# Patient Record
Sex: MALE | Race: WHITE | NOT HISPANIC OR LATINO | Employment: UNEMPLOYED | URBAN - METROPOLITAN AREA
[De-identification: names, ages, dates, MRNs, and addresses within clinical notes are randomized per-mention and may not be internally consistent; named-entity substitution may affect disease eponyms.]

---

## 2017-07-21 ENCOUNTER — HOSPITAL ENCOUNTER (INPATIENT)
Facility: MEDICAL CENTER | Age: 52
LOS: 1 days | DRG: 565 | End: 2017-07-22
Attending: EMERGENCY MEDICINE | Admitting: INTERNAL MEDICINE
Payer: COMMERCIAL

## 2017-07-21 ENCOUNTER — APPOINTMENT (OUTPATIENT)
Dept: RADIOLOGY | Facility: MEDICAL CENTER | Age: 52
DRG: 565 | End: 2017-07-21
Attending: INTERNAL MEDICINE
Payer: COMMERCIAL

## 2017-07-21 DIAGNOSIS — M79.605 BILATERAL LOWER EXTREMITY PAIN: ICD-10-CM

## 2017-07-21 DIAGNOSIS — M79.604 BILATERAL LOWER EXTREMITY PAIN: ICD-10-CM

## 2017-07-21 PROBLEM — T87.43 RIGHT BKA INFECTION (HCC): Status: ACTIVE | Noted: 2017-07-21

## 2017-07-21 PROBLEM — S81.801A WOUND OF RIGHT LEG: Status: ACTIVE | Noted: 2017-07-21

## 2017-07-21 PROBLEM — Z72.0 TOBACCO ABUSE: Status: ACTIVE | Noted: 2017-07-21

## 2017-07-21 PROBLEM — D50.9 MICROCYTIC ANEMIA: Status: ACTIVE | Noted: 2017-07-21

## 2017-07-21 PROBLEM — G62.9 PERIPHERAL NEUROPATHY: Status: ACTIVE | Noted: 2017-07-21

## 2017-07-21 PROBLEM — L97.522 SKIN ULCER OF LEFT FOOT WITH FAT LAYER EXPOSED (HCC): Status: ACTIVE | Noted: 2017-07-21

## 2017-07-21 LAB
ALBUMIN SERPL BCP-MCNC: 3.2 G/DL (ref 3.2–4.9)
ALBUMIN/GLOB SERPL: 0.6 G/DL
ALP SERPL-CCNC: 117 U/L (ref 30–99)
ALT SERPL-CCNC: 16 U/L (ref 2–50)
ANION GAP SERPL CALC-SCNC: 14 MMOL/L (ref 0–11.9)
AST SERPL-CCNC: 24 U/L (ref 12–45)
BASOPHILS # BLD AUTO: 0.4 % (ref 0–1.8)
BASOPHILS # BLD: 0.04 K/UL (ref 0–0.12)
BILIRUB SERPL-MCNC: 0.4 MG/DL (ref 0.1–1.5)
BUN SERPL-MCNC: 10 MG/DL (ref 8–22)
CALCIUM SERPL-MCNC: 8.7 MG/DL (ref 8.4–10.2)
CHLORIDE SERPL-SCNC: 99 MMOL/L (ref 96–112)
CO2 SERPL-SCNC: 24 MMOL/L (ref 20–33)
CREAT SERPL-MCNC: 0.72 MG/DL (ref 0.5–1.4)
CRP SERPL HS-MCNC: 2.52 MG/DL (ref 0–0.75)
EOSINOPHIL # BLD AUTO: 0.22 K/UL (ref 0–0.51)
EOSINOPHIL NFR BLD: 2.1 % (ref 0–6.9)
ERYTHROCYTE [DISTWIDTH] IN BLOOD BY AUTOMATED COUNT: 39.4 FL (ref 35.9–50)
ERYTHROCYTE [SEDIMENTATION RATE] IN BLOOD BY WESTERGREN METHOD: 69 MM/HOUR (ref 0–20)
GFR SERPL CREATININE-BSD FRML MDRD: >60 ML/MIN/1.73 M 2
GLOBULIN SER CALC-MCNC: 5.2 G/DL (ref 1.9–3.5)
GLUCOSE SERPL-MCNC: 97 MG/DL (ref 65–99)
HCT VFR BLD AUTO: 36.9 % (ref 42–52)
HGB BLD-MCNC: 11.6 G/DL (ref 14–18)
IMM GRANULOCYTES # BLD AUTO: 0.03 K/UL (ref 0–0.11)
IMM GRANULOCYTES NFR BLD AUTO: 0.3 % (ref 0–0.9)
IRON SATN MFR SERPL: 13 % (ref 15–55)
IRON SERPL-MCNC: 40 UG/DL (ref 50–180)
LYMPHOCYTES # BLD AUTO: 2.74 K/UL (ref 1–4.8)
LYMPHOCYTES NFR BLD: 25.6 % (ref 22–41)
MAGNESIUM SERPL-MCNC: 1.9 MG/DL (ref 1.5–2.5)
MCH RBC QN AUTO: 23.3 PG (ref 27–33)
MCHC RBC AUTO-ENTMCNC: 31.4 G/DL (ref 33.7–35.3)
MCV RBC AUTO: 74.2 FL (ref 81.4–97.8)
MONOCYTES # BLD AUTO: 0.48 K/UL (ref 0–0.85)
MONOCYTES NFR BLD AUTO: 4.5 % (ref 0–13.4)
NEUTROPHILS # BLD AUTO: 7.2 K/UL (ref 1.82–7.42)
NEUTROPHILS NFR BLD: 67.1 % (ref 44–72)
NRBC # BLD AUTO: 0 K/UL
NRBC BLD AUTO-RTO: 0 /100 WBC
PLATELET # BLD AUTO: 566 K/UL (ref 164–446)
PMV BLD AUTO: 8.5 FL (ref 9–12.9)
POTASSIUM SERPL-SCNC: 4.1 MMOL/L (ref 3.6–5.5)
PROT SERPL-MCNC: 8.4 G/DL (ref 6–8.2)
RBC # BLD AUTO: 4.97 M/UL (ref 4.7–6.1)
SODIUM SERPL-SCNC: 137 MMOL/L (ref 135–145)
TIBC SERPL-MCNC: 307 UG/DL (ref 250–450)
TSH SERPL DL<=0.005 MIU/L-ACNC: 0.92 UIU/ML (ref 0.35–5.5)
WBC # BLD AUTO: 10.7 K/UL (ref 4.8–10.8)

## 2017-07-21 PROCEDURE — 99407 BEHAV CHNG SMOKING > 10 MIN: CPT | Performed by: INTERNAL MEDICINE

## 2017-07-21 PROCEDURE — 99285 EMERGENCY DEPT VISIT HI MDM: CPT

## 2017-07-21 PROCEDURE — A9270 NON-COVERED ITEM OR SERVICE: HCPCS | Performed by: INTERNAL MEDICINE

## 2017-07-21 PROCEDURE — 73630 X-RAY EXAM OF FOOT: CPT | Mod: LT

## 2017-07-21 PROCEDURE — 85652 RBC SED RATE AUTOMATED: CPT

## 2017-07-21 PROCEDURE — 700105 HCHG RX REV CODE 258: Performed by: INTERNAL MEDICINE

## 2017-07-21 PROCEDURE — 83540 ASSAY OF IRON: CPT

## 2017-07-21 PROCEDURE — 700102 HCHG RX REV CODE 250 W/ 637 OVERRIDE(OP): Performed by: INTERNAL MEDICINE

## 2017-07-21 PROCEDURE — 770006 HCHG ROOM/CARE - MED/SURG/GYN SEMI*

## 2017-07-21 PROCEDURE — 83735 ASSAY OF MAGNESIUM: CPT

## 2017-07-21 PROCEDURE — 84443 ASSAY THYROID STIM HORMONE: CPT

## 2017-07-21 PROCEDURE — 86140 C-REACTIVE PROTEIN: CPT

## 2017-07-21 PROCEDURE — 73562 X-RAY EXAM OF KNEE 3: CPT | Mod: RT

## 2017-07-21 PROCEDURE — 94760 N-INVAS EAR/PLS OXIMETRY 1: CPT

## 2017-07-21 PROCEDURE — 700111 HCHG RX REV CODE 636 W/ 250 OVERRIDE (IP): Performed by: EMERGENCY MEDICINE

## 2017-07-21 PROCEDURE — 82728 ASSAY OF FERRITIN: CPT

## 2017-07-21 PROCEDURE — 36415 COLL VENOUS BLD VENIPUNCTURE: CPT

## 2017-07-21 PROCEDURE — 83550 IRON BINDING TEST: CPT

## 2017-07-21 PROCEDURE — 96372 THER/PROPH/DIAG INJ SC/IM: CPT

## 2017-07-21 PROCEDURE — 99223 1ST HOSP IP/OBS HIGH 75: CPT | Mod: 25 | Performed by: INTERNAL MEDICINE

## 2017-07-21 PROCEDURE — 80053 COMPREHEN METABOLIC PANEL: CPT

## 2017-07-21 PROCEDURE — 83036 HEMOGLOBIN GLYCOSYLATED A1C: CPT

## 2017-07-21 PROCEDURE — 96374 THER/PROPH/DIAG INJ IV PUSH: CPT

## 2017-07-21 PROCEDURE — 700111 HCHG RX REV CODE 636 W/ 250 OVERRIDE (IP): Performed by: INTERNAL MEDICINE

## 2017-07-21 PROCEDURE — 85025 COMPLETE CBC W/AUTO DIFF WBC: CPT

## 2017-07-21 RX ORDER — OXYCODONE HYDROCHLORIDE 5 MG/1
5 TABLET ORAL
Status: DISCONTINUED | OUTPATIENT
Start: 2017-07-21 | End: 2017-07-22 | Stop reason: HOSPADM

## 2017-07-21 RX ORDER — PROMETHAZINE HYDROCHLORIDE 25 MG/1
12.5-25 SUPPOSITORY RECTAL EVERY 4 HOURS PRN
Status: DISCONTINUED | OUTPATIENT
Start: 2017-07-21 | End: 2017-07-22 | Stop reason: HOSPADM

## 2017-07-21 RX ORDER — POLYETHYLENE GLYCOL 3350 17 G/17G
1 POWDER, FOR SOLUTION ORAL
Status: DISCONTINUED | OUTPATIENT
Start: 2017-07-21 | End: 2017-07-22 | Stop reason: HOSPADM

## 2017-07-21 RX ORDER — NICOTINE 21 MG/24HR
14 PATCH, TRANSDERMAL 24 HOURS TRANSDERMAL
Status: DISCONTINUED | OUTPATIENT
Start: 2017-07-21 | End: 2017-07-22 | Stop reason: HOSPADM

## 2017-07-21 RX ORDER — ONDANSETRON 2 MG/ML
4 INJECTION INTRAMUSCULAR; INTRAVENOUS EVERY 4 HOURS PRN
Status: DISCONTINUED | OUTPATIENT
Start: 2017-07-21 | End: 2017-07-22 | Stop reason: HOSPADM

## 2017-07-21 RX ORDER — MORPHINE SULFATE 4 MG/ML
2 INJECTION, SOLUTION INTRAMUSCULAR; INTRAVENOUS
Status: DISCONTINUED | OUTPATIENT
Start: 2017-07-21 | End: 2017-07-22 | Stop reason: HOSPADM

## 2017-07-21 RX ORDER — MORPHINE SULFATE 4 MG/ML
4 INJECTION, SOLUTION INTRAMUSCULAR; INTRAVENOUS
Status: COMPLETED | OUTPATIENT
Start: 2017-07-21 | End: 2017-07-22

## 2017-07-21 RX ORDER — AMOXICILLIN 250 MG
2 CAPSULE ORAL 2 TIMES DAILY
Status: DISCONTINUED | OUTPATIENT
Start: 2017-07-21 | End: 2017-07-22 | Stop reason: HOSPADM

## 2017-07-21 RX ORDER — SODIUM CHLORIDE 9 MG/ML
INJECTION, SOLUTION INTRAVENOUS CONTINUOUS
Status: DISCONTINUED | OUTPATIENT
Start: 2017-07-21 | End: 2017-07-22 | Stop reason: HOSPADM

## 2017-07-21 RX ORDER — ONDANSETRON 4 MG/1
4 TABLET, ORALLY DISINTEGRATING ORAL EVERY 4 HOURS PRN
Status: DISCONTINUED | OUTPATIENT
Start: 2017-07-21 | End: 2017-07-22 | Stop reason: HOSPADM

## 2017-07-21 RX ORDER — BISACODYL 10 MG
10 SUPPOSITORY, RECTAL RECTAL
Status: DISCONTINUED | OUTPATIENT
Start: 2017-07-21 | End: 2017-07-22 | Stop reason: HOSPADM

## 2017-07-21 RX ORDER — MORPHINE SULFATE 4 MG/ML
4 INJECTION, SOLUTION INTRAMUSCULAR; INTRAVENOUS ONCE
Status: COMPLETED | OUTPATIENT
Start: 2017-07-21 | End: 2017-07-21

## 2017-07-21 RX ORDER — PROMETHAZINE HYDROCHLORIDE 25 MG/1
12.5-25 TABLET ORAL EVERY 4 HOURS PRN
Status: DISCONTINUED | OUTPATIENT
Start: 2017-07-21 | End: 2017-07-22 | Stop reason: HOSPADM

## 2017-07-21 RX ORDER — GABAPENTIN 300 MG/1
300 CAPSULE ORAL 3 TIMES DAILY
Status: DISCONTINUED | OUTPATIENT
Start: 2017-07-21 | End: 2017-07-22

## 2017-07-21 RX ORDER — OXYCODONE HYDROCHLORIDE 5 MG/1
2.5 TABLET ORAL
Status: DISCONTINUED | OUTPATIENT
Start: 2017-07-21 | End: 2017-07-22 | Stop reason: HOSPADM

## 2017-07-21 RX ORDER — ONDANSETRON 2 MG/ML
4 INJECTION INTRAMUSCULAR; INTRAVENOUS
Status: DISCONTINUED | OUTPATIENT
Start: 2017-07-21 | End: 2017-07-22 | Stop reason: HOSPADM

## 2017-07-21 RX ORDER — ACETAMINOPHEN 325 MG/1
650 TABLET ORAL EVERY 6 HOURS PRN
Status: DISCONTINUED | OUTPATIENT
Start: 2017-07-21 | End: 2017-07-22 | Stop reason: HOSPADM

## 2017-07-21 RX ORDER — ONDANSETRON 2 MG/ML
4 INJECTION INTRAMUSCULAR; INTRAVENOUS ONCE
Status: DISCONTINUED | OUTPATIENT
Start: 2017-07-21 | End: 2017-07-22 | Stop reason: HOSPADM

## 2017-07-21 RX ADMIN — MORPHINE SULFATE 2 MG: 4 INJECTION INTRAVENOUS at 19:23

## 2017-07-21 RX ADMIN — MORPHINE SULFATE 4 MG: 4 INJECTION INTRAVENOUS at 14:29

## 2017-07-21 RX ADMIN — OXYCODONE HYDROCHLORIDE 5 MG: 5 TABLET ORAL at 20:40

## 2017-07-21 RX ADMIN — OXYCODONE HYDROCHLORIDE 5 MG: 5 TABLET ORAL at 16:49

## 2017-07-21 RX ADMIN — ACETAMINOPHEN 650 MG: 325 TABLET, FILM COATED ORAL at 20:44

## 2017-07-21 RX ADMIN — ONDANSETRON 4 MG: 2 INJECTION INTRAMUSCULAR; INTRAVENOUS at 14:16

## 2017-07-21 RX ADMIN — MORPHINE SULFATE 4 MG: 4 INJECTION INTRAVENOUS at 23:17

## 2017-07-21 RX ADMIN — SODIUM CHLORIDE: 9 INJECTION, SOLUTION INTRAVENOUS at 16:50

## 2017-07-21 ASSESSMENT — PAIN SCALES - GENERAL
PAINLEVEL_OUTOF10: 7
PAINLEVEL_OUTOF10: 8
PAINLEVEL_OUTOF10: 7
PAINLEVEL_OUTOF10: 7

## 2017-07-21 ASSESSMENT — ENCOUNTER SYMPTOMS
NAUSEA: 0
SHORTNESS OF BREATH: 0
MYALGIAS: 0
HEADACHES: 0
HEARTBURN: 0
FEVER: 0
DEPRESSION: 0
BLOOD IN STOOL: 0
COUGH: 0
HALLUCINATIONS: 0
ABDOMINAL PAIN: 0
DIZZINESS: 0
PALPITATIONS: 0
WEAKNESS: 0
DIARRHEA: 0
VOMITING: 0
SORE THROAT: 0
FOCAL WEAKNESS: 0
BACK PAIN: 0
CHILLS: 0

## 2017-07-21 NOTE — ED NOTES
"Med Rec completed per patient  Allergies reviewed  No ORAL antibiotics in last 30 days    Patient stated he found some [place] in a bag last week and took a few of those, along with a blue pill he found (\"maybe Oxycodone\")  "

## 2017-07-21 NOTE — ASSESSMENT & PLAN NOTE
- with hx of PVD and medical noncompliance with wound care since moving to [place]  - Wound care consult placed; appreciate recs  - Xray findings noted; elevated CRP/ESR noted; possible chr osteo  - Wound care obtained wound culture; started broad-spec abx  - ID recs appreciated; transferred to [facility] for LPS

## 2017-07-21 NOTE — ED NOTES
RN unsuccessful x 2 attempts on IV start. Lab called for assistance. ERP aware. Charge nurse notified for assistance.

## 2017-07-21 NOTE — ASSESSMENT & PLAN NOTE
- X-ray findings noted  - CRP and ESR elevated  - SIRS markers stable for now  - wound care obtained Cx; started on Vanc and Zosyn  - transferring to [facility] for LPS

## 2017-07-21 NOTE — H&P
" Hospital Medicine History and Physical    Date of Service  7/21/2017    Chief Complaint  Chief Complaint   Patient presents with   • Open Wound       History of Presenting Illness  52 y.o. male w hx of PVD s/p L TMA and R BKA, tobacco abuse who presented 7/21/2017 with R stump pain and drainage and persistent L TMA stump wound.    Had his amputations >5 years ago in [place].  Had home health with wound care while living there and was on disability but one year ago moved to [place] and has not had any wound care or medical follow up since.  A few months ago he noticed that his R BKA site seemed to have opened up.  After this it started draining straw colored fluid that would \"harden up\" on his skin.  It would also bleed intermittently.  He also noticed an ulcer form on his L TMA site.  Due to both issues he was not able to use his prosthesis (Right) and was not able to put weight on his L foot, therefore he has been crawling from place to place.  He tries not to go outside as he has no wheelchair.  He rents a room by the week at various hotels around [facility].    He denies any fever.  +drainage and pain at R BKA site.  No foul odor.    No change in urination or bowel habits.  Normal appetite.    Primary Care Physician  Pcp Pt States None    Consultants  LPS    Code Status  Full    Review of Systems  Review of Systems   Constitutional: Negative for fever, chills and malaise/fatigue.   HENT: Negative for sore throat.    Respiratory: Negative for cough and shortness of breath.    Cardiovascular: Negative for chest pain and palpitations.   Gastrointestinal: Negative for heartburn, nausea, vomiting, abdominal pain, diarrhea and blood in stool.   Genitourinary: Negative for dysuria and frequency.   Musculoskeletal: Negative for myalgias and back pain.   Neurological: Negative for dizziness, focal weakness, weakness and headaches.   Psychiatric/Behavioral: Negative for depression and hallucinations.   All other systems " reviewed and are negative.         Past Medical History  History reviewed. No pertinent past medical history.    Surgical History  Past Surgical History   Procedure Laterality Date   • Other orthopedic surgery       right BKA   • Other orthopedic surgery       left foot partial amputation       Medications  No current facility-administered medications on file prior to encounter.     No current outpatient prescriptions on file prior to encounter.       Family History  No FMHx of heart attack or stroke    Social History  Social History   Substance Use Topics   • Smoking status: Never Smoker    • Smokeless tobacco: Never Used   • Alcohol Use: No       Allergies  Allergies   Allergen Reactions   • Succinylcholine      Sister had malignant hyperthermia post administration        Physical Exam  Laboratory   Hemodynamics  Temp (24hrs), Av.1 °C (98.8 °F), Min:37.1 °C (98.8 °F), Max:37.1 °C (98.8 °F)   Temperature: 37.1 °C (98.8 °F)  Pulse  Av  Min: 103  Max: 103    Blood Pressure: (!) 99/71 mmHg, NIBP: 138/76 mmHg      Respiratory      Respiration: 18, Pulse Oximetry: 95 %             Physical Exam   Constitutional: He is oriented to person, place, and time. He appears well-developed and well-nourished. No distress.   Unkempt   HENT:   Head: Normocephalic.   Mouth/Throat: No oropharyngeal exudate.   Eyes:   R eye w corneal thickening, ptosis   Neck: Normal range of motion. No tracheal deviation present.   Cardiovascular: Normal rate and regular rhythm.    No murmur heard.  Pulmonary/Chest: Effort normal. No respiratory distress. He has no wheezes. He exhibits no tenderness.   Abdominal: Soft. He exhibits no distension. There is no tenderness. There is no guarding.   Musculoskeletal: Normal range of motion. He exhibits tenderness (R BKA stump). He exhibits no edema.   Lymphadenopathy:     He has no cervical adenopathy.   Neurological: He is alert and oriented to person, place, and time. No cranial nerve deficit.    Decreased sensation of L foot   Skin: Skin is warm and dry. No rash noted.   Broken skin on R BKA stump, no necrosis or abscess.  No fluctuance. +serosanguinous drainage.  Large ulceration on plantar aspect of L foot, clean edges, no drainage   Psychiatric: He has a normal mood and affect. His behavior is normal.   Nursing note and vitals reviewed.      Recent Labs      07/21/17   1441   WBC  10.7   RBC  4.97   HEMOGLOBIN  11.6*   HEMATOCRIT  36.9*   MCV  74.2*   MCH  23.3*   MCHC  31.4*   RDW  39.4   PLATELETCT  566*   MPV  8.5*     Recent Labs      07/21/17   1441   SODIUM  137   POTASSIUM  4.1   CHLORIDE  99   CO2  24   GLUCOSE  97   BUN  10   CREATININE  0.72   CALCIUM  8.7     Recent Labs      07/21/17   1441   ALTSGPT  16   ASTSGOT  24   ALKPHOSPHAT  117*   TBILIRUBIN  0.4   GLUCOSE  97                 No results found for: TROPONINI    Imaging  X ray L foot and R BKA pending     Assessment/Plan     I anticipate this patient will require at least two midnights for appropriate medical management, necessitating inpatient admission.  IV abx after cultures drawn, IV morphine for pain control.    * Wound of right leg, at BKA site  Assessment & Plan  He reports no hx of DM but history of poor circulation, had BKA RLE in 2008 (Dr. Collins in [place]).  He subsequently had left TMA, same physician also in [place].  States had MRSA infection in the past.  Since surgeries had home health with wound care but has since moved to [place] ~1year ago and no wound care since that time.  -Left plantar ulcer, deep tissues exposed, no drainage or evidence of acute infection  -R BKA dehiscence, draining straw colored material for last several weeks, CRP elevated, suspect chronic osteo  -Imaging w Xray ordered and pending  -LPS consulted, may need debridement vs further amp for suspected osteo.  -Hold off on abx until cultures drawn (possibly in OR) since he is not acutely ill  -May need ID as well pending imaging and LPS  evaluation    Skin ulcer of left foot with fat layer exposed (CMS-HCC)  Assessment & Plan  No purulent material but has been walking/crawling on open wound for months  -Imaging with xray  -CRP elevated  -F/U ESR  -Wound care  -Possible further surgery needed  -Hold off on abx until cultures drawn    Microcytic anemia  Assessment & Plan  Associated with thrombocytosis suggests iron deficiency  -Check iron studies  -Start supplement (PO) if low, avoid IV in setting of acute infection    Peripheral neuropathy  Assessment & Plan  Add gabapentin to help with pain control  Check A1C    Tobacco abuse  Assessment & Plan  -counseled for more than 10 minutes on tobacco cessation [id number]   -states he has cut down by only using e-vape version of tobacco          VTE prophylaxis: Lovenox.

## 2017-07-21 NOTE — ED PROVIDER NOTES
ED Provider Note    CHIEF COMPLAINT  Chief Complaint   Patient presents with   • Open Wound       HPI  Yariel Cheung is a 52 y.o. male who presents complaining of wounds on his legs. The patient only recently relocated here to [place]. He has a history of amputations secondary to circulation problems, although he's been told by other vascular surgeons as circulation is fine. He has a left-sided foot amputation, as well as a right-sided below-knee amputation. He had been in wound care for some time but was eventually released. Over the last several days has had increasing pain in both of his surgical sites, and on the right it's been having increasing drainage. He suspects he has had a fever although he has not had temperature to measure it. It feels like his leg is more swollen the right, not point he cannot wear his prosthesis. He denies any chest pain or shortness of breath. There is no nausea or vomiting. No change in bowel or bladder. There is no other complaint.    PAST MEDICAL HISTORY  No diabetes.    FAMILY HISTORY  History reviewed. No pertinent family history.    SOCIAL HISTORY  Social History   Substance Use Topics   • Smoking status: Never Smoker    • Smokeless tobacco: Never Used   • Alcohol Use: No         SURGICAL HISTORY  Past Surgical History   Procedure Laterality Date   • Other orthopedic surgery       right BKA   • Other orthopedic surgery       left foot partial amputation       CURRENT MEDICATIONS  Home Medications     Reviewed by Deniz Chaudhari (Pharmacy Tech) on 07/21/17 at 1339  Med List Status: Complete    Medication Last Dose Status          Patient Rao Taking any Medications                        I have reviewed the nurses notes and/or the list brought with the patient.    ALLERGIES  Allergies   Allergen Reactions   • Succinylcholine      Sister had malignant hyperthermia post administration       REVIEW OF SYSTEMS  See HPI for further details. Review of systems as above, otherwise  "all other systems are negative.     PHYSICAL EXAM  VITAL SIGNS: BP 99/71 mmHg  Pulse 103  Temp(Src) 37.1 °C (98.8 °F)  Resp 18  Ht 1.854 m (6' 0.99\")  Wt 72.576 kg (160 lb)  BMI 21.11 kg/m2  SpO2 97%  Constitutional: Well appearing patient in no acute distress.  Not toxic, nor ill in appearance.  HENT: Mucus membranes moist.  Oropharynx is clear.  Eyes: Right eye trauma.  No scleral icterus.   Neck: Full nontender range of motion.  Lymphatic: No cervical lymphadenopathy noted.   Cardiovascular: Regular heart rate and rhythm.  No murmurs, rubs, nor gallop appreciated.   Thorax & Lungs: Chest is nontender.  Lungs are clear to auscultation with good air movement bilaterally.  No wheeze, rhonchi, nor rales.   Abdomen: Soft, with no tenderness, rebound nor guarding.  No mass, pulsatile mass, nor hepatosplenomegaly appreciated.  Skin: No purpura nor petechia noted.  Extremities/Musculoskeletal: Left distal foot amputation. There is diffusely tender however I see no erythema or warmth. There is no drainage. On the right, he has some serous sanguinous drainage from the end of the scar. This is exquisitely tender. There is no warmth or erythema. I do not appreciate any edema. He has a bounding left posterior tibialis  Neurologic: Alert & oriented.  Strength and sensation is intact all around.   Psychiatric: Normal affect appropriate for the clinical situation.  LABS  As ordered:  Labs Reviewed   CBC WITH DIFFERENTIAL   COMP METABOLIC PANEL   CRP QUANTITIVE (NON-CARDIAC)   WESTERGREN SED RATE   MAGNESIUM     MEDICAL RECORD  I have reviewed patient's medical record and pertinent results are listed above.    COURSE & MEDICAL DECISION MAKING  I have reviewed any medical record information, laboratory studies and radiographic results as noted above.  This patient presents with increasing pain of his amputation sites, and some drainage and swelling of the right to the point that he cannot wear his prosthesis. I've ordered " laboratories.  At this point, recommended imaging to confirm that there is no evidence of osteomyelitis. He also will need pain control. Discussed the patient's case with Dr. Morelos. She'll be consulting on him.      FINAL IMPRESSION  1. Bilateral lower extremity pain           This dictation was created using voice recognition software.    Electronically signed by: Pepe Cash, 7/21/2017 2:38 PM

## 2017-07-21 NOTE — ED NOTES
Wounds on right BKA stump and wound on bottom of left foot. States wound on bottom of left foot was under weekly treatment until 4 months ago when stopped going to treatment. Writing in pain.   States just moved to [place] and living in [facility].

## 2017-07-21 NOTE — ASSESSMENT & PLAN NOTE
- iron studies noted; awaiting stool occult  - starting oral iron supplementation and monitoring for bleed

## 2017-07-21 NOTE — ASSESSMENT & PLAN NOTE
- counseled by admitting hospitalist for more than 10 minutes on tobacco cessation   - states he vapes to assist with cessation

## 2017-07-22 ENCOUNTER — RESOLUTE PROFESSIONAL BILLING HOSPITAL PROF FEE (OUTPATIENT)
Dept: HOSPITALIST | Facility: MEDICAL CENTER | Age: 52
End: 2017-07-22
Payer: COMMERCIAL

## 2017-07-22 ENCOUNTER — HOSPITAL ENCOUNTER (INPATIENT)
Facility: MEDICAL CENTER | Age: 52
LOS: 88 days | DRG: 464 | End: 2017-10-18
Attending: INTERNAL MEDICINE | Admitting: INTERNAL MEDICINE
Payer: COMMERCIAL

## 2017-07-22 VITALS
BODY MASS INDEX: 21.2 KG/M2 | OXYGEN SATURATION: 94 % | WEIGHT: 160 LBS | HEIGHT: 73 IN | DIASTOLIC BLOOD PRESSURE: 53 MMHG | SYSTOLIC BLOOD PRESSURE: 99 MMHG | RESPIRATION RATE: 18 BRPM | TEMPERATURE: 97.9 F | HEART RATE: 83 BPM

## 2017-07-22 DIAGNOSIS — F11.20 UNCOMPLICATED OPIOID DEPENDENCE (HCC): ICD-10-CM

## 2017-07-22 DIAGNOSIS — S81.801S WOUND OF RIGHT LOWER EXTREMITY, SEQUELA: ICD-10-CM

## 2017-07-22 DIAGNOSIS — G62.9 PERIPHERAL POLYNEUROPATHY: ICD-10-CM

## 2017-07-22 DIAGNOSIS — L97.522 SKIN ULCER OF LEFT FOOT WITH FAT LAYER EXPOSED (HCC): ICD-10-CM

## 2017-07-22 DIAGNOSIS — S81.801S WOUND OF RIGHT LEG, SEQUELA: ICD-10-CM

## 2017-07-22 LAB
ALBUMIN SERPL BCP-MCNC: 2.4 G/DL (ref 3.2–4.9)
BASOPHILS # BLD AUTO: 0.7 % (ref 0–1.8)
BASOPHILS # BLD: 0.05 K/UL (ref 0–0.12)
BUN SERPL-MCNC: 9 MG/DL (ref 8–22)
CALCIUM SERPL-MCNC: 8.4 MG/DL (ref 8.4–10.2)
CHLORIDE SERPL-SCNC: 104 MMOL/L (ref 96–112)
CO2 SERPL-SCNC: 26 MMOL/L (ref 20–33)
CREAT SERPL-MCNC: 0.79 MG/DL (ref 0.5–1.4)
EOSINOPHIL # BLD AUTO: 0.24 K/UL (ref 0–0.51)
EOSINOPHIL NFR BLD: 3.2 % (ref 0–6.9)
ERYTHROCYTE [DISTWIDTH] IN BLOOD BY AUTOMATED COUNT: 39.8 FL (ref 35.9–50)
FERRITIN SERPL-MCNC: 131.4 NG/ML (ref 22–322)
GFR SERPL CREATININE-BSD FRML MDRD: >60 ML/MIN/1.73 M 2
GLUCOSE SERPL-MCNC: 117 MG/DL (ref 65–99)
GRAM STN SPEC: NORMAL
HCT VFR BLD AUTO: 35.7 % (ref 42–52)
HGB BLD-MCNC: 11.2 G/DL (ref 14–18)
IMM GRANULOCYTES # BLD AUTO: 0.03 K/UL (ref 0–0.11)
IMM GRANULOCYTES NFR BLD AUTO: 0.4 % (ref 0–0.9)
LYMPHOCYTES # BLD AUTO: 2.02 K/UL (ref 1–4.8)
LYMPHOCYTES NFR BLD: 26.6 % (ref 22–41)
MAGNESIUM SERPL-MCNC: 1.9 MG/DL (ref 1.5–2.5)
MCH RBC QN AUTO: 23.5 PG (ref 27–33)
MCHC RBC AUTO-ENTMCNC: 31.4 G/DL (ref 33.7–35.3)
MCV RBC AUTO: 74.8 FL (ref 81.4–97.8)
MONOCYTES # BLD AUTO: 0.35 K/UL (ref 0–0.85)
MONOCYTES NFR BLD AUTO: 4.6 % (ref 0–13.4)
NEUTROPHILS # BLD AUTO: 4.91 K/UL (ref 1.82–7.42)
NEUTROPHILS NFR BLD: 64.5 % (ref 44–72)
NRBC # BLD AUTO: 0 K/UL
NRBC BLD AUTO-RTO: 0 /100 WBC
PHOSPHATE SERPL-MCNC: 3.2 MG/DL (ref 2.5–4.5)
PLATELET # BLD AUTO: 505 K/UL (ref 164–446)
PMV BLD AUTO: 8.5 FL (ref 9–12.9)
POTASSIUM SERPL-SCNC: 3.8 MMOL/L (ref 3.6–5.5)
RBC # BLD AUTO: 4.77 M/UL (ref 4.7–6.1)
SIGNIFICANT IND 70042: NORMAL
SITE SITE: NORMAL
SODIUM SERPL-SCNC: 138 MMOL/L (ref 135–145)
SOURCE SOURCE: NORMAL
WBC # BLD AUTO: 7.6 K/UL (ref 4.8–10.8)

## 2017-07-22 PROCEDURE — 700102 HCHG RX REV CODE 250 W/ 637 OVERRIDE(OP): Performed by: INTERNAL MEDICINE

## 2017-07-22 PROCEDURE — 700105 HCHG RX REV CODE 258: Performed by: INTERNAL MEDICINE

## 2017-07-22 PROCEDURE — 85025 COMPLETE CBC W/AUTO DIFF WBC: CPT

## 2017-07-22 PROCEDURE — 700111 HCHG RX REV CODE 636 W/ 250 OVERRIDE (IP): Performed by: EMERGENCY MEDICINE

## 2017-07-22 PROCEDURE — A9270 NON-COVERED ITEM OR SERVICE: HCPCS | Performed by: INTERNAL MEDICINE

## 2017-07-22 PROCEDURE — 3E0234Z INTRODUCTION OF SERUM, TOXOID AND VACCINE INTO MUSCLE, PERCUTANEOUS APPROACH: ICD-10-PCS | Performed by: INTERNAL MEDICINE

## 2017-07-22 PROCEDURE — 36415 COLL VENOUS BLD VENIPUNCTURE: CPT

## 2017-07-22 PROCEDURE — 700111 HCHG RX REV CODE 636 W/ 250 OVERRIDE (IP): Performed by: INTERNAL MEDICINE

## 2017-07-22 PROCEDURE — 90670 PCV13 VACCINE IM: CPT | Performed by: INTERNAL MEDICINE

## 2017-07-22 PROCEDURE — 99223 1ST HOSP IP/OBS HIGH 75: CPT | Performed by: INTERNAL MEDICINE

## 2017-07-22 PROCEDURE — 87205 SMEAR GRAM STAIN: CPT

## 2017-07-22 PROCEDURE — 87186 SC STD MICRODIL/AGAR DIL: CPT | Mod: 91

## 2017-07-22 PROCEDURE — 83735 ASSAY OF MAGNESIUM: CPT

## 2017-07-22 PROCEDURE — 87077 CULTURE AEROBIC IDENTIFY: CPT | Mod: 91

## 2017-07-22 PROCEDURE — 87070 CULTURE OTHR SPECIMN AEROBIC: CPT

## 2017-07-22 PROCEDURE — 770006 HCHG ROOM/CARE - MED/SURG/GYN SEMI*

## 2017-07-22 PROCEDURE — 80069 RENAL FUNCTION PANEL: CPT

## 2017-07-22 RX ORDER — MORPHINE SULFATE 4 MG/ML
2 INJECTION, SOLUTION INTRAMUSCULAR; INTRAVENOUS
Status: DISCONTINUED | OUTPATIENT
Start: 2017-07-22 | End: 2017-08-01

## 2017-07-22 RX ORDER — ONDANSETRON 4 MG/1
4 TABLET, ORALLY DISINTEGRATING ORAL EVERY 4 HOURS PRN
Status: DISCONTINUED | OUTPATIENT
Start: 2017-07-22 | End: 2017-10-18 | Stop reason: HOSPADM

## 2017-07-22 RX ORDER — POLYETHYLENE GLYCOL 3350 17 G/17G
1 POWDER, FOR SOLUTION ORAL
Status: CANCELLED | OUTPATIENT
Start: 2017-07-22

## 2017-07-22 RX ORDER — PROMETHAZINE HYDROCHLORIDE 25 MG/1
12.5-25 SUPPOSITORY RECTAL EVERY 4 HOURS PRN
Status: CANCELLED | OUTPATIENT
Start: 2017-07-22

## 2017-07-22 RX ORDER — FERROUS SULFATE 325(65) MG
325 TABLET ORAL 2 TIMES DAILY WITH MEALS
Status: DISCONTINUED | OUTPATIENT
Start: 2017-07-22 | End: 2017-10-18 | Stop reason: HOSPADM

## 2017-07-22 RX ORDER — NICOTINE 21 MG/24HR
14 PATCH, TRANSDERMAL 24 HOURS TRANSDERMAL
Status: DISCONTINUED | OUTPATIENT
Start: 2017-07-23 | End: 2017-08-03

## 2017-07-22 RX ORDER — BISACODYL 10 MG
10 SUPPOSITORY, RECTAL RECTAL
Status: CANCELLED | OUTPATIENT
Start: 2017-07-22

## 2017-07-22 RX ORDER — AMOXICILLIN 250 MG
2 CAPSULE ORAL 2 TIMES DAILY
Status: CANCELLED | OUTPATIENT
Start: 2017-07-22

## 2017-07-22 RX ORDER — OXYCODONE HYDROCHLORIDE 5 MG/1
5 TABLET ORAL
Status: CANCELLED | OUTPATIENT
Start: 2017-07-22

## 2017-07-22 RX ORDER — ONDANSETRON 2 MG/ML
4 INJECTION INTRAMUSCULAR; INTRAVENOUS EVERY 4 HOURS PRN
Status: CANCELLED | OUTPATIENT
Start: 2017-07-22

## 2017-07-22 RX ORDER — NICOTINE 21 MG/24HR
14 PATCH, TRANSDERMAL 24 HOURS TRANSDERMAL
Status: CANCELLED | OUTPATIENT
Start: 2017-07-23

## 2017-07-22 RX ORDER — SODIUM CHLORIDE 9 MG/ML
INJECTION, SOLUTION INTRAVENOUS CONTINUOUS
Status: CANCELLED | OUTPATIENT
Start: 2017-07-22

## 2017-07-22 RX ORDER — PROMETHAZINE HYDROCHLORIDE 25 MG/1
12.5-25 SUPPOSITORY RECTAL EVERY 4 HOURS PRN
Status: DISCONTINUED | OUTPATIENT
Start: 2017-07-22 | End: 2017-08-15

## 2017-07-22 RX ORDER — FERROUS SULFATE 325(65) MG
325 TABLET ORAL 2 TIMES DAILY WITH MEALS
Status: DISCONTINUED | OUTPATIENT
Start: 2017-07-22 | End: 2017-07-22 | Stop reason: HOSPADM

## 2017-07-22 RX ORDER — AMOXICILLIN 250 MG
2 CAPSULE ORAL 2 TIMES DAILY
Status: DISCONTINUED | OUTPATIENT
Start: 2017-07-22 | End: 2017-07-31

## 2017-07-22 RX ORDER — MORPHINE SULFATE 4 MG/ML
2 INJECTION, SOLUTION INTRAMUSCULAR; INTRAVENOUS
Status: CANCELLED | OUTPATIENT
Start: 2017-07-22

## 2017-07-22 RX ORDER — OXYCODONE HYDROCHLORIDE 5 MG/1
2.5 TABLET ORAL
Status: DISCONTINUED | OUTPATIENT
Start: 2017-07-22 | End: 2017-08-01

## 2017-07-22 RX ORDER — POLYETHYLENE GLYCOL 3350 17 G/17G
1 POWDER, FOR SOLUTION ORAL
Status: DISCONTINUED | OUTPATIENT
Start: 2017-07-22 | End: 2017-07-31

## 2017-07-22 RX ORDER — ACETAMINOPHEN 325 MG/1
650 TABLET ORAL EVERY 6 HOURS PRN
Status: CANCELLED | OUTPATIENT
Start: 2017-07-22

## 2017-07-22 RX ORDER — BISACODYL 10 MG
10 SUPPOSITORY, RECTAL RECTAL
Status: DISCONTINUED | OUTPATIENT
Start: 2017-07-22 | End: 2017-07-31

## 2017-07-22 RX ORDER — ONDANSETRON 4 MG/1
4 TABLET, ORALLY DISINTEGRATING ORAL EVERY 4 HOURS PRN
Status: CANCELLED | OUTPATIENT
Start: 2017-07-22

## 2017-07-22 RX ORDER — ONDANSETRON 2 MG/ML
4 INJECTION INTRAMUSCULAR; INTRAVENOUS EVERY 4 HOURS PRN
Status: DISCONTINUED | OUTPATIENT
Start: 2017-07-22 | End: 2017-10-18 | Stop reason: HOSPADM

## 2017-07-22 RX ORDER — PROMETHAZINE HYDROCHLORIDE 25 MG/1
12.5-25 TABLET ORAL EVERY 4 HOURS PRN
Status: DISCONTINUED | OUTPATIENT
Start: 2017-07-22 | End: 2017-08-15

## 2017-07-22 RX ORDER — ACETAMINOPHEN 325 MG/1
650 TABLET ORAL EVERY 6 HOURS PRN
Status: DISCONTINUED | OUTPATIENT
Start: 2017-07-22 | End: 2017-10-18 | Stop reason: HOSPADM

## 2017-07-22 RX ORDER — SODIUM CHLORIDE 9 MG/ML
INJECTION, SOLUTION INTRAVENOUS CONTINUOUS
Status: DISCONTINUED | OUTPATIENT
Start: 2017-07-22 | End: 2017-07-23

## 2017-07-22 RX ORDER — OXYCODONE HYDROCHLORIDE 5 MG/1
5 TABLET ORAL
Status: DISCONTINUED | OUTPATIENT
Start: 2017-07-22 | End: 2017-08-01

## 2017-07-22 RX ORDER — PROMETHAZINE HYDROCHLORIDE 25 MG/1
12.5-25 TABLET ORAL EVERY 4 HOURS PRN
Status: CANCELLED | OUTPATIENT
Start: 2017-07-22

## 2017-07-22 RX ORDER — OXYCODONE HYDROCHLORIDE 5 MG/1
2.5 TABLET ORAL
Status: CANCELLED | OUTPATIENT
Start: 2017-07-22

## 2017-07-22 RX ORDER — FERROUS SULFATE 325(65) MG
325 TABLET ORAL 2 TIMES DAILY WITH MEALS
Status: CANCELLED | OUTPATIENT
Start: 2017-07-22

## 2017-07-22 RX ADMIN — SODIUM CHLORIDE: 9 INJECTION, SOLUTION INTRAVENOUS at 04:42

## 2017-07-22 RX ADMIN — STANDARDIZED SENNA CONCENTRATE AND DOCUSATE SODIUM 1 TABLET: 8.6; 5 TABLET, FILM COATED ORAL at 19:15

## 2017-07-22 RX ADMIN — PNEUMOCOCCAL 13-VALENT CONJUGATE VACCINE 0.5 ML: 2.2; 2.2; 2.2; 2.2; 2.2; 4.4; 2.2; 2.2; 2.2; 2.2; 2.2; 2.2; 2.2 INJECTION, SUSPENSION INTRAMUSCULAR at 23:25

## 2017-07-22 RX ADMIN — ENOXAPARIN SODIUM 40 MG: 100 INJECTION SUBCUTANEOUS at 09:16

## 2017-07-22 RX ADMIN — OXYCODONE HYDROCHLORIDE 5 MG: 5 TABLET ORAL at 16:31

## 2017-07-22 RX ADMIN — MORPHINE SULFATE 2 MG: 4 INJECTION INTRAVENOUS at 18:44

## 2017-07-22 RX ADMIN — Medication 325 MG: at 16:31

## 2017-07-22 RX ADMIN — NICOTINE 14 MG: 14 PATCH, EXTENDED RELEASE TRANSDERMAL at 06:13

## 2017-07-22 RX ADMIN — OXYCODONE HYDROCHLORIDE 5 MG: 5 TABLET ORAL at 06:13

## 2017-07-22 RX ADMIN — OXYCODONE HYDROCHLORIDE 5 MG: 5 TABLET ORAL at 13:00

## 2017-07-22 RX ADMIN — OXYCODONE HYDROCHLORIDE 5 MG: 5 TABLET ORAL at 09:15

## 2017-07-22 RX ADMIN — MORPHINE SULFATE 4 MG: 4 INJECTION INTRAVENOUS at 04:42

## 2017-07-22 RX ADMIN — OXYCODONE HYDROCHLORIDE 5 MG: 5 TABLET ORAL at 02:12

## 2017-07-22 RX ADMIN — SODIUM CHLORIDE: 9 INJECTION, SOLUTION INTRAVENOUS at 16:47

## 2017-07-22 RX ADMIN — OXYCODONE HYDROCHLORIDE 5 MG: 5 TABLET ORAL at 23:06

## 2017-07-22 RX ADMIN — VANCOMYCIN HYDROCHLORIDE 1800 MG: 10 INJECTION, POWDER, LYOPHILIZED, FOR SOLUTION INTRAVENOUS at 11:02

## 2017-07-22 RX ADMIN — SODIUM CHLORIDE: 9 INJECTION, SOLUTION INTRAVENOUS at 23:10

## 2017-07-22 RX ADMIN — PIPERACILLIN SODIUM AND TAZOBACTAM SODIUM 3.38 G: 3; .375 INJECTION, POWDER, FOR SOLUTION INTRAVENOUS at 18:44

## 2017-07-22 RX ADMIN — FERROUS SULFATE TAB 325 MG (65 MG ELEMENTAL FE) 325 MG: 325 (65 FE) TAB at 09:15

## 2017-07-22 RX ADMIN — VANCOMYCIN HYDROCHLORIDE 1200 MG: 100 INJECTION, POWDER, LYOPHILIZED, FOR SOLUTION INTRAVENOUS at 23:15

## 2017-07-22 RX ADMIN — ACETAMINOPHEN 650 MG: 325 TABLET, FILM COATED ORAL at 19:14

## 2017-07-22 ASSESSMENT — PAIN SCALES - GENERAL
PAINLEVEL_OUTOF10: 4
PAINLEVEL_OUTOF10: 7
PAINLEVEL_OUTOF10: 7
PAINLEVEL_OUTOF10: 5
PAINLEVEL_OUTOF10: 7
PAINLEVEL_OUTOF10: 8

## 2017-07-22 ASSESSMENT — LIFESTYLE VARIABLES
ALCOHOL_USE: NO
EVER_SMOKED: YES
ALCOHOL_USE: NO
EVER_SMOKED: YES
DO YOU DRINK ALCOHOL: NO

## 2017-07-22 ASSESSMENT — PATIENT HEALTH QUESTIONNAIRE - PHQ9
SUM OF ALL RESPONSES TO PHQ QUESTIONS 1-9: 0
2. FEELING DOWN, DEPRESSED, IRRITABLE, OR HOPELESS: NOT AT ALL
1. LITTLE INTEREST OR PLEASURE IN DOING THINGS: NOT AT ALL
SUM OF ALL RESPONSES TO PHQ9 QUESTIONS 1 AND 2: 0

## 2017-07-22 NOTE — CARE PLAN
Problem: Infection  Goal: Will remain free from infection  Intervention: Implement standard precautions and perform hand washing before and after patient contact  Hand washing continued before and after contact with patient  Patient educated on hand washing      Problem: Pain Management  Goal: Pain level will decrease to patient’s comfort goal  Pain assessed on 0-10 pain scale  Reporting 7/10 to right BKA and left foot  Medicated per MAR with prn morphine and oxy      Intervention: Educate and implement non-pharmacologic comfort measures. Examples: relaxation, distration, play therapy, activity therapy, massage, etc.  Elevation of extremities with pt report of relief with elevation

## 2017-07-22 NOTE — DISCHARGE PLANNING
Per Transfer Nurse Lachelle patient will go to room T428-2.  Transportation has been arranged with YOHAN for patient to transfer to [facility] today at 1300.  LISBETH Villar has been advised of transport time.

## 2017-07-22 NOTE — CONSULTS
INFECTIOUS DISEASES INPATIENT CONSULT NOTE     Date of Service: 7/22/2017    Consult Requested By: Manuel Reece M.D.    Reason for Consultation: nonhealing BKA and foot ulcer    History of Present Illness:   Yariel Cheung is a 52 y.o. White male w hx of PVD s/p L TMA and R BKA, tobacco abuse whowas admitted 7/21/2017 with R stump pain and drainage and persistent L TMA stump wound.    Had his amputations >5 years ago in [place].  States the left foot wound never healed.  The BKA site originally healed then started draining and swelling intermittently 5 months ago.  Had home health with wound care while living there and was on disability but one year ago moved to [place] and has not had any wound care or medical follow up since. +Fever when it swelled-none now. + bleeding intermittently.   Due to both issues he was not able to use his prosthesis (Right) and was not able to put weight on his L foot, therefore he has to crawl.    No foul odor.   No N/V. No change in urination or bowel habits.  Normal appetite.  He stopped seeing his doctor due to limitations on narcotics  Currently on no abtibiotics. Infectious Diseases consulted for antibiotic recommendation and management      Review Of Systems:  ROS are negative except as noted above in the HPI.    PMH:   Blind right eye  Right BKA  Left TMA  PVD  No DM    PSH:  Past Surgical History   Procedure Laterality Date   • Other orthopedic surgery       right BKA   • Other orthopedic surgery       left foot partial amputation       FAMILY HX:  Denied    SOCIAL HX: Vapes for past 2 years  Social History     Social History   • Marital Status: Single     Spouse Name: N/A   • Number of Children: N/A   • Years of Education: N/A     Occupational History   • Not on file.     Social History Main Topics   • Smoking status: Former Smoker    • Smokeless tobacco: Never Used   • Alcohol Use: No   • Drug Use: Yes      Comment: cannabis   • Sexual Activity: Not on file       History    Alcohol Use No       Allergies/Intolerances:  Allergies   Allergen Reactions   • Succinylcholine      Sister had malignant hyperthermia post administration       History reviewed with the patient    Other Current Medications:    Current facility-administered medications:   •  ferrous sulfate tablet 325 mg, 325 mg, Oral, BID WITH MEALS, Manuel Reece M.D., 325 mg at 07/22/17 0915  •  MD RIVERA... vancomycin per pharmacy protocol, , Other, pharmacy to dose, Joie Ramon M.D.  •  piperacillin-tazobactam (ZOSYN) 3.375 g in  mL IVPB, 3.375 g, Intravenous, Q6HRS, Joie Ramon M.D.  •  vancomycin 1,200 mg in  mL IVPB, 17 mg/kg, Intravenous, Q12HR, Manuel Reece M.D.  •  [COMPLETED] morphine (pf) 4 mg/ml injection 4 mg, 4 mg, Intravenous, Q30 MIN PRN, 4 mg at 07/22/17 0442 **AND** ondansetron (ZOFRAN) syringe/vial injection 4 mg, 4 mg, Intravenous, Q HOUR PRN, Pepe Cash M.D., 4 mg at 07/21/17 1416  •  ondansetron (ZOFRAN) syringe/vial injection 4 mg, 4 mg, Intramuscular, Once, Pepe Cash M.D., Stopped at 07/21/17 1445  •  senna-docusate (PERICOLACE or SENOKOT S) 8.6-50 MG per tablet 2 Tab, 2 Tab, Oral, BID, 2 Tab at 07/21/17 2040 **AND** polyethylene glycol/lytes (MIRALAX) PACKET 1 Packet, 1 Packet, Oral, QDAY PRN **AND** magnesium hydroxide (MILK OF MAGNESIA) suspension 30 mL, 30 mL, Oral, QDAY PRN, 30 mL at 07/21/17 2037 **AND** bisacodyl (DULCOLAX) suppository 10 mg, 10 mg, Rectal, QDAY PRN, Patricia Morelos D.O.  •  NS infusion, , Intravenous, Continuous, Patricia Morelos D.O., Last Rate: 83 mL/hr at 07/22/17 0442  •  enoxaparin (LOVENOX) inj 40 mg, 40 mg, Subcutaneous, DAILY, CESAR GarcíaO., 40 mg at 07/22/17 0916  •  ondansetron (ZOFRAN) syringe/vial injection 4 mg, 4 mg, Intravenous, Q4HRS PRN, CESAR GarcíaO.  •  ondansetron (ZOFRAN ODT) dispertab 4 mg, 4 mg, Oral, Q4HRS PRN, CESAR GarcíaO.  •  promethazine (PHENERGAN) tablet 12.5-25 mg, 12.5-25 mg,  "Oral, Q4HRS PRN, Patricia Morelos D.O.  •  promethazine (PHENERGAN) suppository 12.5-25 mg, 12.5-25 mg, Rectal, Q4HRS PRN, Patricia Moreols D.O.  •  prochlorperazine (COMPAZINE) injection 5-10 mg, 5-10 mg, Intravenous, Q4HRS PRN, Patricia Morelos D.O.  •  INITIATE NICOTINE REPLACEMENT PROTOCOL , , , Once **AND** nicotine (NICODERM) 14 MG/24HR 14 mg, 14 mg, Transdermal, Daily-0600, 14 mg at 17 0613 **AND** Protocol 205 PATIENT EDUCATION MATERIALS, , , Once **AND** Protocol 205 Rotate nicotine patch application sites daily , , , CONTINUOUS **AND** nicotine polacrilex (NICORETTE) 2 MG piece 2 mg, 2 mg, Oral, Q HOUR PRN, Patricia Morelos D.O.  •  acetaminophen (TYLENOL) tablet 650 mg, 650 mg, Oral, Q6HRS PRN, Patricia Morelos D.O., 650 mg at 17  •  Notify provider if pain remains uncontrolled, , , CONTINUOUS **AND** Use the numeric rating scale (NRS-11) on regular floors and Critical-Care Pain Observation Tool (CPOT) on ICUs/Trauma to assess pain, , , CONTINUOUS **AND** Pulse Ox (Oximetry), , , CONTINUOUS **AND** Pharmacy Consult Request ...Pain Management Review, , Other, PRN **AND** If patient difficult to arouse and/or has respiratory depression, stop any opiates that are currently infusing and call a Rapid Response., , , CONTINUOUS **AND** oxycodone immediate-release (ROXICODONE) tablet 2.5 mg, 2.5 mg, Oral, Q3HRS PRN **AND** oxycodone immediate-release (ROXICODONE) tablet 5 mg, 5 mg, Oral, Q3HRS PRN, 5 mg at 17 1300 **AND** morphine (pf) 4 mg/ml injection 2 mg, 2 mg, Intravenous, Q3HRS PRN, Patricia Morelos D.O., 2 mg at 17      Most Recent Vital Signs:  BP 99/53 mmHg  Pulse 83  Temp(Src) 36.6 °C (97.9 °F)  Resp 18  Ht 1.854 m (6' 0.99\")  Wt 72.576 kg (160 lb)  BMI 21.11 kg/m2  SpO2 94%  Temp  Av.7 °C (98 °F)  Min: 36.4 °C (97.5 °F)  Max: 37.1 °C (98.8 °F)    Physical Exam:  General: Disheveled well-appearing, well nourished no acute distress  HEENT: sclera " anicteric, PERRL, EOMI, MMM, no oral lesions Dentition poor  Neck: supple, no lymphadenopathy  Chest: CTAB, no r/r/w, unlabored.  Cardiac: RRR, normal S1 S2, no m/r/g   Abdomen: + bowel sounds, soft, non-tender, non-distended, no HSM  Extremities: No cyanosis, clubbing. no edema, + pulses Left TMA with large plantar wound 5 X 6 X 1 cm  Drainage from BKA  Skin: tattoos  Neuro: Alert and oriented times 3, Speech fluent CN intact OLIVA    Pertinent Lab Results:  Recent Labs      07/21/17   1441  07/22/17   0751   WBC  10.7  7.6      Recent Labs      07/21/17   1441  07/22/17   0751   HEMOGLOBIN  11.6*  11.2*   HEMATOCRIT  36.9*  35.7*   MCV  74.2*  74.8*   MCH  23.3*  23.5*   PLATELETCT  566*  505*         Recent Labs      07/21/17   1441  07/22/17   0751   SODIUM  137  138   POTASSIUM  4.1  3.8   CHLORIDE  99  104   CO2  24  26   CREATININE  0.72  0.79        Recent Labs      07/21/17   1441  07/22/17   0751   ALBUMIN  3.2  2.4*        Pertinent Micro:  Results     Procedure Component Value Units Date/Time    CULTURE WOUND W/ GRAM STAIN [[id number]] Collected:  07/22/17 1000    Order Status:  Completed Specimen Information:  Wound from Left Foot Updated:  07/22/17 1051    CULTURE WOUND W/ GRAM STAIN [[id number]]     Order Status:  Sent Specimen Information:  Wound from Right Leg         No results found for: BLOODCULTU, BLDCULT, BCHOLD     Studies:    IMPRESSION:   Likely PVD  Nonhealing TMA with ulceration  Dehiscence BKA      PLAN:   Wound of right leg, at BKA site  Afebrile  No leukocytosis  No hx of DM  Suspect chronic osteo  PVD-needs reeval    + MRSA infection in the past.   Noncompliant with wound care and follow up  Xray equiv-MRI if feasible  Start vanco and Zosyn pending debridement    Skin ulcer of left foot with fat layer exposed (CMS-HCC)  Left plantar ulcer, deep tissues exposed, serous drainage   Xray equiv-MRI if feasible  CRP elevated  Wound care  Needs LPS eval  Abx as above    Peripheral neuropathy  Add  gabapentin to help with pain control  Check A1C    Recommend transfer to [facility] for LPS eval  Prognosis for limb salvage poor  Plan of care discussed with IM Manuel Reece M.D.. Will continue to follow    Joie Ramon M.D.

## 2017-07-22 NOTE — PROGRESS NOTES
Requesting to be up on wheelchair so he can get OOB and wheel himself off the hallway-wrapped left stump with dry gauze.

## 2017-07-22 NOTE — PROGRESS NOTES
"Pharmacy Kinetics 52 y.o. male on vancomycin day # 1 2017    Currently on Vancomycin New start    Indication for Treatment: Osteo    Pertinent history per medical record: Admitted on 2017 for None.    Other antibiotics: Zosyn 3.375 q 6h    Allergies: Succinylcholine     List concerns for renal function (possible concerns include abnormal LFTs, BUN/SCr ratio > 20:1, CHF, obesity, malnutrition/low albumin, hypermetabolic state (SIRS), pressors/hypotension, nephrotoxic drugs, etc.): None    Pertinent cultures to date:   None    Recent Labs      17   1441  17   0751   WBC  10.7  7.6   NEUTSPOLYS  67.10  64.50     Recent Labs      17   1441  17   0751   BUN  10  9   CREATININE  0.72  0.79   ALBUMIN  3.2  2.4*     No results for input(s): VANCOTROUGH, VANCOPEAK, VANCORANDOM in the last 72 hours.  Intake/Output Summary (Last 24 hours) at 17 1022  Last data filed at 17 1009   Gross per 24 hour   Intake   1956 ml   Output   2200 ml   Net   -244 ml      Blood pressure 99/53, pulse 83, temperature 36.6 °C (97.9 °F), resp. rate 18, height 1.854 m (6' 0.99\"), weight 72.576 kg (160 lb), SpO2 94 %. Temp (24hrs), Av.7 °C (98 °F), Min:36.4 °C (97.5 °F), Max:37.1 °C (98.8 °F)      A/P   1. Vancomycin dose change: Start with 1800 mg load dose.  Start 1200 mg q 12h  2. Next vancomycin level:  10:30  3. Goal trough: 15  4. Comments: Will monitor.    Janet Garcia.    "

## 2017-07-22 NOTE — DISCHARGE PLANNING
Medical SW    Referral: Dr Reece will transfer pt to [facility] for limb preservation/wound care. He wants pt to go to a medical bed. He would like pt to d/c around noon.      Intervention: Allan spoke to transfer center at [facility]. She will advised Sw once a bed is located. Allan completed internal transfer form and PCS and faxed to Iesha HENDERSON. Allan spoke to Iesha. Paul [facility] has located a room for pt T434.1 and and RN report [phone].     Allan completed COBRA w/ pt and MD then provided to RN w/ above info. Unit clerk completed transfer packet for pt.    Allan spoke to Iesha and YOHAN castillo p/u at 1pm. Iesha indicates room has changed to T428.2. Allan advised bedside RN of room number change.     Plan: Allan to assist w/ d/c planning as needed.

## 2017-07-22 NOTE — PROGRESS NOTES
Pt alert and oriented.  C/O BLE pain, will medicate per MAR.  IV fluids infusing.  Updated pt w/ POC.  Will continue with care.

## 2017-07-22 NOTE — PROGRESS NOTES
Report received from day RN, assumed care of pt at 1900, pt c/o of pain to right BKA, medicated per MAR, fall precautions in place, call light within reach, will continue rounding

## 2017-07-22 NOTE — H&P
Hospital Medicine History and Physical    Date of Service  7/22/2017    Chief Complaint  LE stump wound drainage    History of Presenting Illness  52 y.o. male who presented 7/21/2017 with right BKA stump pain and wound drainage in addition to left TMA stump wound (nonhealing).  Patient has history of PVD s/p L TMA and R BKA more than 5 yrs ago in [place].  He was receiving home health with wound care while living in [facility] while on disability.  Patient moved to [place] 1yr ago.  Since moving to [place], he has not established with PCP or received wound care.  Over the past few months, he began noticing drainage from R BKA stump with wound opening up.  He also noticed ulcer developing at the site of his L TMA stump.  He has prosthesis for his R leg, but unable to use due to these developments over the past few months.  He has no wheelchair and rents rooms weekly at various hotels throughout the city.    Patient will be transferred to [facility] for LPS evaluation and treatment.  May need ID to be consulted as well to assist with antibiotic management.   Primary Care Physician  Pcp Pt States None    Consultants  LPS    Code Status  Full    Review of Systems  ROS  Constitutional: Negative for fever, chills and malaise/fatigue.    HENT: Negative for sore throat.     Respiratory: Negative for cough and shortness of breath.     Cardiovascular: Negative for chest pain and palpitations.    Gastrointestinal: Negative for heartburn, nausea, vomiting, abdominal pain, diarrhea and blood in stool.    Genitourinary: Negative for dysuria and frequency.    Musculoskeletal: Negative for myalgias and back pain.    Neurological: Negative for dizziness, focal weakness, weakness and headaches.    Psychiatric/Behavioral: Negative for depression and hallucinations.    All other systems reviewed and are negative.     Past Medical History  No past medical history on file.    Surgical History  Past Surgical History   Procedure Laterality Date   •  Other orthopedic surgery       right BKA   • Other orthopedic surgery       left foot partial amputation       Medications  No current facility-administered medications on file prior to encounter.     No current outpatient prescriptions on file prior to encounter.       Family History  No family history on file.    Social History  Social History   Substance Use Topics   • Smoking status: Never Smoker    • Smokeless tobacco: Never Used   • Alcohol Use: No       Allergies  Allergies   Allergen Reactions   • Chicken Allergy Hives     Skin gets red and blotchy.  Pt wants this in as an allergy    • Turkey Hives     Skin gets red and blotchy.  Pt wants this in as a food allergy   • Succinylcholine      Sister had malignant hyperthermia post administration        Physical Exam  Laboratory   Hemodynamics  Temp (24hrs), Av.2 °C (98.9 °F), Min:37.2 °C (98.9 °F), Max:37.2 °C (98.9 °F)   Temperature: 37.2 °C (98.9 °F)  Pulse  Av  Min: 99  Max: 99    Blood Pressure: (!) 84/59 mmHg      Respiratory      Respiration: 20, Pulse Oximetry: 95 %             Physical Exam  Constitutional: He is oriented to person, place, and time. He appears well-developed and well-nourished. No distress.   Unkempt    HENT:    Head: Normocephalic.    Mouth/Throat: No oropharyngeal exudate.    Eyes:   R eye w corneal thickening, ptosis    Neck: Normal range of motion. No tracheal deviation present.    Cardiovascular: Normal rate and regular rhythm.     No murmur heard.  Pulmonary/Chest: Effort normal. No respiratory distress. He has no wheezes. He exhibits no tenderness.    Abdominal: Soft. He exhibits no distension. There is no tenderness. There is no guarding.   Musculoskeletal: Normal range of motion. He exhibits tenderness (R BKA stump). He exhibits no edema.   Lymphadenopathy:     He has no cervical adenopathy.   Neurological: He is alert and oriented to person, place, and time. No cranial nerve deficit.   Decreased sensation of L foot     Skin: Skin is warm and dry. No rash noted.   Broken skin on R BKA stump, no necrosis or abscess.  No fluctuance. +serosanguinous drainage.  Large ulceration on plantar aspect of L foot, clean edges, no drainage   Psychiatric: He has a normal mood and affect. His behavior is normal.    Nursing note and vitals reviewed.   Recent Labs      07/21/17   1441  07/22/17   0751   WBC  10.7  7.6   RBC  4.97  4.77   HEMOGLOBIN  11.6*  11.2*   HEMATOCRIT  36.9*  35.7*   MCV  74.2*  74.8*   MCH  23.3*  23.5*   MCHC  31.4*  31.4*   RDW  39.4  39.8   PLATELETCT  566*  505*   MPV  8.5*  8.5*     Recent Labs      07/21/17   1441  07/22/17   0751   SODIUM  137  138   POTASSIUM  4.1  3.8   CHLORIDE  99  104   CO2  24  26   GLUCOSE  97  117*   BUN  10  9   CREATININE  0.72  0.79   CALCIUM  8.7  8.4     Recent Labs      07/21/17   1441  07/22/17   0751   ALTSGPT  16   --    ASTSGOT  24   --    ALKPHOSPHAT  117*   --    TBILIRUBIN  0.4   --    GLUCOSE  97  117*                 No results found for: TROPONINI    Imaging  Xray L Foot:    Extensive postsurgical changes from transmetatarsal amputation. There is overlying soft tissue swelling.  No specific radiographic findings for osteomyelitis. However, plain film is very insensitive to detect early osteomyelitis. If there is clinical concern, further evaluation with MRI is recommended.    Xray R Knee 3V:    1. Mild tricompartmental osteoarthritis.  2. Meniscal chondrocalcinosis could relate to degenerative change or CPPD arthropathy.    Assessment/Plan     I anticipate this patient will require at least two midnights for appropriate medical management, necessitating inpatient admission.    * Skin ulcer of left foot with fat layer exposed (CMS-[facility])  Assessment & Plan  - X-ray findings noted  - CRP and ESR elevated  - SIRS markers stable for now  - wound care obtained Cx; started on Vanc and Zosyn  - transferring to [facility] for LPS     Wound of right leg, at BKA site  Assessment & Plan  -  with hx of PVD and medical noncompliance with wound care since moving to [place]  - Wound care consult placed; appreciate recs  - Xray findings noted; elevated CRP/ESR noted; possible chr osteo  - Wound care obtained wound culture; started broad-spec abx  - ID recs appreciated; transferred to [facility] for LPS    Microcytic anemia  Assessment & Plan  - iron studies noted; awaiting stool occult  - starting oral iron supplementation and monitoring for bleed    Peripheral neuropathy  Assessment & Plan  - cont gabapentin  - A1c pending    Tobacco abuse  Assessment & Plan  - counseled by admitting hospitalist for more than 10 minutes on tobacco cessation    - states he vapes to assist with cessation          VTE prophylaxis: Lovenox.

## 2017-07-22 NOTE — DISCHARGE SUMMARY
DISCHARGE SUMMARY   Hospital Medicine History and Physical    Date of Service  7/22/2017    Chief Complaint  Chief Complaint   Patient presents with   • Open Wound       History of Presenting Illness  52 y.o. male who presented 7/21/2017 with right BKA stump pain and wound drainage in addition to left TMA stump wound (nonhealing).  Patient has history of PVD s/p L TMA and R BKA more than 5 yrs ago in [place].  He was receiving home health with wound care while living in [facility] while on disability.  Patient moved to [place] 1yr ago.  Since moving to [place], he has not established with PCP or received wound care.  Over the past few months, he began noticing drainage from R BKA stump with wound opening up.  He also noticed ulcer developing at the site of his L TMA stump.  He has prosthesis for his R leg, but unable to use due to these developments over the past few months.  He has no wheelchair and rents rooms weekly at various hotels throughout the city.    Patient will be transferred to [facility] for LPS evaluation and treatment.  May need ID to be consulted as well to assist with antibiotic management.  Primary Care Physician  Pcp Pt States None    Consultants  ID: Dr. Ramon  LPS    Code Status  Full    Review of Systems  ROS   Constitutional: Negative for fever, chills and malaise/fatigue.   HENT: Negative for sore throat.    Respiratory: Negative for cough and shortness of breath.    Cardiovascular: Negative for chest pain and palpitations.   Gastrointestinal: Negative for heartburn, nausea, vomiting, abdominal pain, diarrhea and blood in stool.   Genitourinary: Negative for dysuria and frequency.   Musculoskeletal: Negative for myalgias and back pain.   Neurological: Negative for dizziness, focal weakness, weakness and headaches.   Psychiatric/Behavioral: Negative for depression and hallucinations.   All other systems reviewed and are negative.       Past Medical History  History reviewed. No pertinent past medical  history.    Surgical History  Past Surgical History   Procedure Laterality Date   • Other orthopedic surgery       right BKA   • Other orthopedic surgery       left foot partial amputation       Medications  No current facility-administered medications on file prior to encounter.     No current outpatient prescriptions on file prior to encounter.       Family History  History reviewed. No pertinent family history.    Social History  Social History   Substance Use Topics   • Smoking status: Never Smoker    • Smokeless tobacco: Never Used   • Alcohol Use: No       Allergies  Allergies   Allergen Reactions   • Succinylcholine      Sister had malignant hyperthermia post administration        Physical Exam  Laboratory   Hemodynamics  Temp (24hrs), Av.7 °C (98 °F), Min:36.4 °C (97.5 °F), Max:37.1 °C (98.8 °F)   Temperature: 36.6 °C (97.9 °F)  Pulse  Av.4  Min: 79  Max: 103    Blood Pressure: (!) 99/53 mmHg, NIBP: 138/76 mmHg      Respiratory      Respiration: 18, Pulse Oximetry: 94 %             Physical Exam  Constitutional: He is oriented to person, place, and time. He appears well-developed and well-nourished. No distress.   Unkempt   HENT:    Head: Normocephalic.    Mouth/Throat: No oropharyngeal exudate.   Eyes:   R eye w corneal thickening, ptosis   Neck: Normal range of motion. No tracheal deviation present.   Cardiovascular: Normal rate and regular rhythm.     No murmur heard.  Pulmonary/Chest: Effort normal. No respiratory distress. He has no wheezes. He exhibits no tenderness.   Abdominal: Soft. He exhibits no distension. There is no tenderness. There is no guarding.   Musculoskeletal: Normal range of motion. He exhibits tenderness (R BKA stump). He exhibits no edema.   Lymphadenopathy:     He has no cervical adenopathy.   Neurological: He is alert and oriented to person, place, and time. No cranial nerve deficit.   Decreased sensation of L foot   Skin: Skin is warm and dry. No rash noted.   Broken skin  on R BKA stump, no necrosis or abscess.  No fluctuance. +serosanguinous drainage.  Large ulceration on plantar aspect of L foot, clean edges, no drainage   Psychiatric: He has a normal mood and affect. His behavior is normal.   Nursing note and vitals reviewed.  Recent Labs      07/21/17   1441  07/22/17   0751   WBC  10.7  7.6   RBC  4.97  4.77   HEMOGLOBIN  11.6*  11.2*   HEMATOCRIT  36.9*  35.7*   MCV  74.2*  74.8*   MCH  23.3*  23.5*   MCHC  31.4*  31.4*   RDW  39.4  39.8   PLATELETCT  566*  505*   MPV  8.5*  8.5*     Recent Labs      07/21/17   1441  07/22/17   0751   SODIUM  137  138   POTASSIUM  4.1  3.8   CHLORIDE  99  104   CO2  24  26   GLUCOSE  97  117*   BUN  10  9   CREATININE  0.72  0.79   CALCIUM  8.7  8.4     Recent Labs      07/21/17   1441  07/22/17   0751   ALTSGPT  16   --    ASTSGOT  24   --    ALKPHOSPHAT  117*   --    TBILIRUBIN  0.4   --    GLUCOSE  97  117*                 No results found for: TROPONINI    Imaging  Xray L Foot:   Extensive postsurgical changes from transmetatarsal amputation. There is overlying soft tissue swelling.  No specific radiographic findings for osteomyelitis. However, plain film is very insensitive to detect early osteomyelitis. If there is clinical concern, further evaluation with MRI is recommended.    Xray R Knee 3V:   1. Mild tricompartmental osteoarthritis.  2. Meniscal chondrocalcinosis could relate to degenerative change or CPPD arthropathy.   Assessment/Plan     I anticipate this patient will require at least two midnights for appropriate medical management, necessitating inpatient admission.    * Skin ulcer of left foot with fat layer exposed (CMS-[facility])  Assessment & Plan  - X-ray findings noted  - CRP and ESR elevated  - SIRS markers stable for now  - wound care obtained Cx; started on Vanc and Zosyn  - transferring to [facility] for LPS     Wound of right leg, at BKA site  Assessment & Plan  - with hx of PVD and medical noncompliance with wound care since  moving to [place]  - Wound care consult placed; appreciate recs  - Xray findings noted; elevated CRP/ESR noted; possible chr osteo  - Wound care obtained wound culture; started broad-spec abx  - ID recs appreciated; transferred to [facility] for LPS    Microcytic anemia  Assessment & Plan  - iron studies noted; awaiting stool occult  - starting oral iron supplementation and monitoring for bleed    Peripheral neuropathy  Assessment & Plan  - cont gabapentin  - A1c pending    Tobacco abuse  Assessment & Plan  - counseled by admitting hospitalist for more than 10 minutes on tobacco cessation   - states he vapes to assist with cessation          VTE prophylaxis: Lovenox.

## 2017-07-22 NOTE — PROGRESS NOTES
"Pharmacy Kinetics 52 y.o. male on vancomycin day #1 7/22/2017    Currently on Vancomycin 1200 mg iv q12hr (11, 23)    Indication for Treatment: possible chronic osteomyelitis  ID consult: No    Pertinent history per medical record: Admitted on 7/22/2017 for R stump pain and drainage and persistent L TMA stump wound. Patient has a hx of PVD s/p L TMA, R BKA and tobacco abuse. Had his amputations >5 years ago in [place].  Had home health with wound care while living there and was on disability but one year ago moved to [place] and has not had any wound care or medical follow up since.  A few months ago he noticed that his R BKA site seemed to have opened up.  After this it started draining straw colored fluid that would \"harden up\" on his skin.  It would also bleed intermittently.  He also noticed an ulcer form on his L TMA site.  Due to both issues he was not able to use his prosthesis (Right) and was not able to put weight on his L foot, therefore he has been crawling from place to place.  He tries not to go outside as he has no wheelchair.  He rents a room by the week at various hotels around [facility]. He denies any fever.  +drainage and pain at R BKA site. No foul odor. No change in urination or bowel habits.     Findings are as follows:  \"-Left plantar ulcer, deep tissues exposed, no drainage or evidence of acute infection  -R BKA dehiscence, draining straw colored material for last several weeks, CRP elevated, suspect chronic osteo  -Imaging w Xray ordered and pending  -LPS consulted, may need debridement vs further amp for suspected osteo.  -Hold off on abx until cultures drawn (possibly in OR) since he is not acutely ill  -May need ID as well pending imaging and LPS evaluation\"    Other antibiotics: piperacillin/tazobactam 3.375 gm q6h    Allergies: Succinylcholine     List concerns for renal function: BKA    Pertinent cultures to date:   To be collected    Recent Labs      07/21/17   1441  07/22/17   0751   WBC  " 10.7  7.6   NEUTSPOLYS  67.10  64.50     Recent Labs      07/21/17   1441  07/22/17   0751   BUN  10  9   CREATININE  0.72  0.79   ALBUMIN  3.2  2.4*     A/P   1. Next vancomycin level: 7/24/17 at 1030 (not ordered)  2. Goal trough: 12-16 mcg/mL  3. Comments: Transfer from [facility]. The patient was loaded there and sent here. I will continue with scheduled dosing and check a level in 2 days to assess. 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 Patient has peripheral neuropathy. HA1c being checked; recommend control of blood sugars to assess with wound healing. Patient has a complicated history (see above) and would likely benefit from an ID consult. Pharmacy will continue to monitor and adjust dosing as clinically appropriate.    Nany Zazueta, PharmD

## 2017-07-22 NOTE — DISCHARGE PLANNING
Received choice form from [facility]. Spoke with Lachelle the transfer nurse, they currently have no released a bed as they are awaiting MD to accept patient.

## 2017-07-22 NOTE — PROGRESS NOTES
Patient is a direct admit from [facility], patient of Dr. Reece's for right BKA infection.  Dr. Reece is accepting the patient.  ADT order signed and held in Psychiatric by MD on 7/22.  Held orders in Psychiatric to be released upon patients arrival to unit.

## 2017-07-23 LAB
GRAM STN SPEC: NORMAL
SIGNIFICANT IND 70042: NORMAL
SITE SITE: NORMAL
SOURCE SOURCE: NORMAL
VANCOMYCIN TROUGH SERPL-MCNC: 25.9 UG/ML (ref 10–20)

## 2017-07-23 PROCEDURE — 80202 ASSAY OF VANCOMYCIN: CPT

## 2017-07-23 PROCEDURE — 700101 HCHG RX REV CODE 250: Performed by: HOSPITALIST

## 2017-07-23 PROCEDURE — 700111 HCHG RX REV CODE 636 W/ 250 OVERRIDE (IP): Performed by: INTERNAL MEDICINE

## 2017-07-23 PROCEDURE — 99406 BEHAV CHNG SMOKING 3-10 MIN: CPT | Performed by: HOSPITALIST

## 2017-07-23 PROCEDURE — 700111 HCHG RX REV CODE 636 W/ 250 OVERRIDE (IP): Performed by: HOSPITALIST

## 2017-07-23 PROCEDURE — 87070 CULTURE OTHR SPECIMN AEROBIC: CPT

## 2017-07-23 PROCEDURE — 770006 HCHG ROOM/CARE - MED/SURG/GYN SEMI*

## 2017-07-23 PROCEDURE — 700105 HCHG RX REV CODE 258: Performed by: INTERNAL MEDICINE

## 2017-07-23 PROCEDURE — 700105 HCHG RX REV CODE 258: Performed by: HOSPITALIST

## 2017-07-23 PROCEDURE — 700102 HCHG RX REV CODE 250 W/ 637 OVERRIDE(OP): Performed by: INTERNAL MEDICINE

## 2017-07-23 PROCEDURE — A9270 NON-COVERED ITEM OR SERVICE: HCPCS | Performed by: INTERNAL MEDICINE

## 2017-07-23 PROCEDURE — 36415 COLL VENOUS BLD VENIPUNCTURE: CPT

## 2017-07-23 PROCEDURE — 99233 SBSQ HOSP IP/OBS HIGH 50: CPT | Mod: 25 | Performed by: HOSPITALIST

## 2017-07-23 PROCEDURE — 87205 SMEAR GRAM STAIN: CPT

## 2017-07-23 RX ORDER — SODIUM HYPOCHLORITE 1.25 MG/ML
SOLUTION TOPICAL 2 TIMES DAILY
Status: DISCONTINUED | OUTPATIENT
Start: 2017-07-23 | End: 2017-07-26

## 2017-07-23 RX ADMIN — DAKIN'S SOLUTION 0.125% (QUARTER STRENGTH) 1 ML: 0.12 SOLUTION at 22:16

## 2017-07-23 RX ADMIN — DAKIN'S SOLUTION 0.125% (QUARTER STRENGTH) 50 ML: 0.12 SOLUTION at 15:46

## 2017-07-23 RX ADMIN — OXYCODONE HYDROCHLORIDE 5 MG: 5 TABLET ORAL at 08:40

## 2017-07-23 RX ADMIN — NICOTINE 14 MG: 14 PATCH, EXTENDED RELEASE TRANSDERMAL at 05:15

## 2017-07-23 RX ADMIN — AMPICILLIN SODIUM AND SULBACTAM SODIUM 3 G: 2; 1 INJECTION, POWDER, FOR SOLUTION INTRAMUSCULAR; INTRAVENOUS at 17:11

## 2017-07-23 RX ADMIN — MORPHINE SULFATE 2 MG: 4 INJECTION INTRAVENOUS at 05:15

## 2017-07-23 RX ADMIN — PIPERACILLIN SODIUM AND TAZOBACTAM SODIUM 3.38 G: 3; .375 INJECTION, POWDER, FOR SOLUTION INTRAVENOUS at 05:27

## 2017-07-23 RX ADMIN — STANDARDIZED SENNA CONCENTRATE AND DOCUSATE SODIUM 2 TABLET: 8.6; 5 TABLET, FILM COATED ORAL at 08:46

## 2017-07-23 RX ADMIN — ENOXAPARIN SODIUM 40 MG: 100 INJECTION SUBCUTANEOUS at 08:41

## 2017-07-23 RX ADMIN — STANDARDIZED SENNA CONCENTRATE AND DOCUSATE SODIUM 1 TABLET: 8.6; 5 TABLET, FILM COATED ORAL at 21:16

## 2017-07-23 RX ADMIN — MORPHINE SULFATE 2 MG: 4 INJECTION INTRAVENOUS at 08:41

## 2017-07-23 RX ADMIN — Medication 325 MG: at 17:11

## 2017-07-23 RX ADMIN — VANCOMYCIN HYDROCHLORIDE 1200 MG: 100 INJECTION, POWDER, LYOPHILIZED, FOR SOLUTION INTRAVENOUS at 13:38

## 2017-07-23 RX ADMIN — AMPICILLIN SODIUM AND SULBACTAM SODIUM 3 G: 2; 1 INJECTION, POWDER, FOR SOLUTION INTRAMUSCULAR; INTRAVENOUS at 12:00

## 2017-07-23 RX ADMIN — MORPHINE SULFATE 2 MG: 4 INJECTION INTRAVENOUS at 01:04

## 2017-07-23 RX ADMIN — Medication 325 MG: at 08:41

## 2017-07-23 RX ADMIN — MORPHINE SULFATE 2 MG: 4 INJECTION INTRAVENOUS at 17:09

## 2017-07-23 RX ADMIN — OXYCODONE HYDROCHLORIDE 5 MG: 5 TABLET ORAL at 13:38

## 2017-07-23 RX ADMIN — OXYCODONE HYDROCHLORIDE 5 MG: 5 TABLET ORAL at 21:17

## 2017-07-23 RX ADMIN — PIPERACILLIN SODIUM AND TAZOBACTAM SODIUM 3.38 G: 3; .375 INJECTION, POWDER, FOR SOLUTION INTRAVENOUS at 00:45

## 2017-07-23 ASSESSMENT — ENCOUNTER SYMPTOMS
DIARRHEA: 0
DIZZINESS: 0
FOCAL WEAKNESS: 0
PALPITATIONS: 0
MYALGIAS: 0
CONSTIPATION: 0
NAUSEA: 0
FEVER: 0
SHORTNESS OF BREATH: 0
CHILLS: 0
WEAKNESS: 1
VOMITING: 0
ABDOMINAL PAIN: 0
COUGH: 0
SENSORY CHANGE: 1
HEADACHES: 0

## 2017-07-23 ASSESSMENT — PAIN SCALES - GENERAL
PAINLEVEL_OUTOF10: 7
PAINLEVEL_OUTOF10: 2
PAINLEVEL_OUTOF10: 7
PAINLEVEL_OUTOF10: 7

## 2017-07-23 NOTE — ASSESSMENT & PLAN NOTE
-L stump is healing well, new alginate appears to be accelerating wound healing, no changes noted today, afebrile, will do another wound dressing change today  -aggressive wound care, all wound vacs off  -continue to encourage ambulation, working on fixing prosthetic   Nutrition support  -see pain control as outlined above  -working on prosthetic leg for patient, SW working on acquiring a wheelchair as well, doing well

## 2017-07-23 NOTE — PROGRESS NOTES
"Received bedside report and assumed care of patient.  Assessment complete; discussed POC with patient.  AO x4, patient calls for assistance.  Patient appears calm and exhibits no signs of distress.  Patient reports pain of 7/10, medicating per MAR PRN with oxycodone PO and morphine IV.  Left plantar foot wound COLUMBA with minimal drainage, Pt verbalized that dressing placed at [facility] was \"just for padding during the transport\".  Right BKA stump appears dry and cracked, discolored, no drainage.  Patient tolerating current diet, no N/V.  BS normoactive x 4, LBM PTA, informed pt to notify RN of BM so that occult blood stool sample can be collected, patient voids with bedside urinal.  Patient is 1x assist, gait not yet observed, pt verbalized \"I can put weight on my right heel if its just for a second\".  Call light within reach, bed in lowest position, SCDs refused, bed alarm in use.  Will continue to monitor pt for changes in status and provide care.  "

## 2017-07-23 NOTE — PROGRESS NOTES
[facility] Hospitalist Progress Note    Date of Service: 2017    Chief Complaint  52 y.o. male admitted 2017 with right BKA stump pain and wound drainage in addition to left TMA stump wound (nonhealing).  Patient has history of PVD s/p L TMA and R BKA more than 5 yrs ago in [place].  He was receiving home health with wound care while living in [facility] while on disability.  Patient moved to [place] 1yr ago.  Since moving to [place], he has not established with PCP or received wound care.  Over the past few months, he began noticing drainage from R BKA stump with wound opening up.  He also noticed ulcer developing at the site of his L TMA stump.  He has prosthesis for his R leg, but unable to use due to these developments over the past few months.  He has no wheelchair and rents rooms weekly at various hotels throughout the city.    Interval Problem Update   - discussed with ID. Discussed with wound. Abx reviewed. Cultures reviewed. Pain 7/10 in left foot.     Consultants/Specialty  LPS  ID - Yenny    Disposition  Clinical, suspect will need long-term abx and probably SNF        Review of Systems   Constitutional: Negative for fever and chills.   Respiratory: Negative for cough and shortness of breath.    Cardiovascular: Negative for chest pain and palpitations.   Gastrointestinal: Negative for nausea, vomiting, abdominal pain, diarrhea and constipation.   Genitourinary: Negative for dysuria.   Musculoskeletal: Negative for myalgias.        Left foot pain and right BKA stump pain   Skin: Negative for itching.   Neurological: Positive for weakness. Negative for dizziness, focal weakness and headaches.   All other systems reviewed and are negative.     Physical Exam  Laboratory/Imaging   Hemodynamics  Temp (24hrs), Av.9 °C (98.4 °F), Min:36.6 °C (97.8 °F), Max:37.4 °C (99.3 °F)   Temperature: 37.4 °C (99.3 °F)  Pulse  Av.7  Min: 77  Max: 99    Blood Pressure: 103/67 mmHg      Respiratory      Respiration:  16, Pulse Oximetry: 100 %             Fluids    Intake/Output Summary (Last 24 hours) at 07/23/17 1411  Last data filed at 07/23/17 1100   Gross per 24 hour   Intake   1476 ml   Output   1800 ml   Net   -324 ml       Nutrition  Orders Placed This Encounter   Procedures   • Diet Order     Standing Status: Standing      Number of Occurrences: 1      Standing Expiration Date:      Order Specific Question:  Diet:     Answer:  Regular [1]      Comments:  No fowl/poultry of any kind in meals per pt request     Physical Exam   Constitutional: He is oriented to person, place, and time. He appears well-developed and well-nourished.   HENT:   Head: Normocephalic and atraumatic.   Mouth/Throat: Oropharynx is clear and moist.   Eyes: Conjunctivae and EOM are normal. Pupils are equal, round, and reactive to light. No scleral icterus.   Neck: Normal range of motion. Neck supple. No tracheal deviation present. No thyromegaly present.   Cardiovascular: Normal rate, regular rhythm, normal heart sounds and intact distal pulses.    No murmur heard.  Pulmonary/Chest: Effort normal and breath sounds normal. No respiratory distress. He has no wheezes.   Abdominal: Soft. Bowel sounds are normal. He exhibits no distension. There is no tenderness.   Musculoskeletal: Normal range of motion. He exhibits no edema or tenderness.        Legs:  Lymphadenopathy:     He has no cervical adenopathy.        Right: No supraclavicular adenopathy present.        Left: No supraclavicular adenopathy present.   Neurological: He is alert and oriented to person, place, and time.   Skin: Skin is warm and dry.   Vitals reviewed.      Recent Labs      07/21/17   1441  07/22/17   0751   WBC  10.7  7.6   RBC  4.97  4.77   HEMOGLOBIN  11.6*  11.2*   HEMATOCRIT  36.9*  35.7*   MCV  74.2*  74.8*   MCH  23.3*  23.5*   MCHC  31.4*  31.4*   RDW  39.4  39.8   PLATELETCT  566*  505*   MPV  8.5*  8.5*     Recent Labs      07/21/17   1441  07/22/17   0751   SODIUM  137  138    POTASSIUM  4.1  3.8   CHLORIDE  99  104   CO2  24  26   GLUCOSE  97  117*   BUN  10  9   CREATININE  0.72  0.79   CALCIUM  8.7  8.4                      Assessment/Plan     Wound of right leg, at BKA site (present on admission)  Assessment & Plan  Not overly infected looking, but abx for left would cover    Skin ulcer of left foot with fat layer exposed (CMS-HCC) (present on admission)  Assessment & Plan  Highly suspicious for osteomyelitis  MR ordered  Discussed with ID and wound  Active Orders     Ordered     Ordering Provider       Sun Jul 23, 2017  8:27 AM    07/23/17 0827  ampicillin/sulbactam (UNASYN) 3 g in  mL IVPB   EVERY 6 HOURS      Parmjit Dumont M.D.       Sat Jul 22, 2017  1:57 PM    07/22/17 1357  MD ALERT... vancomycin per pharmacy protocol   PHARMACY TO DOSE     Question:  Indication(s) for vancomycin?  Answer:  Osteomyelitis    Manuel Reece M.D.    07/22/17 1357  vancomycin 1,200 mg in  mL IVPB   EVERY 12 HOURS      Manuel Reece M.D.      Cultures growing pseudomonas and s. aureus    Peripheral neuropathy (present on admission)  Assessment & Plan  Per patient, has had since childhood. Has had good success with Lyrica, will order    Tobacco abuse (present on admission)  Assessment & Plan  Nicotine replacement PRN  Strongly encouraged to quit  Counseled at least 3 minutes on 07/23/2017    Labs reviewed and Medications reviewed        DVT Prophylaxis: Enoxaparin (Lovenox)    Ulcer prophylaxis: Not indicated

## 2017-07-23 NOTE — ASSESSMENT & PLAN NOTE
s/p BKA revision w debridement.   Follow clinically   Prosthetic Tech evaluating patient for fit, apparently having issues

## 2017-07-23 NOTE — CARE PLAN
Problem: Safety  Goal: Will remain free from falls  Outcome: PROGRESSING AS EXPECTED  Reminded patient to use call light when ambulating in order to prevent falls.  Treaded socks on, bed alarm in use, call light within reach, bed in lowest position, upper bed rails up.    Problem: Infection  Goal: Will remain free from infection  Outcome: PROGRESSING AS EXPECTED  Reminded patient of the signs and symptoms of infection and encouraged patient to report any of these findings in order to promote best outcomes.    Problem: Pain Management  Goal: Pain level will decrease to patient’s comfort goal  Outcome: PROGRESSING SLOWER THAN EXPECTED  Assessed pain and medicated per MAR.  Reminded patient to report any changes in severity or quality of pain in order to best manage pain.

## 2017-07-23 NOTE — PROGRESS NOTES
Pt family at bedside, past medical notes (paperwork) given to this RN and has been placed in chart in locked draw.

## 2017-07-23 NOTE — PROGRESS NOTES
Wound culture for left plantar foot wound sent this AM.  Patient was already receiving antibiotics prior to transfer from [facility].    Patient's IV this morning was detatched from the pigtail lumen, and leaking when this RN came back into room.  Tegarderm was peeled back and some blood had pooled onto pillow below the dressing.  Cleaned and redressed IV, assured patency, and continued infusion.    Discussed with NOC RN pt verbalizing past heroin abuse, and that his brother or son (unknown) also had a history of drug abuse.  NOC RN also reported that a male family member had visited last night, and verbalized that they were currently suing another hospital.  Patient appears to be well-informed on hospital processes including medication administration, wound cultures, antibiotics, and pain management.    Obtaining IV access for this patient has proved to be exceptionally difficult with multiple attempts by more than one RN, both for this admission and as per report from RN at [facility].  Pt verbalized past placement of a PICC line, and verbalized if this would be necessary.  Reminded patient of risks related to infection with central line placement.

## 2017-07-23 NOTE — PROGRESS NOTES
Infectious Disease Progress Note    Author: Joie Ramon DOS & Time created: 2017  2:53 PM    CC: FU right BKA stump infection and nonhealing left TMA stump wound     Interval History:  52 y.o. WM admitted 2017 with right BKA infection and nonhealing left TMA stump wound    AF WBC 7.6 less drainage from wounds Asking if he will need another BKA  Labs Reviewed, Medications Reviewed, Radiology Reviewed and Wound Reviewed.    Review of Systems:  Review of Systems   Constitutional: Negative for fever and chills.   Gastrointestinal: Negative for nausea, vomiting, abdominal pain and diarrhea.   Neurological: Positive for sensory change.   All other systems reviewed and are negative.      Hemodynamics:  Temp (24hrs), Av.9 °C (98.4 °F), Min:36.6 °C (97.8 °F), Max:37.4 °C (99.3 °F)  Temperature: 37.4 °C (99.3 °F)  Pulse  Av.7  Min: 77  Max: 99   Blood Pressure: 103/67 mmHg       Physical Exam:  Physical Exam   Constitutional: He is oriented to person, place, and time. He appears well-developed. No distress.   Disheveled   HENT:   Head: Normocephalic.   Eyes:   Blind right eye   Neck: Neck supple.   Cardiovascular: Normal rate.    Pulmonary/Chest: Effort normal. No respiratory distress.   Abdominal: Soft. He exhibits no distension. There is no tenderness.   Musculoskeletal: He exhibits no edema.   Wound right BKA-no longer draining  Large plantar wound left   Neurological: He is alert and oriented to person, place, and time.   Skin: No rash noted.   tattoos   Nursing note and vitals reviewed.      Labs:  Recent Labs      17   1441  17   0751   WBC  10.7  7.6   RBC  4.97  4.77   HEMOGLOBIN  11.6*  11.2*   HEMATOCRIT  36.9*  35.7*   MCV  74.2*  74.8*   MCH  23.3*  23.5*   RDW  39.4  39.8   PLATELETCT  566*  505*   MPV  8.5*  8.5*   NEUTSPOLYS  67.10  64.50   LYMPHOCYTES  25.60  26.60   MONOCYTES  4.50  4.60   EOSINOPHILS  2.10  3.20   BASOPHILS  0.40  0.70     Recent Labs      17    1441  07/22/17   0751   SODIUM  137  138   POTASSIUM  4.1  3.8   CHLORIDE  99  104   CO2  24  26   GLUCOSE  97  117*   BUN  10  9     Recent Labs      07/21/17   1441  07/22/17   0751   ALBUMIN  3.2  2.4*   TBILIRUBIN  0.4   --    ALKPHOSPHAT  117*   --    TOTPROTEIN  8.4*   --    ALTSGPT  16   --    ASTSGOT  24   --    CREATININE  0.72  0.79     Results     Procedure Component Value Units Date/Time    GRAM STAIN [[id number]] Collected:  07/23/17 0730    Order Status:  Completed Specimen Information:  Wound Updated:  07/23/17 1107     Significant Indicator .      Source WND      Site RIGHT LEG      Gram Stain Result No organisms seen.     Narrative:      Collected By:[id number] SUNNY GONZALEZ    CULTURE WOUND W/ GRAM STAIN [[id number]] Collected:  07/23/17 0730    Order Status:  Completed Specimen Information:  Wound from Right Leg Updated:  07/23/17 0744    Narrative:      Collected By:[id number] SUNNY GONZALEZ          Fluids:  Intake/Output       07/21/17 0700 - 07/22/17 0659 (Not Admitted) 07/22/17 0700 - 07/23/17 0659 07/23/17 0700 - 07/24/17 0659      [id number] 4752-7043 Total 7403-0643 1717-2712 Total 2833-6586 5878-3157 Total       Intake    P.O.  --  -- --  500  -- 500  240  -- 240    P.O. -- -- -- 500 -- 500 240 -- 240    I.V.  --  -- --  --  996 996  --  -- --    IV Volume (NS) -- -- -- -- 996 996 -- -- --    Total Intake -- -- --  240 -- 240       Output    Urine  --  -- --  200  900 1100  700  -- 700    Void (ml) -- -- --  700 -- 700    Stool  --  -- --  --  -- --  --  -- --    Number of Times Stooled -- -- -- 0 x 0 x 0 x -- -- --    Total Output -- -- --  700 -- 700       Net I/O     -- -- -- 300 96 155 -983 -- -460        Weight: 65.2 kg (143 lb 11.8 oz)    Assessment:  Active Hospital Problems    Diagnosis   • Skin ulcer of left foot with fat layer exposed (CMS-[facility]) [L97.522]   • Wound of right leg, at BKA site [S88.801A]   • Peripheral neuropathy [G62.9]   • Tobacco abuse  [Z72.0]       Plan:  Wound of right leg, at BKA site  Afebrile  No leukocytosis  No hx of DM  Suspect chronic osteo  PVD-needs reeval    + MRSA infection in the past.   Noncompliant with wound care and follow up  Xray equiv-MRI if feasible  Continue vanco and Zosyn pending debridement    Skin ulcer of left foot with fat layer exposed (CMS-HCC)  Left plantar ulcer, deep tissues exposed, serous drainage    Xray equiv-MRI if feasible  CRP elevated  Wound care  Needs LPS eval  Abx as above    Peripheral neuropathy  Add gabapentin to help with pain control  Check A1C    Prognosis for limb salvage poor    Plan of care discussed with IM-Dr Dumont and LPS

## 2017-07-23 NOTE — PROGRESS NOTES
"Pharmacy Kinetics 52 y.o. male on vancomycin day #2 7/23/2017    Currently on Vancomycin 1200 mg iv q12hr (11, 23)    Indication for Treatment: SSTI  ID consult: Yes    Pertinent history per medical record: Admitted on 7/22/2017 for R stump pain and drainage and persistent L TMA stump wound. Patient has a hx of PVD s/p L TMA, R BKA and tobacco abuse. Had his amputations >5 years ago in [place].  Had home health with wound care while living there and was on disability but one year ago moved to [place] and has not had any wound care or medical follow up since.  A few months ago he noticed that his R BKA site seemed to have opened up.  After this it started draining straw colored fluid that would \"harden up\" on his skin.  It would also bleed intermittently.  He also noticed an ulcer form on his L TMA site.  Due to both issues he was not able to use his prosthesis (Right) and was not able to put weight on his L foot, therefore he has been crawling from place to place.  He tries not to go outside as he has no wheelchair.  He rents a room by the week at various hotels around [facility]. He denies any fever.  +drainage and pain at R BKA site. No foul odor. No change in urination or bowel habits.     Other antibiotics: ampicillin/sulbactam 3 gm q6h    Allergies: Succinylcholine     List concerns for renal function: BKA    Pertinent cultures to date:    (7/22) left foot wound - POS for staph aureus and pseudomonas  (7/23) right leg wound - NEG    Recent Labs      07/21/17   1441  07/22/17   0751   WBC  10.7  7.6   NEUTSPOLYS  67.10  64.50     Recent Labs      07/21/17   1441  07/22/17   0751   BUN  10  9   CREATININE  0.72  0.79   ALBUMIN  3.2  2.4*     Recent Labs      07/23/17   1033   VANCOTROUGH  25.9*     Intake/Output Summary (Last 24 hours) at 07/23/17 1509  Last data filed at 07/23/17 1100   Gross per 24 hour   Intake   1476 ml   Output   1800 ml   Net   -324 ml      Blood pressure 103/67, pulse 90, temperature 37.4 °C " (99.3 °F), resp. rate 16, weight 65.2 kg (143 lb 11.8 oz), SpO2 100 %. Temp (24hrs), Av.9 °C (98.4 °F), Min:36.6 °C (97.8 °F), Max:37.4 °C (99.3 °F)    A/P   1. Vancomycin dose change: decrease to 1200 mg q24h (@1400)  2. Next vancomycin level: 17 at 1330 (not ordered)  3. Goal trough: 12-16 mcg/mL  4. Comments: A level was drawn early but it was significantly higher than expected. The patient's weight is actually about ~7kg smaller. Based on the level, I will change the frequency from q12h to q24h. I will check a level in 2 days (possibly tomorrow) to assess the new dosing and monitor renal function. ID is following the patient. Pharmacy will continue to monitor and adjust dosing as clinically appropriate.    Nany Zazueta, MattD

## 2017-07-24 ENCOUNTER — APPOINTMENT (OUTPATIENT)
Dept: RADIOLOGY | Facility: MEDICAL CENTER | Age: 52
DRG: 464 | End: 2017-07-24
Attending: ORTHOPAEDIC SURGERY
Payer: COMMERCIAL

## 2017-07-24 LAB
ANION GAP SERPL CALC-SCNC: 9 MMOL/L (ref 0–11.9)
BACTERIA WND AEROBE CULT: ABNORMAL
BUN SERPL-MCNC: 12 MG/DL (ref 8–22)
CALCIUM SERPL-MCNC: 9 MG/DL (ref 8.5–10.5)
CHLORIDE SERPL-SCNC: 106 MMOL/L (ref 96–112)
CO2 SERPL-SCNC: 23 MMOL/L (ref 20–33)
CREAT SERPL-MCNC: 0.8 MG/DL (ref 0.5–1.4)
ERYTHROCYTE [DISTWIDTH] IN BLOOD BY AUTOMATED COUNT: 40.7 FL (ref 35.9–50)
EST. AVERAGE GLUCOSE BLD GHB EST-MCNC: 117 MG/DL
GFR SERPL CREATININE-BSD FRML MDRD: >60 ML/MIN/1.73 M 2
GLUCOSE SERPL-MCNC: 104 MG/DL (ref 65–99)
GRAM STN SPEC: ABNORMAL
HBA1C MFR BLD: 5.7 % (ref 0–5.6)
HCT VFR BLD AUTO: 35 % (ref 42–52)
HGB BLD-MCNC: 10.7 G/DL (ref 14–18)
MCH RBC QN AUTO: 23.1 PG (ref 27–33)
MCHC RBC AUTO-ENTMCNC: 30.6 G/DL (ref 33.7–35.3)
MCV RBC AUTO: 75.6 FL (ref 81.4–97.8)
PLATELET # BLD AUTO: 495 K/UL (ref 164–446)
PMV BLD AUTO: 8.6 FL (ref 9–12.9)
POTASSIUM SERPL-SCNC: 3.6 MMOL/L (ref 3.6–5.5)
RBC # BLD AUTO: 4.63 M/UL (ref 4.7–6.1)
SIGNIFICANT IND 70042: ABNORMAL
SITE SITE: ABNORMAL
SODIUM SERPL-SCNC: 138 MMOL/L (ref 135–145)
SOURCE SOURCE: ABNORMAL
WBC # BLD AUTO: 10.6 K/UL (ref 4.8–10.8)

## 2017-07-24 PROCEDURE — 93922 UPR/L XTREMITY ART 2 LEVELS: CPT

## 2017-07-24 PROCEDURE — 700105 HCHG RX REV CODE 258

## 2017-07-24 PROCEDURE — 83036 HEMOGLOBIN GLYCOSYLATED A1C: CPT

## 2017-07-24 PROCEDURE — 73590 X-RAY EXAM OF LOWER LEG: CPT | Mod: RT

## 2017-07-24 PROCEDURE — A9270 NON-COVERED ITEM OR SERVICE: HCPCS | Performed by: INTERNAL MEDICINE

## 2017-07-24 PROCEDURE — 700102 HCHG RX REV CODE 250 W/ 637 OVERRIDE(OP): Performed by: HOSPITALIST

## 2017-07-24 PROCEDURE — 700111 HCHG RX REV CODE 636 W/ 250 OVERRIDE (IP)

## 2017-07-24 PROCEDURE — 80048 BASIC METABOLIC PNL TOTAL CA: CPT

## 2017-07-24 PROCEDURE — 85027 COMPLETE CBC AUTOMATED: CPT

## 2017-07-24 PROCEDURE — 36415 COLL VENOUS BLD VENIPUNCTURE: CPT

## 2017-07-24 PROCEDURE — 770021 HCHG ROOM/CARE - ISO PRIVATE

## 2017-07-24 PROCEDURE — 700105 HCHG RX REV CODE 258: Performed by: HOSPITALIST

## 2017-07-24 PROCEDURE — 700111 HCHG RX REV CODE 636 W/ 250 OVERRIDE (IP): Performed by: INTERNAL MEDICINE

## 2017-07-24 PROCEDURE — 93922 UPR/L XTREMITY ART 2 LEVELS: CPT | Performed by: SURGERY

## 2017-07-24 PROCEDURE — 99232 SBSQ HOSP IP/OBS MODERATE 35: CPT | Performed by: HOSPITALIST

## 2017-07-24 PROCEDURE — 700111 HCHG RX REV CODE 636 W/ 250 OVERRIDE (IP): Performed by: HOSPITALIST

## 2017-07-24 PROCEDURE — A9270 NON-COVERED ITEM OR SERVICE: HCPCS | Performed by: HOSPITALIST

## 2017-07-24 PROCEDURE — 700102 HCHG RX REV CODE 250 W/ 637 OVERRIDE(OP): Performed by: INTERNAL MEDICINE

## 2017-07-24 RX ORDER — PREGABALIN 75 MG/1
75 CAPSULE ORAL 3 TIMES DAILY
Status: DISCONTINUED | OUTPATIENT
Start: 2017-07-24 | End: 2017-07-31

## 2017-07-24 RX ORDER — OXYCODONE HCL 10 MG/1
10 TABLET, FILM COATED, EXTENDED RELEASE ORAL EVERY 12 HOURS
Status: DISCONTINUED | OUTPATIENT
Start: 2017-07-24 | End: 2017-07-26

## 2017-07-24 RX ADMIN — AMPICILLIN SODIUM AND SULBACTAM SODIUM 3 G: 2; 1 INJECTION, POWDER, FOR SOLUTION INTRAMUSCULAR; INTRAVENOUS at 06:06

## 2017-07-24 RX ADMIN — Medication 325 MG: at 19:00

## 2017-07-24 RX ADMIN — PREGABALIN 75 MG: 75 CAPSULE ORAL at 12:31

## 2017-07-24 RX ADMIN — DAKIN'S SOLUTION 0.125% (QUARTER STRENGTH) 1 ML: 0.12 SOLUTION at 21:51

## 2017-07-24 RX ADMIN — NICOTINE 14 MG: 14 PATCH, EXTENDED RELEASE TRANSDERMAL at 06:06

## 2017-07-24 RX ADMIN — STANDARDIZED SENNA CONCENTRATE AND DOCUSATE SODIUM 2 TABLET: 8.6; 5 TABLET, FILM COATED ORAL at 08:48

## 2017-07-24 RX ADMIN — STANDARDIZED SENNA CONCENTRATE AND DOCUSATE SODIUM 2 TABLET: 8.6; 5 TABLET, FILM COATED ORAL at 21:49

## 2017-07-24 RX ADMIN — AMPICILLIN SODIUM AND SULBACTAM SODIUM 3 G: 2; 1 INJECTION, POWDER, FOR SOLUTION INTRAMUSCULAR; INTRAVENOUS at 00:22

## 2017-07-24 RX ADMIN — OXYCODONE HYDROCHLORIDE 10 MG: 10 TABLET, FILM COATED, EXTENDED RELEASE ORAL at 21:49

## 2017-07-24 RX ADMIN — ENOXAPARIN SODIUM 40 MG: 100 INJECTION SUBCUTANEOUS at 08:47

## 2017-07-24 RX ADMIN — Medication 325 MG: at 08:48

## 2017-07-24 RX ADMIN — VANCOMYCIN HYDROCHLORIDE 1200 MG: 100 INJECTION, POWDER, LYOPHILIZED, FOR SOLUTION INTRAVENOUS at 15:16

## 2017-07-24 RX ADMIN — DAKIN'S SOLUTION 0.125% (QUARTER STRENGTH) 50 ML: 0.12 SOLUTION at 08:54

## 2017-07-24 RX ADMIN — AMPICILLIN SODIUM AND SULBACTAM SODIUM 3 G: 2; 1 INJECTION, POWDER, FOR SOLUTION INTRAMUSCULAR; INTRAVENOUS at 12:39

## 2017-07-24 RX ADMIN — PREGABALIN 75 MG: 75 CAPSULE ORAL at 21:49

## 2017-07-24 RX ADMIN — MORPHINE SULFATE 2 MG: 4 INJECTION INTRAVENOUS at 08:51

## 2017-07-24 RX ADMIN — OXYCODONE HYDROCHLORIDE 5 MG: 5 TABLET ORAL at 06:06

## 2017-07-24 RX ADMIN — ONDANSETRON 4 MG: 2 INJECTION INTRAMUSCULAR; INTRAVENOUS at 08:59

## 2017-07-24 RX ADMIN — PREGABALIN 75 MG: 75 CAPSULE ORAL at 15:16

## 2017-07-24 RX ADMIN — MORPHINE SULFATE 2 MG: 4 INJECTION INTRAVENOUS at 00:22

## 2017-07-24 RX ADMIN — OXYCODONE HYDROCHLORIDE 5 MG: 5 TABLET ORAL at 12:31

## 2017-07-24 RX ADMIN — OXYCODONE HYDROCHLORIDE 10 MG: 10 TABLET, FILM COATED, EXTENDED RELEASE ORAL at 12:39

## 2017-07-24 ASSESSMENT — ENCOUNTER SYMPTOMS
MYALGIAS: 0
CONSTIPATION: 0
SHORTNESS OF BREATH: 0
SENSORY CHANGE: 1
HEADACHES: 0
DIARRHEA: 0
ABDOMINAL PAIN: 0
PALPITATIONS: 0
FOCAL WEAKNESS: 0
MYALGIAS: 1
CHILLS: 0
WEAKNESS: 1
COUGH: 0
DIZZINESS: 0
FEVER: 0
VOMITING: 0
CHILLS: 1
NAUSEA: 0

## 2017-07-24 ASSESSMENT — PAIN SCALES - GENERAL
PAINLEVEL_OUTOF10: 8
PAINLEVEL_OUTOF10: 7

## 2017-07-24 NOTE — CARE PLAN
Problem: Safety  Goal: Will remain free from injury  Outcome: PROGRESSING AS EXPECTED  Proper fall precautions in place. Call light within reach and encouraged to use. Hourly rounding in practice. Bed alarm in use.     Problem: Skin Integrity  Goal: Risk for impaired skin integrity will decrease  Outcome: PROGRESSING AS EXPECTED  Dressing to RLE changed this shift. Pt tolerated well.

## 2017-07-24 NOTE — PROGRESS NOTES
[facility] Hospitalist Progress Note    Date of Service: 7/24/2017    Chief Complaint  52 y.o. male admitted 7/22/2017 with right BKA stump pain and wound drainage in addition to left TMA stump wound (nonhealing).  Patient has history of PVD s/p L TMA and R BKA more than 5 yrs ago in [place].  He was receiving home health with wound care while living in [facility] while on disability.  Patient moved to [place] 1yr ago.  Since moving to [place], he has not established with PCP or received wound care.  Over the past few months, he began noticing drainage from R BKA stump with wound opening up.  He also noticed ulcer developing at the site of his L TMA stump.  He has prosthesis for his R leg, but unable to use due to these developments over the past few months.  He has no wheelchair and rents rooms weekly at various hotels throughout the city.    Interval Problem Update  7/24 - he has a lot of interest in increasing his pain regimen. It sounds like he had been taking methadone even purchasing on the street. Prior to that he had been on some long-acting opiates. I advised I would start this for his chronic back pain but needed down titrated cerumen. I made no such promises about him continuing on an outpatient basis, but given his acute on chronic pain currently, and the fact that he had already been using long-acting pain medications, albeit somewhat illicitly, it should be fine for a brief period of time, with the goal to down titrate. He understands this.    7/23 - discussed with ID. Discussed with wound. Abx reviewed. Cultures reviewed. Pain 7/10 in left foot.     Consultants/Specialty  LPS  ID - Yenny    Disposition  Clinical, suspect will need long-term abx and probably SNF        Review of Systems   Constitutional: Negative for fever and chills.   Respiratory: Negative for cough and shortness of breath.    Cardiovascular: Negative for chest pain and palpitations.   Gastrointestinal: Negative for nausea, vomiting, abdominal  pain, diarrhea and constipation.   Genitourinary: Negative for dysuria.   Musculoskeletal: Negative for myalgias.        Left foot pain and right BKA stump pain   Skin: Negative for itching.   Neurological: Positive for weakness. Negative for dizziness, focal weakness and headaches.   All other systems reviewed and are negative.     Physical Exam  Laboratory/Imaging   Hemodynamics  Temp (24hrs), Av.8 °C (98.2 °F), Min:36.2 °C (97.2 °F), Max:37.6 °C (99.7 °F)   Temperature: 36.8 °C (98.3 °F)  Pulse  Av  Min: 77  Max: 99    Blood Pressure: 113/68 mmHg      Respiratory      Respiration: 18, Pulse Oximetry: 99 %        RUL Breath Sounds: Diminished;Clear, RML Breath Sounds: Diminished, RLL Breath Sounds: Diminished, NANCY Breath Sounds: Diminished;Clear, LLL Breath Sounds: Diminished    Fluids    Intake/Output Summary (Last 24 hours) at 17 1407  Last data filed at 17 0700   Gross per 24 hour   Intake    860 ml   Output   1250 ml   Net   -390 ml       Nutrition  Orders Placed This Encounter   Procedures   • DIET ORDER     Standing Status: Standing      Number of Occurrences: 1      Standing Expiration Date:      Order Specific Question:  Diet:     Answer:  Regular [1]      Comments:  No fowl/poultry     Physical Exam   Constitutional: He is oriented to person, place, and time. He appears well-developed and well-nourished.   HENT:   Head: Normocephalic and atraumatic.   Mouth/Throat: Oropharynx is clear and moist.   Eyes: Conjunctivae and EOM are normal. Pupils are equal, round, and reactive to light. No scleral icterus.   Neck: Normal range of motion. Neck supple. No tracheal deviation present. No thyromegaly present.   Cardiovascular: Normal rate, regular rhythm, normal heart sounds and intact distal pulses.    No murmur heard.  Pulmonary/Chest: Effort normal and breath sounds normal. No respiratory distress. He has no wheezes.   Abdominal: Soft. Bowel sounds are normal. He exhibits no distension.  There is no tenderness.   Musculoskeletal: Normal range of motion. He exhibits no edema or tenderness.        Legs:  Lymphadenopathy:     He has no cervical adenopathy.        Right: No supraclavicular adenopathy present.        Left: No supraclavicular adenopathy present.   Neurological: He is alert and oriented to person, place, and time.   Skin: Skin is warm and dry.   Vitals reviewed.      Recent Labs      07/21/17   1441  07/22/17   0751  07/24/17   0433   WBC  10.7  7.6  10.6   RBC  4.97  4.77  4.63*   HEMOGLOBIN  11.6*  11.2*  10.7*   HEMATOCRIT  36.9*  35.7*  35.0*   MCV  74.2*  74.8*  75.6*   MCH  23.3*  23.5*  23.1*   MCHC  31.4*  31.4*  30.6*   RDW  39.4  39.8  40.7   PLATELETCT  566*  505*  495*   MPV  8.5*  8.5*  8.6*     Recent Labs      07/21/17   1441  07/22/17   0751  07/24/17   0433   SODIUM  137  138  138   POTASSIUM  4.1  3.8  3.6   CHLORIDE  99  104  106   CO2  24  26  23   GLUCOSE  97  117*  104*   BUN  10  9  12   CREATININE  0.72  0.79  0.80   CALCIUM  8.7  8.4  9.0                      Assessment/Plan     Wound of right leg, at BKA site (present on admission)  Assessment & Plan  Not overly infected looking, but abx for left would cover    Skin ulcer of left foot with fat layer exposed (CMS-HCC) (present on admission)  Assessment & Plan  Highly suspicious for osteomyelitis  MR ordered  Discussed with ID and wound  Active Orders     Ordered     Ordering Provider       Sun Jul 23, 2017  3:08 PM    07/23/17 1508  vancomycin 1,200 mg in  mL IVPB   EVERY 24 HOURS      KYRA Flowers Jul 23, 2017  8:27 AM    07/23/17 0827  ampicillin/sulbactam (UNASYN) 3 g in  mL IVPB   EVERY 6 HOURS      FLORA Perez Jul 22, 2017  1:57 PM    07/22/17 1357  MD ALERT... vancomycin per pharmacy protocol   PHARMACY TO DOSE     Question:  Indication(s) for vancomycin?  Answer:  Osteomyelitis    Manuel Reece M.D.       Contact isolation    Peripheral neuropathy  (present on admission)  Assessment & Plan  Per patient, has had since childhood. Has had good success with Lyrica    Tobacco abuse (present on admission)  Assessment & Plan  Nicotine replacement PRN  Strongly encouraged to quit  Counseled at least 3 minutes on 07/23/2017      Labs reviewed and Medications reviewed        DVT Prophylaxis: Enoxaparin (Lovenox)    Ulcer prophylaxis: Not indicated

## 2017-07-24 NOTE — CARE PLAN
Problem: Nutritional:  Goal: Achieve adequate nutritional intake  Patient will consume >50% of meals  Outcome: MET Date Met:  07/24/17

## 2017-07-24 NOTE — DISCHARGE PLANNING
Care Transition Team Assessment    SW met with pt at bedside to complete assessment. Pt states he has been staying with his son who just got a new place and pt is not yet sure what the address is. Pt advised [place] the [place] and has been in [place] for 9 days. Pt plans to move to [place], but currently has [employer] insurance. This could be a barrier to dc as many providers/facilities in the [place] area do not work with [employer]. Per pt, plan is for him to get surgery tomorrow. DC needs currently unknown.     Pt requested a WC for his room to get himself up to the bathroom. SW spoke with Charge RN who advised this is a safety issue so pt will not have a WC in his room.     Information Source  Orientation : Oriented x 4  Information Given By: Patient  Who is responsible for making decisions for patient? : Patient    Readmission Evaluation  Is this a readmission?: No    Elopement Risk  Legal Hold: No  Ambulatory or Self Mobile in Wheelchair: No-Not an Elopement Risk  Elopement Risk: Not at Risk for Elopement    Interdisciplinary Discharge Planning  Does Admitting Nurse Feel This Could be a Complex Discharge?: Yes  Primary Care Physician: [facility] but [facility] yet  Lives with - Patient's Self Care Capacity: Friends, Other (Comments) (currintly staying with son)  Patient or legal guardian wants to designate a caregiver (see row info): No  Support Systems: Children  Housing / Facility: Motel  Do You Take your Prescribed Medications Regularly: Yes  Able to Return to Previous ADL's: Yes  Mobility Issues: Yes  Patient Expects to be Discharged to:: home with son  Assistance Needed: Yes  Durable Medical Equipment: Other - Specify (WC)    Discharge Preparedness  What is your plan after discharge?: Uncertain - pending medical team collaboration  What are your discharge supports?: Child, Other (comment) (Friend//Sponsor)  Prior Functional Level: Independent with Activities of Daily  Living  Difficulity with ADLs: Walking  Difficulty with ADLs Comment: Pt mostly stays in bed and states he has a WC in the [place] area.  Difficulity with IADLs: Driving  Difficulity with IADL Comments: Pt stays home    Functional Assesment  Prior Functional Level: Independent with Activities of Daily Living    Finances  Prescription Coverage: Yes    Vision / Hearing Impairment  Vision Impairment : Yes (reading glasses)  Right Eye Vision: Blind  Left Eye Vision: Impaired, Patient Declines to Wear Visual Aid  Hearing Impairment : No    Values / Beliefs / Concerns  Values / Beliefs Concerns : No    Advance Directive  Advance Directive?: None  Advance Directive offered?: AD Booklet refused    Domestic Abuse  Have you ever been the victim of abuse or violence?: No  Physical Abuse or Sexual Abuse: No  Verbal Abuse or Emotional Abuse: No  Possible Abuse Reported to:: Not Applicable    Psychological Assessment  History of Substance Abuse: Heroin  Date Last Used - Heroin: 10 years ago per pt  History of Psychiatric Problems: Yes    Discharge Risks or Barriers  Discharge risks or barriers?: No PCP, Uninsured / underinsured, Transportation, Mental health, Post-acute placement / services  Patient risk factors: Mental health, No PCP, Substance abuse, Uninsured or underinsured

## 2017-07-24 NOTE — PROGRESS NOTES
Pt AAOx4.   C/o pain at 7/10 in left foot. Medicated per MAR.   Dressing in place on left TMA. With ulcer on plantar surface.  Right BKA, dehiscing, per pt serous drainage leaking from wound, none noted at this time.   Prosthesis at bedside, pt stating it is not fitting at this time due to swelling.  Blindness noted in right eye.  Diminished breath sounds in all fields. Pt given IS and able to pull 3000 mL.  Pt up at bedside, tolerating a regular diet.   Last BM 7/22 per pt. Pt voiding in urinal.   Discussed POC with pt to be up out of bed today for ambulation.   Pt asking to wait until after meeting with physician and stating that he will have pain when up.   Reassured pt that he can be premedicated prior to ambulation.  Call light within reach, bed alarm on. Educated pt on need to call for assistance.  All needs addressed at this time.

## 2017-07-24 NOTE — PROGRESS NOTES
Infectious Disease Progress Note    Author: Bekah Mckeon M.D. DOS & Time created: 2017  11:48 AM    CC: FU right BKA stump infection and nonhealing left TMA stump wound     Interval History:  52 y.o. WM admitted 2017 with right BKA infection and nonhealing left TMA stump wound    AF WBC 7.6 less drainage from wounds Asking if he will need another BKA  17- no fevers. WBC 10.6. Complains of chills. Complains of 10 out of 10 pain in the left foot. Complains of drainage from the right stump.  Labs Reviewed, Medications Reviewed, Radiology Reviewed and Wound Reviewed.    Review of Systems:  Review of Systems   Constitutional: Positive for chills. Negative for fever.   Respiratory: Negative for cough and shortness of breath.    Cardiovascular: Negative for chest pain.   Gastrointestinal: Negative for nausea, vomiting, abdominal pain and diarrhea.   Genitourinary: Negative for dysuria.   Musculoskeletal: Positive for myalgias and joint pain.   Neurological: Positive for sensory change.   All other systems reviewed and are negative.      Hemodynamics:  Temp (24hrs), Av.8 °C (98.2 °F), Min:36.2 °C (97.2 °F), Max:37.6 °C (99.7 °F)  Temperature: 36.8 °C (98.3 °F)  Pulse  Av  Min: 77  Max: 99   Blood Pressure: 113/68 mmHg       Physical Exam:  Physical Exam   Constitutional: He is oriented to person, place, and time. He appears well-developed. No distress.   Disheveled   HENT:   Head: Normocephalic.   Eyes:   Blind right eye   Neck: Neck supple.   Cardiovascular: Normal rate.    Pulmonary/Chest: Effort normal. No respiratory distress.   Abdominal: Soft. He exhibits no distension. There is no tenderness.   Musculoskeletal: He exhibits no edema.   Wound right BKA-no longer draining  Large plantar wound left foot- refer to the pictures   Neurological: He is alert and oriented to person, place, and time.   Skin: No rash noted.   tattoos   Nursing note and vitals reviewed.      Labs:  Recent Labs       07/21/17 1441 07/22/17 0751 07/24/17   0433   WBC  10.7  7.6  10.6   RBC  4.97  4.77  4.63*   HEMOGLOBIN  11.6*  11.2*  10.7*   HEMATOCRIT  36.9*  35.7*  35.0*   MCV  74.2*  74.8*  75.6*   MCH  23.3*  23.5*  23.1*   RDW  39.4  39.8  40.7   PLATELETCT  566*  505*  495*   MPV  8.5*  8.5*  8.6*   NEUTSPOLYS  67.10  64.50   --    LYMPHOCYTES  25.60  26.60   --    MONOCYTES  4.50  4.60   --    EOSINOPHILS  2.10  3.20   --    BASOPHILS  0.40  0.70   --      Recent Labs      07/21/17 1441 07/22/17 0751 07/24/17 0433   SODIUM  137  138  138   POTASSIUM  4.1  3.8  3.6   CHLORIDE  99  104  106   CO2  24  26  23   GLUCOSE  97  117*  104*   BUN  10  9  12     Recent Labs      07/21/17 1441 07/22/17 0751 07/24/17 0433   ALBUMIN  3.2  2.4*   --    TBILIRUBIN  0.4   --    --    ALKPHOSPHAT  117*   --    --    TOTPROTEIN  8.4*   --    --    ALTSGPT  16   --    --    ASTSGOT  24   --    --    CREATININE  0.72  0.79  0.80     Results     Procedure Component Value Units Date/Time    GRAM STAIN [[id number]] Collected:  07/23/17 0730    Order Status:  Completed Specimen Information:  Wound Updated:  07/23/17 1107     Significant Indicator .      Source WND      Site RIGHT LEG      Gram Stain Result No organisms seen.     Narrative:      Collected By:[id number] SUNNY GONZALEZ    CULTURE WOUND W/ GRAM STAIN [[id number]] Collected:  07/23/17 0730    Order Status:  Completed Specimen Information:  Wound from Right Leg Updated:  07/23/17 0744    Narrative:      Collected By:[id number] SUNNY GONZALEZ          Fluids:  Intake/Output       07/22/17 0700 - 07/23/17 0659 07/23/17 0700 - 07/24/17 0659 07/24/17 0700 - 07/25/17 0659      [id number] [id number] Total 0700-1859 1900-0659 Total 0700-1859 1900-0659 Total       Intake    P.O.  500  -- 500  240  480 720  --  -- --    P.O. 500 -- 500 240 480 720 -- -- --    I.V.  --  996 996  --  280 280  100  -- 100    IV Volume (NS) -- 996 996 -- 180 180 -- -- --    IV Piggyback Volume -- --  -- -- 100 100 100 -- 100    Total Intake   100 -- 100       Output    Urine  200  900 1100  700  1250 1950  --  -- --    Void (ml)  700 1250 1950 -- -- --    Stool  --  -- --  --  -- --  --  -- --    Number of Times Stooled 0 x 0 x 0 x -- -- -- -- -- --    Total Output  700 1250 1950 -- -- --       Net I/O     300 96 396 -460 -490 -950 100 -- 100             Assessment:  Active Hospital Problems    Diagnosis   • Skin ulcer of left foot with fat layer exposed (CMS-HCC) [L97.522]   • Wound of right leg, at BKA site [S81.801A]   • Peripheral neuropathy [G62.9]   • Tobacco abuse [Z72.0]       Plan:  Wound of right leg, at BKA site  Afebrile  No leukocytosis  No hx of DM  Suspect chronic osteo  PVD-needs reeval    + MRSA infection in the past.   Noncompliant with wound care and follow up  Xray equiv-MRI if feasible  Continue vanco and Zosyn pending debridement  Cultures MRSA and Pseudomonas  MRSA isolation    Skin ulcer of left foot with fat layer exposed (CMS-HCC)  Left plantar ulcer, deep tissues exposed, serous drainage    Xray equiv-MRI if feasible  CRP elevated  Wound care  Needs LPS eval  Abx as above    Peripheral neuropathy  Add gabapentin to help with pain control  Check A1C    Prognosis for limb salvage poor    Plan of care discussed with IM-Dr Dumont and LPS

## 2017-07-24 NOTE — PROGRESS NOTES
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 Takes methadone he states for back pain.  Not diabetic.   Presents to ED with increased pain to L TMA with infected neuropathic ulcer and pain to R BKA. TMA to L foot performed 4 years ago in [place], [place]. He recalls the incision never resolved until recently. He developed plantar ulcer to his foot within few months after the TMA. He refused to go to a SNF because his mother passed away from c.diff infection so he had home IV infusions with wound care at outpt clinic. His Right foot developed ulcers during this time that became infected. He had VAC therapy to both feet, and HBOT to try to heal the ulcers. Pt then had RLE BKA about 2 years after the L foot TMA. He stopped going to wound care clinic and began his own wound care regimen saying the therapies did not work.   The R BKA he states has been leaking for 1 year blood and ss fluid. His leg swells up and he notices an increase in drainage. He wears a  sock. He has pain every time he puts his prosthesis on. Used [facility] [facility] in [place], [place]. prosthetic is 2 years old. He purchased his own CAM walking boot for his left foot when walking.  He moved to [place] last 2 wks [facility] when he had intense pain to his BLE, taken to ED. Currently living with son.     Past medical history, medicines, allergies, family history, social history,   and review of systems reviewed      PHYSICAL EXAMINATION:   General Appearance:  Well developed, nourished, in no acute distress    Vascular Assessment:  +3 popliteal pulse RLE  +2 PT, unable to feel DP  Audible with doppler but appears weaker towards DP    Sensory  "Assessment  Insensate at the level of knee bilaterally  insensate to hands and forearms     Offloading  Short CAM walking boot for L foot  Prosthetic for RLE      Wound Assessment:  R BKA:  Partial thickness ulcer to medial stump measures ~0.5x0.5x<0.1cm. Scant serous drainage present. Cleaned with NS, applied skin prep, hydrocolloid thick, secured with hypafix tape.    L TMA: large plantar ulcer. Distal incision resolved.   Plantar ulcer with thick biofilm. separation 0.3cm to lateral edge. Bone easily palpated. 75% red tissue, 25% slough  dakins drsg applied     L calf gastrocsoleus ROM limited    Labs  Recent Labs      07/21/17   1441  07/22/17   0751  07/24/17   0433   WBC  10.7  7.6  10.6   RBC  4.97  4.77  4.63*   HEMOGLOBIN  11.6*  11.2*  10.7*   HEMATOCRIT  36.9*  35.7*  35.0*   MCV  74.2*  74.8*  75.6*   MCH  23.3*  23.5*  23.1*   MCHC  31.4*  31.4*  30.6*   RDW  39.4  39.8  40.7   PLATELETCT  566*  505*  495*   MPV  8.5*  8.5*  8.6*     Recent Labs      07/21/17   1441  07/22/17   0751  07/24/17   0433   SODIUM  137  138  138   POTASSIUM  4.1  3.8  3.6   CHLORIDE  99  104  106   CO2  24  26  23   GLUCOSE  97  117*  104*   BUN  10  9  12     /68 mmHg  Pulse 85  Temp(Src) 36.8 °C (98.3 °F)  Resp 18  Ht 1.854 m (6' 1\")  Wt 65.2 kg (143 lb 11.8 oz)  BMI 18.97 kg/m2  SpO2 99%  a1c 5.7%  Esr 69  crp 2.52    Imaging  L TMA: Extensive postsurgical changes from transmetatarsal amputation. There is overlying soft tissue swelling.  R knee:   Mild tricompartmental osteoarthritis.    2. Meniscal chondrocalcinosis could relate to degenerative change or CPPD arthropathy.               Infection Management  Microbiology   L foot: MRSA, pseudomonas  R BKA: neg    Antibiotics     vanco  zosyn      Patient Education    Discussed with pt regarding importance of offloading ulcers to heal wounds BLE, checking foot daily d/t neuropathy, to not trim his own toenails d/t neuropathy, consume nutritious foods-- extra " protein for increased wound healing.      Plan:   1. R BKA: drsg orders placed. Contact [facility] to evaluate prosthesis.      2. L TMA: bone exposed. Xray w/o definite osseus destruction, Clinically suspect osteo. . D/w Ortho. Plan for surgery either L TMA revision with VAC vs BKA after arterial status is confirmed. Cancel MRI    3. Foot infection: +MRSA, ID involved. Elevated CRP, ESR. IV abx per ID    4. Offloading: NWB BLE.  to check prosthesis. Large plantar ulcer to L foot    5. Neuropathy:  Implications of loss of protective sensation (LOPS) discussed with patient- including increased risk for amputation.  Advised to check foot at least daily, moisturize, and to always wear protective foot wear.     6. : pt concerned about post acute care. Fearful of SNF as mother  from c. Diff infection. Pt with soical security/disability from [place].     DW: pt, son, RN, Dr. Dumont, Dr. Shirley

## 2017-07-24 NOTE — DIETARY
Nutrition Services: Low BMI and Pressure Ulcer on Nutrition Admit Screen   52 year old male with admit dx of Right BKA infection  Past Pertinent Med Hx: No past medical history on file.    Attempted to visit pt, pt was busy with other discipline. Per chart pt PO %. Pt is eating well.     Ht: 185.4 cm  Wt 7/23: 65.2 kg via stand up scale. Adjust for BKA: 69 kg  BMI: 18.96. Adjusted for BKA: 20.1  Diet: Regular  Pertinent Labs: Glucose 104  Pertinent Meds: unasyn, lovenox, ferrous sulfate, oxycontin, lyrica, pericolace, vancomycin, roxicodone (prn)  Fluids: No IV fluids noted in MAR  Skin: Per wound team note on 7/23 - Left plantar foot, neuropathic wound    GI: Last BM 7/24    PLAN/RECOMMEND -   1) Nutrition rep to see patient daily for meal and snack preferences.  2) Encourage PO  3) Weekly weights to monitor fluid and nutrition status  4) Obtain supplement order per RD as needed.    RD available prn

## 2017-07-24 NOTE — PROGRESS NOTES
Report received from Jayson DALE. Assessment completed. Patient A&Ox4. Complains of 7/10 pain in his left foot, medicated per MAR. Refused to mobilize due to pain and weakness in bilateral lower extremities. Right BKA, dehiscing, patient states serous drainage from wound, none noted at this time. Right BKA prosthesis at bedside, patient states it no longer fits him well. Left foot TMA with ulcer on plantar surface. Dressing changed this shift. Blindness in right eye. Diminished lung sounds throughout all fields. IS use encouraged, room air, O2 saturation in high 90's. Tolerating regular diet, normoactive bowel sounds x4. Last BM 7/22/2017 (per patient). PIV left forearm, running TKO. Discussed plan of care with patient, all questions answered. Call light within reach, bed in lowest position, all needs met.

## 2017-07-24 NOTE — PROGRESS NOTES
"Pharmacy Kinetics 52 y.o. male on vancomycin day # 3 7/24/2017    Currently on Vancomycin 1200 mg iv q12hr (11, 23)    Indication for Treatment: SSTI  ID consult: Yes    Pertinent history per medical record: Admitted on 7/22/2017 for R stump pain and drainage and persistent L TMA stump wound. Patient has a hx of PVD s/p L TMA, R BKA and tobacco abuse. Had his amputations >5 years ago in [place].  Had home health with wound care while living there and was on disability but one year ago moved to [place] and has not had any wound care or medical follow up since.  A few months ago he noticed that his R BKA site seemed to have opened up.  After this it started draining straw colored fluid that would \"harden up\" on his skin.  It would also bleed intermittently.  He also noticed an ulcer form on his L TMA site.  Due to both issues he was not able to use his prosthesis (Right) and was not able to put weight on his L foot, therefore he has been crawling from place to place.  He tries not to go outside as he has no wheelchair.  He rents a room by the week at various hotels around [facility]. He denies any fever.  +drainage and pain at R BKA site. No foul odor. No change in urination or bowel habits.    Other antibiotics: ampicillin/sulbactam 3 gm q6h    Allergies: Succinylcholine     List concerns for renal function: BKA    Pertinent cultures to date:    (7/22) left foot wound - POS for staph aureus and pseudomonas  (7/23) right leg wound - NEG      METHICILLIN RESISTANT STAPHYLOCOCCUS AUREUS      Antibiotic Sensitivity Microscan Unit Status     Ampicillin/sulbactam Resistant >16/8 mcg/mL Final     Clindamycin Sensitive <=0.5 mcg/mL Final     Daptomycin Sensitive 1 mcg/mL Final     Erythromycin Sensitive <=0.5 mcg/mL Final     Moxifloxacin Sensitive 2 mcg/mL Final     Oxacillin Resistant >2 mcg/mL Final     Penicillin Resistant >8 mcg/mL Final     Tetracycline Sensitive <=4 mcg/mL Final     Trimeth/Sulfa Sensitive <=0.5/9.5 mcg/mL " "Final     Vancomycin Sensitive 2 mcg/mL Final        PSEUDOMONAS AERUGINOSA      Antibiotic Sensitivity Microscan Unit Status     Amikacin Sensitive <=16 mcg/mL Final     Cefepime Sensitive <=8 mcg/mL Final     Ceftazidime Sensitive 4 mcg/mL Final     Ciprofloxacin Sensitive <=1 mcg/mL Final     Gentamicin Sensitive <=4 mcg/mL Final     Imipenem Resistant 8 mcg/mL Final     Meropenem Sensitive 4 mcg/mL Final     Pip/Tazobactam Sensitive <=16 mcg/mL Final     Tobramycin Sensitive <=4 mcg/mL Final        Recent Labs      17   1441  17   0751  17   0433   WBC  10.7  7.6  10.6   NEUTSPOLYS  67.10  64.50   --      Recent Labs      17   1441  17   0751  17   0433   BUN  10  9  12   CREATININE  0.72  0.79  0.80   ALBUMIN  3.2  2.4*   --      Recent Labs      17   1033   VANCOTROUGH  25.9*     Intake/Output Summary (Last 24 hours) at 17 1226  Last data filed at 17 0700   Gross per 24 hour   Intake    860 ml   Output   1250 ml   Net   -390 ml      Blood pressure 113/68, pulse 85, temperature 36.8 °C (98.3 °F), resp. rate 18, height 1.854 m (6' 1\"), weight 65.2 kg (143 lb 11.8 oz), SpO2 99 %. Temp (24hrs), Av.8 °C (98.2 °F), Min:36.2 °C (97.2 °F), Max:37.6 °C (99.7 °F)    A/P   1. Next vancomycin level: 17 at 1330 (ordered)  2. Goal trough: 12-16 mcg/mL  3. Comments: ID is following the patient. The wound is growing pseudomonas and staph aureus - sensitivities as above. A level was obtained (about an hour early - not while vancomycin was running) and it was well above goal so I halved the dose but I am going to check a level after 2 doses on the new dosing to make sure we are not under-dosing the patient. Renal function is stable. Pharmacy will continue to monitor and adjust dosing as clinically appropriate.    Nany Zazueta, PharmD  "

## 2017-07-24 NOTE — WOUND TEAM
"[facility]  Inpatient Services  Initial Wound and Skin Care Evaluation    Admission Date:  07/22/17     HPI, PMH, SH: Reviewed  Unit where seen by Wound Team:  [facility]     WOUND CONSULT RELATED TO:  Left plantar and right BKA site      SUBJECTIVE:  \"The right one is the one that hurts.\"      Self Report / Pain Level:  Pain with palpation to right limb.     OBJECTIVE:  Wounds open to air.    WOUND TYPE, LOCATION, CHARACTERISTICS (Pressure ulcers: location, stage, POA or date identified)    Location and type of wound:  Left plantar foot, neuropathic wound          Periwound:  Calloused, intact       Drainage:   Min, yellow    Tissue Type and %:  40% yellow, 60% pink    Wound Edges:  Calloused    Odor:    None    Exposed structure(s): Bone palpated    S&S of Infection:  Non healing, exposed bone       Measurements:  (Taken 07/23/17)   Length:   5.5 cm    Width:    5 cm   Depth:   ANDREINA     INTERVENTIONS BY WOUND TEAM:  Wound cleansed with NS and gauze.  Wound bed covered with NS moistened roll guaze, covered with ABD pad and secured with roll guaze.  Right limb cleansed and covered with adhesive foam for further assessment of any drainage.     Interdisciplinary consultation:  Dr. Dumont, Dom GUTHRIE, RN, patient      EVALUATION:  Patient with history of neuropathy without DM.  Previous TMA to left foot about 4-5 years ago, which patient states is just now healing.  Patient unsure how plantar wound started, assuming because he walks on it and does not use any offloading boot at this time.  [facility] for antimicrobial and debridement.  Dr. Dumont ordered MRI since bone exposed.   Patient states that right limb has been draining heavily with blood and clots.  Incision appears mostly healed with a chronic appearing dimple.  Unable to find or palpate any tract or area that could be draining.  Foam placed to absorb any drainage for assessment.    LPS to continue to follow and evaluate further needs     Factors " affecting wound healing:  Neuropathy, tobacco abuse    Goals:  Decrease in wound size by 1% each week.     NURSING PLAN OF CARE ORDERS (X):    Dressing changes: See Dressing Maintenance orders: X  Skin care: See Skin Care orders:   Rectal tube care: See Rectal Tube Care orders:   Other orders:    RSKIN: CURRENT (X) ORDERED (O)  Q shift Himanshu:  X  Q shift pressure point assessments:  X  Pressure redistribution mattress      X  CAROL      Bariatric CAROL      Bariatric foam        Heel float boots       Heels floated on pillows    X  Barrier wipes      Barrier Cream      Barrier paste      Sacral silicone dressing    PRN  Silicone O2 tubing      Anchorfast      Trach with Optifoam split foam       Waffle cushion      Rectal tube or BMS      Antifungal tx    Turn q 2 hours  X ensure patient is turning   Up to chair     Ambulate   PT/OT     Dietician      PO    X TF   TPN     PVN    NPO   # days   Other       WOUND TEAM PLAN OF CARE (X):   NPWT change 3 x week:        Dressing changes by wound team:       Follow up as needed:     X  Other (explain):    Anticipated discharge plans (X):  SNF:           Home Care:           Outpatient Wound Center:      X      Self Care:            Other:

## 2017-07-25 PROBLEM — F11.20 OPIATE DEPENDENCE (HCC): Status: ACTIVE | Noted: 2017-07-25

## 2017-07-25 LAB
BACTERIA WND AEROBE CULT: NORMAL
GRAM STN SPEC: NORMAL
SIGNIFICANT IND 70042: NORMAL
SITE SITE: NORMAL
SOURCE SOURCE: NORMAL

## 2017-07-25 PROCEDURE — 700111 HCHG RX REV CODE 636 W/ 250 OVERRIDE (IP): Performed by: INTERNAL MEDICINE

## 2017-07-25 PROCEDURE — 700105 HCHG RX REV CODE 258

## 2017-07-25 PROCEDURE — 500053 HCHG BANDAGE, ELASTIC 4: Performed by: ORTHOPAEDIC SURGERY

## 2017-07-25 PROCEDURE — 160035 HCHG PACU - 1ST 60 MINS PHASE I: Performed by: ORTHOPAEDIC SURGERY

## 2017-07-25 PROCEDURE — 501330 HCHG SET, CYSTO IRRIG TUBING: Performed by: ORTHOPAEDIC SURGERY

## 2017-07-25 PROCEDURE — 160038 HCHG SURGERY MINUTES - EA ADDL 1 MIN LEVEL 2: Performed by: ORTHOPAEDIC SURGERY

## 2017-07-25 PROCEDURE — A9270 NON-COVERED ITEM OR SERVICE: HCPCS | Performed by: HOSPITALIST

## 2017-07-25 PROCEDURE — A6402 STERILE GAUZE <= 16 SQ IN: HCPCS | Performed by: ORTHOPAEDIC SURGERY

## 2017-07-25 PROCEDURE — 160027 HCHG SURGERY MINUTES - 1ST 30 MINS LEVEL 2: Performed by: ORTHOPAEDIC SURGERY

## 2017-07-25 PROCEDURE — 700105 HCHG RX REV CODE 258: Performed by: INTERNAL MEDICINE

## 2017-07-25 PROCEDURE — 0JBR0ZZ EXCISION OF LEFT FOOT SUBCUTANEOUS TISSUE AND FASCIA, OPEN APPROACH: ICD-10-PCS | Performed by: ORTHOPAEDIC SURGERY

## 2017-07-25 PROCEDURE — 501838 HCHG SUTURE GENERAL: Performed by: ORTHOPAEDIC SURGERY

## 2017-07-25 PROCEDURE — 502240 HCHG MISC OR SUPPLY RC 0272: Performed by: ORTHOPAEDIC SURGERY

## 2017-07-25 PROCEDURE — 700101 HCHG RX REV CODE 250

## 2017-07-25 PROCEDURE — 501480 HCHG STOCKINETTE: Performed by: ORTHOPAEDIC SURGERY

## 2017-07-25 PROCEDURE — A9270 NON-COVERED ITEM OR SERVICE: HCPCS | Performed by: INTERNAL MEDICINE

## 2017-07-25 PROCEDURE — 99232 SBSQ HOSP IP/OBS MODERATE 35: CPT | Performed by: INTERNAL MEDICINE

## 2017-07-25 PROCEDURE — 700102 HCHG RX REV CODE 250 W/ 637 OVERRIDE(OP): Performed by: INTERNAL MEDICINE

## 2017-07-25 PROCEDURE — 500881 HCHG PACK, EXTREMITY: Performed by: ORTHOPAEDIC SURGERY

## 2017-07-25 PROCEDURE — A9270 NON-COVERED ITEM OR SERVICE: HCPCS

## 2017-07-25 PROCEDURE — A6454 SELF-ADHER BAND W>=3" <5"/YD: HCPCS | Performed by: ORTHOPAEDIC SURGERY

## 2017-07-25 PROCEDURE — 700111 HCHG RX REV CODE 636 W/ 250 OVERRIDE (IP)

## 2017-07-25 PROCEDURE — 501488 HCHG SUCTION CANN, WOUNDVAC TRAC: Performed by: ORTHOPAEDIC SURGERY

## 2017-07-25 PROCEDURE — 770021 HCHG ROOM/CARE - ISO PRIVATE

## 2017-07-25 PROCEDURE — 700111 HCHG RX REV CODE 636 W/ 250 OVERRIDE (IP): Performed by: HOSPITALIST

## 2017-07-25 PROCEDURE — 700105 HCHG RX REV CODE 258: Performed by: HOSPITALIST

## 2017-07-25 PROCEDURE — 700102 HCHG RX REV CODE 250 W/ 637 OVERRIDE(OP): Performed by: HOSPITALIST

## 2017-07-25 PROCEDURE — 0L8P0ZZ DIVISION OF LEFT LOWER LEG TENDON, OPEN APPROACH: ICD-10-PCS | Performed by: ORTHOPAEDIC SURGERY

## 2017-07-25 PROCEDURE — 160022 HCHG BLOCK: Performed by: ORTHOPAEDIC SURGERY

## 2017-07-25 PROCEDURE — 160009 HCHG ANES TIME/MIN: Performed by: ORTHOPAEDIC SURGERY

## 2017-07-25 PROCEDURE — 160002 HCHG RECOVERY MINUTES (STAT): Performed by: ORTHOPAEDIC SURGERY

## 2017-07-25 PROCEDURE — 500452 HCHG DRESSING, WOUND VAC MED.: Performed by: ORTHOPAEDIC SURGERY

## 2017-07-25 PROCEDURE — 160036 HCHG PACU - EA ADDL 30 MINS PHASE I: Performed by: ORTHOPAEDIC SURGERY

## 2017-07-25 PROCEDURE — 160048 HCHG OR STATISTICAL LEVEL 1-5: Performed by: ORTHOPAEDIC SURGERY

## 2017-07-25 PROCEDURE — 700102 HCHG RX REV CODE 250 W/ 637 OVERRIDE(OP)

## 2017-07-25 RX ORDER — HYDROMORPHONE HYDROCHLORIDE 2 MG/ML
INJECTION, SOLUTION INTRAMUSCULAR; INTRAVENOUS; SUBCUTANEOUS
Status: COMPLETED
Start: 2017-07-25 | End: 2017-07-25

## 2017-07-25 RX ORDER — OXYCODONE HCL 5 MG/5 ML
SOLUTION, ORAL ORAL
Status: COMPLETED
Start: 2017-07-25 | End: 2017-07-25

## 2017-07-25 RX ORDER — SODIUM CHLORIDE 9 MG/ML
INJECTION, SOLUTION INTRAVENOUS
Status: COMPLETED
Start: 2017-07-25 | End: 2017-07-25

## 2017-07-25 RX ORDER — MAGNESIUM HYDROXIDE 1200 MG/15ML
LIQUID ORAL
Status: DISCONTINUED | OUTPATIENT
Start: 2017-07-25 | End: 2017-07-25 | Stop reason: HOSPADM

## 2017-07-25 RX ORDER — SULFAMETHOXAZOLE AND TRIMETHOPRIM 800; 160 MG/1; MG/1
1 TABLET ORAL EVERY 12 HOURS
Status: DISCONTINUED | OUTPATIENT
Start: 2017-07-25 | End: 2017-07-29

## 2017-07-25 RX ORDER — ZOLPIDEM TARTRATE 5 MG/1
5 TABLET ORAL
Status: DISCONTINUED | OUTPATIENT
Start: 2017-07-25 | End: 2017-10-18

## 2017-07-25 RX ADMIN — HYDROMORPHONE HYDROCHLORIDE 0.5 MG: 2 INJECTION INTRAMUSCULAR; INTRAVENOUS; SUBCUTANEOUS at 15:05

## 2017-07-25 RX ADMIN — HYDROMORPHONE HYDROCHLORIDE 0.5 MG: 2 INJECTION INTRAMUSCULAR; INTRAVENOUS; SUBCUTANEOUS at 15:15

## 2017-07-25 RX ADMIN — DAKIN'S SOLUTION 0.125% (QUARTER STRENGTH) 50 ML: 0.12 SOLUTION at 09:24

## 2017-07-25 RX ADMIN — OXYCODONE HYDROCHLORIDE 10 MG: 5 SOLUTION ORAL at 15:10

## 2017-07-25 RX ADMIN — SULFAMETHOXAZOLE AND TRIMETHOPRIM 1 TABLET: 800; 160 TABLET ORAL at 20:42

## 2017-07-25 RX ADMIN — AMPICILLIN SODIUM AND SULBACTAM SODIUM 3 G: 2; 1 INJECTION, POWDER, FOR SOLUTION INTRAMUSCULAR; INTRAVENOUS at 05:43

## 2017-07-25 RX ADMIN — STANDARDIZED SENNA CONCENTRATE AND DOCUSATE SODIUM 2 TABLET: 8.6; 5 TABLET, FILM COATED ORAL at 09:23

## 2017-07-25 RX ADMIN — HYDROMORPHONE HYDROCHLORIDE 0.5 MG: 2 INJECTION INTRAMUSCULAR; INTRAVENOUS; SUBCUTANEOUS at 15:35

## 2017-07-25 RX ADMIN — AMPICILLIN SODIUM AND SULBACTAM SODIUM 3 G: 2; 1 INJECTION, POWDER, FOR SOLUTION INTRAMUSCULAR; INTRAVENOUS at 12:16

## 2017-07-25 RX ADMIN — OXYCODONE HYDROCHLORIDE 5 MG: 5 TABLET ORAL at 09:23

## 2017-07-25 RX ADMIN — Medication 325 MG: at 16:30

## 2017-07-25 RX ADMIN — NICOTINE 14 MG: 14 PATCH, EXTENDED RELEASE TRANSDERMAL at 05:43

## 2017-07-25 RX ADMIN — SODIUM CHLORIDE 500 ML: 9 INJECTION, SOLUTION INTRAVENOUS at 16:30

## 2017-07-25 RX ADMIN — OXYCODONE HYDROCHLORIDE 10 MG: 10 TABLET, FILM COATED, EXTENDED RELEASE ORAL at 09:22

## 2017-07-25 RX ADMIN — FENTANYL CITRATE 50 MCG: 50 INJECTION, SOLUTION INTRAMUSCULAR; INTRAVENOUS at 15:00

## 2017-07-25 RX ADMIN — PIPERACILLIN SODIUM AND TAZOBACTAM SODIUM 3.38 G: 3; .375 INJECTION, POWDER, FOR SOLUTION INTRAVENOUS at 16:46

## 2017-07-25 RX ADMIN — OXYCODONE HYDROCHLORIDE 10 MG: 10 TABLET, FILM COATED, EXTENDED RELEASE ORAL at 20:42

## 2017-07-25 RX ADMIN — PREGABALIN 75 MG: 75 CAPSULE ORAL at 16:30

## 2017-07-25 RX ADMIN — ZOLPIDEM TARTRATE 5 MG: 5 TABLET, FILM COATED ORAL at 00:20

## 2017-07-25 RX ADMIN — FENTANYL CITRATE 50 MCG: 50 INJECTION, SOLUTION INTRAMUSCULAR; INTRAVENOUS at 15:20

## 2017-07-25 RX ADMIN — PROMETHAZINE HYDROCHLORIDE 25 MG: 25 TABLET ORAL at 04:24

## 2017-07-25 RX ADMIN — AMPICILLIN SODIUM AND SULBACTAM SODIUM 3 G: 2; 1 INJECTION, POWDER, FOR SOLUTION INTRAMUSCULAR; INTRAVENOUS at 01:15

## 2017-07-25 RX ADMIN — MORPHINE SULFATE 2 MG: 4 INJECTION INTRAVENOUS at 22:31

## 2017-07-25 RX ADMIN — PREGABALIN 75 MG: 75 CAPSULE ORAL at 09:23

## 2017-07-25 RX ADMIN — PREGABALIN 75 MG: 75 CAPSULE ORAL at 20:41

## 2017-07-25 RX ADMIN — ZOLPIDEM TARTRATE 5 MG: 5 TABLET, FILM COATED ORAL at 22:36

## 2017-07-25 RX ADMIN — Medication 325 MG: at 09:23

## 2017-07-25 RX ADMIN — HYDROMORPHONE HYDROCHLORIDE 0.5 MG: 2 INJECTION INTRAMUSCULAR; INTRAVENOUS; SUBCUTANEOUS at 15:25

## 2017-07-25 RX ADMIN — MORPHINE SULFATE 2 MG: 4 INJECTION INTRAVENOUS at 04:18

## 2017-07-25 RX ADMIN — SODIUM CHLORIDE 500 ML: 9 INJECTION, SOLUTION INTRAVENOUS at 02:15

## 2017-07-25 ASSESSMENT — ENCOUNTER SYMPTOMS
SHORTNESS OF BREATH: 0
SENSORY CHANGE: 1
CHILLS: 0
COUGH: 0
VOMITING: 0
NAUSEA: 0
MYALGIAS: 1
DIARRHEA: 0
FEVER: 0
ABDOMINAL PAIN: 0

## 2017-07-25 ASSESSMENT — PAIN SCALES - GENERAL
PAINLEVEL_OUTOF10: 7
PAINLEVEL_OUTOF10: 2
PAINLEVEL_OUTOF10: 4
PAINLEVEL_OUTOF10: 7
PAINLEVEL_OUTOF10: 7
PAINLEVEL_OUTOF10: 2
PAINLEVEL_OUTOF10: 3
PAINLEVEL_OUTOF10: 6

## 2017-07-25 NOTE — OR NURSING
Pt AA/Ox4. VSS. Wound vac dressing to left foot, CDI. Pt reports 2-3/10 pain scale. Pain medications given. Pt reports numbness and tingling to bilateral lower extremities. Pt denies nausea or vomiting. Report given to SUYAPA Tyler. Pt's son, michaela Newman. Awaiting transport.

## 2017-07-25 NOTE — CARE PLAN
Problem: Mobility  Goal: Risk for activity intolerance will decrease  Outcome: PROGRESSING AS EXPECTED  Educated that wheelchair cannot be at bedside due to high fall risk. Patient educated to call for assistance.     Problem: Pain Management  Goal: Pain level will decrease to patient’s comfort goal  Outcome: PROGRESSING AS EXPECTED  Pain managed with scheduled OxyContin and Lyrica. Tolerating pain level at this time.

## 2017-07-25 NOTE — PROGRESS NOTES
LIMB PRESERVATION SERVICE     51 y/o male with R BKA and L foot TMA with infected ulcer and pain.       ABIs completed  triphasic flow LLE 1.42      PLAN  Continue wound care  NPO midnight  Ortho-Dr. Shirley to see pt, eval for I & D with revision of TMA and GSR vs BKA      D/w: pt, son, RN, Dr. Shirley

## 2017-07-25 NOTE — PROGRESS NOTES
Infectious Disease Progress Note    Author: Bekah Mckeon M.D. DOS & Time created: 2017  1:21 PM    CC: FU right BKA stump infection and nonhealing left TMA stump wound     Interval History:  52 y.o. WM admitted 2017 with right BKA infection and nonhealing left TMA stump wound    AF WBC 7.6 less drainage from wounds Asking if he will need another BKA  17- no fevers. WBC 10.6. Complains of chills. Complains of 10 out of 10 pain in the left foot. Complains of drainage from the right stump.  17- complains of pain in the foot. Had low-grade fevers up to 100.7 last night . Foot cultures are showing MRSA and Pseudomonas  Labs Reviewed, Medications Reviewed, Radiology Reviewed and Wound Reviewed.    Review of Systems:  Review of Systems   Constitutional: Negative for fever and chills.   Respiratory: Negative for cough and shortness of breath.    Cardiovascular: Negative for chest pain.   Gastrointestinal: Negative for nausea, vomiting, abdominal pain and diarrhea.   Genitourinary: Negative for dysuria.   Musculoskeletal: Positive for myalgias and joint pain.   Neurological: Positive for sensory change.   All other systems reviewed and are negative.      Hemodynamics:  Temp (24hrs), Av.2 °C (98.9 °F), Min:36.6 °C (97.9 °F), Max:38.2 °C (100.7 °F)  Temperature: 36.6 °C (97.9 °F)  Pulse  Av  Min: 72  Max: 111Heart Rate (Monitored): 72  Blood Pressure: 142/85 mmHg, NIBP: (!) 98/75 mmHg       Physical Exam:  Physical Exam   Constitutional: He is oriented to person, place, and time. He appears well-developed. No distress.   Disheveled   HENT:   Head: Normocephalic.   Eyes:   Blind right eye   Neck: Neck supple.   Cardiovascular: Normal rate.    Pulmonary/Chest: Effort normal. No respiratory distress.   Abdominal: Soft. He exhibits no distension. There is no tenderness.   Musculoskeletal: He exhibits no edema.   Wound right BKA-no longer draining  Large plantar wound left foot- refer to the  pictures   Neurological: He is alert and oriented to person, place, and time.   Skin: No rash noted.   tattoos   Nursing note and vitals reviewed.      Labs:  Recent Labs      07/24/17 0433   WBC  10.6   RBC  4.63*   HEMOGLOBIN  10.7*   HEMATOCRIT  35.0*   MCV  75.6*   MCH  23.1*   RDW  40.7   PLATELETCT  495*   MPV  8.6*     Recent Labs      07/24/17 0433   SODIUM  138   POTASSIUM  3.6   CHLORIDE  106   CO2  23   GLUCOSE  104*   BUN  12     Recent Labs      07/24/17 0433   CREATININE  0.80     Results     Procedure Component Value Units Date/Time    CULTURE WOUND W/ GRAM STAIN [[id number]] Collected:  07/23/17 0730    Order Status:  Completed Specimen Information:  Wound from Right Leg Updated:  07/25/17 1018     Gram Stain Result No organisms seen.      Significant Indicator NEG      Source WND      Site RIGHT LEG      Culture Result Wound Rare growth Mixed skin gladis.     Narrative:      Collected By:[id number] SUNNY GONZALEZ    GRAM STAIN [[id number]] Collected:  07/23/17 0730    Order Status:  Completed Specimen Information:  Wound Updated:  07/23/17 1107     Significant Indicator .      Source WND      Site RIGHT LEG      Gram Stain Result No organisms seen.     Narrative:      Collected By:[id number] SUNNY GONZALEZ          Fluids:  Intake/Output       07/23/17 0700 - 07/24/17 0659 07/24/17 0700 - 07/25/17 0659 07/25/17 0700 - 07/26/17 0659      4221-3840 1029-6450 Total 1935-8314 2753-6710 Total 1374-5304 4691-0255 Total       Intake    P.O.  240  480 720  1440  480 1920  --  -- --    P.O. 240  480 1920 -- -- --    I.V.  --  280 280  100  530 630  --  -- --    IV Volume (NS) -- 180 180 -- 180 180 -- -- --    IV Piggyback Volume -- 100 100 100 350 450 -- -- --    Total Intake  1540 1010 2550 -- -- --       Output    Urine  700  1250 1950  1800  1800 3600  1000  -- 1000    Number of Times Voided -- -- -- 6 x -- 6 x -- -- --    Void (ml) 700 1250 1950 1800 1800 3600 1000 -- 1000    Stool   --  -- --  --  -- --  --  -- --    Number of Times Stooled -- -- -- -- -- -- 0 x -- 0 x    Total Output 700 1250 1950 1800 1800 3600 1000 -- 1000       Net I/O     -460 -490 -950 -260 -790 -1050 -1000 -- -1000             Assessment:  Active Hospital Problems    Diagnosis   • Skin ulcer of left foot with fat layer exposed (CMS-HCC) [L97.522]   • Wound of right leg, at BKA site [S81.801A]   • Peripheral neuropathy [G62.9]   • Tobacco abuse [Z72.0]       Plan:  Wound of right leg, at BKA site  Afebrile  No leukocytosis  No hx of DM  Suspect chronic osteo  PVD-needs reeval    + MRSA infection in the past.   Noncompliant with wound care and follow up  Xray equiv-MRI if feasible  Patient is for revision of the stump today  Patient says he still has drainage from the stump    Skin ulcer of left foot with fat layer exposed (CMS-HCC)  Left plantar ulcer, deep tissues exposed, serous drainage    Cultures for MRSA and Pseudomonas  Discontinue vancomycin  Start by mouth Bactrim  Continue Zosyn  Patient is for debridement today    Peripheral neuropathy  Add gabapentin to help with pain control  Check A1C    Prognosis for limb salvage poor    Placement- patient has lots of concerns about his insurance coverage and subsequent place to live    Plan of care discussed with IM.

## 2017-07-25 NOTE — OR NURSING
Per pt request, notary services needed.  Pt states he would like for someone to assist him with that paperwork, after surgery.  Message left for Alisia Porter.

## 2017-07-25 NOTE — OR NURSING
Pt via bed, accompanied by transport, was transferred to [facility] at 1609.

## 2017-07-25 NOTE — PROGRESS NOTES
Pt indicated for:     RIGHT revision BKA with I&D and tract excision    LEFT foot I&D, gastroc recession, wound vac placement    Discussed with patient, understandably concerned with social situation and finances, will work w SW and DC planning on DC needs.  Will be heel WB LLE and NWB LLE postop, floor vac changes 1-2 times then decide whether continued vac v dressing changes most appropriate.    Chavez Shirley MD

## 2017-07-25 NOTE — PROGRESS NOTES
"Pharmacy Kinetics 52 y.o. male on vancomycin day #4 7/25/2017    Currently on Vancomycin 1200 mg iv q24h (1500)    Indication for Treatment: SSTI  ID consult: Yes    Pertinent history per medical record: Admitted on 7/22/2017 for R stump pain and drainage and persistent L TMA stump wound. Patient has a hx of PVD s/p L TMA, R BKA and tobacco abuse. Had his amputations >5 years ago in [place].  Had home health with wound care while living there and was on disability but one year ago moved to [place] and has not had any wound care or medical follow up since.  A few months ago he noticed that his R BKA site seemed to have opened up.  After this it started draining straw colored fluid that would \"harden up\" on his skin.  It would also bleed intermittently.  He also noticed an ulcer form on his L TMA site.  Due to both issues he was not able to use his prosthesis (Right) and was not able to put weight on his L foot, therefore he has been crawling from place to place.  He tries not to go outside as he has no wheelchair.  He rents a room by the week at various hotels around [facility]. He denies any fever.  +drainage and pain at R BKA site. No foul odor. No change in urination or bowel habits.    Other antibiotics: ampicillin/sulbactam 3 gm q6h    Allergies: Succinylcholine     List concerns for renal function: BKA    Pertinent cultures to date:    (7/22) left foot wound - POS for staph aureus and pseudomonas  (7/23) right leg wound - NEG    METHICILLIN RESISTANT STAPHYLOCOCCUS AUREUS        Antibiotic  Sensitivity  Microscan  Unit  Status       Ampicillin/sulbactam  Resistant  >16/8  mcg/mL  Final       Clindamycin  Sensitive  <=0.5  mcg/mL  Final       Daptomycin  Sensitive  1  mcg/mL  Final       Erythromycin  Sensitive  <=0.5  mcg/mL  Final       Moxifloxacin  Sensitive  2  mcg/mL  Final       Oxacillin  Resistant  >2  mcg/mL  Final       Penicillin  Resistant  >8  mcg/mL  Final       Tetracycline  Sensitive  <=4  mcg/mL  " "Final       Trimeth/Sulfa  Sensitive  <=0.5/9.5  mcg/mL  Final       Vancomycin  Sensitive  2  mcg/mL  Final            PSEUDOMONAS AERUGINOSA        Antibiotic  Sensitivity  Microscan  Unit  Status       Amikacin  Sensitive  <=16  mcg/mL  Final       Cefepime  Sensitive  <=8  mcg/mL  Final       Ceftazidime  Sensitive  4  mcg/mL  Final       Ciprofloxacin  Sensitive  <=1  mcg/mL  Final       Gentamicin  Sensitive  <=4  mcg/mL  Final       Imipenem  Resistant  8  mcg/mL  Final       Meropenem  Sensitive  4  mcg/mL  Final       Pip/Tazobactam  Sensitive  <=16  mcg/mL  Final       Tobramycin  Sensitive  <=4  mcg/mL  Final               Recent Labs      17   0433   WBC  10.6     Recent Labs      17   0433   BUN  12   CREATININE  0.80     Recent Labs      17   1033   VANCOTROUGH  25.9*     Intake/Output Summary (Last 24 hours) at 17 1235  Last data filed at 17 0800   Gross per 24 hour   Intake   1490 ml   Output   3550 ml   Net  -2060 ml      Blood pressure 142/85, pulse 111, temperature 38.2 °C (100.7 °F), resp. rate 16, height 1.854 m (6' 1\"), weight 65.2 kg (143 lb 11.8 oz), SpO2 91 %. Temp (24hrs), Av.3 °C (99.1 °F), Min:36.7 °C (98 °F), Max:38.2 °C (100.7 °F)    A/P   1. Next vancomycin level: 727/17 at 1430 (not ordered)  2. Goal trough: 12-16 mcg/mL  3. Comments: IV access was lost yesterday. The dose was started on time but stopped and resumed at ~2200. The dose was re-timed from 1400 to 1500 daily. Therefore, I will keep the patient on q24h dosing and let the patient get 2 more doses before checking a level to ensure an accurate representation of dosing. ID is following the patient. No concern for accumulation. Every 12 hours dosing appeared to be too much for the patient. Pharmacy will continue to monitor and adjust dosing as clinically appropriate.    Nany Zazueta, PharmD  "

## 2017-07-25 NOTE — PROGRESS NOTES
Patient is A&Ox4.   Reports pain to LE, medicated per MAR.   R BKA with open wound, dressing in place, intact. L TMA dressing changed this shift. OLIVA, reports baseline numbness and tingling throughout, calls appropriately for assistance, bed alarm on.   On RA, denies SOB, chest pain.   Normoactive BS x 4. Tolerates regular diet. +flatus, +BM. Pt is voiding q shift. Aware of NPO status after midnight for impending surgery.   PIV KVO.   Updated on POC. Belongings and call light within reach. All needs met at this time.

## 2017-07-25 NOTE — CONSULTS
"7/25/2017    Yariel Cheung is a 52 y.o. male who presents with a complicated history regarding his bilateral lower extremities.  He has had a BKA on the right that healed, but has had serous drainage from it the last 2-3 months.  His prosthesis has become painful.  He is also s/p Left TMA, which he states never completely healed, now with plantar ulceration.  Very resistant to BKA on left.  Discussed with son and patient, they would like to try nonoperative management on the right BKA stump, would like to try to save Left foot.  Patient is in the process of relocating from [place] to [place] as his son is here.        History reviewed. No pertinent past medical history.    Past Surgical History   Procedure Laterality Date   • Other orthopedic surgery       right BKA   • Other orthopedic surgery       left foot partial amputation       Medications  No current facility-administered medications on file prior to encounter.     No current outpatient prescriptions on file prior to encounter.       Allergies  Chicken allergy; Turkey; and Succinylcholine    ROS  All other systems were reviewed and found to be negative    History reviewed. No pertinent family history.    Social History     Social History   • Marital Status: Single     Spouse Name: N/A   • Number of Children: N/A   • Years of Education: N/A     Social History Main Topics   • Smoking status: Never Smoker    • Smokeless tobacco: Never Used   • Alcohol Use: No   • Drug Use: Yes      Comment: cannabis   • Sexual Activity: Not Asked     Other Topics Concern   • None     Social History Narrative       Physical Exam  Vitals  Blood pressure 142/85, pulse 111, temperature 36.6 °C (97.9 °F), resp. rate 18, height 1.854 m (6' 1\"), weight 65.2 kg (143 lb 11.8 oz), SpO2 99 %.  General: Well Developed, Well Nourished, no acute distress  Psychiatric: Alert and oriented x3, appropriate responses to questions, pleasant mood and affect.  HEENT: Normocephalic, atraumatic  Eyes: " Anicteric, PERRLA, EOMI  RLE: s/p BKA, invaginations of incision, dried skin distally.  Do not see ulceration.  LLE: S/P short TMA.  Incision healed, but large plantar ulceration with 75% red tissue and 25% slough, thick surrounding callous.  No sensation in feet.  Palp PT and DP pulses.     Vascular studies normal to LLE    Radiographs:  DX-TIBIA AND FIBULA RIGHT   Final Result      Focal soft tissue swelling of the distal RIGHT stump status post below knee amputation with heterotopic ossification and potential focal bony destruction, raising concern for osteomyelitis.      LE ART/JOHNATHAN   Final Result          Laboratory Values  Recent Labs      07/24/17   0433   WBC  10.6   RBC  4.63*   HEMOGLOBIN  10.7*   HEMATOCRIT  35.0*   MCV  75.6*   MCH  23.1*   MCHC  30.6*   RDW  40.7   PLATELETCT  495*   MPV  8.6*     Recent Labs      07/24/17   0433   SODIUM  138   POTASSIUM  3.6   CHLORIDE  106   CO2  23   GLUCOSE  104*   BUN  12             Impression:    #1 Persistent ulceration plantar L foot s/p TMA.  Pain at distal Right BKA stump with bony overgrowth.  Will attempt nonoperative management RLE with eval by prosthetist.  Discussed high likelihood of LEFT BKA without progress in wound healing    Plan:    NPO for LEFT foot I&D, GSR, vac placement  CAM boot LLE posop  VAC change on floor postop with LPW eval to determine continued vac v dressing  Will arrange eval by prosthetist for RLE  Abx per ID  DVT proph per hospitalist  Will continue to follow    Chavez Shirley MD

## 2017-07-25 NOTE — PROGRESS NOTES
Received bedside report and assumed care of patient.  Assessment complete; discussed POC with patient.  AO x4, patient calls for assistance.  Patient appears anxious and exhibits no signs of distress.  Patient reports pain of 6/10, medicating per MAR PRN.  Patient NPO with sips, pt verbalized understadning.  BS normoactive x 4, LBM yesterday per pt 7/24/17, patient voids with bedside urinal PRN.  Patient is 1x assist, non-weight bearing indicated by limb preservation at this time, gait not attempted.  Call light within reach, bed in lowest position, SCDs refused, bed alarm in use.  Will continue to monitor pt for changes in status and provide care.    Possible surgery planned with Dr Shirley, discussed with pt at bedside this AM.

## 2017-07-25 NOTE — OR SURGEON
Operative Report    PreOp Diagnosis: Left plantar foot ulcer    PostOp Diagnosis: Same    Procedure(s):  IRRIGATION & DEBRIDEMENT ORTHO-FOOT AND GASTROC RECESSION, wound vac placement  - Wound Class: Dirty or Infected    Surgeon(s):  Chavez Shirley M.D.    Anesthesiologist/Type of Anesthesia:  Anesthesiologist: Carrington Gonzales M.D./General    Surgical Staff:  Circulator: Ericka Man R.N.  Scrub Person: Sharonda Lindsey    Specimens:  * No specimens in log *    Estimated Blood Loss: 25cc    TT: 24 min @ 250mmHg    Findings: Bleeding bed of ulceration 6cm x 6cm, bleeding tissue covering bone in its entirety    Complications: None        7/25/2017 2:39 PM Chavez Shirley

## 2017-07-26 PROCEDURE — 97163 PT EVAL HIGH COMPLEX 45 MIN: CPT

## 2017-07-26 PROCEDURE — A9270 NON-COVERED ITEM OR SERVICE: HCPCS | Performed by: INTERNAL MEDICINE

## 2017-07-26 PROCEDURE — 700111 HCHG RX REV CODE 636 W/ 250 OVERRIDE (IP): Performed by: INTERNAL MEDICINE

## 2017-07-26 PROCEDURE — A9270 NON-COVERED ITEM OR SERVICE: HCPCS | Performed by: HOSPITALIST

## 2017-07-26 PROCEDURE — 700102 HCHG RX REV CODE 250 W/ 637 OVERRIDE(OP): Performed by: INTERNAL MEDICINE

## 2017-07-26 PROCEDURE — 97166 OT EVAL MOD COMPLEX 45 MIN: CPT

## 2017-07-26 PROCEDURE — G8987 SELF CARE CURRENT STATUS: HCPCS | Mod: CK

## 2017-07-26 PROCEDURE — 97535 SELF CARE MNGMENT TRAINING: CPT

## 2017-07-26 PROCEDURE — 770021 HCHG ROOM/CARE - ISO PRIVATE

## 2017-07-26 PROCEDURE — 99232 SBSQ HOSP IP/OBS MODERATE 35: CPT | Performed by: INTERNAL MEDICINE

## 2017-07-26 PROCEDURE — 700102 HCHG RX REV CODE 250 W/ 637 OVERRIDE(OP): Performed by: HOSPITALIST

## 2017-07-26 PROCEDURE — 700101 HCHG RX REV CODE 250: Performed by: INTERNAL MEDICINE

## 2017-07-26 PROCEDURE — G8988 SELF CARE GOAL STATUS: HCPCS | Mod: CJ

## 2017-07-26 PROCEDURE — 700105 HCHG RX REV CODE 258: Performed by: INTERNAL MEDICINE

## 2017-07-26 PROCEDURE — G8979 MOBILITY GOAL STATUS: HCPCS | Mod: CI

## 2017-07-26 PROCEDURE — G8978 MOBILITY CURRENT STATUS: HCPCS | Mod: CK

## 2017-07-26 RX ORDER — SODIUM CHLORIDE 9 MG/ML
INJECTION, SOLUTION INTRAVENOUS
Status: COMPLETED
Start: 2017-07-26 | End: 2017-07-27

## 2017-07-26 RX ORDER — LIDOCAINE 50 MG/G
1 PATCH TOPICAL EVERY 24 HOURS
Status: DISCONTINUED | OUTPATIENT
Start: 2017-07-26 | End: 2017-10-18 | Stop reason: HOSPADM

## 2017-07-26 RX ORDER — OXYCODONE HCL 20 MG/1
20 TABLET, FILM COATED, EXTENDED RELEASE ORAL EVERY 12 HOURS
Status: DISCONTINUED | OUTPATIENT
Start: 2017-07-26 | End: 2017-08-05

## 2017-07-26 RX ADMIN — MORPHINE SULFATE 2 MG: 4 INJECTION INTRAVENOUS at 07:47

## 2017-07-26 RX ADMIN — PIPERACILLIN SODIUM AND TAZOBACTAM SODIUM 3.38 G: 3; .375 INJECTION, POWDER, FOR SOLUTION INTRAVENOUS at 00:40

## 2017-07-26 RX ADMIN — PREGABALIN 75 MG: 75 CAPSULE ORAL at 16:30

## 2017-07-26 RX ADMIN — ENOXAPARIN SODIUM 40 MG: 100 INJECTION SUBCUTANEOUS at 07:41

## 2017-07-26 RX ADMIN — OXYCODONE HYDROCHLORIDE 20 MG: 20 TABLET, FILM COATED, EXTENDED RELEASE ORAL at 20:39

## 2017-07-26 RX ADMIN — PIPERACILLIN SODIUM AND TAZOBACTAM SODIUM 3.38 G: 3; .375 INJECTION, POWDER, FOR SOLUTION INTRAVENOUS at 12:24

## 2017-07-26 RX ADMIN — PIPERACILLIN SODIUM AND TAZOBACTAM SODIUM 3.38 G: 3; .375 INJECTION, POWDER, FOR SOLUTION INTRAVENOUS at 06:00

## 2017-07-26 RX ADMIN — SULFAMETHOXAZOLE AND TRIMETHOPRIM 1 TABLET: 800; 160 TABLET ORAL at 20:39

## 2017-07-26 RX ADMIN — ZOLPIDEM TARTRATE 5 MG: 5 TABLET, FILM COATED ORAL at 23:37

## 2017-07-26 RX ADMIN — Medication 325 MG: at 16:30

## 2017-07-26 RX ADMIN — OXYCODONE HYDROCHLORIDE 5 MG: 5 TABLET ORAL at 03:59

## 2017-07-26 RX ADMIN — PIPERACILLIN SODIUM AND TAZOBACTAM SODIUM 3.38 G: 3; .375 INJECTION, POWDER, FOR SOLUTION INTRAVENOUS at 23:37

## 2017-07-26 RX ADMIN — SULFAMETHOXAZOLE AND TRIMETHOPRIM 1 TABLET: 800; 160 TABLET ORAL at 07:47

## 2017-07-26 RX ADMIN — OXYCODONE HYDROCHLORIDE 5 MG: 5 TABLET ORAL at 08:53

## 2017-07-26 RX ADMIN — ACETAMINOPHEN 650 MG: 325 TABLET, FILM COATED ORAL at 10:10

## 2017-07-26 RX ADMIN — LIDOCAINE 1 PATCH: 50 PATCH CUTANEOUS at 12:01

## 2017-07-26 RX ADMIN — NICOTINE 14 MG: 14 PATCH, EXTENDED RELEASE TRANSDERMAL at 06:08

## 2017-07-26 RX ADMIN — OXYCODONE HYDROCHLORIDE 5 MG: 5 TABLET ORAL at 16:30

## 2017-07-26 RX ADMIN — Medication 325 MG: at 07:41

## 2017-07-26 RX ADMIN — PREGABALIN 75 MG: 75 CAPSULE ORAL at 20:39

## 2017-07-26 RX ADMIN — STANDARDIZED SENNA CONCENTRATE AND DOCUSATE SODIUM 2 TABLET: 8.6; 5 TABLET, FILM COATED ORAL at 07:41

## 2017-07-26 RX ADMIN — MORPHINE SULFATE 2 MG: 4 INJECTION INTRAVENOUS at 12:01

## 2017-07-26 RX ADMIN — OXYCODONE HYDROCHLORIDE 10 MG: 10 TABLET, FILM COATED, EXTENDED RELEASE ORAL at 07:41

## 2017-07-26 RX ADMIN — PROMETHAZINE HYDROCHLORIDE 25 MG: 25 TABLET ORAL at 23:38

## 2017-07-26 RX ADMIN — PIPERACILLIN SODIUM AND TAZOBACTAM SODIUM 3.38 G: 3; .375 INJECTION, POWDER, FOR SOLUTION INTRAVENOUS at 18:40

## 2017-07-26 RX ADMIN — PREGABALIN 75 MG: 75 CAPSULE ORAL at 07:41

## 2017-07-26 ASSESSMENT — COGNITIVE AND FUNCTIONAL STATUS - GENERAL
STANDING UP FROM CHAIR USING ARMS: A LITTLE
MOVING FROM LYING ON BACK TO SITTING ON SIDE OF FLAT BED: UNABLE
TOILETING: A LOT
HELP NEEDED FOR BATHING: A LOT
CLIMB 3 TO 5 STEPS WITH RAILING: TOTAL
DAILY ACTIVITIY SCORE: 15
PERSONAL GROOMING: A LITTLE
WALKING IN HOSPITAL ROOM: TOTAL
SUGGESTED CMS G CODE MODIFIER DAILY ACTIVITY: CK
EATING MEALS: A LITTLE
DRESSING REGULAR UPPER BODY CLOTHING: A LITTLE
SUGGESTED CMS G CODE MODIFIER MOBILITY: CL
DRESSING REGULAR LOWER BODY CLOTHING: A LOT
MOBILITY SCORE: 14

## 2017-07-26 ASSESSMENT — ENCOUNTER SYMPTOMS
FEVER: 0
DIARRHEA: 0
DIZZINESS: 0
CHILLS: 0
HEADACHES: 0
FOCAL WEAKNESS: 0
NAUSEA: 0
SHORTNESS OF BREATH: 0
MYALGIAS: 0
CONSTIPATION: 0
COUGH: 0
SENSORY CHANGE: 1
VOMITING: 0
MYALGIAS: 1
PALPITATIONS: 0
ABDOMINAL PAIN: 0

## 2017-07-26 ASSESSMENT — PAIN SCALES - GENERAL
PAINLEVEL_OUTOF10: 6
PAINLEVEL_OUTOF10: 8
PAINLEVEL_OUTOF10: ASSUMED PAIN PRESENT
PAINLEVEL_OUTOF10: 8
PAINLEVEL_OUTOF10: 4
PAINLEVEL_OUTOF10: 7
PAINLEVEL_OUTOF10: 6
PAINLEVEL_OUTOF10: 3
PAINLEVEL_OUTOF10: 1
PAINLEVEL_OUTOF10: 7

## 2017-07-26 ASSESSMENT — ACTIVITIES OF DAILY LIVING (ADL): TOILETING: INDEPENDENT

## 2017-07-26 ASSESSMENT — GAIT ASSESSMENTS: GAIT LEVEL OF ASSIST: UNABLE TO PARTICIPATE

## 2017-07-26 NOTE — THERAPY
"Occupational Therapy Evaluation completed.   Functional Status: Mod A ADLs and mobility using sliding board  Plan of Care: Will benefit from Occupational Therapy 3 times per week  Discharge Recommendations:  Equipment: Wheelchair. Post-acute therapy Discharge to a transitional care facility for continued skilled therapy services.    Patient presents s/p R BKA infection and L wound debridement with L LE TTWB. Patient reports I with ADLs, IADLs, and mobility PTA with noted need to have more help noting reason to move in with son. Patient, upon eval, demos decreased strength, endurance, tolerance, ADL participation, balance, and increased pain necessitating Mod A ADLs and mobility using sliding board. Patient would benefit from skilled OT in this setting followed by rehab prior to DC home with services and son. x3 week.     Issued green theraband        See \"Rehab Therapy-Acute\" Patient Summary Report for complete documentation.    "

## 2017-07-26 NOTE — PROGRESS NOTES
Size medium diabetic off loading boot has been applied and fitted to pt's L LE. Pt is tolerating well at this time. Please do not hesitate to contact the ortho tech  team at ext # [phone] if any assistance is needed.

## 2017-07-26 NOTE — THERAPY
Physical Therapy Evaluation completed.   Bed Mobility:  Supine to Sit: Supervised  Transfers: Sit to Stand: Unable to Participate  Gait: Level Of Assist: Unable to Participate with No Equipment Needed       Plan of Care: Will benefit from Physical Therapy 3 times per week  Discharge Recommendations: Equipment: Wheelchair. Post-acute therapy Discharge to a transitional care facility for continued skilled therapy services.    Mr. Cheung is a 51 y/o male who presents to acute with drainage and pain from R BKA and non healing ulcer at L TMA. It was recommended he receive R BKA revision and L BKA, both of which he refused. He did agree to I and D of L foot plantar ulcer and gastroc recession with wound vac placement. During therapy eval he stated that he regrets refusing the R BKA revision as he feels he will not be able to don a prosthetic until it is corrected. He presents with significant lower extremity weakness, impaired dynamic balance, and inability to stand or ambulate due to TTWB L LE and inability to don prosthetic on R LE. Prosthetist has been contacted for fitting of new prosthetic. He is a questionable historian, stating facts to therapist that may be confabulations. Impaired reasoning skills, at one time explains why he refused various procedures, then will state he wants to have procedures (placement of PICC line and R BKA revision). Unclear extent of son's involvement, at one point pt referred to son as his caregiver, later in session states he does not want to reside with his son. Attempted to educate patient extensively on importance of the procedures medical staff is completing to his overall well being. Practiced scooting at EOB to determine if could follow TTWB L LE, 100% compliance with this. Bed to w/c transfer min assist using lateral scoot method, however bed was elevated. Recommend slide board for transfers secondary to inability to use LEs and overall weakness/deconditioning. Pt would benefit from  "physical therapy services during acute stay to address his impaired transfers, LE weakness, and dynamic balance deficits.    See \"Rehab Therapy-Acute\" Patient Summary Report for complete documentation.     "

## 2017-07-26 NOTE — PROGRESS NOTES
[facility] Hospitalist Progress Note    Date of Service: 7/26/2017    Chief Complaint  52 y.o. male admitted 7/22/2017 with right BKA stump pain and wound drainage in addition to left TMA stump wound (nonhealing).  Patient has history of PVD s/p L TMA and R BKA more than 5 yrs ago in [place].  He was receiving home health with wound care while living in [facility] while on disability.  Patient moved to [place] 1yr ago.  Since moving to [place], he has not established with PCP or received wound care.  Over the past few months, he began noticing drainage from R BKA stump with wound opening up.  He also noticed ulcer developing at the site of his L TMA stump.  He has prosthesis for his R leg, but unable to use due to these developments over the past few months.  He has no wheelchair and rents rooms weekly at various hotels throughout the city.    Interval Problem Update  7/24- opiate dependence addressed by Dr Dumont  7/25 - s/p Debridement and gastroscoleus resection LLE in OR  7/26- c/o Burning in LLE to RN, already on Lyrica- Oxycontin increased, discussed w/ wound RN at bedside, discussed d/c planning w/ patient and .  Orpro making new cup for BKA prosthesis RLE, new clamshell boot for heel walking LLE    Consultants/Specialty  LPS  ID - Yenny  Ortho- Fern    Disposition  Limited options w/ Payor (Medical HMO)  Discussed realities of VA benefits/ eligibility w/ patient        Review of Systems   Constitutional: Negative for fever and chills.   Respiratory: Negative for cough and shortness of breath.    Cardiovascular: Negative for chest pain and palpitations.   Gastrointestinal: Negative for nausea, vomiting, abdominal pain, diarrhea and constipation.   Genitourinary: Negative for dysuria.   Musculoskeletal: Negative for myalgias.        Left foot pain, burning   Skin: Negative for itching.   Neurological: Negative for dizziness, focal weakness and headaches.   All other systems reviewed and are negative.      Physical Exam  Laboratory/Imaging   Hemodynamics  Temp (24hrs), Av.1 °C (98.7 °F), Min:36.3 °C (97.3 °F), Max:37.5 °C (99.5 °F)   Temperature: 36.9 °C (98.4 °F)  Pulse  Av.8  Min: 71  Max: 111 Heart Rate (Monitored): 73  Blood Pressure: 107/62 mmHg, NIBP: (!) 93/58 mmHg      Respiratory      Respiration: 18, Pulse Oximetry: 94 %        RUL Breath Sounds: Diminished, RML Breath Sounds: Diminished, RLL Breath Sounds: Diminished, NANCY Breath Sounds: Diminished, LLL Breath Sounds: Diminished    Fluids    Intake/Output Summary (Last 24 hours) at 17 1323  Last data filed at 17 1000   Gross per 24 hour   Intake   1520 ml   Output   2225 ml   Net   -705 ml       Nutrition  Orders Placed This Encounter   Procedures   • DIET ORDER     Standing Status: Standing      Number of Occurrences: 1      Standing Expiration Date:      Order Specific Question:  Diet:     Answer:  Regular [1]     Physical Exam   Constitutional: He is oriented to person, place, and time. He appears well-developed and well-nourished.   Disheveled and malnourished appearing  daniel   HENT:   Head: Normocephalic and atraumatic.   Mouth/Throat: Oropharynx is clear and moist.   Eyes: No scleral icterus.   R corneal opacification and small globe   Neck: Normal range of motion. Neck supple. No tracheal deviation present. No thyromegaly present.   Cardiovascular: Normal rate, regular rhythm, normal heart sounds and intact distal pulses.    No murmur heard.  Pulmonary/Chest: Effort normal and breath sounds normal. No respiratory distress. He has no wheezes.   Abdominal: Soft. Bowel sounds are normal. He exhibits no distension. There is no tenderness.   Musculoskeletal: He exhibits no edema or tenderness.        Arms:       Legs:  LLE post op dressings/ boot     Lymphadenopathy:     He has no cervical adenopathy.        Right: No supraclavicular adenopathy present.        Left: No supraclavicular adenopathy present.   Neurological: He is  alert and oriented to person, place, and time.   Skin: Skin is warm and dry.   Psychiatric: He has a normal mood and affect.   Vitals reviewed.      Recent Labs      07/24/17   0433   WBC  10.6   RBC  4.63*   HEMOGLOBIN  10.7*   HEMATOCRIT  35.0*   MCV  75.6*   MCH  23.1*   MCHC  30.6*   RDW  40.7   PLATELETCT  495*   MPV  8.6*     Recent Labs      07/24/17   0433   SODIUM  138   POTASSIUM  3.6   CHLORIDE  106   CO2  23   GLUCOSE  104*   BUN  12   CREATININE  0.80   CALCIUM  9.0                      Assessment/Plan     Wound of right leg, at BKA site (present on admission)  Assessment & Plan  Not overly infected looking, but abx for left would cover    Skin ulcer of left foot with fat layer exposed (CMS-HCC) (present on admission)  Assessment & Plan  S/p OR debridement and gastroscoleus resection 7/25 (boubacar)   Active Orders     Ordered     Ordering Provider       Sun Jul 23, 2017  3:08 PM    07/23/17 1508  vancomycin 1,200 mg in  mL IVPB   EVERY 24 HOURS      Nany Zazueta, PHARMD       [place] Jul 23, 2017  8:27 AM    07/23/17 0827  ampicillin/sulbactam (UNASYN) 3 g in  mL IVPB   EVERY 6 HOURS      Parmjit Dumont M.D.       Sat Jul 22, 2017  1:57 PM    07/22/17 1357  MD ALERT... vancomycin per pharmacy protocol   PHARMACY TO DOSE     Question:  Indication(s) for vancomycin?  Answer:  Osteomyelitis    Manuel Reece M.D.       Contact isolation    Peripheral neuropathy (present on admission)  Assessment & Plan  On lyrica, still c/o pain  oxycontin increased  Consider depakote    Tobacco abuse (present on admission)  Assessment & Plan  Nicotine replacement PRN  Strongly encouraged to quit  Counseled at least 3 minutes on 07/23/2017    Opiate dependence (CMS-[facility]) (present on admission)  Assessment & Plan  Buying methadone on street  Drug seeking behavior inpatient.      Labs reviewed and Medications reviewed  Rolon catheter: No Rolon      DVT Prophylaxis: Enoxaparin (Lovenox)    Ulcer prophylaxis:  Not indicated  Antibiotics: Treating active infection/contamination beyond 24 hours perioperative coverage  Assessed for rehab: Patient was assess for and/or received rehabilitation services during this hospitalization

## 2017-07-26 NOTE — PROGRESS NOTES
Report received, poc discussed, assumed care of pt.   Call light in reach, hourly rounding in place.   Pt is baseline WC bound, R BKA, per report friend is to bring home WC today.   Reg diet.  + void. LBM 7/24.   Oxy/morphine for pain.  Contact precautions for MRSA.  LLE wound vac in place.  No further needs.    Pt requesting lidocaine patch to be ordered for back pain.

## 2017-07-26 NOTE — PROGRESS NOTES
[facility] Hospitalist Progress Note    Date of Service: 2017    Chief Complaint  52 y.o. male admitted 2017 with right BKA stump pain and wound drainage in addition to left TMA stump wound (nonhealing).  Patient has history of PVD s/p L TMA and R BKA more than 5 yrs ago in [place].  He was receiving home health with wound care while living in [facility] while on disability.  Patient moved to [place] 1yr ago.  Since moving to [place], he has not established with PCP or received wound care.  Over the past few months, he began noticing drainage from R BKA stump with wound opening up.  He also noticed ulcer developing at the site of his L TMA stump.  He has prosthesis for his R leg, but unable to use due to these developments over the past few months.  He has no wheelchair and rents rooms weekly at various hotels throughout the city.    Interval Problem Update  - opiate dependence addressed by Dr Dumont   - s/p Debridement and gastrox resection LLE in OR    Consultants/Specialty  LPS  ID - Yenny  Ortho- Fern    Disposition  Clinical, suspect will need long-term abx and probably SNF        Review of Systems   Constitutional: Negative for fever and chills.   Respiratory: Negative for cough and shortness of breath.    Cardiovascular: Negative for chest pain and palpitations.   Gastrointestinal: Negative for nausea, vomiting, abdominal pain, diarrhea and constipation.   Genitourinary: Negative for dysuria.   Musculoskeletal: Negative for myalgias.        Left foot pain and right BKA stump pain   Skin: Negative for itching.   Neurological: Positive for weakness. Negative for dizziness, focal weakness and headaches.   All other systems reviewed and are negative.     Physical Exam  Laboratory/Imaging   Hemodynamics  Temp (24hrs), Av.1 °C (98.8 °F), Min:36.3 °C (97.3 °F), Max:38.2 °C (100.7 °F)   Temperature: 37.4 °C (99.4 °F)  Pulse  Av.7  Min: 71  Max: 111 Heart Rate (Monitored): 73  Blood Pressure: 109/53  mmHg, NIBP: (!) 93/58 mmHg      Respiratory      Respiration: 16, Pulse Oximetry: 97 %        RUL Breath Sounds: Diminished, RML Breath Sounds: Diminished, RLL Breath Sounds: Diminished, NANCY Breath Sounds: Diminished, LLL Breath Sounds: Diminished    Fluids    Intake/Output Summary (Last 24 hours) at 07/25/17 2158  Last data filed at 07/25/17 1800   Gross per 24 hour   Intake   1490 ml   Output   2925 ml   Net  -1435 ml       Nutrition  Orders Placed This Encounter   Procedures   • DIET ORDER     Standing Status: Standing      Number of Occurrences: 1      Standing Expiration Date:      Order Specific Question:  Diet:     Answer:  Regular [1]     Physical Exam   Constitutional: He is oriented to person, place, and time. He appears well-developed and well-nourished.   Disheveled and malnourished appearing  daniel   HENT:   Head: Normocephalic and atraumatic.   Mouth/Throat: Oropharynx is clear and moist.   Eyes: No scleral icterus.   R corneal opacification and small globe   Neck: Normal range of motion. Neck supple. No tracheal deviation present. No thyromegaly present.   Cardiovascular: Normal rate, regular rhythm, normal heart sounds and intact distal pulses.    No murmur heard.  Pulmonary/Chest: Effort normal and breath sounds normal. No respiratory distress. He has no wheezes.   Abdominal: Soft. Bowel sounds are normal. He exhibits no distension. There is no tenderness.   Musculoskeletal: He exhibits no edema or tenderness.        Arms:       Legs:  LLE post op dressings/ boot  R stump with bland dry eschars no erythema   Lymphadenopathy:     He has no cervical adenopathy.        Right: No supraclavicular adenopathy present.        Left: No supraclavicular adenopathy present.   Neurological: He is alert and oriented to person, place, and time.   Skin: Skin is warm and dry.   Psychiatric: He has a normal mood and affect.   Vitals reviewed.      Recent Labs      07/24/17   0433   WBC  10.6   RBC  4.63*    HEMOGLOBIN  10.7*   HEMATOCRIT  35.0*   MCV  75.6*   MCH  23.1*   MCHC  30.6*   RDW  40.7   PLATELETCT  495*   MPV  8.6*     Recent Labs      07/24/17   0433   SODIUM  138   POTASSIUM  3.6   CHLORIDE  106   CO2  23   GLUCOSE  104*   BUN  12   CREATININE  0.80   CALCIUM  9.0                      Assessment/Plan     Wound of right leg, at BKA site (present on admission)  Assessment & Plan  Not overly infected looking, but abx for left would cover    Skin ulcer of left foot with fat layer exposed (CMS-HCC) (present on admission)  Assessment & Plan  S/p OR debridement and gastroscoleus resection 7/25 (boubacar)   Active Orders     Ordered     Ordering Provider       Sun Jul 23, 2017  3:08 PM    07/23/17 1508  vancomycin 1,200 mg in  mL IVPB   EVERY 24 HOURS      Nany Zazueta, PHARMD       [place] Jul 23, 2017  8:27 AM    07/23/17 0827  ampicillin/sulbactam (UNASYN) 3 g in  mL IVPB   EVERY 6 HOURS      Parmjit Dumont M.D.       Sat Jul 22, 2017  1:57 PM    07/22/17 1357  MD ALERT... vancomycin per pharmacy protocol   PHARMACY TO DOSE     Question:  Indication(s) for vancomycin?  Answer:  Osteomyelitis    Manuel Reece M.D.       Contact isolation    Peripheral neuropathy (present on admission)  Assessment & Plan  Per patient, has had since childhood. Has had good success with Lyrica    Tobacco abuse (present on admission)  Assessment & Plan  Nicotine replacement PRN  Strongly encouraged to quit  Counseled at least 3 minutes on 07/23/2017    Opiate dependence (CMS-HCC)  Assessment & Plan  Buying methadone on street      Labs reviewed and Medications reviewed  Rolon catheter: No Rolon      DVT Prophylaxis: Enoxaparin (Lovenox)    Ulcer prophylaxis: Not indicated  Antibiotics: Treating active infection/contamination beyond 24 hours perioperative coverage  Assessed for rehab: Patient was assess for and/or received rehabilitation services during this hospitalization

## 2017-07-26 NOTE — PROGRESS NOTES
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 Takes methadone he states for back pain.  Not diabetic.    Presents to ED with increased pain to L TMA with infected neuropathic ulcer and pain to R BKA.    POD # 1 s/p L foot I & D with VAC placement, GSR by Dr. Shirley  /62 mmHg  Pulse 73  Temp(Src) 36.9 °C (98.4 °F)  Resp 18  Ht 1.854 m (6' 1\")  Wt 65.2 kg (143 lb 11.8 oz)  BMI 18.97 kg/m2  SpO2 94%  Pain controlled    Offloading boot in place to LLE.   Discussed with Nazario CLARKE with [facility]set[facility]  Will plan to make CROW boot for pt to offload plantar ulcer and provide some weight bearing without affecting the plantar ulcer with VAC  VAC in place 125mmhg continuous     R BKA:  Partial thickness ulcer appears it has resolved. Mild crust to incision line. Dimethicone lotion applied. tubigrip D applied double layer.       ID involved  IV abx- MRSA      PLAN  VAC change tomorrow by IP wound team for LLE. Change per IP wound team protocol  NWB LLE until CROW boot can be applied by [facility]  diabetic boot continuously for 6wk d/t GSR incision to calf  Prosthetist to eval RLE prosthetic-ordered   NWB until prosthetic is adjusted  Skin care for RLE, ordered   Continue IV abx per ID       "

## 2017-07-26 NOTE — ASSESSMENT & PLAN NOTE
Methadone w prn oral oxycodone, dilaudid -decrease even further today  prn  Morphine IM for dressing changes only, pain appears well controlled  -NO INCREASE IN PAIN MEDS with IV  Continue this dose of the gabapentin at 800 mg TID  -highly manipulative behavior  -attempt multi-modal pain therapy  -continue low dose marinol to help with pain and appetite improvement  -NO other changes to pain medications today

## 2017-07-26 NOTE — PROGRESS NOTES
Infectious Disease Progress Note    Author: Bekah Mckeon M.D. DOS & Time created: 2017  12:32 PM    CC: FU right BKA stump infection and nonhealing left TMA stump wound     Interval History:  52 y.o. WM admitted 2017 with right BKA infection and nonhealing left TMA stump wound    AF WBC 7.6 less drainage from wounds Asking if he will need another BKA  17- no fevers. WBC 10.6. Complains of chills. Complains of 10 out of 10 pain in the left foot. Complains of drainage from the right stump.  17- complains of pain in the foot. Had low-grade fevers up to 100.7 last night . Foot cultures are showing MRSA and Pseudomonas  17- no fevers. Complains of burning in his left leg. No discharge noted from his right stump  Labs Reviewed, Medications Reviewed, Radiology Reviewed and Wound Reviewed.    Review of Systems:  Review of Systems   Constitutional: Negative for fever and chills.   Respiratory: Negative for cough and shortness of breath.    Cardiovascular: Negative for chest pain.   Gastrointestinal: Negative for nausea, vomiting, abdominal pain and diarrhea.   Genitourinary: Negative for dysuria.   Musculoskeletal: Positive for myalgias and joint pain.        Burning sensation in his left foot and lower leg   Neurological: Positive for sensory change.   All other systems reviewed and are negative.      Hemodynamics:  Temp (24hrs), Av °C (98.6 °F), Min:36.3 °C (97.3 °F), Max:37.5 °C (99.5 °F)  Temperature: 36.9 °C (98.4 °F)  Pulse  Av.8  Min: 71  Max: 111Heart Rate (Monitored): 73  Blood Pressure: 107/62 mmHg, NIBP: (!) 93/58 mmHg       Physical Exam:  Physical Exam   Constitutional: He is oriented to person, place, and time. He appears well-developed. No distress.   Disheveled   HENT:   Head: Normocephalic.   Poor dentition   Eyes:   Blind right eye   Neck: Neck supple.   Cardiovascular: Normal rate.    Pulmonary/Chest: Effort normal. No respiratory distress.   Abdominal: Soft. He  exhibits no distension. There is no tenderness.   Musculoskeletal: He exhibits no edema.   Wound right BKA-no longer draining-wound is closed  Left foot surgical dressing and wound VAC present   Neurological: He is alert and oriented to person, place, and time.   Skin: No rash noted.   tattoos   Nursing note and vitals reviewed.      Labs:  Recent Labs      07/24/17 0433   WBC  10.6   RBC  4.63*   HEMOGLOBIN  10.7*   HEMATOCRIT  35.0*   MCV  75.6*   MCH  23.1*   RDW  40.7   PLATELETCT  495*   MPV  8.6*     Recent Labs      07/24/17 0433   SODIUM  138   POTASSIUM  3.6   CHLORIDE  106   CO2  23   GLUCOSE  104*   BUN  12     Recent Labs      07/24/17 0433   CREATININE  0.80     Results     Procedure Component Value Units Date/Time    CULTURE WOUND W/ GRAM STAIN [[id number]] Collected:  07/23/17 0730    Order Status:  Completed Specimen Information:  Wound from Right Leg Updated:  07/25/17 1018     Gram Stain Result No organisms seen.      Significant Indicator NEG      Source WND      Site RIGHT LEG      Culture Result Wound Rare growth Mixed skin gladis.     Narrative:      Collected By:[id number] SUNNY GONZALEZ    GRAM STAIN [[id number]] Collected:  07/23/17 0730    Order Status:  Completed Specimen Information:  Wound Updated:  07/23/17 1107     Significant Indicator .      Source WND      Site RIGHT LEG      Gram Stain Result No organisms seen.     Narrative:      Collected By:[id number] SUNNY GONZALEZ          Fluids:  Intake/Output       07/24/17 0700 - 07/25/17 0659 07/25/17 0700 - 07/26/17 0659 07/26/17 0700 - 07/27/17 0659      0700-1859 1900-0659 Total 0700-1859 1900-0659 Total 2911-7838 2806-7105 Total       Intake    P.O.  1440  480 1920  280  500 780  240  -- 240    P.O. 4877 915 9339 280 500 780 240 -- 240    I.V.  100  530 630  500  -- 500  --  -- --    Crystalloid Intake -- -- -- 500 -- 500 -- -- --    IV Volume (NS) -- 180 180 -- -- -- -- -- --    IV Piggyback Volume 100 350 450 -- -- -- -- -- --    Total  Intake 1540 1010 2550  240 -- 240       Output    Urine  1800  1800 3600  1400  600 2000  1200  -- 1200    Number of Times Voided 6 x -- 6 x -- 1 x 1 x -- -- --    Void (ml) 1800 1800 3600 9209 878 4694 1200 -- 1200    Stool  --  -- --  --  -- --  --  -- --    Number of Times Stooled -- -- -- 0 x 0 x 0 x -- -- --    Blood  --  -- --  25  -- 25  --  -- --    Est. Blood Loss (mL) -- -- -- 25 -- 25 -- -- --    Total Output 1800 1800 3600 3916 527 4978 1200 -- 1200       Net I/O     -260 -790 -1050 -645 -100 -745 -960 -- -960             Assessment:  Active Hospital Problems    Diagnosis   • Skin ulcer of left foot with fat layer exposed (CMS-HCC) [L97.522]   • Wound of right leg, at BKA site [S81.801A]   • Peripheral neuropathy [G62.9]   • Tobacco abuse [Z72.0]       Plan:  Drainage from right BKA site- resolved  Afebrile  No leukocytosis  The wound has completely healed  Patient refused a revision yesterday    Skin ulcer of left foot with fat layer exposed (CMS-HCC)  Left plantar ulcer, deep tissues exposed, serous drainage    Cultures for MRSA and Pseudomonas  Discontinue vancomycin  Start by mouth Bactrim  Continue Zosyn  Patient underwent surgery on 7/25/17  Follow the OR cultures  Probably needs long-term antibiotics  PICC line    Peripheral neuropathy    Prognosis for limb salvage poor    Placement- patient has lots of concerns about his insurance coverage and subsequent place to live    Plan of care discussed with IM.

## 2017-07-26 NOTE — PROGRESS NOTES
Pageeduard QUIJANO.  Pt irritable. Continuously paging staff to ask when the MD will be in.   C/o burning to LLE foot/ankle area where wound vac is in place.  Vac is functioning, ace wrap re-wrapped. PRNs given.  Pt also requesting lidocaine patch for LBP.

## 2017-07-26 NOTE — PROGRESS NOTES
"Ortho Prog Note    POD #0 s/p L foot I&D, GSR, vac placement    Doing well postop, pain in calf    /60 mmHg  Pulse 75  Temp(Src) 37.2 °C (98.9 °F)  Resp 18  Ht 1.854 m (6' 1\")  Wt 65.2 kg (143 lb 11.8 oz)  BMI 18.97 kg/m2  SpO2 95%      RLE: Dressing CDI, vac holding suction. Foot WWP        POD #0 Doing well    TDWB LLE  WBAT RLE  Vac change LLE Thursday or Friday with vac team, will monitor wound progress closely  Will ask orthotist to eval RLE prosthesis for improvements in fit  Abx per ID    Chavez Shirley MD      "

## 2017-07-26 NOTE — CARE PLAN
Problem: Pain Management  Goal: Pain level will decrease to patient’s comfort goal  Outcome: PROGRESSING AS EXPECTED  Pt has pain in RLE, receiving PRN pain meds per MAR, repositioned for comfort. Non-pharmacologic options offered.  C/o burning. meds ordered. Lidocaine patch ordered for LBP.    Problem: Mobility  Goal: Risk for activity intolerance will decrease  Outcome: PROGRESSING AS EXPECTED  Pt is a 2 person assist with WC. PT/OT involved. Off loading boot in place.

## 2017-07-27 ENCOUNTER — APPOINTMENT (OUTPATIENT)
Dept: RADIOLOGY | Facility: MEDICAL CENTER | Age: 52
DRG: 464 | End: 2017-07-27
Attending: INTERNAL MEDICINE
Payer: COMMERCIAL

## 2017-07-27 PROCEDURE — 770021 HCHG ROOM/CARE - ISO PRIVATE

## 2017-07-27 PROCEDURE — 700102 HCHG RX REV CODE 250 W/ 637 OVERRIDE(OP): Performed by: INTERNAL MEDICINE

## 2017-07-27 PROCEDURE — 97605 NEG PRS WND THER DME<=50SQCM: CPT

## 2017-07-27 PROCEDURE — A9270 NON-COVERED ITEM OR SERVICE: HCPCS | Performed by: HOSPITALIST

## 2017-07-27 PROCEDURE — A9270 NON-COVERED ITEM OR SERVICE: HCPCS | Performed by: INTERNAL MEDICINE

## 2017-07-27 PROCEDURE — 700101 HCHG RX REV CODE 250: Performed by: INTERNAL MEDICINE

## 2017-07-27 PROCEDURE — 700105 HCHG RX REV CODE 258: Performed by: INTERNAL MEDICINE

## 2017-07-27 PROCEDURE — 700111 HCHG RX REV CODE 636 W/ 250 OVERRIDE (IP): Performed by: INTERNAL MEDICINE

## 2017-07-27 PROCEDURE — 99232 SBSQ HOSP IP/OBS MODERATE 35: CPT | Performed by: INTERNAL MEDICINE

## 2017-07-27 PROCEDURE — 700105 HCHG RX REV CODE 258

## 2017-07-27 PROCEDURE — 700102 HCHG RX REV CODE 250 W/ 637 OVERRIDE(OP): Performed by: HOSPITALIST

## 2017-07-27 PROCEDURE — 36569 INSJ PICC 5 YR+ W/O IMAGING: CPT

## 2017-07-27 PROCEDURE — C1751 CATH, INF, PER/CENT/MIDLINE: HCPCS

## 2017-07-27 RX ADMIN — OXYCODONE HYDROCHLORIDE 20 MG: 20 TABLET, FILM COATED, EXTENDED RELEASE ORAL at 11:24

## 2017-07-27 RX ADMIN — OXYCODONE HYDROCHLORIDE 5 MG: 5 TABLET ORAL at 07:31

## 2017-07-27 RX ADMIN — OXYCODONE HYDROCHLORIDE 20 MG: 20 TABLET, FILM COATED, EXTENDED RELEASE ORAL at 19:49

## 2017-07-27 RX ADMIN — PIPERACILLIN SODIUM AND TAZOBACTAM SODIUM 3.38 G: 3; .375 INJECTION, POWDER, FOR SOLUTION INTRAVENOUS at 18:22

## 2017-07-27 RX ADMIN — OXYCODONE HYDROCHLORIDE 5 MG: 5 TABLET ORAL at 18:22

## 2017-07-27 RX ADMIN — OXYCODONE HYDROCHLORIDE 5 MG: 5 TABLET ORAL at 03:36

## 2017-07-27 RX ADMIN — PREGABALIN 75 MG: 75 CAPSULE ORAL at 15:18

## 2017-07-27 RX ADMIN — Medication 325 MG: at 18:22

## 2017-07-27 RX ADMIN — PREGABALIN 75 MG: 75 CAPSULE ORAL at 08:28

## 2017-07-27 RX ADMIN — OXYCODONE HYDROCHLORIDE 5 MG: 5 TABLET ORAL at 15:18

## 2017-07-27 RX ADMIN — PIPERACILLIN SODIUM AND TAZOBACTAM SODIUM 3.38 G: 3; .375 INJECTION, POWDER, FOR SOLUTION INTRAVENOUS at 11:24

## 2017-07-27 RX ADMIN — LIDOCAINE 1 PATCH: 50 PATCH CUTANEOUS at 11:24

## 2017-07-27 RX ADMIN — PREGABALIN 75 MG: 75 CAPSULE ORAL at 19:49

## 2017-07-27 RX ADMIN — Medication 325 MG: at 08:27

## 2017-07-27 RX ADMIN — MORPHINE SULFATE 2 MG: 4 INJECTION INTRAVENOUS at 08:24

## 2017-07-27 RX ADMIN — SULFAMETHOXAZOLE AND TRIMETHOPRIM 1 TABLET: 800; 160 TABLET ORAL at 08:27

## 2017-07-27 RX ADMIN — PIPERACILLIN SODIUM AND TAZOBACTAM SODIUM 3.38 G: 3; .375 INJECTION, POWDER, FOR SOLUTION INTRAVENOUS at 05:39

## 2017-07-27 RX ADMIN — SODIUM CHLORIDE 1000 ML: 9 INJECTION, SOLUTION INTRAVENOUS at 03:37

## 2017-07-27 RX ADMIN — SULFAMETHOXAZOLE AND TRIMETHOPRIM 1 TABLET: 800; 160 TABLET ORAL at 19:48

## 2017-07-27 RX ADMIN — ENOXAPARIN SODIUM 40 MG: 100 INJECTION SUBCUTANEOUS at 08:27

## 2017-07-27 RX ADMIN — NICOTINE 14 MG: 14 PATCH, EXTENDED RELEASE TRANSDERMAL at 05:39

## 2017-07-27 RX ADMIN — STANDARDIZED SENNA CONCENTRATE AND DOCUSATE SODIUM 2 TABLET: 8.6; 5 TABLET, FILM COATED ORAL at 08:27

## 2017-07-27 RX ADMIN — ZOLPIDEM TARTRATE 5 MG: 5 TABLET, FILM COATED ORAL at 23:48

## 2017-07-27 RX ADMIN — OXYCODONE HYDROCHLORIDE 5 MG: 5 TABLET ORAL at 22:42

## 2017-07-27 RX ADMIN — PIPERACILLIN SODIUM AND TAZOBACTAM SODIUM 3.38 G: 3; .375 INJECTION, POWDER, FOR SOLUTION INTRAVENOUS at 23:45

## 2017-07-27 ASSESSMENT — ENCOUNTER SYMPTOMS
VOMITING: 0
NAUSEA: 0
FEVER: 0
MYALGIAS: 1
DIARRHEA: 0
MYALGIAS: 0
CHILLS: 0
DIZZINESS: 0
FOCAL WEAKNESS: 0
HEADACHES: 0
ABDOMINAL PAIN: 0
COUGH: 0
PALPITATIONS: 0
SENSORY CHANGE: 1
SHORTNESS OF BREATH: 0
CONSTIPATION: 0
WEAKNESS: 1

## 2017-07-27 ASSESSMENT — PAIN SCALES - GENERAL
PAINLEVEL_OUTOF10: 7
PAINLEVEL_OUTOF10: 6
PAINLEVEL_OUTOF10: 2
PAINLEVEL_OUTOF10: 7
PAINLEVEL_OUTOF10: 1
PAINLEVEL_OUTOF10: 5

## 2017-07-27 NOTE — PROGRESS NOTES
[facility] Hospitalist Progress Note    Date of Service: 7/27/2017    Chief Complaint  52 y.o. male admitted 7/22/2017 with right BKA stump pain and wound drainage in addition to left TMA stump wound (nonhealing).  Patient has history of PVD s/p L TMA and R BKA more than 5 yrs ago in [place].  He was receiving home health with wound care while living in [facility] while on disability.  Patient moved to [place] 1yr ago.  Since moving to [place], he has not established with PCP or received wound care.  Over the past few months, he began noticing drainage from R BKA stump with wound opening up.  He also noticed ulcer developing at the site of his L TMA stump.  He has prosthesis for his R leg, but unable to use due to these developments over the past few months.  He has no wheelchair and rents rooms weekly at various hotels throughout the city.    Interval Problem Update  7/24- opiate dependence addressed by Dr Dumont  7/25 - s/p Debridement and gastroscoleus resection LLE in OR  7/26- c/o Burning in LLE to RN, already on Lyrica- Oxycontin increased, discussed w/ wound RN at bedside, discussed d/c planning w/ patient and .  Orpro making new cup for BKA prosthesis RLE, new clamshell boot for heel walking LLE  7/27 He is actively calling his insurance  in [place] regarding SNF options.  Had wound vac change earlier today.    Consultants/Specialty  [facility]    Disposition  Limited options w/ Payor (Medical HMO)  Discussed realities of VA benefits/ eligibility w/ patient        Review of Systems   Constitutional: Negative for fever and chills.   Respiratory: Negative for cough and shortness of breath.    Cardiovascular: Negative for chest pain and palpitations.   Gastrointestinal: Negative for nausea, vomiting, abdominal pain, diarrhea and constipation.   Genitourinary: Negative for dysuria.   Musculoskeletal: Negative for myalgias.        Left foot pain, burning   Skin: Negative for itching.    Neurological: Negative for dizziness, focal weakness and headaches.   All other systems reviewed and are negative.     Physical Exam  Laboratory/Imaging   Hemodynamics  Temp (24hrs), Av.7 °C (98.1 °F), Min:36.4 °C (97.5 °F), Max:37 °C (98.6 °F)   Temperature: 36.8 °C (98.3 °F)  Pulse  Av.7  Min: 71  Max: 111    Blood Pressure: 148/95 mmHg      Respiratory      Respiration: 18, Pulse Oximetry: 94 %        RUL Breath Sounds: Diminished, RML Breath Sounds: Diminished, RLL Breath Sounds: Diminished, NANCY Breath Sounds: Diminished, LLL Breath Sounds: Diminished    Fluids    Intake/Output Summary (Last 24 hours) at 17 1654  Last data filed at 17 1600   Gross per 24 hour   Intake   1220 ml   Output   2600 ml   Net  -1380 ml       Nutrition  Orders Placed This Encounter   Procedures   • DIET ORDER     Standing Status: Standing      Number of Occurrences: 1      Standing Expiration Date:      Order Specific Question:  Diet:     Answer:  Regular [1]     Physical Exam   Constitutional: He is oriented to person, place, and time. He appears well-developed and well-nourished.   Interval shower   HENT:   Head: Normocephalic and atraumatic.   Mouth/Throat: Oropharynx is clear and moist.   Eyes: No scleral icterus.   R corneal opacification and small globe   Neck: Normal range of motion. Neck supple. No tracheal deviation present. No thyromegaly present.   Cardiovascular: Normal rate, regular rhythm, normal heart sounds and intact distal pulses.    No murmur heard.  Pulmonary/Chest: Effort normal and breath sounds normal. No respiratory distress. He has no wheezes.   Abdominal: Soft. Bowel sounds are normal. He exhibits no distension. There is no tenderness.   Musculoskeletal: He exhibits no edema or tenderness.        Arms:       Legs:  LLE post op dressings/ boot     Lymphadenopathy:     He has no cervical adenopathy.        Right: No supraclavicular adenopathy present.        Left: No supraclavicular  adenopathy present.   Neurological: He is alert and oriented to person, place, and time.   Skin: Skin is warm and dry.   Psychiatric: He has a normal mood and affect.   Vitals reviewed.                               Assessment/Plan     Wound of right leg, at BKA site (present on admission)  Assessment & Plan  Not overly infected looking, but abx for left would cover    Skin ulcer of left foot with fat layer exposed (CMS-HCC) (present on admission)  Assessment & Plan  S/p OR debridement and gastroscoleus resection 7/25 (boubacar)   Active Orders     Ordered     Ordering Provider       Sun Jul 23, 2017  3:08 PM    07/23/17 1508  vancomycin 1,200 mg in  mL IVPB   EVERY 24 HOURS      Nany Zazueta, KYRA       [place] Jul 23, 2017  8:27 AM    07/23/17 0827  ampicillin/sulbactam (UNASYN) 3 g in  mL IVPB   EVERY 6 HOURS      Parmjit Dumont M.D.       Sat Jul 22, 2017  1:57 PM    07/22/17 1357  MD ALERT... vancomycin per pharmacy protocol   PHARMACY TO DOSE     Question:  Indication(s) for vancomycin?  Answer:  Osteomyelitis    Manuel Reece M.D.       Contact isolation    Peripheral neuropathy (present on admission)  Assessment & Plan  On lyrica, still c/o pain  oxycontin increased  Consider depakote    Tobacco abuse (present on admission)  Assessment & Plan  Nicotine replacement PRN  Strongly encouraged to quit  Counseled at least 3 minutes on 07/23/2017    Opiate dependence (CMS-HCC) (present on admission)  Assessment & Plan  Buying methadone on street  Drug seeking behavior inpatient.      Labs reviewed and Medications reviewed  Rolon catheter: No Rolon      DVT Prophylaxis: Enoxaparin (Lovenox)    Ulcer prophylaxis: Not indicated  Antibiotics: Treating active infection/contamination beyond 24 hours perioperative coverage  Assessed for rehab: Patient was assess for and/or received rehabilitation services during this hospitalization

## 2017-07-27 NOTE — PROGRESS NOTES
"Student nurse met with pt at bedside to complete assessment. Pt is pleasant and reports that he just came here from [place] and is staying with his son who lives in a 3rd story apt with elevator access. Pt has a RBKA and a wound on the bottom of his left foot. Reports he usually can ambulate with a walker but since he has the wound on his left foot now he cannot. He states that he has a w/c but does not know where it is and states that it is \"beat up.\" Assisted pt to the edge of the bed to eat his breakfast.  "

## 2017-07-27 NOTE — PROGRESS NOTES
"Report received from NOC shift RN. Patient is A/O X 4 and on room air. No O2 use at home. VSS. /62 mmHg  Pulse 82  Temp(Src) 37 °C (98.6 °F)  Resp 16  Ht 1.854 m (6' 1\")  Wt 65.2 kg (143 lb 11.8 oz)  BMI 18.97 kg/m2  SpO2 94% Labs reviewed. WBC at 10.6 from 7.6. Last CMP 7/24/2017.  Administered 2mg of IV Morphine for LLE wound vac change. Scant output present in wound vac reservoir. Set to 125mmHg. Boot removed to facilitate wound vac change.  +BM. +void. Right stump assessed, skin approximated, no oozing from site at this time. Patient states that oozing begins when prosthesis is worn. PIV on RUE running TKO. 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 Patient is able to transfer to personal WC which is stored in bathroom. Call light at bedside and patient calls appropriately.     "

## 2017-07-27 NOTE — PROGRESS NOTES
PICC Insertion Procedure Note    Consents confirmed, vessel patency confirmed with ultrasound. Risks and benefits of procedure explained to patient/family and education regarding central line associated bloodstream infections provided. Questions answered.     PICC placed in LUE per MD order with ultrasound guidance. 4 Fr, single lumen PICC placed in basilic vein after 1 attempt(s). 3 cc's of 1% lidocaine injected intradermally, 21 gauge microintroducer needle and modified Seldinger technique used. 47 cm total catheter length inserted with good blood return. Each lumen flushed without resistance with 10 mL 0.9% normal saline. PICC line secured with Biopatch and Tegaderm.    PICC tip location in the SVC confirmed by ECG technology. Pt tolerated procedure well.  Patient condition relayed to unit RN or ordering physician via this post procedure note in the EMR.     Phot[employer] Power PICC ref # [id number], Lot # [id number]

## 2017-07-27 NOTE — CARE PLAN
Problem: Communication  Goal: The ability to communicate needs accurately and effectively will improve  Outcome: PROGRESSING AS EXPECTED  Pt demonstrates ability to make needs known, uses call light appropriately.    Problem: Infection  Goal: Will remain free from infection  Outcome: PROGRESSING AS EXPECTED  Pt educated on the s/s of infection, verbalizes understanding, will continue to monitor

## 2017-07-27 NOTE — WOUND TEAM
"[facility]  Inpatient Services  Wound and Skin Care Progress Note    Admission Date:  07/22/17     HPI, PMH, SH: Reviewed  Unit where seen by Wound Team:  [id number]     WOUND CONSULT RELATED TO:   NPWT dressing change left plantar foot      SUBJECTIVE:  \"I've had VAC dressings before\"      Self Report / Pain Level:  Pain with touching foot secondary to neuropathy.     OBJECTIVE:    NPWT dressing intact    WOUND TYPE, LOCATION, CHARACTERISTICS (Pressure ulcers: location, stage, POA or date identified)    Location and type of wound:  Open surgical wound left plantar foot      Periwound:   intact       Drainage:  Minimal serosanguinous    Tissue Type and %:  100%    Wound Edges:  attached   Odor:  mild    Exposed structure(s):  Bone palpated    S&S of Infection:    Non healing, exposed bone       Measurements:    (Taken 7/27/17)   Length:   5 cm    Width:    5.2 cm   Depth:   <0.5cm     INTERVENTIONS BY WOUND TEAM:  Met with Dom Nguyễn LPS team and removed old dressing.  Cleansed wound with wound cleanser and measurements and photo taken.  Benzoin and drape to bull wound skin.  Covered wound with one piece black foam and bridged to dorsum of foot.  Secured with drape and resumed NPWT at 125mmHg continuously.  ABD pad under tubing anterior leg and secured with ACE wrap.  Reapplied boot noting difficult to fit foot with heel at bottom of boot easily.  [facility] is following up with this.      Interdisciplinary consultation:  Dom Nguyễn RN, staff RN, patient      EVALUATION:  Clean surgically debrided wound with mild odor with initial dressing removal.     Factors affecting wound healing:  Neuropathy, tobacco abuse    Goals:  Decrease in wound size by 1% each week.     NURSING PLAN OF CARE ORDERS (X):    Dressing changes: See Dressing Maintenance orders:  X  Skin care: See Skin Care orders:   Rectal tube care: See Rectal Tube Care orders:   Other orders:    WOUND TEAM PLAN OF CARE (X):   NPWT change 3 x week: "   X     Dressing changes by wound team:       Follow up as needed:       Other (explain):    Anticipated discharge plans (X):  SNF:           Home Care:           Outpatient Wound Center:      X      Self Care:            Other:

## 2017-07-27 NOTE — PROGRESS NOTES
Infectious Disease Progress Note    Author: Bekah Mckeon M.D. DOS & Time created: 2017  4:51 PM    CC: FU right BKA stump infection and nonhealing left TMA stump wound     Interval History:  52 y.o. WM admitted 2017 with right BKA infection and nonhealing left TMA stump wound    AF WBC 7.6 less drainage from wounds Asking if he will need another BKA  17- no fevers. WBC 10.6. Complains of chills. Complains of 10 out of 10 pain in the left foot. Complains of drainage from the right stump.  17- complains of pain in the foot. Had low-grade fevers up to 100.7 last night . Foot cultures are showing MRSA and Pseudomonas  17- no fevers. Complains of burning in his left leg. No discharge noted from his right stump  17- no fevers. Getting his PICC line. No new issues overnight.  Labs Reviewed, Medications Reviewed, Radiology Reviewed and Wound Reviewed.    Review of Systems:  Review of Systems   Constitutional: Negative for fever and chills.   Respiratory: Negative for cough and shortness of breath.    Cardiovascular: Negative for chest pain.   Gastrointestinal: Negative for nausea, vomiting, abdominal pain and diarrhea.   Genitourinary: Negative for dysuria.   Musculoskeletal: Positive for myalgias and joint pain.        Burning sensation in his left foot and lower leg   Neurological: Positive for sensory change.   All other systems reviewed and are negative.      Hemodynamics:  Temp (24hrs), Av.7 °C (98.1 °F), Min:36.4 °C (97.5 °F), Max:37 °C (98.6 °F)  Temperature: 36.8 °C (98.3 °F)  Pulse  Av.7  Min: 71  Max: 111   Blood Pressure: 148/95 mmHg       Physical Exam:  Physical Exam   Constitutional: He is oriented to person, place, and time. He appears well-developed. No distress.   Disheveled   HENT:   Head: Normocephalic.   Poor dentition   Eyes:   Blind right eye   Neck: Neck supple.   Cardiovascular: Normal rate.    Pulmonary/Chest: Effort normal. No respiratory distress.    Abdominal: Soft. He exhibits no distension. There is no tenderness.   Musculoskeletal: He exhibits no edema.   Wound right BKA-no longer draining-wound is closed  Left foot surgical dressing and wound VAC present   Neurological: He is alert and oriented to person, place, and time.   Skin: No rash noted.   tattoos   Nursing note and vitals reviewed.      Labs:  No results for input(s): WBC, RBC, HEMOGLOBIN, HEMATOCRIT, MCV, MCH, RDW, PLATELETCT, MPV, NEUTSPOLYS, LYMPHOCYTES, MONOCYTES, EOSINOPHILS, BASOPHILS, RBCMORPHOLO in the last 72 hours.  No results for input(s): SODIUM, POTASSIUM, CHLORIDE, CO2, GLUCOSE, BUN, CPKTOTAL in the last 72 hours.  No results for input(s): ALBUMIN, TBILIRUBIN, ALKPHOSPHAT, TOTPROTEIN, ALTSGPT, ASTSGOT, CREATININE in the last 72 hours.  Results     Procedure Component Value Units Date/Time    CULTURE WOUND W/ GRAM STAIN [[id number]] Collected:  07/23/17 0730    Order Status:  Completed Specimen Information:  Wound from Right Leg Updated:  07/25/17 1018     Gram Stain Result No organisms seen.      Significant Indicator NEG      Source WND      Site RIGHT LEG      Culture Result Wound Rare growth Mixed skin gladis.     Narrative:      Collected By:[id number] SUNNY GONZALEZ    GRAM STAIN [[id number]] Collected:  07/23/17 0730    Order Status:  Completed Specimen Information:  Wound Updated:  07/23/17 1107     Significant Indicator .      Source WND      Site RIGHT LEG      Gram Stain Result No organisms seen.     Narrative:      Collected By:[id number] SUNNY GONZALEZ          Fluids:  Intake/Output       07/25/17 0700 - 07/26/17 0659 07/26/17 0700 - 07/27/17 0659 07/27/17 0700 - 07/28/17 0659      7444-4049 4458-8459 Total 2471-0272 5531-4032 Total 1521-1825 8756-2209 Total       Intake    P.O.  280  500 780  720  500 1220  480  -- 480    P.O. 280 500 780  480 -- 480    I.V.  500  -- 500  --  -- --  --  -- --    Crystalloid Intake 500 -- 500 -- -- -- -- -- --    Total Intake 780 500  1280  480 -- 480       Output    Urine  1400  600 2000 2050  800 2850  1450  -- 1450    Number of Times Voided -- 1 x 1 x -- 2 x 2 x -- -- --    Void (ml) 3444 254 4239 2050 800 2850 1450 -- 1450    Stool  --  -- --  --  -- --  --  -- --    Number of Times Stooled 0 x 0 x 0 x -- 0 x 0 x 0 x -- 0 x    Blood  25  -- 25  --  -- --  --  -- --    Est. Blood Loss (mL) 25 -- 25 -- -- -- -- -- --    Total Output 9789 167 1579 2050 800 2850 1450 -- 1450       Net I/O     -645 -100 -745 -1330 -300 -1630 -970 -- -970             Assessment:  Active Hospital Problems    Diagnosis   • Skin ulcer of left foot with fat layer exposed (CMS-HCC) [L97.522]   • Wound of right leg, at BKA site [S81.801A]   • Peripheral neuropathy [G62.9]   • Tobacco abuse [Z72.0]       Plan:  Drainage from right BKA site- resolved  Afebrile  No leukocytosis  The wound has completely healed  Patient refused a revision yesterday    Skin ulcer of left foot with fat layer exposed (CMS-HCC)  Left plantar ulcer, deep tissues exposed, serous drainage    Cultures for MRSA and Pseudomonas  Discontinue vancomycin  Start by mouth Bactrim  Continue Zosyn  Patient underwent surgery on 7/25/17  Follow the OR cultures  Probably needs long-term antibiotics  PICC line placed 7/27/17    Peripheral neuropathy    Prognosis for limb salvage poor        Plan of care discussed with IM.

## 2017-07-27 NOTE — PROGRESS NOTES
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 Takes methadone he states for back pain.  Not diabetic.    Presents to ED with increased pain to L TMA with infected neuropathic ulcer and pain to R BKA.    POD # 2 s/p L foot I & D with VAC placement, GSR by Dr. Shirley  /62 mmHg  Pulse 82  Temp(Src) 37 °C (98.6 °F)  Resp 16  Ht 1.854 m (6' 1\")  Wt 65.2 kg (143 lb 11.8 oz)  BMI 18.97 kg/m2  SpO2 94%  Pain controlled but c/o neuropathic pains intermittently to LLE    Offloading boot in place to LLE.   Indentation to heel noted, non tender to touch. Concerning for possible blister formation  Discussed with Nazario CLARKE with [facility]. Will see pt today  Will plan to make CROW boot for pt to offload plantar ulcer and provide some weight bearing without affecting the plantar ulcer with VAC      L TMA plantar ulcer:   Wound bed 100% red clean, bleeding to edges. Bone palpated-smooth, not visible  VAC changed by wound team CRWO Floyd, set to 125mmhg continuous       GSR:   Incision approximated, without erythema, edema. No drainage  Cleaned with NS, applied dry gauze adaptic  Boot replaced with ABD pad to heel      R BKA:  Partial thickness ulcer appears it has resolved. Mild crust to incision line. Dimethicone lotion applied. tubigrip D applied double layer.       ID involved  IV abx- MRSA      PLAN  Continue VAC change per IP wound team for LLE.   diabetic boot continuously for 6wk d/t GSR incision to calf  NWB LLE until CROW boot can be applied by [facility]    Prosthetist to eval RLE prosthetic  NWB until prosthetic is adjusted  Skin care for RLE, ordered   Continue IV abx per ID    D/C planning: SW involved, [facility]. Concerned with insurance coverage. anticipate IV abx, wound care longterm       "

## 2017-07-28 PROBLEM — A49.8 PSEUDOMONAS INFECTION: Status: ACTIVE | Noted: 2017-07-28

## 2017-07-28 PROBLEM — Z22.322 MRSA (METHICILLIN RESISTANT STAPH AUREUS) CULTURE POSITIVE: Status: ACTIVE | Noted: 2017-07-28

## 2017-07-28 PROCEDURE — 770021 HCHG ROOM/CARE - ISO PRIVATE

## 2017-07-28 PROCEDURE — 700102 HCHG RX REV CODE 250 W/ 637 OVERRIDE(OP): Performed by: INTERNAL MEDICINE

## 2017-07-28 PROCEDURE — 700111 HCHG RX REV CODE 636 W/ 250 OVERRIDE (IP): Performed by: INTERNAL MEDICINE

## 2017-07-28 PROCEDURE — 700101 HCHG RX REV CODE 250: Performed by: INTERNAL MEDICINE

## 2017-07-28 PROCEDURE — 700102 HCHG RX REV CODE 250 W/ 637 OVERRIDE(OP): Performed by: HOSPITALIST

## 2017-07-28 PROCEDURE — A9270 NON-COVERED ITEM OR SERVICE: HCPCS | Performed by: INTERNAL MEDICINE

## 2017-07-28 PROCEDURE — A9270 NON-COVERED ITEM OR SERVICE: HCPCS | Performed by: HOSPITALIST

## 2017-07-28 PROCEDURE — 700105 HCHG RX REV CODE 258: Performed by: INTERNAL MEDICINE

## 2017-07-28 PROCEDURE — 99232 SBSQ HOSP IP/OBS MODERATE 35: CPT | Performed by: INTERNAL MEDICINE

## 2017-07-28 RX ORDER — SODIUM CHLORIDE 9 MG/ML
INJECTION, SOLUTION INTRAVENOUS
Status: COMPLETED
Start: 2017-07-28 | End: 2017-07-28

## 2017-07-28 RX ADMIN — NICOTINE 14 MG: 14 PATCH, EXTENDED RELEASE TRANSDERMAL at 05:11

## 2017-07-28 RX ADMIN — PREGABALIN 75 MG: 75 CAPSULE ORAL at 15:06

## 2017-07-28 RX ADMIN — MORPHINE SULFATE 2 MG: 4 INJECTION INTRAVENOUS at 15:05

## 2017-07-28 RX ADMIN — PIPERACILLIN SODIUM AND TAZOBACTAM SODIUM 3.38 G: 3; .375 INJECTION, POWDER, FOR SOLUTION INTRAVENOUS at 19:21

## 2017-07-28 RX ADMIN — Medication 325 MG: at 19:21

## 2017-07-28 RX ADMIN — Medication 325 MG: at 09:35

## 2017-07-28 RX ADMIN — OXYCODONE HYDROCHLORIDE 5 MG: 5 TABLET ORAL at 23:04

## 2017-07-28 RX ADMIN — PREGABALIN 75 MG: 75 CAPSULE ORAL at 09:35

## 2017-07-28 RX ADMIN — PIPERACILLIN SODIUM AND TAZOBACTAM SODIUM 3.38 G: 3; .375 INJECTION, POWDER, FOR SOLUTION INTRAVENOUS at 23:08

## 2017-07-28 RX ADMIN — OXYCODONE HYDROCHLORIDE 5 MG: 5 TABLET ORAL at 12:21

## 2017-07-28 RX ADMIN — OXYCODONE HYDROCHLORIDE 20 MG: 20 TABLET, FILM COATED, EXTENDED RELEASE ORAL at 20:25

## 2017-07-28 RX ADMIN — PROMETHAZINE HYDROCHLORIDE 25 MG: 25 TABLET ORAL at 01:58

## 2017-07-28 RX ADMIN — PIPERACILLIN SODIUM AND TAZOBACTAM SODIUM 3.38 G: 3; .375 INJECTION, POWDER, FOR SOLUTION INTRAVENOUS at 12:22

## 2017-07-28 RX ADMIN — LIDOCAINE 1 PATCH: 50 PATCH CUTANEOUS at 12:22

## 2017-07-28 RX ADMIN — OXYCODONE HYDROCHLORIDE 5 MG: 5 TABLET ORAL at 19:21

## 2017-07-28 RX ADMIN — ONDANSETRON 4 MG: 2 INJECTION INTRAMUSCULAR; INTRAVENOUS at 20:24

## 2017-07-28 RX ADMIN — STANDARDIZED SENNA CONCENTRATE AND DOCUSATE SODIUM 2 TABLET: 8.6; 5 TABLET, FILM COATED ORAL at 20:25

## 2017-07-28 RX ADMIN — OXYCODONE HYDROCHLORIDE 5 MG: 5 TABLET ORAL at 01:57

## 2017-07-28 RX ADMIN — PIPERACILLIN SODIUM AND TAZOBACTAM SODIUM 3.38 G: 3; .375 INJECTION, POWDER, FOR SOLUTION INTRAVENOUS at 05:12

## 2017-07-28 RX ADMIN — OXYCODONE HYDROCHLORIDE 5 MG: 5 TABLET ORAL at 05:11

## 2017-07-28 RX ADMIN — SULFAMETHOXAZOLE AND TRIMETHOPRIM 1 TABLET: 800; 160 TABLET ORAL at 20:25

## 2017-07-28 RX ADMIN — SULFAMETHOXAZOLE AND TRIMETHOPRIM 1 TABLET: 800; 160 TABLET ORAL at 09:46

## 2017-07-28 RX ADMIN — PROMETHAZINE HYDROCHLORIDE 25 MG: 25 TABLET ORAL at 09:34

## 2017-07-28 RX ADMIN — STANDARDIZED SENNA CONCENTRATE AND DOCUSATE SODIUM 2 TABLET: 8.6; 5 TABLET, FILM COATED ORAL at 09:35

## 2017-07-28 RX ADMIN — ENOXAPARIN SODIUM 40 MG: 100 INJECTION SUBCUTANEOUS at 09:34

## 2017-07-28 RX ADMIN — OXYCODONE HYDROCHLORIDE 20 MG: 20 TABLET, FILM COATED, EXTENDED RELEASE ORAL at 09:35

## 2017-07-28 RX ADMIN — PREGABALIN 75 MG: 75 CAPSULE ORAL at 20:25

## 2017-07-28 ASSESSMENT — ENCOUNTER SYMPTOMS
SENSORY CHANGE: 1
CONSTIPATION: 0
PALPITATIONS: 0
SHORTNESS OF BREATH: 0
NAUSEA: 0
VOMITING: 0
MYALGIAS: 1
ABDOMINAL PAIN: 0
FEVER: 0
DIARRHEA: 0
CHILLS: 0
COUGH: 0

## 2017-07-28 ASSESSMENT — PAIN SCALES - GENERAL
PAINLEVEL_OUTOF10: 4
PAINLEVEL_OUTOF10: 6
PAINLEVEL_OUTOF10: 2
PAINLEVEL_OUTOF10: 2
PAINLEVEL_OUTOF10: 6

## 2017-07-28 ASSESSMENT — PAIN SCALES - WONG BAKER: WONGBAKER_NUMERICALRESPONSE: HURTS JUST A LITTLE BIT

## 2017-07-28 NOTE — PROGRESS NOTES
Infectious Disease Progress Note    Author: Bekah Mckeon M.D. DOS & Time created: 2017  3:06 PM    CC: FU right BKA stump infection and nonhealing left TMA stump wound     Interval History:  52 y.o. WM admitted 2017 with right BKA infection and nonhealing left TMA stump wound    AF WBC 7.6 less drainage from wounds Asking if he will need another BKA  17- no fevers. WBC 10.6. Complains of chills. Complains of 10 out of 10 pain in the left foot. Complains of drainage from the right stump.  17- complains of pain in the foot. Had low-grade fevers up to 100.7 last night . Foot cultures are showing MRSA and Pseudomonas  17- no fevers. Complains of burning in his left leg. No discharge noted from his right stump  17- no fevers. Complains of ongoing pain.  Labs Reviewed, Medications Reviewed, Radiology Reviewed and Wound Reviewed.    Review of Systems:  Review of Systems   Constitutional: Negative for fever and chills.   Respiratory: Negative for cough and shortness of breath.    Cardiovascular: Negative for chest pain.   Gastrointestinal: Negative for nausea, vomiting, abdominal pain and diarrhea.   Genitourinary: Negative for dysuria.   Musculoskeletal: Positive for myalgias and joint pain.        Burning sensation in his left foot and lower leg   Neurological: Positive for sensory change.   All other systems reviewed and are negative.      Hemodynamics:  Temp (24hrs), Av.9 °C (98.5 °F), Min:36.7 °C (98 °F), Max:37.2 °C (98.9 °F)  Temperature: 36.7 °C (98 °F)  Pulse  Av.2  Min: 71  Max: 111   Blood Pressure: 110/64 mmHg       Physical Exam:  Physical Exam   Constitutional: He is oriented to person, place, and time. He appears well-developed. No distress.   Disheveled   HENT:   Head: Normocephalic.   Poor dentition   Eyes:   Blind right eye   Neck: Neck supple.   Cardiovascular: Normal rate.    Pulmonary/Chest: Effort normal. No respiratory distress.   Abdominal: Soft. He  exhibits no distension. There is no tenderness.   Musculoskeletal: He exhibits no edema.   Wound right BKA-no longer draining-wound is closed  Left foot surgical dressing and wound VAC present   Neurological: He is alert and oriented to person, place, and time.   Skin: No rash noted.   tattoos   Nursing note and vitals reviewed.      Labs:  No results for input(s): WBC, RBC, HEMOGLOBIN, HEMATOCRIT, MCV, MCH, RDW, PLATELETCT, MPV, NEUTSPOLYS, LYMPHOCYTES, MONOCYTES, EOSINOPHILS, BASOPHILS, RBCMORPHOLO in the last 72 hours.  No results for input(s): SODIUM, POTASSIUM, CHLORIDE, CO2, GLUCOSE, BUN, CPKTOTAL in the last 72 hours.  No results for input(s): ALBUMIN, TBILIRUBIN, ALKPHOSPHAT, TOTPROTEIN, ALTSGPT, ASTSGOT, CREATININE in the last 72 hours.  Results     Procedure Component Value Units Date/Time    CULTURE WOUND W/ GRAM STAIN [[id number]] Collected:  07/23/17 0730    Order Status:  Completed Specimen Information:  Wound from Right Leg Updated:  07/25/17 1018     Gram Stain Result No organisms seen.      Significant Indicator NEG      Source WND      Site RIGHT LEG      Culture Result Wound Rare growth Mixed skin gladis.     Narrative:      Collected By:[id number] SUNNY GONZALEZ    GRAM STAIN [[id number]] Collected:  07/23/17 0730    Order Status:  Completed Specimen Information:  Wound Updated:  07/23/17 1107     Significant Indicator .      Source WND      Site RIGHT LEG      Gram Stain Result No organisms seen.     Narrative:      Collected By:[id number] SUNNY GONZALEZ          Fluids:  Intake/Output       07/26/17 0700 - 07/27/17 0659 07/27/17 0700 - 07/28/17 0659 07/28/17 0700 - 07/29/17 0659      0639-0087 2109-8020 Total 6873-3190 0507-8717 Total 4318-5533 9595-3946 Total       Intake    P.O.  720  500 1220  720  1000 1720  400  -- 400    P.O.  720 1000 1720 400 -- 400    Total Intake  720 1000 1720 400 -- 400       Output    Urine  2050  800 2850  1950  1800 3750  800  -- 800    Number of  Times Voided -- 2 x 2 x -- 1 x 1 x 1 x -- 1 x    Void (ml) 2050 800 2850 1950 1800 3750 800 -- 800    Stool  --  -- --  --  -- --  --  -- --    Number of Times Stooled -- 0 x 0 x 0 x 1 x 1 x 1 x -- 1 x    Total Output 2050 800 2850 1950 1800 3750 800 -- 800       Net I/O     -1330 -300 -1630 -1230 -800 -2030 -400 -- -400             Assessment:  Active Hospital Problems    Diagnosis   • Skin ulcer of left foot with fat layer exposed (CMS-HCC) [L97.522]   • Wound of right leg, at BKA site [S81.801A]   • Peripheral neuropathy [G62.9]   • Tobacco abuse [Z72.0]       Plan:  Drainage from right BKA site- resolved  Afebrile  No leukocytosis  The wound has completely healed  Patient refused a revision yesterday    Skin ulcer of left foot with fat layer exposed (CMS-[facility])  Left plantar ulcer, deep tissues exposed, serous drainage    Cultures for MRSA and Pseudomonas  Discontinue vancomycin  Start by mouth Bactrim  Continue Zosyn  Patient underwent surgery on 7/25/17  Probably needs long-term antibiotics  PICC line done 7/27/17  Continue antibiotics through 8/25/17    Peripheral neuropathy    Prognosis for limb salvage poor    Placement- patient has lots of concerns about his insurance coverage and subsequent place to live. Talking to social work about his placement.        Plan of care discussed with IM.

## 2017-07-28 NOTE — DISCHARGE PLANNING
This 52 year old male admitted on 7/22 with a left foot ulcer.  He has had a right BKA in the past and has recently had some problems with drainage from the stump.  On 7/25 he had an irrigation and debridement of his left foot and a wound vac was placed.  He has a room in [place], [place], in [facility].  He has a [employer] HMO ([facility]) and will need skilled nursing.  He has a  in the [place] ([facility]) and her name is Lamine, [phone].  I called her to find out which SNF's are preferred providers under his plan but was only able to leave a VM message.  The patient would like to move to [place].  We discussed his discharge plan and I told him that for now he will need to go to a SNF in [place] because of his insurance.  Once he is discharged from the SNF then at that time he should apply for [place] Medicaid if he plans to stay in [place].

## 2017-07-29 PROCEDURE — A9270 NON-COVERED ITEM OR SERVICE: HCPCS | Performed by: INTERNAL MEDICINE

## 2017-07-29 PROCEDURE — 700102 HCHG RX REV CODE 250 W/ 637 OVERRIDE(OP): Performed by: INTERNAL MEDICINE

## 2017-07-29 PROCEDURE — A9270 NON-COVERED ITEM OR SERVICE: HCPCS | Performed by: HOSPITALIST

## 2017-07-29 PROCEDURE — 700102 HCHG RX REV CODE 250 W/ 637 OVERRIDE(OP): Performed by: HOSPITALIST

## 2017-07-29 PROCEDURE — 700111 HCHG RX REV CODE 636 W/ 250 OVERRIDE (IP)

## 2017-07-29 PROCEDURE — 97606 NEG PRS WND THER DME>50 SQCM: CPT

## 2017-07-29 PROCEDURE — 700101 HCHG RX REV CODE 250: Performed by: INTERNAL MEDICINE

## 2017-07-29 PROCEDURE — 99232 SBSQ HOSP IP/OBS MODERATE 35: CPT | Performed by: INTERNAL MEDICINE

## 2017-07-29 PROCEDURE — 770021 HCHG ROOM/CARE - ISO PRIVATE

## 2017-07-29 PROCEDURE — 700105 HCHG RX REV CODE 258

## 2017-07-29 PROCEDURE — 700111 HCHG RX REV CODE 636 W/ 250 OVERRIDE (IP): Performed by: INTERNAL MEDICINE

## 2017-07-29 PROCEDURE — 700105 HCHG RX REV CODE 258: Performed by: INTERNAL MEDICINE

## 2017-07-29 RX ORDER — SODIUM CHLORIDE 9 MG/ML
INJECTION, SOLUTION INTRAVENOUS
Status: COMPLETED
Start: 2017-07-29 | End: 2017-07-29

## 2017-07-29 RX ADMIN — PIPERACILLIN SODIUM AND TAZOBACTAM SODIUM 3.38 G: 3; .375 INJECTION, POWDER, FOR SOLUTION INTRAVENOUS at 12:14

## 2017-07-29 RX ADMIN — VANCOMYCIN HYDROCHLORIDE 1600 MG: 100 INJECTION, POWDER, LYOPHILIZED, FOR SOLUTION INTRAVENOUS at 18:38

## 2017-07-29 RX ADMIN — Medication 325 MG: at 08:19

## 2017-07-29 RX ADMIN — SULFAMETHOXAZOLE AND TRIMETHOPRIM 1 TABLET: 800; 160 TABLET ORAL at 12:16

## 2017-07-29 RX ADMIN — PIPERACILLIN SODIUM AND TAZOBACTAM SODIUM 3.38 G: 3; .375 INJECTION, POWDER, FOR SOLUTION INTRAVENOUS at 05:10

## 2017-07-29 RX ADMIN — STANDARDIZED SENNA CONCENTRATE AND DOCUSATE SODIUM 1 TABLET: 8.6; 5 TABLET, FILM COATED ORAL at 08:19

## 2017-07-29 RX ADMIN — Medication 325 MG: at 18:38

## 2017-07-29 RX ADMIN — OXYCODONE HYDROCHLORIDE 5 MG: 5 TABLET ORAL at 23:46

## 2017-07-29 RX ADMIN — SODIUM CHLORIDE 500 ML: 9 INJECTION, SOLUTION INTRAVENOUS at 12:19

## 2017-07-29 RX ADMIN — ENOXAPARIN SODIUM 40 MG: 100 INJECTION SUBCUTANEOUS at 08:19

## 2017-07-29 RX ADMIN — OXYCODONE HYDROCHLORIDE 20 MG: 20 TABLET, FILM COATED, EXTENDED RELEASE ORAL at 08:19

## 2017-07-29 RX ADMIN — PREGABALIN 75 MG: 75 CAPSULE ORAL at 20:27

## 2017-07-29 RX ADMIN — STANDARDIZED SENNA CONCENTRATE AND DOCUSATE SODIUM 2 TABLET: 8.6; 5 TABLET, FILM COATED ORAL at 20:27

## 2017-07-29 RX ADMIN — OXYCODONE HYDROCHLORIDE 5 MG: 5 TABLET ORAL at 20:37

## 2017-07-29 RX ADMIN — OXYCODONE HYDROCHLORIDE 5 MG: 5 TABLET ORAL at 05:10

## 2017-07-29 RX ADMIN — ZOLPIDEM TARTRATE 5 MG: 5 TABLET, FILM COATED ORAL at 01:06

## 2017-07-29 RX ADMIN — PREGABALIN 75 MG: 75 CAPSULE ORAL at 08:19

## 2017-07-29 RX ADMIN — PREGABALIN 75 MG: 75 CAPSULE ORAL at 15:58

## 2017-07-29 RX ADMIN — MORPHINE SULFATE 2 MG: 4 INJECTION INTRAVENOUS at 07:30

## 2017-07-29 RX ADMIN — OXYCODONE HYDROCHLORIDE 20 MG: 20 TABLET, FILM COATED, EXTENDED RELEASE ORAL at 20:27

## 2017-07-29 RX ADMIN — LIDOCAINE 1 PATCH: 50 PATCH CUTANEOUS at 12:18

## 2017-07-29 RX ADMIN — NICOTINE 14 MG: 14 PATCH, EXTENDED RELEASE TRANSDERMAL at 05:10

## 2017-07-29 RX ADMIN — CEFEPIME 2 G: 2 INJECTION, POWDER, FOR SOLUTION INTRAMUSCULAR; INTRAVENOUS at 20:29

## 2017-07-29 RX ADMIN — OXYCODONE HYDROCHLORIDE 5 MG: 5 TABLET ORAL at 15:58

## 2017-07-29 ASSESSMENT — ENCOUNTER SYMPTOMS
NAUSEA: 0
DIARRHEA: 0
FEVER: 0
VOMITING: 0
COUGH: 0
SHORTNESS OF BREATH: 0
ABDOMINAL PAIN: 0
PALPITATIONS: 0
SENSORY CHANGE: 1
MYALGIAS: 1
CHILLS: 0
CONSTIPATION: 0

## 2017-07-29 ASSESSMENT — PAIN SCALES - GENERAL
PAINLEVEL_OUTOF10: 7
PAINLEVEL_OUTOF10: 4
PAINLEVEL_OUTOF10: 7
PAINLEVEL_OUTOF10: 6
PAINLEVEL_OUTOF10: 7

## 2017-07-29 NOTE — PROGRESS NOTES
"[facility] Hospitalist Progress Note    Date of Service: 7/28/2017    Chief Complaint  52 y.o. male admitted 7/22/2017 with right BKA stump pain and wound drainage in addition to left TMA stump wound (nonhealing).  Patient has history of PVD s/p L TMA and R BKA more than 5 yrs ago in [place].  He was receiving home health with wound care while living in [facility] while on disability.  Patient moved to [place] 1yr ago.  Since moving to [place], he has not established with PCP or received wound care.  Over the past few months, he began noticing drainage from R BKA stump with wound opening up.  He also noticed ulcer developing at the site of his L TMA stump.  He has prosthesis for his R leg, but unable to use due to these developments over the past few months.  He has no wheelchair and rents rooms weekly at various hotels throughout the city.    Interval Problem Update  7/24- opiate dependence addressed by Dr Dumont  7/25 - s/p Debridement and gastroscoleus resection LLE in OR  7/26- c/o Burning in LLE to RN, already on Lyrica- Oxycontin increased, discussed w/ wound RN at bedside, discussed d/c planning w/ patient and .  Orpro making new cup for BKA prosthesis RLE, new clamshell boot for heel walking LLE  7/27 He is actively calling his insurance  in [place] regarding SNF options.  Had wound vac change earlier today.  7/28- no new issues per patient- discussed w/ ID    Consultants/Specialty  LPS  ID - [facility]  Ortho- [facility]    Disposition  Limited options w/ Payor (Medical HMO)          Review of Systems   Constitutional: Negative for fever and chills.   Respiratory: Negative for cough and shortness of breath.    Cardiovascular: Negative for chest pain and palpitations.   Gastrointestinal: Negative for nausea, vomiting, abdominal pain, diarrhea (loose \"but not like c dif\") and constipation.   Musculoskeletal:        Left foot pain   All other systems reviewed and are negative.     Physical Exam  " Laboratory/Imaging   Hemodynamics  Temp (24hrs), Av °C (98.6 °F), Min:36.7 °C (98 °F), Max:37.2 °C (98.9 °F)   Temperature: 36.7 °C (98 °F)  Pulse  Av.2  Min: 71  Max: 111    Blood Pressure: 110/64 mmHg      Respiratory      Respiration: 17, Pulse Oximetry: 96 %        RUL Breath Sounds: Diminished, RML Breath Sounds: Diminished, RLL Breath Sounds: Diminished, NANCY Breath Sounds: Diminished, LLL Breath Sounds: Diminished    Fluids    Intake/Output Summary (Last 24 hours) at 17 1750  Last data filed at 17 1200   Gross per 24 hour   Intake   1640 ml   Output   3100 ml   Net  -1460 ml       Nutrition  Orders Placed This Encounter   Procedures   • DIET ORDER     Standing Status: Standing      Number of Occurrences: 1      Standing Expiration Date:      Order Specific Question:  Diet:     Answer:  Regular [1]     Physical Exam   Constitutional: He is oriented to person, place, and time. He appears well-developed and well-nourished.   HENT:   Head: Normocephalic and atraumatic.   Mouth/Throat: Oropharynx is clear and moist.   Eyes: No scleral icterus.   R corneal opacification and small globe   Neck: Normal range of motion. Neck supple. No tracheal deviation present. No thyromegaly present.   Cardiovascular: Normal rate, regular rhythm, normal heart sounds and intact distal pulses.    No murmur heard.  Pulmonary/Chest: Effort normal and breath sounds normal. No respiratory distress. He has no wheezes.   Abdominal: Soft. Bowel sounds are normal. He exhibits no distension. There is no tenderness.   Musculoskeletal: He exhibits no edema or tenderness.        Arms:       Legs:  LLE post op dressings/ boot     Lymphadenopathy:     He has no cervical adenopathy.        Right: No supraclavicular adenopathy present.        Left: No supraclavicular adenopathy present.   Neurological: He is alert and oriented to person, place, and time.   Skin: Skin is warm and dry.   Feels a bit warm   Psychiatric: He has a  normal mood and affect.   Nursing note and vitals reviewed.                               Assessment/Plan     Wound of right leg, at BKA site (present on admission)  Assessment & Plan  Not infected, just dry eschars    Skin ulcer of left foot with fat layer exposed (CMS-HCC) (present on admission)  Assessment & Plan  S/p OR debridement and gastroscoleus resection 7/25 (boubacar) wound Vac  Contact isolation    Peripheral neuropathy (present on admission)  Assessment & Plan  On lyrica, oxycontin increased, has not asked for further escalation  Consider depakote    Tobacco abuse (present on admission)  Assessment & Plan  Nicotine replacement PRN  Strongly encouraged to quit  Counseled at least 3 minutes on 07/23/2017    Opiate dependence (CMS-HCC) (present on admission)  Assessment & Plan  Buying methadone on street  Drug seeking behavior inpatient.  Has not asked for escalation last 2 days    MRSA (methicillin resistant staph aureus) culture positive (present on admission)  Assessment & Plan  bactrim    Pseudomonas infection (present on admission)  Assessment & Plan  On Zosyn      Labs reviewed and Medications reviewed  Rolon catheter: No Rolon      DVT Prophylaxis: Enoxaparin (Lovenox)    Ulcer prophylaxis: Not indicated  Antibiotics: Treating active infection/contamination beyond 24 hours perioperative coverage  Assessed for rehab: Patient was assess for and/or received rehabilitation services during this hospitalization

## 2017-07-29 NOTE — PROGRESS NOTES
Pt AAOx4, pleasant and cooperative.  Lungs are clear on room air.  L foot has wound vac in place, suctioning appropriately. Dressing CDI.  R BKA has tubigrip in place, CDI.  Denies N/V, voiding adequately.  Pain managed per MAR.  Call light and personal belongings within reach.  All needs met at this time.   Will continue to monitor pt.

## 2017-07-29 NOTE — PROGRESS NOTES
"[facility] Hospitalist Progress Note    Date of Service: 7/29/2017    Chief Complaint  52 y.o. male admitted 7/22/2017 with right BKA stump pain and wound drainage in addition to left TMA stump wound (nonhealing).  Patient has history of PVD s/p L TMA and R BKA more than 5 yrs ago in [place].  He was receiving home health with wound care while living in [facility] while on disability.  Patient moved to [place] 1yr ago.  Since moving to [place], he has not established with PCP or received wound care.  Over the past few months, he began noticing drainage from R BKA stump with wound opening up.  He also noticed ulcer developing at the site of his L TMA stump.  He has prosthesis for his R leg, but unable to use due to these developments over the past few months.  He has no wheelchair and rents rooms weekly at various hotels throughout the city.    Interval Problem Update  7/24- opiate dependence addressed by Dr Dumont  7/25 - s/p Debridement and gastroscoleus resection LLE in OR  7/26- c/o Burning in LLE to RN, already on Lyrica- Oxycontin increased, discussed w/ wound RN at bedside, discussed d/c planning w/ patient and .  Orpro making new cup for BKA prosthesis RLE, new clamshell boot for heel walking LLE  7/27 He is actively calling his insurance  in [place] regarding SNF options.  Had wound vac change earlier today.  7/28- no new issues per patient- discussed w/ ID  7/29- RN concerned about low grade temp and borderline BP- not too far from baseline.  On bactrim, needs BMP.    Consultants/Specialty  LPS  ID - [facility]  [facility]- [facility]    Disposition  Limited options w/ Payor (Medical HMO)          Review of Systems   Constitutional: Negative for fever and chills.   Respiratory: Negative for cough and shortness of breath.    Cardiovascular: Negative for chest pain and palpitations.   Gastrointestinal: Negative for nausea, vomiting, abdominal pain, diarrhea (loose \"but not like c dif\") and constipation. "   Musculoskeletal:        Left foot pain   All other systems reviewed and are negative.     Physical Exam  Laboratory/Imaging   Hemodynamics  Temp (24hrs), Av.1 °C (98.8 °F), Min:36.7 °C (98.1 °F), Max:37.3 °C (99.2 °F)   Temperature: 37.2 °C (99 °F)  Pulse  Av.7  Min: 71  Max: 111    Blood Pressure: 105/71 mmHg      Respiratory      Respiration: 16, Pulse Oximetry: 98 %        RUL Breath Sounds: Clear, RML Breath Sounds: Clear, RLL Breath Sounds: Diminished, NANCY Breath Sounds: Clear, LLL Breath Sounds: Diminished    Fluids    Intake/Output Summary (Last 24 hours) at 17 1535  Last data filed at 17 1400   Gross per 24 hour   Intake   1520 ml   Output   2150 ml   Net   -630 ml       Nutrition  Orders Placed This Encounter   Procedures   • DIET ORDER     Standing Status: Standing      Number of Occurrences: 1      Standing Expiration Date:      Order Specific Question:  Diet:     Answer:  Regular [1]     Physical Exam   Constitutional: He is oriented to person, place, and time. He appears well-developed and well-nourished.   HENT:   Head: Normocephalic and atraumatic.   Mouth/Throat: Oropharynx is clear and moist.   Eyes: No scleral icterus.   R corneal opacification and small globe   Neck: Normal range of motion. Neck supple. No tracheal deviation present. No thyromegaly present.   Cardiovascular: Normal rate, regular rhythm, normal heart sounds and intact distal pulses.    No murmur heard.  Pulmonary/Chest: Effort normal and breath sounds normal. No respiratory distress. He has no wheezes.   Abdominal: Soft. Bowel sounds are normal. He exhibits no distension. There is no tenderness.   Musculoskeletal: He exhibits no edema or tenderness.        Arms:       Legs:  LLE post op dressings/ boot     Lymphadenopathy:     He has no cervical adenopathy.        Right: No supraclavicular adenopathy present.        Left: No supraclavicular adenopathy present.   Neurological: He is alert and oriented to  person, place, and time.   Skin: Skin is warm and dry.   Psychiatric: He has a normal mood and affect.   Nursing note and vitals reviewed.                               Assessment/Plan     Wound of right leg, at BKA site (present on admission)  Assessment & Plan  Not infected, just dry eschars    Skin ulcer of left foot with fat layer exposed (CMS-HCC) (present on admission)  Assessment & Plan  S/p OR debridement and gastroscoleus resection 7/25 (boubacar) wound Vac  Contact isolation    Peripheral neuropathy (present on admission)  Assessment & Plan  On lyrica, oxycontin increased, has not asked for further escalation  Consider depakote    Tobacco abuse (present on admission)  Assessment & Plan  Nicotine replacement PRN  Strongly encouraged to quit  Counseled at least 3 minutes on 07/23/2017    Opiate dependence (CMS-HCC) (present on admission)  Assessment & Plan  Buying methadone on street  Drug seeking behavior inpatient.  Has not asked for escalation last 2 days    MRSA (methicillin resistant staph aureus) culture positive (present on admission)  Assessment & Plan  bactrim    Pseudomonas infection (present on admission)  Assessment & Plan  On Zosyn      Labs reviewed and Medications reviewed  Rolon catheter: No Rolon      DVT Prophylaxis: Enoxaparin (Lovenox)    Ulcer prophylaxis: Not indicated  Antibiotics: Treating active infection/contamination beyond 24 hours perioperative coverage  Assessed for rehab: Patient was assess for and/or received rehabilitation services during this hospitalization

## 2017-07-29 NOTE — PROGRESS NOTES
AA&O x 4.  Wound vac in place and operating left foot.  Boot in place.  Right BKA.    Tolerating regular diet.  +bs +flatus +BM  Voiding adequate clear yellow urine.  Medicated for pain Q 3 hours PRN.  VSS.  SpO2: 99% on room air.

## 2017-07-29 NOTE — CARE PLAN
Problem: Skin Integrity  Goal: Risk for impaired skin integrity will decrease  Outcome: PROGRESSING AS EXPECTED  Wound Vac inplace on L heel, Dressing CDI, suctioning appropriately    Problem: Mobility  Goal: Risk for activity intolerance will decrease  Outcome: PROGRESSING AS EXPECTED    Problem: Pain Management  Goal: Pain level will decrease to patient’s comfort goal  Outcome: PROGRESSING AS EXPECTED  Pain managed per MAR

## 2017-07-29 NOTE — CARE PLAN
Problem: Communication  Goal: The ability to communicate needs accurately and effectively will improve  Intervention: Educate patient and significant other/support system about the plan of care, procedures, treatments, medications and allow for questions  Discussed POC.  All questions answered.  Updated PRN.      Problem: Safety  Goal: Will remain free from falls  Intervention: Assess risk factors for falls  Bed alarm on.  Call light within reach, calls appropriately for assist.  Bed in lowest position.

## 2017-07-30 PROBLEM — N17.9 AKI (ACUTE KIDNEY INJURY) (HCC): Status: ACTIVE | Noted: 2017-07-30

## 2017-07-30 LAB
ALBUMIN SERPL BCP-MCNC: 3.2 G/DL (ref 3.2–4.9)
ALBUMIN/GLOB SERPL: 0.7 G/DL
ALP SERPL-CCNC: 82 U/L (ref 30–99)
ALT SERPL-CCNC: 19 U/L (ref 2–50)
ANION GAP SERPL CALC-SCNC: 7 MMOL/L (ref 0–11.9)
AST SERPL-CCNC: 24 U/L (ref 12–45)
BASOPHILS # BLD AUTO: 0 % (ref 0–1.8)
BASOPHILS # BLD: 0 K/UL (ref 0–0.12)
BILIRUB SERPL-MCNC: 0.3 MG/DL (ref 0.1–1.5)
BUN SERPL-MCNC: 22 MG/DL (ref 8–22)
CALCIUM SERPL-MCNC: 9.1 MG/DL (ref 8.5–10.5)
CHLORIDE SERPL-SCNC: 103 MMOL/L (ref 96–112)
CO2 SERPL-SCNC: 26 MMOL/L (ref 20–33)
CREAT SERPL-MCNC: 1.17 MG/DL (ref 0.5–1.4)
EOSINOPHIL # BLD AUTO: 0.31 K/UL (ref 0–0.51)
EOSINOPHIL NFR BLD: 3.5 % (ref 0–6.9)
ERYTHROCYTE [DISTWIDTH] IN BLOOD BY AUTOMATED COUNT: 44.5 FL (ref 35.9–50)
GFR SERPL CREATININE-BSD FRML MDRD: >60 ML/MIN/1.73 M 2
GLOBULIN SER CALC-MCNC: 4.7 G/DL (ref 1.9–3.5)
GLUCOSE SERPL-MCNC: 110 MG/DL (ref 65–99)
HCT VFR BLD AUTO: 36.9 % (ref 42–52)
HGB BLD-MCNC: 11.2 G/DL (ref 14–18)
LYMPHOCYTES # BLD AUTO: 3.7 K/UL (ref 1–4.8)
LYMPHOCYTES NFR BLD: 41.6 % (ref 22–41)
MANUAL DIFF BLD: NORMAL
MCH RBC QN AUTO: 23.2 PG (ref 27–33)
MCHC RBC AUTO-ENTMCNC: 30.4 G/DL (ref 33.7–35.3)
MCV RBC AUTO: 76.6 FL (ref 81.4–97.8)
MONOCYTES # BLD AUTO: 0.24 K/UL (ref 0–0.85)
MONOCYTES NFR BLD AUTO: 2.7 % (ref 0–13.4)
MORPHOLOGY BLD-IMP: NORMAL
NEUTROPHILS # BLD AUTO: 4.65 K/UL (ref 1.82–7.42)
NEUTROPHILS NFR BLD: 52.2 % (ref 44–72)
NRBC # BLD AUTO: 0 K/UL
NRBC BLD AUTO-RTO: 0 /100 WBC
PLATELET # BLD AUTO: 443 K/UL (ref 164–446)
PLATELET BLD QL SMEAR: NORMAL
PMV BLD AUTO: 8.9 FL (ref 9–12.9)
POTASSIUM SERPL-SCNC: 4.2 MMOL/L (ref 3.6–5.5)
PROT SERPL-MCNC: 7.9 G/DL (ref 6–8.2)
RBC # BLD AUTO: 4.82 M/UL (ref 4.7–6.1)
RBC BLD AUTO: NORMAL
SODIUM SERPL-SCNC: 136 MMOL/L (ref 135–145)
WBC # BLD AUTO: 8.9 K/UL (ref 4.8–10.8)

## 2017-07-30 PROCEDURE — 700111 HCHG RX REV CODE 636 W/ 250 OVERRIDE (IP): Performed by: INTERNAL MEDICINE

## 2017-07-30 PROCEDURE — 700102 HCHG RX REV CODE 250 W/ 637 OVERRIDE(OP): Performed by: HOSPITALIST

## 2017-07-30 PROCEDURE — 700101 HCHG RX REV CODE 250: Performed by: INTERNAL MEDICINE

## 2017-07-30 PROCEDURE — 700102 HCHG RX REV CODE 250 W/ 637 OVERRIDE(OP): Performed by: INTERNAL MEDICINE

## 2017-07-30 PROCEDURE — 700105 HCHG RX REV CODE 258

## 2017-07-30 PROCEDURE — A9270 NON-COVERED ITEM OR SERVICE: HCPCS | Performed by: INTERNAL MEDICINE

## 2017-07-30 PROCEDURE — A9270 NON-COVERED ITEM OR SERVICE: HCPCS | Performed by: HOSPITALIST

## 2017-07-30 PROCEDURE — 99232 SBSQ HOSP IP/OBS MODERATE 35: CPT | Performed by: INTERNAL MEDICINE

## 2017-07-30 PROCEDURE — 700105 HCHG RX REV CODE 258: Performed by: INTERNAL MEDICINE

## 2017-07-30 PROCEDURE — 700111 HCHG RX REV CODE 636 W/ 250 OVERRIDE (IP)

## 2017-07-30 PROCEDURE — 85007 BL SMEAR W/DIFF WBC COUNT: CPT

## 2017-07-30 PROCEDURE — 770021 HCHG ROOM/CARE - ISO PRIVATE

## 2017-07-30 PROCEDURE — 85027 COMPLETE CBC AUTOMATED: CPT

## 2017-07-30 PROCEDURE — 80053 COMPREHEN METABOLIC PANEL: CPT

## 2017-07-30 RX ORDER — SODIUM CHLORIDE 9 MG/ML
INJECTION, SOLUTION INTRAVENOUS
Status: COMPLETED
Start: 2017-07-30 | End: 2017-07-30

## 2017-07-30 RX ADMIN — LIDOCAINE 1 PATCH: 50 PATCH CUTANEOUS at 12:46

## 2017-07-30 RX ADMIN — Medication 325 MG: at 18:21

## 2017-07-30 RX ADMIN — VANCOMYCIN HYDROCHLORIDE 1100 MG: 100 INJECTION, POWDER, LYOPHILIZED, FOR SOLUTION INTRAVENOUS at 18:20

## 2017-07-30 RX ADMIN — PROMETHAZINE HYDROCHLORIDE 25 MG: 25 TABLET ORAL at 06:13

## 2017-07-30 RX ADMIN — OXYCODONE HYDROCHLORIDE 5 MG: 5 TABLET ORAL at 21:18

## 2017-07-30 RX ADMIN — OXYCODONE HYDROCHLORIDE 20 MG: 20 TABLET, FILM COATED, EXTENDED RELEASE ORAL at 21:18

## 2017-07-30 RX ADMIN — Medication 325 MG: at 09:50

## 2017-07-30 RX ADMIN — STANDARDIZED SENNA CONCENTRATE AND DOCUSATE SODIUM 2 TABLET: 8.6; 5 TABLET, FILM COATED ORAL at 09:50

## 2017-07-30 RX ADMIN — SODIUM CHLORIDE 1000 ML: 9 INJECTION, SOLUTION INTRAVENOUS at 18:21

## 2017-07-30 RX ADMIN — PROMETHAZINE HYDROCHLORIDE 25 MG: 25 TABLET ORAL at 00:41

## 2017-07-30 RX ADMIN — PREGABALIN 75 MG: 75 CAPSULE ORAL at 09:50

## 2017-07-30 RX ADMIN — ZOLPIDEM TARTRATE 5 MG: 5 TABLET, FILM COATED ORAL at 23:07

## 2017-07-30 RX ADMIN — STANDARDIZED SENNA CONCENTRATE AND DOCUSATE SODIUM 2 TABLET: 8.6; 5 TABLET, FILM COATED ORAL at 21:18

## 2017-07-30 RX ADMIN — ENOXAPARIN SODIUM 40 MG: 100 INJECTION SUBCUTANEOUS at 09:50

## 2017-07-30 RX ADMIN — PROMETHAZINE HYDROCHLORIDE 25 MG: 25 TABLET ORAL at 23:08

## 2017-07-30 RX ADMIN — CEFEPIME 2 G: 2 INJECTION, POWDER, FOR SOLUTION INTRAMUSCULAR; INTRAVENOUS at 21:17

## 2017-07-30 RX ADMIN — PREGABALIN 75 MG: 75 CAPSULE ORAL at 21:18

## 2017-07-30 RX ADMIN — OXYCODONE HYDROCHLORIDE 5 MG: 5 TABLET ORAL at 03:09

## 2017-07-30 RX ADMIN — OXYCODONE HYDROCHLORIDE 20 MG: 20 TABLET, FILM COATED, EXTENDED RELEASE ORAL at 09:50

## 2017-07-30 RX ADMIN — ZOLPIDEM TARTRATE 5 MG: 5 TABLET, FILM COATED ORAL at 00:41

## 2017-07-30 RX ADMIN — OXYCODONE HYDROCHLORIDE 5 MG: 5 TABLET ORAL at 16:48

## 2017-07-30 RX ADMIN — VANCOMYCIN HYDROCHLORIDE 1100 MG: 100 INJECTION, POWDER, LYOPHILIZED, FOR SOLUTION INTRAVENOUS at 06:13

## 2017-07-30 RX ADMIN — CEFEPIME 2 G: 2 INJECTION, POWDER, FOR SOLUTION INTRAMUSCULAR; INTRAVENOUS at 09:51

## 2017-07-30 RX ADMIN — PREGABALIN 75 MG: 75 CAPSULE ORAL at 15:30

## 2017-07-30 RX ADMIN — OXYCODONE HYDROCHLORIDE 5 MG: 5 TABLET ORAL at 12:50

## 2017-07-30 RX ADMIN — OXYCODONE HYDROCHLORIDE 5 MG: 5 TABLET ORAL at 06:14

## 2017-07-30 RX ADMIN — OXYCODONE HYDROCHLORIDE 5 MG: 5 TABLET ORAL at 09:50

## 2017-07-30 RX ADMIN — NICOTINE 14 MG: 14 PATCH, EXTENDED RELEASE TRANSDERMAL at 06:14

## 2017-07-30 ASSESSMENT — ENCOUNTER SYMPTOMS
NAUSEA: 0
CONSTIPATION: 0
SENSORY CHANGE: 1
CHILLS: 0
COUGH: 0
PALPITATIONS: 0
SHORTNESS OF BREATH: 0
FEVER: 0
MYALGIAS: 1
ABDOMINAL PAIN: 0
VOMITING: 0
DIARRHEA: 0

## 2017-07-30 ASSESSMENT — PAIN SCALES - GENERAL
PAINLEVEL_OUTOF10: 4
PAINLEVEL_OUTOF10: 2
PAINLEVEL_OUTOF10: 6
PAINLEVEL_OUTOF10: 7
PAINLEVEL_OUTOF10: 6
PAINLEVEL_OUTOF10: 4

## 2017-07-30 NOTE — PROGRESS NOTES
Patient is A&Ox4.   Reports pain to BLE, medicated per MAR.   OLIVA. R BKA stump with tubigrip, no drainage noted. L foot with wound vac, intact, suction at 125 mmHg. Off loading boot in place to LLE.   Pt mobilizes with x 1 assistance to wheelchair. Educated to call prior to getting oob.   Diminished lung sounds in all bases. On RA. Denies SOB, chest pain.   Normoactive BS x 4. Tolerating diet. Positive flatus, BM. Pt is voiding q shift.   LUE PICC in place, patent. KVO.   Updated on POC. Belongings and call light in reach. All needs met at this time.     Refuses BA, educated on risk to fall, verbalizes understanding and still declines. Call light in reach, calls appropriately for assistance.

## 2017-07-30 NOTE — PROGRESS NOTES
"Pharmacy Kinetics 52 y.o. male on vancomycin day # 2 2017    Currently on Vancomycin 1,100mg IV q12h (0600, 1800)     Indication for Treatment: Osteomyelitis     Pertinent history per medical record: Admitted on 2017 for R BKA pain and wound drainage + L TMA stump wound non-healing. PMH significant for TMA + BKA 5 years ago, PVD, and peripheral neuropathy. Currently treating L plantar ulcer w/ deep tissue exposed and serous draining reported w/ MRSA and pseudomonas positive cultures - ID managing.      Other antibiotics: Cefepime 2g q12h     Allergies: Chicken allergy; Turkey; and Succinylcholine     List concerns for renal function: Low albumin     Pertinent cultures to date:    : L foot wound cx = pseudomonas + MRSA; Sensitive to cefepime and vancomycin (NISHANT 2)      Recent Labs      17   0345   WBC  8.9   NEUTSPOLYS  52.20     Recent Labs      17   0345   BUN  22   CREATININE  1.17   ALBUMIN  3.2     No results for input(s): VANCOTROUGH, VANCOPEAK, VANCORANDOM in the last 72 hours.  Intake/Output Summary (Last 24 hours) at 17 0907  Last data filed at 17 0600   Gross per 24 hour   Intake   3350 ml   Output   2450 ml   Net    900 ml      Blood pressure 111/68, pulse 75, temperature 37.1 °C (98.8 °F), resp. rate 18, height 1.854 m (6' 1\"), weight 65.2 kg (143 lb 11.8 oz), SpO2 97 %. Temp (24hrs), Av.2 °C (98.9 °F), Min:36.8 °C (98.2 °F), Max:37.5 °C (99.5 °F)      A/P   1. Vancomycin dose change: Not indicated - continue current regimen.   2. Next vancomycin level:  @ 0530 AM   3. Goal trough: 16 - 20 mcg/mL   Comments: ID following - continue antibiotics through 17. Renal indices are trending upward- will CTM. Overall renal function remains adequate w/normal BMI (19). Expect a normal volume of distribution and adequate clearance of drug. Trough level ordered for  prior to the 5th total dose of vancomycin. Leukocytosis improving and " patient afebrile.     Margarita Mills, PHARMD

## 2017-07-30 NOTE — PROGRESS NOTES
Infectious Disease Progress Note    Author: Bekah Mckeon M.D. DOS & Time created: 2017  5:21 PM    CC: FU right BKA stump infection and nonhealing left TMA stump wound     Interval History:  52 y.o. WM admitted 2017 with right BKA infection and nonhealing left TMA stump wound    AF WBC 7.6 less drainage from wounds Asking if he will need another BKA  17- no fevers. WBC 10.6. Complains of chills. Complains of 10 out of 10 pain in the left foot. Complains of drainage from the right stump.  17- complains of pain in the foot. Had low-grade fevers up to 100.7 last night . Foot cultures are showing MRSA and Pseudomonas  17- no fevers. Complains of burning in his left leg. No discharge noted from his right stump  17- no fevers. Complains of ongoing pain.  17- no fevers. No labs available  Labs Reviewed, Medications Reviewed, Radiology Reviewed and Wound Reviewed.    Review of Systems:  Review of Systems   Constitutional: Negative for fever and chills.   Respiratory: Negative for cough and shortness of breath.    Cardiovascular: Negative for chest pain.   Gastrointestinal: Negative for nausea, vomiting, abdominal pain and diarrhea.   Genitourinary: Negative for dysuria.   Musculoskeletal: Positive for myalgias and joint pain.        Burning sensation in his left foot and lower leg   Neurological: Positive for sensory change.   All other systems reviewed and are negative.      Hemodynamics:  Temp (24hrs), Av.1 °C (98.8 °F), Min:36.7 °C (98.1 °F), Max:37.3 °C (99.2 °F)  Temperature: 37.3 °C (99.1 °F)  Pulse  Av.9  Min: 71  Max: 111   Blood Pressure: 109/77 mmHg       Physical Exam:  Physical Exam   Constitutional: He is oriented to person, place, and time. He appears well-developed. No distress.   Disheveled   HENT:   Head: Normocephalic.   Poor dentition  Right eye enucleation   Eyes:   Blind right eye   Neck: Neck supple.   Cardiovascular: Normal rate.    Pulmonary/Chest:  Effort normal. No respiratory distress.   Abdominal: Soft. He exhibits no distension. There is no tenderness.   Musculoskeletal: He exhibits no edema.   Wound right BKA-no longer draining-wound is closed  Left foot surgical dressing and wound VAC present   Neurological: He is alert and oriented to person, place, and time.   Skin: No rash noted.   tattoos   Nursing note and vitals reviewed.      Labs:  No results for input(s): WBC, RBC, HEMOGLOBIN, HEMATOCRIT, MCV, MCH, RDW, PLATELETCT, MPV, NEUTSPOLYS, LYMPHOCYTES, MONOCYTES, EOSINOPHILS, BASOPHILS, RBCMORPHOLO in the last 72 hours.  No results for input(s): SODIUM, POTASSIUM, CHLORIDE, CO2, GLUCOSE, BUN, CPKTOTAL in the last 72 hours.  No results for input(s): ALBUMIN, TBILIRUBIN, ALKPHOSPHAT, TOTPROTEIN, ALTSGPT, ASTSGOT, CREATININE in the last 72 hours.  Results     Procedure Component Value Units Date/Time    CULTURE WOUND W/ GRAM STAIN [[id number]] Collected:  07/23/17 0730    Order Status:  Completed Specimen Information:  Wound from Right Leg Updated:  07/25/17 1018     Gram Stain Result No organisms seen.      Significant Indicator NEG      Source WND      Site RIGHT LEG      Culture Result Wound Rare growth Mixed skin gladis.     Narrative:      Collected By:[id number] SUNNY GONZALEZ    GRAM STAIN [[id number]] Collected:  07/23/17 0730    Order Status:  Completed Specimen Information:  Wound Updated:  07/23/17 1107     Significant Indicator .      Source WND      Site RIGHT LEG      Gram Stain Result No organisms seen.     Narrative:      Collected By:[id number] SUNNY GONZALEZ          Fluids:  Intake/Output       07/27/17 0700 - 07/28/17 0659 07/28/17 0700 - 07/29/17 0659 07/29/17 0700 - 07/30/17 0659      [id number] 1552-8985 Total 3390-6255 8808-2502 Total 3930-0993 5668-2231 Total       Intake    P.O.  720  1000 1720  780  120 900  960  -- 960    P.O. 720 1000 1720 780 120 900 960 -- 960    I.V.  --  -- --  --  440 440  --  -- --    IV Volume (NS) -- -- -- -- 240  240 -- -- --    IV Piggyback Volume -- -- -- -- 200 200 -- -- --    Total Intake 720 1000 1720  960 -- 960       Output    Urine  1950  1800 3750  1400  1250 2650  900  -- 900    Number of Times Voided -- 1 x 1 x 2 x -- 2 x -- -- --    Void (ml) 1950 1800 3750 1400 1250 2650 900 -- 900    Stool  --  -- --  --  -- --  --  -- --    Number of Times Stooled 0 x 1 x 1 x 2 x 0 x 2 x 1 x -- 1 x    Total Output 1950 1800 3750 1400 1250 2650 900 -- 900       Net I/O     -1230 -800 -2030 -620 -690 -1310 60 -- 60             Assessment:  Active Hospital Problems    Diagnosis   • Skin ulcer of left foot with fat layer exposed (CMS-HCC) [L97.522]   • Wound of right leg, at BKA site [S81.801A]   • Peripheral neuropathy [G62.9]   • Tobacco abuse [Z72.0]       Plan:  Drainage from right BKA site- resolved  Afebrile  No leukocytosis  The wound has completely healed  Patient refused a revision yesterday    Skin ulcer of left foot with fat layer exposed (CMS-HCC)  Left plantar ulcer, deep tissues exposed, serous drainage    Cultures  MRSA and Pseudomonas  Discontinue bactrim  Start IV vancomycin  Change Zosyn to cefepime  Check labs in the morning  Patient had surgery on 7/25/17  PICC done on 7/27/17  Continue antibiotics through a 8/25/17  Peripheral neuropathy    Prognosis for limb salvage poor    Placement- patient has lots of concerns about his insurance coverage and subsequent place to live. Talking to social work about his placement.        Plan of care discussed with IM.

## 2017-07-30 NOTE — PROGRESS NOTES
"Pharmacy Kinetics 52 y.o. male on vancomycin day # 1 2017    Currently on Vancomycin: Loading dose 25mg/kg x one (1800)    Indication for Treatment: Osteomyelitis     Pertinent history per medical record: Admitted on 2017 for R BKA pain and wound drainage + L TMA stump wound non-healing. PMH significant for TMA + BKA 5 years ago, PVD, and peripheral neuropathy. Currently treating L plantar ulcer w/ deep tissue exposed and serous draining reported w/ MRSA and pseudomonas positive cultures - ID managing.     Other antibiotics: Cefepime 2g q12h     Allergies: Chicken allergy; Turkey; and Succinylcholine     List concerns for renal function: Low albumin     Pertinent cultures to date:   : L foot wound cx = pseudomonas + MRSA; Sensitive to cefepime and vancomycin (NISHANT 2)    No results for input(s): WBC, NEUTSPOLYS, BANDSSTABS in the last 72 hours.  No results for input(s): BUN, CREATININE, ALBUMIN in the last 72 hours.  No results for input(s): VANCOTROUGH, VANCOPEAK, VANCORANDOM in the last 72 hours.  Intake/Output Summary (Last 24 hours) at 17 1732  Last data filed at 17 1400   Gross per 24 hour   Intake   1520 ml   Output   2150 ml   Net   -630 ml      Blood pressure 109/77, pulse 92, temperature 37.3 °C (99.1 °F), resp. rate 18, height 1.854 m (6' 1\"), weight 65.2 kg (143 lb 11.8 oz), SpO2 98 %. Temp (24hrs), Av.1 °C (98.8 °F), Min:36.7 °C (98.1 °F), Max:37.3 °C (99.2 °F)      A/P   1. Vancomycin dose change: Initiated maintenance dose of 1,100mg (17mg/kg) IV q12h (0600, 1800)  2. Next vancomycin level: Prior to 4th dose (not ordered)   3. Goal trough: 16 - 20 mcg/mL   4. Comments:  ID following - continue antibiotics through 17. Renal function is adequate w/normal BMI (19). Expect a normal volume of distribution and adequate clearance of drug. Will draw a trough level prior to the 4th total dose of vancomycin when drug is at steady state and adjust regimen if needed.     Margarita HASASN" Gene, PHARMD

## 2017-07-30 NOTE — WOUND TEAM
"[facility]  Inpatient Services  Wound and Skin Care Progress Note    Admission Date:  07/22/17     HPI, PMH, SH: Reviewed  Unit where seen by Wound Team:  [id number]     WOUND CONSULT RELATED TO:   NPWT dressing change left plantar foot      SUBJECTIVE:  \"can you take a picture\"      Self Report / Pain Level:  Pain with touching foot secondary to neuropathy.     OBJECTIVE:    NPWT dressing intact    WOUND TYPE, LOCATION, CHARACTERISTICS (Pressure ulcers: location, stage, POA or date identified)    Location and type of wound:  Open surgical wound left plantar foot      Periwound:   intact       Drainage:  Minimal serosanguinous    Tissue Type and %:  100%    Wound Edges:  attached   Odor:  mild    Exposed structure(s):  Bone palpated    S&S of Infection:    Non healing, exposed bone       Measurements:    (Taken 7/27/17)   Length:   5 cm    Width:    5.2 cm   Depth:   <0.5cm     INTERVENTIONS BY WOUND TEAM:  removed old dressing, 3 pieces total.  Cleansed wound with wound cleanser.  Benzoin and drape to bull wound skin.  Covered wound with one piece black foam and bridged to dorsum of foot.  Secured with drape and resumed NPWT at 125mmHg continuously.  ABD pad under tubing anterior leg and secured with ACE wrap.  LPS is following up with this.      Interdisciplinary consultation:   staff RN, patient      EVALUATION:  Clean surgically debrided wound.    Factors affecting wound healing:  Neuropathy, tobacco abuse    Goals:  Decrease in wound size by 1% each week.     NURSING PLAN OF CARE ORDERS (X):    Dressing changes: See Dressing Maintenance orders:  X  Skin care: See Skin Care orders:   Rectal tube care: See Rectal Tube Care orders:   Other orders:    WOUND TEAM PLAN OF CARE (X):   NPWT change 3 x week:   X     Dressing changes by wound team:       Follow up as needed:       Other (explain):    Anticipated discharge plans (X):  SNF:           Home Care:           Outpatient Wound Center:      X    "   Self Care:            Other:

## 2017-07-30 NOTE — PROGRESS NOTES
Received report from NOC RN. Running NS TKO into PICC between abx IVPB's. Bowel sounds are normoactive x4. LBM- 7-29.     Pt is AOx 4. Pt gets into his wheel chair with 1 assist. Post-intervention (cough) lung sounds clear in all fields.    Pain- pt is in a lot of pain this morning. Reporting new tingling and coldness under his wound vac. Medicated per mar, repositioned and wound vac dressing visualized with no abnormalities. Will reasses pain in 1 hour and call the doc.    POC discussed with pt, All questions answered

## 2017-07-30 NOTE — PROGRESS NOTES
Infectious Disease Progress Note    Author: Bekah Mckeon M.D. DOS & Time created: 2017  1:29 PM    CC: FU right BKA stump infection and nonhealing left TMA stump wound     Interval History:  52 y.o. WM admitted 2017 with right BKA infection and nonhealing left TMA stump wound    AF WBC 7.6 less drainage from wounds Asking if he will need another BKA  17- no fevers. WBC 10.6. Complains of chills. Complains of 10 out of 10 pain in the left foot. Complains of drainage from the right stump.  17- complains of pain in the foot. Had low-grade fevers up to 100.7 last night . Foot cultures are showing MRSA and Pseudomonas  17- no fevers. Complains of burning in his left leg. No discharge noted from his right stump  17- no fevers. Complains of ongoing pain.  17- no fevers. No labs available  17-MAXIMUM TEMPERATURE 99.5. Ongoing pain issues. WBC 9000. Creatinine 1.17  Labs Reviewed, Medications Reviewed, Radiology Reviewed and Wound Reviewed.    Review of Systems:  Review of Systems   Constitutional: Negative for fever and chills.   Respiratory: Negative for cough and shortness of breath.    Cardiovascular: Negative for chest pain.   Gastrointestinal: Negative for nausea, vomiting, abdominal pain and diarrhea.   Genitourinary: Negative for dysuria.   Musculoskeletal: Positive for myalgias and joint pain.        Burning sensation in his left foot and lower leg   Neurological: Positive for sensory change.   All other systems reviewed and are negative.      Hemodynamics:  Temp (24hrs), Av.2 °C (98.9 °F), Min:36.8 °C (98.2 °F), Max:37.5 °C (99.5 °F)  Temperature: 37.1 °C (98.8 °F)  Pulse  Av.6  Min: 71  Max: 111   Blood Pressure: 111/68 mmHg       Physical Exam:  Physical Exam   Constitutional: He is oriented to person, place, and time. He appears well-developed. No distress.   Disheveled   HENT:   Head: Normocephalic.   Poor dentition  Right eye enucleation   Eyes:   Blind  right eye   Neck: Neck supple.   Cardiovascular: Normal rate.    Pulmonary/Chest: Effort normal. No respiratory distress.   Abdominal: Soft. He exhibits no distension. There is no tenderness.   Musculoskeletal: He exhibits no edema.   Wound right BKA-no longer draining-wound is closed  Left foot surgical dressing and wound VAC present   Neurological: He is alert and oriented to person, place, and time.   Skin: No rash noted.   tattoos   Nursing note and vitals reviewed.      Labs:  Recent Labs      07/30/17   0345   WBC  8.9   RBC  4.82   HEMOGLOBIN  11.2*   HEMATOCRIT  36.9*   MCV  76.6*   MCH  23.2*   RDW  44.5   PLATELETCT  443   MPV  8.9*   NEUTSPOLYS  52.20   LYMPHOCYTES  41.60*   MONOCYTES  2.70   EOSINOPHILS  3.50   BASOPHILS  0.00   RBCMORPHOLO  Normal     Recent Labs      07/30/17   0345   SODIUM  136   POTASSIUM  4.2   CHLORIDE  103   CO2  26   GLUCOSE  110*   BUN  22     Recent Labs      07/30/17   0345   ALBUMIN  3.2   TBILIRUBIN  0.3   ALKPHOSPHAT  82   TOTPROTEIN  7.9   ALTSGPT  19   ASTSGOT  24   CREATININE  1.17     Results     Procedure Component Value Units Date/Time    CULTURE WOUND W/ GRAM STAIN [[id number]] Collected:  07/23/17 0730    Order Status:  Completed Specimen Information:  Wound from Right Leg Updated:  07/25/17 1018     Gram Stain Result No organisms seen.      Significant Indicator NEG      Source WND      Site RIGHT LEG      Culture Result Wound Rare growth Mixed skin gladis.     Narrative:      Collected By:[id number] SUNNY GONZALEZ          Fluids:  Intake/Output       07/28/17 0700 - 07/29/17 0659 07/29/17 0700 - 07/30/17 0659 07/30/17 0700 - 07/31/17 0659      3515-9539 1013-4466 Total 6046-8097 4511-9537 Total 4672-4674 5419-3061 Total       Intake    P.O.  780  120 900  960  2080 3040  --  -- --    P.O. 780 120  3040 -- -- --    I.V.  --  440 440  430  -- 430  --  -- --    IV Volume (NS) -- 240 240 330 -- 330 -- -- --    IV Piggyback Volume -- 200 200 100 -- 100 -- --  --    Total Intake  1390 2080 3470 -- -- --       Output    Urine  1400  1250 2650  900  1550 2450  400  -- 400    Number of Times Voided 2 x -- 2 x -- 2 x 2 x -- -- --    Void (ml) 1400 1250 2650 900 1550 2450 400 -- 400    Stool  --  -- --  --  -- --  --  -- --    Number of Times Stooled 2 x 0 x 2 x 1 x -- 1 x -- -- --    Total Output 1400 1250 2650 900 1550 2450 400 -- 400       Net I/O     -620 690 -1310  -400 -- -400             Assessment:  Active Hospital Problems    Diagnosis   • Skin ulcer of left foot with fat layer exposed (CMS-HCC) [L97.522]   • Wound of right leg, at BKA site [S81.801A]   • Peripheral neuropathy [G62.9]   • Tobacco abuse [Z72.0]       Plan:  Drainage from right BKA site- resolved  Afebrile  No leukocytosis  The wound has completely healed      Skin ulcer of left foot with fat layer exposed (CMS-HCC)  Left plantar ulcer, deep tissues exposed, serous drainage    Cultures  MRSA and Pseudomonas  Discontinue bactrim  Started IV vancomycin 7/29/17  Zosyn was changed to cefepime on 7/29/17  His creatinine is up to 1.17  If the creatinine continues to go up we may have to discontinue the vancomycin  Patient had surgery on 7/25/17  PICC done on 7/27/17  Continue antibiotics through a 8/25/17  Monitor labs closely  Peripheral neuropathy    Prognosis for limb salvage poor    Placement- patient has lots of concerns about his insurance coverage and subsequent place to live. Talking to social work about his placement.        Plan of care discussed with IM.

## 2017-07-30 NOTE — CARE PLAN
Problem: Safety  Goal: Will remain free from injury  Outcome: PROGRESSING AS EXPECTED  Proper fall precautions in place. Call light within reach and encouraged to use. Hourly rounding in practice.     Problem: Pain Management  Goal: Pain level will decrease to patient’s comfort goal  Outcome: PROGRESSING AS EXPECTED  Pain managed with scheduled and PRN medications. Tolerating current pain level.

## 2017-07-31 PROBLEM — R19.7 DIARRHEA: Status: ACTIVE | Noted: 2017-07-31

## 2017-07-31 LAB
ANION GAP SERPL CALC-SCNC: 7 MMOL/L (ref 0–11.9)
BUN SERPL-MCNC: 25 MG/DL (ref 8–22)
CALCIUM SERPL-MCNC: 9.2 MG/DL (ref 8.5–10.5)
CHLORIDE SERPL-SCNC: 104 MMOL/L (ref 96–112)
CO2 SERPL-SCNC: 24 MMOL/L (ref 20–33)
CREAT SERPL-MCNC: 1.05 MG/DL (ref 0.5–1.4)
GFR SERPL CREATININE-BSD FRML MDRD: >60 ML/MIN/1.73 M 2
GLUCOSE SERPL-MCNC: 105 MG/DL (ref 65–99)
POTASSIUM SERPL-SCNC: 4.2 MMOL/L (ref 3.6–5.5)
SODIUM SERPL-SCNC: 135 MMOL/L (ref 135–145)

## 2017-07-31 PROCEDURE — A9270 NON-COVERED ITEM OR SERVICE: HCPCS | Performed by: INTERNAL MEDICINE

## 2017-07-31 PROCEDURE — 700105 HCHG RX REV CODE 258

## 2017-07-31 PROCEDURE — 700102 HCHG RX REV CODE 250 W/ 637 OVERRIDE(OP): Performed by: HOSPITALIST

## 2017-07-31 PROCEDURE — 700111 HCHG RX REV CODE 636 W/ 250 OVERRIDE (IP)

## 2017-07-31 PROCEDURE — 700111 HCHG RX REV CODE 636 W/ 250 OVERRIDE (IP): Performed by: INTERNAL MEDICINE

## 2017-07-31 PROCEDURE — 700102 HCHG RX REV CODE 250 W/ 637 OVERRIDE(OP): Performed by: INTERNAL MEDICINE

## 2017-07-31 PROCEDURE — 700105 HCHG RX REV CODE 258: Performed by: INTERNAL MEDICINE

## 2017-07-31 PROCEDURE — 770021 HCHG ROOM/CARE - ISO PRIVATE

## 2017-07-31 PROCEDURE — 80048 BASIC METABOLIC PNL TOTAL CA: CPT

## 2017-07-31 PROCEDURE — 99232 SBSQ HOSP IP/OBS MODERATE 35: CPT | Performed by: INTERNAL MEDICINE

## 2017-07-31 PROCEDURE — 700101 HCHG RX REV CODE 250: Performed by: NURSE PRACTITIONER

## 2017-07-31 PROCEDURE — 700101 HCHG RX REV CODE 250: Performed by: INTERNAL MEDICINE

## 2017-07-31 PROCEDURE — 87493 C DIFF AMPLIFIED PROBE: CPT

## 2017-07-31 PROCEDURE — A9270 NON-COVERED ITEM OR SERVICE: HCPCS | Performed by: HOSPITALIST

## 2017-07-31 RX ORDER — LINEZOLID 600 MG/1
600 TABLET, FILM COATED ORAL EVERY 12 HOURS
Status: DISCONTINUED | OUTPATIENT
Start: 2017-07-31 | End: 2017-07-31

## 2017-07-31 RX ORDER — LIDOCAINE 40 MG/G
1 CREAM TOPICAL 3 TIMES DAILY PRN
Status: DISCONTINUED | OUTPATIENT
Start: 2017-07-31 | End: 2017-08-12

## 2017-07-31 RX ORDER — DOXYCYCLINE 100 MG/1
100 TABLET ORAL EVERY 12 HOURS
Status: COMPLETED | OUTPATIENT
Start: 2017-07-31 | End: 2017-08-25

## 2017-07-31 RX ORDER — LIDOCAINE HYDROCHLORIDE 40 MG/ML
SOLUTION TOPICAL 3 TIMES DAILY PRN
Status: DISCONTINUED | OUTPATIENT
Start: 2017-07-31 | End: 2017-07-31

## 2017-07-31 RX ORDER — QUETIAPINE FUMARATE 25 MG/1
100 TABLET, FILM COATED ORAL 3 TIMES DAILY
Status: DISCONTINUED | OUTPATIENT
Start: 2017-07-31 | End: 2017-08-04

## 2017-07-31 RX ORDER — PREGABALIN 75 MG/1
150 CAPSULE ORAL 3 TIMES DAILY
Status: DISCONTINUED | OUTPATIENT
Start: 2017-07-31 | End: 2017-10-18

## 2017-07-31 RX ADMIN — OXYCODONE HYDROCHLORIDE 5 MG: 5 TABLET ORAL at 19:19

## 2017-07-31 RX ADMIN — PREGABALIN 150 MG: 150 CAPSULE ORAL at 20:58

## 2017-07-31 RX ADMIN — Medication 325 MG: at 17:27

## 2017-07-31 RX ADMIN — ACETAMINOPHEN 650 MG: 325 TABLET, FILM COATED ORAL at 16:03

## 2017-07-31 RX ADMIN — NICOTINE 14 MG: 14 PATCH, EXTENDED RELEASE TRANSDERMAL at 05:40

## 2017-07-31 RX ADMIN — OXYCODONE HYDROCHLORIDE 20 MG: 20 TABLET, FILM COATED, EXTENDED RELEASE ORAL at 20:58

## 2017-07-31 RX ADMIN — OXYCODONE HYDROCHLORIDE 5 MG: 5 TABLET ORAL at 03:51

## 2017-07-31 RX ADMIN — PROMETHAZINE HYDROCHLORIDE 25 MG: 25 TABLET ORAL at 23:58

## 2017-07-31 RX ADMIN — STANDARDIZED SENNA CONCENTRATE AND DOCUSATE SODIUM 2 TABLET: 8.6; 5 TABLET, FILM COATED ORAL at 07:52

## 2017-07-31 RX ADMIN — OXYCODONE HYDROCHLORIDE 20 MG: 20 TABLET, FILM COATED, EXTENDED RELEASE ORAL at 10:52

## 2017-07-31 RX ADMIN — LIDOCAINE 1 APPLICATION: 40 CREAM TOPICAL at 05:40

## 2017-07-31 RX ADMIN — OXYCODONE HYDROCHLORIDE 5 MG: 5 TABLET ORAL at 23:53

## 2017-07-31 RX ADMIN — ENOXAPARIN SODIUM 40 MG: 100 INJECTION SUBCUTANEOUS at 07:52

## 2017-07-31 RX ADMIN — CEFEPIME 2 G: 2 INJECTION, POWDER, FOR SOLUTION INTRAMUSCULAR; INTRAVENOUS at 07:53

## 2017-07-31 RX ADMIN — Medication 325 MG: at 07:49

## 2017-07-31 RX ADMIN — MORPHINE SULFATE 2 MG: 4 INJECTION INTRAVENOUS at 07:49

## 2017-07-31 RX ADMIN — CEFEPIME 2 G: 2 INJECTION, POWDER, FOR SOLUTION INTRAMUSCULAR; INTRAVENOUS at 21:08

## 2017-07-31 RX ADMIN — VANCOMYCIN HYDROCHLORIDE 1100 MG: 100 INJECTION, POWDER, LYOPHILIZED, FOR SOLUTION INTRAVENOUS at 05:40

## 2017-07-31 RX ADMIN — QUETIAPINE FUMARATE 100 MG: 25 TABLET ORAL at 20:56

## 2017-07-31 RX ADMIN — LINEZOLID 600 MG: 600 TABLET, FILM COATED ORAL at 10:52

## 2017-07-31 RX ADMIN — DOXYCYCLINE 100 MG: 100 TABLET ORAL at 13:07

## 2017-07-31 RX ADMIN — LIDOCAINE 1 PATCH: 50 PATCH CUTANEOUS at 10:51

## 2017-07-31 RX ADMIN — OXYCODONE HYDROCHLORIDE 5 MG: 5 TABLET ORAL at 13:07

## 2017-07-31 RX ADMIN — OXYCODONE HYDROCHLORIDE 5 MG: 5 TABLET ORAL at 16:03

## 2017-07-31 RX ADMIN — DOXYCYCLINE 100 MG: 100 TABLET ORAL at 20:57

## 2017-07-31 RX ADMIN — PREGABALIN 150 MG: 150 CAPSULE ORAL at 16:03

## 2017-07-31 RX ADMIN — OXYCODONE HYDROCHLORIDE 5 MG: 5 TABLET ORAL at 00:20

## 2017-07-31 RX ADMIN — PREGABALIN 75 MG: 75 CAPSULE ORAL at 07:52

## 2017-07-31 ASSESSMENT — ENCOUNTER SYMPTOMS
ABDOMINAL PAIN: 0
FEVER: 0
NAUSEA: 0
VOMITING: 0
PALPITATIONS: 0
DIARRHEA: 1
SENSORY CHANGE: 1
COUGH: 0
CONSTIPATION: 0
DIARRHEA: 0
CHILLS: 0
MYALGIAS: 1
SHORTNESS OF BREATH: 0

## 2017-07-31 ASSESSMENT — PAIN SCALES - GENERAL
PAINLEVEL_OUTOF10: 7
PAINLEVEL_OUTOF10: 6
PAINLEVEL_OUTOF10: 7
PAINLEVEL_OUTOF10: 7

## 2017-07-31 NOTE — CARE PLAN
Problem: Mobility  Goal: Risk for activity intolerance will decrease  Intervention: Encourage patient to increase activity level in collaboration with Interdisciplinary Team  OOB to wheelchair, out to [facility] for 30 minutes with family members.  Continues to use exercise band for upper body strength.        Problem: Respiratory:  Goal: Respiratory status will improve  Intervention: Educate and encourage incentive spirometry usage  Continues to use IS frequently 10 x hourly while awake.

## 2017-07-31 NOTE — PROGRESS NOTES
[facility] Progress Note    Date of Service: 7/31/2017    Chief Complaint  52 y.o. male admitted 7/22/2017 with right BKA stump pain and wound drainage in addition to left TMA stump wound (nonhealing).  Patient has history of PVD s/p L TMA and R BKA more than 5 yrs ago in [place].  He was receiving home health with wound care while living in [facility] while on disability.  Patient moved to [place] 1yr ago.  Since moving to [place], he has not established with PCP or received wound care.  Over the past few months, he began noticing drainage from R BKA stump with wound opening up.  He also noticed ulcer developing at the site of his L TMA stump.  He has prosthesis for his R leg, but unable to use due to these developments over the past few months.  He has no wheelchair and rents rooms weekly at various hotels throughout the city.    Interval Problem Update  7/24- opiate dependence addressed by Dr Dumont  7/25 - s/p Debridement and gastroscoleus resection LLE in OR  7/26- c/o Burning in LLE to RN, already on Lyrica- Oxycontin increased, discussed w/ wound RN at bedside, discussed d/c planning w/ patient and .  Orpro making new cup for BKA prosthesis RLE, new clamshell boot for heel walking LLE  7/27 He is actively calling his insurance  in [place] regarding SNF options.  Had wound vac change earlier today.  7/28- no new issues per patient- discussed w/ ID  7/29- RN concerned about low grade temp and borderline BP- not too far from baseline.  On bactrim, needs BMP.  7/30- creatinine is up, not a lot but is unclear whether this is due to the Vanco, or more recently the Bactrim- ID has changed back to Vanco, discussed with pharmacy there where of the rise in creatinine. Will continue to monitor   7/31 ID has changed Vanco to Doxy, patient having more frequent and now incontinent of stool. Again feels like he has low grade temp. Stool softeners d/c'd. (he has been refusing all but 1 colace). More pain in  legs today. Discussed w/ patient and RN. Lyrica increased.    Consultants/Specialty  LPS  ID - [facility]  Ortho- [facility]    Disposition  Limited options w/ Payor (Medical HMO)  Needs prolonged wound Vac          Review of Systems   Constitutional: Positive for malaise/fatigue. Negative for fever and chills.   Respiratory: Negative for cough and shortness of breath.    Cardiovascular: Negative for chest pain and palpitations.   Gastrointestinal: Positive for diarrhea (worsening). Negative for nausea, vomiting, abdominal pain and constipation.   Musculoskeletal:        More pain in feet   All other systems reviewed and are negative.     Physical Exam  Laboratory/Imaging   Hemodynamics  Temp (24hrs), Av.1 °C (98.7 °F), Min:36.7 °C (98.1 °F), Max:37.4 °C (99.4 °F)   Temperature: 36.7 °C (98.1 °F)  Pulse  Av.8  Min: 71  Max: 111    Blood Pressure: 114/68 mmHg      Respiratory      Respiration: 16, Pulse Oximetry: 98 %        RUL Breath Sounds: Clear, RML Breath Sounds: Clear, RLL Breath Sounds: Clear, NANCY Breath Sounds: Clear, LLL Breath Sounds: Clear    Fluids    Intake/Output Summary (Last 24 hours) at 17 1500  Last data filed at 17 0400   Gross per 24 hour   Intake   1400 ml   Output   1550 ml   Net   -150 ml       Nutrition  Orders Placed This Encounter   Procedures   • DIET ORDER     Standing Status: Standing      Number of Occurrences: 1      Standing Expiration Date:      Order Specific Question:  Diet:     Answer:  Regular [1]     Physical Exam   Constitutional: He is oriented to person, place, and time. He appears well-developed and well-nourished.   HENT:   Head: Normocephalic and atraumatic.   Mouth/Throat: Oropharynx is clear and moist.   Eyes: No scleral icterus.   R corneal opacification and small globe   Neck: Normal range of motion. Neck supple. No tracheal deviation present. No thyromegaly present.   Cardiovascular: Normal rate and regular rhythm.    No murmur heard.  Pulmonary/Chest:  Effort normal and breath sounds normal. No respiratory distress. He has no wheezes.   Abdominal: Soft. Bowel sounds are normal. He exhibits no distension. There is no tenderness.   Musculoskeletal: He exhibits no edema or tenderness.        Arms:       Legs:  LLE post op dressings/ boot     Lymphadenopathy:     He has no cervical adenopathy.        Right: No supraclavicular adenopathy present.        Left: No supraclavicular adenopathy present.   Neurological: He is alert and oriented to person, place, and time.   Skin: Skin is warm and dry.   Increased warmth to touch   Psychiatric:   Mildly anxious   Nursing note and vitals reviewed.      Recent Labs      07/30/17   0345   WBC  8.9   RBC  4.82   HEMOGLOBIN  11.2*   HEMATOCRIT  36.9*   MCV  76.6*   MCH  23.2*   MCHC  30.4*   RDW  44.5   PLATELETCT  443   MPV  8.9*     Recent Labs      07/30/17   0345  07/31/17   0402   SODIUM  136  135   POTASSIUM  4.2  4.2   CHLORIDE  103  104   CO2  26  24   GLUCOSE  110*  105*   BUN  22  25*   CREATININE  1.17  1.05   CALCIUM  9.1  9.2                      Assessment/Plan     Wound of right leg, at BKA site (present on admission)  Assessment & Plan  Not infected, just dry eschars    Skin ulcer of left foot with fat layer exposed (CMS-HCC) (present on admission)  Assessment & Plan  S/p OR debridement and gastroscoleus resection 7/25 (boubacar) wound Vac  Contact isolation    Peripheral neuropathy (present on admission)  Assessment & Plan  Pain worse  Is too far out to be acute post op pain  Lyrica maxed out today 7/31    Tobacco abuse (present on admission)  Assessment & Plan  Nicotine replacement PRN  Strongly encouraged to quit  Counseled at least 3 minutes on 07/23/2017    Opiate dependence (CMS-HCC) (present on admission)  Assessment & Plan  Buying methadone on street  Drug seeking behavior inpatient.  Has not asked for escalation since 7/26    MRSA (methicillin resistant staph aureus) culture positive (present on  admission)  Assessment & Plan  On Doxy    Pseudomonas infection (present on admission)  Assessment & Plan  On cefipime    ABRIL (acute kidney injury) (CMS-[facility])  Assessment & Plan  Improved, now on Doxycycline    Diarrhea  Assessment & Plan  Now incontinent      Labs reviewed and Medications reviewed  Rolon catheter: No Rolon      DVT Prophylaxis: Enoxaparin (Lovenox)    Ulcer prophylaxis: Not indicated  Antibiotics: Treating active infection/contamination beyond 24 hours perioperative coverage  Assessed for rehab: Patient was assess for and/or received rehabilitation services during this hospitalization

## 2017-07-31 NOTE — PROGRESS NOTES
Patient continues agitated. Requesting a Psych consult because he states that he suffers from agoraphobia and no one has offered to help him with his condition. Patient also states that he had been taking methadone back in [place] to assist with pain management. Call placed to Dr. Bermudez.

## 2017-07-31 NOTE — PROGRESS NOTES
"Patient reporting elevated pain from wound vac site. Entered room to administer PRN 5mg of Oxy IR. Patient refused medication claiming \"it doesn't do s$%t\". Returned medication to [facility].    "

## 2017-07-31 NOTE — DISCHARGE PLANNING
[facility] ([facility] [place] [place])  Lamine, [phone] who is in the [place] Area and let her know pt will need skilled nursing. I called her to find out which SNF's are preferred providers under his plan but was only able to leave a VM message. Await callback.

## 2017-07-31 NOTE — PROGRESS NOTES
"Received report from NOC shift RN. Patient is A/O X 4 and on room air. Addressed uncontrolled pain from BLE with 2mg of IV morphine. Provided dependency education d/t patient's Hx of morphine abuse. VSS. BP 99/57 mmHg  Pulse 89  Temp(Src) 36.8 °C (98.2 °F)  Resp 16  Ht 1.854 m (6' 1\")  Wt 65.2 kg (143 lb 11.8 oz)  BMI 18.97 kg/m2  SpO2 95% Labs reviewed. Removed LLE boot and assessed dressing, CDI. Wound vac in place and set to suction. Seal is patent and minimal output is present in cannister. Skin integrity at heel is PWD and intact. Boot replaced. Right BKA inspected, skin integrity is PWD and intact. Patient requesting mor lidocaine lotion to ease discomfort. +BM. +void. PICC line on left basilic running NS at TKO and ABX. Patient is now able to transfer to  using X1 assist. Right PIV still flushing, patent. Call light at bedside. Patient would like to speak to CW today to discuss dc planning.     "

## 2017-07-31 NOTE — PROGRESS NOTES
Infectious Disease Progress Note    Author: Joie Ramon M.D. DOS & Time created: 2017  3:53 PM    CC: FU right BKA stump infection and nonhealing left TMA stump wound     Interval History:  52 y.o. WM admitted 2017 with right BKA infection and nonhealing left TMA stump wound    AF WBC 7.6 less drainage from wounds Asking if he will need another BKA  17- no fevers. WBC 10.6. Complains of chills. Complains of 10 out of 10 pain in the left foot. Complains of drainage from the right stump.  17- complains of pain in the foot. Had low-grade fevers up to 100.7 last night . Foot cultures are showing MRSA and Pseudomonas  17- no fevers. Complains of burning in his left leg. No discharge noted from his right stump  17- no fevers. Complains of ongoing pain.  17- no fevers. No labs available  17-MAXIMUM TEMPERATURE 99.5. Ongoing pain issues. WBC 9000. Creatinine 1.17   AF lots of c/o neuropathy-feels like stump is in ice water  Labs Reviewed, Medications Reviewed, Radiology Reviewed and Wound Reviewed.    Review of Systems:  Review of Systems   Constitutional: Negative for fever and chills.   Respiratory: Negative for cough and shortness of breath.    Cardiovascular: Negative for chest pain.   Gastrointestinal: Negative for nausea, vomiting, abdominal pain and diarrhea.   Genitourinary: Negative for dysuria.   Musculoskeletal: Positive for myalgias and joint pain.   Neurological: Positive for sensory change.   All other systems reviewed and are negative.      Hemodynamics:  Temp (24hrs), Av.1 °C (98.7 °F), Min:36.7 °C (98.1 °F), Max:37.4 °C (99.4 °F)  Temperature: 36.7 °C (98.1 °F)  Pulse  Av.8  Min: 71  Max: 111   Blood Pressure: 114/68 mmHg       Physical Exam:  Physical Exam   Constitutional: He is oriented to person, place, and time. He appears well-developed. No distress.   Disheveled   HENT:   Head: Normocephalic.   Poor dentition  Right eye enucleation    Eyes:   Blind right eye   Neck: Neck supple.   Cardiovascular: Normal rate.    Pulmonary/Chest: Effort normal. No respiratory distress.   Abdominal: Soft. He exhibits no distension. There is no tenderness.   Musculoskeletal: He exhibits no edema.   Wound right BKA-no longer draining-wound is closed  Left foot surgical dressing and wound VAC present   Neurological: He is alert and oriented to person, place, and time.   Skin: No rash noted.   tattoos   Nursing note and vitals reviewed.      Labs:  Recent Labs      07/30/17 0345   WBC  8.9   RBC  4.82   HEMOGLOBIN  11.2*   HEMATOCRIT  36.9*   MCV  76.6*   MCH  23.2*   RDW  44.5   PLATELETCT  443   MPV  8.9*   NEUTSPOLYS  52.20   LYMPHOCYTES  41.60*   MONOCYTES  2.70   EOSINOPHILS  3.50   BASOPHILS  0.00   RBCMORPHOLO  Normal     Recent Labs      07/30/17 0345  07/31/17   0402   SODIUM  136  135   POTASSIUM  4.2  4.2   CHLORIDE  103  104   CO2  26  24   GLUCOSE  110*  105*   BUN  22  25*     Recent Labs      07/30/17 0345 07/31/17   0402   ALBUMIN  3.2   --    TBILIRUBIN  0.3   --    ALKPHOSPHAT  82   --    TOTPROTEIN  7.9   --    ALTSGPT  19   --    ASTSGOT  24   --    CREATININE  1.17  1.05     Results     Procedure Component Value Units Date/Time    CDIFF BY PCR WITH TOXIN [[id number]]     Order Status:  No result Specimen Information:  Stool from Stool     CULTURE WOUND W/ GRAM STAIN [[id number]] Collected:  07/23/17 0730    Order Status:  Completed Specimen Information:  Wound from Right Leg Updated:  07/25/17 1018     Gram Stain Result No organisms seen.      Significant Indicator NEG      Source WND      Site RIGHT LEG      Culture Result Wound Rare growth Mixed skin gladis.     Narrative:      Collected By:[id number] SUNNY GONZALEZ          Fluids:  Intake/Output       07/29/17 0700 - 07/30/17 0659 07/30/17 0700 - 07/31/17 0659 07/31/17 0700 - 08/01/17 0659      [id number] 0843-4501 Total 8334-6442 1925-4263 Total 5645-7046 6698-1473 Total       Intake    P.O.   960  2080 3040  720  1040 1760  --  -- --    P.O. 960 2080 3040 720 1040 1760 -- -- --    I.V.  430  -- 430  490  -- 490  --  -- --    IV Volume (NS) 330 -- 330 240 -- 240 -- -- --    IV Piggyback Volume 100 -- 100 250 -- 250 -- -- --    Total Intake 1390 2080 3470 1210 1040 2250 -- -- --       Output    Urine  900  1550 2450  400  1550 1950  --  -- --    Number of Times Voided -- 2 x 2 x -- 4 x 4 x -- -- --    Void (ml) 900 1550 2450 400 1550 1950 -- -- --    Stool  --  -- --  --  -- --  --  -- --    Number of Times Stooled 1 x -- 1 x -- -- -- 2 x -- 2 x    Total Output 900 1550 2450 400 1550 1950 -- -- --       Net I/O      810 -510 300 -- -- --             Assessment:  Active Hospital Problems    Diagnosis   • Skin ulcer of left foot with fat layer exposed (CMS-[facility]) [L97.522]   • Wound of right leg, at BKA site [S81.801A]   • Peripheral neuropathy [G62.9]   • Tobacco abuse [Z72.0]       Plan:  Drainage from right BKA site- improved  Afebrile  No leukocytosis  No further drainage  Persistent pain    Skin ulcer of left foot with fat layer exposed (CMS-[facility])  Afebrile  No leukocytosis  S/p OR 7/25/17  Cultures  MRSA and Pseudomonas  DC vanco due to nephrotoxicity  Added doxy (no Zyvox due to neuropathy)  Cefepime for PSAR  PICC done on 7/27/17  Continue antibiotics through a 8/25/17  No once daily options    Prognosis for limb salvage poor      Plan of care discussed with IM.

## 2017-07-31 NOTE — PROGRESS NOTES
[facility] Hospitalist Progress Note    Date of Service: 7/30/2017    Chief Complaint  52 y.o. male admitted 7/22/2017 with right BKA stump pain and wound drainage in addition to left TMA stump wound (nonhealing).  Patient has history of PVD s/p L TMA and R BKA more than 5 yrs ago in [place].  He was receiving home health with wound care while living in [facility] while on disability.  Patient moved to [place] 1yr ago.  Since moving to [place], he has not established with PCP or received wound care.  Over the past few months, he began noticing drainage from R BKA stump with wound opening up.  He also noticed ulcer developing at the site of his L TMA stump.  He has prosthesis for his R leg, but unable to use due to these developments over the past few months.  He has no wheelchair and rents rooms weekly at various hotels throughout the city.    Interval Problem Update  7/24- opiate dependence addressed by Dr Dumont  7/25 - s/p Debridement and gastroscoleus resection LLE in OR  7/26- c/o Burning in LLE to RN, already on Lyrica- Oxycontin increased, discussed w/ wound RN at bedside, discussed d/c planning w/ patient and .  Orpro making new cup for BKA prosthesis RLE, new clamshell boot for heel walking LLE  7/27 He is actively calling his insurance  in [place] regarding SNF options.  Had wound vac change earlier today.  7/28- no new issues per patient- discussed w/ ID  7/29- RN concerned about low grade temp and borderline BP- not too far from baseline.  On bactrim, needs BMP.  7/30- creatinine is up, not a lot but is unclear whether this is due to the Vanco, or more recently the Bactrim- ID has changed back to Vanco, discussed with pharmacy there where of the rise in creatinine. Will continue to monitor    Consultants/Specialty  [place]  ID - [facility]  [facility]- [facility]    Disposition  Limited options w/ Payor (Medical HMO)          Review of Systems   Constitutional: Negative for fever and chills.   Respiratory:  Negative for cough and shortness of breath.    Cardiovascular: Negative for chest pain and palpitations.   Gastrointestinal: Negative for nausea, vomiting, abdominal pain, diarrhea and constipation.   Musculoskeletal:        Left foot pain severe this a.m.  He asked his nurse for a pain pill 90 minutes before I saw him, I tracked down his nurse she is precepting a new nurse- and we'll track down that person to find out why he has not been medicated yet   All other systems reviewed and are negative.     Physical Exam  Laboratory/Imaging   Hemodynamics  Temp (24hrs), Av.2 °C (98.9 °F), Min:36.8 °C (98.2 °F), Max:37.5 °C (99.5 °F)   Temperature: 37.3 °C (99.1 °F)  Pulse  Av.8  Min: 71  Max: 111    Blood Pressure: 108/74 mmHg      Respiratory      Respiration: 14, Pulse Oximetry: 98 %        RUL Breath Sounds: Clear, RML Breath Sounds: Clear, RLL Breath Sounds: Clear, NANCY Breath Sounds: Clear, LLL Breath Sounds: Clear    Fluids    Intake/Output Summary (Last 24 hours) at 17 1838  Last data filed at 17 1800   Gross per 24 hour   Intake   3290 ml   Output   1950 ml   Net   1340 ml       Nutrition  Orders Placed This Encounter   Procedures   • DIET ORDER     Standing Status: Standing      Number of Occurrences: 1      Standing Expiration Date:      Order Specific Question:  Diet:     Answer:  Regular [1]     Physical Exam   Constitutional: He is oriented to person, place, and time. He appears well-developed and well-nourished.   HENT:   Head: Normocephalic and atraumatic.   Mouth/Throat: Oropharynx is clear and moist.   Eyes: No scleral icterus.   R corneal opacification and small globe   Neck: Normal range of motion. Neck supple. No tracheal deviation present. No thyromegaly present.   Cardiovascular: Normal rate, regular rhythm, normal heart sounds and intact distal pulses.    No murmur heard.  Pulmonary/Chest: Effort normal and breath sounds normal. No respiratory distress. He has no wheezes.    Abdominal: Soft. Bowel sounds are normal. He exhibits no distension. There is no tenderness.   Musculoskeletal: He exhibits no edema or tenderness.        Arms:       Legs:  LLE post op dressings/ boot     Lymphadenopathy:     He has no cervical adenopathy.        Right: No supraclavicular adenopathy present.        Left: No supraclavicular adenopathy present.   Neurological: He is alert and oriented to person, place, and time.   Skin: Skin is warm and dry.   Psychiatric: He has a normal mood and affect.   Nursing note and vitals reviewed.      Recent Labs      07/30/17   0345   WBC  8.9   RBC  4.82   HEMOGLOBIN  11.2*   HEMATOCRIT  36.9*   MCV  76.6*   MCH  23.2*   MCHC  30.4*   RDW  44.5   PLATELETCT  443   MPV  8.9*     Recent Labs      07/30/17   0345   SODIUM  136   POTASSIUM  4.2   CHLORIDE  103   CO2  26   GLUCOSE  110*   BUN  22   CREATININE  1.17   CALCIUM  9.1                      Assessment/Plan     Wound of right leg, at BKA site (present on admission)  Assessment & Plan  Not infected, just dry eschars    Skin ulcer of left foot with fat layer exposed (CMS-HCC) (present on admission)  Assessment & Plan  S/p OR debridement and gastroscoleus resection 7/25 (boubacar) wound Vac  Contact isolation    Peripheral neuropathy (present on admission)  Assessment & Plan  On lyrica, oxycontin increased, has not asked for further escalation  Consider depakote    Tobacco abuse (present on admission)  Assessment & Plan  Nicotine replacement PRN  Strongly encouraged to quit  Counseled at least 3 minutes on 07/23/2017    Opiate dependence (CMS-HCC) (present on admission)  Assessment & Plan  Buying methadone on street  Drug seeking behavior inpatient.  Has not asked for escalation since 7/26    MRSA (methicillin resistant staph aureus) culture positive (present on admission)  Assessment & Plan  Back on Vanco  Was on Bactrim up until 7/29    Pseudomonas infection (present on admission)  Assessment & Plan  On Zosyn    ABRIL  (acute kidney injury) (CMS-[facility])  Assessment & Plan  ? Related to antibiotics -Monitor      Labs reviewed and Medications reviewed  Rolon catheter: No Rolon      DVT Prophylaxis: Enoxaparin (Lovenox)    Ulcer prophylaxis: Not indicated  Antibiotics: Treating active infection/contamination beyond 24 hours perioperative coverage  Assessed for rehab: Patient was assess for and/or received rehabilitation services during this hospitalization

## 2017-07-31 NOTE — PROGRESS NOTES
Bedside report received.  Assessment complete.  A&O x 4. Patient calls appropriately.  Denies numbness and tingling. Moves extremities.   Patient gets into wheelchair with one assist.  Patient has 6/10 pain. MAr medication given.  Denies N&V. Tolerating diet.   + void, + flatus  Patient denies SOB.  Review plan with of care with patient. Call light and personal belongings with in reach. Hourly rounding in place. All needs met at this time.

## 2017-07-31 NOTE — PROGRESS NOTES
Rounded with pt. Answered questions related to his discharge plan and his antibiotics. Notified Case Management to round with pt regarding SNF discharge plan.

## 2017-08-01 LAB
C DIFF DNA SPEC QL NAA+PROBE: NEGATIVE
C DIFF TOX GENS STL QL NAA+PROBE: NEGATIVE

## 2017-08-01 PROCEDURE — 700111 HCHG RX REV CODE 636 W/ 250 OVERRIDE (IP): Performed by: INTERNAL MEDICINE

## 2017-08-01 PROCEDURE — 700102 HCHG RX REV CODE 250 W/ 637 OVERRIDE(OP): Performed by: INTERNAL MEDICINE

## 2017-08-01 PROCEDURE — 99232 SBSQ HOSP IP/OBS MODERATE 35: CPT | Performed by: HOSPITALIST

## 2017-08-01 PROCEDURE — A9270 NON-COVERED ITEM OR SERVICE: HCPCS | Performed by: HOSPITALIST

## 2017-08-01 PROCEDURE — 770021 HCHG ROOM/CARE - ISO PRIVATE

## 2017-08-01 PROCEDURE — A9270 NON-COVERED ITEM OR SERVICE: HCPCS | Performed by: INTERNAL MEDICINE

## 2017-08-01 PROCEDURE — 97605 NEG PRS WND THER DME<=50SQCM: CPT

## 2017-08-01 PROCEDURE — 306372 DRESSING,VAC SIMPLACE MED: Performed by: ORTHOPAEDIC SURGERY

## 2017-08-01 PROCEDURE — 700101 HCHG RX REV CODE 250: Performed by: INTERNAL MEDICINE

## 2017-08-01 PROCEDURE — 700105 HCHG RX REV CODE 258: Performed by: INTERNAL MEDICINE

## 2017-08-01 PROCEDURE — 700102 HCHG RX REV CODE 250 W/ 637 OVERRIDE(OP): Performed by: HOSPITALIST

## 2017-08-01 RX ORDER — OXYCODONE HYDROCHLORIDE 5 MG/1
5-10 TABLET ORAL EVERY 4 HOURS PRN
Status: DISCONTINUED | OUTPATIENT
Start: 2017-08-01 | End: 2017-08-01

## 2017-08-01 RX ORDER — SODIUM CHLORIDE, SODIUM LACTATE, POTASSIUM CHLORIDE, CALCIUM CHLORIDE 600; 310; 30; 20 MG/100ML; MG/100ML; MG/100ML; MG/100ML
INJECTION, SOLUTION INTRAVENOUS
Status: COMPLETED
Start: 2017-08-01 | End: 2017-08-01

## 2017-08-01 RX ORDER — OXYCODONE HYDROCHLORIDE 10 MG/1
10 TABLET ORAL EVERY 4 HOURS PRN
Status: DISCONTINUED | OUTPATIENT
Start: 2017-08-01 | End: 2017-08-19

## 2017-08-01 RX ORDER — OXYCODONE HYDROCHLORIDE 5 MG/1
5 TABLET ORAL EVERY 4 HOURS PRN
Status: DISCONTINUED | OUTPATIENT
Start: 2017-08-01 | End: 2017-08-19

## 2017-08-01 RX ORDER — MORPHINE SULFATE 4 MG/ML
2 INJECTION, SOLUTION INTRAMUSCULAR; INTRAVENOUS
Status: DISCONTINUED | OUTPATIENT
Start: 2017-08-01 | End: 2017-08-16

## 2017-08-01 RX ADMIN — CEFEPIME 2 G: 2 INJECTION, POWDER, FOR SOLUTION INTRAMUSCULAR; INTRAVENOUS at 20:50

## 2017-08-01 RX ADMIN — QUETIAPINE FUMARATE 100 MG: 25 TABLET ORAL at 22:08

## 2017-08-01 RX ADMIN — LIDOCAINE 1 PATCH: 50 PATCH CUTANEOUS at 12:48

## 2017-08-01 RX ADMIN — OXYCODONE HYDROCHLORIDE 20 MG: 20 TABLET, FILM COATED, EXTENDED RELEASE ORAL at 20:50

## 2017-08-01 RX ADMIN — OXYCODONE HYDROCHLORIDE 5 MG: 5 TABLET ORAL at 03:17

## 2017-08-01 RX ADMIN — Medication 325 MG: at 17:28

## 2017-08-01 RX ADMIN — ENOXAPARIN SODIUM 40 MG: 100 INJECTION SUBCUTANEOUS at 09:48

## 2017-08-01 RX ADMIN — NICOTINE 14 MG: 14 PATCH, EXTENDED RELEASE TRANSDERMAL at 05:29

## 2017-08-01 RX ADMIN — CEFEPIME 2 G: 2 INJECTION, POWDER, FOR SOLUTION INTRAMUSCULAR; INTRAVENOUS at 09:50

## 2017-08-01 RX ADMIN — MORPHINE SULFATE 2 MG: 4 INJECTION INTRAVENOUS at 09:44

## 2017-08-01 RX ADMIN — OXYCODONE HYDROCHLORIDE 5 MG: 5 TABLET ORAL at 10:03

## 2017-08-01 RX ADMIN — Medication 325 MG: at 09:47

## 2017-08-01 RX ADMIN — PREGABALIN 150 MG: 150 CAPSULE ORAL at 09:47

## 2017-08-01 RX ADMIN — LIDOCAINE 1 APPLICATION: 40 CREAM TOPICAL at 05:29

## 2017-08-01 RX ADMIN — PREGABALIN 150 MG: 150 CAPSULE ORAL at 15:42

## 2017-08-01 RX ADMIN — CEFEPIME 2 G: 2 INJECTION, POWDER, FOR SOLUTION INTRAMUSCULAR; INTRAVENOUS at 15:42

## 2017-08-01 RX ADMIN — OXYCODONE HYDROCHLORIDE 10 MG: 5 TABLET ORAL at 17:28

## 2017-08-01 RX ADMIN — OXYCODONE HYDROCHLORIDE 10 MG: 10 TABLET ORAL at 22:08

## 2017-08-01 RX ADMIN — OXYCODONE HYDROCHLORIDE 20 MG: 20 TABLET, FILM COATED, EXTENDED RELEASE ORAL at 09:47

## 2017-08-01 RX ADMIN — QUETIAPINE FUMARATE 50 MG: 25 TABLET ORAL at 09:58

## 2017-08-01 RX ADMIN — OXYCODONE HYDROCHLORIDE 10 MG: 5 TABLET ORAL at 12:49

## 2017-08-01 RX ADMIN — DOXYCYCLINE 100 MG: 100 TABLET ORAL at 09:47

## 2017-08-01 RX ADMIN — PROMETHAZINE HYDROCHLORIDE 25 MG: 25 TABLET ORAL at 05:29

## 2017-08-01 RX ADMIN — DOXYCYCLINE 100 MG: 100 TABLET ORAL at 20:50

## 2017-08-01 RX ADMIN — PREGABALIN 150 MG: 150 CAPSULE ORAL at 20:50

## 2017-08-01 ASSESSMENT — PAIN SCALES - GENERAL
PAINLEVEL_OUTOF10: 7
PAINLEVEL_OUTOF10: 5
PAINLEVEL_OUTOF10: 3
PAINLEVEL_OUTOF10: 2
PAINLEVEL_OUTOF10: 4
PAINLEVEL_OUTOF10: 5
PAINLEVEL_OUTOF10: 7
PAINLEVEL_OUTOF10: 6

## 2017-08-01 ASSESSMENT — ENCOUNTER SYMPTOMS
FLANK PAIN: 0
SENSORY CHANGE: 1
HALLUCINATIONS: 0
PALPITATIONS: 0
TREMORS: 0
EYE DISCHARGE: 0
EYE PAIN: 0
STRIDOR: 0
CONSTIPATION: 0
VOMITING: 0
COUGH: 0
SHORTNESS OF BREATH: 0
MYALGIAS: 1
FEVER: 0
SPEECH CHANGE: 0
CHILLS: 0
ABDOMINAL PAIN: 0
TINGLING: 1
DIARRHEA: 0
NAUSEA: 0

## 2017-08-01 ASSESSMENT — LIFESTYLE VARIABLES: SUBSTANCE_ABUSE: 1

## 2017-08-01 NOTE — DISCHARGE PLANNING
Met with pt at bedside to discuss dc plan. Told pt we are waiting on a list of skilled facilities in [facility] in [facility] from his insurance so we can make referrals. Insurance to fax their referral form to here that needs to be filled out prior to getting a list of skilled facilities.   Some choices pt has requested if they are in network are [facility] in [place], [facility] in [facility] and [place], [facility] in [place], [facility] in [place], and [facility] in [place].   Plan: await insurance form which needs to be completed prior to being given skilled facility list.

## 2017-08-01 NOTE — PROGRESS NOTES
Infectious Disease Progress Note    Author: Joie Ramon M.D. DOS & Time created: 2017  2:03 PM    CC: FU right BKA stump infection and nonhealing left TMA stump wound     Interval History:  52 y.o. WM admitted 2017 with right BKA infection and nonhealing left TMA stump wound    AF WBC 7.6 less drainage from wounds Asking if he will need another BKA  17- no fevers. WBC 10.6. Complains of chills. Complains of 10 out of 10 pain in the left foot. Complains of drainage from the right stump.  17- complains of pain in the foot. Had low-grade fevers up to 100.7 last night . Foot cultures are showing MRSA and Pseudomonas  17- no fevers. Complains of burning in his left leg. No discharge noted from his right stump  17- no fevers. Complains of ongoing pain.  17- no fevers. No labs available  17-MAXIMUM TEMPERATURE 99.5. Ongoing pain issues. WBC 9000. Creatinine 1.17   AF lots of c/o neuropathy-feels like stump is in ice water   AF no new complaints- +phantom limb pain  Labs Reviewed, Medications Reviewed, Radiology Reviewed and Wound Reviewed.    Review of Systems:  Review of Systems   Constitutional: Negative for fever and chills.   Respiratory: Negative for cough and shortness of breath.    Cardiovascular: Negative for chest pain.   Gastrointestinal: Negative for nausea, vomiting, abdominal pain and diarrhea.   Genitourinary: Negative for dysuria.   Musculoskeletal: Positive for myalgias and joint pain.   Neurological: Positive for sensory change.   All other systems reviewed and are negative.      Hemodynamics:  Temp (24hrs), Av.8 °C (98.3 °F), Min:36.4 °C (97.6 °F), Max:37.3 °C (99.2 °F)  Temperature: 36.9 °C (98.5 °F)  Pulse  Av.8  Min: 71  Max: 111   Blood Pressure: 118/64 mmHg       Physical Exam:  Physical Exam   Constitutional: He is oriented to person, place, and time. He appears well-developed. No distress.   Disheveled   HENT:   Head: Normocephalic.    Poor dentition     Eyes:   Blind right eye   Neck: Neck supple.   Cardiovascular: Normal rate.    Pulmonary/Chest: Effort normal. No respiratory distress.   Abdominal: Soft. He exhibits no distension. There is no tenderness.   Musculoskeletal: He exhibits no edema.   Wound right BKA-no longer draining-wound is closed  Left foot surgical dressing and wound VAC present   Neurological: He is alert and oriented to person, place, and time.   Skin: No rash noted.   tattoos   Nursing note and vitals reviewed.      Labs:  Recent Labs      07/30/17 0345   WBC  8.9   RBC  4.82   HEMOGLOBIN  11.2*   HEMATOCRIT  36.9*   MCV  76.6*   MCH  23.2*   RDW  44.5   PLATELETCT  443   MPV  8.9*   NEUTSPOLYS  52.20   LYMPHOCYTES  41.60*   MONOCYTES  2.70   EOSINOPHILS  3.50   BASOPHILS  0.00   RBCMORPHOLO  Normal     Recent Labs      07/30/17 0345 07/31/17   0402   SODIUM  136  135   POTASSIUM  4.2  4.2   CHLORIDE  103  104   CO2  26  24   GLUCOSE  110*  105*   BUN  22  25*     Recent Labs      07/30/17 0345 07/31/17   0402   ALBUMIN  3.2   --    TBILIRUBIN  0.3   --    ALKPHOSPHAT  82   --    TOTPROTEIN  7.9   --    ALTSGPT  19   --    ASTSGOT  24   --    CREATININE  1.17  1.05     Results     Procedure Component Value Units Date/Time    CDIFF BY PCR WITH TOXIN [[id number]] Collected:  07/31/17 2223    Order Status:  Completed Specimen Information:  Stool from Stool Updated:  08/01/17 1336     C Diff by PCR Negative      Comment: C. difficile NOT detected by PCR.  Treatment not indicated per guidelines.  Repeat testing not indicated within 7 days.          027-NAP1-BI Presumptive Negative      Comment: Presumptive 027/NAP1/BI target DNA sequences are NOT DETECTED.       Narrative:      Special Contact [id number] ROSMERY ALANIZ I  Patient running low grade temp and now incontinent of  diarrhea  Does this patient have risk factors for C-diff?->Yes  Has patient taken stool softeners or laxatives in the last 5  days?->Yes           Fluids:  Intake/Output       07/30/17 0700 - 07/31/17 0659 07/31/17 0700 - 08/01/17 0659 08/01/17 0700 - 08/02/17 0659      [id number] 5638-3963 Total 7287-9998 1758-9820 Total 0700-1859 7007-7647 Total       Intake    P.O.  720  1040 1760  960  940 1900  --  -- --    P.O. 720 1040 1760  -- -- --    I.V.  490  -- 490  --  -- --  280  -- 280    IV Volume (NS) 240 -- 240 -- -- -- 180 -- 180    IV Piggyback Volume 250 -- 250 -- -- -- 100 -- 100    Total Intake 1210 1040 2250  280 -- 280       Output    Urine  400  1550 1950  --  1000 1000  725  -- 725    Number of Times Voided -- 4 x 4 x 2 x 1 x 3 x 1 x -- 1 x    Void (ml) 400 1550 1950 -- 1000 1000 725 -- 725    Stool  --  -- --  --  -- --  --  -- --    Number of Times Stooled -- -- -- 2 x 1 x 3 x -- -- --    Total Output 400 1550 1950 -- 1000 1000 725 -- 725       Net I/O     810 -510 300 960 -60 900 -445 -- -445             Assessment:  Active Hospital Problems    Diagnosis   • Skin ulcer of left foot with fat layer exposed (CMS-[facility]) [L97.522]   • Wound of right leg, at BKA site [S81.801A]   • Peripheral neuropathy [G62.9]   • Tobacco abuse [Z72.0]       Plan:  Skin ulcer of left foot with fat layer exposed (CMS-[facility])  Afebrile  No leukocytosis  S/p OR 7/25/17  Cultures  MRSA and Pseudomonas  DC vanco due to nephrotoxicity  Continue doxy (no Zyvox due to neuropathy)  Cefepime for PSAR-increase to every 8 hours  Continue antibiotics through a 8/25/17  No once daily options    Drainage from right BKA site- improved  Afebrile  No leukocytosis  No further drainage  Persistent pain  Prognosis for limb salvage poor      Plan of care discussed with [facility]-Dr Elizabeth

## 2017-08-01 NOTE — PROGRESS NOTES
Bedside report received.  Assessment complete.  A&O x 4. Patient calls appropriately.  Denies numbness and tingling. Moves extremities.   Patient up with one assist to wheelchair.  Patient has 6/10 pain. MAR medications given  Denies N&V. Tolerating diet.  Right BKA. Wound vac to left foot.  +void, + flatus  Patient denies SOB.  Review plan with of care with patient. Call light and personal belongings with in reach. Hourly rounding in place. All needs met at this time.

## 2017-08-01 NOTE — PROGRESS NOTES
Pt educated on discharge plan. CM is awaiting a call back from his insurance company, so that they can send referrals to SNFs in [place]. Explained that the [facility] CM will be in to go over options with him prior to sending the referrals. The pt gave me permission to discuss his discharge plan wit his son. Pt requested to see Dr. Crystal and he is at bedside with pt now. Answered all questions.

## 2017-08-01 NOTE — WOUND TEAM
"[facility]  Inpatient Services  Wound and Skin Care Treatment Note    Admission Date:  07/22/17     HPI, PMH, SH: Reviewed  Unit where seen by Wound Team:  [id number]     WOUND CONSULT RELATED TO:   NPWT dressing change left plantar foot      SUBJECTIVE:  \"My whole lower leg is in pain because of \"phantom limb pain\"\"      Self Report / Pain Level:  4/10, patient received pain medication during treatment. Patient agreeable to start treatment prior to pain medication     OBJECTIVE:    NPWT dressing intact    WOUND TYPE, LOCATION, CHARACTERISTICS (Pressure ulcers: location, stage, POA or date identified)    Location and type of wound:  Open surgical wound left plantar foot      Periwound:   Intact, slight macerated around wound, increased maceration in between remaining amputation site of pervious digit      Drainage:  Minimal serosanguinous    Tissue Type and %:  100% red  Wound Edges:  attached   Odor:  mild    Exposed structure(s):  Bone palpated    S&S of Infection:    Non healing, exposed bone       Measurements:    (Taken 7/27/17)   Length:   5 cm    Width:    5.2 cm   Depth:   <0.5cm     INTERVENTIONS BY WOUND TEAM:  removed old dressing, 3 pieces of black foam total.  Cleansed wound with wound cleanser.  Benzoin and drape to bull wound skin. Placed gauze in between remaining amputation site of pervious digit. Covered wound with one piece black foam and bridged to dorsum of foot.  Secured with drape and resumed NPWT at 125mmHg continuously.  Secured with ACE wrap. Placed cam boot back on LE.  [facility] is following up with this.      Interdisciplinary consultation:   Wound PT, PT student, Nurse     EVALUATION:  Clean surgically debrided wound. Gauze used in between remaining amputated site to decrease maceration. 3 pieces of black foam used in total.     Factors affecting wound healing:  Neuropathy, tobacco abuse    Goals:  Decrease in wound size by 1% each week.     NURSING PLAN OF CARE ORDERS " (X):    Dressing changes: See Dressing Maintenance orders:  X  Skin care: See Skin Care orders:   Rectal tube care: See Rectal Tube Care orders:   Other orders:    WOUND TEAM PLAN OF CARE (X):   NPWT change 3 x week:   X     Dressing changes by wound team:       Follow up as needed:       Other (explain):    Anticipated discharge plans (X):  SNF:           Home Care:           Outpatient Wound Center:      X      Self Care:            Other:

## 2017-08-01 NOTE — DISCHARGE PLANNING
Call placed to [facility] ([facility])  Angelica, [phone]. Requested list of skilled facilities within their network to send snf referrals. Left message with Angelica, and she states she will have someone call me back.

## 2017-08-01 NOTE — PROGRESS NOTES
Assumed care at 0700. Received report from night shift RN. Bedside report completed. AOx4.  C/o pain-medicated.  Denies nausea.  Tolerating diet. +voiding. +BM yesterday.  IVF infusing tko with abx.  Wound vac in place to left foot-changed today.  Transfers to wheel chair with 1 assist. Pt call light and belongings within reach, fall precautions in place.

## 2017-08-01 NOTE — PROGRESS NOTES
[facility] Hospitalist Progress Note    Date of Service: 2017    Chief Complaint    52 y.o. male hx of PVD s/p L TMA and Rt Bka > 5 years ago  admitted 2017 with right BKA stump pain and wound drainage in addition to left TMA stump wound (nonhealing) over several months.  s/p Debridement and gastroscoleus resection LLE on 17     Interval Problem Update    Wound vac changed today .  Co uncontrolled pain left stump.    Consultants/Specialty  [place]  ID - [facility]- dr. Ramon   [facility]    Disposition  Limited options w/ Payor (Medical HMO)          Review of Systems   Constitutional: Negative for fever and chills.   HENT: Negative for congestion.    Eyes: Negative for pain and discharge.        Vision loss rt eye   Respiratory: Negative for cough, shortness of breath and stridor.    Cardiovascular: Negative for chest pain and palpitations.   Gastrointestinal: Negative for nausea, vomiting, abdominal pain, diarrhea and constipation.   Genitourinary: Negative for hematuria and flank pain.   Musculoskeletal: Positive for myalgias and joint pain.   Skin: Negative for itching and rash.   Neurological: Positive for tingling and sensory change. Negative for tremors and speech change.        Chronic peripheral neuropathy .   Psychiatric/Behavioral: Positive for substance abuse. Negative for hallucinations.   All other systems reviewed and are negative.     Physical Exam  Laboratory/Imaging   Hemodynamics  Temp (24hrs), Av.8 °C (98.2 °F), Min:36.4 °C (97.6 °F), Max:37.3 °C (99.2 °F)   Temperature: 36.6 °C (97.8 °F)  Pulse  Av.8  Min: 71  Max: 111    Blood Pressure: 107/77 mmHg      Respiratory      Respiration: 16, Pulse Oximetry: 96 %        RUL Breath Sounds: Clear, RML Breath Sounds: Clear, RLL Breath Sounds: Clear, NANCY Breath Sounds: Clear, LLL Breath Sounds: Clear    Fluids    Intake/Output Summary (Last 24 hours) at 17 0720  Last data filed at 17 0400   Gross per 24 hour   Intake   1900 ml    Output   1000 ml   Net    900 ml       Nutrition  Orders Placed This Encounter   Procedures   • DIET ORDER     Standing Status: Standing      Number of Occurrences: 1      Standing Expiration Date:      Order Specific Question:  Diet:     Answer:  Regular [1]     Physical Exam   Constitutional: He is oriented to person, place, and time. No distress.   Eyes: Right eye exhibits no discharge. Left eye exhibits no discharge. No scleral icterus.   Rt eye vision loss, enucleated.    Neck: Neck supple.   Cardiovascular: Normal rate and regular rhythm.    No murmur heard.  Pulmonary/Chest: No stridor. He has no wheezes. He has no rales.   Abdominal: Soft. Bowel sounds are normal. He exhibits no distension. There is no tenderness.   Musculoskeletal: He exhibits no edema.   Rt leg BKA - incision dry, no dehisc.  Left leg w boot wound vac being placed.    Neurological: He is alert and oriented to person, place, and time. Coordination abnormal.   Skin: Skin is warm and dry. He is not diaphoretic. No erythema.   Psychiatric: He has a normal mood and affect. His behavior is normal.       Recent Labs      07/30/17   0345   WBC  8.9   RBC  4.82   HEMOGLOBIN  11.2*   HEMATOCRIT  36.9*   MCV  76.6*   MCH  23.2*   MCHC  30.4*   RDW  44.5   PLATELETCT  443   MPV  8.9*     Recent Labs      07/30/17   0345  07/31/17   0402   SODIUM  136  135   POTASSIUM  4.2  4.2   CHLORIDE  103  104   CO2  26  24   GLUCOSE  110*  105*   BUN  22  25*   CREATININE  1.17  1.05   CALCIUM  9.1  9.2                      Assessment/Plan     * Skin ulcer of left foot with fat layer exposed (CMS-HCC) (present on admission)  Assessment & Plan  S/p OR debridement and gastroscoleus resection 7/25 w wound vac re placement. Today  Contact isolation  Wound care.   Uncontrolled pain-- increase oral prn oxycodone for breakthru pain     Wound of right leg, at BKA site (present on admission)  Assessment & Plan  No acute infection -  dry eschars present.     MRSA  (methicillin resistant staph aureus) culture positive (present on admission)  Assessment & Plan  Coverage w Doxycycline     Pseudomonas infection (present on admission)  Assessment & Plan  Coverage w IV cefipime    ABRIL (acute kidney injury) (CMS-HCC)  Assessment & Plan  ? Vancomycin associated - Improved  Antibiotics changed to Doxycycline    Tobacco abuse (present on admission)  Assessment & Plan  Nicotine replacement PRN  Cessation counseling given -  Ongoing re enforcement.     Diarrhea  Assessment & Plan  Now incontinent    Peripheral neuropathy (present on admission)  Assessment & Plan  cw lyrica .  Consider Elavil to help with sleep , pain if worsens.     Opiate dependence (CMS-HCC) (present on admission)  Assessment & Plan  Noted Drug seeking behavior inpatient.  Avoid escalating  IV narcotics with no no signs for pain.         Labs reviewed and Medications reviewed  Rolon catheter: No Rolon      DVT Prophylaxis: Enoxaparin (Lovenox)    Ulcer prophylaxis: Not indicated  Antibiotics: Treating active infection/contamination beyond 24 hours perioperative coverage  Assessed for rehab: Patient was assess for and/or received rehabilitation services during this hospitalization

## 2017-08-02 LAB
ANION GAP SERPL CALC-SCNC: 7 MMOL/L (ref 0–11.9)
BUN SERPL-MCNC: 30 MG/DL (ref 8–22)
CALCIUM SERPL-MCNC: 9.2 MG/DL (ref 8.5–10.5)
CHLORIDE SERPL-SCNC: 106 MMOL/L (ref 96–112)
CO2 SERPL-SCNC: 24 MMOL/L (ref 20–33)
CREAT SERPL-MCNC: 0.92 MG/DL (ref 0.5–1.4)
GFR SERPL CREATININE-BSD FRML MDRD: >60 ML/MIN/1.73 M 2
GLUCOSE SERPL-MCNC: 113 MG/DL (ref 65–99)
POTASSIUM SERPL-SCNC: 3.9 MMOL/L (ref 3.6–5.5)
SODIUM SERPL-SCNC: 137 MMOL/L (ref 135–145)

## 2017-08-02 PROCEDURE — 700101 HCHG RX REV CODE 250: Performed by: INTERNAL MEDICINE

## 2017-08-02 PROCEDURE — 97110 THERAPEUTIC EXERCISES: CPT

## 2017-08-02 PROCEDURE — A9270 NON-COVERED ITEM OR SERVICE: HCPCS | Performed by: INTERNAL MEDICINE

## 2017-08-02 PROCEDURE — 700102 HCHG RX REV CODE 250 W/ 637 OVERRIDE(OP): Performed by: INTERNAL MEDICINE

## 2017-08-02 PROCEDURE — 80048 BASIC METABOLIC PNL TOTAL CA: CPT

## 2017-08-02 PROCEDURE — A9270 NON-COVERED ITEM OR SERVICE: HCPCS | Performed by: HOSPITALIST

## 2017-08-02 PROCEDURE — 700102 HCHG RX REV CODE 250 W/ 637 OVERRIDE(OP): Performed by: HOSPITALIST

## 2017-08-02 PROCEDURE — 99232 SBSQ HOSP IP/OBS MODERATE 35: CPT | Performed by: HOSPITALIST

## 2017-08-02 PROCEDURE — 700105 HCHG RX REV CODE 258: Performed by: INTERNAL MEDICINE

## 2017-08-02 PROCEDURE — 700111 HCHG RX REV CODE 636 W/ 250 OVERRIDE (IP): Performed by: INTERNAL MEDICINE

## 2017-08-02 PROCEDURE — 770021 HCHG ROOM/CARE - ISO PRIVATE

## 2017-08-02 PROCEDURE — 97530 THERAPEUTIC ACTIVITIES: CPT

## 2017-08-02 PROCEDURE — 97535 SELF CARE MNGMENT TRAINING: CPT

## 2017-08-02 RX ADMIN — LIDOCAINE 1 PATCH: 50 PATCH CUTANEOUS at 10:24

## 2017-08-02 RX ADMIN — OXYCODONE HYDROCHLORIDE 10 MG: 10 TABLET ORAL at 03:25

## 2017-08-02 RX ADMIN — ENOXAPARIN SODIUM 40 MG: 100 INJECTION SUBCUTANEOUS at 08:29

## 2017-08-02 RX ADMIN — CEFEPIME 2 G: 2 INJECTION, POWDER, FOR SOLUTION INTRAMUSCULAR; INTRAVENOUS at 05:45

## 2017-08-02 RX ADMIN — DOXYCYCLINE 100 MG: 100 TABLET ORAL at 08:29

## 2017-08-02 RX ADMIN — ZOLPIDEM TARTRATE 5 MG: 5 TABLET, FILM COATED ORAL at 23:27

## 2017-08-02 RX ADMIN — LIDOCAINE 1 APPLICATION: 40 CREAM TOPICAL at 21:18

## 2017-08-02 RX ADMIN — OXYCODONE HYDROCHLORIDE 10 MG: 10 TABLET ORAL at 10:24

## 2017-08-02 RX ADMIN — Medication 325 MG: at 18:20

## 2017-08-02 RX ADMIN — PREGABALIN 150 MG: 150 CAPSULE ORAL at 08:29

## 2017-08-02 RX ADMIN — ONDANSETRON 4 MG: 4 TABLET, ORALLY DISINTEGRATING ORAL at 03:30

## 2017-08-02 RX ADMIN — QUETIAPINE FUMARATE 100 MG: 25 TABLET ORAL at 21:18

## 2017-08-02 RX ADMIN — QUETIAPINE FUMARATE 100 MG: 25 TABLET ORAL at 15:22

## 2017-08-02 RX ADMIN — OXYCODONE HYDROCHLORIDE 20 MG: 20 TABLET, FILM COATED, EXTENDED RELEASE ORAL at 08:29

## 2017-08-02 RX ADMIN — DOXYCYCLINE 100 MG: 100 TABLET ORAL at 21:18

## 2017-08-02 RX ADMIN — CEFEPIME 2 G: 2 INJECTION, POWDER, FOR SOLUTION INTRAMUSCULAR; INTRAVENOUS at 15:21

## 2017-08-02 RX ADMIN — CEFEPIME 2 G: 2 INJECTION, POWDER, FOR SOLUTION INTRAMUSCULAR; INTRAVENOUS at 21:23

## 2017-08-02 RX ADMIN — PREGABALIN 150 MG: 150 CAPSULE ORAL at 15:21

## 2017-08-02 RX ADMIN — OXYCODONE HYDROCHLORIDE 10 MG: 10 TABLET ORAL at 15:22

## 2017-08-02 RX ADMIN — LIDOCAINE 1 APPLICATION: 40 CREAM TOPICAL at 03:26

## 2017-08-02 RX ADMIN — ZOLPIDEM TARTRATE 5 MG: 5 TABLET, FILM COATED ORAL at 00:59

## 2017-08-02 RX ADMIN — OXYCODONE HYDROCHLORIDE 20 MG: 20 TABLET, FILM COATED, EXTENDED RELEASE ORAL at 21:18

## 2017-08-02 RX ADMIN — QUETIAPINE FUMARATE 100 MG: 25 TABLET ORAL at 08:28

## 2017-08-02 RX ADMIN — OXYCODONE HYDROCHLORIDE 10 MG: 10 TABLET ORAL at 21:18

## 2017-08-02 RX ADMIN — PREGABALIN 150 MG: 150 CAPSULE ORAL at 21:18

## 2017-08-02 RX ADMIN — ONDANSETRON 4 MG: 4 TABLET, ORALLY DISINTEGRATING ORAL at 21:18

## 2017-08-02 RX ADMIN — NICOTINE 14 MG: 14 PATCH, EXTENDED RELEASE TRANSDERMAL at 05:45

## 2017-08-02 RX ADMIN — Medication 325 MG: at 08:29

## 2017-08-02 ASSESSMENT — PAIN SCALES - GENERAL
PAINLEVEL_OUTOF10: 7
PAINLEVEL_OUTOF10: 6
PAINLEVEL_OUTOF10: 7

## 2017-08-02 ASSESSMENT — ENCOUNTER SYMPTOMS
NERVOUS/ANXIOUS: 1
DIARRHEA: 1
HALLUCINATIONS: 0
SPEECH CHANGE: 0
ABDOMINAL PAIN: 0
FLANK PAIN: 0
SHORTNESS OF BREATH: 0
CHILLS: 0
TREMORS: 0
TINGLING: 1
SENSORY CHANGE: 1
VOMITING: 0
CONSTIPATION: 0
EYE PAIN: 0
COUGH: 0
FEVER: 0
MYALGIAS: 1
EYE DISCHARGE: 0
STRIDOR: 0
NAUSEA: 0
DIARRHEA: 0

## 2017-08-02 ASSESSMENT — COGNITIVE AND FUNCTIONAL STATUS - GENERAL
SUGGESTED CMS G CODE MODIFIER MOBILITY: CL
SUGGESTED CMS G CODE MODIFIER DAILY ACTIVITY: CJ
WALKING IN HOSPITAL ROOM: TOTAL
DRESSING REGULAR LOWER BODY CLOTHING: A LITTLE
TOILETING: A LITTLE
HELP NEEDED FOR BATHING: A LOT
STANDING UP FROM CHAIR USING ARMS: A LITTLE
DAILY ACTIVITIY SCORE: 20
MOVING FROM LYING ON BACK TO SITTING ON SIDE OF FLAT BED: UNABLE
MOBILITY SCORE: 14
CLIMB 3 TO 5 STEPS WITH RAILING: TOTAL

## 2017-08-02 ASSESSMENT — GAIT ASSESSMENTS: GAIT LEVEL OF ASSIST: UNABLE TO PARTICIPATE

## 2017-08-02 ASSESSMENT — LIFESTYLE VARIABLES: SUBSTANCE_ABUSE: 1

## 2017-08-02 NOTE — THERAPY
"Occupational Therapy Treatment completed with focus on ADLs, ADL transfers and patient education.  Functional Status:  Pt seen for OT tx today.  Pt was pleasant and cooperative throughout the session but continues to be limited by weakness, endurance, and self care.  Pt completed supine to sit, sit to stand, and slide board transfer with SBA.    Completed seated gr/hy with supervision and completed LB dressing supine it bed as he is unable to lift self with x1 LE and UE while pulling pants up from w/c.  States due to R shoulder pain is unable to use FWW for standing.  Pt would benefit from continued inpt OT to increase independence with functional transfers, functional endurance, and IADLs.  Plan of Care: Will benefit from Occupational Therapy 3 times per week  Discharge Recommendations:  Equipment Front-Wheel Walker, Wheelchair and Grab Bars. Post-acute therapy Discharge to a transitional care facility for continued skilled therapy services.    See \"Rehab Therapy-Acute\" Patient Summary Report for complete documentation.   "

## 2017-08-02 NOTE — PROGRESS NOTES
Patient resting quietly in bed, no S/S of distress, AA&Ox4. Denies SOB, chest pain, dizziness. Patient states pain of 6/10, will medicate according to MAR. Patient has wound vac in place with no complications. Call light within reach,  pt calls appropriately and does not get out of bed. Bed in lowest position, bed locked, RN and CNA numbers provided, no further needs at this time. Hourly rounding in place.

## 2017-08-02 NOTE — CARE PLAN
Problem: Communication  Goal: The ability to communicate needs accurately and effectively will improve  Outcome: PROGRESSING AS EXPECTED  Patient able to effectively communicate his needs    Problem: Infection  Goal: Will remain free from infection  Outcome: PROGRESSING AS EXPECTED  Patient on long term antibiotics for infection

## 2017-08-02 NOTE — PROGRESS NOTES
Assumed care of Mr Jonatan at 1900.    Pt is A&O x4.  Pain 5/10.  Pt medicated per MAR  Nausea denied  Tolerating Diet   Lt foot wound.  Wound vac, dressing, and walking boot in place.  Wound vac is set to suction, patent, no leaks present  Positive Urine output  Positive BM Void   Positive Flatus  Standby assist   Bed in lowest position and locked.    ** Bed alarm refused despite pt education on fall risk.  Pt is A&O x4 and demonstrates appropriate use of call light to call for assistance.  CLIP, hourly rounding in place.    Pt resting comfortably now.  Review plan of care with patient  Call light within reach  Hourly rounds in place  All needs met at this time

## 2017-08-02 NOTE — PROGRESS NOTES
[facility] Hospitalist Progress Note    Date of Service: 2017    Chief Complaint    52 y.o. male hx of PVD s/p L TMA and Rt Bka > 5 years ago  admitted 2017 with right BKA stump pain and wound drainage in addition to left TMA stump wound (nonhealing) over several months.  s/p Debridement and gastroscoleus resection LLE on 17     Interval Problem Update    Pain better controlled. Discussed with SW- placement options difficult due to Medical Payor and multi daily dosing of IV atbs    Consultants/Specialty  LPS  ID - [facility]- dr. Yenny Shirley    Disposition  Limited options w/ Payor (Medical HMO)          Review of Systems   Constitutional: Negative for fever and chills.   HENT: Negative for congestion.    Eyes: Negative for pain and discharge.        Vision loss rt eye   Respiratory: Negative for cough, shortness of breath and stridor.    Cardiovascular: Negative for chest pain and leg swelling.   Gastrointestinal: Negative for vomiting, abdominal pain, diarrhea and constipation.   Genitourinary: Negative for hematuria and flank pain.   Musculoskeletal: Positive for myalgias and joint pain.   Neurological: Positive for tingling and sensory change. Negative for tremors and speech change.        Chronic peripheral neuropathy .   Psychiatric/Behavioral: Positive for substance abuse. Negative for hallucinations. The patient is nervous/anxious.    All other systems reviewed and are negative.     Physical Exam  Laboratory/Imaging   Hemodynamics  Temp (24hrs), Av.8 °C (98.3 °F), Min:36.4 °C (97.6 °F), Max:37.3 °C (99.1 °F)   Temperature: 36.4 °C (97.6 °F)  Pulse  Av  Min: 71  Max: 111    Blood Pressure: (!) 96/69 mmHg      Respiratory      Respiration: 18, Pulse Oximetry: 96 %        RUL Breath Sounds: Clear, RML Breath Sounds: Clear, RLL Breath Sounds: Diminished, NANCY Breath Sounds: Clear, LLL Breath Sounds: Diminished    Fluids    Intake/Output Summary (Last 24 hours) at 17 0658  Last  data filed at 08/02/17 0400   Gross per 24 hour   Intake   2220 ml   Output   3775 ml   Net  -1555 ml       Nutrition  Orders Placed This Encounter   Procedures   • DIET ORDER     Standing Status: Standing      Number of Occurrences: 1      Standing Expiration Date:      Order Specific Question:  Diet:     Answer:  Regular [1]     Physical Exam   Constitutional: He is oriented to person, place, and time. No distress.   Thin set.   HENT:   Head: Normocephalic and atraumatic.   Eyes: Right eye exhibits no discharge. Left eye exhibits no discharge. No scleral icterus.   Rt eye vision loss, enucleated.    Neck: Neck supple.   Cardiovascular: Normal rate and regular rhythm.    No murmur heard.  Pulmonary/Chest: No stridor. He has no wheezes. He has no rales.   Abdominal: Soft. Bowel sounds are normal. He exhibits no distension. There is no tenderness.   Musculoskeletal: He exhibits no edema.   Rt leg BKA - incision dry, no dehisc- healed.   Left leg w boot wound vac present.   Neurological: He is alert and oriented to person, place, and time. Coordination abnormal.   Skin: Skin is warm and dry. He is not diaphoretic. No erythema.   Psychiatric: He has a normal mood and affect. His behavior is normal.           Recent Labs      07/31/17   0402  08/02/17   0213   SODIUM  135  137   POTASSIUM  4.2  3.9   CHLORIDE  104  106   CO2  24  24   GLUCOSE  105*  113*   BUN  25*  30*   CREATININE  1.05  0.92   CALCIUM  9.2  9.2                      Assessment/Plan     * Skin ulcer of left foot with fat layer exposed (CMS-HCC) (present on admission)  Assessment & Plan  S/p OR debridement and gastroscoleus resection 7/25   Continue wounf vac care.  Contact isolation  Pain control w oxycontin , prn oxycodone , IV Ms for breakthru pain .     MRSA (methicillin resistant staph aureus) culture positive (present on admission)  Assessment & Plan  w Left foot infxn-cw Doxycycline     Pseudomonas infection (present on admission)  Assessment &  Plan  Left foot- continue IV cefepime .   Plan thru 8-25-17      ABRIL (acute kidney injury) (CMS-HCC)  Assessment & Plan  ? Vancomycin associated - resolving .  Antibiotics changed to Doxycycline  Monitor for urinary output changes .    Tobacco abuse (present on admission)  Assessment & Plan  Nicotine replacement PRN  Cessation counseling given     Diarrhea  Assessment & Plan  abd exam benign, afeb-  monitor for worsening .    Peripheral neuropathy (present on admission)  Assessment & Plan  cw lyrica --States he does not tolerate elavil .  Zyvox dcd.    Wound of right leg, at BKA site (present on admission)  Assessment & Plan  No acute infection , incision healed .    Opiate dependence (CMS-HCC) (present on admission)  Assessment & Plan  Noted Drug seeking behavior inpatient.  Avoid escalating  IV narcotics with no no signs for pain- use primarily for dressing / vac changes .        Labs reviewed and Medications reviewed  Rolon catheter: No Rolon      DVT Prophylaxis: Enoxaparin (Lovenox)    Ulcer prophylaxis: Not indicated  Antibiotics: Treating active infection/contamination beyond 24 hours perioperative coverage  Assessed for rehab: Patient was assess for and/or received rehabilitation services during this hospitalization

## 2017-08-02 NOTE — DISCHARGE PLANNING
Rec’d call from Dominic ([phone]) at [facility]. Reviewed that rec’d list of SNFs in [place]. Referrals in progress today.     Instructed by Dominic that when pt has been accepted to a SNF, we need to call [facility] to arrange transport. Call Nkechi at [phone].     [place] [facility] referrals in progress to [facility] in [place], [place]; [place], [place]; [facility], [facility], [facility]; [place], [place]; [place], [place].     Reviewed at bedside with pt.

## 2017-08-02 NOTE — PROGRESS NOTES
Infectious Disease Progress Note    Author: Joie Ramon M.D. DOS & Time created: 2017  1:27 PM    CC: FU right BKA stump infection and nonhealing left TMA stump wound     Interval History:  52 y.o. WM admitted 2017 with right BKA infection and nonhealing left TMA stump wound    AF WBC 7.6 less drainage from wounds Asking if he will need another BKA  17- no fevers. WBC 10.6. Complains of chills. Complains of 10 out of 10 pain in the left foot. Complains of drainage from the right stump.  17- complains of pain in the foot. Had low-grade fevers up to 100.7 last night . Foot cultures are showing MRSA and Pseudomonas  17- no fevers. Complains of burning in his left leg. No discharge noted from his right stump  17- no fevers. Complains of ongoing pain.  17- no fevers. No labs available  17-MAXIMUM TEMPERATURE 99.5. Ongoing pain issues. WBC 9000. Creatinine 1.17   AF lots of c/o neuropathy-feels like stump is in ice water   AF no new complaints- +phantom limb pain   AF pain better controlled Having loose stools. Wants a psych consult  Labs Reviewed, Medications Reviewed, Radiology Reviewed and Wound Reviewed.    Review of Systems:  Review of Systems   Constitutional: Negative for fever and chills.   Respiratory: Negative for cough and shortness of breath.    Cardiovascular: Negative for chest pain.   Gastrointestinal: Positive for diarrhea. Negative for nausea, vomiting and abdominal pain.   Genitourinary: Negative for dysuria.   Musculoskeletal: Positive for myalgias and joint pain.   Neurological: Positive for sensory change.   All other systems reviewed and are negative.      Hemodynamics:  Temp (24hrs), Av.7 °C (98.1 °F), Min:36.4 °C (97.6 °F), Max:37.3 °C (99.1 °F)  Temperature: 36.5 °C (97.7 °F)  Pulse  Av.9  Min: 71  Max: 111   Blood Pressure: 105/77 mmHg       Physical Exam:  Physical Exam   Constitutional: He is oriented to person, place, and  time. He appears well-developed. No distress.   Disheveled   HENT:   Head: Normocephalic.   Poor dentition     Eyes:   Blind right eye   Neck: Neck supple.   Cardiovascular: Normal rate.    Pulmonary/Chest: Effort normal. No respiratory distress.   Abdominal: Soft. He exhibits no distension. There is no tenderness.   Musculoskeletal: He exhibits no edema.   Wound right BKA-no longer draining-wound is closed  Left foot surgical dressing and wound VAC present   Neurological: He is alert and oriented to person, place, and time.   Skin: No rash noted.   tattoos   Nursing note and vitals reviewed.      Labs:  No results for input(s): WBC, RBC, HEMOGLOBIN, HEMATOCRIT, MCV, MCH, RDW, PLATELETCT, MPV, NEUTSPOLYS, LYMPHOCYTES, MONOCYTES, EOSINOPHILS, BASOPHILS, RBCMORPHOLO in the last 72 hours.  Recent Labs      07/31/17   0402  08/02/17   0213   SODIUM  135  137   POTASSIUM  4.2  3.9   CHLORIDE  104  106   CO2  24  24   GLUCOSE  105*  113*   BUN  25*  30*     Recent Labs      07/31/17   0402  08/02/17   0213   CREATININE  1.05  0.92     Results     Procedure Component Value Units Date/Time    CDIFF BY PCR WITH TOXIN [[id number]] Collected:  07/31/17 2223    Order Status:  Completed Specimen Information:  Stool from Stool Updated:  08/01/17 1336     C Diff by PCR Negative      Comment: C. difficile NOT detected by PCR.  Treatment not indicated per guidelines.  Repeat testing not indicated within 7 days.          027-NAP1-BI Presumptive Negative      Comment: Presumptive 027/NAP1/BI target DNA sequences are NOT DETECTED.       Narrative:      Special Contact [id number] [facility]  Patient running low grade temp and now incontinent of  diarrhea  Does this patient have risk factors for C-diff?->Yes  Has patient taken stool softeners or laxatives in the last 5  days?->Yes          Fluids:  Intake/Output       07/31/17 0700 - 08/01/17 0659 08/01/17 0700 - 08/02/17 0659 08/02/17 0700 - 08/03/17 0659      [id number]  "1900-0659 Total [id number] 1900-0659 Total [id number] 1900-0659 Total       Intake    P.O.  960  940 1900  840  600 1440  240  -- 240    P.O.   240 -- 240    I.V.  --  -- --  440  340 780  --  -- --    IV Volume (NS) -- -- -- 240 240 480 -- -- --    IV Piggyback Volume -- -- -- 200 100 300 -- -- --    Total Intake  2972 160 6585 240 -- 240       Output    Urine  --  1000 1000  1875  1900 3775  500  -- 500    Number of Times Voided 2 x 1 x 3 x 1 x 3 x 4 x -- -- --    Void (ml) -- 1000 1000 1875 1900 3775 500 -- 500    Stool  --  -- --  --  -- --  --  -- --    Number of Times Stooled 2 x 1 x 3 x -- -- -- -- -- --    Total Output -- 1000 1000 1875 1900 3775 500 -- 500       Net I/O     960 -60 900 -595 -960 -1555 -260 -- -260             Assessment:  Active Hospital Problems    Diagnosis   • Skin ulcer of left foot with fat layer exposed (CMS-HCC) [L97.522]   • Wound of right leg, at BKA site [S81.801A]   • Peripheral neuropathy [G62.9]   • Tobacco abuse [Z72.0]       Plan:  Skin ulcer of left foot with fat layer exposed (CMS-HCC)  Afebrile  No leukocytosis  S/p OR 7/25/17  Cultures  MRSA and Pseudomonas  DC'd vanco due to nephrotoxicity  Continue doxy (no Zyvox due to neuropathy)  Cefepime for PSAR  Continue antibiotics through a 8/25/17  No once daily options    Diarrhea, new  Cdiff neg  Hold stool softeners    Drainage from right BKA site- improved  Afebrile  No leukocytosis  No further drainage  Persistent pain  Prognosis for limb salvage poor    Placement will be difficult-states \"half\" the facilities in [place] have restraining orders against him  Plan of care discussed with IM-Dr Elizabeth      "

## 2017-08-02 NOTE — CARE PLAN
Problem: Safety  Goal: Will remain free from falls  Pt remains free from fall at this time.  Pt educated on fall risk.  Pt demonstrates understanding by appropriate use of call light to call for assistance.  CLIP, hourly rounding in place    Problem: Venous Thromboembolism (VTW)/Deep Vein Thrombosis (DVT) Prevention:  Goal: Patient will participate in Venous Thrombosis (VTE)/Deep Vein Thrombosis (DVT)Prevention Measures    08/01/17 2045   OTHER   Risk Assessment Score 2   VTE RISK Moderate   Pharmacologic Prophylaxis Used LMWH: Enoxaparin(Lovenox)   Mechanical/VTE Prophylaxis   Mechanical Prophylaxis  SCDs, Sequential Compression Device   SCDs, Sequential Compression Device Off

## 2017-08-02 NOTE — DISCHARGE PLANNING
Per the request of the CTTM on floor Carrie the SNF referral has been sent to the Facilities provided by the insurance Company

## 2017-08-03 PROBLEM — F41.9 ANXIETY: Status: ACTIVE | Noted: 2017-08-03

## 2017-08-03 PROCEDURE — 700111 HCHG RX REV CODE 636 W/ 250 OVERRIDE (IP): Performed by: INTERNAL MEDICINE

## 2017-08-03 PROCEDURE — A9270 NON-COVERED ITEM OR SERVICE: HCPCS | Performed by: HOSPITALIST

## 2017-08-03 PROCEDURE — 700105 HCHG RX REV CODE 258: Performed by: INTERNAL MEDICINE

## 2017-08-03 PROCEDURE — A9270 NON-COVERED ITEM OR SERVICE: HCPCS | Performed by: INTERNAL MEDICINE

## 2017-08-03 PROCEDURE — 99232 SBSQ HOSP IP/OBS MODERATE 35: CPT | Performed by: HOSPITALIST

## 2017-08-03 PROCEDURE — 700102 HCHG RX REV CODE 250 W/ 637 OVERRIDE(OP): Performed by: HOSPITALIST

## 2017-08-03 PROCEDURE — 700102 HCHG RX REV CODE 250 W/ 637 OVERRIDE(OP): Performed by: INTERNAL MEDICINE

## 2017-08-03 PROCEDURE — 700101 HCHG RX REV CODE 250: Performed by: INTERNAL MEDICINE

## 2017-08-03 PROCEDURE — 770021 HCHG ROOM/CARE - ISO PRIVATE

## 2017-08-03 PROCEDURE — 700101 HCHG RX REV CODE 250: Performed by: NURSE PRACTITIONER

## 2017-08-03 PROCEDURE — 97605 NEG PRS WND THER DME<=50SQCM: CPT

## 2017-08-03 RX ORDER — CLONIDINE HYDROCHLORIDE 0.1 MG/1
0.1 TABLET ORAL 3 TIMES DAILY
Status: DISCONTINUED | OUTPATIENT
Start: 2017-08-03 | End: 2017-08-04

## 2017-08-03 RX ADMIN — QUETIAPINE FUMARATE 50 MG: 25 TABLET ORAL at 07:55

## 2017-08-03 RX ADMIN — OXYCODONE HYDROCHLORIDE 20 MG: 20 TABLET, FILM COATED, EXTENDED RELEASE ORAL at 07:51

## 2017-08-03 RX ADMIN — OXYCODONE HYDROCHLORIDE 10 MG: 10 TABLET ORAL at 02:35

## 2017-08-03 RX ADMIN — OXYCODONE HYDROCHLORIDE 10 MG: 10 TABLET ORAL at 17:17

## 2017-08-03 RX ADMIN — ENOXAPARIN SODIUM 40 MG: 100 INJECTION SUBCUTANEOUS at 07:51

## 2017-08-03 RX ADMIN — QUETIAPINE FUMARATE 100 MG: 25 TABLET ORAL at 22:01

## 2017-08-03 RX ADMIN — Medication 325 MG: at 07:51

## 2017-08-03 RX ADMIN — DOXYCYCLINE 100 MG: 100 TABLET ORAL at 07:51

## 2017-08-03 RX ADMIN — OXYCODONE HYDROCHLORIDE 20 MG: 20 TABLET, FILM COATED, EXTENDED RELEASE ORAL at 22:01

## 2017-08-03 RX ADMIN — PREGABALIN 150 MG: 150 CAPSULE ORAL at 22:02

## 2017-08-03 RX ADMIN — LIDOCAINE 1 PATCH: 50 PATCH CUTANEOUS at 10:37

## 2017-08-03 RX ADMIN — DOXYCYCLINE 100 MG: 100 TABLET ORAL at 22:01

## 2017-08-03 RX ADMIN — PREGABALIN 150 MG: 150 CAPSULE ORAL at 14:15

## 2017-08-03 RX ADMIN — CEFEPIME 2 G: 2 INJECTION, POWDER, FOR SOLUTION INTRAMUSCULAR; INTRAVENOUS at 14:15

## 2017-08-03 RX ADMIN — CEFEPIME 2 G: 2 INJECTION, POWDER, FOR SOLUTION INTRAMUSCULAR; INTRAVENOUS at 05:36

## 2017-08-03 RX ADMIN — CEFEPIME 2 G: 2 INJECTION, POWDER, FOR SOLUTION INTRAMUSCULAR; INTRAVENOUS at 22:01

## 2017-08-03 RX ADMIN — ONDANSETRON 4 MG: 4 TABLET, ORALLY DISINTEGRATING ORAL at 22:14

## 2017-08-03 RX ADMIN — LIDOCAINE 1 APPLICATION: 40 CREAM TOPICAL at 22:14

## 2017-08-03 RX ADMIN — PREGABALIN 150 MG: 150 CAPSULE ORAL at 07:51

## 2017-08-03 RX ADMIN — OXYCODONE HYDROCHLORIDE 10 MG: 10 TABLET ORAL at 21:59

## 2017-08-03 RX ADMIN — QUETIAPINE FUMARATE 50 MG: 25 TABLET ORAL at 14:15

## 2017-08-03 RX ADMIN — OXYCODONE HYDROCHLORIDE 10 MG: 10 TABLET ORAL at 13:18

## 2017-08-03 RX ADMIN — LIDOCAINE 1 APPLICATION: 40 CREAM TOPICAL at 10:43

## 2017-08-03 RX ADMIN — LIDOCAINE 1 APPLICATION: 40 CREAM TOPICAL at 05:36

## 2017-08-03 RX ADMIN — ONDANSETRON 4 MG: 4 TABLET, ORALLY DISINTEGRATING ORAL at 05:41

## 2017-08-03 RX ADMIN — OXYCODONE HYDROCHLORIDE 10 MG: 10 TABLET ORAL at 09:10

## 2017-08-03 RX ADMIN — Medication 325 MG: at 17:17

## 2017-08-03 ASSESSMENT — PAIN SCALES - GENERAL
PAINLEVEL_OUTOF10: 6
PAINLEVEL_OUTOF10: 5
PAINLEVEL_OUTOF10: 5
PAINLEVEL_OUTOF10: 7
PAINLEVEL_OUTOF10: 6
PAINLEVEL_OUTOF10: 6

## 2017-08-03 ASSESSMENT — ENCOUNTER SYMPTOMS
MYALGIAS: 1
HEADACHES: 0
COUGH: 0
NAUSEA: 0
DIARRHEA: 0
HALLUCINATIONS: 0
STRIDOR: 0
CONSTIPATION: 0
CHILLS: 0
DIARRHEA: 1
SPEECH CHANGE: 0
FLANK PAIN: 0
NERVOUS/ANXIOUS: 1
INSOMNIA: 1
FEVER: 0
VOMITING: 0
TREMORS: 0
ABDOMINAL PAIN: 0
TINGLING: 1
SENSORY CHANGE: 1
SHORTNESS OF BREATH: 0

## 2017-08-03 ASSESSMENT — LIFESTYLE VARIABLES: SUBSTANCE_ABUSE: 1

## 2017-08-03 NOTE — PROGRESS NOTES
Infectious Disease Progress Note    Author: Joie Ramon M.D. DOS & Time created: 8/3/2017  2:17 PM    CC: FU right BKA stump infection and nonhealing left TMA stump wound     Interval History:  52 y.o. WM admitted 2017 with right BKA infection and nonhealing left TMA stump wound    AF WBC 7.6 less drainage from wounds Asking if he will need another BKA  17- no fevers. WBC 10.6. Complains of chills. Complains of 10 out of 10 pain in the left foot. Complains of drainage from the right stump.  17- complains of pain in the foot. Had low-grade fevers up to 100.7 last night . Foot cultures are showing MRSA and Pseudomonas  17- no fevers. Complains of burning in his left leg. No discharge noted from his right stump  17- no fevers. Complains of ongoing pain.  17- no fevers. No labs available  17-MAXIMUM TEMPERATURE 99.5. Ongoing pain issues. WBC 9000. Creatinine 1.17   AF lots of c/o neuropathy-feels like stump is in ice water   AF no new complaints- +phantom limb pain   AF pain better controlled Having loose stools. Wants a psych consult  8/3 AF in  today No new complaints  Labs Reviewed, Medications Reviewed, Radiology Reviewed and Wound Reviewed.    Review of Systems:  Review of Systems   Constitutional: Negative for fever and chills.   Respiratory: Negative for cough and shortness of breath.    Cardiovascular: Negative for chest pain.   Gastrointestinal: Positive for diarrhea. Negative for nausea, vomiting and abdominal pain.   Genitourinary: Negative for dysuria.   Musculoskeletal: Positive for myalgias and joint pain.   Neurological: Positive for sensory change.   All other systems reviewed and are negative.      Hemodynamics:  Temp (24hrs), Av.4 °C (97.6 °F), Min:36.2 °C (97.1 °F), Max:36.6 °C (97.9 °F)  Temperature: 36.6 °C (97.9 °F)  Pulse  Av.3  Min: 71  Max: 111   Blood Pressure: 137/73 mmHg       Physical Exam:  Physical Exam   Constitutional:  He is oriented to person, place, and time. He appears well-developed. No distress.   Disheveled   HENT:   Head: Normocephalic.   Poor dentition     Eyes:   Blind right eye   Neck: Neck supple.   Cardiovascular: Normal rate.    Pulmonary/Chest: Effort normal. No respiratory distress.   Abdominal: Soft. He exhibits no distension. There is no tenderness.   Musculoskeletal: He exhibits no edema.   Wound right BKA-no longer draining-wound is closed  Left foot surgical dressing and wound VAC present   Neurological: He is alert and oriented to person, place, and time.   Skin: No rash noted.   tattoos   Nursing note and vitals reviewed.      Labs:  No results for input(s): WBC, RBC, HEMOGLOBIN, HEMATOCRIT, MCV, MCH, RDW, PLATELETCT, MPV, NEUTSPOLYS, LYMPHOCYTES, MONOCYTES, EOSINOPHILS, BASOPHILS, RBCMORPHOLO in the last 72 hours.  Recent Labs      08/02/17   0213   SODIUM  137   POTASSIUM  3.9   CHLORIDE  106   CO2  24   GLUCOSE  113*   BUN  30*     Recent Labs      08/02/17   0213   CREATININE  0.92     Results     Procedure Component Value Units Date/Time    CDIFF BY PCR WITH TOXIN [[id number]] Collected:  07/31/17 2223    Order Status:  Completed Specimen Information:  Stool from Stool Updated:  08/01/17 1336     C Diff by PCR Negative      Comment: C. difficile NOT detected by PCR.  Treatment not indicated per guidelines.  Repeat testing not indicated within 7 days.          027-NAP1-BI Presumptive Negative      Comment: Presumptive 027/NAP1/BI target DNA sequences are NOT DETECTED.       Narrative:      Special Contact [id number] BOTELLO KATTY I  Patient running low grade temp and now incontinent of  diarrhea  Does this patient have risk factors for C-diff?->Yes  Has patient taken stool softeners or laxatives in the last 5  days?->Yes          Fluids:  Intake/Output       08/01/17 0700 - 08/02/17 0659 08/02/17 0700 - 08/03/17 0659 08/03/17 0700 - 08/04/17 0659      [id number] 1900-0659 Total [id number] 1900-0659  "Total 9471-1853 9301-7136 Total       Intake    P.O.  840  600 1440  240  600 840  560  -- 560    P.O.  240 600 840 560 -- 560    I.V.  440  340 780  180  240 420  160  -- 160    IV Volume (NS) 240 240 480 180 240 420 160 -- 160    IV Piggyback Volume 200 100 300 -- -- -- -- -- --    Total Intake 2311 999 6898  720 -- 720       Output    Urine  1875  1900 3775  500  1700 2200  1400  -- 1400    Number of Times Voided 1 x 3 x 4 x -- -- -- -- -- --    Void (ml) 1875 1900 3775 500 1700 2200 1400 -- 1400    Stool  --  -- --  --  -- --  --  -- --    Number of Times Stooled -- -- -- 1 x -- 1 x -- -- --    Total Output 1875 1900 3775 500 1700 2200 1400 -- 1400       Net I/O     -595 -960 -1555 -80 -860 -940 -680 -- -680             Assessment:  Active Hospital Problems    Diagnosis   • Skin ulcer of left foot with fat layer exposed (CMS-HCC) [L97.522]   • Wound of right leg, at BKA site [S81.801A]   • Peripheral neuropathy [G62.9]   • Tobacco abuse [Z72.0]       Plan:  Skin ulcer of left foot with fat layer exposed (CMS-HCC)  Afebrile  No leukocytosis  S/p OR 7/25/17  Cultures  MRSA and Pseudomonas  DC'd vanco due to nephrotoxicity  Continue doxy (no Zyvox due to neuropathy)  Cefepime for PSAR  Continue antibiotics through a 8/25/17  No once daily options  Check CBC and BMP in am    Diarrhea  Cdiff neg  Hold stool softeners    Drainage from right BKA site- improved  Afebrile  No leukocytosis  No further drainage  Persistent pain    Prognosis for limb salvage poor, LLE    Placement will be difficult-states \"half\" the facilities in [place] have restraining orders against him  Appreciate psych eval and recommendations  Plan of care discussed with IM-Dr Elizabeth      "

## 2017-08-03 NOTE — DISCHARGE PLANNING
Per Carrie RN case manager's note of 8/2, she listed [facility] [place] [facility]  SANTIAGO Copeland, has sent out referrals to these SNF's.  The patient has a wound vac.  I spoke with Bronwyn at [facility] and she said that if the patient is transferred to a SNF more that 2 hours away we will need the patient's doctor here to write an order that the wound vac can be clamped for the duration of the trip.  If this is not acceptable, the wound vac for the facility would need to be delivered here so that he has it on for the duration of the trip.

## 2017-08-03 NOTE — CARE PLAN
Problem: Safety  Goal: Will remain free from falls  Outcome: PROGRESSING AS EXPECTED  Pt remains free from fall.  Pt educated of fall risk.  Pt demonstrates understanding by appropriate use of call light to call for assistance.      Problem: Venous Thromboembolism (VTW)/Deep Vein Thrombosis (DVT) Prevention:  Goal: Patient will participate in Venous Thrombosis (VTE)/Deep Vein Thrombosis (DVT)Prevention Measures  Outcome: PROGRESSING AS EXPECTED    08/02/17 2113   OTHER   Risk Assessment Score 3   VTE RISK High   Pharmacologic Prophylaxis Used LMWH: Enoxaparin(Lovenox)   Mechanical/VTE Prophylaxis   Mechanical Prophylaxis  SCDs, Sequential Compression Device   SCDs, Sequential Compression Device Refused

## 2017-08-03 NOTE — PROGRESS NOTES
Pt requested that his son, Jorge Rosado, not be allowed to visit him.    Charge RN and security notified.  Doors on unit closed

## 2017-08-03 NOTE — PROGRESS NOTES
Spoke with Dr. Trujillo paged regarding clonidine order. Dr. Trujillo stating med is sometimes used for anxiety from withdrawal which she believes pt is experiencing.

## 2017-08-03 NOTE — CARE PLAN
Problem: Safety  Goal: Will remain free from injury  Outcome: PROGRESSING AS EXPECTED  Call light and personal belongings within reach. Pt refusing bed alarm. Pt calls appropriately. Non skid sock in use.    Problem: Skin Integrity  Goal: Risk for impaired skin integrity will decrease  Outcome: PROGRESSING AS EXPECTED  Waffle mattress in use. Encouraging Q2 turns. Wound vac to be changed today.Skin break down education provided. Moisturizer in use. Pt eating % of meals.    Problem: Pain Management  Goal: Pain level will decrease to patient’s comfort goal  Outcome: PROGRESSING AS EXPECTED  Pain well controlled. Lidocaine patch ordered. Lidocaine cream  PRN. Extra pillows in use for extremity elevation.

## 2017-08-03 NOTE — PROGRESS NOTES
[facility]ist Progress Note    Date of Service: 8/3/2017    Chief Complaint    52 y.o. male hx of PVD s/p L TMA and Rt Bka > 5 years ago  admitted 2017 with right BKA stump pain and wound drainage in addition to left TMA stump wound (nonhealing) over several months.  s/p Debridement and gastroscoleus resection LLE on 17     Interval Problem Update    Increased anxiety - states he has dx of Agoraphobia. Request life skills eval.  Episodes of breakthru pain - improved with narcotics.  Wants nicotine patch decreased.     Consultants/Specialty  LPS  ID - [facility]- dr. Yenny Sanchez- [place]    Disposition  Limited options w/ Payor (Medical HMO)          Review of Systems   Constitutional: Negative for fever and chills.   HENT: Negative for congestion.    Eyes:        Vision loss rt eye   Respiratory: Negative for cough, shortness of breath and stridor.    Cardiovascular: Negative for chest pain and leg swelling.   Gastrointestinal: Negative for vomiting, abdominal pain, diarrhea and constipation.   Genitourinary: Negative for hematuria and flank pain.   Musculoskeletal: Positive for myalgias and joint pain.   Neurological: Positive for tingling and sensory change. Negative for tremors, speech change and headaches.        Chronic peripheral neuropathy .   Psychiatric/Behavioral: Positive for substance abuse. Negative for hallucinations. The patient is nervous/anxious and has insomnia.    All other systems reviewed and are negative.     Physical Exam  Laboratory/Imaging   Hemodynamics  Temp (24hrs), Av.4 °C (97.5 °F), Min:36.2 °C (97.1 °F), Max:36.6 °C (97.8 °F)   Temperature: 36.2 °C (97.1 °F)  Pulse  Av  Min: 71  Max: 111    Blood Pressure: 115/67 mmHg      Respiratory      Respiration: 18, Pulse Oximetry: 96 %        RUL Breath Sounds: Clear, RML Breath Sounds: Clear, RLL Breath Sounds: Diminished, NANCY Breath Sounds: Clear, LLL Breath Sounds: Diminished    Fluids    Intake/Output Summary (Last 24  hours) at 08/03/17 0807  Last data filed at 08/03/17 0400   Gross per 24 hour   Intake   1000 ml   Output   2200 ml   Net  -1200 ml       Nutrition  Orders Placed This Encounter   Procedures   • DIET ORDER     Standing Status: Standing      Number of Occurrences: 1      Standing Expiration Date:      Order Specific Question:  Diet:     Answer:  Regular [1]     Physical Exam   Constitutional: He is oriented to person, place, and time. No distress.   Thin set.   Eyes: Right eye exhibits no discharge. Left eye exhibits no discharge. No scleral icterus.   Rt eye vision loss, enucleated.    Neck: Neck supple. No JVD present.   Cardiovascular: Normal rate and regular rhythm.    No murmur heard.  Pulmonary/Chest: No stridor. He has no wheezes. He has no rales.   Abdominal: Soft. Bowel sounds are normal. He exhibits no distension. There is no tenderness.   Musculoskeletal: He exhibits no edema.   Rt leg BKA - incision dry, no dehisc- healed.   Left leg w boot wound vac present.   Neurological: He is alert and oriented to person, place, and time. Coordination abnormal.   Skin: Skin is warm and dry. He is not diaphoretic. No erythema.   Psychiatric: His mood appears anxious. He is agitated and hyperactive.           Recent Labs      08/02/17   0213   SODIUM  137   POTASSIUM  3.9   CHLORIDE  106   CO2  24   GLUCOSE  113*   BUN  30*   CREATININE  0.92   CALCIUM  9.2                      Assessment/Plan     * Skin ulcer of left foot with fat layer exposed (CMS-HCC) (present on admission)  Assessment & Plan  S/p OR debridement and gastroscoleus resection 7/25   wound vac care. w Contact isolation for MRSA.  Pain control w sched oxycontin , prn oxycodone , IV Ms for breakthru pain .     MRSA (methicillin resistant staph aureus) culture positive (present on admission)  Assessment & Plan  w Left foot infxn  cw Doxycycline     Pseudomonas infection (present on admission)  Assessment & Plan  Left foot- continue IV cefepime .   Plan  thru 8-25-17      ABRIL (acute kidney injury) (CMS-HCC)  Assessment & Plan  ? Vancomycin associated - resolved.  Changed to Doxy  Monitor for urinary output changes .    Anxiety  Assessment & Plan  At times severe w agitation. Reports hx of Agoraphobia   ? Benefit from SSRI - will consult life skills.  cw seroquel     Tobacco abuse (present on admission)  Assessment & Plan  Nicotine replacement PRN  Cessation counseling given     Diarrhea  Assessment & Plan  abd exam benign, afeb  Follow symptoms.    Peripheral neuropathy (present on admission)  Assessment & Plan  cw lyrica --States he does not tolerate elavil .  Zyvox dcd.    Wound of right leg, at BKA site (present on admission)  Assessment & Plan  No acute infection , incision healed .  Monitor     Opiate dependence (CMS-HCC) (present on admission)  Assessment & Plan  Noted Drug seeking behavior inpatient, improved.  Avoid escalating  IV narcotics with no no signs for pain- use primarily for dressing / vac changes .        Labs reviewed and Medications reviewed  Rolon catheter: No Rolon      DVT Prophylaxis: Enoxaparin (Lovenox)    Ulcer prophylaxis: Not indicated  Antibiotics: Treating active infection/contamination beyond 24 hours perioperative coverage  Assessed for rehab: Patient was assess for and/or received rehabilitation services during this hospitalization

## 2017-08-03 NOTE — ASSESSMENT & PLAN NOTE
Re assurance, bit worse today since patient is apprehensive about his discharge plan to [facility]

## 2017-08-03 NOTE — PROGRESS NOTES
"Pt A&O x 4.  Reporting 6/10 pain. Pt medicated for pain per MAR.  Pt requesting half dose of seroquel stating, \"I don't want to be a zombie all day.\"  Wound vac to LLE. No air leak noted at this time. Wound vac to be changed today.  Boot in place.  Pt denies n/v at this time. +bowel sounds. Pt reporting loose BMs yesterday.  POC discussed with pt. All needs addressed at this time.  "
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
## 2017-08-03 NOTE — WOUND TEAM
"[facility]  Inpatient Services  Wound and Skin Care Treatment Note    Admission Date:  07/22/17     HPI, PMH, SH: Reviewed  Unit where seen by Wound Team:  [id number]    WOUND CONSULT RELATED TO:   NPWT dressing change left plantar foot      SUBJECTIVE:  \"I'm gonna take a shower after this.\"      Self Report / Pain Level:  Pre-medicated, tolerated well.     OBJECTIVE:    NPWT dressing intact    WOUND TYPE, LOCATION, CHARACTERISTICS (Pressure ulcers: location, stage, POA or date identified)    Location and type of wound:  Open surgical wound left plantar foot      Periwound:   Intact, slight macerated around wound, increased maceration in between remaining amputation site of pervious digit      Drainage:    Minimal serosanguinous    Tissue Type and %:   100% red  Wound Edges:   Attached/macerated    Odor:    mild    Exposed structure(s): Bone palpated    S&S of Infection:    Non healing, exposed bone       Measurements:    (Taken 08/03/17)   Length:   4.5 cm    Width:    4.5 cm   Depth:   <0.5cm     INTERVENTIONS BY WOUND TEAM:  removed old dressing, 3 pieces of black foam total.  Cleansed wound with wound cleanser.  Benzoin and drape to bull wound skin. Placed gauze in between remaining amputation site of pervious digit. Covered wound with one piece black foam and bridged to dorsum of foot.  4 pieces total.  Secured with drape and resumed NPWT at 125mmHg continuously.  Secured with ACE wrap. Placed cam boot back on LE.  LPS is following up with this.      Interdisciplinary consultation:   RN, patient      EVALUATION:  Clean surgically debrided wound. Gauze used in between remaining amputated site to decrease maceration. 4 pieces of black foam used in total.     Factors affecting wound healing:  Neuropathy, tobacco abuse    Goals:  Decrease in wound size by 1% each week.     NURSING PLAN OF CARE ORDERS (X):    Dressing changes: See Dressing Maintenance orders:  X  Skin care: See Skin Care orders:   Rectal " tube care: See Rectal Tube Care orders:   Other orders:    WOUND TEAM PLAN OF CARE (X):   NPWT change 3 x week:   X     Dressing changes by wound team:       Follow up as needed:       Other (explain):    Anticipated discharge plans (X):  SNF:           Home Care:           Outpatient Wound Center:      X      Self Care:            Other:

## 2017-08-03 NOTE — PROGRESS NOTES
Assumed care of Mr Cheung at 1900.     Pt is A&O x4.  Pain 7/10.  Pt medicated per MAR  Pt complains of nausea.  Pt medicated per MAR.  Tolerating Diet    Lt foot wound.  Wound vac, dressing, and walking boot in place.  Wound vac is set to suction, patent, no leaks present  Positive Urine output  Positive BM Void   Positive Flatus  Standby assist    Bed in lowest position and locked.    ** Bed alarm refused despite pt education on fall risk.  Pt is A&O x4 and demonstrates appropriate use of call light to call for assistance.  CLIP, hourly rounding in place.    Pt resting comfortably now.  Review plan of care with patient  Call light within reach  Hourly rounds in place  All needs met at this time
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
## 2017-08-03 NOTE — PROGRESS NOTES
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 Takes methadone he states for back pain.  Not diabetic.    Presents to ED with increased pain to L TMA with infected neuropathic ulcer and pain to R BKA.     s/p L foot I & D with VAC placement, GSR by Dr. Shirley on 7/25    /73 mmHg  Pulse 108  Temp(Src) 36.6 °C (97.9 °F)  Resp 18  Ht 1.854 m (6' 1\")  Wt 65.2 kg (143 lb 11.8 oz)  BMI 18.97 kg/m2  SpO2 96%     Pain controlled but c/o neuropathic pains intermittently to LLE    Offloading boot in place to LLE.   VAC dressing changed to day by IP wound team   Photo reviewed, wound team note reviewed. Wound bed clean and granulating, periwound macerated    GSR:    Incision approximated, without erythema, edema. No drainage  Cleaned with NS, applied dry gauze adaptic  Boot replaced with ABD pad to heel      R BKA:  skin intact  Per Nazario fajardo from [employer] working on new R prosthetic, will also be making shoe/crow boot  for left foot    ID involved  IV abx- MRSA    Case management working on SNF placement in [place]    PLAN  Continue VAC change per IP wound team for LLE.    diabetic boot continuously for 6wk d/t GSR incision to calf  NWB LLE until CROW boot can be applied by [employer]  Prosthetist to eval RLE prosthetic  NWB until prosthetic is adjusted  Continue IV abx per ID  Discharge planning.  Ok for discharge from LPS standpoint      "

## 2017-08-04 LAB
ANION GAP SERPL CALC-SCNC: 7 MMOL/L (ref 0–11.9)
BASOPHILS # BLD AUTO: 0.5 % (ref 0–1.8)
BASOPHILS # BLD: 0.05 K/UL (ref 0–0.12)
BUN SERPL-MCNC: 23 MG/DL (ref 8–22)
CALCIUM SERPL-MCNC: 9.5 MG/DL (ref 8.5–10.5)
CHLORIDE SERPL-SCNC: 103 MMOL/L (ref 96–112)
CO2 SERPL-SCNC: 25 MMOL/L (ref 20–33)
CREAT SERPL-MCNC: 0.75 MG/DL (ref 0.5–1.4)
EOSINOPHIL # BLD AUTO: 0.23 K/UL (ref 0–0.51)
EOSINOPHIL NFR BLD: 2.5 % (ref 0–6.9)
ERYTHROCYTE [DISTWIDTH] IN BLOOD BY AUTOMATED COUNT: 46.3 FL (ref 35.9–50)
GFR SERPL CREATININE-BSD FRML MDRD: >60 ML/MIN/1.73 M 2
GLUCOSE SERPL-MCNC: 107 MG/DL (ref 65–99)
HCT VFR BLD AUTO: 38.2 % (ref 42–52)
HGB BLD-MCNC: 12 G/DL (ref 14–18)
IMM GRANULOCYTES # BLD AUTO: 0.02 K/UL (ref 0–0.11)
IMM GRANULOCYTES NFR BLD AUTO: 0.2 % (ref 0–0.9)
LYMPHOCYTES # BLD AUTO: 3.24 K/UL (ref 1–4.8)
LYMPHOCYTES NFR BLD: 35.3 % (ref 22–41)
MCH RBC QN AUTO: 23.7 PG (ref 27–33)
MCHC RBC AUTO-ENTMCNC: 31.4 G/DL (ref 33.7–35.3)
MCV RBC AUTO: 75.3 FL (ref 81.4–97.8)
MONOCYTES # BLD AUTO: 0.46 K/UL (ref 0–0.85)
MONOCYTES NFR BLD AUTO: 5 % (ref 0–13.4)
NEUTROPHILS # BLD AUTO: 5.18 K/UL (ref 1.82–7.42)
NEUTROPHILS NFR BLD: 56.5 % (ref 44–72)
NRBC # BLD AUTO: 0 K/UL
NRBC BLD AUTO-RTO: 0 /100 WBC
PLATELET # BLD AUTO: 425 K/UL (ref 164–446)
PMV BLD AUTO: 9 FL (ref 9–12.9)
POTASSIUM SERPL-SCNC: 4 MMOL/L (ref 3.6–5.5)
RBC # BLD AUTO: 5.07 M/UL (ref 4.7–6.1)
SODIUM SERPL-SCNC: 135 MMOL/L (ref 135–145)
WBC # BLD AUTO: 9.2 K/UL (ref 4.8–10.8)

## 2017-08-04 PROCEDURE — 700102 HCHG RX REV CODE 250 W/ 637 OVERRIDE(OP): Performed by: INTERNAL MEDICINE

## 2017-08-04 PROCEDURE — 700101 HCHG RX REV CODE 250: Performed by: INTERNAL MEDICINE

## 2017-08-04 PROCEDURE — A9270 NON-COVERED ITEM OR SERVICE: HCPCS | Performed by: INTERNAL MEDICINE

## 2017-08-04 PROCEDURE — A9270 NON-COVERED ITEM OR SERVICE: HCPCS | Performed by: PSYCHIATRY & NEUROLOGY

## 2017-08-04 PROCEDURE — 700111 HCHG RX REV CODE 636 W/ 250 OVERRIDE (IP): Performed by: INTERNAL MEDICINE

## 2017-08-04 PROCEDURE — 770021 HCHG ROOM/CARE - ISO PRIVATE

## 2017-08-04 PROCEDURE — 700102 HCHG RX REV CODE 250 W/ 637 OVERRIDE(OP): Performed by: PSYCHIATRY & NEUROLOGY

## 2017-08-04 PROCEDURE — 700102 HCHG RX REV CODE 250 W/ 637 OVERRIDE(OP): Performed by: HOSPITALIST

## 2017-08-04 PROCEDURE — A9270 NON-COVERED ITEM OR SERVICE: HCPCS | Performed by: HOSPITALIST

## 2017-08-04 PROCEDURE — 80048 BASIC METABOLIC PNL TOTAL CA: CPT

## 2017-08-04 PROCEDURE — 99232 SBSQ HOSP IP/OBS MODERATE 35: CPT | Performed by: HOSPITALIST

## 2017-08-04 PROCEDURE — 700105 HCHG RX REV CODE 258: Performed by: INTERNAL MEDICINE

## 2017-08-04 PROCEDURE — 85025 COMPLETE CBC W/AUTO DIFF WBC: CPT

## 2017-08-04 RX ORDER — CLONIDINE HYDROCHLORIDE 0.1 MG/1
0.1 TABLET ORAL 3 TIMES DAILY
Status: DISCONTINUED | OUTPATIENT
Start: 2017-08-04 | End: 2017-08-04

## 2017-08-04 RX ORDER — DIVALPROEX SODIUM 250 MG/1
250 TABLET, DELAYED RELEASE ORAL EVERY 8 HOURS
Status: DISCONTINUED | OUTPATIENT
Start: 2017-08-04 | End: 2017-08-11

## 2017-08-04 RX ADMIN — OXYCODONE HYDROCHLORIDE 10 MG: 10 TABLET ORAL at 10:51

## 2017-08-04 RX ADMIN — PROMETHAZINE HYDROCHLORIDE 25 MG: 25 TABLET ORAL at 20:18

## 2017-08-04 RX ADMIN — LIDOCAINE 1 APPLICATION: 40 CREAM TOPICAL at 22:40

## 2017-08-04 RX ADMIN — OXYCODONE HYDROCHLORIDE 10 MG: 10 TABLET ORAL at 22:37

## 2017-08-04 RX ADMIN — OXYCODONE HYDROCHLORIDE 10 MG: 10 TABLET ORAL at 15:21

## 2017-08-04 RX ADMIN — OXYCODONE HYDROCHLORIDE 20 MG: 20 TABLET, FILM COATED, EXTENDED RELEASE ORAL at 22:29

## 2017-08-04 RX ADMIN — CEFEPIME 2 G: 2 INJECTION, POWDER, FOR SOLUTION INTRAMUSCULAR; INTRAVENOUS at 14:32

## 2017-08-04 RX ADMIN — PREGABALIN 150 MG: 150 CAPSULE ORAL at 07:12

## 2017-08-04 RX ADMIN — PREGABALIN 150 MG: 150 CAPSULE ORAL at 16:18

## 2017-08-04 RX ADMIN — OXYCODONE HYDROCHLORIDE 10 MG: 10 TABLET ORAL at 04:45

## 2017-08-04 RX ADMIN — OXYCODONE HYDROCHLORIDE 20 MG: 20 TABLET, FILM COATED, EXTENDED RELEASE ORAL at 07:12

## 2017-08-04 RX ADMIN — DOXYCYCLINE 100 MG: 100 TABLET ORAL at 22:29

## 2017-08-04 RX ADMIN — CEFEPIME 2 G: 2 INJECTION, POWDER, FOR SOLUTION INTRAMUSCULAR; INTRAVENOUS at 05:50

## 2017-08-04 RX ADMIN — DIVALPROEX SODIUM 250 MG: 250 TABLET, DELAYED RELEASE ORAL at 18:42

## 2017-08-04 RX ADMIN — Medication 325 MG: at 18:42

## 2017-08-04 RX ADMIN — NICOTINE 7 MG: 7 PATCH, EXTENDED RELEASE TRANSDERMAL at 05:50

## 2017-08-04 RX ADMIN — LIDOCAINE 1 PATCH: 50 PATCH CUTANEOUS at 11:34

## 2017-08-04 RX ADMIN — Medication 325 MG: at 07:12

## 2017-08-04 RX ADMIN — ENOXAPARIN SODIUM 40 MG: 100 INJECTION SUBCUTANEOUS at 07:12

## 2017-08-04 RX ADMIN — PREGABALIN 150 MG: 150 CAPSULE ORAL at 22:29

## 2017-08-04 RX ADMIN — DOXYCYCLINE 100 MG: 100 TABLET ORAL at 07:12

## 2017-08-04 RX ADMIN — CEFEPIME 2 G: 2 INJECTION, POWDER, FOR SOLUTION INTRAMUSCULAR; INTRAVENOUS at 22:30

## 2017-08-04 ASSESSMENT — ENCOUNTER SYMPTOMS
SHORTNESS OF BREATH: 0
NERVOUS/ANXIOUS: 1
DIARRHEA: 1
HEADACHES: 0
FEVER: 0
FLANK PAIN: 0
INSOMNIA: 1
VOMITING: 0
CONSTIPATION: 0
TINGLING: 1
COUGH: 0
MYALGIAS: 1
STRIDOR: 0
HALLUCINATIONS: 0
ABDOMINAL PAIN: 0
CHILLS: 0
TREMORS: 0
SPEECH CHANGE: 0
NAUSEA: 0
SENSORY CHANGE: 1
DIARRHEA: 0

## 2017-08-04 ASSESSMENT — PAIN SCALES - GENERAL
PAINLEVEL_OUTOF10: 5
PAINLEVEL_OUTOF10: 6
PAINLEVEL_OUTOF10: 3

## 2017-08-04 ASSESSMENT — PAIN SCALES - WONG BAKER
WONGBAKER_NUMERICALRESPONSE: HURTS JUST A LITTLE BIT
WONGBAKER_NUMERICALRESPONSE: HURTS JUST A LITTLE BIT

## 2017-08-04 ASSESSMENT — LIFESTYLE VARIABLES: SUBSTANCE_ABUSE: 1

## 2017-08-04 NOTE — CARE PLAN
Problem: Infection  Goal: Will remain free from infection  Outcome: PROGRESSING SLOWER THAN EXPECTED    Problem: Discharge Barriers/Planning  Goal: Patient’s continuum of care needs will be met  Outcome: PROGRESSING AS EXPECTED

## 2017-08-04 NOTE — PROGRESS NOTES
[facility] Hospitalist Progress Note    Date of Service: 2017    Chief Complaint    52 y.o. male hx of PVD s/p L TMA and Rt Bka > 5 years ago  admitted 2017 with right BKA stump pain and wound drainage in addition to left TMA stump wound (nonhealing) over several months.  s/p Debridement and gastroscoleus resection LLE on 17     Interval Problem Update     Clonidine started by life skills- Concerned about BP  despite measurements > 100.  Patient re assured. Using wheel chair. Afebrile on IV atbs.     Consultants/Specialty  LPS  ID - [facility]- dr. Ramon   [facility]- [facility]  Psychiatry .     Disposition  Limited options w/ Payor (Medical HMO)          Review of Systems   Constitutional: Negative for fever and chills.   HENT: Negative for congestion.    Eyes:        Vision loss rt eye   Respiratory: Negative for cough, shortness of breath and stridor.    Cardiovascular: Negative for chest pain and leg swelling.   Gastrointestinal: Negative for vomiting, abdominal pain, diarrhea and constipation.   Genitourinary: Negative for hematuria and flank pain.   Musculoskeletal: Positive for myalgias and joint pain.   Neurological: Positive for tingling and sensory change. Negative for tremors, speech change and headaches.        Chronic peripheral neuropathy .   Psychiatric/Behavioral: Positive for substance abuse. Negative for hallucinations. The patient is nervous/anxious and has insomnia.    All other systems reviewed and are negative.     Physical Exam  Laboratory/Imaging   Hemodynamics  Temp (24hrs), Av.8 °C (98.3 °F), Min:36.4 °C (97.5 °F), Max:37.3 °C (99.2 °F)   Temperature: 36.9 °C (98.4 °F)  Pulse  Av.8  Min: 71  Max: 111    Blood Pressure: 113/59 mmHg      Respiratory      Respiration: 18, Pulse Oximetry: 99 %        RUL Breath Sounds: Clear, RML Breath Sounds: Clear, RLL Breath Sounds: Diminished, NANCY Breath Sounds: Clear, LLL Breath Sounds: Diminished    Fluids    Intake/Output Summary (Last 24  hours) at 08/04/17 0858  Last data filed at 08/04/17 0725   Gross per 24 hour   Intake   1350 ml   Output   2500 ml   Net  -1150 ml       Nutrition  Orders Placed This Encounter   Procedures   • DIET ORDER     Standing Status: Standing      Number of Occurrences: 1      Standing Expiration Date:      Order Specific Question:  Diet:     Answer:  Regular [1]     Physical Exam   Constitutional: He is oriented to person, place, and time. No distress.   Thin set.   Eyes: Right eye exhibits no discharge. Left eye exhibits no discharge. No scleral icterus.   Rt eye vision loss, enucleated.    Neck: Neck supple. No JVD present.   Cardiovascular: Normal rate and regular rhythm.    No murmur heard.  Pulmonary/Chest: No stridor. He has no wheezes. He has no rales.   Abdominal: Soft. Bowel sounds are normal. He exhibits no distension. There is no tenderness.   Musculoskeletal: He exhibits no edema.   Rt leg BKA - incision dry, no dehisc- healed.   Left leg w boot wound vac present.   Neurological: He is alert and oriented to person, place, and time. Coordination abnormal.   Skin: Skin is warm and dry. He is not diaphoretic. No erythema.   Psychiatric: His mood appears anxious. He is agitated and hyperactive.           Recent Labs      08/02/17   0213   SODIUM  137   POTASSIUM  3.9   CHLORIDE  106   CO2  24   GLUCOSE  113*   BUN  30*   CREATININE  0.92   CALCIUM  9.2                      Assessment/Plan     * Skin ulcer of left foot with fat layer exposed (CMS-HCC) (present on admission)  Assessment & Plan  S/p OR debridement and gastroscoleus resection 7/25   wound vac care w  changes three time a week  Contact isolation for MRSA.  Pain control w sched oxycontin , prn oxycodone , IV Ms for breakthru pain .   TDWB LLE, WBAT RLE per Ortho recs.    MRSA (methicillin resistant staph aureus) culture positive (present on admission)  Assessment & Plan  w Left foot infxn  cw Doxycycline   Monitor for acute symptoms     Pseudomonas  infection (present on admission)  Assessment & Plan  Left foot- continue IV cefepime .   Plan thru 8-25-17      ABRIL (acute kidney injury) (CMS-HCC)  Assessment & Plan  ? Vancomycin associated - resolved. Drug stopped.   Monitor for urinary output changes .    Anxiety  Assessment & Plan  At times severe w agitation. Reports hx of Agoraphobia   cw seroquel   Monitor response to clonidine, life skills help appreciated .    Tobacco abuse (present on admission)  Assessment & Plan  Nicotine replacement PRN  Cessation counseling given     Diarrhea  Assessment & Plan  abd exam benign, afeb- reports clearing .  Follow symptoms.    Peripheral neuropathy (present on admission)  Assessment & Plan  cw lyrica --States he does not tolerate elavil .  Zyvox dcd.    Wound of right leg, at BKA site (present on admission)  Assessment & Plan  No acute infection , incision healed .  Monitor     Opiate dependence (CMS-HCC) (present on admission)  Assessment & Plan  Noted Drug seeking behavior inpatient, improved.  Avoid escalating  IV narcotics with no no signs for pain- use primarily for dressing / vac changes .      Discussed with Case management, multi disciplinary team. Remains difficult discharge .  Labs reviewed and Medications reviewed  Rolon catheter: No Rolon      DVT Prophylaxis: Enoxaparin (Lovenox)    Ulcer prophylaxis: Not indicated  Antibiotics: Treating active infection/contamination beyond 24 hours perioperative coverage  Assessed for rehab: Patient was assess for and/or received rehabilitation services during this hospitalization

## 2017-08-04 NOTE — PROGRESS NOTES
AxO x4, VSS, medicated for 5/10 pain. Tolerating diet, medicated with zofran as requested. R BKA wound healed, L foot wound has a wound vac, sealed and functioning well. L foot boot in place. Voiding adequately, LBM 8/2, +BS, +flatus. All needs met at this time, call light in reach.     Patient refuses a bed alarm despite education, patient calls appropriately.

## 2017-08-04 NOTE — PROGRESS NOTES
"Ortho Prog Note    7/25/17 L foot I&D, GSR, vac placement    Doing very well, good spirits, vac changes three times a week, measured for new prosthesis     /70 mmHg  Pulse 106  Temp(Src) 37.3 °C (99.2 °F)  Resp 18  Ht 1.854 m (6' 1\")  Wt 65.2 kg (143 lb 11.8 oz)  BMI 18.97 kg/m2  SpO2 96%      RLE: Dressing CDI, vac holding suction. Foot WWP        POD #8 Doing well, continue vac changes    TDWB LLE  WBAT RLE  Continue vac changes, work on placement  Abx per ID    Chavez Shirley MD      "

## 2017-08-04 NOTE — PROGRESS NOTES
Pt has requested to speak with someone in psych. He was seen yesterday and would like to talk with someone again. Is not expressing any suicidal ideations or threats. Is anxious after son attempted to visit him and wants to address medications with  [facility] consult line and left message.

## 2017-08-04 NOTE — PROGRESS NOTES
Infectious Disease Progress Note    Author: Joie Ramon M.D. DOS & Time created: 2017  2:21 PM    CC: FU right BKA stump infection and nonhealing left TMA stump wound     Interval History:  52 y.o. WM admitted 2017 with right BKA infection and nonhealing left TMA stump wound    AF WBC 7.6 less drainage from wounds Asking if he will need another BKA  17- no fevers. WBC 10.6. Complains of chills. Complains of 10 out of 10 pain in the left foot. Complains of drainage from the right stump.  17- complains of pain in the foot. Had low-grade fevers up to 100.7 last night . Foot cultures are showing MRSA and Pseudomonas  17- no fevers. Complains of burning in his left leg. No discharge noted from his right stump  17- no fevers. Complains of ongoing pain.  17- no fevers. No labs available  17-MAXIMUM TEMPERATURE 99.5. Ongoing pain issues. WBC 9000. Creatinine 1.17   AF lots of c/o neuropathy-feels like stump is in ice water   AF no new complaints- +phantom limb pain   AF pain better controlled Having loose stools. Wants a psych consult  8/3 AF in WC today No new complaints   AF WBC 9.2 no new complaints-wants to see psych again  Labs Reviewed, Medications Reviewed, Radiology Reviewed and Wound Reviewed.    Review of Systems:  Review of Systems   Constitutional: Negative for fever and chills.   Respiratory: Negative for cough and shortness of breath.    Cardiovascular: Negative for chest pain.   Gastrointestinal: Positive for diarrhea. Negative for nausea, vomiting and abdominal pain.   Genitourinary: Negative for dysuria.   Musculoskeletal: Positive for myalgias and joint pain.   Neurological: Positive for sensory change.   All other systems reviewed and are negative.      Hemodynamics:  Temp (24hrs), Av.8 °C (98.3 °F), Min:36.4 °C (97.5 °F), Max:37.3 °C (99.2 °F)  Temperature: 36.9 °C (98.4 °F)  Pulse  Av.8  Min: 71  Max: 111   Blood Pressure: 113/59  mmHg       Physical Exam:  Physical Exam   Constitutional: He is oriented to person, place, and time. He appears well-developed. No distress.   Disheveled   HENT:   Head: Normocephalic.   Poor dentition     Eyes:   Blind right eye   Neck: Neck supple.   Cardiovascular: Normal rate.    Pulmonary/Chest: Effort normal. No respiratory distress.   Abdominal: Soft. He exhibits no distension. There is no tenderness.   Musculoskeletal: He exhibits no edema.   Wound right BKA-no longer draining-wound is closed  Left foot surgical dressing and wound VAC present   Neurological: He is alert and oriented to person, place, and time.   Skin: No rash noted.   tattoos   Nursing note and vitals reviewed.      Labs:  Recent Labs      08/04/17   1100   WBC  9.2   RBC  5.07   HEMOGLOBIN  12.0*   HEMATOCRIT  38.2*   MCV  75.3*   MCH  23.7*   RDW  46.3   PLATELETCT  425   MPV  9.0   NEUTSPOLYS  56.50   LYMPHOCYTES  35.30   MONOCYTES  5.00   EOSINOPHILS  2.50   BASOPHILS  0.50     Recent Labs      08/02/17   0213  08/04/17   1100   SODIUM  137  135   POTASSIUM  3.9  4.0   CHLORIDE  106  103   CO2  24  25   GLUCOSE  113*  107*   BUN  30*  23*     Recent Labs      08/02/17   0213  08/04/17   1100   CREATININE  0.92  0.75     Results     Procedure Component Value Units Date/Time    CDIFF BY PCR WITH TOXIN [[id number]] Collected:  07/31/17 2223    Order Status:  Completed Specimen Information:  Stool from Stool Updated:  08/01/17 1336     C Diff by PCR Negative      Comment: C. difficile NOT detected by PCR.  Treatment not indicated per guidelines.  Repeat testing not indicated within 7 days.          027-NAP1-BI Presumptive Negative      Comment: Presumptive 027/NAP1/BI target DNA sequences are NOT DETECTED.       Narrative:      Special Contact [id number] ROSMERY ALANIZ I  Patient running low grade temp and now incontinent of  diarrhea  Does this patient have risk factors for C-diff?->Yes  Has patient taken stool softeners or laxatives  "in the last 5  days?->Yes          Fluids:  Intake/Output       08/02/17 0700 - 08/03/17 0659 08/03/17 0700 - 08/04/17 0659 08/04/17 0700 - 08/05/17 0659      7350-4986 2438-4870 Total 6695-3557 7784-2067 Total 2608-8440 9978-5965 Total       Intake    P.O.  240  600 840  1010  460 1470  --  -- --    P.O. 240  460 1470 -- -- --    I.V.  180  240 420  160  -- 160  --  -- --    IV Volume (NS) 180 240 420 160 -- 160 -- -- --    Total Intake  5464 839 9109 -- -- --       Output    Urine  500  1700 2200  1650  1150 2800  0  -- 0    Number of Times Voided -- -- -- -- -- -- 1 x -- 1 x    Void (ml) 500 1700 2200 1650 1150 2800 0 -- 0    Stool  --  -- --  --  -- --  --  -- --    Number of Times Stooled 1 x -- 1 x -- -- -- 1 x -- 1 x    Total Output 500 1700 2200 1650 1150 2800 0 -- 0       Net I/O     -80 -860 -940 -480 -690 -1170 0 -- 0             Assessment:  Active Hospital Problems    Diagnosis   • Skin ulcer of left foot with fat layer exposed (CMS-[facility]) [L97.522]   • Wound of right leg, at BKA site [S81.801A]   • Peripheral neuropathy [G62.9]   • Tobacco abuse [Z72.0]       Plan:  Skin ulcer of left foot with fat layer exposed (CMS-[facility])  Afebrile  No leukocytosis  S/p OR 7/25/17  Cultures  MRSA and Pseudomonas  DC'd vanco due to nephrotoxicity  Continue doxy (no Zyvox due to neuropathy)  Cefepime for PSAR  Continue antibiotics through a 8/25/17  No once daily options  Check CBC and BMP in am    Diarrhea  Cdiff neg  Hold stool softeners    Drainage from right BKA site- improved  Afebrile  No leukocytosis  No further drainage  Persistent pain    Prognosis for limb salvage poor, LLE    Placement will be difficult-states \"half\" the facilities in [place] have restraining orders against him  Appreciate psych eval and recommendations    Plan of care discussed with IM-Dr Elizabeth      "

## 2017-08-05 PROCEDURE — 700102 HCHG RX REV CODE 250 W/ 637 OVERRIDE(OP): Performed by: INTERNAL MEDICINE

## 2017-08-05 PROCEDURE — A9270 NON-COVERED ITEM OR SERVICE: HCPCS | Performed by: INTERNAL MEDICINE

## 2017-08-05 PROCEDURE — 700111 HCHG RX REV CODE 636 W/ 250 OVERRIDE (IP): Performed by: INTERNAL MEDICINE

## 2017-08-05 PROCEDURE — A9270 NON-COVERED ITEM OR SERVICE: HCPCS | Performed by: HOSPITALIST

## 2017-08-05 PROCEDURE — 700102 HCHG RX REV CODE 250 W/ 637 OVERRIDE(OP): Performed by: HOSPITALIST

## 2017-08-05 PROCEDURE — 700105 HCHG RX REV CODE 258: Performed by: INTERNAL MEDICINE

## 2017-08-05 PROCEDURE — 99232 SBSQ HOSP IP/OBS MODERATE 35: CPT | Performed by: HOSPITALIST

## 2017-08-05 PROCEDURE — 700102 HCHG RX REV CODE 250 W/ 637 OVERRIDE(OP): Performed by: PSYCHIATRY & NEUROLOGY

## 2017-08-05 PROCEDURE — 770021 HCHG ROOM/CARE - ISO PRIVATE

## 2017-08-05 PROCEDURE — A9270 NON-COVERED ITEM OR SERVICE: HCPCS | Performed by: PSYCHIATRY & NEUROLOGY

## 2017-08-05 PROCEDURE — 700111 HCHG RX REV CODE 636 W/ 250 OVERRIDE (IP): Performed by: HOSPITALIST

## 2017-08-05 PROCEDURE — 97605 NEG PRS WND THER DME<=50SQCM: CPT

## 2017-08-05 RX ORDER — METHADONE HYDROCHLORIDE 10 MG/1
10 TABLET ORAL EVERY 8 HOURS
Status: DISCONTINUED | OUTPATIENT
Start: 2017-08-05 | End: 2017-08-19

## 2017-08-05 RX ADMIN — LIDOCAINE 1 APPLICATION: 40 CREAM TOPICAL at 22:14

## 2017-08-05 RX ADMIN — MORPHINE SULFATE 2 MG: 4 INJECTION INTRAVENOUS at 00:04

## 2017-08-05 RX ADMIN — DIVALPROEX SODIUM 250 MG: 250 TABLET, DELAYED RELEASE ORAL at 00:04

## 2017-08-05 RX ADMIN — DOXYCYCLINE 100 MG: 100 TABLET ORAL at 08:51

## 2017-08-05 RX ADMIN — PREGABALIN 150 MG: 150 CAPSULE ORAL at 08:51

## 2017-08-05 RX ADMIN — DIVALPROEX SODIUM 250 MG: 250 TABLET, DELAYED RELEASE ORAL at 05:58

## 2017-08-05 RX ADMIN — OXYCODONE HYDROCHLORIDE 10 MG: 10 TABLET ORAL at 17:06

## 2017-08-05 RX ADMIN — OXYCODONE HYDROCHLORIDE 20 MG: 20 TABLET, FILM COATED, EXTENDED RELEASE ORAL at 09:22

## 2017-08-05 RX ADMIN — CEFEPIME 2 G: 2 INJECTION, POWDER, FOR SOLUTION INTRAMUSCULAR; INTRAVENOUS at 05:58

## 2017-08-05 RX ADMIN — CEFEPIME 2 G: 2 INJECTION, POWDER, FOR SOLUTION INTRAMUSCULAR; INTRAVENOUS at 15:40

## 2017-08-05 RX ADMIN — ENOXAPARIN SODIUM 40 MG: 100 INJECTION SUBCUTANEOUS at 08:51

## 2017-08-05 RX ADMIN — METHADONE HYDROCHLORIDE 10 MG: 10 TABLET ORAL at 22:14

## 2017-08-05 RX ADMIN — PREGABALIN 150 MG: 150 CAPSULE ORAL at 15:40

## 2017-08-05 RX ADMIN — PREGABALIN 150 MG: 150 CAPSULE ORAL at 22:14

## 2017-08-05 RX ADMIN — CEFEPIME 2 G: 2 INJECTION, POWDER, FOR SOLUTION INTRAMUSCULAR; INTRAVENOUS at 22:14

## 2017-08-05 RX ADMIN — OXYCODONE HYDROCHLORIDE 10 MG: 10 TABLET ORAL at 02:45

## 2017-08-05 RX ADMIN — DOXYCYCLINE 100 MG: 100 TABLET ORAL at 22:14

## 2017-08-05 RX ADMIN — DIVALPROEX SODIUM 250 MG: 250 TABLET, DELAYED RELEASE ORAL at 22:45

## 2017-08-05 RX ADMIN — OXYCODONE HYDROCHLORIDE 10 MG: 10 TABLET ORAL at 08:51

## 2017-08-05 RX ADMIN — DIVALPROEX SODIUM 250 MG: 250 TABLET, DELAYED RELEASE ORAL at 15:40

## 2017-08-05 RX ADMIN — NICOTINE 7 MG: 7 PATCH, EXTENDED RELEASE TRANSDERMAL at 05:58

## 2017-08-05 RX ADMIN — Medication 325 MG: at 17:08

## 2017-08-05 RX ADMIN — Medication 325 MG: at 08:51

## 2017-08-05 RX ADMIN — ZOLPIDEM TARTRATE 5 MG: 5 TABLET, FILM COATED ORAL at 00:03

## 2017-08-05 RX ADMIN — OXYCODONE HYDROCHLORIDE 10 MG: 10 TABLET ORAL at 22:14

## 2017-08-05 RX ADMIN — OXYCODONE HYDROCHLORIDE 5 MG: 5 TABLET ORAL at 12:59

## 2017-08-05 ASSESSMENT — ENCOUNTER SYMPTOMS
STRIDOR: 0
FLANK PAIN: 0
ABDOMINAL PAIN: 0
VOMITING: 0
SHORTNESS OF BREATH: 0
SPEECH CHANGE: 0
TINGLING: 1
PALPITATIONS: 0
TREMORS: 0
MYALGIAS: 1
NERVOUS/ANXIOUS: 1
FEVER: 0
NAUSEA: 0
HALLUCINATIONS: 0
DIARRHEA: 1
DIARRHEA: 0
COUGH: 0
CHILLS: 0
SENSORY CHANGE: 1

## 2017-08-05 ASSESSMENT — PAIN SCALES - GENERAL
PAINLEVEL_OUTOF10: 5
PAINLEVEL_OUTOF10: 6
PAINLEVEL_OUTOF10: 4
PAINLEVEL_OUTOF10: 6
PAINLEVEL_OUTOF10: 4

## 2017-08-05 ASSESSMENT — PATIENT HEALTH QUESTIONNAIRE - PHQ9
SUM OF ALL RESPONSES TO PHQ9 QUESTIONS 1 AND 2: 0
1. LITTLE INTEREST OR PLEASURE IN DOING THINGS: NOT AT ALL
SUM OF ALL RESPONSES TO PHQ QUESTIONS 1-9: 0
2. FEELING DOWN, DEPRESSED, IRRITABLE, OR HOPELESS: NOT AT ALL

## 2017-08-05 ASSESSMENT — LIFESTYLE VARIABLES: DO YOU DRINK ALCOHOL: NO

## 2017-08-05 NOTE — PSYCHIATRY
"PSYCHIATRIC FOLLOW UP:    Reason for Admission:   Legal hold status:     Psychiatric Supervising Attending:         HPI:     51 y/o man with personality disorder, \"anxiety\". Actually presenting as slightly manic today. He is upset he isn't getting klonopin, and iimpulsive and jumps to conclusions in conversation with me. He was able to redirect. He does not want seroquel or clonidine. He eventually agrees to take depakote as a mood stabilizer and because it helps with neuropathy.       Psychiatric Examination: observed phenomenon:  Vitals:   Filed Vitals:    08/04/17 1600 08/04/17 2030 08/05/17 0400 08/05/17 0800   BP: 141/73 96/74 116/60 105/67   Pulse: 94 95 88 88   Temp: 36.9 °C (98.5 °F) 36.9 °C (98.4 °F) 36.2 °C (97.2 °F) 37.1 °C (98.7 °F)   Resp: 18 20 20 16   Height:       Weight:       SpO2: 96% 96% 92% 98%       Musculoskeletal(abnormal movements, gait, etc): psychomotor agitation   Appearane:grooming fair   Thoughts: racing   Speech:pressured  Mood:    Irritable   Affect:    Mildly labile   SI/HI:   Denies   Attention/Alertness:   Poor attention   Memory:    Fair   Orientation:    oriented  Fund of Knowledge:    Fair   Insight/Judgement into syptoms decreased   Neurological Testing:( ie clock, cube drawing, MMSE, MOCA,etc.)    Medical systems reviewed: (pain, new complaint, etc)         Denies n/v   Denies abd pain  Some pain in legs  Tingling in hands   All other systems reviewed and are negative.     Lab results/tests:       Recent Results (from the past 48 hour(s))   CBC WITH DIFFERENTIAL    Collection Time: 08/04/17 11:00 AM   Result Value Ref Range    WBC 9.2 4.8 - 10.8 K/uL    RBC 5.07 4.70 - 6.10 M/uL    Hemoglobin 12.0 (L) 14.0 - 18.0 g/dL    Hematocrit 38.2 (L) 42.0 - 52.0 %    MCV 75.3 (L) 81.4 - 97.8 fL    MCH 23.7 (L) 27.0 - 33.0 pg    MCHC 31.4 (L) 33.7 - 35.3 g/dL    RDW 46.3 35.9 - 50.0 fL    Platelet Count 425 164 - 446 K/uL    MPV 9.0 9.0 - 12.9 fL    Neutrophils-Polys 56.50 44.00 - 72.00 " %    Lymphocytes 35.30 22.00 - 41.00 %    Monocytes 5.00 0.00 - 13.40 %    Eosinophils 2.50 0.00 - 6.90 %    Basophils 0.50 0.00 - 1.80 %    Immature Granulocytes 0.20 0.00 - 0.90 %    Nucleated RBC 0.00 /100 WBC    Neutrophils (Absolute) 5.18 1.82 - 7.42 K/uL    Lymphs (Absolute) 3.24 1.00 - 4.80 K/uL    Monos (Absolute) 0.46 0.00 - 0.85 K/uL    Eos (Absolute) 0.23 0.00 - 0.51 K/uL    Baso (Absolute) 0.05 0.00 - 0.12 K/uL    Immature Granulocytes (abs) 0.02 0.00 - 0.11 K/uL    NRBC (Absolute) 0.00 K/uL   BASIC METABOLIC PANEL    Collection Time: 08/04/17 11:00 AM   Result Value Ref Range    Sodium 135 135 - 145 mmol/L    Potassium 4.0 3.6 - 5.5 mmol/L    Chloride 103 96 - 112 mmol/L    Co2 25 20 - 33 mmol/L    Glucose 107 (H) 65 - 99 mg/dL    Bun 23 (H) 8 - 22 mg/dL    Creatinine 0.75 0.50 - 1.40 mg/dL    Calcium 9.5 8.5 - 10.5 mg/dL    Anion Gap 7.0 0.0 - 11.9   ESTIMATED GFR    Collection Time: 08/04/17 11:00 AM   Result Value Ref Range    GFR If African American >60 >60 mL/min/1.73 m 2    GFR If Non African American >60 >60 mL/min/1.73 m 2        Assessment:(acuity level)  Mood disorder unspecified, r/o BAD  Personality disorder unspecified  Opiate use disorder           Plan:  legal hold: none   Starting depakote 250mg po tid  Will follow

## 2017-08-05 NOTE — WOUND TEAM
"[facility]  Inpatient Services  Wound and Skin Care Treatment Note    Admission Date:  07/22/17     HPI, PMH, SH: Reviewed  Unit where seen by Wound Team:  [id number]    WOUND CONSULT RELATED TO:   NPWT dressing change left plantar foot      SUBJECTIVE:  \"Can you change the cannister?\"      Self Report / Pain Level:  Tolerated well with PO pre medication     OBJECTIVE:    Previous NPWT dressing intact    WOUND TYPE, LOCATION, CHARACTERISTICS (Pressure ulcers: location, stage, POA or date identified)    Location and type of wound:  Open surgical wound left plantar foot      Periwound:   Intact, macerated around wound and in between remaining amputation site of pervious digit      Drainage:    Minimal serosanguinous    Tissue Type and %:   100% red  Wound Edges:   Attached/macerated    Odor:    mild    Exposed structure(s): Bone palpated    S&S of Infection:    Non healing, exposed bone       Measurements:    (Taken 08/03/17)   Length:   4.5 cm    Width:    4.5 cm   Depth:   <0.5cm     INTERVENTIONS BY WOUND TEAM:  Removed old dressing and cleansed wound with wound cleanser.  Benzoin and drape to bull wound skin.  Covered wound with black foam bridging to dorsum of foot after placing dry gauze in between last digit.  Secured with drape and resumed NPWT at 125mmHg continuously.   Total black foam=3 pieces.  Secured with ACE wrap. Placed cam boot back on LE.  LPS is following up with this.      Interdisciplinary consultation:   Staff RN, patient      EVALUATION:  Clean surgically debrided wound. Gauze used in between remaining amputated site to decrease maceration.     Factors affecting wound healing:  Neuropathy, tobacco abuse    Goals:  Decrease in wound size by 1% each week.     NURSING PLAN OF CARE ORDERS (X):    Dressing changes: See Dressing Maintenance orders:  X  Skin care: See Skin Care orders:   Rectal tube care: See Rectal Tube Care orders:   Other orders:    WOUND TEAM PLAN OF CARE (X):   NPWT " change 3 x week:   X     Dressing changes by wound team:       Follow up as needed:       Other (explain):    Anticipated discharge plans (X):  SNF:           Home Care:           Outpatient Wound Center:      X      Self Care:            Other:

## 2017-08-05 NOTE — PROGRESS NOTES
[facility] Hospitalist Progress Note    Date of Service: 2017    Chief Complaint    52 y.o. male hx of PVD s/p L TMA and Rt Bka > 5 years ago  admitted 2017 with right BKA stump pain and wound drainage in addition to left TMA stump wound (nonhealing) over several months.  s/p Debridement and gastroscoleus resection LLE on 17     Interval Problem Update    Afeb on IV atbs. Declined clonidine- started on depakote per life skills.  Previously on Methadone 10 tid. Request to be placed back on .    Consultants/Specialty  LPS  ID - [facility]- dr. Ramon   [facility]  Psychiatry .     Disposition  Limited options w/ Payor (Medical HMO)          Review of Systems   Constitutional: Negative for fever and chills.   HENT: Negative for congestion.    Eyes:        Vision loss rt eye   Respiratory: Negative for cough, shortness of breath and stridor.    Cardiovascular: Negative for chest pain, palpitations and leg swelling.   Gastrointestinal: Negative for vomiting, abdominal pain and diarrhea.   Genitourinary: Negative for hematuria and flank pain.   Musculoskeletal: Positive for myalgias and joint pain.   Neurological: Positive for tingling and sensory change. Negative for tremors and speech change.        Chronic peripheral neuropathy .   Psychiatric/Behavioral: Negative for hallucinations. The patient is nervous/anxious (improved.).    All other systems reviewed and are negative.     Physical Exam  Laboratory/Imaging   Hemodynamics  Temp (24hrs), Av.8 °C (98.2 °F), Min:36.2 °C (97.2 °F), Max:37.1 °C (98.7 °F)   Temperature: 37.1 °C (98.7 °F)  Pulse  Av.1  Min: 71  Max: 111    Blood Pressure: 105/67 mmHg      Respiratory      Respiration: 16, Pulse Oximetry: 98 %        RUL Breath Sounds: Clear, RML Breath Sounds: Clear, RLL Breath Sounds: Diminished, NANCY Breath Sounds: Clear, LLL Breath Sounds: Diminished    Fluids    Intake/Output Summary (Last 24 hours) at 17 0914  Last data filed at 17  0800   Gross per 24 hour   Intake   1180 ml   Output    775 ml   Net    405 ml       Nutrition  Orders Placed This Encounter   Procedures   • DIET ORDER     Standing Status: Standing      Number of Occurrences: 1      Standing Expiration Date:      Order Specific Question:  Diet:     Answer:  Regular [1]     Physical Exam   Constitutional: He is oriented to person, place, and time. No distress.   Thin set.   Eyes: Right eye exhibits no discharge. Left eye exhibits no discharge.   Rt eye vision loss, enucleated.    Neck: Neck supple. No JVD present.   Cardiovascular: Normal rate and regular rhythm.    No murmur heard.  Pulmonary/Chest: No stridor. He has no wheezes. He has no rales.   Abdominal: Soft. Bowel sounds are normal. He exhibits no distension. There is no tenderness.   Musculoskeletal: He exhibits no edema.   Rt leg BKA - incision dry, no dehisc- healed.   Left leg w boot wound vac present.   Neurological: He is alert and oriented to person, place, and time. Coordination abnormal.   Skin: Skin is warm and dry. He is not diaphoretic. No erythema.   Psychiatric: His mood appears anxious (cooperative .).       Recent Labs      08/04/17   1100   WBC  9.2   RBC  5.07   HEMOGLOBIN  12.0*   HEMATOCRIT  38.2*   MCV  75.3*   MCH  23.7*   MCHC  31.4*   RDW  46.3   PLATELETCT  425   MPV  9.0     Recent Labs      08/04/17   1100   SODIUM  135   POTASSIUM  4.0   CHLORIDE  103   CO2  25   GLUCOSE  107*   BUN  23*   CREATININE  0.75   CALCIUM  9.5                      Assessment/Plan     * Skin ulcer of left foot with fat layer exposed (CMS-HCC) (present on admission)  Assessment & Plan  S/p OR debridement and gastroscoleus resection 7/25   Continue wound vac care   Contact isolation for MRSA.  TDWB LLE, WBAT RLE per Ortho recs.  Patient reportedly has restraining orders from much of the Skilled facilites in [place], difficult discharge-- await response from other facilities.     MRSA (methicillin resistant staph aureus)  culture positive (present on admission)  Assessment & Plan  w Left foot infxn  cw Doxycycline   Monitor for acute symptoms     Pseudomonas infection (present on admission)  Assessment & Plan  Left foot- continue IV cefepime .   Plan thru 8-25-17      ABRIL (acute kidney injury) (CMS-HCC)  Assessment & Plan  ? Vancomycin associated - resolved. Drug stopped.   Monitor for urinary output changes .    Anxiety  Assessment & Plan  At times severe w agitation. Reports hx of Agoraphobia   sched depakote -- life skills input.    Tobacco abuse (present on admission)  Assessment & Plan  Nicotine replacement PRN  Cessation counseling given     Diarrhea  Assessment & Plan  abd exam benign, afeb- reports clearing .  Follow symptoms.    Peripheral neuropathy (present on admission)  Assessment & Plan  cw lyrica --States he does not tolerate elavil .      Wound of right leg, at BKA site (present on admission)  Assessment & Plan  No acute infection , incision healed .  Monitor     Opiate dependence (CMS-HCC) (present on admission)  Assessment & Plan  Noted Drug seeking behavior inpatient- improved deviance.  Avoid escalating  IV narcotics with no no signs for pain- use primarily for dressing / vac changes .  Request to be placed back on methadone 10 mg tid-- plan resume and transition off oxycontin.     Discussed with ID.   Labs reviewed and Medications reviewed  Rolon catheter: No Rolon      DVT Prophylaxis: Enoxaparin (Lovenox)    Ulcer prophylaxis: Not indicated  Antibiotics: Treating active infection/contamination beyond 24 hours perioperative coverage  Assessed for rehab: Patient was assess for and/or received rehabilitation services during this hospitalization

## 2017-08-05 NOTE — PROGRESS NOTES
Infectious Disease Progress Note    Author: Joie Ramon M.D. DOS & Time created: 2017  4:36 PM    CC: FU right BKA stump infection and nonhealing left TMA stump wound     Interval History:  52 y.o. WM admitted 2017 with right BKA infection and nonhealing left TMA stump wound    AF WBC 7.6 less drainage from wounds Asking if he will need another BKA  17- no fevers. WBC 10.6. Complains of chills. Complains of 10 out of 10 pain in the left foot. Complains of drainage from the right stump.  17- complains of pain in the foot. Had low-grade fevers up to 100.7 last night . Foot cultures are showing MRSA and Pseudomonas  17- no fevers. Complains of burning in his left leg. No discharge noted from his right stump  17- no fevers. Complains of ongoing pain.  17- no fevers. No labs available  17-MAXIMUM TEMPERATURE 99.5. Ongoing pain issues. WBC 9000. Creatinine 1.17   AF lots of c/o neuropathy-feels like stump is in ice water   AF no new complaints- +phantom limb pain   AF pain better controlled Having loose stools. Wants a psych consult  8/3 AF in WC today No new complaints   AF WBC 9.2 no new complaints-wants to see psych again   AF WBC 9.2 seen during dressing change  Labs Reviewed, Medications Reviewed, Radiology Reviewed and Wound Reviewed.    Review of Systems:  Review of Systems   Constitutional: Negative for fever and chills.   Respiratory: Negative for cough and shortness of breath.    Cardiovascular: Negative for chest pain.   Gastrointestinal: Positive for diarrhea. Negative for nausea, vomiting and abdominal pain.   Genitourinary: Negative for dysuria.   Musculoskeletal: Positive for myalgias and joint pain.   Neurological: Positive for sensory change.   All other systems reviewed and are negative.      Hemodynamics:  Temp (24hrs), Av.7 °C (98.1 °F), Min:36.2 °C (97.2 °F), Max:37.1 °C (98.7 °F)  Temperature: 37.1 °C (98.7 °F)  Pulse  Av.1   Min: 71  Max: 111   Blood Pressure: 105/67 mmHg       Physical Exam:  Physical Exam   Constitutional: He is oriented to person, place, and time. He appears well-developed. No distress.   Disheveled   HENT:   Head: Normocephalic.   Poor dentition     Eyes:   Blind right eye   Neck: Neck supple.   Cardiovascular: Normal rate.    Pulmonary/Chest: Effort normal. No respiratory distress.   Abdominal: Soft. He exhibits no distension. There is no tenderness.   Musculoskeletal: He exhibits no edema.   Wound right BKA-no longer draining  Left foot surgical dressing-granulating, decreased size  4.5 by 4.5 cm-serous drainage   Neurological: He is alert and oriented to person, place, and time.   Skin: No rash noted.   tattoos   Nursing note and vitals reviewed.      Labs:  Recent Labs      08/04/17   1100   WBC  9.2   RBC  5.07   HEMOGLOBIN  12.0*   HEMATOCRIT  38.2*   MCV  75.3*   MCH  23.7*   RDW  46.3   PLATELETCT  425   MPV  9.0   NEUTSPOLYS  56.50   LYMPHOCYTES  35.30   MONOCYTES  5.00   EOSINOPHILS  2.50   BASOPHILS  0.50     Recent Labs      08/04/17   1100   SODIUM  135   POTASSIUM  4.0   CHLORIDE  103   CO2  25   GLUCOSE  107*   BUN  23*     Recent Labs      08/04/17   1100   CREATININE  0.75     Results     Procedure Component Value Units Date/Time    CDIFF BY PCR WITH TOXIN [[id number]] Collected:  07/31/17 2223    Order Status:  Completed Specimen Information:  Stool from Stool Updated:  08/01/17 1336     C Diff by PCR Negative      Comment: C. difficile NOT detected by PCR.  Treatment not indicated per guidelines.  Repeat testing not indicated within 7 days.          027-NAP1-BI Presumptive Negative      Comment: Presumptive 027/NAP1/BI target DNA sequences are NOT DETECTED.       Narrative:      Special Contact [phone] ROSMERY ALANIZ I  Patient running low grade temp and now incontinent of  diarrhea  Does this patient have risk factors for C-diff?->Yes  Has patient taken stool softeners or laxatives in  "the last 5  days?->Yes          Fluids:  Intake/Output       08/03/17 0700 - 08/04/17 0659 08/04/17 0700 - 08/05/17 0659 08/05/17 0700 - 08/06/17 0659      7513-9377 6524-0799 Total 0471-1426 3295-3050 Total 1782-4350 7139-6738 Total       Intake    P.O.  1010  460 1470  1080  -- 1080  --  -- --    P.O. 2535 737 7732 1080 -- 1080 -- -- --    I.V.  160  -- 160  100  -- 100  160  -- 160    IV Volume (NS) 160 -- 160 -- -- -- 160 -- 160    IV Piggyback Volume -- -- -- 100 -- 100 -- -- --    Total Intake 1302 947 7231 1180 -- 1180 160 -- 160       Output    Urine  1650  1150 2800  775  -- 775  0  -- 0    Number of Times Voided -- -- -- 1 x -- 1 x -- -- --    Void (ml) 1650 1150 2800 775 -- 775 0 -- 0    Stool  --  -- --  --  -- --  --  -- --    Number of Times Stooled -- -- -- 1 x -- 1 x 0 x -- 0 x    Total Output 1650 1150 2800 775 -- 775 0 -- 0       Net I/O     -480 -690 -1170 405 -- 405 160 -- 160             Assessment:  Active Hospital Problems    Diagnosis   • Skin ulcer of left foot with fat layer exposed (CMS-[facility]) [L97.522]   • Wound of right leg, at BKA site [S81.801A]   • Peripheral neuropathy [G62.9]   • Tobacco abuse [Z72.0]       Plan:  Skin ulcer of left foot with fat layer exposed (CMS-[facility]), improving  Afebrile  No leukocytosis  S/p OR 7/25/17  Cultures  MRSA and Pseudomonas  DC'd vanco due to nephrotoxicity  Continue doxy (no Zyvox due to neuropathy)  Cefepime for PSAR  Continue antibiotics through a 8/25/17  No once daily options  Check CBC and BMP in am    Diarrhea  Cdiff neg  Hold stool softeners    Drainage from right BKA site- improved  Afebrile  No leukocytosis  No further drainage  Persistent pain    Prognosis for limb salvage poor, LLE    Placement will be difficult-states \"half\" the facilities in [place] have restraining orders against him  Appreciate psych eval and recommendations    Plan of care discussed with IM-Dr Elizabeth/Wound care      "

## 2017-08-05 NOTE — CARE PLAN
Problem: Safety  Goal: Will remain free from injury  Outcome: PROGRESSING AS EXPECTED  Pt able to transfer self from bed to WC and back without assistance using slide board.     Problem: Pain Management  Goal: Pain level will decrease to patient’s comfort goal  Outcome: PROGRESSING AS EXPECTED  Pt medicated per MAR, reports adequate pain relief after brief episode of uncontrollable pain

## 2017-08-05 NOTE — PROGRESS NOTES
Assumed care of patient received report from NOC, Assessed patient, provided morning meds, Patient awake and alert sitting up in bed at lowest position with call light within reach.

## 2017-08-06 PROCEDURE — 700102 HCHG RX REV CODE 250 W/ 637 OVERRIDE(OP): Performed by: INTERNAL MEDICINE

## 2017-08-06 PROCEDURE — 700111 HCHG RX REV CODE 636 W/ 250 OVERRIDE (IP): Performed by: INTERNAL MEDICINE

## 2017-08-06 PROCEDURE — 700102 HCHG RX REV CODE 250 W/ 637 OVERRIDE(OP): Performed by: PSYCHIATRY & NEUROLOGY

## 2017-08-06 PROCEDURE — 700105 HCHG RX REV CODE 258

## 2017-08-06 PROCEDURE — 700101 HCHG RX REV CODE 250: Performed by: INTERNAL MEDICINE

## 2017-08-06 PROCEDURE — 700105 HCHG RX REV CODE 258: Performed by: INTERNAL MEDICINE

## 2017-08-06 PROCEDURE — A9270 NON-COVERED ITEM OR SERVICE: HCPCS | Performed by: INTERNAL MEDICINE

## 2017-08-06 PROCEDURE — 700102 HCHG RX REV CODE 250 W/ 637 OVERRIDE(OP): Performed by: HOSPITALIST

## 2017-08-06 PROCEDURE — A9270 NON-COVERED ITEM OR SERVICE: HCPCS | Performed by: PSYCHIATRY & NEUROLOGY

## 2017-08-06 PROCEDURE — A9270 NON-COVERED ITEM OR SERVICE: HCPCS | Performed by: HOSPITALIST

## 2017-08-06 PROCEDURE — 770021 HCHG ROOM/CARE - ISO PRIVATE

## 2017-08-06 PROCEDURE — 99232 SBSQ HOSP IP/OBS MODERATE 35: CPT | Performed by: HOSPITALIST

## 2017-08-06 RX ORDER — SODIUM CHLORIDE 9 MG/ML
INJECTION, SOLUTION INTRAVENOUS
Status: COMPLETED
Start: 2017-08-06 | End: 2017-08-06

## 2017-08-06 RX ORDER — ONDANSETRON 2 MG/ML
INJECTION INTRAMUSCULAR; INTRAVENOUS
Status: COMPLETED
Start: 2017-08-06 | End: 2017-08-06

## 2017-08-06 RX ADMIN — PREGABALIN 150 MG: 150 CAPSULE ORAL at 13:39

## 2017-08-06 RX ADMIN — LIDOCAINE 1 PATCH: 50 PATCH CUTANEOUS at 11:56

## 2017-08-06 RX ADMIN — Medication 325 MG: at 17:38

## 2017-08-06 RX ADMIN — OXYCODONE HYDROCHLORIDE 10 MG: 10 TABLET ORAL at 02:23

## 2017-08-06 RX ADMIN — METHADONE HYDROCHLORIDE 10 MG: 10 TABLET ORAL at 13:39

## 2017-08-06 RX ADMIN — SODIUM CHLORIDE 1000 ML: 9 INJECTION, SOLUTION INTRAVENOUS at 02:26

## 2017-08-06 RX ADMIN — DIVALPROEX SODIUM 250 MG: 250 TABLET, DELAYED RELEASE ORAL at 05:48

## 2017-08-06 RX ADMIN — ENOXAPARIN SODIUM 40 MG: 100 INJECTION SUBCUTANEOUS at 08:17

## 2017-08-06 RX ADMIN — PREGABALIN 150 MG: 150 CAPSULE ORAL at 08:17

## 2017-08-06 RX ADMIN — OXYCODONE HYDROCHLORIDE 10 MG: 10 TABLET ORAL at 22:23

## 2017-08-06 RX ADMIN — DIVALPROEX SODIUM 250 MG: 250 TABLET, DELAYED RELEASE ORAL at 21:44

## 2017-08-06 RX ADMIN — ACETAMINOPHEN 650 MG: 325 TABLET, FILM COATED ORAL at 11:56

## 2017-08-06 RX ADMIN — METHADONE HYDROCHLORIDE 10 MG: 10 TABLET ORAL at 21:44

## 2017-08-06 RX ADMIN — DOXYCYCLINE 100 MG: 100 TABLET ORAL at 21:44

## 2017-08-06 RX ADMIN — METHADONE HYDROCHLORIDE 10 MG: 10 TABLET ORAL at 05:49

## 2017-08-06 RX ADMIN — CEFEPIME 2 G: 2 INJECTION, POWDER, FOR SOLUTION INTRAMUSCULAR; INTRAVENOUS at 22:22

## 2017-08-06 RX ADMIN — NICOTINE 7 MG: 7 PATCH, EXTENDED RELEASE TRANSDERMAL at 05:48

## 2017-08-06 RX ADMIN — Medication 325 MG: at 08:17

## 2017-08-06 RX ADMIN — OXYCODONE HYDROCHLORIDE 10 MG: 10 TABLET ORAL at 08:17

## 2017-08-06 RX ADMIN — DIVALPROEX SODIUM 250 MG: 250 TABLET, DELAYED RELEASE ORAL at 13:39

## 2017-08-06 RX ADMIN — CEFEPIME 2 G: 2 INJECTION, POWDER, FOR SOLUTION INTRAMUSCULAR; INTRAVENOUS at 05:48

## 2017-08-06 RX ADMIN — PREGABALIN 150 MG: 150 CAPSULE ORAL at 21:44

## 2017-08-06 RX ADMIN — CEFEPIME 2 G: 2 INJECTION, POWDER, FOR SOLUTION INTRAMUSCULAR; INTRAVENOUS at 13:39

## 2017-08-06 RX ADMIN — DOXYCYCLINE 100 MG: 100 TABLET ORAL at 08:17

## 2017-08-06 RX ADMIN — LIDOCAINE 1 APPLICATION: 40 CREAM TOPICAL at 17:38

## 2017-08-06 RX ADMIN — OXYCODONE HYDROCHLORIDE 10 MG: 10 TABLET ORAL at 17:38

## 2017-08-06 ASSESSMENT — ENCOUNTER SYMPTOMS
TREMORS: 0
MYALGIAS: 1
CHILLS: 0
SPEECH CHANGE: 0
PALPITATIONS: 0
SENSORY CHANGE: 1
STRIDOR: 0
FLANK PAIN: 0
VOMITING: 0
DIARRHEA: 0
FEVER: 0
TINGLING: 1
WEAKNESS: 1
NERVOUS/ANXIOUS: 1
HALLUCINATIONS: 0
COUGH: 0
SHORTNESS OF BREATH: 0
ABDOMINAL PAIN: 0

## 2017-08-06 ASSESSMENT — PAIN SCALES - GENERAL
PAINLEVEL_OUTOF10: 6
PAINLEVEL_OUTOF10: 5
PAINLEVEL_OUTOF10: 6
PAINLEVEL_OUTOF10: 5
PAINLEVEL_OUTOF10: 6
PAINLEVEL_OUTOF10: 5

## 2017-08-06 NOTE — PROGRESS NOTES
"CNA reported to RN that pt has vaping products at his bedside.  RN also noticed odor from pt.  RN spoke to pt about [facility]'s tobacco campus policy.  Pt stated he went to where he \"saw a bunch of nurses smoking and thought it was ok.\"  RN explained to pt that leaving campus to use tobacco products can be considered leaving AMA. Pt states understanding but that \"I might be leaving AMA anyways.\"  "

## 2017-08-06 NOTE — PROGRESS NOTES
[facility] Hospitalist Progress Note    Date of Service: 2017    Chief Complaint    52 y.o. male hx of PVD s/p L TMA and Rt Bka > 5 years ago  admitted 2017 with right BKA stump pain and wound drainage in addition to left TMA stump wound (nonhealing) over several months.  s/p Debridement and gastroscoleus resection LLE on 17     Interval Problem Update    Pain controlled w switch from oxycontin to methadone.     Consultants/Specialty  LPS  ID - [facility]- dr. Ramon   [facility]  Psychiatry .     Disposition  Limited options w/ Payor (Medical HMO)          Review of Systems   Constitutional: Negative for fever and chills.   HENT: Negative for congestion.    Eyes:        Vision loss rt eye   Respiratory: Negative for cough, shortness of breath and stridor.    Cardiovascular: Negative for chest pain, palpitations and leg swelling.   Gastrointestinal: Negative for vomiting, abdominal pain and diarrhea.   Genitourinary: Negative for hematuria and flank pain.   Musculoskeletal: Positive for myalgias and joint pain.   Neurological: Positive for tingling, sensory change and weakness. Negative for tremors and speech change.        Chronic peripheral neuropathy .   Psychiatric/Behavioral: Negative for hallucinations. The patient is nervous/anxious (improved.).    All other systems reviewed and are negative.     Physical Exam  Laboratory/Imaging   Hemodynamics  Temp (24hrs), Av.6 °C (97.9 °F), Min:36.2 °C (97.1 °F), Max:37.2 °C (99 °F)   Temperature: 36.5 °C (97.7 °F)  Pulse  Av.1  Min: 68  Max: 111    Blood Pressure: 117/79 mmHg      Respiratory      Respiration: 18, Pulse Oximetry: 97 %        RUL Breath Sounds: Clear, RML Breath Sounds: Clear, RLL Breath Sounds: Diminished, NANCY Breath Sounds: Clear, LLL Breath Sounds: Diminished    Fluids    Intake/Output Summary (Last 24 hours) at 17 0908  Last data filed at 17 0800   Gross per 24 hour   Intake   1960 ml   Output   1475 ml   Net    485 ml        Nutrition  Orders Placed This Encounter   Procedures   • DIET ORDER     Standing Status: Standing      Number of Occurrences: 1      Standing Expiration Date:      Order Specific Question:  Diet:     Answer:  Regular [1]     Physical Exam   Constitutional: He is oriented to person, place, and time. No distress.   Thin set.   Eyes: Right eye exhibits no discharge. Left eye exhibits no discharge.   Rt eye vision loss, enucleated.    Neck: Neck supple. No JVD present.   Cardiovascular: Normal rate and regular rhythm.    No murmur heard.  Pulmonary/Chest: No stridor. He has no wheezes. He has no rales.   Abdominal: Soft. Bowel sounds are normal. He exhibits no distension. There is no tenderness.   Musculoskeletal: He exhibits no edema.   Rt leg BKA - incision dry, no dehisc- healed.   Left leg w boot wound vac present.   Neurological: He is alert and oriented to person, place, and time. Coordination abnormal.   Skin: Skin is warm and dry. He is not diaphoretic. No erythema.       Recent Labs      08/04/17   1100   WBC  9.2   RBC  5.07   HEMOGLOBIN  12.0*   HEMATOCRIT  38.2*   MCV  75.3*   MCH  23.7*   MCHC  31.4*   RDW  46.3   PLATELETCT  425   MPV  9.0     Recent Labs      08/04/17   1100   SODIUM  135   POTASSIUM  4.0   CHLORIDE  103   CO2  25   GLUCOSE  107*   BUN  23*   CREATININE  0.75   CALCIUM  9.5                      Assessment/Plan     * Skin ulcer of left foot with fat layer exposed (CMS-HCC) (present on admission)  Assessment & Plan  S/p OR debridement and gastroscoleus resection 7/25   Continue wound vac care  w changes three times/wk  Contact isolation for MRSA.- TDWB LLE, WBAT RLE per Ortho recs.  Patient reportedly has restraining orders from much of the Skilled facilites in [place], difficult discharge-- await response from other facilities.     MRSA (methicillin resistant staph aureus) culture positive (present on admission)  Assessment & Plan  w Left foot infxn-  Afeb, clinically improving.   cw  Doxycycline - thru 8-25-17   Monitor for acute symptoms     Pseudomonas infection (present on admission)  Assessment & Plan  Left foot- continue IV cefepime .   Plan thru 8-25-17      ABRIL (acute kidney injury) (CMS-HCC)  Assessment & Plan  ? Vancomycin associated - resolved. Drug stopped.       Anxiety  Assessment & Plan  At times severe w agitation. Reports hx of Agoraphobia   sched depakote   Life skills input appreciated .    Tobacco abuse (present on admission)  Assessment & Plan  Nicotine replacement PRN  Cessation counseling given     Diarrhea  Assessment & Plan  abd exam benign, afeb- no new co.  Follow symptoms.    Peripheral neuropathy (present on admission)  Assessment & Plan  cw lyrica --States he does not tolerate elavil .      Wound of right leg, at BKA site (present on admission)  Assessment & Plan  No acute infection , incision healed .  Monitor     Opiate dependence (CMS-HCC) (present on admission)  Assessment & Plan  Noted Drug seeking behavior inpatient- improved deviance.  Avoid escalating  IV narcotics with no no signs for pain- use primarily for dressing / vac changes .  oxycontin dcd w transition to methadone.     Discussed with ID.   Labs reviewed and Medications reviewed  Rolon catheter: No Rolon      DVT Prophylaxis: Enoxaparin (Lovenox)    Ulcer prophylaxis: Not indicated  Antibiotics: Treating active infection/contamination beyond 24 hours perioperative coverage  Assessed for rehab: Patient was assess for and/or received rehabilitation services during this hospitalization

## 2017-08-06 NOTE — CARE PLAN
Problem: Safety  Goal: Will remain free from falls  Outcome: PROGRESSING AS EXPECTED  Pt remains free from fall at this time.  Pt educated on fall risk.  Pt demonstrated understanding by appropriate use of call light to call for assistance.     Problem: Venous Thromboembolism (VTW)/Deep Vein Thrombosis (DVT) Prevention:  Goal: Patient will participate in Venous Thrombosis (VTE)/Deep Vein Thrombosis (DVT)Prevention Measures  Outcome: PROGRESSING AS EXPECTED    08/05/17 2145   OTHER   Risk Assessment Score 3   VTE RISK High   Pharmacologic Prophylaxis Used LMWH: Enoxaparin(Lovenox)   Mechanical/VTE Prophylaxis   Mechanical Prophylaxis  SCDs, Sequential Compression Device   SCDs, Sequential Compression Device Off

## 2017-08-06 NOTE — PROGRESS NOTES
AAOx4.   SBA assist transfer to wheelchair.   BA refused.  Wound vac in place to E.  Pain medicated per the MAR.   Regular diet, tolerating well.  Denies n/v.  Voids up to bathroom/ urinal at bedside. + flatus.

## 2017-08-06 NOTE — PROGRESS NOTES
Assumed care of Mr Cheung at 1900.   Pt is A&O x4.  Pain 5/10. Pt medicated per MAR  Pt complains of nausea. Pt medicated per MAR.  Tolerating Diet   Lt foot wound. Wound vac, dressing, and walking boot in place. Wound vac is set to suction, patent, no leaks present  Positive Urine output  Positive BM Void  Positive Flatus  Standby assist   Bed in lowest position and locked.    ** Bed alarm refused despite pt education on fall risk. Pt is A&O x4 and demonstrates appropriate use of call light to call for assistance. CLIP, hourly rounding in place.    Pt resting comfortably now.  Review plan of care with patient  Call light within reach  Hourly rounds in place  All needs met at this time

## 2017-08-06 NOTE — PROGRESS NOTES
"Patient increasing agitated into the evening, Order changed from OxyContin to Methadone per the pt. Request. Dose is not matching his previous dose, Current dose is 10mg TID his previous dose was 20mg TID, Pt. Stated \"why don't they give me the Klonopin I used to take\". Pt. Agitated with prescribed medications, he has spent a lot of effort over the years finding medications that work for him. This RN spoke with him about giving the dose a chance to work before allowing the issue to frustrate him. Was able to calm him. Need to monitor the methadone effectiveness for the next 48hrs.   "

## 2017-08-06 NOTE — CARE PLAN
Problem: Safety  Goal: Will remain free from falls  Outcome: PROGRESSING AS EXPECTED  Fall precautions in place.  Bed alarm refused.  Pt agrees to call for assistance.     Problem: Pain Management  Goal: Pain level will decrease to patient’s comfort goal  Pain medicated per the MAR..

## 2017-08-07 ENCOUNTER — APPOINTMENT (OUTPATIENT)
Dept: RADIOLOGY | Facility: MEDICAL CENTER | Age: 52
DRG: 464 | End: 2017-08-07
Attending: HOSPITALIST
Payer: COMMERCIAL

## 2017-08-07 PROCEDURE — 700102 HCHG RX REV CODE 250 W/ 637 OVERRIDE(OP): Performed by: PSYCHIATRY & NEUROLOGY

## 2017-08-07 PROCEDURE — A9270 NON-COVERED ITEM OR SERVICE: HCPCS | Performed by: INTERNAL MEDICINE

## 2017-08-07 PROCEDURE — 770021 HCHG ROOM/CARE - ISO PRIVATE

## 2017-08-07 PROCEDURE — 97605 NEG PRS WND THER DME<=50SQCM: CPT

## 2017-08-07 PROCEDURE — 700101 HCHG RX REV CODE 250: Performed by: HOSPITALIST

## 2017-08-07 PROCEDURE — 700111 HCHG RX REV CODE 636 W/ 250 OVERRIDE (IP): Performed by: INTERNAL MEDICINE

## 2017-08-07 PROCEDURE — 700102 HCHG RX REV CODE 250 W/ 637 OVERRIDE(OP): Performed by: INTERNAL MEDICINE

## 2017-08-07 PROCEDURE — 700111 HCHG RX REV CODE 636 W/ 250 OVERRIDE (IP): Performed by: HOSPITALIST

## 2017-08-07 PROCEDURE — 700105 HCHG RX REV CODE 258: Performed by: INTERNAL MEDICINE

## 2017-08-07 PROCEDURE — A9270 NON-COVERED ITEM OR SERVICE: HCPCS | Performed by: HOSPITALIST

## 2017-08-07 PROCEDURE — A9270 NON-COVERED ITEM OR SERVICE: HCPCS | Performed by: PSYCHIATRY & NEUROLOGY

## 2017-08-07 PROCEDURE — 76937 US GUIDE VASCULAR ACCESS: CPT

## 2017-08-07 PROCEDURE — 97535 SELF CARE MNGMENT TRAINING: CPT

## 2017-08-07 PROCEDURE — 700102 HCHG RX REV CODE 250 W/ 637 OVERRIDE(OP): Performed by: HOSPITALIST

## 2017-08-07 PROCEDURE — 36569 INSJ PICC 5 YR+ W/O IMAGING: CPT

## 2017-08-07 PROCEDURE — 99232 SBSQ HOSP IP/OBS MODERATE 35: CPT | Performed by: HOSPITALIST

## 2017-08-07 PROCEDURE — 700101 HCHG RX REV CODE 250: Performed by: INTERNAL MEDICINE

## 2017-08-07 RX ORDER — LIDOCAINE 50 MG/G
1 PATCH TOPICAL EVERY 24 HOURS
Status: DISCONTINUED | OUTPATIENT
Start: 2017-08-07 | End: 2017-09-13

## 2017-08-07 RX ADMIN — METHADONE HYDROCHLORIDE 10 MG: 10 TABLET ORAL at 21:35

## 2017-08-07 RX ADMIN — LIDOCAINE 1 PATCH: 50 PATCH CUTANEOUS at 12:13

## 2017-08-07 RX ADMIN — CEFEPIME 2 G: 2 INJECTION, POWDER, FOR SOLUTION INTRAMUSCULAR; INTRAVENOUS at 21:34

## 2017-08-07 RX ADMIN — PREGABALIN 150 MG: 150 CAPSULE ORAL at 08:12

## 2017-08-07 RX ADMIN — DIVALPROEX SODIUM 250 MG: 250 TABLET, DELAYED RELEASE ORAL at 05:17

## 2017-08-07 RX ADMIN — ENOXAPARIN SODIUM 40 MG: 100 INJECTION SUBCUTANEOUS at 08:12

## 2017-08-07 RX ADMIN — METHADONE HYDROCHLORIDE 10 MG: 10 TABLET ORAL at 14:21

## 2017-08-07 RX ADMIN — DOXYCYCLINE 100 MG: 100 TABLET ORAL at 21:35

## 2017-08-07 RX ADMIN — METHADONE HYDROCHLORIDE 10 MG: 10 TABLET ORAL at 05:17

## 2017-08-07 RX ADMIN — MORPHINE SULFATE 2 MG: 4 INJECTION INTRAVENOUS at 08:16

## 2017-08-07 RX ADMIN — CEFEPIME 2 G: 2 INJECTION, POWDER, FOR SOLUTION INTRAMUSCULAR; INTRAVENOUS at 14:21

## 2017-08-07 RX ADMIN — ZOLPIDEM TARTRATE 5 MG: 5 TABLET, FILM COATED ORAL at 00:32

## 2017-08-07 RX ADMIN — ONDANSETRON 4 MG: 4 TABLET, ORALLY DISINTEGRATING ORAL at 22:17

## 2017-08-07 RX ADMIN — Medication 325 MG: at 18:02

## 2017-08-07 RX ADMIN — OXYCODONE HYDROCHLORIDE 10 MG: 10 TABLET ORAL at 10:54

## 2017-08-07 RX ADMIN — OXYCODONE HYDROCHLORIDE 10 MG: 10 TABLET ORAL at 04:38

## 2017-08-07 RX ADMIN — Medication 325 MG: at 08:12

## 2017-08-07 RX ADMIN — ZOLPIDEM TARTRATE 5 MG: 5 TABLET, FILM COATED ORAL at 22:17

## 2017-08-07 RX ADMIN — CEFEPIME 2 G: 2 INJECTION, POWDER, FOR SOLUTION INTRAMUSCULAR; INTRAVENOUS at 05:20

## 2017-08-07 RX ADMIN — DIVALPROEX SODIUM 250 MG: 250 TABLET, DELAYED RELEASE ORAL at 21:34

## 2017-08-07 RX ADMIN — DOXYCYCLINE 100 MG: 100 TABLET ORAL at 08:12

## 2017-08-07 RX ADMIN — PREGABALIN 150 MG: 150 CAPSULE ORAL at 14:21

## 2017-08-07 RX ADMIN — OXYCODONE HYDROCHLORIDE 10 MG: 10 TABLET ORAL at 16:26

## 2017-08-07 RX ADMIN — LIDOCAINE 1 PATCH: 50 PATCH CUTANEOUS at 14:09

## 2017-08-07 RX ADMIN — LIDOCAINE 1 APPLICATION: 40 CREAM TOPICAL at 03:37

## 2017-08-07 RX ADMIN — PREGABALIN 150 MG: 150 CAPSULE ORAL at 21:34

## 2017-08-07 ASSESSMENT — ENCOUNTER SYMPTOMS
DIARRHEA: 0
TREMORS: 0
VOMITING: 0
PALPITATIONS: 0
CHILLS: 0
SENSORY CHANGE: 1
NAUSEA: 0
ABDOMINAL PAIN: 0
SPEECH CHANGE: 0
HALLUCINATIONS: 0
FEVER: 0
COUGH: 0
WEAKNESS: 1
NERVOUS/ANXIOUS: 1
MYALGIAS: 1
DIARRHEA: 1
FLANK PAIN: 0
TINGLING: 1
SHORTNESS OF BREATH: 0

## 2017-08-07 ASSESSMENT — PAIN SCALES - GENERAL
PAINLEVEL_OUTOF10: 7
PAINLEVEL_OUTOF10: 0
PAINLEVEL_OUTOF10: 5
PAINLEVEL_OUTOF10: 6
PAINLEVEL_OUTOF10: 5
PAINLEVEL_OUTOF10: 5
PAINLEVEL_OUTOF10: 6
PAINLEVEL_OUTOF10: 6
PAINLEVEL_OUTOF10: 5
PAINLEVEL_OUTOF10: 9

## 2017-08-07 ASSESSMENT — COGNITIVE AND FUNCTIONAL STATUS - GENERAL
TOILETING: A LITTLE
HELP NEEDED FOR BATHING: A LITTLE
SUGGESTED CMS G CODE MODIFIER DAILY ACTIVITY: CJ
DRESSING REGULAR LOWER BODY CLOTHING: A LITTLE
DAILY ACTIVITIY SCORE: 21

## 2017-08-07 NOTE — PROGRESS NOTES
Assumed care of pt at 0700. Pt sitting at edge of bed, oriented x4, Pt rates pain 5 out of 10 in BLE, lower back, R shoulder, medicated per MAR. Pt - N/V. + BS, + flatus, + BM. Wound vac in place to L foot, changed this am. Pt up with standby assist. Labs noted, assessment complete. Pt updated on POC. Pt educated to use call light when in need of assisstance. Bed locked and in lowest position. All needs met at this time, call light within reach, will continue to monitor.

## 2017-08-07 NOTE — WOUND TEAM
[facility]  Inpatient Services  Wound and Skin Care Treatment Note    Admission Date:  07/22/17     HPI, PMH, SH: Reviewed  Unit where seen by Wound Team:  [id number]    WOUND CONSULT RELATED TO:   Scheduled NPWT dressing change left plantar foot      SUBJECTIVE:  Pleasant/agreeable    Self Report / Pain Level:  Tolerated well with PO pre medication     OBJECTIVE:    Previous NPWT dressing intact    WOUND TYPE, LOCATION, CHARACTERISTICS (Pressure ulcers: location, stage, POA or date identified)    Location/type of wound: Open surgical wound left plantar foot      Periwound:     macerated around wound and in between remaining amputation site of pervious digit      Drainage:     scant serosanguinous    Tissue Type and %:    100% red/pink  Wound Edges:    Attached/macerated    Odor:     none  Exposed structure(s):  Bone palpated    S&S of Infection:     none      Measurements:     (Taken 08/03/17)   Length:    4.5 cm    Width:     4.5 cm   Depth:    <0.5cm      INTERVENTIONS BY WOUND TEAM:  Removed old dressing and irrigated wound with wound cleanser.  Benzoin, thin hydrocolloid and drape to bull wound skin as needed. Small piece of paste ring used to seal fissure on lateral side.  Covered wound with 1 piece black foam with a bridge and button to dorsum of foot. Secured with drape and applied neg pressure at 125mmHg/dpc.   Total black foam=3 pieces. Placed cam boot back on LE.    Interdisciplinary consultation:   Staff RN, patient      EVALUATION: bull-wnd and foot in general are moist so minimized the amt of drape and did not use the elastic wrap    Factors affecting wound healing:  Neuropathy, tobacco abuse     Goals:  Decrease in wound size by 5% every 2 weeks -- no maceration    NURSING PLAN OF CARE ORDERS (x):    Dressing changes: See Dressing Maintenance orders:  x  Skin care: See Skin Care orders: x  Rectal tube care: See Rectal Tube Care orders:   Other orders:    WOUND TEAM PLAN OF CARE (x):   NPWT  change 3 x week:   x     Dressing changes by wound team:       Follow up as needed:     x  Other (explain):    Anticipated discharge plans (x):  SNF:           Home Care:           Outpatient Wound Center:   x      Self Care:            Other:

## 2017-08-07 NOTE — CARE PLAN
Problem: Infection  Goal: Will remain free from infection  Outcome: PROGRESSING AS EXPECTED  Long term IV abx for treatment of infection. PICC line in place, CHG bath given. VSS. Monitoring wounds for s/sx of worsening infection.     Problem: Venous Thromboembolism (VTW)/Deep Vein Thrombosis (DVT) Prevention:  Goal: Patient will participate in Venous Thrombosis (VTE)/Deep Vein Thrombosis (DVT)Prevention Measures  Outcome: PROGRESSING AS EXPECTED  SQ lovenox, patient able to mobilize to wheel chair, active ROM of all extremities.

## 2017-08-07 NOTE — PROGRESS NOTES
Infectious Disease Progress Note    Author: Lizzette Pino M.D. DOS & Time created: 2017  10:23 AM    CC: FU right BKA stump infection and nonhealing left TMA stump wound     Interval History:  52 y.o. WM admitted 2017 with right BKA infection and nonhealing left TMA stump wound    AF WBC 7.6 less drainage from wounds Asking if he will need another BKA  17- no fevers. WBC 10.6. Complains of chills. Complains of 10 out of 10 pain in the left foot. Complains of drainage from the right stump.  17- complains of pain in the foot. Had low-grade fevers up to 100.7 last night . Foot cultures are showing MRSA and Pseudomonas  17- no fevers. Complains of burning in his left leg. No discharge noted from his right stump  17- no fevers. Complains of ongoing pain.  17- no fevers. No labs available  17-MAXIMUM TEMPERATURE 99.5. Ongoing pain issues. WBC 9000. Creatinine 1.17   AF lots of c/o neuropathy-feels like stump is in ice water   AF no new complaints- +phantom limb pain   AF pain better controlled Having loose stools. Wants a psych consult  8/3 AF in WC today No new complaints   AF WBC 9.2 no new complaints-wants to see psych again   AF WBC 9.2 seen during dressing change   AF, no CBC, tired of infections, thinking about left BKA, wants to speak to Dr. Shirley  Labs Reviewed, Medications Reviewed, Radiology Reviewed and Wound Reviewed.    Review of Systems:  Review of Systems   Constitutional: Negative for fever and chills.   Respiratory: Negative for cough and shortness of breath.    Cardiovascular: Negative for chest pain.   Gastrointestinal: Positive for diarrhea. Negative for nausea, vomiting and abdominal pain.   Genitourinary: Negative for dysuria.   Musculoskeletal: Positive for myalgias and joint pain.   Neurological: Positive for sensory change.   All other systems reviewed and are negative.      Hemodynamics:  Temp (24hrs), Av.9 °C (98.4 °F),  Min:36.6 °C (97.8 °F), Max:37.1 °C (98.7 °F)  Temperature: 36.9 °C (98.4 °F)  Pulse  Av.3  Min: 68  Max: 111   Blood Pressure: 113/77 mmHg       Physical Exam:  Physical Exam   Constitutional: He is oriented to person, place, and time. He appears well-developed. No distress.   Disheveled   HENT:   Head: Normocephalic.   Poor dentition     Eyes:   Blind right eye   Neck: Neck supple.   Cardiovascular: Normal rate.    Pulmonary/Chest: Effort normal. No respiratory distress.   Abdominal: Soft. He exhibits no distension. There is no tenderness.   Musculoskeletal: He exhibits no edema.   Wound right BKA-no longer draining  Left foot surgical dressing-granulating, decreased size  4.5 by 4.5 cm-serous drainage  Wound vac  Boot   Neurological: He is alert and oriented to person, place, and time.   Skin: No rash noted.   tattoos   Nursing note and vitals reviewed.      Labs:  Recent Labs      17   1100   WBC  9.2   RBC  5.07   HEMOGLOBIN  12.0*   HEMATOCRIT  38.2*   MCV  75.3*   MCH  23.7*   RDW  46.3   PLATELETCT  425   MPV  9.0   NEUTSPOLYS  56.50   LYMPHOCYTES  35.30   MONOCYTES  5.00   EOSINOPHILS  2.50   BASOPHILS  0.50     Recent Labs      17   1100   SODIUM  135   POTASSIUM  4.0   CHLORIDE  103   CO2  25   GLUCOSE  107*   BUN  23*     Recent Labs      17   1100   CREATININE  0.75     Results     Procedure Component Value Units Date/Time    CDIFF BY PCR WITH TOXIN [[id number]] Collected:  17 2223    Order Status:  Completed Specimen Information:  Stool from Stool Updated:  17 1336     C Diff by PCR Negative      Comment: C. difficile NOT detected by PCR.  Treatment not indicated per guidelines.  Repeat testing not indicated within 7 days.          027-NAP1-BI Presumptive Negative      Comment: Presumptive 027/NAP1/BI target DNA sequences are NOT DETECTED.       Narrative:      Special Contact [phone] ROSMERY ALANIZ I  Patient running low grade temp and now incontinent  "of  diarrhea  Does this patient have risk factors for C-diff?->Yes  Has patient taken stool softeners or laxatives in the last 5  days?->Yes          Fluids:  Intake/Output       08/05/17 0700 - 08/06/17 0659 08/06/17 0700 - 08/07/17 0659 08/07/17 0700 - 08/08/17 0659      0357-1219 2880-9496 Total 4253-1437 3965-2210 Total 8385-6242 0377-1931 Total       Intake    P.O.  840  960 1800  1200  240 1440  240  -- 240    P.O.  2151 091 2760 240 -- 240    I.V.  240  240 480  120  300 420  70  -- 70    IV Volume (NS) 240 240 480 120 100 220 70 -- 70    IV Piggyback Volume -- -- -- -- 200 200 -- -- --    Total Intake 1080 1200 2280 1826 242 7175 310 -- 310       Output    Urine  500  975 1475  2500  700 3200  --  -- --    Void (ml)  2500 700 3200 -- -- --    Stool  --  -- --  --  -- --  --  -- --    Number of Times Stooled 0 x 0 x 0 x -- 0 x 0 x -- -- --    Total Output  2500 700 3200 -- -- --       Net I/O     580 225 805 -1180 -160 -1340 310 -- 310             Assessment:  Active Hospital Problems    Diagnosis   • Skin ulcer of left foot with fat layer exposed (CMS-[facility]) [L97.522]   • Wound of right leg, at BKA site [S81.801A]   • Peripheral neuropathy [G62.9]   • Tobacco abuse [Z72.0]       Plan:  Skin ulcer of left foot with fat layer exposed (CMS-[facility]), improving  Afebrile  No leukocytosis  S/p OR 7/25/17  Cultures  MRSA and Pseudomonas  DC'd vanco due to nephrotoxicity  Continue doxy (no Zyvox due to neuropathy)  Cefepime for PSAR  Continue antibiotics through a 8/25/17 unless pt has BKA  No once daily options  Check CBC and BMP in am  Pt contemplating left BKA and wants to talk to Dr. Fern    Diarrhea  Cdiff neg  Hold stool softeners    Drainage from right BKA site- improved  Afebrile   No leukocytosis  No further drainage  Persistent pain    Prognosis for limb salvage poor, LLE    Placement will be difficult-states \"half\" the facilities in [place] have restraining orders against " him  Appreciate psych eval and recommendations    Plan of care discussed with IM-Dr Brownlee/Wound care

## 2017-08-07 NOTE — PROGRESS NOTES
[facility] Hospitalist Progress Note    Date of Service: 2017    Chief Complaint    52 y.o. male hx of PVD s/p L TMA and Rt Bka > 5 years ago  admitted 2017 with right BKA stump pain and wound drainage in addition to left TMA stump wound (nonhealing) over several months.  s/p Debridement and gastroscoleus resection LLE on 17     Interval Problem Update    Pain controlled w switch from oxycontin to methadone. Co chronic right shoulder pain     Consultants/Specialty  LPS  ID - [facility]- dr. Ramon   [facility]  Psychiatry .     Disposition  Limited options w/ Payor (Medical HMO)          Review of Systems   Constitutional: Negative for fever and chills.   Eyes:        Vision loss rt eye   Respiratory: Negative for cough and shortness of breath.    Cardiovascular: Negative for chest pain, palpitations and leg swelling.   Gastrointestinal: Negative for vomiting, abdominal pain and diarrhea.   Genitourinary: Negative for hematuria and flank pain.   Musculoskeletal: Positive for myalgias and joint pain.   Neurological: Positive for tingling, sensory change and weakness. Negative for tremors and speech change.        Chronic peripheral neuropathy .   Psychiatric/Behavioral: Negative for hallucinations. The patient is nervous/anxious (improved.).    All other systems reviewed and are negative.     Physical Exam  Laboratory/Imaging   Hemodynamics  Temp (24hrs), Av.9 °C (98.4 °F), Min:36.6 °C (97.8 °F), Max:37.1 °C (98.7 °F)   Temperature: 36.6 °C (97.8 °F)  Pulse  Av.2  Min: 68  Max: 111    Blood Pressure: 107/70 mmHg      Respiratory      Respiration: 16, Pulse Oximetry: 96 %        RUL Breath Sounds: Clear, RML Breath Sounds: Clear, RLL Breath Sounds: Diminished, NANCY Breath Sounds: Clear, LLL Breath Sounds: Diminished    Fluids    Intake/Output Summary (Last 24 hours) at 17 0800  Last data filed at 17 0500   Gross per 24 hour   Intake   1620 ml   Output   2600 ml   Net   -980 ml        Nutrition  Orders Placed This Encounter   Procedures   • DIET ORDER     Standing Status: Standing      Number of Occurrences: 1      Standing Expiration Date:      Order Specific Question:  Diet:     Answer:  Regular [1]     Physical Exam   Constitutional: He is oriented to person, place, and time. No distress.   Eyes: Right eye exhibits no discharge. Left eye exhibits no discharge.   Rt eye vision loss, enucleated.    Neck: Neck supple. No JVD present.   Cardiovascular: Normal rate and regular rhythm.    No murmur heard.  Pulmonary/Chest: No stridor. He has no wheezes. He has no rales.   Abdominal: Soft. Bowel sounds are normal. He exhibits no distension. There is no tenderness.   Musculoskeletal: He exhibits no edema.   Rt leg BKA - incision dry, no dehisc- healed.   Left leg w boot wound vac present.  Rt shoulder no redness or warmth, mild swelling.    Neurological: He is alert and oriented to person, place, and time. Coordination abnormal.   Skin: Skin is warm and dry. He is not diaphoretic. No erythema.       Recent Labs      08/04/17   1100   WBC  9.2   RBC  5.07   HEMOGLOBIN  12.0*   HEMATOCRIT  38.2*   MCV  75.3*   MCH  23.7*   MCHC  31.4*   RDW  46.3   PLATELETCT  425   MPV  9.0     Recent Labs      08/04/17   1100   SODIUM  135   POTASSIUM  4.0   CHLORIDE  103   CO2  25   GLUCOSE  107*   BUN  23*   CREATININE  0.75   CALCIUM  9.5                      Assessment/Plan     * Skin ulcer of left foot with fat layer exposed (CMS-HCC) (present on admission)  Assessment & Plan  S/p OR debridement and gastroscoleus resection 7/25   Continue wound vac care  w changes three times/wk  Contact isolation for MRSA.  TDWB LLE, WBAT RLE per Ortho recs.  Patient reportedly has restraining orders from much of the Skilled facilites in [place], difficult discharge-- Discussed with  - putting out referrals w no acceptance to date.    MRSA (methicillin resistant staph aureus) culture positive (present on  admission)  Assessment & Plan  w Left foot infxn-  Afeb, clinically improving.   cw Doxycycline - thru 8-25-17   Monitor for acute symptoms     Pseudomonas infection (present on admission)  Assessment & Plan  Left foot- continue IV cefepime .   Plan thru 8-25-17  Discussed with ID    Anxiety  Assessment & Plan  At times severe w agitation. Reports hx of Agoraphobia - mood improved of late.  sched depakote   Life skills input appreciated .    Diarrhea  Assessment & Plan  abd exam benign, afeb- no new co.  Follow symptoms.    Peripheral neuropathy (present on admission)  Assessment & Plan  cw lyrica      Tobacco abuse (present on admission)  Assessment & Plan  Nicotine replacement PRN  Cessation counseling given     Wound of right leg, at BKA site (present on admission)  Assessment & Plan  No acute infection , incision healed .      Opiate dependence (CMS-HCC) (present on admission)  Assessment & Plan  Noted Drug seeking behavior inpatient- improved deviance.  Avoid escalating  IV narcotics with no no signs for pain- use primarily for dressing / vac changes .  oxycontin recently stopped-- continue methadone.   Trial lidoderm patch for chronic rt shoulder , lower back pain     ABRIL (acute kidney injury) (CMS-[facility])  Assessment & Plan  Suspect Vancomycin associated - resolved. Drug stopped. Changed to Doxy for MRSA coverage.        Labs reviewed and Medications reviewed  Rolon catheter: No Rolon      DVT Prophylaxis: Enoxaparin (Lovenox)    Ulcer prophylaxis: Not indicated  Antibiotics: Treating active infection/contamination beyond 24 hours perioperative coverage  Assessed for rehab: Patient was assess for and/or received rehabilitation services during this hospitalization

## 2017-08-07 NOTE — DISCHARGE PLANNING
Left VM for Yaima [facility]CS to return call. Need the follow-up on the 7 skilled referrals that were sent out to [place] facilities to see if there are any accepting facilities.

## 2017-08-07 NOTE — PROGRESS NOTES
"Patient oriented x4. reporting pain to right shoulder and left foot, medicated per MAR. Tolerating oral medications and food/fluids. Voiding in urinal and reported x3 BMs today. PICC line in place, IV abx per MAR. RLE BKA, scab present to approximated incision. LLE with off loading boot and wound vac to foot. Patient able to transfer himself from bed to wheelchair. Refuses use of bed alarm or assistance with transfers this evening. Updated on POC and verbalizes understanding. Patient stated he was going to the vending machine to get soda this evening. Returned to floor within 10 minutes accompanied by visitors. Reinforced non smoking policy with patient, patient irritated and upset, stating \"I didn't vape in my room.\"  Educated about safety of not using these products in and around the hospital, patient hesitant to accept education. Charge RN aware. Patient's son and another visitor at the bedside this evening, patient reports he needed his son to drop of clothing and he was \"okay with him coming for now, I just don't like the drama.\" Patient requesting to take Ambien later this evening. Call light in reach, communication board updated, rounding implemented.  "

## 2017-08-07 NOTE — THERAPY
"Occupational Therapy Treatment completed with focus on ADLs, ADL transfers and patient education.  Functional Status:  Pt seen for OT tx today.  Pt was pleasant and cooperative throughout the session but continues to be limited by weakness, endurance, and self care.  Pt completed supine to sit, sit to partial stand, and sit pivot with SBA.  Continues to c/o R UE hurting and came up with various strategies during transfers to decrease use during transfer decreasing pain.  Pt continues to be unable to use prosthetic due to it is now too big for him and not safe to use.  Pt spoke with prosthetist but pt didn't like the solution presented.  Completed toileting with SBA from w/c to toilet.  Discussed d/c planning, home safety, and what rehab entails.  Pt would benefit from continued inpt OT to increase independence with functional transfers, functional endurance, and ADLs however with some assistance with community mobility, IADLs, and if pt were w/c level could be home.  Pt will be going to stay at a friends house however he is not sure he can get a w/c into the bathroom.  Will get measurements to find out.   Plan of Care: Will benefit from Occupational Therapy 3 times per week  Discharge Recommendations:  Equipment Will Continue to Assess for Equipment Needs. Post-acute therapy Discharge to a transitional care facility for continued skilled therapy services.    See \"Rehab Therapy-Acute\" Patient Summary Report for complete documentation.   "

## 2017-08-08 PROCEDURE — 700101 HCHG RX REV CODE 250: Performed by: INTERNAL MEDICINE

## 2017-08-08 PROCEDURE — A9270 NON-COVERED ITEM OR SERVICE: HCPCS | Performed by: INTERNAL MEDICINE

## 2017-08-08 PROCEDURE — 700101 HCHG RX REV CODE 250: Performed by: HOSPITALIST

## 2017-08-08 PROCEDURE — 97530 THERAPEUTIC ACTIVITIES: CPT

## 2017-08-08 PROCEDURE — 770021 HCHG ROOM/CARE - ISO PRIVATE

## 2017-08-08 PROCEDURE — 700102 HCHG RX REV CODE 250 W/ 637 OVERRIDE(OP): Performed by: INTERNAL MEDICINE

## 2017-08-08 PROCEDURE — 97110 THERAPEUTIC EXERCISES: CPT

## 2017-08-08 PROCEDURE — 700102 HCHG RX REV CODE 250 W/ 637 OVERRIDE(OP): Performed by: HOSPITALIST

## 2017-08-08 PROCEDURE — A9270 NON-COVERED ITEM OR SERVICE: HCPCS | Performed by: HOSPITALIST

## 2017-08-08 PROCEDURE — 700111 HCHG RX REV CODE 636 W/ 250 OVERRIDE (IP): Performed by: INTERNAL MEDICINE

## 2017-08-08 PROCEDURE — 99232 SBSQ HOSP IP/OBS MODERATE 35: CPT | Performed by: INTERNAL MEDICINE

## 2017-08-08 PROCEDURE — 700105 HCHG RX REV CODE 258: Performed by: INTERNAL MEDICINE

## 2017-08-08 PROCEDURE — 700111 HCHG RX REV CODE 636 W/ 250 OVERRIDE (IP): Performed by: HOSPITALIST

## 2017-08-08 RX ORDER — CLONAZEPAM 1 MG/1
1 TABLET ORAL 2 TIMES DAILY
Status: DISCONTINUED | OUTPATIENT
Start: 2017-08-08 | End: 2017-08-19

## 2017-08-08 RX ADMIN — METHADONE HYDROCHLORIDE 10 MG: 10 TABLET ORAL at 22:02

## 2017-08-08 RX ADMIN — CLONAZEPAM 1 MG: 1 TABLET ORAL at 22:02

## 2017-08-08 RX ADMIN — DOXYCYCLINE 100 MG: 100 TABLET ORAL at 08:54

## 2017-08-08 RX ADMIN — LIDOCAINE 1 PATCH: 50 PATCH CUTANEOUS at 13:00

## 2017-08-08 RX ADMIN — METHADONE HYDROCHLORIDE 10 MG: 10 TABLET ORAL at 05:34

## 2017-08-08 RX ADMIN — DOXYCYCLINE 100 MG: 100 TABLET ORAL at 22:01

## 2017-08-08 RX ADMIN — OXYCODONE HYDROCHLORIDE 10 MG: 10 TABLET ORAL at 01:36

## 2017-08-08 RX ADMIN — ENOXAPARIN SODIUM 40 MG: 100 INJECTION SUBCUTANEOUS at 08:54

## 2017-08-08 RX ADMIN — Medication 325 MG: at 08:54

## 2017-08-08 RX ADMIN — PREGABALIN 150 MG: 150 CAPSULE ORAL at 08:54

## 2017-08-08 RX ADMIN — CEFEPIME 2 G: 2 INJECTION, POWDER, FOR SOLUTION INTRAMUSCULAR; INTRAVENOUS at 13:48

## 2017-08-08 RX ADMIN — METHADONE HYDROCHLORIDE 10 MG: 10 TABLET ORAL at 13:47

## 2017-08-08 RX ADMIN — CLONAZEPAM 1 MG: 1 TABLET ORAL at 13:47

## 2017-08-08 RX ADMIN — LIDOCAINE 1 PATCH: 50 PATCH CUTANEOUS at 11:00

## 2017-08-08 RX ADMIN — Medication 325 MG: at 17:28

## 2017-08-08 RX ADMIN — ONDANSETRON 4 MG: 4 TABLET, ORALLY DISINTEGRATING ORAL at 09:02

## 2017-08-08 RX ADMIN — CEFEPIME 2 G: 2 INJECTION, POWDER, FOR SOLUTION INTRAMUSCULAR; INTRAVENOUS at 22:01

## 2017-08-08 RX ADMIN — CEFEPIME 2 G: 2 INJECTION, POWDER, FOR SOLUTION INTRAMUSCULAR; INTRAVENOUS at 05:35

## 2017-08-08 RX ADMIN — OXYCODONE HYDROCHLORIDE 10 MG: 10 TABLET ORAL at 09:02

## 2017-08-08 RX ADMIN — ACETAMINOPHEN 650 MG: 325 TABLET, FILM COATED ORAL at 03:18

## 2017-08-08 RX ADMIN — PREGABALIN 150 MG: 150 CAPSULE ORAL at 22:02

## 2017-08-08 RX ADMIN — MORPHINE SULFATE 2 MG: 4 INJECTION INTRAVENOUS at 02:33

## 2017-08-08 RX ADMIN — OXYCODONE HYDROCHLORIDE 10 MG: 10 TABLET ORAL at 13:15

## 2017-08-08 RX ADMIN — PREGABALIN 150 MG: 150 CAPSULE ORAL at 15:29

## 2017-08-08 ASSESSMENT — PAIN SCALES - GENERAL
PAINLEVEL_OUTOF10: 7
PAINLEVEL_OUTOF10: 5
PAINLEVEL_OUTOF10: 4
PAINLEVEL_OUTOF10: 9
PAINLEVEL_OUTOF10: 4
PAINLEVEL_OUTOF10: 9
PAINLEVEL_OUTOF10: 0

## 2017-08-08 ASSESSMENT — ENCOUNTER SYMPTOMS
SHORTNESS OF BREATH: 0
DIARRHEA: 1
COUGH: 0
FEVER: 0
CHILLS: 0
MYALGIAS: 1
ABDOMINAL PAIN: 0
SENSORY CHANGE: 1
TREMORS: 0
VOMITING: 0
DIARRHEA: 0
TINGLING: 1
WEAKNESS: 1
SPEECH CHANGE: 0
NAUSEA: 0
HALLUCINATIONS: 0
FLANK PAIN: 0
PALPITATIONS: 0
NERVOUS/ANXIOUS: 1

## 2017-08-08 ASSESSMENT — COGNITIVE AND FUNCTIONAL STATUS - GENERAL
WALKING IN HOSPITAL ROOM: A LITTLE
MOBILITY SCORE: 19
STANDING UP FROM CHAIR USING ARMS: A LITTLE
CLIMB 3 TO 5 STEPS WITH RAILING: A LOT
SUGGESTED CMS G CODE MODIFIER MOBILITY: CK
MOVING FROM LYING ON BACK TO SITTING ON SIDE OF FLAT BED: A LITTLE

## 2017-08-08 ASSESSMENT — GAIT ASSESSMENTS
GAIT LEVEL OF ASSIST: CONTACT GUARD ASSIST
ASSISTIVE DEVICE: FRONT WHEEL WALKER
DEVIATION: BRADYKINETIC;ANTALGIC
DISTANCE (FEET): 150

## 2017-08-08 NOTE — PROGRESS NOTES
Pt AAOx4, agitated and irritable.  Lungs are clear on RA.  Vac dressing to L dericktt CDI, wound vac suctioning appropriately.  R BKA has tubigrip in place, CDI.  Denies N/V, last BM 8/7. Tolerating diet well.  Pt on wheelchair at this time.  Call light and personal belongings within reach.  All needs met at this time.  Will continue to monitor pt.

## 2017-08-08 NOTE — PROGRESS NOTES
PICC line repositioned, notified by Veronica Tovar okay to restart fluids. Fluids restarted at [facility].

## 2017-08-08 NOTE — PROGRESS NOTES
PICC Note:  PICC line retracted approximately 10 cm.  Cleaned with chlorhexidene, draped in a sterile technique.  Guide wire placed, PICC advanced without resistance.  Dressed with tegaderm, biopatch.  Good blood return.  CXR to confirm placement.

## 2017-08-08 NOTE — PROGRESS NOTES
Called to room by pt. Per pt call light was dropped on PICC line and PICC line was pulled out. Fluids stopped. Veronica PICC line RN notified. Per Veronica, place order for PICC line reposition, Veronica will try to get to reposition today. Order placed, pt updated.

## 2017-08-08 NOTE — PROGRESS NOTES
[facility] Hospitalist Progress Note    Date of Service: 2017    Chief Complaint    52 y.o. male hx of PVD s/p L TMA and Rt Bka > 5 years ago  admitted 2017 with right BKA stump pain and wound drainage in addition to left TMA stump wound (nonhealing) over several months.  s/p Debridement and gastroscoleus resection LLE on 17     Interval Problem Update    Pain controlled w switch from oxycontin to methadone. Co chronic right shoulder pain    - close watch, working with staff. Discussed pain issues, no increase in narcotics.  Tolerating abx.  Long talk, previously on klonopin, will add low dose.    Consultants/Specialty  LPS  ID - [facility]- dr. Ramon   [facility]  Psychiatry .     Disposition  Limited options w/ Payor (Medical HMO)          Review of Systems   Constitutional: Negative for fever and chills.   Eyes:        Vision loss rt eye   Respiratory: Negative for cough and shortness of breath.    Cardiovascular: Negative for chest pain, palpitations and leg swelling.   Gastrointestinal: Negative for vomiting, abdominal pain and diarrhea.   Genitourinary: Negative for hematuria and flank pain.   Musculoskeletal: Positive for myalgias and joint pain.   Neurological: Positive for tingling, sensory change and weakness. Negative for tremors and speech change.        Chronic peripheral neuropathy .   Psychiatric/Behavioral: Negative for hallucinations. The patient is nervous/anxious (improved.).    All other systems reviewed and are negative.     Physical Exam  Laboratory/Imaging   Hemodynamics  Temp (24hrs), Av.7 °C (98.1 °F), Min:36.4 °C (97.6 °F), Max:37 °C (98.6 °F)   Temperature: 36.7 °C (98 °F)  Pulse  Av.3  Min: 68  Max: 111    Blood Pressure: 117/69 mmHg      Respiratory      Respiration: 18, Pulse Oximetry: 93 %        RUL Breath Sounds: Clear, RML Breath Sounds: Clear, RLL Breath Sounds: Clear, NANCY Breath Sounds: Clear, LLL Breath Sounds: Clear    Fluids    Intake/Output Summary (Last  24 hours) at 08/08/17 1037  Last data filed at 08/08/17 0800   Gross per 24 hour   Intake    570 ml   Output   2900 ml   Net  -2330 ml       Nutrition  Orders Placed This Encounter   Procedures   • DIET ORDER     Standing Status: Standing      Number of Occurrences: 1      Standing Expiration Date:      Order Specific Question:  Diet:     Answer:  Regular [1]     Physical Exam   Constitutional: He is oriented to person, place, and time. No distress.   Eyes: Right eye exhibits no discharge. Left eye exhibits no discharge.   Rt eye vision loss, enucleated.    Neck: Neck supple. No JVD present.   Cardiovascular: Normal rate and regular rhythm.    No murmur heard.  Pulmonary/Chest: No stridor. He has no wheezes. He has no rales.   Abdominal: Soft. Bowel sounds are normal. He exhibits no distension. There is no tenderness.   Musculoskeletal: He exhibits no edema.   Rt leg BKA - incision dry, no dehisc- healed.   Left leg w boot wound vac present.  Rt shoulder no redness or warmth, mild swelling.    Neurological: He is alert and oriented to person, place, and time. Coordination abnormal.   Skin: Skin is warm and dry. He is not diaphoretic. No erythema.                                Assessment/Plan     * Skin ulcer of left foot with fat layer exposed (CMS-HCC) (present on admission)  Assessment & Plan  S/p OR debridement and gastroscoleus resection 7/25   Continue wound vac care  w changes three times/wk  Contact isolation for MRSA.  TDWB LLE, WBAT RLE per Ortho recs.  Patient reportedly has restraining orders from much of the Skilled facilites in [place], difficult discharge-- Discussed with  - putting out referrals w no acceptance to date.    MRSA (methicillin resistant staph aureus) culture positive (present on admission)  Assessment & Plan  Doxycycline to cover L foot infection thru 8-25-17   Vanco stopped due to ARF    Pseudomonas infection (present on admission)  Assessment & Plan  Left foot infection,  continue IV cefepime thru 8-25-17, ID assisting    Anxiety  Assessment & Plan  Depakote, psych assisting    Diarrhea  Assessment & Plan  Follow    Peripheral neuropathy (present on admission)  Assessment & Plan  Lyrica      Tobacco abuse (present on admission)  Assessment & Plan  Nicotine replacement PRN  Cessation counseling given     Wound of right leg, at BKA site (present on admission)  Assessment & Plan  No acute infection , incision healed .      Opiate dependence (CMS-HCC) (present on admission)  Assessment & Plan  Noted Drug seeking behavior inpatient- improved deviance.  Avoid escalating  IV narcotics with no no signs for pain- use primarily for dressing / vac changes .  oxycontin recently stopped-- continue methadone.   Trial lidoderm patch for chronic rt shoulder , lower back pain     ABRIL (acute kidney injury) (CMS-HCC)  Assessment & Plan  Suspect Vancomycin associated - resolved. Drug stopped. Changed to Doxy for MRSA coverage.        Labs reviewed and Medications reviewed  Rolon catheter: No Rolon      DVT Prophylaxis: Enoxaparin (Lovenox)    Ulcer prophylaxis: Not indicated  Antibiotics: Treating active infection/contamination beyond 24 hours perioperative coverage  Assessed for rehab: Patient was assess for and/or received rehabilitation services during this hospitalization

## 2017-08-08 NOTE — THERAPY
"Physical Therapy Treatment completed.   Bed Mobility:  Supine to Sit: Modified Independent  Transfers: Sit to Stand: Contact Guard Assist (with FWW)  Gait: Level Of Assist: Contact Guard Assist with Front-Wheel Walker trial of prosthetic       Plan of Care: Will benefit from Physical Therapy 2 times per week  Discharge Recommendations: Equipment: Will Continue to Assess for Equipment Needs.   Pt with improving activity tolerance, obtained prosthetic to assess for home d/c instead of SNF considering dificult discharge once antibiotics end (notes indicating 8/25/17); requires assist for balance and to maintain TTWB on left LE. Impulsivity with transfers contributing to right rotator cuff irritability, focused on technique and speed to reduce strain. Anticipate pt cannot independently manage wound vac in community and will require care until this is removed; currently, would need SNF. Will follow.   See \"Rehab Therapy-Acute\" Patient Summary Report for complete documentation.       "

## 2017-08-08 NOTE — PROGRESS NOTES
Patient refused bed alarm. Patient educated on fall risks and importance of using call light. Discussed fall prevention and safety. Patient verbalized understanding, continued to decline bed alarm. Patient calls for assistance. Fall precautions in place: nonslip socks, bed is in low position, personal belongings, wastebasket, call light, and phone are within patient's reach.

## 2017-08-08 NOTE — PROGRESS NOTES
Infectious Disease Progress Note    Author: Lizzette Pino M.D. DOS & Time created: 8/8/2017  11:20 AM    CC: FU right BKA stump infection and nonhealing left TMA stump wound     Interval History:  52 y.o. WM admitted 7/22/2017 with right BKA infection and nonhealing left TMA stump wound   7/23 AF WBC 7.6 less drainage from wounds Asking if he will need another BKA  7/24/17- no fevers. WBC 10.6. Complains of chills. Complains of 10 out of 10 pain in the left foot. Complains of drainage from the right stump.  7/25/17- complains of pain in the foot. Had low-grade fevers up to 100.7 last night . Foot cultures are showing MRSA and Pseudomonas  7/26/17- no fevers. Complains of burning in his left leg. No discharge noted from his right stump  7/28/17- no fevers. Complains of ongoing pain.  7/29/17- no fevers. No labs available  7/30/17-MAXIMUM TEMPERATURE 99.5. Ongoing pain issues. WBC 9000. Creatinine 1.17  7/31 AF lots of c/o neuropathy-feels like stump is in ice water  8/1 AF no new complaints- +phantom limb pain  8/2 AF pain better controlled Having loose stools. Wants a psych consult  8/3 AF in WC today No new complaints  8/4 AF WBC 9.2 no new complaints-wants to see psych again  8/5 AF WBC 9.2 seen during dressing change  8/7 AF, no CBC, tired of infections, thinking about left BKA, wants to speak to Dr. Shirley  8/8 AF, no CBC, no longer wants a BKA, wants to try abx first, PICC came out a little after his TV remote hit his PICC  Labs Reviewed, Medications Reviewed, Radiology Reviewed and Wound Reviewed.    Review of Systems:  Review of Systems   Constitutional: Negative for fever and chills.   Respiratory: Negative for cough and shortness of breath.    Cardiovascular: Negative for chest pain.   Gastrointestinal: Positive for diarrhea. Negative for nausea, vomiting and abdominal pain.   Genitourinary: Negative for dysuria.   Musculoskeletal: Positive for myalgias and joint pain.   Neurological: Positive for  sensory change.   All other systems reviewed and are negative.      Hemodynamics:  Temp (24hrs), Av.7 °C (98.1 °F), Min:36.4 °C (97.6 °F), Max:37 °C (98.6 °F)  Temperature: 36.7 °C (98 °F)  Pulse  Av.3  Min: 68  Max: 111   Blood Pressure: 117/69 mmHg       Physical Exam:  Physical Exam   Constitutional: He is oriented to person, place, and time. He appears well-developed. No distress.   Disheveled   HENT:   Head: Normocephalic.   Poor dentition     Eyes:   Blind right eye   Neck: Neck supple.   Cardiovascular: Normal rate.    Pulmonary/Chest: Effort normal. No respiratory distress.   Abdominal: Soft. He exhibits no distension. There is no tenderness.   Musculoskeletal: He exhibits no edema.   Wound right BKA-no longer draining  Left foot surgical dressing - maceration, some serosang, bone palpate per wound care notes  Measures 4.5x4.5x0.5cm  Wound vac  Boot    LUE PICC nontender   Neurological: He is alert and oriented to person, place, and time.   Skin: No rash noted.   tattoos   Nursing note and vitals reviewed.      Labs:  No results for input(s): WBC, RBC, HEMOGLOBIN, HEMATOCRIT, MCV, MCH, RDW, PLATELETCT, MPV, NEUTSPOLYS, LYMPHOCYTES, MONOCYTES, EOSINOPHILS, BASOPHILS, RBCMORPHOLO in the last 72 hours.  No results for input(s): SODIUM, POTASSIUM, CHLORIDE, CO2, GLUCOSE, BUN, CPKTOTAL in the last 72 hours.  No results for input(s): ALBUMIN, TBILIRUBIN, ALKPHOSPHAT, TOTPROTEIN, ALTSGPT, ASTSGOT, CREATININE in the last 72 hours.  Results     Procedure Component Value Units Date/Time    CDIFF BY PCR WITH TOXIN [[id number]] Collected:  17 2223    Order Status:  Completed Specimen Information:  Stool from Stool Updated:  17 1336     C Diff by PCR Negative      Comment: C. difficile NOT detected by PCR.  Treatment not indicated per guidelines.  Repeat testing not indicated within 7 days.          027-NAP1-BI Presumptive Negative      Comment: Presumptive 027/NAP1/BI target DNA sequences are NOT  "DETECTED.       Narrative:      Special Contact [id number] ROSMERY ALANIZ I  Patient running low grade temp and now incontinent of  diarrhea  Does this patient have risk factors for C-diff?->Yes  Has patient taken stool softeners or laxatives in the last 5  days?->Yes          Fluids:  Intake/Output       08/06/17 0700 - 08/07/17 0659 08/07/17 0700 - 08/08/17 0659 08/08/17 0700 - 08/09/17 0659      6460-5937 3411-4954 Total 0748-4835 0316-8446 Total 6295-7194 8529-7997 Total       Intake    P.O.  1200  240 1440  240  -- 240  --  -- --    P.O. 0796 753 5420 240 -- 240 -- -- --    I.V.  120  300 420  310  330 640  --  -- --    IV Volume (NS) 120 100 220 210 230 440 -- -- --    IV Piggyback Volume -- 200 200 100 100 200 -- -- --    Total Intake 3522 766 5872 550 330 880 -- -- --       Output    Urine  2500  700 3200  400  1900 2300  600  -- 600    Void (ml) 2500 700 3200 400 1900 2300 600 -- 600    Stool  --  -- --  --  -- --  --  -- --    Number of Times Stooled -- 0 x 0 x -- 0 x 0 x -- -- --    Total Output 2500 700 3200 400 1900 2300 600 -- 600       Net I/O     -1180 -160 -1340 150 -1570 -1420 -600 -- -600             Assessment:  Active Hospital Problems    Diagnosis   • Skin ulcer of left foot with fat layer exposed (CMS-[facility]) [L97.522]   • Wound of right leg, at BKA site [S81.801A]   • Peripheral neuropathy [G62.9]   • Tobacco abuse [Z72.0]       Plan:  Skin ulcer of left foot with fat layer exposed (CMS-[facility]), improving  Afebrile  No leukocytosis  S/p OR 7/25/17  Cultures  MRSA and Pseudomonas  DC'd vanco due to nephrotoxicity  Continue doxy (no Zyvox due to neuropathy) and cefepime for PSAR  Continue antibiotics through a 8/25/17   No once daily options    Diarrhea, stable  Cdiff neg  Hold stool softeners    Drainage from right BKA site- improved  Afebrile   No leukocytosis  No further drainage  Persistent pain    Prognosis for limb salvage poor, LLE    Placement will be difficult-states \"half\" the " facilities in [place] have restraining orders against him    Appreciate psych eval and recommendations    DW Dr. Zee

## 2017-08-09 PROCEDURE — 700102 HCHG RX REV CODE 250 W/ 637 OVERRIDE(OP): Performed by: INTERNAL MEDICINE

## 2017-08-09 PROCEDURE — 700102 HCHG RX REV CODE 250 W/ 637 OVERRIDE(OP): Performed by: HOSPITALIST

## 2017-08-09 PROCEDURE — 700111 HCHG RX REV CODE 636 W/ 250 OVERRIDE (IP): Performed by: INTERNAL MEDICINE

## 2017-08-09 PROCEDURE — A9270 NON-COVERED ITEM OR SERVICE: HCPCS | Performed by: INTERNAL MEDICINE

## 2017-08-09 PROCEDURE — G8988 SELF CARE GOAL STATUS: HCPCS | Mod: CJ

## 2017-08-09 PROCEDURE — A9270 NON-COVERED ITEM OR SERVICE: HCPCS | Performed by: HOSPITALIST

## 2017-08-09 PROCEDURE — 700111 HCHG RX REV CODE 636 W/ 250 OVERRIDE (IP): Performed by: HOSPITALIST

## 2017-08-09 PROCEDURE — 700102 HCHG RX REV CODE 250 W/ 637 OVERRIDE(OP): Performed by: PSYCHIATRY & NEUROLOGY

## 2017-08-09 PROCEDURE — 700101 HCHG RX REV CODE 250: Performed by: HOSPITALIST

## 2017-08-09 PROCEDURE — 700105 HCHG RX REV CODE 258: Performed by: INTERNAL MEDICINE

## 2017-08-09 PROCEDURE — A9270 NON-COVERED ITEM OR SERVICE: HCPCS | Performed by: PSYCHIATRY & NEUROLOGY

## 2017-08-09 PROCEDURE — 770021 HCHG ROOM/CARE - ISO PRIVATE

## 2017-08-09 PROCEDURE — G8989 SELF CARE D/C STATUS: HCPCS | Mod: CI

## 2017-08-09 PROCEDURE — 99232 SBSQ HOSP IP/OBS MODERATE 35: CPT | Performed by: INTERNAL MEDICINE

## 2017-08-09 PROCEDURE — 700101 HCHG RX REV CODE 250: Performed by: INTERNAL MEDICINE

## 2017-08-09 PROCEDURE — 97605 NEG PRS WND THER DME<=50SQCM: CPT

## 2017-08-09 RX ORDER — SODIUM CHLORIDE 9 MG/ML
INJECTION, SOLUTION INTRAVENOUS
Status: COMPLETED
Start: 2017-08-09 | End: 2017-08-09

## 2017-08-09 RX ADMIN — CLONAZEPAM 1 MG: 1 TABLET ORAL at 09:14

## 2017-08-09 RX ADMIN — LIDOCAINE 1 PATCH: 50 PATCH CUTANEOUS at 13:50

## 2017-08-09 RX ADMIN — ONDANSETRON 4 MG: 4 TABLET, ORALLY DISINTEGRATING ORAL at 03:32

## 2017-08-09 RX ADMIN — ACETAMINOPHEN 650 MG: 325 TABLET, FILM COATED ORAL at 21:32

## 2017-08-09 RX ADMIN — ONDANSETRON 4 MG: 4 TABLET, ORALLY DISINTEGRATING ORAL at 15:22

## 2017-08-09 RX ADMIN — LIDOCAINE 1 PATCH: 50 PATCH CUTANEOUS at 13:49

## 2017-08-09 RX ADMIN — CEFEPIME 2 G: 2 INJECTION, POWDER, FOR SOLUTION INTRAMUSCULAR; INTRAVENOUS at 21:26

## 2017-08-09 RX ADMIN — OXYCODONE HYDROCHLORIDE 10 MG: 10 TABLET ORAL at 03:32

## 2017-08-09 RX ADMIN — METHADONE HYDROCHLORIDE 10 MG: 10 TABLET ORAL at 21:26

## 2017-08-09 RX ADMIN — CEFEPIME 2 G: 2 INJECTION, POWDER, FOR SOLUTION INTRAMUSCULAR; INTRAVENOUS at 05:19

## 2017-08-09 RX ADMIN — PREGABALIN 150 MG: 150 CAPSULE ORAL at 15:22

## 2017-08-09 RX ADMIN — CEFEPIME 2 G: 2 INJECTION, POWDER, FOR SOLUTION INTRAMUSCULAR; INTRAVENOUS at 13:49

## 2017-08-09 RX ADMIN — PREGABALIN 150 MG: 150 CAPSULE ORAL at 21:26

## 2017-08-09 RX ADMIN — DOXYCYCLINE 100 MG: 100 TABLET ORAL at 09:14

## 2017-08-09 RX ADMIN — PREGABALIN 150 MG: 150 CAPSULE ORAL at 09:14

## 2017-08-09 RX ADMIN — ZOLPIDEM TARTRATE 5 MG: 5 TABLET, FILM COATED ORAL at 03:33

## 2017-08-09 RX ADMIN — METHADONE HYDROCHLORIDE 10 MG: 10 TABLET ORAL at 05:19

## 2017-08-09 RX ADMIN — ENOXAPARIN SODIUM 40 MG: 100 INJECTION SUBCUTANEOUS at 09:15

## 2017-08-09 RX ADMIN — METHADONE HYDROCHLORIDE 10 MG: 10 TABLET ORAL at 13:49

## 2017-08-09 RX ADMIN — Medication 325 MG: at 18:28

## 2017-08-09 RX ADMIN — Medication 325 MG: at 09:15

## 2017-08-09 RX ADMIN — MORPHINE SULFATE 2 MG: 4 INJECTION INTRAVENOUS at 09:15

## 2017-08-09 RX ADMIN — CLONAZEPAM 1 MG: 1 TABLET ORAL at 21:26

## 2017-08-09 RX ADMIN — DOXYCYCLINE 100 MG: 100 TABLET ORAL at 21:26

## 2017-08-09 ASSESSMENT — ENCOUNTER SYMPTOMS
VOMITING: 0
DIARRHEA: 0
FEVER: 0
NAUSEA: 0
WEAKNESS: 1
SENSORY CHANGE: 1
HALLUCINATIONS: 0
SPEECH CHANGE: 0
SHORTNESS OF BREATH: 0
TINGLING: 1
CHILLS: 0
PALPITATIONS: 0
NERVOUS/ANXIOUS: 1
FLANK PAIN: 0
COUGH: 0
TREMORS: 0
MYALGIAS: 1
DIARRHEA: 1
ABDOMINAL PAIN: 0

## 2017-08-09 ASSESSMENT — PAIN SCALES - GENERAL
PAINLEVEL_OUTOF10: 6
PAINLEVEL_OUTOF10: 0

## 2017-08-09 NOTE — PROGRESS NOTES
[facility] Hospitalist Progress Note    Date of Service: 2017    Chief Complaint    52 y.o. male hx of PVD s/p L TMA and Rt Bka > 5 years ago  admitted 2017 with right BKA stump pain and wound drainage in addition to left TMA stump wound (nonhealing) over several months.  s/p Debridement and gastroscoleus resection LLE on 17     Interval Problem Update    Pain controlled w switch from oxycontin to methadone. Co chronic right shoulder pain    - close watch, working with staff. Discussed pain issues, no increase in narcotics.  Tolerating abx.  Long talk, previously on klonopin, will add low dose.   - he feels much better emotionally on low dose klonopin, not excessively sleepy.    Consultants/Specialty  LPS  ID - [facility]- dr. Ramon   [facility]  Psychiatry .     Disposition  Limited options w/ Payor (Medical HMO)          Review of Systems   Constitutional: Negative for fever and chills.   Eyes:        Vision loss rt eye   Respiratory: Negative for cough and shortness of breath.    Cardiovascular: Negative for chest pain, palpitations and leg swelling.   Gastrointestinal: Negative for vomiting, abdominal pain and diarrhea.   Genitourinary: Negative for hematuria and flank pain.   Musculoskeletal: Positive for myalgias and joint pain.   Neurological: Positive for tingling, sensory change and weakness. Negative for tremors and speech change.        Chronic peripheral neuropathy .   Psychiatric/Behavioral: Negative for hallucinations. The patient is nervous/anxious (improved.).    All other systems reviewed and are negative.     Physical Exam  Laboratory/Imaging   Hemodynamics  Temp (24hrs), Av.4 °C (97.5 °F), Min:36.2 °C (97.2 °F), Max:36.5 °C (97.7 °F)   Temperature: 36.4 °C (97.5 °F)  Pulse  Av.6  Min: 68  Max: 111    Blood Pressure: 112/62 mmHg      Respiratory      Respiration: 18, Pulse Oximetry: 97 %        RUL Breath Sounds: Clear, RML Breath Sounds: Clear, RLL Breath Sounds: Clear,  NANCY Breath Sounds: Clear, LLL Breath Sounds: Clear    Fluids    Intake/Output Summary (Last 24 hours) at 08/09/17 1247  Last data filed at 08/09/17 0800   Gross per 24 hour   Intake   1840 ml   Output   2550 ml   Net   -710 ml       Nutrition  Orders Placed This Encounter   Procedures   • DIET ORDER     Standing Status: Standing      Number of Occurrences: 1      Standing Expiration Date:      Order Specific Question:  Diet:     Answer:  Regular [1]     Physical Exam   Constitutional: He is oriented to person, place, and time. No distress.   Eyes: Right eye exhibits no discharge. Left eye exhibits no discharge.   Rt eye vision loss, enucleated.    Neck: Neck supple. No JVD present.   Cardiovascular: Normal rate and regular rhythm.    No murmur heard.  Pulmonary/Chest: No stridor. He has no wheezes. He has no rales.   Abdominal: Soft. Bowel sounds are normal. He exhibits no distension. There is no tenderness.   Musculoskeletal: He exhibits no edema.   Rt leg BKA - incision dry, no dehisc- healed.   Left leg w boot wound vac present.  Rt shoulder no redness or warmth, mild swelling.    Neurological: He is alert and oriented to person, place, and time. Coordination abnormal.   Skin: Skin is warm and dry. He is not diaphoretic. No erythema.                                Assessment/Plan     * Skin ulcer of left foot with fat layer exposed (CMS-HCC) (present on admission)  Assessment & Plan  S/p OR debridement and gastroscoleus resection 7/25   Continue wound vac care  w changes three times/wk  Contact isolation for MRSA.  TDWB LLE, WBAT RLE per Ortho recs.  Patient reportedly has restraining orders from much of the Skilled facilites in [place], difficult discharge-- Discussed with  - putting out referrals w no acceptance to date.    MRSA (methicillin resistant staph aureus) culture positive (present on admission)  Assessment & Plan  Doxycycline to cover L foot infection thru 8-25-17   Vanco stopped due to  ARF    Pseudomonas infection (present on admission)  Assessment & Plan  Left foot infection, continue IV cefepime thru 8-25-17, ID assisting    Anxiety  Assessment & Plan  Depakote, psych assisting, long talk, will add low dose klonopin    Diarrhea  Assessment & Plan  Follow    Peripheral neuropathy (present on admission)  Assessment & Plan  Lyrica      Tobacco abuse (present on admission)  Assessment & Plan  Nicotine replacement PRN  Cessation counseling given     Wound of right leg, at BKA site (present on admission)  Assessment & Plan  No acute infection , incision healed .      Opiate dependence (CMS-HCC) (present on admission)  Assessment & Plan  Noted Drug seeking behavior inpatient- improved deviance.  Avoid escalating  IV narcotics with no no signs for pain- use primarily for dressing / vac changes .  oxycontin recently stopped-- continue methadone.   Trial lidoderm patch for chronic rt shoulder , lower back pain     ABRIL (acute kidney injury) (CMS-HCC)  Assessment & Plan  Suspect Vancomycin associated - resolved. Drug stopped. Changed to Doxy for MRSA coverage.        Labs reviewed and Medications reviewed  Rolon catheter: No Rolon      DVT Prophylaxis: Enoxaparin (Lovenox)    Ulcer prophylaxis: Not indicated  Antibiotics: Treating active infection/contamination beyond 24 hours perioperative coverage  Assessed for rehab: Patient was assess for and/or received rehabilitation services during this hospitalization

## 2017-08-09 NOTE — PSYCHIATRY
"PSYCHIATRIC FOLLOW UP:    Reason for Admission: No chief complaint on file.    Legal hold status:   No hold  Psychiatric Supervising Attending:   Angelica Trujillo MD        HPI:  Pt is pleasant and conversant today. He is out of the wheelchair and walking with a prosthesis. He reports that the primary team started him on clonazepam, and \"the first one I took, I could feel it working right away.\" He states the dose is less than he used as an outpatient, but he is happy with it. He reports having nightmares with the depakote. He denies depressive or anxious symptoms.  He denies SI.     Psychiatric Examination: observed phenomenon:  Vitals: Blood pressure 120/66, pulse 80, temperature 36.2 °C (97.2 °F), resp. rate 18, height 1.854 m (6' 1\"), weight 65.2 kg (143 lb 11.8 oz), SpO2 94 %.  Musculoskeletal: normal psychomotor activity, no tics or unusual mannerisms noted  Appearance: Thin male, appropriate dress and grooming. Behavior is calm, cooperative,  appropriate eye contact  Thoughts:  Linear, logical, goal oriented, no blocking of thought noted, no delusional content noted, not obviously responding to internal stimuli.  Speech: Normal rate and jorge, mild pressuring of speech noted  Mood:  \"pretty good\"          Affect: Euthymic, normal range of affect          SI/HI: denies, no evidence of active SI/HI noted   Attention/Alertness: Alert  Memory: Recent and remote memory grossly intact     Orientation: A&Ox3     Fund of Knowledge: Fair     Insight/Judgement into symptoms: decreased  Neurological Testing: not tested          Lab results/tests: No results found for this or any previous visit (from the past 24 hour(s)).       Assessment:(acuity level)   Mood disorder unspecified, r/o BAD  Personality disorder unspecified  Opiate use disorder           Plan:  legal hold: none  Labs/tests ordered: none  Records Reviewed: yes  Discussed with other provider: yes  Signing off              "

## 2017-08-09 NOTE — DISCHARGE PLANNING
Call placed to Mary at [facility] ([phone]). Reviewed referrals in progress to [place] [facility] referrals to [facility] in [place], [place]; [place], [place]; [facility], [facility]; [place], [place]; [place], [place].  We do not yet have an accepting.    When do receive an accepting facility, we are to call Dominic, the Skilled  to get an authorization for SNF. Dominic ([phone]) at [facility].     Instructed by Dominic that when pt has been accepted to a SNF, we need to call [facility] to arrange transport. Call [facility] at [phone].        The patient has a wound vac.  Per CM note, spoke with Bronwyn at [facility] and she said that if the patient is transferred to a SNF more that 2 hours away we will need the patient's doctor here to write an order that the wound vac can be clamped for the duration of the trip.  If this is not acceptable, the wound vac for the facility would need to be delivered here so that he has it on for the duration of the trip.

## 2017-08-09 NOTE — PROGRESS NOTES
Pt AAOx4, pleasant and cooperative tonight.  Lungs are clear on RA.  Vac dressing to L foot CDI, wound vac suctioning appropriately.  R BKA COLUMBA, wound healed. New prosthesis to RLE at bedside.  Denies N/V, last BM 8/8. Tolerating regular diet well.  Reports pain at tolerable level at this time.  Pt up ambulatory with prosthesis, SBA. Wheelchair available at bedside.  Call light and personal belongings within reach.  All needs met at this time.  Will continue to monitor pt.

## 2017-08-09 NOTE — CARE PLAN
Problem: Safety  Goal: Will remain free from injury  Outcome: PROGRESSING AS EXPECTED  Pt refused bed alarm, new prosthesis allows pt to ambulate using FWW, steady gait.    Problem: Pain Management  Goal: Pain level will decrease to patient’s comfort goal  Outcome: PROGRESSING AS EXPECTED  Pain well managed per MAR

## 2017-08-09 NOTE — PROGRESS NOTES
Infectious Disease Progress Note    Author: Lizzette Pino M.D. DOS & Time created: 8/9/2017  11:52 AM    CC: FU right BKA stump infection and nonhealing left TMA stump wound     Interval History:  52 y.o. WM admitted 7/22/2017 with right BKA infection and nonhealing left TMA stump wound   7/23 AF WBC 7.6 less drainage from wounds Asking if he will need another BKA  7/24/17- no fevers. WBC 10.6. Complains of chills. Complains of 10 out of 10 pain in the left foot. Complains of drainage from the right stump.  7/25/17- complains of pain in the foot. Had low-grade fevers up to 100.7 last night . Foot cultures are showing MRSA and Pseudomonas  7/26/17- no fevers. Complains of burning in his left leg. No discharge noted from his right stump  7/28/17- no fevers. Complains of ongoing pain.  7/29/17- no fevers. No labs available  7/30/17-MAXIMUM TEMPERATURE 99.5. Ongoing pain issues. WBC 9000. Creatinine 1.17  7/31 AF lots of c/o neuropathy-feels like stump is in ice water  8/1 AF no new complaints- +phantom limb pain  8/2 AF pain better controlled Having loose stools. Wants a psych consult  8/3 AF in WC today No new complaints  8/4 AF WBC 9.2 no new complaints-wants to see psych again  8/5 AF WBC 9.2 seen during dressing change  8/7 AF, no CBC, tired of infections, thinking about left BKA, wants to speak to Dr. Shirley  8/8 AF, no CBC, no longer wants a BKA, wants to try abx first, PICC came out a little after his TV remote hit his PICC  8/9 AF, no CBC, ambulating in halls without issues  Labs Reviewed, Medications Reviewed, Radiology Reviewed and Wound Reviewed.    Review of Systems:  Review of Systems   Constitutional: Negative for fever and chills.   Respiratory: Negative for cough and shortness of breath.    Cardiovascular: Negative for chest pain.   Gastrointestinal: Positive for diarrhea. Negative for nausea, vomiting and abdominal pain.   Genitourinary: Negative for dysuria.   Musculoskeletal: Positive for  myalgias and joint pain.   Neurological: Positive for sensory change.   All other systems reviewed and are negative.      Hemodynamics:  Temp (24hrs), Av.4 °C (97.5 °F), Min:36.2 °C (97.2 °F), Max:36.5 °C (97.7 °F)  Temperature: 36.4 °C (97.5 °F)  Pulse  Av.6  Min: 68  Max: 111   Blood Pressure: 112/62 mmHg       Physical Exam:  Physical Exam   Constitutional: He is oriented to person, place, and time. He appears well-developed. No distress.   Disheveled   HENT:   Head: Normocephalic.   Poor dentition     Eyes:   Blind right eye   Neck: Neck supple.   Cardiovascular: Normal rate.    Pulmonary/Chest: Effort normal. No respiratory distress.   Abdominal: Soft. He exhibits no distension. There is no tenderness.   Musculoskeletal: He exhibits no edema.   Wound right BKA-no longer draining  Left foot surgical dressing - maceration, some serosang, bone palpate per wound care notes  Measures 4.5x4.5x0.5cm  Wound vac  Boot    LUE PICC nontender   Neurological: He is alert and oriented to person, place, and time.   Skin: No rash noted.   tattoos   Nursing note and vitals reviewed.      Labs:  No results for input(s): WBC, RBC, HEMOGLOBIN, HEMATOCRIT, MCV, MCH, RDW, PLATELETCT, MPV, NEUTSPOLYS, LYMPHOCYTES, MONOCYTES, EOSINOPHILS, BASOPHILS, RBCMORPHOLO in the last 72 hours.  No results for input(s): SODIUM, POTASSIUM, CHLORIDE, CO2, GLUCOSE, BUN, CPKTOTAL in the last 72 hours.  No results for input(s): ALBUMIN, TBILIRUBIN, ALKPHOSPHAT, TOTPROTEIN, ALTSGPT, ASTSGOT, CREATININE in the last 72 hours.  Results     ** No results found for the last 168 hours. **          Fluids:  Intake/Output       17 07 - 17 0659 17 07 - 17 0659 17 07 - 08/10/17 0659      9887-2664 8885-9249 Total 3550-7297 5578-8824 Total 1735-8670 6346-1242 Total       Intake    P.O.  240  -- 240  1200  440 1640  240  -- 240    P.O. 240 -- 240 3347 851 6625 240 -- 240    I.V.  310  330 640  100  340 440  --  -- --     "IV Volume (NS) 210 230 440 -- 240 240 -- -- --    IV Piggyback Volume 100 100 200 100 100 200 -- -- --    Total Intake 550  780 2080 240 -- 240       Output    Urine  400  1900 2300  2550  500 3050  600  -- 600    Void (ml) 400 1900 2300 2550 500 3050 600 -- 600    Stool  --  -- --  --  -- --  --  -- --    Number of Times Stooled -- 0 x 0 x -- -- -- -- -- --    Total Output 400 1900 2300 2550 500 3050 600 -- 600       Net I/O     150 -1570 -1420 -1250 280 -970 -360 -- -360             Assessment:  Active Hospital Problems    Diagnosis   • Skin ulcer of left foot with fat layer exposed (CMS-HCC) [L97.522]   • Wound of right leg, at BKA site [S81.801A]   • Peripheral neuropathy [G62.9]   • Tobacco abuse [Z72.0]       Plan:  Skin ulcer of left foot with fat layer exposed (CMS-HCC), improving  Afebrile  No leukocytosis  S/p OR 7/25/17  Cultures  MRSA and Pseudomonas  DC'd vanco due to nephrotoxicity  Continue doxy (no Zyvox due to neuropathy) and cefepime for PSAR  Continue antibiotics through 8/25/17   No once daily options  Wound care    Diarrhea, stable  Cdiff neg  Hold stool softeners    Drainage from right BKA site- improved  Afebrile   No leukocytosis  No further drainage  Persistent pain    Prognosis for limb salvage poor, LLE    Placement will be difficult-states \"half\" the facilities in [place] have restraining orders against him    Appreciate psych eval and recommendations    DW Dr. Zee. ID signing off. Please reconsult if needed.      "

## 2017-08-09 NOTE — DISCHARGE PLANNING
Followed up with [facility]. Spoke to staff who stated she would verify if referral was received and call this [facility] back. Attempted to follow up with [facility] and [facility] SN[facility], however, no answer. Voicemail left. Attempted to follow up with [facility], however, staff in meeting. Message left. Followed up with [facility]. Spoke to staff who requested updated referral. Updated referral faxed. Attempted to follow up with [facility], and [facility], however no answer. Voicemail left.    Received a call back from [facility]. They are declining patient due to no bed availability.

## 2017-08-09 NOTE — THERAPY
Discussed with OT pt has met goals and has no further skilled need in this setting.  Pt will be returning home at friend home per pt w/c level and would benefit from home health.   MW [phone]

## 2017-08-10 PROCEDURE — A9270 NON-COVERED ITEM OR SERVICE: HCPCS | Performed by: INTERNAL MEDICINE

## 2017-08-10 PROCEDURE — A9270 NON-COVERED ITEM OR SERVICE: HCPCS | Performed by: PSYCHIATRY & NEUROLOGY

## 2017-08-10 PROCEDURE — 700102 HCHG RX REV CODE 250 W/ 637 OVERRIDE(OP): Performed by: PSYCHIATRY & NEUROLOGY

## 2017-08-10 PROCEDURE — 700102 HCHG RX REV CODE 250 W/ 637 OVERRIDE(OP): Performed by: HOSPITALIST

## 2017-08-10 PROCEDURE — 700111 HCHG RX REV CODE 636 W/ 250 OVERRIDE (IP): Performed by: INTERNAL MEDICINE

## 2017-08-10 PROCEDURE — 770021 HCHG ROOM/CARE - ISO PRIVATE

## 2017-08-10 PROCEDURE — A9270 NON-COVERED ITEM OR SERVICE: HCPCS | Performed by: HOSPITALIST

## 2017-08-10 PROCEDURE — 700101 HCHG RX REV CODE 250: Performed by: HOSPITALIST

## 2017-08-10 PROCEDURE — 99232 SBSQ HOSP IP/OBS MODERATE 35: CPT | Performed by: INTERNAL MEDICINE

## 2017-08-10 PROCEDURE — 700102 HCHG RX REV CODE 250 W/ 637 OVERRIDE(OP): Performed by: INTERNAL MEDICINE

## 2017-08-10 PROCEDURE — 700101 HCHG RX REV CODE 250: Performed by: INTERNAL MEDICINE

## 2017-08-10 PROCEDURE — 700105 HCHG RX REV CODE 258: Performed by: INTERNAL MEDICINE

## 2017-08-10 RX ADMIN — CLONAZEPAM 1 MG: 1 TABLET ORAL at 21:00

## 2017-08-10 RX ADMIN — METHADONE HYDROCHLORIDE 10 MG: 10 TABLET ORAL at 05:26

## 2017-08-10 RX ADMIN — ZOLPIDEM TARTRATE 5 MG: 5 TABLET, FILM COATED ORAL at 23:49

## 2017-08-10 RX ADMIN — LIDOCAINE 1 PATCH: 50 PATCH CUTANEOUS at 14:53

## 2017-08-10 RX ADMIN — ACETAMINOPHEN 650 MG: 325 TABLET, FILM COATED ORAL at 03:19

## 2017-08-10 RX ADMIN — CEFEPIME 2 G: 2 INJECTION, POWDER, FOR SOLUTION INTRAMUSCULAR; INTRAVENOUS at 21:00

## 2017-08-10 RX ADMIN — Medication 325 MG: at 10:01

## 2017-08-10 RX ADMIN — OXYCODONE HYDROCHLORIDE 10 MG: 10 TABLET ORAL at 23:49

## 2017-08-10 RX ADMIN — OXYCODONE HYDROCHLORIDE 10 MG: 10 TABLET ORAL at 10:01

## 2017-08-10 RX ADMIN — OXYCODONE HYDROCHLORIDE 10 MG: 10 TABLET ORAL at 03:19

## 2017-08-10 RX ADMIN — CLONAZEPAM 1 MG: 1 TABLET ORAL at 10:01

## 2017-08-10 RX ADMIN — DOXYCYCLINE 100 MG: 100 TABLET ORAL at 21:00

## 2017-08-10 RX ADMIN — LIDOCAINE 1 PATCH: 50 PATCH CUTANEOUS at 14:54

## 2017-08-10 RX ADMIN — LIDOCAINE 1 APPLICATION: 40 CREAM TOPICAL at 03:20

## 2017-08-10 RX ADMIN — LIDOCAINE 1 APPLICATION: 40 CREAM TOPICAL at 23:51

## 2017-08-10 RX ADMIN — ENOXAPARIN SODIUM 40 MG: 100 INJECTION SUBCUTANEOUS at 10:00

## 2017-08-10 RX ADMIN — METHADONE HYDROCHLORIDE 10 MG: 10 TABLET ORAL at 14:52

## 2017-08-10 RX ADMIN — CEFEPIME 2 G: 2 INJECTION, POWDER, FOR SOLUTION INTRAMUSCULAR; INTRAVENOUS at 14:53

## 2017-08-10 RX ADMIN — METHADONE HYDROCHLORIDE 10 MG: 10 TABLET ORAL at 21:00

## 2017-08-10 RX ADMIN — CEFEPIME 2 G: 2 INJECTION, POWDER, FOR SOLUTION INTRAMUSCULAR; INTRAVENOUS at 05:26

## 2017-08-10 RX ADMIN — PREGABALIN 150 MG: 150 CAPSULE ORAL at 14:53

## 2017-08-10 RX ADMIN — Medication 325 MG: at 18:04

## 2017-08-10 RX ADMIN — DOXYCYCLINE 100 MG: 100 TABLET ORAL at 10:01

## 2017-08-10 RX ADMIN — ZOLPIDEM TARTRATE 5 MG: 5 TABLET, FILM COATED ORAL at 23:48

## 2017-08-10 RX ADMIN — PREGABALIN 150 MG: 150 CAPSULE ORAL at 10:01

## 2017-08-10 RX ADMIN — PREGABALIN 150 MG: 150 CAPSULE ORAL at 21:00

## 2017-08-10 ASSESSMENT — ENCOUNTER SYMPTOMS
MYALGIAS: 1
SHORTNESS OF BREATH: 0
FEVER: 0
DIARRHEA: 0
SENSORY CHANGE: 1
WEAKNESS: 1
NERVOUS/ANXIOUS: 1
HALLUCINATIONS: 0
CHILLS: 0
SPEECH CHANGE: 0
COUGH: 0
TREMORS: 0
FLANK PAIN: 0
TINGLING: 1
PALPITATIONS: 0
VOMITING: 0
ABDOMINAL PAIN: 0

## 2017-08-10 ASSESSMENT — PAIN SCALES - GENERAL
PAINLEVEL_OUTOF10: 6
PAINLEVEL_OUTOF10: 5
PAINLEVEL_OUTOF10: 6
PAINLEVEL_OUTOF10: 6
PAINLEVEL_OUTOF10: 5
PAINLEVEL_OUTOF10: 7

## 2017-08-10 NOTE — PROGRESS NOTES
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 Takes methadone he states for back pain.  Not diabetic.    Presentee to ED with increased pain to L TMA with infected neuropathic ulcer and pain to R BKA.     s/p L foot I & D with VAC placement, GSR by Dr. Shirley on 7/25    /73 mmHg  Pulse 108  Temp(Src) 36.6 °C (97.9 °F)  Resp 18  Ht 1.854 m (6' 1\")  Wt 65.2 kg (143 lb 11.8 oz)  BMI 18.97 kg/m2  SpO2 96%     Pain controlled     Offloading boot in place to LLE.    VAC dressing changed to day by IP wound team   Photo and wound team note reviewed. Wound bed clean and granulating.  Periwound maceration better      R BKA:  skin intact  Per Nazario fajardo from [facility] in room with new prosthetic    ID involved  IV abx- MRSA    Case management working on SNF placement in [place].  No accepting facility as yet    PLAN  Continue VAC change per IP wound team for LLE.    diabetic boot continuously for 6wk d/t GSR incision to calf  NWB LLE until CROW boot can be applied by [facility]  Prosthetist to eval RLE prosthetic  NWB until prosthetic is adjusted  Continue IV abx per ID  Discharge planning.  Ok for discharge from LPS standpoint  "

## 2017-08-10 NOTE — PROGRESS NOTES
[facility] Hospitalist Progress Note    Date of Service: 8/10/2017    Chief Complaint    52 y.o. male hx of PVD s/p L TMA and Rt Bka > 5 years ago  admitted 2017 with right BKA stump pain and wound drainage in addition to left TMA stump wound (nonhealing) over several months.  s/p Debridement and gastroscoleus resection LLE on 17     Interval Problem Update    Pain controlled w switch from oxycontin to methadone. Co chronic right shoulder pain    - close watch, working with staff. Discussed pain issues, no increase in narcotics.  Tolerating abx.  Long talk, previously on klonopin, will add low dose.   - he feels much better emotionally on low dose klonopin, not excessively sleepy.  8/10 - wandering in WC. Tolerating abx.    Consultants/Specialty  LPS  ID - [facility]- dr. Ramon   [facility]  Psychiatry .     Disposition  Limited options w/ Payor (Medical HMO)          Review of Systems   Constitutional: Negative for fever and chills.   Eyes:        Vision loss rt eye   Respiratory: Negative for cough and shortness of breath.    Cardiovascular: Negative for chest pain, palpitations and leg swelling.   Gastrointestinal: Negative for vomiting, abdominal pain and diarrhea.   Genitourinary: Negative for hematuria and flank pain.   Musculoskeletal: Positive for myalgias and joint pain.   Neurological: Positive for tingling, sensory change and weakness. Negative for tremors and speech change.        Chronic peripheral neuropathy .   Psychiatric/Behavioral: Negative for hallucinations. The patient is nervous/anxious (improved.).    All other systems reviewed and are negative.     Physical Exam  Laboratory/Imaging   Hemodynamics  Temp (24hrs), Av.5 °C (97.7 °F), Min:36.2 °C (97.2 °F), Max:36.8 °C (98.2 °F)   Temperature: 36.8 °C (98.2 °F)  Pulse  Av  Min: 68  Max: 111    Blood Pressure: 102/69 mmHg      Respiratory      Respiration: 18, Pulse Oximetry: 99 %        RUL Breath Sounds: Clear, RML Breath  Sounds: Clear, RLL Breath Sounds: Clear, NANCY Breath Sounds: Clear, LLL Breath Sounds: Clear    Fluids    Intake/Output Summary (Last 24 hours) at 08/10/17 1225  Last data filed at 08/10/17 0400   Gross per 24 hour   Intake   1120 ml   Output   1200 ml   Net    -80 ml       Nutrition  Orders Placed This Encounter   Procedures   • DIET ORDER     Standing Status: Standing      Number of Occurrences: 1      Standing Expiration Date:      Order Specific Question:  Diet:     Answer:  Regular [1]     Physical Exam   Constitutional: He is oriented to person, place, and time. No distress.   Eyes: Right eye exhibits no discharge. Left eye exhibits no discharge.   Rt eye vision loss, enucleated.    Neck: Neck supple. No JVD present.   Cardiovascular: Normal rate and regular rhythm.    No murmur heard.  Pulmonary/Chest: No stridor. He has no wheezes. He has no rales.   Abdominal: Soft. Bowel sounds are normal. He exhibits no distension. There is no tenderness.   Musculoskeletal: He exhibits no edema.   Rt leg BKA - incision dry, no dehisc- healed.   Left leg w boot wound vac present.  Rt shoulder no redness or warmth, mild swelling.    Neurological: He is alert and oriented to person, place, and time. Coordination abnormal.   Skin: Skin is warm and dry. He is not diaphoretic. No erythema.                                Assessment/Plan     * Skin ulcer of left foot with fat layer exposed (CMS-HCC) (present on admission)  Assessment & Plan  S/p OR debridement and gastroscoleus resection 7/25   Continue wound vac care  w changes three times/wk  Contact isolation for MRSA.  TDWB LLE, WBAT RLE per Ortho recs.  Patient reportedly has restraining orders from much of the Skilled facilites in [place], difficult discharge-- Discussed with  - putting out referrals w no acceptance to date.    MRSA (methicillin resistant staph aureus) culture positive (present on admission)  Assessment & Plan  Doxycycline to cover L foot infection  thru 8-25-17   Vanco stopped due to ARF    Pseudomonas infection (present on admission)  Assessment & Plan  Left foot infection, continue IV cefepime thru 8-25-17, ID assisting    Anxiety  Assessment & Plan  Depakote, psych assisting, long talk, will add low dose klonopin    Diarrhea  Assessment & Plan  Follow    Peripheral neuropathy (present on admission)  Assessment & Plan  Lyrica      Tobacco abuse (present on admission)  Assessment & Plan  Nicotine replacement PRN  Cessation counseling given     Wound of right leg, at BKA site (present on admission)  Assessment & Plan  No acute infection , incision healed .      Opiate dependence (CMS-HCC) (present on admission)  Assessment & Plan  Noted Drug seeking behavior inpatient- improved deviance.  Avoid escalating  IV narcotics with no no signs for pain- use primarily for dressing / vac changes .  oxycontin recently stopped-- continue methadone.   Trial lidoderm patch for chronic rt shoulder , lower back pain     ABRIL (acute kidney injury) (CMS-HCC)  Assessment & Plan  Suspect Vancomycin associated - resolved. Drug stopped. Changed to Doxy for MRSA coverage.        Labs reviewed and Medications reviewed  Rolon catheter: No Rolon      DVT Prophylaxis: Enoxaparin (Lovenox)    Ulcer prophylaxis: Not indicated  Antibiotics: Treating active infection/contamination beyond 24 hours perioperative coverage  Assessed for rehab: Patient was assess for and/or received rehabilitation services during this hospitalization

## 2017-08-10 NOTE — PROGRESS NOTES
Pt AAOx4, pleasant and cooperative tonight.  Lungs are clear on RA.  Vac dressing to L foot CDI, wound vac suctioning appropriately. Immobilizer boots in place  R BKA COLUMBA, wound healed.  Denies N/V, last BM 8/9. Tolerating regular diet well.  Reports pain at tolerable level at this time.   Wheelchair available at bedside.  Call light and personal belongings within reach.  All needs met at this time.  Will continue to monitor pt.

## 2017-08-10 NOTE — PROGRESS NOTES
AA&O x 4.  Ambulating with new prosthetic  Medicated for wound vac changed in a.m.  Boot on left foot with wound vac operating.  VSS.  SpO2: 93% on room air.  Tolerating diet.  Last BM 8/8/17.

## 2017-08-10 NOTE — WOUND TEAM
[facility]  Inpatient Services  Wound and Skin Care Treatment Note    Admission Date:  07/22/17     HPI, PMH, SH: Reviewed  Unit where seen by Wound Team:  [id number]    WOUND CONSULT RELATED TO:   Scheduled NPWT dressing change left plantar foot      SUBJECTIVE:  Pleasant/agreeable    Self Report / Pain Level:  Tolerated well with PO pre medication     OBJECTIVE:    Previous NPWT dressing intact    WOUND TYPE, LOCATION, CHARACTERISTICS (Pressure ulcers: location, stage, POA or date identified)    Location/type of wound: Open surgical wound left plantar foot      Periwound:     macerated around wound and in between remaining amputation site of pervious digit; callous-like tissue      Drainage:     scant serosanguinous    Tissue Type and %:    100% red/pink  Wound Edges:    Attached/macerated    Odor:     none  Exposed structure(s):  Bone palpated    S&S of Infection:     none      Measurements: (cm)  (Taken 08/09/17)   Length:    5.0   Width:     5.5  Depth:    ~ 0.2      INTERVENTIONS BY WOUND TEAM:  Removed prev dressing and irrigated wound with wound cleanser.  Measurements and photograph done. Benzoin, thin hydrocolloid and drape to bull wound skin as needed. Small piece of paste ring used to seal fissure on lateral side.  Covered wound with 1 piece black foam with a bridge and button to dorsum of foot. Secured with drape and applied neg pressure at 125mmhg/dpc.   Total black foam=3 pieces.    Interdisciplinary consultation:   Staff RN, patient      EVALUATION:   Insole of the walking boot was wet when removed so was not replaced but left off to dry out; pt likely spilled something -- wnd stable; bull-wnd slightly improved but callous-like tissue may impede healing    Factors affecting wound healing:  Neuropathy, tobacco abuse     Goals:  Decrease in wound size by 5% every 2 weeks -- no maceration    NURSING PLAN OF CARE ORDERS (x):    Dressing changes: See Dressing Maintenance orders:  x  Skin care:  See Skin Care orders: x  Rectal tube care: See Rectal Tube Care orders:   Other orders:    WOUND TEAM PLAN OF CARE (x):   NPWT change 3 x week:   x     Dressing changes by wound team:       Follow up as needed:     x  Other (explain):    Anticipated discharge plans (x):  SNF:           Home Care:           Outpatient Wound Center:   x      Self Care:            Other:

## 2017-08-10 NOTE — PROGRESS NOTES
Morning report received. Pt in bed watching tv. Pt reporting a pain level of 6/10. Medicated per MAR.   Pt on normal saline running TKO in his single lumen PICC.   Bowel sounds are normoactive x4. LBM- 8/8  Pt mobile into wheel chair up self and steady but still calls appropriately.     POC for the day- awaiting prosthesis return

## 2017-08-10 NOTE — CARE PLAN
Problem: Infection  Goal: Will remain free from infection  Outcome: PROGRESSING AS EXPECTED  Monitoring for infection, antibiotics ordered    Problem: Pain Management  Goal: Pain level will decrease to patient’s comfort goal  Pain well managed per MAR

## 2017-08-10 NOTE — DISCHARGE PLANNING
I called SANTIAGO Erwin, and asked her to call the 6 SNF's in [place] that are pending acceptance and to let me know if there are any that can take the patient.  [facility] said that they cannot authorize any other SNF's until all 6 of the ones in contract have declined the patient.  Yaima said that she called all 6 of them late yesterday and is waiting for them to call her back.

## 2017-08-11 LAB — GLUCOSE BLD-MCNC: 114 MG/DL (ref 65–99)

## 2017-08-11 PROCEDURE — A9270 NON-COVERED ITEM OR SERVICE: HCPCS | Performed by: INTERNAL MEDICINE

## 2017-08-11 PROCEDURE — 700102 HCHG RX REV CODE 250 W/ 637 OVERRIDE(OP): Performed by: INTERNAL MEDICINE

## 2017-08-11 PROCEDURE — 700101 HCHG RX REV CODE 250: Performed by: HOSPITALIST

## 2017-08-11 PROCEDURE — 82962 GLUCOSE BLOOD TEST: CPT

## 2017-08-11 PROCEDURE — A9270 NON-COVERED ITEM OR SERVICE: HCPCS | Performed by: HOSPITALIST

## 2017-08-11 PROCEDURE — 700101 HCHG RX REV CODE 250: Performed by: INTERNAL MEDICINE

## 2017-08-11 PROCEDURE — 700101 HCHG RX REV CODE 250: Performed by: NURSE PRACTITIONER

## 2017-08-11 PROCEDURE — 700102 HCHG RX REV CODE 250 W/ 637 OVERRIDE(OP): Performed by: HOSPITALIST

## 2017-08-11 PROCEDURE — 770021 HCHG ROOM/CARE - ISO PRIVATE

## 2017-08-11 PROCEDURE — 700111 HCHG RX REV CODE 636 W/ 250 OVERRIDE (IP): Performed by: INTERNAL MEDICINE

## 2017-08-11 PROCEDURE — 97605 NEG PRS WND THER DME<=50SQCM: CPT

## 2017-08-11 PROCEDURE — 700105 HCHG RX REV CODE 258: Performed by: INTERNAL MEDICINE

## 2017-08-11 PROCEDURE — 700111 HCHG RX REV CODE 636 W/ 250 OVERRIDE (IP): Performed by: HOSPITALIST

## 2017-08-11 PROCEDURE — 99232 SBSQ HOSP IP/OBS MODERATE 35: CPT | Performed by: INTERNAL MEDICINE

## 2017-08-11 RX ADMIN — OXYCODONE HYDROCHLORIDE 10 MG: 10 TABLET ORAL at 05:38

## 2017-08-11 RX ADMIN — CEFEPIME 2 G: 2 INJECTION, POWDER, FOR SOLUTION INTRAMUSCULAR; INTRAVENOUS at 13:38

## 2017-08-11 RX ADMIN — Medication 325 MG: at 08:06

## 2017-08-11 RX ADMIN — LIDOCAINE 1 PATCH: 50 PATCH CUTANEOUS at 12:45

## 2017-08-11 RX ADMIN — CEFEPIME 2 G: 2 INJECTION, POWDER, FOR SOLUTION INTRAMUSCULAR; INTRAVENOUS at 05:42

## 2017-08-11 RX ADMIN — ACETAMINOPHEN 650 MG: 325 TABLET, FILM COATED ORAL at 23:27

## 2017-08-11 RX ADMIN — CLONAZEPAM 1 MG: 1 TABLET ORAL at 08:12

## 2017-08-11 RX ADMIN — LIDOCAINE 1 APPLICATION: 40 CREAM TOPICAL at 12:46

## 2017-08-11 RX ADMIN — CLONAZEPAM 1 MG: 1 TABLET ORAL at 21:49

## 2017-08-11 RX ADMIN — METHADONE HYDROCHLORIDE 10 MG: 10 TABLET ORAL at 21:49

## 2017-08-11 RX ADMIN — ENOXAPARIN SODIUM 40 MG: 100 INJECTION SUBCUTANEOUS at 08:12

## 2017-08-11 RX ADMIN — PREGABALIN 150 MG: 150 CAPSULE ORAL at 14:06

## 2017-08-11 RX ADMIN — DOXYCYCLINE 100 MG: 100 TABLET ORAL at 08:12

## 2017-08-11 RX ADMIN — PREGABALIN 150 MG: 150 CAPSULE ORAL at 08:12

## 2017-08-11 RX ADMIN — DOXYCYCLINE 100 MG: 100 TABLET ORAL at 21:50

## 2017-08-11 RX ADMIN — LIDOCAINE 1 PATCH: 50 PATCH CUTANEOUS at 12:44

## 2017-08-11 RX ADMIN — CEFEPIME 2 G: 2 INJECTION, POWDER, FOR SOLUTION INTRAMUSCULAR; INTRAVENOUS at 21:50

## 2017-08-11 RX ADMIN — METHADONE HYDROCHLORIDE 10 MG: 10 TABLET ORAL at 14:06

## 2017-08-11 RX ADMIN — METHADONE HYDROCHLORIDE 10 MG: 10 TABLET ORAL at 05:38

## 2017-08-11 RX ADMIN — ACETAMINOPHEN 650 MG: 325 TABLET, FILM COATED ORAL at 05:38

## 2017-08-11 RX ADMIN — PREGABALIN 150 MG: 150 CAPSULE ORAL at 21:50

## 2017-08-11 RX ADMIN — MORPHINE SULFATE 2 MG: 4 INJECTION INTRAVENOUS at 13:36

## 2017-08-11 ASSESSMENT — PAIN SCALES - GENERAL
PAINLEVEL_OUTOF10: 0
PAINLEVEL_OUTOF10: 0
PAINLEVEL_OUTOF10: 3
PAINLEVEL_OUTOF10: 2
PAINLEVEL_OUTOF10: 0
PAINLEVEL_OUTOF10: 6
PAINLEVEL_OUTOF10: 3
PAINLEVEL_OUTOF10: 6

## 2017-08-11 ASSESSMENT — ENCOUNTER SYMPTOMS
ABDOMINAL PAIN: 0
VOMITING: 0
SHORTNESS OF BREATH: 0
NERVOUS/ANXIOUS: 1
FLANK PAIN: 0
PALPITATIONS: 0
TINGLING: 1
SENSORY CHANGE: 1
HALLUCINATIONS: 0
WEAKNESS: 1
MYALGIAS: 1
COUGH: 0
CHILLS: 0
TREMORS: 0
DIARRHEA: 0
SPEECH CHANGE: 0
FEVER: 0

## 2017-08-11 NOTE — PROGRESS NOTES
"Pt A&O x4. Friend present at bedside.     Vitals: /71 mmHg  Pulse 108  Temp(Src) 37 °C (98.6 °F)  Resp 18  Ht 1.854 m (6' 1\")  Wt 65.2 kg (143 lb 11.8 oz)  BMI 18.97 kg/m2  SpO2 95%\    Declines pain interventions at this time.     Neuro: OLIVA. Denies new onset of numbness/ tingling. Baseline N/T hands/ fingers, BLE.     Cardiac: Denies new onset of chest pain. Mild Tachycardia.     Vascular: Pulses 2+ BUE, 1+ LLE pedal. 1+ generalized LLE edema noted.    Respiratory: Lungs sound clear to auscultation. Pulling 3500 on IS, effective, self-motivated. Denies SOB.    GI: Abdomen soft. + bowel sounds, + flatus, + BM today. On regular diet, tolerating well. - nausea/ vomiting.    : Pt voiding adequately.      MSK: Pt up to bathroom self, tolerating well. Pt utilizing RLE prosthesis and FWW to ambulate around room and up to bathroom. LLE touch down weight bearing with offloading boot to be worn at all times.     Integumentary: Right BKA POA stump incision CDI, pt applies Lidocaine cream PRN. LLE TMA surgical incisions covered with wound vac dressing, wound vac dressing CDI, wound vac on, scant/ no drainage.    Labs noted. LUE PICC single lumen patent, dressing CDI, no s/sx of infection noted.    Fall precautions in place: Bed locked in lowest position, Upper bed rails up, treaded socks in place, personal belongings within reach, call light within reach, appropriate mobility signs in place, - bed alarm. Pt calls appropriately.     Pt updated on POC.   "

## 2017-08-11 NOTE — PROGRESS NOTES
0645 Received report, introduced self to pt, reviewed labs, orders, allergies, code status    1900 Handed off to night shift RN. Pt care relinquished.

## 2017-08-11 NOTE — CARE PLAN
Problem: Safety  Goal: Will remain free from falls  Pt will continue to call appropriately    Problem: Skin Integrity  Goal: Risk for impaired skin integrity will decrease  Wound care as scheduled will continue

## 2017-08-11 NOTE — CARE PLAN
Problem: Infection  Goal: Will remain free from infection  Outcome: PROGRESSING AS EXPECTED  Pt educated on process of changing the dressing on his PICC line because the dressing was no longer in tact. Educated on importance of CHG bath!    Problem: Mobility  Goal: Risk for activity intolerance will decrease  Outcome: PROGRESSING AS EXPECTED  Pt received his prothesis today and was educated on how to move about safely with his new mobility

## 2017-08-11 NOTE — PROGRESS NOTES
[facility] Hospitalist Progress Note    Date of Service: 2017    Chief Complaint    52 y.o. male hx of PVD s/p L TMA and Rt Bka > 5 years ago  admitted 2017 with right BKA stump pain and wound drainage in addition to left TMA stump wound (nonhealing) over several months.  s/p Debridement and gastroscoleus resection LLE on 17     Interval Problem Update    Pain controlled w switch from oxycontin to methadone. Co chronic right shoulder pain    - close watch, working with staff. Discussed pain issues, no increase in narcotics.  Tolerating abx.  Long talk, previously on klonopin, will add low dose.   - he feels much better emotionally on low dose klonopin, not excessively sleepy.  8/10 - wandering in WC. Tolerating abx.   - up in room, anxiety managed.  Scoots in WC.    Consultants/Specialty  LPS  ID - [facility]- dr. Ramon   [facility]  Psychiatry .     Disposition  Limited options w/ Payor (Medical HMO)          Review of Systems   Constitutional: Negative for fever and chills.   Eyes:        Vision loss rt eye   Respiratory: Negative for cough and shortness of breath.    Cardiovascular: Negative for chest pain, palpitations and leg swelling.   Gastrointestinal: Negative for vomiting, abdominal pain and diarrhea.   Genitourinary: Negative for hematuria and flank pain.   Musculoskeletal: Positive for myalgias and joint pain.   Neurological: Positive for tingling, sensory change and weakness. Negative for tremors and speech change.        Chronic peripheral neuropathy .   Psychiatric/Behavioral: Negative for hallucinations. The patient is nervous/anxious (improved.).    All other systems reviewed and are negative.     Physical Exam  Laboratory/Imaging   Hemodynamics  Temp (24hrs), Av.7 °C (98 °F), Min:36.1 °C (97 °F), Max:37 °C (98.6 °F)   Temperature: 36.7 °C (98.1 °F)  Pulse  Av.3  Min: 63  Max: 111    Blood Pressure: 115/63 mmHg      Respiratory      Respiration: 18, Pulse Oximetry: 100  %        RUL Breath Sounds: Clear, RML Breath Sounds: Clear, RLL Breath Sounds: Clear, NANCY Breath Sounds: Clear, LLL Breath Sounds: Clear    Fluids    Intake/Output Summary (Last 24 hours) at 08/11/17 1120  Last data filed at 08/11/17 0500   Gross per 24 hour   Intake   1320 ml   Output   1550 ml   Net   -230 ml       Nutrition  Orders Placed This Encounter   Procedures   • DIET ORDER     Standing Status: Standing      Number of Occurrences: 1      Standing Expiration Date:      Order Specific Question:  Diet:     Answer:  Regular [1]     Physical Exam   Constitutional: He is oriented to person, place, and time. No distress.   Eyes: Right eye exhibits no discharge. Left eye exhibits no discharge.   Rt eye vision loss, enucleated.    Neck: Neck supple. No JVD present.   Cardiovascular: Normal rate and regular rhythm.    No murmur heard.  Pulmonary/Chest: No stridor. He has no wheezes. He has no rales.   Abdominal: Soft. Bowel sounds are normal. He exhibits no distension. There is no tenderness.   Musculoskeletal: He exhibits no edema.   Rt leg BKA - incision dry, no dehisc- healed.   Left leg w boot wound vac present.  Rt shoulder no redness or warmth, mild swelling.    Neurological: He is alert and oriented to person, place, and time. Coordination abnormal.   Skin: Skin is warm and dry. He is not diaphoretic. No erythema.                                Assessment/Plan     * Skin ulcer of left foot with fat layer exposed (CMS-HCC) (present on admission)  Assessment & Plan  S/p OR debridement and gastroscoleus resection 7/25   Continue wound vac care  w changes three times/wk  Contact isolation for MRSA.  TDWB LLE, WBAT RLE per Ortho recs.  Patient reportedly has restraining orders from much of the Skilled facilites in [place], difficult discharge-- Discussed with  - putting out referrals w no acceptance to date.    MRSA (methicillin resistant staph aureus) culture positive (present on admission)  Assessment &  Plan  Doxycycline to cover L foot infection thru 8-25-17   Vanco stopped due to ARF    Pseudomonas infection (present on admission)  Assessment & Plan  Left foot infection, continue IV cefepime thru 8-25-17, ID assisting    Anxiety  Assessment & Plan  Depakote, psych assisting, long talk, will add low dose klonopin    Diarrhea  Assessment & Plan  Follow    Peripheral neuropathy (present on admission)  Assessment & Plan  Lyrica      Tobacco abuse (present on admission)  Assessment & Plan  Nicotine replacement PRN  Cessation counseling given     Wound of right leg, at BKA site (present on admission)  Assessment & Plan  No acute infection , incision healed .      Opiate dependence (CMS-HCC) (present on admission)  Assessment & Plan  Noted Drug seeking behavior inpatient- improved deviance.  Avoid escalating  IV narcotics with no no signs for pain- use primarily for dressing / vac changes .  oxycontin recently stopped-- continue methadone.   Trial lidoderm patch for chronic rt shoulder , lower back pain     ABRIL (acute kidney injury) (CMS-HCC)  Assessment & Plan  Suspect Vancomycin associated - resolved. Drug stopped. Changed to Doxy for MRSA coverage.        Labs reviewed and Medications reviewed  Rolon catheter: No Rolon      DVT Prophylaxis: Enoxaparin (Lovenox)    Ulcer prophylaxis: Not indicated  Antibiotics: Treating active infection/contamination beyond 24 hours perioperative coverage  Assessed for rehab: Patient was assess for and/or received rehabilitation services during this hospitalization

## 2017-08-12 PROCEDURE — 700105 HCHG RX REV CODE 258

## 2017-08-12 PROCEDURE — A9270 NON-COVERED ITEM OR SERVICE: HCPCS | Performed by: INTERNAL MEDICINE

## 2017-08-12 PROCEDURE — 302151 K-PAD 14X20: Performed by: INTERNAL MEDICINE

## 2017-08-12 PROCEDURE — 700102 HCHG RX REV CODE 250 W/ 637 OVERRIDE(OP): Performed by: INTERNAL MEDICINE

## 2017-08-12 PROCEDURE — 700105 HCHG RX REV CODE 258: Performed by: INTERNAL MEDICINE

## 2017-08-12 PROCEDURE — 700101 HCHG RX REV CODE 250: Performed by: NURSE PRACTITIONER

## 2017-08-12 PROCEDURE — 700101 HCHG RX REV CODE 250: Performed by: INTERNAL MEDICINE

## 2017-08-12 PROCEDURE — 700111 HCHG RX REV CODE 636 W/ 250 OVERRIDE (IP): Performed by: INTERNAL MEDICINE

## 2017-08-12 PROCEDURE — A9270 NON-COVERED ITEM OR SERVICE: HCPCS | Performed by: HOSPITALIST

## 2017-08-12 PROCEDURE — 302131 K PAD MOTOR: Performed by: INTERNAL MEDICINE

## 2017-08-12 PROCEDURE — 99232 SBSQ HOSP IP/OBS MODERATE 35: CPT | Performed by: INTERNAL MEDICINE

## 2017-08-12 PROCEDURE — 700102 HCHG RX REV CODE 250 W/ 637 OVERRIDE(OP): Performed by: HOSPITALIST

## 2017-08-12 PROCEDURE — 770021 HCHG ROOM/CARE - ISO PRIVATE

## 2017-08-12 RX ORDER — LIDOCAINE 40 MG/G
1 CREAM TOPICAL PRN
Status: DISCONTINUED | OUTPATIENT
Start: 2017-08-12 | End: 2017-09-25

## 2017-08-12 RX ORDER — SODIUM CHLORIDE 9 MG/ML
INJECTION, SOLUTION INTRAVENOUS
Status: COMPLETED
Start: 2017-08-12 | End: 2017-08-12

## 2017-08-12 RX ADMIN — PREGABALIN 150 MG: 150 CAPSULE ORAL at 22:04

## 2017-08-12 RX ADMIN — CLONAZEPAM 1 MG: 1 TABLET ORAL at 08:58

## 2017-08-12 RX ADMIN — OXYCODONE HYDROCHLORIDE 10 MG: 10 TABLET ORAL at 08:59

## 2017-08-12 RX ADMIN — Medication 325 MG: at 17:50

## 2017-08-12 RX ADMIN — DOXYCYCLINE 100 MG: 100 TABLET ORAL at 08:58

## 2017-08-12 RX ADMIN — METHADONE HYDROCHLORIDE 10 MG: 10 TABLET ORAL at 22:04

## 2017-08-12 RX ADMIN — Medication 325 MG: at 08:59

## 2017-08-12 RX ADMIN — OXYCODONE HYDROCHLORIDE 10 MG: 10 TABLET ORAL at 04:00

## 2017-08-12 RX ADMIN — LIDOCAINE 1 APPLICATION: 40 CREAM TOPICAL at 04:01

## 2017-08-12 RX ADMIN — LIDOCAINE 1 APPLICATION: 40 CREAM TOPICAL at 22:04

## 2017-08-12 RX ADMIN — ACETAMINOPHEN 650 MG: 325 TABLET, FILM COATED ORAL at 22:04

## 2017-08-12 RX ADMIN — OXYCODONE HYDROCHLORIDE 10 MG: 10 TABLET ORAL at 22:04

## 2017-08-12 RX ADMIN — CEFEPIME 2 G: 2 INJECTION, POWDER, FOR SOLUTION INTRAMUSCULAR; INTRAVENOUS at 06:29

## 2017-08-12 RX ADMIN — LIDOCAINE 1 APPLICATION: 40 CREAM TOPICAL at 17:50

## 2017-08-12 RX ADMIN — LIDOCAINE 1 PATCH: 50 PATCH CUTANEOUS at 12:19

## 2017-08-12 RX ADMIN — PREGABALIN 150 MG: 150 CAPSULE ORAL at 14:50

## 2017-08-12 RX ADMIN — METHADONE HYDROCHLORIDE 10 MG: 10 TABLET ORAL at 06:02

## 2017-08-12 RX ADMIN — CEFEPIME 2 G: 2 INJECTION, POWDER, FOR SOLUTION INTRAMUSCULAR; INTRAVENOUS at 13:52

## 2017-08-12 RX ADMIN — PREGABALIN 150 MG: 150 CAPSULE ORAL at 08:59

## 2017-08-12 RX ADMIN — CLONAZEPAM 1 MG: 1 TABLET ORAL at 22:04

## 2017-08-12 RX ADMIN — DOXYCYCLINE 100 MG: 100 TABLET ORAL at 22:04

## 2017-08-12 RX ADMIN — CEFEPIME 2 G: 2 INJECTION, POWDER, FOR SOLUTION INTRAMUSCULAR; INTRAVENOUS at 22:03

## 2017-08-12 RX ADMIN — LIDOCAINE 1 APPLICATION: 40 CREAM TOPICAL at 14:48

## 2017-08-12 RX ADMIN — METHADONE HYDROCHLORIDE 10 MG: 10 TABLET ORAL at 13:52

## 2017-08-12 RX ADMIN — OXYCODONE HYDROCHLORIDE 10 MG: 10 TABLET ORAL at 14:51

## 2017-08-12 RX ADMIN — SODIUM CHLORIDE 500 ML: 9 INJECTION, SOLUTION INTRAVENOUS at 10:55

## 2017-08-12 RX ADMIN — ENOXAPARIN SODIUM 40 MG: 100 INJECTION SUBCUTANEOUS at 08:58

## 2017-08-12 ASSESSMENT — ENCOUNTER SYMPTOMS
CHILLS: 0
ABDOMINAL PAIN: 0
SENSORY CHANGE: 1
SHORTNESS OF BREATH: 0
SPEECH CHANGE: 0
FEVER: 0
TREMORS: 0
NERVOUS/ANXIOUS: 1
MYALGIAS: 1
PALPITATIONS: 0
VOMITING: 0
COUGH: 0
HALLUCINATIONS: 0
FLANK PAIN: 0
TINGLING: 1
WEAKNESS: 1
DIARRHEA: 0

## 2017-08-12 ASSESSMENT — PAIN SCALES - GENERAL
PAINLEVEL_OUTOF10: 7
PAINLEVEL_OUTOF10: 6
PAINLEVEL_OUTOF10: 5
PAINLEVEL_OUTOF10: 5
PAINLEVEL_OUTOF10: 8
PAINLEVEL_OUTOF10: 3
PAINLEVEL_OUTOF10: 4
PAINLEVEL_OUTOF10: 6

## 2017-08-12 NOTE — PROGRESS NOTES
Pt continues to argue regarding closed tubing set. Per CNA pt reports he is going to go for a walk with his front wheel walker/cane and IV pole and make himself fall so he can collect his $2500. Pt provided with education once again, PICC line SL. Pt notified of doctor's order not to leave the floor and told he will be considered leaving AMA if he does leave the floor. Pt acknowledges order. Charge RN notified of ongoing situation, she will speak to pt. Current weight bearing restrictions are NWB RLE until prothesis is fitted. Prothesis fitted yesterday, per pt, weight bearing restrictions order still NWB. Dr. Shirley paged to clarify weight bearing status.

## 2017-08-12 NOTE — PROGRESS NOTES
[facility] Hospitalist Progress Note    Date of Service: 2017    Chief Complaint    52 y.o. male hx of PVD s/p L TMA and Rt Bka > 5 years ago  admitted 2017 with right BKA stump pain and wound drainage in addition to left TMA stump wound (nonhealing) over several months.  s/p Debridement and gastroscoleus resection LLE on 17     Interval Problem Update    Pain controlled w switch from oxycontin to methadone. Co chronic right shoulder pain    - close watch, working with staff. Discussed pain issues, no increase in narcotics.  Tolerating abx.  Long talk, previously on klonopin, will add low dose.   - he feels much better emotionally on low dose klonopin, not excessively sleepy.  8/10 - wandering in WC. Tolerating abx.   - up in room, anxiety managed.  Scoots in WC.   - relaxed, sleeping backwards in bed.  Tolerating abx.    Consultants/Specialty  LPS  ID - [facility]- dr. Ramon   [facility]  Psychiatry .     Disposition  Limited options w/ Payor (Medical HMO)          Review of Systems   Constitutional: Negative for fever and chills.   Eyes:        Vision loss rt eye   Respiratory: Negative for cough and shortness of breath.    Cardiovascular: Negative for chest pain, palpitations and leg swelling.   Gastrointestinal: Negative for vomiting, abdominal pain and diarrhea.   Genitourinary: Negative for hematuria and flank pain.   Musculoskeletal: Positive for myalgias and joint pain.   Neurological: Positive for tingling, sensory change and weakness. Negative for tremors and speech change.        Chronic peripheral neuropathy .   Psychiatric/Behavioral: Negative for hallucinations. The patient is nervous/anxious (improved.).    All other systems reviewed and are negative.     Physical Exam  Laboratory/Imaging   Hemodynamics  Temp (24hrs), Av.8 °C (98.3 °F), Min:36.7 °C (98.1 °F), Max:37 °C (98.6 °F)   Temperature: 36.7 °C (98.1 °F)  Pulse  Av.7  Min: 63  Max: 111    Blood Pressure:  113/74 mmHg      Respiratory      Respiration: 15, Pulse Oximetry: 94 %        RUL Breath Sounds: Clear, RML Breath Sounds: Clear, RLL Breath Sounds: Clear, NANCY Breath Sounds: Clear, LLL Breath Sounds: Clear    Fluids    Intake/Output Summary (Last 24 hours) at 08/12/17 1025  Last data filed at 08/12/17 0800   Gross per 24 hour   Intake    810 ml   Output    675 ml   Net    135 ml       Nutrition  Orders Placed This Encounter   Procedures   • DIET ORDER     Standing Status: Standing      Number of Occurrences: 1      Standing Expiration Date:      Order Specific Question:  Diet:     Answer:  Regular [1]     Physical Exam   Constitutional: He is oriented to person, place, and time. No distress.   Eyes: Right eye exhibits no discharge. Left eye exhibits no discharge.   Rt eye vision loss, enucleated.    Neck: Neck supple. No JVD present.   Cardiovascular: Normal rate and regular rhythm.    No murmur heard.  Pulmonary/Chest: No stridor. He has no wheezes. He has no rales.   Abdominal: Soft. Bowel sounds are normal. He exhibits no distension. There is no tenderness.   Musculoskeletal: He exhibits no edema.   Rt leg BKA - incision dry, no dehisc- healed.   Left leg w boot wound vac present.  Rt shoulder no redness or warmth, mild swelling.    Neurological: He is alert and oriented to person, place, and time. Coordination abnormal.   Skin: Skin is warm and dry. He is not diaphoretic. No erythema.                                Assessment/Plan     * Skin ulcer of left foot with fat layer exposed (CMS-HCC) (present on admission)  Assessment & Plan  S/p OR debridement and gastroscoleus resection 7/25   Continue wound vac care  w changes three times/wk  Contact isolation for MRSA.  TDWB LLE, WBAT RLE per Ortho recs.  Patient reportedly has restraining orders from much of the Skilled facilites in [place], difficult discharge-- Discussed with  - putting out referrals w no acceptance to date.    MRSA (methicillin  resistant staph aureus) culture positive (present on admission)  Assessment & Plan  Doxycycline to cover L foot infection thru 8-25-17   Vanco stopped due to ARF    Pseudomonas infection (present on admission)  Assessment & Plan  Left foot infection, continue IV cefepime thru 8-25-17, ID assisting    Anxiety  Assessment & Plan  Depakote, psych assisting, long talk, will add low dose klonopin    Diarrhea  Assessment & Plan  Follow    Peripheral neuropathy (present on admission)  Assessment & Plan  Lyrica      Tobacco abuse (present on admission)  Assessment & Plan  Nicotine replacement PRN  Cessation counseling given     Wound of right leg, at BKA site (present on admission)  Assessment & Plan  No acute infection , incision healed .      Opiate dependence (CMS-HCC) (present on admission)  Assessment & Plan  Noted Drug seeking behavior inpatient- improved deviance.  Avoid escalating  IV narcotics with no no signs for pain- use primarily for dressing / vac changes .  oxycontin recently stopped-- continue methadone.   Trial lidoderm patch for chronic rt shoulder , lower back pain     ABRIL (acute kidney injury) (CMS-HCC)  Assessment & Plan  Suspect Vancomycin associated - resolved. Drug stopped. Changed to Doxy for MRSA coverage.        Labs reviewed and Medications reviewed  Rolon catheter: No Rolon      DVT Prophylaxis: Enoxaparin (Lovenox)    Ulcer prophylaxis: Not indicated  Antibiotics: Treating active infection/contamination beyond 24 hours perioperative coverage  Assessed for rehab: Patient was assess for and/or received rehabilitation services during this hospitalization

## 2017-08-12 NOTE — WOUND TEAM
"[facility]  Inpatient Services  Wound and Skin Care Treatment Note    Admission Date:  07/22/17     HPI, PMH, SH: Reviewed  Unit where seen by Wound Team:  [id number]    WOUND CONSULT RELATED TO:   Scheduled NPWT dressing change left plantar foot      SUBJECTIVE:  \"I don't know it was off\"    Self Report / Pain Level:  Tolerated well with IV medication at patient's insistence    OBJECTIVE:    Previous NPWT dressing off with moderate drainage on sock and inside boot    WOUND TYPE, LOCATION, CHARACTERISTICS (Pressure ulcers: location, stage, POA or date identified)    Location/type of wound: Open surgical wound left plantar foot      Periwound:     macerated around wound and in between remaining amputation site of pervious digit; callous-like tissue      Drainage:     moderate serosanguinous    Tissue Type and %:    100% red/pink  Wound Edges:    Attached/macerated    Odor:     none  Exposed structure(s):  Bone palpated    S&S of Infection:     none      Measurements: (cm)  (Taken 08/09/17)   Length:    5.0   Width:     5.5  Depth:    ~ 0.2      INTERVENTIONS BY WOUND TEAM:  Removed old dressing and cleansed wound with wound cleanser.  Benzoin and drape to bull wound.  Covered wound with one piece black foam and bridged to dorsum of foot off setting from previous dressing due to red spot that is blanching.  Gauze placed inbetween digit and secured all with drape.  Total black foam=3 pieces.  Resumed NPWT at 125mmHg continuously.      Interdisciplinary consultation:   Staff RN, patient      EVALUATION:  Previous dressing rolled off near digit and moderate drainage in boot and sock.  Resumed continuous setting to promote seal as patient up and about alot     Factors affecting wound healing:  Neuropathy, tobacco abuse     Goals:  Decrease in wound size by 5% every 2 weeks -- no maceration    NURSING PLAN OF CARE ORDERS (x):    Dressing changes: See Dressing Maintenance orders:  x  Skin care: See Skin Care " orders:   Rectal tube care: See Rectal Tube Care orders:   Other orders:    WOUND TEAM PLAN OF CARE (x):   NPWT change 3 x week:   x     Dressing changes by wound team:       Follow up as needed:       Other (explain):    Anticipated discharge plans (x):  SNF:           Home Care:           Outpatient Wound Center:         Self Care:            Other:  TBD

## 2017-08-12 NOTE — PROGRESS NOTES
"Pt A&O x4. Friend present at bedside.     Vitals: /68 mmHg  Pulse 84  Temp(Src) 37 °C (98.6 °F)  Resp 18  Ht 1.854 m (6' 1\")  Wt 65.2 kg (143 lb 11.8 oz)  BMI 18.97 kg/m2  SpO2 99%\    Pt requesting PRN Lidocaine cream for shoulder and stump, Tylenol for HA.     Neuro: OLIVA. Denies new onset of numbness/ tingling. Baseline N/T hands/ fingers, BLE.     Cardiac: Denies new onset of chest pain.    Vascular: Pulses 2+ BUE, 1+ LLE pedal. 1+ generalized LLE edema noted.    Respiratory: Lungs sound clear to auscultation. Pulling 3500 on IS, effective, self-motivated. Denies SOB.    GI: Abdomen soft. + bowel sounds, + flatus, + BM today. On regular diet, tolerating well. - nausea/ vomiting.    : Pt voiding adequately.      MSK: Pt up to bathroom self, tolerating well. Pt utilizing RLE prosthesis and FWW to ambulate around room and up to bathroom. LLE touch down weight bearing with offloading boot to be worn at all times, pt compliant.     Integumentary: Right BKA POA stump incision CDI, pt applies Lidocaine cream PRN. LLE TMA surgical incisions covered with wound vac dressing, wound vac dressing CDI, changed today, wound vac on, scant/ no drainage.    Labs noted. LUE PICC single lumen patent, dressing CDI, no s/sx of infection noted.    Fall precautions in place: Bed locked in lowest position, Upper bed rails up, personal belongings within reach, call light within reach, appropriate mobility signs in place, - bed alarm. Pt calls appropriately.     Pt updated on POC.   "

## 2017-08-12 NOTE — PROGRESS NOTES
Received call back from Evita HENSON. Updated on pt prothesis fitting. Order received to advance weight bearing status to RLE WBAT, nursing staff to assess stump Qshift for skin breakdown. Pt updated.

## 2017-08-12 NOTE — CARE PLAN
Problem: Safety  Goal: Will remain free from falls  Outcome: PROGRESSING AS EXPECTED  Intervention: Assess risk factors for falls    08/11/17 2000   OTHER   Fall Risk High Risk to Fall - 2 or more points    Risk for Injury-Any positive answers results in the pt being at high risk for fall related injury Not Applicable   Mobility Status Assessment 0-Ambulates & Transfers Independently. No Assistance Required   History of fall 0   Pt Calls for Assistance No assistance required       Intervention: Implement fall precautions    08/11/17 2000   OTHER   Environmental Precautions Personal Belongings, Wastebasket, Call Bell etc. in Easy Reach;Report Given to Other Health Care Providers Regarding Fall Risk;Bed in Low Position;Communication Sign for Patients & Families;Mobility Assessed & Appropriate Sign Placed   IV Pole on Same Side of Bed as Bathroom Yes   Bedrails Bedrails Closest to Bathroom Down           Problem: Venous Thromboembolism (VTW)/Deep Vein Thrombosis (DVT) Prevention:  Goal: Patient will participate in Venous Thrombosis (VTE)/Deep Vein Thrombosis (DVT)Prevention Measures  Outcome: PROGRESSING AS EXPECTED    08/11/17 2000   OTHER   Risk Assessment Score 2   VTE RISK Moderate   Pharmacologic Prophylaxis Used LMWH: Enoxaparin(Lovenox)   Mechanical/VTE Prophylaxis   Mechanical Prophylaxis  SCDs, Sequential Compression Device   SCDs, Sequential Compression Device Refused         Problem: Bowel/Gastric:  Goal: Normal bowel function is maintained or improved  Outcome: PROGRESSING AS EXPECTED    08/11/17 2000   OTHER   Last BM 08/11/17   Number of Times Stooled 2

## 2017-08-12 NOTE — PROGRESS NOTES
Patient's RN approached me for assistance with this patient this am. Charge RN was off unit at meeting and I was asked to intercede. Patient had been confrontational with RN during beginning of shift and had been speaking to RN in a derogatory manner.  When primary care RN approached me she appeared frustrated at behavior and language from patient directed toward her. I had provided care for this patient on arrival to unit and was familiar with his behavior toward nursing staff. I stepped into the room and asked patient what the issue was with primary RN. Patient verbalized that he had held on to some resentment towards RN from a previous shift. Patient was also upset that RN was unwilling to disconnect IV fluids from PICC line d/t proper protocol. In addition the patient was upset that scheduled lidocaine jelly was not left at bedside for self application on right BKA. I explained to patient that having RNs allow him to do what he wanted at any given time was not necessarily the best of care form him and that following specific care protocols result in keeping him safe and healthy. I also explained that if he continued to verbally abuse staff that it would be difficult to provide continuation of care and a new RN may have to be assigned to him every day. Initially the patient became agitated but then stated that he just did not like feeling as if staff was disrespecting him. I acknowledged his concern and explained that I would pass information on to primary RN and management. I asked the patient what he needed to make him comfortable prior to leaving room and told him I would do what I could to accommodate him. Passed on request fpr additional lidocaine jelly from patient to primary care RN and discussed issue with both RN and Charge when she returned to unit.

## 2017-08-12 NOTE — PROGRESS NOTES
Assumed care of pt at 0700. Pt sitting at edge of bed, frustrated and irritable with this RN due to RN refusing to SL PICC line. This RN has provided education and explained need to keep closed tubing set up pt is polite and acknowledges understanding, once RN leaves room pt mumbles under his breath and speaks negatively about this RN. RN addressed, pt remains upset, charge RN aware. Pt oriented x4. Pt - N/V. + BS,  + BM. L TMA wound vac in place at 125mmHg. Pt up with standby assist, refusing to ambulate with staff, only wants to ambulate independently and unable to do when connected to IV fluids. Labs noted, assessment complete. Pt updated on POC. Pt educated to use call light when in need of assisstance. Bed locked and in lowest position. All needs met at this time, call light within reach, will continue to monitor.

## 2017-08-13 PROCEDURE — 700102 HCHG RX REV CODE 250 W/ 637 OVERRIDE(OP): Performed by: INTERNAL MEDICINE

## 2017-08-13 PROCEDURE — A9270 NON-COVERED ITEM OR SERVICE: HCPCS | Performed by: INTERNAL MEDICINE

## 2017-08-13 PROCEDURE — 770021 HCHG ROOM/CARE - ISO PRIVATE

## 2017-08-13 PROCEDURE — 700111 HCHG RX REV CODE 636 W/ 250 OVERRIDE (IP): Performed by: INTERNAL MEDICINE

## 2017-08-13 PROCEDURE — 700105 HCHG RX REV CODE 258: Performed by: INTERNAL MEDICINE

## 2017-08-13 PROCEDURE — 99232 SBSQ HOSP IP/OBS MODERATE 35: CPT | Performed by: INTERNAL MEDICINE

## 2017-08-13 PROCEDURE — A9270 NON-COVERED ITEM OR SERVICE: HCPCS | Performed by: HOSPITALIST

## 2017-08-13 PROCEDURE — 700102 HCHG RX REV CODE 250 W/ 637 OVERRIDE(OP): Performed by: HOSPITALIST

## 2017-08-13 RX ADMIN — ENOXAPARIN SODIUM 40 MG: 100 INJECTION SUBCUTANEOUS at 08:14

## 2017-08-13 RX ADMIN — LIDOCAINE 1 APPLICATION: 40 CREAM TOPICAL at 01:27

## 2017-08-13 RX ADMIN — CLONAZEPAM 1 MG: 1 TABLET ORAL at 08:14

## 2017-08-13 RX ADMIN — CEFEPIME 2 G: 2 INJECTION, POWDER, FOR SOLUTION INTRAMUSCULAR; INTRAVENOUS at 21:21

## 2017-08-13 RX ADMIN — PREGABALIN 150 MG: 150 CAPSULE ORAL at 14:18

## 2017-08-13 RX ADMIN — CEFEPIME 2 G: 2 INJECTION, POWDER, FOR SOLUTION INTRAMUSCULAR; INTRAVENOUS at 06:32

## 2017-08-13 RX ADMIN — OXYCODONE HYDROCHLORIDE 10 MG: 10 TABLET ORAL at 06:32

## 2017-08-13 RX ADMIN — METHADONE HYDROCHLORIDE 10 MG: 10 TABLET ORAL at 14:18

## 2017-08-13 RX ADMIN — CLONAZEPAM 1 MG: 1 TABLET ORAL at 21:22

## 2017-08-13 RX ADMIN — PREGABALIN 150 MG: 150 CAPSULE ORAL at 21:22

## 2017-08-13 RX ADMIN — OXYCODONE HYDROCHLORIDE 10 MG: 10 TABLET ORAL at 12:26

## 2017-08-13 RX ADMIN — DOXYCYCLINE 100 MG: 100 TABLET ORAL at 21:22

## 2017-08-13 RX ADMIN — PREGABALIN 150 MG: 150 CAPSULE ORAL at 08:14

## 2017-08-13 RX ADMIN — CEFEPIME 2 G: 2 INJECTION, POWDER, FOR SOLUTION INTRAMUSCULAR; INTRAVENOUS at 14:18

## 2017-08-13 RX ADMIN — DOXYCYCLINE 100 MG: 100 TABLET ORAL at 08:14

## 2017-08-13 RX ADMIN — METHADONE HYDROCHLORIDE 10 MG: 10 TABLET ORAL at 21:21

## 2017-08-13 RX ADMIN — Medication 325 MG: at 08:09

## 2017-08-13 RX ADMIN — OXYCODONE HYDROCHLORIDE 10 MG: 10 TABLET ORAL at 21:22

## 2017-08-13 RX ADMIN — OXYCODONE HYDROCHLORIDE 10 MG: 10 TABLET ORAL at 02:29

## 2017-08-13 RX ADMIN — METHADONE HYDROCHLORIDE 10 MG: 10 TABLET ORAL at 06:32

## 2017-08-13 RX ADMIN — LIDOCAINE 1 APPLICATION: 40 CREAM TOPICAL at 06:34

## 2017-08-13 RX ADMIN — Medication 325 MG: at 17:38

## 2017-08-13 ASSESSMENT — PAIN SCALES - GENERAL
PAINLEVEL_OUTOF10: 4
PAINLEVEL_OUTOF10: 5
PAINLEVEL_OUTOF10: 0
PAINLEVEL_OUTOF10: 6
PAINLEVEL_OUTOF10: 6
PAINLEVEL_OUTOF10: 5
PAINLEVEL_OUTOF10: 4

## 2017-08-13 ASSESSMENT — ENCOUNTER SYMPTOMS
COUGH: 0
ABDOMINAL PAIN: 0
MYALGIAS: 1
WEAKNESS: 1
HALLUCINATIONS: 0
SHORTNESS OF BREATH: 0
PALPITATIONS: 0
FEVER: 0
SENSORY CHANGE: 1
CHILLS: 0
FLANK PAIN: 0
TREMORS: 0
NERVOUS/ANXIOUS: 1
TINGLING: 1
SPEECH CHANGE: 0
VOMITING: 0
DIARRHEA: 0

## 2017-08-13 NOTE — PROGRESS NOTES
[facility]ist Progress Note    Date of Service: 2017    Chief Complaint    52 y.o. male hx of PVD s/p L TMA and Rt Bka > 5 years ago  admitted 2017 with right BKA stump pain and wound drainage in addition to left TMA stump wound (nonhealing) over several months.  s/p Debridement and gastroscoleus resection LLE on 17     Interval Problem Update    Pain controlled w switch from oxycontin to methadone. Co chronic right shoulder pain    - close watch, working with staff. Discussed pain issues, no increase in narcotics.  Tolerating abx.  Long talk, previously on klonopin, will add low dose.   - he feels much better emotionally on low dose klonopin, not excessively sleepy.  8/10 - wandering in WC. Tolerating abx.   - up in room, anxiety managed.  Scoots in WC.   - relaxed, sleeping backwards in bed.  Tolerating abx.   - organizing his laundry.  Long talk.    Consultants/Specialty  LPS  ID - [facility]- dr. Ramon   [facility]  Psychiatry .     Disposition  Limited options w/ Payor (Medical HMO)          Review of Systems   Constitutional: Negative for fever and chills.   Eyes:        Vision loss rt eye   Respiratory: Negative for cough and shortness of breath.    Cardiovascular: Negative for chest pain, palpitations and leg swelling.   Gastrointestinal: Negative for vomiting, abdominal pain and diarrhea.   Genitourinary: Negative for hematuria and flank pain.   Musculoskeletal: Positive for myalgias and joint pain.   Neurological: Positive for tingling, sensory change and weakness. Negative for tremors and speech change.        Chronic peripheral neuropathy .   Psychiatric/Behavioral: Negative for hallucinations. The patient is nervous/anxious (improved.).    All other systems reviewed and are negative.     Physical Exam  Laboratory/Imaging   Hemodynamics  Temp (24hrs), Av.8 °C (98.3 °F), Min:36.6 °C (97.8 °F), Max:37 °C (98.6 °F)   Temperature: 36.7 °C (98.1 °F)  Pulse  Av.1   Min: 63  Max: 111    Blood Pressure: 120/76 mmHg      Respiratory      Respiration: 18, Pulse Oximetry: 95 %             Fluids    Intake/Output Summary (Last 24 hours) at 08/13/17 1128  Last data filed at 08/13/17 0900   Gross per 24 hour   Intake   1740 ml   Output   1025 ml   Net    715 ml       Nutrition  Orders Placed This Encounter   Procedures   • DIET ORDER     Standing Status: Standing      Number of Occurrences: 1      Standing Expiration Date:      Order Specific Question:  Diet:     Answer:  Regular [1]     Physical Exam   Constitutional: He is oriented to person, place, and time. No distress.   Eyes: Right eye exhibits no discharge. Left eye exhibits no discharge.   Rt eye vision loss, enucleated.    Neck: Neck supple. No JVD present.   Cardiovascular: Normal rate and regular rhythm.    No murmur heard.  Pulmonary/Chest: No stridor. He has no wheezes. He has no rales.   Abdominal: Soft. Bowel sounds are normal. He exhibits no distension. There is no tenderness.   Musculoskeletal: He exhibits no edema.   Rt leg BKA - incision dry, no dehisc- healed.   Left leg w boot wound vac present.  Rt shoulder no redness or warmth, mild swelling.    Neurological: He is alert and oriented to person, place, and time. Coordination abnormal.   Skin: Skin is warm and dry. He is not diaphoretic. No erythema.                                Assessment/Plan     * Skin ulcer of left foot with fat layer exposed (CMS-HCC) (present on admission)  Assessment & Plan  S/p OR debridement and gastroscoleus resection 7/25   Continue wound vac care  w changes three times/wk  Contact isolation for MRSA.  TDWB LLE, WBAT RLE per Ortho recs.  Patient reportedly has restraining orders from much of the Skilled facilites in [place], difficult discharge-- Discussed with  - putting out referrals w no acceptance to date.    MRSA (methicillin resistant staph aureus) culture positive (present on admission)  Assessment & Plan  Doxycycline  to cover L foot infection thru 8-25-17   Vanco stopped due to ARF    Pseudomonas infection (present on admission)  Assessment & Plan  Left foot infection, continue IV cefepime thru 8-25-17, ID assisting    Anxiety  Assessment & Plan  Depakote, psych assisting, long talk, will add low dose klonopin    Diarrhea  Assessment & Plan  Follow    Peripheral neuropathy (present on admission)  Assessment & Plan  Lyrica      Tobacco abuse (present on admission)  Assessment & Plan  Nicotine replacement PRN  Cessation counseling given     Wound of right leg, at BKA site (present on admission)  Assessment & Plan  No acute infection , incision healed .      Opiate dependence (CMS-HCC) (present on admission)  Assessment & Plan  Noted Drug seeking behavior inpatient- improved deviance.  Avoid escalating  IV narcotics with no no signs for pain- use primarily for dressing / vac changes .  oxycontin recently stopped-- continue methadone.   Trial lidoderm patch for chronic rt shoulder , lower back pain     ABRIL (acute kidney injury) (CMS-HCC)  Assessment & Plan  Suspect Vancomycin associated - resolved. Drug stopped. Changed to Doxy for MRSA coverage.        Labs reviewed and Medications reviewed  Rolon catheter: No Rolon      DVT Prophylaxis: Enoxaparin (Lovenox)    Ulcer prophylaxis: Not indicated  Antibiotics: Treating active infection/contamination beyond 24 hours perioperative coverage  Assessed for rehab: Patient was assess for and/or received rehabilitation services during this hospitalization

## 2017-08-13 NOTE — CARE PLAN
Problem: Safety  Goal: Will remain free from injury  Proper fall precautions in place. Call light within reach and encouraged to use. Hourly rounding in practice. Bed alarm in use.     Problem: Skin Integrity  Goal: Risk for impaired skin integrity will decrease  Pt demonstrates ability to turn self in bed without assistance of staff. Understands importance in prevention of breakdown, ulcers, and potential infection. Hourly rounding in effect.

## 2017-08-13 NOTE — PROGRESS NOTES
0645 Received report, introduced self to pt, reviewed labs, orders, allergies, code status    0800 pt refusing to have picc line remained hooked up and closed. Education given, pt still refusing.    1228 pt caught trying to go downstairs. Pt advised that he is not to leave unit. Pt cooperative, returned to his room, pleasant with staff.    1242 pt found to have knife in possession. Pt willingly surrendered it to staff and given to security. No complications.     1600 spoke with Tadeo QUIJANO about reddened site in PICC insertion site as reported by night RN. Area around insertion site through dressing looks very clear, no abnomal colors. Reported both views to MD MACIE suggested no new orders to be placed.

## 2017-08-13 NOTE — CARE PLAN
Problem: Safety  Goal: Will remain free from falls  Outcome: PROGRESSING AS EXPECTED  Fall risk assessed, call light within reach, bed in lowest position, treaded socks on, pt educated to call for assistance    Problem: Mobility  Goal: Risk for activity intolerance will decrease  Outcome: PROGRESSING AS EXPECTED  Pt ambulating in halls with prosthesis

## 2017-08-13 NOTE — PROGRESS NOTES
"Assumed care of pt at 1900, report given. Pt sitting up in bed this PM with no complaints of severe pain, nausea, or SOB. Pt has right BKA that is open to air and healing. New prothesis fitted and extremity is now WBAT. Left foot has no toes and wound vac is in place. Extremity is TDWB and must wear a boot when ambulating. Pt ambulates self with a FFW and does to frequently. Pt agitated at staff this evening stating that \"everything always changes with new nurses and cna's\". Pt still refusing closed tubing set for PICC. Pt also verbally upset about PRN, TID lidocaine topical order stating that he was just able to keep it at bedside and apply it whenever he pleased. Hospitalist paged and general PRN order given; order modified. Pt is only pleasant when receiving what he wants and tries to push staff's limits of what he can get away with. All is consistent with day shift report. Education and teaching provided even though he resists. MRSA contact precautions in place. Bed in lowest position, call light within reach, and no other needs at this time.    "

## 2017-08-13 NOTE — CARE PLAN
Problem: Discharge Barriers/Planning  Goal: Patient’s continuum of care needs will be met  Care coordinator will find DC placement    Problem: Skin Integrity  Goal: Risk for impaired skin integrity will decrease  Wound assessed, wound vac will continue to drain appropriately

## 2017-08-13 NOTE — PROGRESS NOTES
Pt A+O x 4. Pt refuses bed alarm despite education. Fall and safety precautions taught and maintained. Pt demonstrates appropriate use of call light that is within reach. Side rails up x 2. Hourly rounding in place.

## 2017-08-13 NOTE — PROGRESS NOTES
Had a long talk with the patient while patient was ambulating in the halls. Patient is an amputee that has a prosthetic that he ambulates with, he is a fall risk. Discussed in length the risk of falls and patient is still refusing the bed alarm. Pt also has a picc line that he demands be unhooked so that he can ambulate and use a walker. Again but educated on the risks of infection, clotting off and needing to get a new picc line if that were to happen, patient still refusing to have IV running if the antibiotics are not in use.

## 2017-08-14 PROCEDURE — 700102 HCHG RX REV CODE 250 W/ 637 OVERRIDE(OP): Performed by: INTERNAL MEDICINE

## 2017-08-14 PROCEDURE — 700105 HCHG RX REV CODE 258: Performed by: INTERNAL MEDICINE

## 2017-08-14 PROCEDURE — 700111 HCHG RX REV CODE 636 W/ 250 OVERRIDE (IP): Performed by: INTERNAL MEDICINE

## 2017-08-14 PROCEDURE — 770021 HCHG ROOM/CARE - ISO PRIVATE

## 2017-08-14 PROCEDURE — A9270 NON-COVERED ITEM OR SERVICE: HCPCS | Performed by: INTERNAL MEDICINE

## 2017-08-14 PROCEDURE — A9270 NON-COVERED ITEM OR SERVICE: HCPCS | Performed by: HOSPITALIST

## 2017-08-14 PROCEDURE — 700102 HCHG RX REV CODE 250 W/ 637 OVERRIDE(OP): Performed by: HOSPITALIST

## 2017-08-14 PROCEDURE — 97605 NEG PRS WND THER DME<=50SQCM: CPT

## 2017-08-14 PROCEDURE — 700101 HCHG RX REV CODE 250: Performed by: INTERNAL MEDICINE

## 2017-08-14 PROCEDURE — 700111 HCHG RX REV CODE 636 W/ 250 OVERRIDE (IP): Performed by: HOSPITALIST

## 2017-08-14 PROCEDURE — 99232 SBSQ HOSP IP/OBS MODERATE 35: CPT | Performed by: INTERNAL MEDICINE

## 2017-08-14 PROCEDURE — 700101 HCHG RX REV CODE 250: Performed by: NURSE PRACTITIONER

## 2017-08-14 RX ADMIN — PROMETHAZINE HYDROCHLORIDE 25 MG: 25 TABLET ORAL at 13:30

## 2017-08-14 RX ADMIN — DOXYCYCLINE 100 MG: 100 TABLET ORAL at 22:21

## 2017-08-14 RX ADMIN — CEFEPIME 2 G: 2 INJECTION, POWDER, FOR SOLUTION INTRAMUSCULAR; INTRAVENOUS at 13:33

## 2017-08-14 RX ADMIN — PREGABALIN 150 MG: 150 CAPSULE ORAL at 14:30

## 2017-08-14 RX ADMIN — LIDOCAINE 1 APPLICATION: 40 CREAM TOPICAL at 13:36

## 2017-08-14 RX ADMIN — METHADONE HYDROCHLORIDE 10 MG: 10 TABLET ORAL at 13:30

## 2017-08-14 RX ADMIN — CEFEPIME 2 G: 2 INJECTION, POWDER, FOR SOLUTION INTRAMUSCULAR; INTRAVENOUS at 05:19

## 2017-08-14 RX ADMIN — PREGABALIN 150 MG: 150 CAPSULE ORAL at 08:24

## 2017-08-14 RX ADMIN — ZOLPIDEM TARTRATE 5 MG: 5 TABLET, FILM COATED ORAL at 00:57

## 2017-08-14 RX ADMIN — MORPHINE SULFATE 2 MG: 4 INJECTION INTRAVENOUS at 08:24

## 2017-08-14 RX ADMIN — OXYCODONE HYDROCHLORIDE 10 MG: 10 TABLET ORAL at 11:29

## 2017-08-14 RX ADMIN — LIDOCAINE 1 APPLICATION: 40 CREAM TOPICAL at 22:21

## 2017-08-14 RX ADMIN — Medication 325 MG: at 17:59

## 2017-08-14 RX ADMIN — Medication 325 MG: at 08:24

## 2017-08-14 RX ADMIN — LIDOCAINE 1 PATCH: 50 PATCH CUTANEOUS at 11:26

## 2017-08-14 RX ADMIN — METHADONE HYDROCHLORIDE 10 MG: 10 TABLET ORAL at 05:19

## 2017-08-14 RX ADMIN — PREGABALIN 150 MG: 150 CAPSULE ORAL at 22:21

## 2017-08-14 RX ADMIN — LIDOCAINE 1 APPLICATION: 40 CREAM TOPICAL at 11:29

## 2017-08-14 RX ADMIN — LIDOCAINE 1 APPLICATION: 40 CREAM TOPICAL at 00:50

## 2017-08-14 RX ADMIN — ACETAMINOPHEN 650 MG: 325 TABLET, FILM COATED ORAL at 05:20

## 2017-08-14 RX ADMIN — DOXYCYCLINE 100 MG: 100 TABLET ORAL at 08:24

## 2017-08-14 RX ADMIN — OXYCODONE HYDROCHLORIDE 10 MG: 10 TABLET ORAL at 17:59

## 2017-08-14 RX ADMIN — CEFEPIME 2 G: 2 INJECTION, POWDER, FOR SOLUTION INTRAMUSCULAR; INTRAVENOUS at 22:20

## 2017-08-14 RX ADMIN — OXYCODONE HYDROCHLORIDE 10 MG: 10 TABLET ORAL at 05:20

## 2017-08-14 RX ADMIN — OXYCODONE HYDROCHLORIDE 10 MG: 10 TABLET ORAL at 22:21

## 2017-08-14 RX ADMIN — ENOXAPARIN SODIUM 40 MG: 100 INJECTION SUBCUTANEOUS at 08:24

## 2017-08-14 RX ADMIN — LIDOCAINE 1 APPLICATION: 40 CREAM TOPICAL at 05:21

## 2017-08-14 RX ADMIN — ACETAMINOPHEN 650 MG: 325 TABLET, FILM COATED ORAL at 13:30

## 2017-08-14 RX ADMIN — CLONAZEPAM 1 MG: 1 TABLET ORAL at 22:21

## 2017-08-14 RX ADMIN — CLONAZEPAM 1 MG: 1 TABLET ORAL at 08:24

## 2017-08-14 RX ADMIN — METHADONE HYDROCHLORIDE 10 MG: 10 TABLET ORAL at 22:21

## 2017-08-14 ASSESSMENT — ENCOUNTER SYMPTOMS
TREMORS: 0
DIARRHEA: 0
VOMITING: 0
HALLUCINATIONS: 0
WEAKNESS: 0
NERVOUS/ANXIOUS: 1
COUGH: 0
CHILLS: 0
SENSORY CHANGE: 0
SPEECH CHANGE: 0
FEVER: 0
PALPITATIONS: 0
ABDOMINAL PAIN: 0
SHORTNESS OF BREATH: 0
MYALGIAS: 1
TINGLING: 0
FLANK PAIN: 0

## 2017-08-14 ASSESSMENT — PAIN SCALES - GENERAL
PAINLEVEL_OUTOF10: 7
PAINLEVEL_OUTOF10: 7
PAINLEVEL_OUTOF10: ASSUMED PAIN PRESENT
PAINLEVEL_OUTOF10: 7
PAINLEVEL_OUTOF10: 6
PAINLEVEL_OUTOF10: ASSUMED PAIN PRESENT

## 2017-08-14 NOTE — DISCHARGE PLANNING
[facility] called the following facilities     [facility]. [facility]CS spoke to Nubia in admissions. They do not have any [facility] Beds Open   [facility]. And [facility]. CCS attempted to leave a message for admission mail box was full  [facility]. CCS spoke to Kristin in admissions. Per Kristin they do not have the referral. [facility]CS faxed the referral to fax# [phone]

## 2017-08-14 NOTE — PROGRESS NOTES
[facility]ist Progress Note    Date of Service: 8/14/2017    Chief Complaint    52 y.o. male hx of PVD s/p L TMA and Rt Bka > 5 years ago  admitted 7/22/2017 with right BKA stump pain and wound drainage in addition to left TMA stump wound (nonhealing) over several months.  s/p Debridement and gastroscoleus resection LLE on 7-25-17     Interval Problem Update    Pain controlled w switch from oxycontin to methadone. Co chronic right shoulder pain   8/8 - close watch, working with staff. Discussed pain issues, no increase in narcotics.  Tolerating abx.  Long talk, previously on klonopin, will add low dose.  8/9 - he feels much better emotionally on low dose klonopin, not excessively sleepy.  8/10 - wandering in WC. Tolerating abx.  8/11 - up in room, anxiety managed.  Scoots in WC.  8/12 - relaxed, sleeping backwards in bed.  Tolerating abx.  8/13 - organizing his laundry.  Long talk.  8/14 - tried to talk me into more narcotics.  I refuse.  His stump is irritated by the prosthetic, needs close watch for infection.  Lectured no to use prosthetic until healed.    Consultants/Specialty  LPS  ID - [facility]- dr. Ramon   [facility]  Psychiatry .     Disposition  Limited options w/ Payor (Medical HMO)          Review of Systems   Constitutional: Negative for fever and chills.   Eyes:        Vision loss rt eye   Respiratory: Negative for cough and shortness of breath.    Cardiovascular: Negative for chest pain, palpitations and leg swelling.   Gastrointestinal: Negative for vomiting, abdominal pain and diarrhea.   Genitourinary: Negative for hematuria and flank pain.   Musculoskeletal: Positive for myalgias and joint pain.   Skin: Positive for rash.   Neurological: Negative for tingling, tremors, sensory change, speech change and weakness.        Chronic peripheral neuropathy .   Psychiatric/Behavioral: Negative for hallucinations. The patient is nervous/anxious (improved.).    All other systems reviewed and are  negative.     Physical Exam  Laboratory/Imaging   Hemodynamics  Temp (24hrs), Av.9 °C (98.4 °F), Min:36.8 °C (98.3 °F), Max:36.9 °C (98.5 °F)   Temperature: 36.8 °C (98.3 °F)  Pulse  Av.3  Min: 63  Max: 111    Blood Pressure: 123/63 mmHg      Respiratory      Respiration: 17, Pulse Oximetry: 92 %             Fluids    Intake/Output Summary (Last 24 hours) at 17 1236  Last data filed at 17 1200   Gross per 24 hour   Intake   2660 ml   Output   1400 ml   Net   1260 ml       Nutrition  Orders Placed This Encounter   Procedures   • DIET ORDER     Standing Status: Standing      Number of Occurrences: 1      Standing Expiration Date:      Order Specific Question:  Diet:     Answer:  Regular [1]     Physical Exam   Constitutional: He is oriented to person, place, and time. No distress.   Eyes: Right eye exhibits no discharge. Left eye exhibits no discharge.   Rt eye vision loss, enucleated.    Neck: Neck supple. No JVD present.   Cardiovascular: Normal rate and regular rhythm.    No murmur heard.  Pulmonary/Chest: No stridor. He has no wheezes. He has no rales.   Abdominal: Soft. Bowel sounds are normal. He exhibits no distension. There is no tenderness.   Musculoskeletal: He exhibits no edema.   Rt leg BKA - incision dry, no dehisc- healed.   Left leg w boot wound vac present.  Rt shoulder no redness or warmth, mild swelling.    Neurological: He is alert and oriented to person, place, and time. Coordination abnormal.   Skin: Skin is warm and dry. Rash noted. He is not diaphoretic. There is erythema.   End of stump on R appears chaffed, wound margins heaped.                                Assessment/Plan     * Skin ulcer of left foot with fat layer exposed (CMS-HCC) (present on admission)  Assessment & Plan  S/p OR debridement and gastroscoleus resection    Continue wound vac care  w changes three times/wk  Contact isolation for MRSA.  TDWB LLE, WBAT RLE per Ortho recs.  Patient reportedly has  restraining orders from much of the Skilled facilites in [place], difficult discharge-- Discussed with  - putting out referrals w no acceptance to date.    MRSA (methicillin resistant staph aureus) culture positive (present on admission)  Assessment & Plan  Doxycycline to cover L foot infection thru 8-25-17   Vanco stopped due to ARF    Pseudomonas infection (present on admission)  Assessment & Plan  Left foot infection, continue IV cefepime thru 8-25-17, ID assisting    Anxiety  Assessment & Plan  Depakote, psych assisting, long talk, will add low dose klonopin    Diarrhea  Assessment & Plan  Follow    Peripheral neuropathy (present on admission)  Assessment & Plan  Lyrica      Tobacco abuse (present on admission)  Assessment & Plan  Nicotine replacement PRN  Cessation counseling given     Wound of right leg, at BKA site (present on admission)  Assessment & Plan  No acute infection , incision healed .      Opiate dependence (CMS-HCC) (present on admission)  Assessment & Plan  Noted Drug seeking behavior inpatient- improved deviance.  Avoid escalating  IV narcotics with no no signs for pain- use primarily for dressing / vac changes .  oxycontin recently stopped-- continue methadone.   Trial lidoderm patch for chronic rt shoulder , lower back pain     ABRIL (acute kidney injury) (CMS-[facility])  Assessment & Plan  Suspect Vancomycin associated - resolved. Drug stopped. Changed to Doxy for MRSA coverage.        Labs reviewed and Medications reviewed  Rolon catheter: No Rolon      DVT Prophylaxis: Enoxaparin (Lovenox)    Ulcer prophylaxis: Not indicated  Antibiotics: Treating active infection/contamination beyond 24 hours perioperative coverage  Assessed for rehab: Patient was assess for and/or received rehabilitation services during this hospitalization

## 2017-08-14 NOTE — PROGRESS NOTES
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 Takes methadone he states for back pain.  Not diabetic.    Presentee to ED with increased pain to L TMA with infected neuropathic ulcer and pain to R BKA.     s/p L foot I & D with VAC placement, GSR by Dr. Shirley on 7/25    /73 mmHg  Pulse 108  Temp(Src) 36.6 °C (97.9 °F)  Resp 18  Ht 1.854 m (6' 1\")  Wt 65.2 kg (143 lb 11.8 oz)  BMI 18.97 kg/m2  SpO2 96%     Pain controlled     Offloading boot in place to E.    VAC dressing changed to day by IP wound team   Photo and wound team note reviewed. Wound bed clean and granulating.  Periwound maceration better      R BKA:  skin with fissure that has cracked, superficial layer of scar has lifted. Pt noticed wound today. He suspects wound started 2 days ago. Pt states he has not had a dressing to site, only topical lidocaine has been applied.    Partial thickness ulcer to distal stump: measures 1.5x6.5x0.2cm. Wound bed 100% red. No drainage, moist tissue. Wound bed cleaned with NS, applied benzoin to periwound and then applied hydrocolloid thin.   periwound with erythema circumferentially. Crepitus felt with edema to anterior leg. Non tender with palpation.   Unknown etiology but most likely from prosthetic sock stretching and pulling on fissure.     Xray R tib fib:   Focal soft tissue swelling of the distal RIGHT stump status post below knee amputation with heterotopic ossification and potential focal bony destruction, raising concern for osteomyelitis.      Nazario from [facility] in room. Reviewed pt's concerns with new prosthetic and arranging for CROW boot for L foot.   Pt stated he was wearing new prosthetic leg since Thursday for >2hrs per day.       ID s/o. abx until 8/25  IV abx- MRSA    Case management working on SNF " placement in [place].  No accepting facility as yet      PLAN  Continue VAC change per IP wound team for LLE.    diabetic boot continuously for 6wk d/t GSR incision to calf, end date 9/5/17  LLE TDWB only for transfers until CROW boot can be applied by [facility]  NWB RLE, prosthesis to not be applied while wound present  NPO midnight for RLE stump revision  Continue IV abx per ID

## 2017-08-14 NOTE — CARE PLAN
"Problem: Safety  Goal: Will remain free from injury  Outcome: PROGRESSING AS EXPECTED  Proper fall precautions in place. Call light within reach and encouraged to use. Hourly rounding in practice. Bed alarm in use.     Problem: Pain Management  Goal: Pain level will decrease to patient’s comfort goal  Outcome: PROGRESSING AS EXPECTED  Pain is better this evening and he did not request use of the lidocaine for is Rt BKA. He states \"it is a lot better than on the streets\".        "

## 2017-08-14 NOTE — PROGRESS NOTES
Pt found off floor in parking garage by security and escorted back. Pt re-educated that he CANNOT leave the floor without an escort. Pt denies knowing this saying he has been down there many times even thought RN/charge RN/managers have talked with him multiple times. Pt hooked back up to TKO fluids for PICC which he apparently unhooks himself at times and refuses. Pt counseled that it is right to leave AMA if he chooses but that he must have PICC taken out of him prior to leaving. Pt spoke with Dr. Zee who reinforced and requested that staff arrange escort off floor once a day.

## 2017-08-14 NOTE — PROGRESS NOTES
"Assumed care of pt at 1900, report given. Pt sitting up in bed this PM with no complaints of severe pain, nausea, or SOB. Pt has right BKA that is open to air and healing. New prothesis fitted and extremity is now WBAT. Left foot has no toes and wound vac is in place. Extremity is TDWB and must wear a boot when ambulating. Pt ambulates self with a FFW and does to frequently. Pt had eventful day and is again agitated at staff this evening stating that \"the day shift nurse took my personal belongings and I want a receipt\". Pt was referring to his lanyard with the knife attached that was taken by security earlier in the day when he wandered off the floor. Charge RN, Akua notified so he can receive his lanyard back, but he was educated he will probably not receive his knife back. Pt is still only pleasant when receiving what he wants and tries to push staff's limits of what he can get away with. Education and teaching provided even though he resists. MRSA contact precautions in place. Bed in lowest position, call light within reach, and no other needs at this time.   "

## 2017-08-14 NOTE — DISCHARGE PLANNING
Pt wants to get on Medicare. Emailed Pt Financial Assistance to see if they can help him apply? He states he has been on disability x 2 years. Await their answer.

## 2017-08-14 NOTE — PROGRESS NOTES
"Placed call to wound care for visit per pt request for tx of R stump which is red. Pt c/o \"new wound and it is not being treated.\"  "

## 2017-08-15 PROCEDURE — A9270 NON-COVERED ITEM OR SERVICE: HCPCS | Performed by: INTERNAL MEDICINE

## 2017-08-15 PROCEDURE — 500088 HCHG BLADE, SAGITTAL: Performed by: ORTHOPAEDIC SURGERY

## 2017-08-15 PROCEDURE — 700111 HCHG RX REV CODE 636 W/ 250 OVERRIDE (IP): Performed by: INTERNAL MEDICINE

## 2017-08-15 PROCEDURE — 160009 HCHG ANES TIME/MIN: Performed by: ORTHOPAEDIC SURGERY

## 2017-08-15 PROCEDURE — 700102 HCHG RX REV CODE 250 W/ 637 OVERRIDE(OP): Performed by: INTERNAL MEDICINE

## 2017-08-15 PROCEDURE — 500881 HCHG PACK, EXTREMITY: Performed by: ORTHOPAEDIC SURGERY

## 2017-08-15 PROCEDURE — 160036 HCHG PACU - EA ADDL 30 MINS PHASE I: Performed by: ORTHOPAEDIC SURGERY

## 2017-08-15 PROCEDURE — 160039 HCHG SURGERY MINUTES - EA ADDL 1 MIN LEVEL 3: Performed by: ORTHOPAEDIC SURGERY

## 2017-08-15 PROCEDURE — 87015 SPECIMEN INFECT AGNT CONCNTJ: CPT | Mod: 91

## 2017-08-15 PROCEDURE — 87075 CULTR BACTERIA EXCEPT BLOOD: CPT

## 2017-08-15 PROCEDURE — A9270 NON-COVERED ITEM OR SERVICE: HCPCS | Performed by: HOSPITALIST

## 2017-08-15 PROCEDURE — 700111 HCHG RX REV CODE 636 W/ 250 OVERRIDE (IP): Performed by: HOSPITALIST

## 2017-08-15 PROCEDURE — 770021 HCHG ROOM/CARE - ISO PRIVATE

## 2017-08-15 PROCEDURE — 700105 HCHG RX REV CODE 258

## 2017-08-15 PROCEDURE — 87186 SC STD MICRODIL/AGAR DIL: CPT

## 2017-08-15 PROCEDURE — 700101 HCHG RX REV CODE 250: Performed by: INTERNAL MEDICINE

## 2017-08-15 PROCEDURE — 160048 HCHG OR STATISTICAL LEVEL 1-5: Performed by: ORTHOPAEDIC SURGERY

## 2017-08-15 PROCEDURE — 87205 SMEAR GRAM STAIN: CPT

## 2017-08-15 PROCEDURE — 160022 HCHG BLOCK: Performed by: ORTHOPAEDIC SURGERY

## 2017-08-15 PROCEDURE — 500367 HCHG DRAIN KIT, HEMOVAC: Performed by: ORTHOPAEDIC SURGERY

## 2017-08-15 PROCEDURE — 700105 HCHG RX REV CODE 258: Performed by: INTERNAL MEDICINE

## 2017-08-15 PROCEDURE — 501838 HCHG SUTURE GENERAL: Performed by: ORTHOPAEDIC SURGERY

## 2017-08-15 PROCEDURE — 87070 CULTURE OTHR SPECIMN AEROBIC: CPT | Mod: 91

## 2017-08-15 PROCEDURE — 501330 HCHG SET, CYSTO IRRIG TUBING: Performed by: ORTHOPAEDIC SURGERY

## 2017-08-15 PROCEDURE — 700101 HCHG RX REV CODE 250

## 2017-08-15 PROCEDURE — 160028 HCHG SURGERY MINUTES - 1ST 30 MINS LEVEL 3: Performed by: ORTHOPAEDIC SURGERY

## 2017-08-15 PROCEDURE — 700111 HCHG RX REV CODE 636 W/ 250 OVERRIDE (IP)

## 2017-08-15 PROCEDURE — 99232 SBSQ HOSP IP/OBS MODERATE 35: CPT | Performed by: INTERNAL MEDICINE

## 2017-08-15 PROCEDURE — 700102 HCHG RX REV CODE 250 W/ 637 OVERRIDE(OP): Performed by: HOSPITALIST

## 2017-08-15 PROCEDURE — 160035 HCHG PACU - 1ST 60 MINS PHASE I: Performed by: ORTHOPAEDIC SURGERY

## 2017-08-15 PROCEDURE — 0Y6H0Z3 DETACHMENT AT RIGHT LOWER LEG, LOW, OPEN APPROACH: ICD-10-PCS | Performed by: ORTHOPAEDIC SURGERY

## 2017-08-15 PROCEDURE — 87077 CULTURE AEROBIC IDENTIFY: CPT

## 2017-08-15 PROCEDURE — 160002 HCHG RECOVERY MINUTES (STAT): Performed by: ORTHOPAEDIC SURGERY

## 2017-08-15 RX ORDER — SODIUM CHLORIDE 9 MG/ML
INJECTION, SOLUTION INTRAVENOUS
Status: COMPLETED
Start: 2017-08-15 | End: 2017-08-16

## 2017-08-15 RX ADMIN — DOXYCYCLINE 100 MG: 100 TABLET ORAL at 09:02

## 2017-08-15 RX ADMIN — CEFEPIME 2 G: 2 INJECTION, POWDER, FOR SOLUTION INTRAMUSCULAR; INTRAVENOUS at 13:16

## 2017-08-15 RX ADMIN — PREGABALIN 150 MG: 150 CAPSULE ORAL at 09:02

## 2017-08-15 RX ADMIN — LIDOCAINE 1 PATCH: 50 PATCH CUTANEOUS at 11:20

## 2017-08-15 RX ADMIN — ACETAMINOPHEN 650 MG: 325 TABLET, FILM COATED ORAL at 11:20

## 2017-08-15 RX ADMIN — ZOLPIDEM TARTRATE 5 MG: 5 TABLET, FILM COATED ORAL at 23:29

## 2017-08-15 RX ADMIN — METHADONE HYDROCHLORIDE 10 MG: 10 TABLET ORAL at 06:26

## 2017-08-15 RX ADMIN — PREGABALIN 150 MG: 150 CAPSULE ORAL at 22:39

## 2017-08-15 RX ADMIN — CLONAZEPAM 1 MG: 1 TABLET ORAL at 09:02

## 2017-08-15 RX ADMIN — LIDOCAINE 1 APPLICATION: 40 CREAM TOPICAL at 11:20

## 2017-08-15 RX ADMIN — CEFEPIME 2 G: 2 INJECTION, POWDER, FOR SOLUTION INTRAMUSCULAR; INTRAVENOUS at 22:47

## 2017-08-15 RX ADMIN — Medication 325 MG: at 18:21

## 2017-08-15 RX ADMIN — METHADONE HYDROCHLORIDE 10 MG: 10 TABLET ORAL at 22:39

## 2017-08-15 RX ADMIN — DOXYCYCLINE 100 MG: 100 TABLET ORAL at 22:39

## 2017-08-15 RX ADMIN — MORPHINE SULFATE 2 MG: 4 INJECTION INTRAVENOUS at 22:39

## 2017-08-15 RX ADMIN — SODIUM CHLORIDE 1000 ML: 9 INJECTION, SOLUTION INTRAVENOUS at 06:28

## 2017-08-15 RX ADMIN — CEFEPIME 2 G: 2 INJECTION, POWDER, FOR SOLUTION INTRAMUSCULAR; INTRAVENOUS at 06:25

## 2017-08-15 RX ADMIN — OXYCODONE HYDROCHLORIDE 10 MG: 10 TABLET ORAL at 20:02

## 2017-08-15 RX ADMIN — OXYCODONE HYDROCHLORIDE 10 MG: 10 TABLET ORAL at 06:26

## 2017-08-15 RX ADMIN — LIDOCAINE 1 APPLICATION: 40 CREAM TOPICAL at 23:14

## 2017-08-15 RX ADMIN — CLONAZEPAM 1 MG: 1 TABLET ORAL at 22:39

## 2017-08-15 RX ADMIN — Medication 325 MG: at 09:02

## 2017-08-15 RX ADMIN — ZOLPIDEM TARTRATE 5 MG: 5 TABLET, FILM COATED ORAL at 23:28

## 2017-08-15 ASSESSMENT — PAIN SCALES - GENERAL
PAINLEVEL_OUTOF10: 4
PAINLEVEL_OUTOF10: ASSUMED PAIN PRESENT
PAINLEVEL_OUTOF10: ASSUMED PAIN PRESENT
PAINLEVEL_OUTOF10: 0
PAINLEVEL_OUTOF10: 0
PAINLEVEL_OUTOF10: 6
PAINLEVEL_OUTOF10: 4
PAINLEVEL_OUTOF10: 0
PAINLEVEL_OUTOF10: 7
PAINLEVEL_OUTOF10: 3
PAINLEVEL_OUTOF10: 0
PAINLEVEL_OUTOF10: 0

## 2017-08-15 ASSESSMENT — ENCOUNTER SYMPTOMS
MYALGIAS: 0
VOMITING: 0
SHORTNESS OF BREATH: 0
NAUSEA: 0
COUGH: 0
FEVER: 0
CHILLS: 0
SEIZURES: 0
DEPRESSION: 0
PALPITATIONS: 0
WEAKNESS: 0
ABDOMINAL PAIN: 0
FLANK PAIN: 0
CONSTIPATION: 0
TINGLING: 0
NERVOUS/ANXIOUS: 1
TREMORS: 0
LOSS OF CONSCIOUSNESS: 0
DIZZINESS: 0
NECK PAIN: 0
DIARRHEA: 0
SENSORY CHANGE: 0
BLOOD IN STOOL: 0
FOCAL WEAKNESS: 0
SPEECH CHANGE: 0
HEARTBURN: 0
HEADACHES: 1
HALLUCINATIONS: 0

## 2017-08-15 NOTE — CARE PLAN
Problem: Pain Management  Goal: Pain level will decrease to patient’s comfort goal  Outcome: PROGRESSING AS EXPECTED  Pt has pain in back/leg/shoulder, receiving PRN pain meds per MAR, repositioned for comfort. Non-pharmacologic options offered. Scheduled lido patch and cream in use.    Problem: Safety  Goal: Will remain free from injury  Outcome: PROGRESSING AS EXPECTED  Pt off floor without permission. Educated and escalated per other progress note.

## 2017-08-15 NOTE — PROGRESS NOTES
Report given to pre-op. Checklist completed. Anesthesia consents ordered and reordered. Cannot find consent at printer. No surgical consent ordered yet.

## 2017-08-15 NOTE — PROGRESS NOTES
Assumed care of pt at 1900, report given. Pt sitting up in wheelchair this PM with no complaints of severe pain, nausea, or SOB. Pt has right BKA that is open to air and healing; however lines are raised and reddened. Pt to have Rt BKA revision at approximately 1245 tomorrow. Pt to be NPO at midnight and now is non-weight bearing on that extremity. Pt not allowed to use prothesis and therefore must use wheelchair to move around. Left foot has no toes and wound vac is in place. Extremity is TDWB and must wear a boot when ambulating. Will inquire about a crow boot in AM for transfers. Pt had eventful day and is again agitated at staff this evening repeating that he wants his knife lanyard back. Pt's affect was lethargic and agitated as he grabbed things wilson-fully from staff. Pt is still only pleasant when receiving what he wants and tries to push staff's limits of what he can get away with. Education and teaching provided even though he resists. MRSA contact precautions in place. Bed in lowest position, call light within reach, and no other needs at this time.

## 2017-08-15 NOTE — CARE PLAN
Problem: Safety  Goal: Will remain free from injury  Outcome: PROGRESSING AS EXPECTED  Proper fall precautions in place. Call light within reach and encouraged to use. Hourly rounding in practice.     Problem: Skin Integrity  Goal: Risk for impaired skin integrity will decrease  Outcome: NOT MET  Pt has evidence of ill fitting prothesis and inflamed stump. Pt demonstrates ability to turn self in bed without assistance of staff. Understands importance in prevention of breakdown, ulcers, and potential infection. Hourly rounding in effect.

## 2017-08-15 NOTE — PROGRESS NOTES
Pt's son came to room and spoke with this RN about surgical intervention tomorrow and wanting to talk to the physician before anything happens. He also stated again about his father's personal belongings. Pt and family updated again on situation. Will notify day shift RN.

## 2017-08-15 NOTE — WOUND TEAM
[facility]  Inpatient Services  Wound and Skin Care Treatment Note    Admission Date:  07/22/17     HPI, PMH, SH: Reviewed  Unit where seen by Wound Team:  [id number]    WOUND CONSULT RELATED TO:   Scheduled NPWT dressing change left plantar foot      SUBJECTIVE:  Pleasant/agreeable    Self Report / Pain Level:  Tolerated well with PO pre medication     OBJECTIVE:    Previous NPWT dressing intact    WOUND TYPE, LOCATION, CHARACTERISTICS (Pressure ulcers: location, stage, POA or date identified)    Location/type of wound: Open surgical wound left plantar foot      Periwound:     macerated around wound and in between remaining amputation site of pervious digit; callous-like tissue      Drainage:     scant serosanguinous    Tissue Type and %:    100% red/pink  Wound Edges:    Attached/macerated    Odor:     none  Exposed structure(s):  Bone palpated    S&S of Infection:     none      Measurements: (cm)  (Taken 08/09/17)   Length:    5.0   Width:     5.5  Depth:    ~ 0.2      INTERVENTIONS BY WOUND TEAM:  Removed prev dressing and irrigated wound with wound cleanser.  Benzoin and drape to bull wound skin as needed. Small piece of paste ring used to seal fissure on lateral side.  Covered wound with 1 piece black foam with a bridge and button to dorsum of foot. Secured with drape and applied cont neg pressure at 125mmhg. Placed a non-adhesive foam under the forefoot to hopefully absorb any moisture. Total black foam=3 pieces.    Interdisciplinary consultation:   Staff RN, patient      EVALUATION:   moisture and maceration remain an issue; bridged through the arch this time and placed a foam drsg under the forefoot    Factors affecting wound healing:  Neuropathy, tobacco abuse     Goals:  Decrease in wound size by 5% every 2 weeks -- no maceration    NURSING PLAN OF CARE ORDERS (x):    Dressing changes: See Dressing Maintenance orders:  x  Skin care: See Skin Care orders: x  Rectal tube care: See Rectal Tube Care  orders:   Other orders:    WOUND TEAM PLAN OF CARE (x):   NPWT change 3 x week:   x     Dressing changes by wound team:       Follow up as needed:     x  Other (explain):    Anticipated discharge plans (x):  SNF:           Home Care:           Outpatient Wound Center:   x      Self Care:            Other:

## 2017-08-15 NOTE — CARE PLAN
Problem: Pain Management  Goal: Pain level will decrease to patient’s comfort goal  Outcome: PROGRESSING AS EXPECTED  Pt has pain in back/leg/shoulder, receiving PRN pain meds per MAR, repositioned for comfort. Non-pharmacologic options offered. Scheduled lido patch and cream in use.    Problem: Safety  Goal: Will remain free from injury  Outcome: PROGRESSING AS EXPECTED  Pt has not attempted to leave floor today. Reminded of rules. Allowing PICC to remain TKO but gets tangled b/c pt impulsive and impatient.

## 2017-08-15 NOTE — OP REPORT
DATE OF SERVICE:  08/15/2017    PREOPERATIVE DIAGNOSES:  1.  Right below-knee amputation with bony regrowth and concern for   osteomyelitis.  2.  Ulceration, right below-knee amputation stump.    POSTOPERATIVE DIAGNOSES:  1.  Right below-knee amputation with bony regrowth and concern for   osteomyelitis.  2.  Ulceration, right below-knee amputation stump.    PROCEDURES:  1.  Right revision below-knee amputation.  2.  Right lower extremity irrigation and debridement, skin, subcutaneous   tissue to bone.    SPECIMENS:  Distal tibia sent separately from distal fibula for culture.    ESTIMATED BLOOD LOSS:  100 mL    TOURNIQUET TIME:  15 minutes at 250 mmHg.    FINDINGS:  Soft distal bony overgrowth of both the tibia and fibula with   suspicion for osteomyelitis, ulceration tracking with track to posterolateral   distal tibia and distal fibula excised extensive scar tissue and extensive   necrotic muscle.    COMPLICATIONS:  None.    OUTCOME:  To PACU in stable condition.    HISTORY OF PRESENT ILLNESS:  This is a 52-year-old gentleman known to me.  I   was initially consulted for his left lower extremity, which we performed an   irrigation and debridement, gastroc soleus recession and VAC change.  He is   overall doing quite well from this and is healing his wound.  We also referred   him to have a new stump for his right lower extremity BKA made.  His BKA of   note was performed at an outside institution.  He has been walking with his   new prosthesis and has developed ulceration in his distal stump.  We had   previously discussed revision, but elected to treat it conservatively.    Unfortunately, at this point, revision is necessary.  He was agreed in the   preoperative holding area and identified by name and medical record number.    The right lower extremity was marked.  Risks of the procedure including   bleeding, infection, pain, neurovascular damage, wound breakdown, and need for   more surgery were discussed and  he provided a written consent.    DESCRIPTION OF PROCEDURE:  He was taken to the operating room and placed on   table in supine position.  Preoperative antibiotics were administered and   general anesthesia was induced.  A nonsterile tourniquet was placed on his   right thigh and his right lower extremity was prepped and draped in the usual   sterile fashion.  An operative pause was undertaken, where all present were in   agreement with patient identification, laterality, and procedure to be   performed.  Initially, the tourniquet was not used.  He had Mercedes shaped   ulceration in his distal stump.  The incision had initially been directly on   the weightbearing surface.  I opened the transverse limb as well as a vertical   limb, which reached anteriorly.  We opened the 3 flaps and found a track to   the posterolateral tibia and to the fibula.  There was thick surrounding scar   tissue as would be expected.  There was extensive necrotic tissue and   extensive scar blurring the lines between normal soft tissue planes.  We spent   a significant amount of time dissecting these planes and removing scar tissue   and excess muscle.  I did not encounter ange purulence.  We exposed the   distal tip of the fibula and the tibia, parts of which were able to be removed   using a knife and were found to be quite soft.  These were sent for culture   and were suspicious for osteomyelitis.  An oscillating saw was used to freshen   the cut of the fibula with a 30-degree anterior bevel and to freshen the cut   of the fibula approximately 3-4 cm shorter than the tibia with a lateral and   anterior bevel.  The saw was used to smooth any sharp edges.  The wound was   then copiously irrigated of skin, fascia, subcutaneous tissue and bone,   sharply removing an extensive amount of nonviable tissue using forceps and   scalpel.  During initial debulking, the tourniquet was used and any large   bleeders were tied off.  It was then lied  down prior to closure.  The gastroc   fascia was able to be brought anteriorly and closed to the anterior distal   tibial fascia using 0 PDS in figure-of-eight fashion.  The deep subcutaneous   layer was closed with 0 PDS in figure-of-eight fashion.  The anterior limbs on   either side of the longitudinal anterior limb ulceration were able to be   completely removed, therefore, freshening all skin edges prior to closure.    The subcutaneous tissue was closed with 2-0 PDS in buried interrupted fashion,   skin closed with 3-0 nylon in horizontal mattress fashion and had an   excellent closure anterior to the weightbearing surface.  The wound was closed   over a Hemovac drain.  Xeroform, gauze, and a Coban dressing were placed.    Nazario Lee then placed a cylinder cast.  The patient was awakened and   extubated in stable condition.    POSTOPERATIVE COURSE:  He will be nonweightbearing right lower extremity, heel   weightbearing left lower extremity in his Cam walker boot.  Hemovac to be   removed on postoperative day #2, casting will be changed postoperative day #4   followed by weekly until sutures were removed.  We will begin  sock   treatment at that point.  Antibiotics per infectious disease.  We will   continue to follow along.       ____________________________________     MD SALO HATFIELD / BERNARD    DD:  08/15/2017 16:05:48  DT:  08/15/2017 16:52:49    D#:  [id number]  Job#:  [id number]

## 2017-08-15 NOTE — PROGRESS NOTES
Report received, poc discussed, assumed care of pt.   Call light in reach, hourly rounding in place.   Pt gets up SBA, L heel touch WB with offloading boot for pivot to WC.  NWB R.  Wound vac to LLE.   Contact iso for MRSA.  NPO for surgery.  + void. LBM 8/14.   LUE PICC in place. Educated that needs to stay TKO.  Oxy and scheduled methadone for pain.  No further needs.  Pt non compliant at times.

## 2017-08-15 NOTE — OR SURGEON
Operative Report    PreOp Diagnosis: Right BKA stump ulceration    PostOp Diagnosis: Same, likely osteomyelitis    Procedure(s):  KNEE AMPUTATION BELOW - REVISION - Wound Class: Dirty or Infected    Surgeon(s):  Chavez Shirley M.D.    Anesthesiologist/Type of Anesthesia:  Anesthesiologist: Jorge Pettit M.D./General    Surgical Staff:  Circulator: Gaby Plaza R.N.  Scrub Person: Sharonda Lindsey    Specimens:  * No specimens in log *    Estimated Blood Loss: 100cc    TT: 15 min @ 250mmHg    Findings: Soft distal bony overgrowth, extensive scar, necrotic muscle    Complications: none        8/15/2017 2:14 PM Chavez Shirley

## 2017-08-15 NOTE — PROGRESS NOTES
[facility] Hospitalist Progress Note    Date of Service: 8/15/2017    Chief Complaint    52 y.o. male hx of PVD s/p L TMA and Rt Bka > 5 years ago  admitted 7/22/2017 with right BKA stump pain and wound drainage in addition to left TMA stump wound (nonhealing) over several months.  s/p Debridement and gastroscoleus resection LLE on 7-25-17 with Dr Shirley. Cultures have grown MRSA / Pseudomonas. He is being treated with IV cefepime and PO doxycycline per ID recommendations. These are to be continued until 08/25/2017. Patient has medical. Difficult disposition. SW continuing to look into placement options at this time. Continuing therapy inpatient at this point in time.     Interval Problem Update  Patient seen and examined on rounds. He informed me he is going to OR today with Dr Shirley for R BKA surgery. He is NPO for this. 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 Reports headaches because of hospital lights since he is here. Unhappy regarding inability to be off floor. Advised to discuss with nursing and nursing management. Pain is controlled. No other acute concerns per patient.     Consultants/Specialty  Limb preservation service  Infectious diseases - Last seen by Dr Pino  Orthopedic surgery - Dr Shirley  Psychiatry - Dr Trujillo    Disposition  Difficult disposition. Remains inpatient to continue therapy. SW working on disposition. Discussed on rounds with SW. Continue looking into placement option.       Review of Systems   Constitutional: Negative for fever and chills.   HENT: Negative for hearing loss.    Eyes:        Vision loss rt eye / Evisceration. Trauma history.    Respiratory: Negative for cough and shortness of breath.    Cardiovascular: Negative for chest pain, palpitations and leg swelling.   Gastrointestinal: Negative for heartburn, nausea, vomiting, abdominal pain, diarrhea, constipation, blood in stool and melena.   Genitourinary: Negative for dysuria, urgency, frequency, hematuria and flank  pain.   Musculoskeletal: Positive for joint pain (chronic). Negative for myalgias and neck pain.        R BKA   Skin: Negative for itching and rash.   Neurological: Positive for headaches. Negative for dizziness, tingling, tremors, sensory change, speech change, focal weakness, seizures, loss of consciousness and weakness.        Chronic peripheral neuropathy .   Psychiatric/Behavioral: Negative for depression, suicidal ideas and hallucinations. The patient is nervous/anxious (improved.).    All other systems reviewed and are negative.     Physical Exam  Laboratory/Imaging   Hemodynamics  Temp (24hrs), Av.9 °C (98.5 °F), Min:36.8 °C (98.3 °F), Max:37.1 °C (98.8 °F)   Temperature: 37.1 °C (98.8 °F)  Pulse  Av.3  Min: 63  Max: 111    Blood Pressure: (!) 97/60 mmHg      Respiratory      Respiration: 16, Pulse Oximetry: 94 %             Fluids    Intake/Output Summary (Last 24 hours) at 08/15/17 1026  Last data filed at 08/15/17 0758   Gross per 24 hour   Intake   1640 ml   Output   2800 ml   Net  -1160 ml       Nutrition  Orders Placed This Encounter   Procedures   • DIET NPO     Standing Status: Standing      Number of Occurrences: 8      Standing Expiration Date:      Order Specific Question:  Restrict to:     Answer:  Sips with Medications [3]     Physical Exam   Constitutional: He is oriented to person, place, and time. He appears well-developed and well-nourished. No distress.   Eyes: Right eye exhibits no discharge. Left eye exhibits no discharge.   Rt eye vision loss, enucleated.    Neck: Neck supple. No JVD present.   Cardiovascular: Normal rate and regular rhythm.    No murmur heard.  Pulmonary/Chest: Effort normal and breath sounds normal. No stridor. No respiratory distress. He has no wheezes. He has no rales.   Abdominal: Soft. Bowel sounds are normal. He exhibits no distension. There is no tenderness. There is no rebound and no guarding.   Musculoskeletal: He exhibits no edema.   Rt leg BKA -  incision dry, no dehisc- healed.   Left leg w boot wound vac present.  Rt shoulder no redness or warmth, mild swelling.    Neurological: He is alert and oriented to person, place, and time. Coordination abnormal.   Skin: Skin is warm and dry. He is not diaphoretic. There is erythema.   End of stump on R appears chaffed, wound margins heaped.   Psychiatric: He has a normal mood and affect. His behavior is normal. Judgment and thought content normal.                                Assessment/Plan     * Skin ulcer of left foot with fat layer exposed (CMS-HCC) (present on admission)  Assessment & Plan  S/p OR debridement and gastroscoleus resection 7/25   Continue wound vac care  w changes three times/wk  Contact isolation for MRSA.  TDWB LLE, WBAT RLE per Ortho recs.  Patient reportedly has restraining orders from much of the Skilled facilites in [place], difficult discharge-- Discussed with  - putting out referrals w no acceptance to date.    MRSA (methicillin resistant staph aureus) culture positive (present on admission)  Assessment & Plan  Doxycycline to cover L foot infection thru 8-25-17   Vanco stopped due to ARF    Pseudomonas infection (present on admission)  Assessment & Plan  Left foot infection, continue IV cefepime thru 8-25-17, ID assisting    Anxiety  Assessment & Plan  Depakote, psych assisting, long talk, will add low dose klonopin    Diarrhea  Assessment & Plan  Follow    Peripheral neuropathy (present on admission)  Assessment & Plan  Lyrica      Tobacco abuse (present on admission)  Assessment & Plan  Nicotine replacement PRN  Cessation counseling given     Wound of right leg, at BKA site (present on admission)  Assessment & Plan  No acute infection , incision healed .      Opiate dependence (CMS-HCC) (present on admission)  Assessment & Plan  Noted Drug seeking behavior inpatient- improved deviance.  Avoid escalating  IV narcotics with no no signs for pain- use primarily for dressing / vac  changes .  oxycontin recently stopped-- continue methadone.   Trial lidoderm patch for chronic rt shoulder , lower back pain     ABRIL (acute kidney injury) (CMS-[facility])  Assessment & Plan  Suspect Vancomycin associated - resolved. Drug stopped. Changed to Doxy for MRSA coverage.        Labs reviewed and Medications reviewed  Rolon catheter: No Rolon      DVT Prophylaxis: Enoxaparin (Lovenox)    Ulcer prophylaxis: Not indicated  Antibiotics: Treating active infection/contamination beyond 24 hours perioperative coverage  Assessed for rehab: Patient was assess for and/or received rehabilitation services during this hospitalization

## 2017-08-15 NOTE — PROGRESS NOTES
"Ortho Prog Note    7/25/17 s/p L foot I&D, GSR, vac placement    Pt received new Right leg and developed worsening drainage and pain, discussed revision BKA    BP 97/51 mmHg  Pulse 92  Temp(Src) 36.8 °C (98.2 °F)  Resp 16  Ht 1.854 m (6' 1\")  Wt 65.2 kg (143 lb 11.8 oz)  BMI 18.97 kg/m2  SpO2 94%      LLE: Dressing CDI, vac holding suction. Foot WWP  RLE: Fissure and cracking at previous incision.  Serous drainage with some blood, partial thickness ulcer 1.5x6.5x0.2cm, incision directly on weight bearing surface        LLE Doing well, RLE with incision breakdown, indicated for Revision RIGHT BKA    TDWB LLE  NWB  Orthotist will likely place cylinder cast  Abx per ID    Chavez Shirley MD      "

## 2017-08-15 NOTE — PROGRESS NOTES
"Ortho Prog Note    7/25/17 s/p L foot I&D, GSR, vac placement  8/15/17 RIGHT BKA revision    Doing well postop, sleepy, no pain    BP 97/51 mmHg  Pulse 92  Temp(Src) 36.8 °C (98.2 °F)  Resp 15  Ht 1.854 m (6' 1\")  Wt 65.2 kg (143 lb 11.8 oz)  BMI 18.97 kg/m2  SpO2 96%      LLE: Dressing CDI, vac holding suction. Foot WWP  RLE: Cylinder cast in place, HVAC holding suction        POD #0 doing well    TDWB LLE  Continue vac changes and offloading  NWB RLE  Cast change POD #4 and qweek until incision healed  Remove HVAC POD #2  Abx per ID    Chavez Shirley MD      "

## 2017-08-16 LAB
GRAM STN SPEC: NORMAL
GRAM STN SPEC: NORMAL
SIGNIFICANT IND 70042: NORMAL
SIGNIFICANT IND 70042: NORMAL
SITE SITE: NORMAL
SITE SITE: NORMAL
SOURCE SOURCE: NORMAL
SOURCE SOURCE: NORMAL

## 2017-08-16 PROCEDURE — 700105 HCHG RX REV CODE 258: Performed by: INTERNAL MEDICINE

## 2017-08-16 PROCEDURE — A9270 NON-COVERED ITEM OR SERVICE: HCPCS | Performed by: INTERNAL MEDICINE

## 2017-08-16 PROCEDURE — 770021 HCHG ROOM/CARE - ISO PRIVATE

## 2017-08-16 PROCEDURE — 700101 HCHG RX REV CODE 250: Performed by: INTERNAL MEDICINE

## 2017-08-16 PROCEDURE — 700111 HCHG RX REV CODE 636 W/ 250 OVERRIDE (IP): Performed by: INTERNAL MEDICINE

## 2017-08-16 PROCEDURE — 700102 HCHG RX REV CODE 250 W/ 637 OVERRIDE(OP): Performed by: INTERNAL MEDICINE

## 2017-08-16 PROCEDURE — 700102 HCHG RX REV CODE 250 W/ 637 OVERRIDE(OP): Performed by: HOSPITALIST

## 2017-08-16 PROCEDURE — A9270 NON-COVERED ITEM OR SERVICE: HCPCS | Performed by: HOSPITALIST

## 2017-08-16 PROCEDURE — 700111 HCHG RX REV CODE 636 W/ 250 OVERRIDE (IP): Performed by: HOSPITALIST

## 2017-08-16 PROCEDURE — 99232 SBSQ HOSP IP/OBS MODERATE 35: CPT | Performed by: INTERNAL MEDICINE

## 2017-08-16 PROCEDURE — 97605 NEG PRS WND THER DME<=50SQCM: CPT

## 2017-08-16 RX ORDER — MORPHINE SULFATE 4 MG/ML
4 INJECTION, SOLUTION INTRAMUSCULAR; INTRAVENOUS
Status: DISCONTINUED | OUTPATIENT
Start: 2017-08-16 | End: 2017-08-16

## 2017-08-16 RX ADMIN — PREGABALIN 150 MG: 150 CAPSULE ORAL at 16:38

## 2017-08-16 RX ADMIN — METHADONE HYDROCHLORIDE 10 MG: 10 TABLET ORAL at 14:50

## 2017-08-16 RX ADMIN — METHADONE HYDROCHLORIDE 10 MG: 10 TABLET ORAL at 06:04

## 2017-08-16 RX ADMIN — OXYCODONE HYDROCHLORIDE 10 MG: 10 TABLET ORAL at 16:37

## 2017-08-16 RX ADMIN — CEFEPIME 2 G: 2 INJECTION, POWDER, FOR SOLUTION INTRAMUSCULAR; INTRAVENOUS at 06:04

## 2017-08-16 RX ADMIN — Medication 325 MG: at 10:23

## 2017-08-16 RX ADMIN — CLONAZEPAM 1 MG: 1 TABLET ORAL at 21:51

## 2017-08-16 RX ADMIN — METHADONE HYDROCHLORIDE 10 MG: 10 TABLET ORAL at 21:55

## 2017-08-16 RX ADMIN — PREGABALIN 150 MG: 150 CAPSULE ORAL at 21:52

## 2017-08-16 RX ADMIN — HYDROMORPHONE HYDROCHLORIDE 0.75 MG: 1 INJECTION, SOLUTION INTRAMUSCULAR; INTRAVENOUS; SUBCUTANEOUS at 17:49

## 2017-08-16 RX ADMIN — CLONAZEPAM 1 MG: 1 TABLET ORAL at 10:23

## 2017-08-16 RX ADMIN — ENOXAPARIN SODIUM 40 MG: 100 INJECTION SUBCUTANEOUS at 10:22

## 2017-08-16 RX ADMIN — MORPHINE SULFATE 4 MG: 4 INJECTION INTRAVENOUS at 14:50

## 2017-08-16 RX ADMIN — DOXYCYCLINE 100 MG: 100 TABLET ORAL at 21:51

## 2017-08-16 RX ADMIN — CEFEPIME 2 G: 2 INJECTION, POWDER, FOR SOLUTION INTRAMUSCULAR; INTRAVENOUS at 21:51

## 2017-08-16 RX ADMIN — CEFEPIME 2 G: 2 INJECTION, POWDER, FOR SOLUTION INTRAMUSCULAR; INTRAVENOUS at 14:50

## 2017-08-16 RX ADMIN — MORPHINE SULFATE 2 MG: 4 INJECTION INTRAVENOUS at 10:23

## 2017-08-16 RX ADMIN — HYDROMORPHONE HYDROCHLORIDE 0.75 MG: 1 INJECTION, SOLUTION INTRAMUSCULAR; INTRAVENOUS; SUBCUTANEOUS at 21:51

## 2017-08-16 RX ADMIN — PREGABALIN 150 MG: 150 CAPSULE ORAL at 10:23

## 2017-08-16 RX ADMIN — Medication 325 MG: at 16:37

## 2017-08-16 RX ADMIN — DOXYCYCLINE 100 MG: 100 TABLET ORAL at 10:23

## 2017-08-16 RX ADMIN — LIDOCAINE 1 PATCH: 50 PATCH CUTANEOUS at 10:21

## 2017-08-16 RX ADMIN — OXYCODONE HYDROCHLORIDE 10 MG: 10 TABLET ORAL at 06:05

## 2017-08-16 ASSESSMENT — ENCOUNTER SYMPTOMS
LOSS OF CONSCIOUSNESS: 0
SEIZURES: 0
NERVOUS/ANXIOUS: 1
SPEECH CHANGE: 0
ABDOMINAL PAIN: 0
COUGH: 0
DIZZINESS: 0
MYALGIAS: 0
NECK PAIN: 0
NAUSEA: 0
CONSTIPATION: 0
TREMORS: 0
HALLUCINATIONS: 0
TINGLING: 0
BLOOD IN STOOL: 0
VOMITING: 0
DIARRHEA: 0
FLANK PAIN: 0
SENSORY CHANGE: 0
WEAKNESS: 0
PALPITATIONS: 0
HEADACHES: 1
FEVER: 0
SHORTNESS OF BREATH: 0
DEPRESSION: 0
CHILLS: 0
FOCAL WEAKNESS: 0
HEARTBURN: 0

## 2017-08-16 ASSESSMENT — PAIN SCALES - GENERAL
PAINLEVEL_OUTOF10: 6
PAINLEVEL_OUTOF10: 8
PAINLEVEL_OUTOF10: 5
PAINLEVEL_OUTOF10: 9
PAINLEVEL_OUTOF10: 5
PAINLEVEL_OUTOF10: 4
PAINLEVEL_OUTOF10: 7

## 2017-08-16 NOTE — PROGRESS NOTES
[facility] Hospitalist Progress Note    Date of Service: 8/16/2017    Chief Complaint    52 y.o. male hx of PVD s/p L TMA and Rt Bka > 5 years ago  admitted 7/22/2017 with right BKA stump pain and wound drainage in addition to left TMA stump wound (nonhealing) over several months.  s/p Debridement and gastroscoleus resection LLE on 7-25-17 with Dr Shirley. Cultures have grown MRSA / Pseudomonas. He is being treated with IV cefepime and PO doxycycline per ID recommendations. These are to be continued until 08/25/2017. Patient has medical. Difficult disposition. SW continuing to look into placement options at this time. Continuing therapy inpatient at this point in time. Also underwent R BKA revision with Dr Shirley 08/15/2017    Interval Problem Update  Patient seen and examined on rounds. S/P OR yesterday with Dr Shirley. 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 POst operative pain. I would increase his Morphine to 4 mg from 2 mg. No other acute concerns per patient. REmains on cefepime / doxy. No labs. Check cbc / bmp in am.     Consultants/Specialty  Limb preservation service  Infectious diseases - Last seen by Dr Pino  Orthopedic surgery - Dr Shirley  Psychiatry - Dr Trujillo    Disposition  Difficult disposition. Remains inpatient to continue therapy. SW working on disposition. Discussed on rounds with SW. Continue looking into placement option.       Review of Systems   Constitutional: Negative for fever and chills.   HENT: Negative for hearing loss.    Eyes:        Vision loss rt eye / Evisceration. Trauma history.    Respiratory: Negative for cough and shortness of breath.    Cardiovascular: Negative for chest pain, palpitations and leg swelling.   Gastrointestinal: Negative for heartburn, nausea, vomiting, abdominal pain, diarrhea, constipation, blood in stool and melena.   Genitourinary: Negative for dysuria, urgency, frequency, hematuria and flank pain.   Musculoskeletal: Positive for joint pain  (chronic). Negative for myalgias and neck pain.        R BKA   Skin: Negative for itching and rash.   Neurological: Positive for headaches. Negative for dizziness, tingling, tremors, sensory change, speech change, focal weakness, seizures, loss of consciousness and weakness.        Chronic peripheral neuropathy .   Psychiatric/Behavioral: Negative for depression, suicidal ideas and hallucinations. The patient is nervous/anxious (improved.).    All other systems reviewed and are negative.     Physical Exam  Laboratory/Imaging   Hemodynamics  Temp (24hrs), Av.8 °C (98.3 °F), Min:36.6 °C (97.9 °F), Max:37.2 °C (98.9 °F)   Temperature: 36.8 °C (98.3 °F)  Pulse  Av.7  Min: 61  Max: 111 Heart Rate (Monitored): 92  Blood Pressure: 113/68 mmHg, NIBP: (!) 92/47 mmHg      Respiratory      Respiration: 18, Pulse Oximetry: 94 %        RUL Breath Sounds: Clear, RML Breath Sounds: Clear, RLL Breath Sounds: Clear, NANCY Breath Sounds: Clear, LLL Breath Sounds: Clear    Fluids    Intake/Output Summary (Last 24 hours) at 17 1121  Last data filed at 17 0800   Gross per 24 hour   Intake   2200 ml   Output   1700 ml   Net    500 ml       Nutrition  Orders Placed This Encounter   Procedures   • DIET ORDER     Standing Status: Standing      Number of Occurrences: 1      Standing Expiration Date:      Order Specific Question:  Diet:     Answer:  Regular [1]      Comments:  styrofoam trays     Physical Exam   Constitutional: He is oriented to person, place, and time. He appears well-developed and well-nourished. No distress.   Eyes: Right eye exhibits no discharge. Left eye exhibits no discharge.   Rt eye vision loss, enucleated.    Neck: Neck supple. No JVD present.   Cardiovascular: Normal rate and regular rhythm.    No murmur heard.  Pulmonary/Chest: Effort normal and breath sounds normal. No stridor. No respiratory distress. He has no wheezes. He has no rales.   Abdominal: Soft. Bowel sounds are normal. He exhibits  no distension. There is no tenderness. There is no rebound and no guarding.   Musculoskeletal: He exhibits no edema.   Rt leg BKA - incision dry, no dehisc- healed.   Left leg w boot wound vac present.  Rt shoulder no redness or warmth, mild swelling.    Neurological: He is alert and oriented to person, place, and time. Coordination abnormal.   Skin: Skin is warm and dry. He is not diaphoretic. There is erythema.   End of stump on R appears chaffed, wound margins heaped.   Psychiatric: He has a normal mood and affect. His behavior is normal. Judgment and thought content normal.                                Assessment/Plan     * Skin ulcer of left foot with fat layer exposed (CMS-[facility]) (present on admission)  Assessment & Plan  S/p OR debridement and gastroscoleus resection 7/25   Continue wound vac care  w changes three times/wk  Contact isolation for MRSA.  TDWB LLE, WBAT RLE per Ortho recs.  Patient reportedly has restraining orders from much of the Skilled facilites in [place], difficult discharge-- Discussed with  - putting out referrals w no acceptance to date.    MRSA (methicillin resistant staph aureus) culture positive (present on admission)  Assessment & Plan  Doxycycline to cover L foot infection thru 8-25-17   Vanco stopped due to ARF    Pseudomonas infection (present on admission)  Assessment & Plan  Left foot infection, continue IV cefepime thru 8-25-17, ID assisting    Anxiety (present on admission)  Assessment & Plan  Depakote, psych assisting, long talk, will add low dose klonopin    Diarrhea (present on admission)  Assessment & Plan  Follow    Peripheral neuropathy (present on admission)  Assessment & Plan  Lyrica      Tobacco abuse (present on admission)  Assessment & Plan  Nicotine replacement PRN  Cessation counseling given     Wound of right leg, at BKA site (present on admission)  Assessment & Plan  No acute infection , incision healed .      Opiate dependence (CMS-[facility]) (present on  admission)  Assessment & Plan  Noted Drug seeking behavior inpatient- improved deviance.  Avoid escalating  IV narcotics with no no signs for pain- use primarily for dressing / vac changes .  oxycontin recently stopped-- continue methadone.   Trial lidoderm patch for chronic rt shoulder , lower back pain     ABRIL (acute kidney injury) (CMS-[facility])  Assessment & Plan  Suspect Vancomycin associated - resolved. Drug stopped. Changed to Doxy for MRSA coverage.        Labs reviewed and Medications reviewed  Rolon catheter: No Rolon      DVT Prophylaxis: Enoxaparin (Lovenox)    Ulcer prophylaxis: Not indicated  Antibiotics: Treating active infection/contamination beyond 24 hours perioperative coverage  Assessed for rehab: Patient was assess for and/or received rehabilitation services during this hospitalization

## 2017-08-16 NOTE — CARE PLAN
Problem: Safety  Goal: Will remain free from falls  Outcome: PROGRESSING AS EXPECTED  Intervention: Assess risk factors for falls    08/15/17 2000   OTHER   Fall Risk High Risk to Fall - 2 or more points    Risk for Injury-Any positive answers results in the pt being at high risk for fall related injury Not Applicable   Mobility Status Assessment 1-1 Healthcare Provider Required for Assistance with Ambulation & Transfer   History of fall 0   Pt Calls for Assistance Yes       Intervention: Implement fall precautions    08/15/17 2000   OTHER   Environmental Precautions Personal Belongings, Wastebasket, Call Bell etc. in Easy Reach;Report Given to Other Health Care Providers Regarding Fall Risk;Bed in Low Position;Communication Sign for Patients & Families;Mobility Assessed & Appropriate Sign Placed   IV Pole on Same Side of Bed as Bathroom Yes   Bedrails Bedrails Closest to Bathroom Down           Problem: Venous Thromboembolism (VTW)/Deep Vein Thrombosis (DVT) Prevention:  Goal: Patient will participate in Venous Thrombosis (VTE)/Deep Vein Thrombosis (DVT)Prevention Measures  Outcome: PROGRESSING AS EXPECTED    08/15/17 2000   OTHER   Risk Assessment Score 3   VTE RISK High   Pharmacologic Prophylaxis Used LMWH: Enoxaparin(Lovenox)         Problem: Bowel/Gastric:  Goal: Normal bowel function is maintained or improved  Outcome: PROGRESSING AS EXPECTED    08/15/17 2000   OTHER   Last BM 08/15/17

## 2017-08-16 NOTE — PROGRESS NOTES
"Pt A&O x4.     Vitals: /64 mmHg  Pulse 61  Temp(Src) 37.2 °C (98.9 °F)  Resp 16  Ht 1.854 m (6' 1\")  Wt 65.2 kg (143 lb 11.8 oz)  BMI 18.97 kg/m2  SpO2 94%\    Rates pain 6 out of 10, medicated for pain.     Neuro: OLIVA. Denies new onset of numbness/ tingling. Baseline N/T BUE, BLE.     Cardiac: Denies new onset of chest pain.    Vascular: Pulses 2+ BUE, 1+ LLE pedal. 1+ dependant LLE edema noted.    Respiratory: Lungs sound clear to auscultation. Pulling 3500 on IS, effective, strong effort. Denies SOB.    GI: Abdomen soft. + bowel sounds, + flatus, + BM today. On regular diet, tolerating well. - nausea/ vomiting.    : Pt voiding adequately.      MSK: RLE stump NWB after fresh pot-op. LLE touch down weight bearing with offloading boot to be worn at all times, pt compliant. Awaiting arrival of \"clam shell\" boot for LLE.     Integumentary: Right BKA new post-op stump incision dressing CDI, hard cast in place. RLE Hemovac in place, compressed to self suction, scant/ serosanguinous drainage noted. LLE TMA surgical incisions covered with wound vac dressing/ elastic wrap, wound vac dressing CDI, wound vac on, scant/ no drainage.    Labs noted. LUE PICC single lumen patent, dressing CDI, no s/sx of infection noted.    Fall precautions in place: Bed locked in lowest position, Upper bed rails up, personal belongings within reach, call light within reach, appropriate mobility signs in place, - bed alarm. Pt calls appropriately.     Pt updated on POC.   "

## 2017-08-17 LAB
ANION GAP SERPL CALC-SCNC: 6 MMOL/L (ref 0–11.9)
BUN SERPL-MCNC: 22 MG/DL (ref 8–22)
CALCIUM SERPL-MCNC: 8.5 MG/DL (ref 8.5–10.5)
CHLORIDE SERPL-SCNC: 108 MMOL/L (ref 96–112)
CO2 SERPL-SCNC: 27 MMOL/L (ref 20–33)
CREAT SERPL-MCNC: 0.69 MG/DL (ref 0.5–1.4)
ERYTHROCYTE [DISTWIDTH] IN BLOOD BY AUTOMATED COUNT: 55.3 FL (ref 35.9–50)
GFR SERPL CREATININE-BSD FRML MDRD: >60 ML/MIN/1.73 M 2
GLUCOSE SERPL-MCNC: 111 MG/DL (ref 65–99)
HCT VFR BLD AUTO: 30.3 % (ref 42–52)
HGB BLD-MCNC: 9.1 G/DL (ref 14–18)
MCH RBC QN AUTO: 24.5 PG (ref 27–33)
MCHC RBC AUTO-ENTMCNC: 30 G/DL (ref 33.7–35.3)
MCV RBC AUTO: 81.7 FL (ref 81.4–97.8)
PLATELET # BLD AUTO: 290 K/UL (ref 164–446)
PMV BLD AUTO: 9.6 FL (ref 9–12.9)
POTASSIUM SERPL-SCNC: 4 MMOL/L (ref 3.6–5.5)
RBC # BLD AUTO: 3.71 M/UL (ref 4.7–6.1)
SODIUM SERPL-SCNC: 141 MMOL/L (ref 135–145)
WBC # BLD AUTO: 10.1 K/UL (ref 4.8–10.8)

## 2017-08-17 PROCEDURE — 700102 HCHG RX REV CODE 250 W/ 637 OVERRIDE(OP): Performed by: INTERNAL MEDICINE

## 2017-08-17 PROCEDURE — A9270 NON-COVERED ITEM OR SERVICE: HCPCS | Performed by: INTERNAL MEDICINE

## 2017-08-17 PROCEDURE — 700111 HCHG RX REV CODE 636 W/ 250 OVERRIDE (IP): Performed by: INTERNAL MEDICINE

## 2017-08-17 PROCEDURE — A9270 NON-COVERED ITEM OR SERVICE: HCPCS | Performed by: HOSPITALIST

## 2017-08-17 PROCEDURE — 85027 COMPLETE CBC AUTOMATED: CPT

## 2017-08-17 PROCEDURE — 700105 HCHG RX REV CODE 258: Performed by: INTERNAL MEDICINE

## 2017-08-17 PROCEDURE — 700101 HCHG RX REV CODE 250: Performed by: INTERNAL MEDICINE

## 2017-08-17 PROCEDURE — 80048 BASIC METABOLIC PNL TOTAL CA: CPT

## 2017-08-17 PROCEDURE — 99232 SBSQ HOSP IP/OBS MODERATE 35: CPT | Performed by: INTERNAL MEDICINE

## 2017-08-17 PROCEDURE — 700101 HCHG RX REV CODE 250: Performed by: NURSE PRACTITIONER

## 2017-08-17 PROCEDURE — 700102 HCHG RX REV CODE 250 W/ 637 OVERRIDE(OP): Performed by: HOSPITALIST

## 2017-08-17 PROCEDURE — 700105 HCHG RX REV CODE 258

## 2017-08-17 PROCEDURE — 700101 HCHG RX REV CODE 250: Performed by: HOSPITALIST

## 2017-08-17 PROCEDURE — 770021 HCHG ROOM/CARE - ISO PRIVATE

## 2017-08-17 RX ORDER — SODIUM CHLORIDE 9 MG/ML
INJECTION, SOLUTION INTRAVENOUS
Status: COMPLETED
Start: 2017-08-17 | End: 2017-08-17

## 2017-08-17 RX ADMIN — OXYCODONE HYDROCHLORIDE 10 MG: 10 TABLET ORAL at 08:22

## 2017-08-17 RX ADMIN — LIDOCAINE 1 PATCH: 50 PATCH CUTANEOUS at 12:28

## 2017-08-17 RX ADMIN — SODIUM CHLORIDE 1000 ML: 9 INJECTION, SOLUTION INTRAVENOUS at 03:30

## 2017-08-17 RX ADMIN — OXYCODONE HYDROCHLORIDE 10 MG: 10 TABLET ORAL at 17:17

## 2017-08-17 RX ADMIN — ENOXAPARIN SODIUM 40 MG: 100 INJECTION SUBCUTANEOUS at 09:24

## 2017-08-17 RX ADMIN — METHADONE HYDROCHLORIDE 10 MG: 10 TABLET ORAL at 22:47

## 2017-08-17 RX ADMIN — HYDROMORPHONE HYDROCHLORIDE 0.75 MG: 1 INJECTION, SOLUTION INTRAMUSCULAR; INTRAVENOUS; SUBCUTANEOUS at 07:16

## 2017-08-17 RX ADMIN — CEFEPIME 2 G: 2 INJECTION, POWDER, FOR SOLUTION INTRAMUSCULAR; INTRAVENOUS at 07:46

## 2017-08-17 RX ADMIN — LIDOCAINE 1 PATCH: 50 PATCH CUTANEOUS at 12:29

## 2017-08-17 RX ADMIN — OXYCODONE HYDROCHLORIDE 10 MG: 10 TABLET ORAL at 01:13

## 2017-08-17 RX ADMIN — HYDROMORPHONE HYDROCHLORIDE 0.75 MG: 1 INJECTION, SOLUTION INTRAMUSCULAR; INTRAVENOUS; SUBCUTANEOUS at 03:17

## 2017-08-17 RX ADMIN — PREGABALIN 150 MG: 150 CAPSULE ORAL at 22:48

## 2017-08-17 RX ADMIN — LIDOCAINE 1 APPLICATION: 40 CREAM TOPICAL at 12:36

## 2017-08-17 RX ADMIN — PREGABALIN 150 MG: 150 CAPSULE ORAL at 09:24

## 2017-08-17 RX ADMIN — CLONAZEPAM 1 MG: 1 TABLET ORAL at 22:47

## 2017-08-17 RX ADMIN — OXYCODONE HYDROCHLORIDE 10 MG: 10 TABLET ORAL at 12:28

## 2017-08-17 RX ADMIN — CLONAZEPAM 1 MG: 1 TABLET ORAL at 09:24

## 2017-08-17 RX ADMIN — HYDROMORPHONE HYDROCHLORIDE 0.75 MG: 1 INJECTION, SOLUTION INTRAMUSCULAR; INTRAVENOUS; SUBCUTANEOUS at 22:49

## 2017-08-17 RX ADMIN — CEFEPIME 2 G: 2 INJECTION, POWDER, FOR SOLUTION INTRAMUSCULAR; INTRAVENOUS at 14:31

## 2017-08-17 RX ADMIN — DOXYCYCLINE 100 MG: 100 TABLET ORAL at 09:24

## 2017-08-17 RX ADMIN — METHADONE HYDROCHLORIDE 10 MG: 10 TABLET ORAL at 14:31

## 2017-08-17 RX ADMIN — HYDROMORPHONE HYDROCHLORIDE 0.75 MG: 1 INJECTION, SOLUTION INTRAMUSCULAR; INTRAVENOUS; SUBCUTANEOUS at 17:17

## 2017-08-17 RX ADMIN — DOXYCYCLINE 100 MG: 100 TABLET ORAL at 22:48

## 2017-08-17 RX ADMIN — OXYCODONE HYDROCHLORIDE 10 MG: 10 TABLET ORAL at 21:54

## 2017-08-17 RX ADMIN — Medication 325 MG: at 08:22

## 2017-08-17 RX ADMIN — PREGABALIN 150 MG: 150 CAPSULE ORAL at 14:31

## 2017-08-17 RX ADMIN — METHADONE HYDROCHLORIDE 10 MG: 10 TABLET ORAL at 07:16

## 2017-08-17 RX ADMIN — Medication 325 MG: at 17:17

## 2017-08-17 RX ADMIN — CEFEPIME 2 G: 2 INJECTION, POWDER, FOR SOLUTION INTRAMUSCULAR; INTRAVENOUS at 22:48

## 2017-08-17 RX ADMIN — HYDROMORPHONE HYDROCHLORIDE 0.75 MG: 1 INJECTION, SOLUTION INTRAMUSCULAR; INTRAVENOUS; SUBCUTANEOUS at 12:38

## 2017-08-17 ASSESSMENT — ENCOUNTER SYMPTOMS
DIARRHEA: 0
DEPRESSION: 0
SPEECH CHANGE: 0
FOCAL WEAKNESS: 0
TREMORS: 0
FLANK PAIN: 0
NERVOUS/ANXIOUS: 1
DIZZINESS: 0
HALLUCINATIONS: 0
HEARTBURN: 0
COUGH: 0
TINGLING: 0
CONSTIPATION: 0
MYALGIAS: 0
NECK PAIN: 0
SHORTNESS OF BREATH: 0
SEIZURES: 0
CHILLS: 0
WEAKNESS: 0
PALPITATIONS: 0
SENSORY CHANGE: 0
ABDOMINAL PAIN: 0
BLOOD IN STOOL: 0
LOSS OF CONSCIOUSNESS: 0
HEADACHES: 1
FEVER: 0
NAUSEA: 0
VOMITING: 0

## 2017-08-17 ASSESSMENT — PAIN SCALES - GENERAL
PAINLEVEL_OUTOF10: 7
PAINLEVEL_OUTOF10: 6
PAINLEVEL_OUTOF10: 5
PAINLEVEL_OUTOF10: 8
PAINLEVEL_OUTOF10: 8
PAINLEVEL_OUTOF10: 6
PAINLEVEL_OUTOF10: 6
PAINLEVEL_OUTOF10: 10
PAINLEVEL_OUTOF10: 9
PAINLEVEL_OUTOF10: 6
PAINLEVEL_OUTOF10: 9
PAINLEVEL_OUTOF10: 7
PAINLEVEL_OUTOF10: 9
PAINLEVEL_OUTOF10: 7
PAINLEVEL_OUTOF10: 8

## 2017-08-17 NOTE — PROGRESS NOTES
Assumed care for Pt. Pt is RT BKA with hemovac to self-suction and hard cast, and LLE TMA with wound vac and offload boot. Pt ambulates by wheelchair and transfers independently. 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 Morphine and oxy for pain management. Bed low and locked and call bell within reach.

## 2017-08-17 NOTE — PROGRESS NOTES
Report received from RN, assumed Care. AOx4, responds appropriately.  Patient appears easily agitated to routine assessment and questions by being verbally aggressive intermittently.      Medicated for pain with night RN. States pain is still 9/10 in RLE.   Cast to RLE with hemovac in place.  LLE boot with wound vac in place.  No air leaks detected at this time.       Plan of care discussed, all questions answered.  Explained importance of calling before getting OOB and patient often  transfers self without staff present even though asked to call.    Patient will not allow RN to  room at this time.  Many personal belongings about the room.  Call light and belongings within reach, bed alarm refused, SCD not appropriate at this time (RLE cast, LLE boot), bed in lowest locked position.  Hourly rounding in place, Will monitor frequently.

## 2017-08-17 NOTE — CARE PLAN
Problem: Safety  Goal: Will remain free from injury  Outcome: PROGRESSING SLOWER THAN EXPECTED  Patient refuses to participate in safety precautions.  Discussed with charge RN.    Problem: Knowledge Deficit  Goal: Knowledge of disease process/condition, treatment plan, diagnostic tests, and medications will improve  Outcome: PROGRESSING SLOWER THAN EXPECTED  Discussed poc with patient.  Agreeable to some aspects.    Problem: Skin Integrity  Goal: Risk for impaired skin integrity will decrease  Outcome: PROGRESSING AS EXPECTED  Patient turns self often in bed.

## 2017-08-17 NOTE — WOUND TEAM
[facility]  Inpatient Services  Wound and Skin Care Treatment Note    Admission Date:  07/22/17     HPI, PMH, SH: Reviewed  Unit where seen by Wound Team:  [id number]    WOUND CONSULT RELATED TO:   Scheduled NPWT dressing change left plantar foot      SUBJECTIVE:  Pleasant/agreeable    Self Report / Pain Level:  Tolerated well with PO pre medication     OBJECTIVE:    Previous NPWT dressing intact    WOUND TYPE, LOCATION, CHARACTERISTICS (Pressure ulcers: location, stage, POA or date identified)    Location/type of wound: Open surgical wound left plantar foot      Periwound:     macerated around wound and in between remaining amputation site of pervious digit; callous-like tissue      Drainage:     scant serosanguinous    Tissue Type and %:    100% red/pink  Wound Edges:    Attached/macerated    Odor:     none  Exposed structure(s):  Bone palpated    S&S of Infection:     none      Measurements: (cm)  (Taken 08/16/17)   Length:    5.2   Width:     5.5  Depth:    ~ 0.2      INTERVENTIONS BY WOUND TEAM:  Removed prev dressing and irrigated wound with wound cleanser.  Measurements and photograph done. Benzoin and drape to bull wound skin as needed. Small piece of paste ring used to seal fissure on lateral side.  Covered wound with 1 piece black foam with a bridge and button to dorsum of foot. Secured with drape and applied cont neg pressure at 125mmhg. Placed a non-adhesive foam under the forefoot to hopefully absorb any moisture. Total black foam=3 pieces.    Interdisciplinary consultation:   Staff RN, patient      EVALUATION:   moisture and maceration much better today; wnd remains clean and is about the same size    Factors affecting wound healing:  Neuropathy, tobacco abuse     Goals:  Decrease in wound size by 5% every 3 weeks -- no maceration    NURSING PLAN OF CARE ORDERS (x):    Dressing changes: See Dressing Maintenance orders:  x  Skin care: See Skin Care orders: x  Rectal tube care: See Rectal Tube  Care orders:   Other orders:    WOUND TEAM PLAN OF CARE (x):   NPWT change 3 x week:   x     Dressing changes by wound team:       Follow up as needed:     x  Other (explain):    Anticipated discharge plans (x):  SNF:           Home Care:           Outpatient Wound Center:   x      Self Care:            Other:

## 2017-08-17 NOTE — PROGRESS NOTES
"Pt A&O x4.     Vitals: /59 mmHg  Pulse 105  Temp(Src) 36.7 °C (98.1 °F)  Resp 18  Ht 1.854 m (6' 1\")  Wt 65.2 kg (143 lb 11.8 oz)  BMI 18.97 kg/m2  SpO2 94%\    Rates pain 8 out of 10, medicated for pain.     Neuro: OLIVA. Denies new onset of numbness/ tingling. Baseline N/T BUE, BLE.     Cardiac: Denies new onset of chest pain. Mild tachycardia.     Vascular: Pulses 2+ BUE, 1+ LLE pedal. 1+ dependant LLE edema noted.    Respiratory: Lungs sound clear to auscultation. Pulling 3500 on IS, effective, strong effort. Denies SOB.    GI: Abdomen soft. + bowel sounds, + flatus, + BM today. On regular diet, tolerating well. - nausea/ vomiting.    : Pt voiding adequately.      MSK: RLE stump NWB. LLE touch down weight bearing with offloading boot to be worn at all times, pt compliant.     Integumentary: Right BKA stump incision dressing CDI, hard cast in place. RLE Hemovac in place, compressed to self suction, scant/ serosanguinous drainage noted. LLE TMA surgical incisions covered with wound vac dressing/ elastic wrap, wound vac dressing CDI, wound vac on, scant/ no drainage, dressing changed today.    Labs noted. LUE PICC single lumen patent, dressing CDI, no s/sx of infection noted.    Fall precautions in place: Bed locked in lowest position, Upper bed rails up, personal belongings within reach, call light within reach, appropriate mobility signs in place, - bed alarm. Pt calls appropriately.     Pt updated on POC.   "

## 2017-08-17 NOTE — PROGRESS NOTES
[facility]    Date of Service: 8/17/2017    Chief Complaint    52 y.o. male hx of PVD s/p L TMA and Rt Bka > 5 years ago  admitted 7/22/2017 with right BKA stump pain and wound drainage in addition to left TMA stump wound (nonhealing) over several months.  s/p Debridement and gastroscoleus resection LLE on 7-25-17 with Dr Shirley. Cultures have grown MRSA / Pseudomonas. He is being treated with IV cefepime and PO doxycycline per ID recommendations. These are to be continued until 08/25/2017. Patient has medical. Difficult disposition. SW continuing to look into placement options at this time. Continuing therapy inpatient at this point in time. Also underwent R BKA revision with Dr Shirley 08/15/2017    Interval Problem Update  Patient seen and examined on rounds. Continues to complain of pain. His pain regimen was titrated twice yesterday and now he is on 0.75 mg dilaudid. He wants to keep this for 24 more hours. I remained agreeable to this. He would like to see Dr Shirley or his partner. I have informed the nurses to have this facilitated. In addition he would like to have double portions for meals, boost and lidocaine cream for his shoulder.  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 No other acute concerns per patient. Remains on cefepime / doxy. Hb dropped post operatively. WBC stable. BMP stable. Case discussed with patient and nursing staff in details. All questions / concerns answered. Will repeat CBC in am tomorrow.       Consultants/Specialty  Limb preservation service  Infectious diseases - Last seen by Dr Pino  Orthopedic surgery - Dr Shirley  Psychiatry - Dr Trujillo    Disposition  Difficult disposition. Remains inpatient to continue therapy. SW working on disposition. Discussed on rounds with SW. Continue looking into placement option.       Review of Systems   Constitutional: Negative for fever and chills.   HENT: Negative for hearing loss.    Eyes:        Vision loss rt  eye / Evisceration. Trauma history.    Respiratory: Negative for cough and shortness of breath.    Cardiovascular: Negative for chest pain, palpitations and leg swelling.   Gastrointestinal: Negative for heartburn, nausea, vomiting, abdominal pain, diarrhea, constipation, blood in stool and melena.   Genitourinary: Negative for dysuria, urgency, frequency, hematuria and flank pain.   Musculoskeletal: Positive for joint pain (chronics). Negative for myalgias and neck pain.        R BKA   Skin: Negative for itching and rash.   Neurological: Positive for headaches. Negative for dizziness, tingling, tremors, sensory change, speech change, focal weakness, seizures, loss of consciousness and weakness.        Chronic peripheral neuropathy .   Psychiatric/Behavioral: Negative for depression, suicidal ideas and hallucinations. The patient is nervous/anxious (improved.).    All other systems reviewed and are negative.     Physical Exam  Laboratory/Imaging   Hemodynamics  Temp (24hrs), Av.7 °C (98.1 °F), Min:36.6 °C (97.8 °F), Max:36.8 °C (98.3 °F)   Temperature: 36.6 °C (97.8 °F)  Pulse  Av  Min: 61  Max: 111    Blood Pressure: 118/78 mmHg      Respiratory      Respiration: 18, Pulse Oximetry: 96 %        RUL Breath Sounds: Clear, RML Breath Sounds: Clear, RLL Breath Sounds: Clear, NANCY Breath Sounds: Clear, LLL Breath Sounds: Clear    Fluids    Intake/Output Summary (Last 24 hours) at 17 1411  Last data filed at 17 0800   Gross per 24 hour   Intake   1440 ml   Output   1981 ml   Net   -541 ml       Nutrition  Orders Placed This Encounter   Procedures   • DIET ORDER     Standing Status: Standing      Number of Occurrences: 1      Standing Expiration Date:      Order Specific Question:  Diet:     Answer:  Regular [1]      Comments:  styrofoam trays     Physical Exam   Constitutional: He is oriented to person, place, and time. He appears well-developed and well-nourished. No distress.   Eyes: Right eye  exhibits no discharge. Left eye exhibits no discharge.   Rt eye vision loss, enucleated.    Neck: Neck supple. No JVD present.   Cardiovascular: Normal rate and regular rhythm.    No murmur heard.  Pulmonary/Chest: Effort normal and breath sounds normal. No stridor. No respiratory distress. He has no wheezes. He has no rales.   Abdominal: Soft. Bowel sounds are normal. He exhibits no distension. There is no tenderness. There is no rebound and no guarding.   Musculoskeletal: He exhibits no edema.   Rt leg BKA - incision dry, no dehisc- healed.   Left leg w boot wound vac present.  Rt shoulder no redness or warmth, mild swelling.    Neurological: He is alert and oriented to person, place, and time. Coordination abnormal.   Skin: Skin is warm and dry. He is not diaphoretic. There is erythema.   End of stump on R appears chaffed, wound margins heaped.   Psychiatric: He has a normal mood and affect. His behavior is normal. Judgment and thought content normal.       Recent Labs      08/17/17   0818   WBC  10.1   RBC  3.71*   HEMOGLOBIN  9.1*   HEMATOCRIT  30.3*   MCV  81.7   MCH  24.5*   MCHC  30.0*   RDW  55.3*   PLATELETCT  290   MPV  9.6     Recent Labs      08/17/17   0818   SODIUM  141   POTASSIUM  4.0   CHLORIDE  108   CO2  27   GLUCOSE  111*   BUN  22   CREATININE  0.69   CALCIUM  8.5                      Assessment/Plan     * Skin ulcer of left foot with fat layer exposed (CMS-HCC) (present on admission)  Assessment & Plan  S/p OR debridement and gastroscoleus resection 7/25   Continue wound vac care  w changes three times/wk  Contact isolation for MRSA.  TDWB LLE, WBAT RLE per Ortho recs.  Patient reportedly has restraining orders from much of the Skilled facilites in [place], difficult discharge-- Discussed with  - putting out referrals w no acceptance to date.    MRSA (methicillin resistant staph aureus) culture positive (present on admission)  Assessment & Plan  Doxycycline to cover L foot infection  thru 8-25-17   Vanco stopped due to ARF    Pseudomonas infection (present on admission)  Assessment & Plan  Left foot infection, continue IV cefepime thru 8-25-17, ID assisting    Anxiety (present on admission)  Assessment & Plan  Depakote, psych assisting, long talk, will add low dose klonopin    Diarrhea (present on admission)  Assessment & Plan  Follow    Peripheral neuropathy (present on admission)  Assessment & Plan  Lyrica      Tobacco abuse (present on admission)  Assessment & Plan  Nicotine replacement PRN  Cessation counseling given     Wound of right leg, at BKA site (present on admission)  Assessment & Plan  No acute infection , incision healed .      Opiate dependence (CMS-[facility]) (present on admission)  Assessment & Plan  Noted Drug seeking behavior inpatient- improved deviance.  Avoid escalating  IV narcotics with no no signs for pain- use primarily for dressing / vac changes .  oxycontin recently stopped-- continue methadone.   Daily wants to change regimen  Same regimen for today.  De escalate tomorrow    ABRIL (acute kidney injury) (CMS-HCC)  Assessment & Plan  Suspect Vancomycin associated - resolved. Drug stopped. Changed to Doxy for MRSA coverage.        Labs reviewed and Medications reviewed  Rolon catheter: No Rolon      DVT Prophylaxis: Enoxaparin (Lovenox)    Ulcer prophylaxis: Not indicated  Antibiotics: Treating active infection/contamination beyond 24 hours perioperative coverage  Assessed for rehab: Patient was assess for and/or received rehabilitation services during this hospitalization

## 2017-08-17 NOTE — PROGRESS NOTES
"Ortho Prog Note    7/25/17 s/p L foot I&D, GSR, vac placement  8/15/17 RIGHT BKA revision    Having RLE surgical site pain.  Awake and alert    /78 mmHg  Pulse 92  Temp(Src) 36.6 °C (97.8 °F)  Resp 18  Ht 1.854 m (6' 1\")  Wt 65.2 kg (143 lb 11.8 oz)  BMI 18.97 kg/m2  SpO2 96%      LLE: Dressing CDI, vac holding suction. Foot WWP  RLE: Cylinder cast in place, HVAC holding suction    200cc from drain last shift    POD #2 doing well    TDWB LLE  Continue vac changes and offloading  NWB RLE  Cast change POD #4 and qweek until incision healed - Nazario Lee orthotist  Remove HVAC POD #3  Abx per ID    Chavez Shirley MD      "

## 2017-08-17 NOTE — PROGRESS NOTES
Patient is refusing use of bed alarm after education.   Charge notified.   Fall risk due to RLE BKA, & medication administration.  Patient verbalizes understanding of risks to fall yet continues to refuse.   Asked patient to call for assistance or when transferring, patient is not always compliant thus far.   Call light within reach.  Hourly rounding in place.   Educated CNA and other RNs around to fall risk.

## 2017-08-17 NOTE — CARE PLAN
Problem: Knowledge Deficit  Goal: Knowledge of disease process/condition, treatment plan, diagnostic tests, and medications will improve  Outcome: PROGRESSING AS EXPECTED  Pt is extremely educated to procedures and condition.    Problem: Pain Management  Goal: Pain level will decrease to patient’s comfort goal  Outcome: PROGRESSING SLOWER THAN EXPECTED  Request for stronger pain management was necessary.

## 2017-08-17 NOTE — PROGRESS NOTES
"Paged Dr. Shirley per patient request.   Patient frequently complains of pain and \"want to rip cast off\" \"it didn't hurt this bad last time\". \"i want to talk to him or someone he has not seen me in a while\".  "

## 2017-08-17 NOTE — PROGRESS NOTES
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 Takes methadone he states for back pain.  Not diabetic.  Presentee to ED with increased pain to L TMA with infected neuropathic ulcer and pain to R BKA.    POD #1 s/p :   1.  Right revision below-knee amputation.  2.  Right lower extremity irrigation and debridement, skin, subcutaneous    tissue to bone.    Blood pressure 116/62, pulse 100, temperature 36.8 °C (98.3 °F), resp. rate 18, height 1.854 m (6' 1\"), weight 65.2 kg (143 lb 11.8 oz), SpO2 94 %.   Patient c/o RLE pain    Hemovac in place, to be removed POD#2  VAC in place to LLE  Jodee boot to LLE    PLAN  - Continue VAC change per IP wound team for LLE.  -NWB RLE  -Cast to RLE, change POD #4  -Discharge planning  "

## 2017-08-17 NOTE — CARE PLAN
Problem: Safety  Goal: Will remain free from falls  Outcome: PROGRESSING AS EXPECTED  Intervention: Assess risk factors for falls    08/16/17 2000   OTHER   Fall Risk High Risk to Fall - 2 or more points    Risk for Injury-Any positive answers results in the pt being at high risk for fall related injury Not Applicable   Mobility Status Assessment 0-Ambulates & Transfers Independently. No Assistance Required   History of fall 0   Pt Calls for Assistance Yes;No assistance required       Intervention: Implement fall precautions    08/16/17 2000   OTHER   Environmental Precautions Personal Belongings, Wastebasket, Call Bell etc. in Easy Reach;Report Given to Other Health Care Providers Regarding Fall Risk;Bed in Low Position;Communication Sign for Patients & Families;Mobility Assessed & Appropriate Sign Placed   IV Pole on Same Side of Bed as Bathroom Yes   Bedrails Bedrails Closest to Bathroom Down           Problem: Venous Thromboembolism (VTW)/Deep Vein Thrombosis (DVT) Prevention:  Goal: Patient will participate in Venous Thrombosis (VTE)/Deep Vein Thrombosis (DVT)Prevention Measures  Outcome: PROGRESSING AS EXPECTED    08/16/17 2000   OTHER   Risk Assessment Score 5   VTE RISK Very High   Pharmacologic Prophylaxis Used LMWH: Enoxaparin(Lovenox)         Problem: Bowel/Gastric:  Goal: Normal bowel function is maintained or improved  Outcome: PROGRESSING AS EXPECTED    08/16/17 2000   OTHER   Last BM 08/16/17   Number of Times Stooled 1

## 2017-08-18 LAB
BACTERIA SPEC ANAEROBE CULT: NORMAL
BACTERIA TISS AEROBE CULT: ABNORMAL
BACTERIA TISS AEROBE CULT: ABNORMAL
BACTERIA TISS AEROBE CULT: NORMAL
ERYTHROCYTE [DISTWIDTH] IN BLOOD BY AUTOMATED COUNT: 53.3 FL (ref 35.9–50)
GRAM STN SPEC: ABNORMAL
GRAM STN SPEC: NORMAL
HCT VFR BLD AUTO: 30 % (ref 42–52)
HGB BLD-MCNC: 9.1 G/DL (ref 14–18)
MCH RBC QN AUTO: 24.5 PG (ref 27–33)
MCHC RBC AUTO-ENTMCNC: 30.3 G/DL (ref 33.7–35.3)
MCV RBC AUTO: 80.6 FL (ref 81.4–97.8)
PLATELET # BLD AUTO: 280 K/UL (ref 164–446)
PMV BLD AUTO: 9.3 FL (ref 9–12.9)
RBC # BLD AUTO: 3.72 M/UL (ref 4.7–6.1)
SIGNIFICANT IND 70042: ABNORMAL
SIGNIFICANT IND 70042: NORMAL
SIGNIFICANT IND 70042: NORMAL
SITE SITE: ABNORMAL
SITE SITE: NORMAL
SITE SITE: NORMAL
SOURCE SOURCE: ABNORMAL
SOURCE SOURCE: NORMAL
SOURCE SOURCE: NORMAL
WBC # BLD AUTO: 8.4 K/UL (ref 4.8–10.8)

## 2017-08-18 PROCEDURE — 700111 HCHG RX REV CODE 636 W/ 250 OVERRIDE (IP): Performed by: INTERNAL MEDICINE

## 2017-08-18 PROCEDURE — 700102 HCHG RX REV CODE 250 W/ 637 OVERRIDE(OP): Performed by: INTERNAL MEDICINE

## 2017-08-18 PROCEDURE — 97605 NEG PRS WND THER DME<=50SQCM: CPT

## 2017-08-18 PROCEDURE — A9270 NON-COVERED ITEM OR SERVICE: HCPCS | Performed by: INTERNAL MEDICINE

## 2017-08-18 PROCEDURE — 700105 HCHG RX REV CODE 258

## 2017-08-18 PROCEDURE — 700105 HCHG RX REV CODE 258: Performed by: INTERNAL MEDICINE

## 2017-08-18 PROCEDURE — 99232 SBSQ HOSP IP/OBS MODERATE 35: CPT | Performed by: INTERNAL MEDICINE

## 2017-08-18 PROCEDURE — 700101 HCHG RX REV CODE 250: Performed by: INTERNAL MEDICINE

## 2017-08-18 PROCEDURE — 700102 HCHG RX REV CODE 250 W/ 637 OVERRIDE(OP): Performed by: HOSPITALIST

## 2017-08-18 PROCEDURE — 770021 HCHG ROOM/CARE - ISO PRIVATE

## 2017-08-18 PROCEDURE — A9270 NON-COVERED ITEM OR SERVICE: HCPCS | Performed by: HOSPITALIST

## 2017-08-18 PROCEDURE — 85027 COMPLETE CBC AUTOMATED: CPT

## 2017-08-18 PROCEDURE — 302151 K-PAD 14X20: Performed by: INTERNAL MEDICINE

## 2017-08-18 RX ORDER — SODIUM CHLORIDE 9 MG/ML
INJECTION, SOLUTION INTRAVENOUS
Status: COMPLETED
Start: 2017-08-18 | End: 2017-08-18

## 2017-08-18 RX ADMIN — PREGABALIN 150 MG: 150 CAPSULE ORAL at 09:01

## 2017-08-18 RX ADMIN — CEFEPIME 2 G: 2 INJECTION, POWDER, FOR SOLUTION INTRAMUSCULAR; INTRAVENOUS at 13:56

## 2017-08-18 RX ADMIN — PREGABALIN 150 MG: 150 CAPSULE ORAL at 22:27

## 2017-08-18 RX ADMIN — DOXYCYCLINE 100 MG: 100 TABLET ORAL at 22:27

## 2017-08-18 RX ADMIN — PREGABALIN 150 MG: 150 CAPSULE ORAL at 16:47

## 2017-08-18 RX ADMIN — OXYCODONE HYDROCHLORIDE 10 MG: 10 TABLET ORAL at 09:01

## 2017-08-18 RX ADMIN — HYDROMORPHONE HYDROCHLORIDE 0.75 MG: 1 INJECTION, SOLUTION INTRAMUSCULAR; INTRAVENOUS; SUBCUTANEOUS at 10:35

## 2017-08-18 RX ADMIN — METHADONE HYDROCHLORIDE 10 MG: 10 TABLET ORAL at 22:27

## 2017-08-18 RX ADMIN — CLONAZEPAM 1 MG: 1 TABLET ORAL at 09:01

## 2017-08-18 RX ADMIN — METHADONE HYDROCHLORIDE 10 MG: 10 TABLET ORAL at 07:00

## 2017-08-18 RX ADMIN — OXYCODONE HYDROCHLORIDE 10 MG: 10 TABLET ORAL at 18:30

## 2017-08-18 RX ADMIN — HYDROMORPHONE HYDROCHLORIDE 0.75 MG: 1 INJECTION, SOLUTION INTRAMUSCULAR; INTRAVENOUS; SUBCUTANEOUS at 03:01

## 2017-08-18 RX ADMIN — CEFEPIME 2 G: 2 INJECTION, POWDER, FOR SOLUTION INTRAMUSCULAR; INTRAVENOUS at 06:59

## 2017-08-18 RX ADMIN — Medication 325 MG: at 09:01

## 2017-08-18 RX ADMIN — ENOXAPARIN SODIUM 40 MG: 100 INJECTION SUBCUTANEOUS at 09:01

## 2017-08-18 RX ADMIN — OXYCODONE HYDROCHLORIDE 10 MG: 10 TABLET ORAL at 22:27

## 2017-08-18 RX ADMIN — HYDROMORPHONE HYDROCHLORIDE 0.75 MG: 1 INJECTION, SOLUTION INTRAMUSCULAR; INTRAVENOUS; SUBCUTANEOUS at 07:01

## 2017-08-18 RX ADMIN — CLONAZEPAM 1 MG: 1 TABLET ORAL at 22:26

## 2017-08-18 RX ADMIN — SODIUM CHLORIDE 500 ML: 9 INJECTION, SOLUTION INTRAVENOUS at 12:26

## 2017-08-18 RX ADMIN — LIDOCAINE 1 PATCH: 50 PATCH CUTANEOUS at 12:04

## 2017-08-18 RX ADMIN — DOXYCYCLINE 100 MG: 100 TABLET ORAL at 09:01

## 2017-08-18 RX ADMIN — OXYCODONE HYDROCHLORIDE 10 MG: 10 TABLET ORAL at 14:26

## 2017-08-18 RX ADMIN — CEFEPIME 2 G: 2 INJECTION, POWDER, FOR SOLUTION INTRAMUSCULAR; INTRAVENOUS at 22:26

## 2017-08-18 RX ADMIN — ZOLPIDEM TARTRATE 5 MG: 5 TABLET, FILM COATED ORAL at 22:26

## 2017-08-18 RX ADMIN — METHADONE HYDROCHLORIDE 10 MG: 10 TABLET ORAL at 13:55

## 2017-08-18 RX ADMIN — LIDOCAINE 1 APPLICATION: 40 CREAM TOPICAL at 12:03

## 2017-08-18 RX ADMIN — Medication 325 MG: at 16:47

## 2017-08-18 ASSESSMENT — PAIN SCALES - GENERAL
PAINLEVEL_OUTOF10: 5
PAINLEVEL_OUTOF10: 6
PAINLEVEL_OUTOF10: 4
PAINLEVEL_OUTOF10: 7
PAINLEVEL_OUTOF10: 6
PAINLEVEL_OUTOF10: 6

## 2017-08-18 ASSESSMENT — ENCOUNTER SYMPTOMS
NERVOUS/ANXIOUS: 1
FLANK PAIN: 0
WEAKNESS: 0
DIZZINESS: 0
TREMORS: 0
CHILLS: 0
DEPRESSION: 0
PALPITATIONS: 0
SHORTNESS OF BREATH: 0
ABDOMINAL PAIN: 0
DIARRHEA: 0
VOMITING: 0
MYALGIAS: 0
FEVER: 0
HALLUCINATIONS: 0
NECK PAIN: 0
HEARTBURN: 0
COUGH: 0
HEADACHES: 1

## 2017-08-18 NOTE — PROGRESS NOTES
Dr. Gotti and this RN discussed VRE culture and discontinuation of dilaudid prn with patient.   Patient resistant to education.  However states he understands about plan for VRE and why the culture results were just presented to him as they just posted today.

## 2017-08-18 NOTE — PROGRESS NOTES
"Microbiology called, \"confirmed VRE in R stump\".   Added to isolation precautions, patient already in contact precautions.   MD notified.   "

## 2017-08-18 NOTE — WOUND TEAM
"[facility]  Inpatient Services  Wound and Skin Care Treatment Note    Admission Date:  07/22/17     HPI, PMH, SH: Reviewed  Unit where seen by Wound Team:  [id number]    WOUND CONSULT RELATED TO:   Scheduled NPWT dressing change left plantar foot      SUBJECTIVE:  \"Sorry I was on the phone for all this\"    Self Report / Pain Level:  Tolerated well with IV pre medication     OBJECTIVE:    Previous NPWT dressing intact    WOUND TYPE, LOCATION, CHARACTERISTICS (Pressure ulcers: location, stage, POA or date identified)    Location/type of wound: Open surgical wound left plantar foot      Periwound:     macerated less around previous digit but remains around wound itself      Drainage:     scant serosanguinous    Tissue Type and %:    100% red/pink  Wound Edges:    Attached/macerated    Odor:     none  Exposed structure(s):  Bone palpated    S&S of Infection:     none      Measurements: (cm)  (Taken 08/16/17)   Length:    5.2   Width:     5.5  Depth:    ~ 0.2      INTERVENTIONS BY WOUND TEAM:  Removed old dressing and cleansed wound with wound cleanser.  Benzoin and drape to bull wound skin.  Covered wound with one piece black foam and then bridged to dorsum of foot medially placing paste ring in space of digit to ensure seal.  Secured with drape and resumed NPWT at 125mmHg continuously.  Total black foam pieces=3.    Interdisciplinary consultation:   Staff RN, patient      EVALUATION:   moisture and maceration much better today; wnd remains clean and is about the same size    Factors affecting wound healing:  Neuropathy, tobacco abuse     Goals:  Decrease in wound size by 5% every 3 weeks -- no maceration    NURSING PLAN OF CARE ORDERS (x):    Dressing changes: See Dressing Maintenance orders:  x  Skin care: See Skin Care orders:   Rectal tube care: See Rectal Tube Care orders:   Other orders:    WOUND TEAM PLAN OF CARE (x):   NPWT change 3 x week:   x     Dressing changes by wound team:       Follow up as " needed:       Other (explain):    Anticipated discharge plans (x):  SNF:           Home Care:           Outpatient Wound Center:   x      Self Care:            Other:

## 2017-08-18 NOTE — PROGRESS NOTES
Had another long discussion with the patient regarding his pain. He said previous doctor gave him iv dilaudid and agreed to put him on IV morphine 4mg. But there is no mentioning about it in previous MD note and bedside RN confirmed it that MD never agreed to give him IV morphine 4 mg.   This morning he was getting IV dilaudid 0.75 mg and he told me that the pain is not controlled with it and asked for more pain.  Definitely has drug seeking behavior.   Per RN: most of the time, he is just laying on the bed or sleeping with no sign of acute pain.  DC all IV prn meds.  Only on po meds.  Also updated him about his culture from his right BKA stump wound and consulted ID.

## 2017-08-18 NOTE — PROGRESS NOTES
[facility]    Date of Service: 8/18/2017    Chief Complaint    52 y.o. male hx of PVD s/p L TMA and Rt Bka > 5 years ago  admitted 7/22/2017 with right BKA stump pain and wound drainage in addition to left TMA stump wound (nonhealing) over several months.  s/p Debridement and gastroscoleus resection LLE on 7-25-17 with Dr Shirley. Cultures have grown MRSA / Pseudomonas. He is being treated with IV cefepime and PO doxycycline per ID recommendations. These are to be continued until 08/25/2017. Patient has medical. Difficult disposition. SW continuing to look into placement options at this time. Continuing therapy inpatient at this point in time. Also underwent R BKA revision with Dr Shirley 08/15/2017    Interval Problem Update  Complaint about pain and asking for more pain meds. Explained to him that I will have to check all his pain medications before recommending any more meds.        Consultants/Specialty  Limb preservation service  Infectious diseases - Last seen by Dr Pino  Orthopedic surgery - Dr Shirley  Psychiatry - Dr Trujillo    Disposition  Difficult disposition. Remains inpatient to continue therapy. SW working on disposition. Discussed on rounds with SW. Continue looking into placement option.       Review of Systems   Constitutional: Negative for fever and chills.   HENT: Negative for hearing loss.    Eyes:        Vision loss rt eye / Evisceration. Trauma history.    Respiratory: Negative for cough and shortness of breath.    Cardiovascular: Negative for chest pain, palpitations and leg swelling.   Gastrointestinal: Negative for heartburn, vomiting, abdominal pain and diarrhea.   Genitourinary: Negative for dysuria, urgency and flank pain.   Musculoskeletal: Positive for joint pain (chronics). Negative for myalgias and neck pain.        R BKA   Skin: Negative for itching and rash.   Neurological: Positive for headaches. Negative for dizziness, tremors and weakness.        Chronic  peripheral neuropathy .   Psychiatric/Behavioral: Negative for depression, suicidal ideas and hallucinations. The patient is nervous/anxious (improved.).    All other systems reviewed and are negative.     Physical Exam  Laboratory/Imaging   Hemodynamics  Temp (24hrs), Av.6 °C (97.8 °F), Min:36.3 °C (97.3 °F), Max:36.8 °C (98.3 °F)   Temperature: 36.8 °C (98.3 °F)  Pulse  Av.1  Min: 61  Max: 111    Blood Pressure: 101/58 mmHg      Respiratory      Respiration: 18, Pulse Oximetry: 93 %        RUL Breath Sounds: Clear, RML Breath Sounds: Clear, RLL Breath Sounds: Clear, NANCY Breath Sounds: Clear, LLL Breath Sounds: Clear    Fluids    Intake/Output Summary (Last 24 hours) at 17 0749  Last data filed at 17 0700   Gross per 24 hour   Intake   2200 ml   Output   2415 ml   Net   -215 ml       Nutrition  Orders Placed This Encounter   Procedures   • DIET ORDER     Standing Status: Standing      Number of Occurrences: 1      Standing Expiration Date:      Order Specific Question:  Diet:     Answer:  Regular [1]      Comments:  styrofoam trays     Physical Exam   Constitutional: He is oriented to person, place, and time. He appears well-developed and well-nourished. No distress.   Eyes: Right eye exhibits no discharge. Left eye exhibits no discharge.   Rt eye vision loss, enucleated.    Neck: Neck supple.   Cardiovascular: Normal rate and regular rhythm.    No murmur heard.  Pulmonary/Chest: Effort normal and breath sounds normal. No stridor. No respiratory distress.   Abdominal: Soft. Bowel sounds are normal. He exhibits no distension.   Musculoskeletal: He exhibits no edema.   Rt leg BKA - incision dry, no dehisc- healed.   Left leg w boot wound vac present.  Rt shoulder no redness or warmth, mild swelling.    Neurological: He is alert and oriented to person, place, and time. Coordination abnormal.   Skin: Skin is warm and dry. He is not diaphoretic. There is erythema.   End of stump on R appears  chaffed, wound margins heaped.   Psychiatric: He has a normal mood and affect. His behavior is normal. Judgment normal.       Recent Labs      08/17/17   0818  08/18/17   0312   WBC  10.1  8.4   RBC  3.71*  3.72*   HEMOGLOBIN  9.1*  9.1*   HEMATOCRIT  30.3*  30.0*   MCV  81.7  80.6*   MCH  24.5*  24.5*   MCHC  30.0*  30.3*   RDW  55.3*  53.3*   PLATELETCT  290  280   MPV  9.6  9.3     Recent Labs      08/17/17   0818   SODIUM  141   POTASSIUM  4.0   CHLORIDE  108   CO2  27   GLUCOSE  111*   BUN  22   CREATININE  0.69   CALCIUM  8.5                      Assessment/Plan     * Skin ulcer of left foot with fat layer exposed (CMS-HCC) (present on admission)  Assessment & Plan  S/p OR debridement and gastroscoleus resection 7/25   Continue wound vac care  w changes three times/wk  Contact isolation for MRSA.  TDWB LLE, WBAT RLE per Ortho recs.  Patient reportedly has restraining orders from much of the Skilled facilites in [place], difficult discharge  Difficult discharge    MRSA (methicillin resistant staph aureus) culture positive (present on admission)  Assessment & Plan  Doxycycline to cover L foot infection thru 8-25-17   Vanco stopped due to ARF    Pseudomonas infection (present on admission)  Assessment & Plan  Left foot infection  continue IV cefepime thru 8-25-17  ID assisting    Anxiety (present on admission)  Assessment & Plan  Depakote, psych assisting, long talk, will add low dose klonopin    Diarrhea (present on admission)  Assessment & Plan  Follow    Peripheral neuropathy (present on admission)  Assessment & Plan  Lyrica      Tobacco abuse (present on admission)  Assessment & Plan  Nicotine replacement PRN  Cessation counseling    Wound of right leg, at BKA site (present on admission)  Assessment & Plan  Culture: VRE  ID following      Opiate dependence (CMS-HCC) (present on admission)  Assessment & Plan  Noted Drug seeking behavior inpatient- improved deviance.  Avoid escalating  IV narcotics with no no signs  for pain- use primarily for dressing / vac changes .  oxycontin recently stopped-- continue methadone.   Daily wants to change regimen      ABRIL (acute kidney injury) (CMS-HCC)  Assessment & Plan  resolved        Labs reviewed and Medications reviewed  Rolon catheter: No Rolon      DVT Prophylaxis: Enoxaparin (Lovenox)    Ulcer prophylaxis: Not indicated  Antibiotics: Treating active infection/contamination beyond 24 hours perioperative coverage  Assessed for rehab: Patient was assess for and/or received rehabilitation services during this hospitalization

## 2017-08-18 NOTE — CARE PLAN
Problem: Venous Thromboembolism (VTW)/Deep Vein Thrombosis (DVT) Prevention:  Goal: Patient will participate in Venous Thrombosis (VTE)/Deep Vein Thrombosis (DVT)Prevention Measures  Outcome: PROGRESSING AS EXPECTED  lovenox in place. Unable to use SCD due to surgeries and RLE BKA    Problem: Skin Integrity  Goal: Risk for impaired skin integrity will decrease  Outcome: PROGRESSING AS EXPECTED  Patient uses the waffle mattress. Turns self.

## 2017-08-18 NOTE — PROGRESS NOTES
hemovac removed after telephone order from Dr. Shirley.   Nazario Lee performed cast change on RLE.  Incision approximated.   Patient tolerated well with no pain reported.

## 2017-08-18 NOTE — PROGRESS NOTES
"RN returned to room, medicated patient who was now accepting.    \"if i have MRSA and VRE why is my name not on the infectious disease lists that my family has been calling?\".  Patient is not accepting education at this time and is getting irritable and verbally aggressive when RN attempts to educate.      "

## 2017-08-18 NOTE — PROGRESS NOTES
"Report received from RN, assumed Care. AOx4, responds appropriately.      Medicated for pain at this time.  Patient is grimacing less today.   Moved room and cleaned patients belongings.  Patient more active today.   Up in wheelchair.  Shaved.    Per night RN, noted \"pimple or white scab\" under biopatch in PICC dressing.  Not assessable under dressing in place at this time.  notified physician.   PICC TKO + IV abx.   Tolerating regular diet and double portions.     Plan of care discussed, all questions answered.  Explained importance of calling before getting OOB and pt verbalizes understanding, however patient does not always call for assistance.    Up self with a steady gait.    Call light and belongings within reach, treaded slipper socks on, bed alarm refused, SCD not applicable, bed in lowest locked position.  Hourly rounding in place, Will monitor frequently.  "

## 2017-08-18 NOTE — PROGRESS NOTES
"Pt A&O x4. Can be tearful at times.     Vitals: /68 mmHg  Pulse 97  Temp(Src) 36.3 °C (97.3 °F)  Resp 18  Ht 1.854 m (6' 1\")  Wt 65.2 kg (143 lb 11.8 oz)  BMI 18.97 kg/m2  SpO2 93%\    Rates pain 8 out of 10, medicated for pain.     Neuro: OLIVA. Denies new onset of numbness/ tingling. Baseline N/T BUE, BLE.     Cardiac: Denies new onset of chest pain.     Vascular: Pulses 2+ BUE, 1+ LLE pedal. 1+ dependant LLE edema noted.    Respiratory: Lungs sound clear to auscultation. Pulling 3500 on IS, effective, strong effort. Denies SOB.    GI: Abdomen soft. + bowel sounds, + flatus, + BM today. On regular diet, tolerating well. - nausea/ vomiting.    : Pt voiding adequately.      MSK: RLE stump NWB. LLE touch down weight bearing with offloading boot to be worn at all times, boot currently in place.     Integumentary: Right BKA stump incision hard cast CDI. RLE Hemovac in place, compressed to self suction, scant/ serosanguinous drainage noted. LLE TMA surgical incisions covered with wound vac dressing/ elastic wrap/ off-loading boot, wound vac dressing CDI, wound vac on, scant/ no drainage.    Labs noted. LUE PICC single lumen patent, dressing CDI, no s/sx of infection noted.    Fall precautions in place: Bed locked in lowest position, Upper bed rails up, personal belongings within reach, call light within reach, appropriate mobility signs in place, - bed alarm, pt refusing. Pt calls appropriately.     Pt updated on POC.   "

## 2017-08-18 NOTE — PROGRESS NOTES
Patient called inpatient infection prevention, infection prevention called this RN.  Explained what is going on with patient.  States she could come up with a patient  and try and educate patient on VRE.    Patient told her he wanted to speak to an MD, this RN already informed patient Dr. Gotti stated he would come talk to him.

## 2017-08-18 NOTE — CARE PLAN
Problem: Safety  Goal: Will remain free from falls  Outcome: PROGRESSING AS EXPECTED  Intervention: Assess risk factors for falls    08/17/17 2000   OTHER   Fall Risk High Risk to Fall - 2 or more points    Risk for Injury-Any positive answers results in the pt being at high risk for fall related injury Not Applicable   Mobility Status Assessment 0-Ambulates & Transfers Independently. No Assistance Required   History of fall 0   Pt Calls for Assistance Yes;No assistance required       Intervention: Implement fall precautions    08/17/17 2000   OTHER   Environmental Precautions Personal Belongings, Wastebasket, Call Bell etc. in Easy Reach;Report Given to Other Health Care Providers Regarding Fall Risk;Bed in Low Position;Communication Sign for Patients & Families;Mobility Assessed & Appropriate Sign Placed   IV Pole on Same Side of Bed as Bathroom Yes   Bedrails Bedrails Closest to Bathroom Down           Problem: Venous Thromboembolism (VTW)/Deep Vein Thrombosis (DVT) Prevention:  Goal: Patient will participate in Venous Thrombosis (VTE)/Deep Vein Thrombosis (DVT)Prevention Measures  Outcome: PROGRESSING AS EXPECTED    08/17/17 2000   OTHER   Risk Assessment Score 5   VTE RISK Very High   Pharmacologic Prophylaxis Used LMWH: Enoxaparin(Lovenox)         Problem: Bowel/Gastric:  Goal: Normal bowel function is maintained or improved  Outcome: PROGRESSING AS EXPECTED    08/17/17 2000   OTHER   Last BM 08/17/17   Number of Times Stooled 1

## 2017-08-18 NOTE — PROGRESS NOTES
"Patient upset, overheard RN and Nazario Lee discuss VRE in RLE BKA that was cultured, when asked this RN stated this again and patient did not take well.  Patient discussed VRE with Nazario Lee yet remained upset.  Patient is now refusing his oral pain meds since his IV dilaudid has been discontinued.    Patient was shaking and appeared very angry.  Patient being verbally aggressive \"this is bullshit you guys are a bunch of jerks, tell the doctor to shove that 10mg bullshit right up his ass\".   This RN asked if patient would like 10 minutes to calm down and if I should come back to medicate with scheduled methadone and oxy?\"  Patient states yes, i dont want to take those right now.   Discussed events with Dr. Gotti who states he will educate the patient on VRE.  "

## 2017-08-19 PROCEDURE — 700102 HCHG RX REV CODE 250 W/ 637 OVERRIDE(OP): Performed by: INTERNAL MEDICINE

## 2017-08-19 PROCEDURE — 770021 HCHG ROOM/CARE - ISO PRIVATE

## 2017-08-19 PROCEDURE — 99232 SBSQ HOSP IP/OBS MODERATE 35: CPT | Performed by: INTERNAL MEDICINE

## 2017-08-19 PROCEDURE — A9270 NON-COVERED ITEM OR SERVICE: HCPCS | Performed by: INTERNAL MEDICINE

## 2017-08-19 PROCEDURE — 700102 HCHG RX REV CODE 250 W/ 637 OVERRIDE(OP): Performed by: HOSPITALIST

## 2017-08-19 PROCEDURE — 700101 HCHG RX REV CODE 250: Performed by: INTERNAL MEDICINE

## 2017-08-19 PROCEDURE — 700105 HCHG RX REV CODE 258

## 2017-08-19 PROCEDURE — 700101 HCHG RX REV CODE 250: Performed by: HOSPITALIST

## 2017-08-19 PROCEDURE — 700105 HCHG RX REV CODE 258: Performed by: INTERNAL MEDICINE

## 2017-08-19 PROCEDURE — 700111 HCHG RX REV CODE 636 W/ 250 OVERRIDE (IP): Performed by: INTERNAL MEDICINE

## 2017-08-19 PROCEDURE — 700102 HCHG RX REV CODE 250 W/ 637 OVERRIDE(OP): Performed by: NURSE PRACTITIONER

## 2017-08-19 PROCEDURE — A9270 NON-COVERED ITEM OR SERVICE: HCPCS | Performed by: NURSE PRACTITIONER

## 2017-08-19 PROCEDURE — A9270 NON-COVERED ITEM OR SERVICE: HCPCS | Performed by: HOSPITALIST

## 2017-08-19 RX ORDER — CLONAZEPAM 2 MG/1
1 TABLET ORAL 2 TIMES DAILY
Status: ON HOLD | COMMUNITY
End: 2017-10-18

## 2017-08-19 RX ORDER — METHADONE HYDROCHLORIDE 10 MG/1
60 TABLET ORAL EVERY 8 HOURS
Status: DISCONTINUED | OUTPATIENT
Start: 2017-08-20 | End: 2017-08-19

## 2017-08-19 RX ORDER — HYDROMORPHONE HYDROCHLORIDE 2 MG/1
2 TABLET ORAL
Status: ON HOLD | COMMUNITY
End: 2017-10-18

## 2017-08-19 RX ORDER — METHADONE HYDROCHLORIDE 10 MG/1
60 TABLET ORAL 3 TIMES DAILY
Status: ON HOLD | COMMUNITY
End: 2017-10-18

## 2017-08-19 RX ORDER — METHADONE HYDROCHLORIDE 10 MG/1
30 TABLET ORAL EVERY 8 HOURS
Status: DISCONTINUED | OUTPATIENT
Start: 2017-08-20 | End: 2017-08-20

## 2017-08-19 RX ORDER — OXYCODONE HYDROCHLORIDE 10 MG/1
10 TABLET ORAL EVERY 6 HOURS PRN
Status: DISCONTINUED | OUTPATIENT
Start: 2017-08-19 | End: 2017-08-19

## 2017-08-19 RX ORDER — OXYCODONE HYDROCHLORIDE 5 MG/1
5 TABLET ORAL EVERY 6 HOURS PRN
Status: DISCONTINUED | OUTPATIENT
Start: 2017-08-19 | End: 2017-08-19

## 2017-08-19 RX ORDER — HYDROMORPHONE HYDROCHLORIDE 2 MG/1
2 TABLET ORAL
Status: DISCONTINUED | OUTPATIENT
Start: 2017-08-19 | End: 2017-08-20

## 2017-08-19 RX ORDER — METHADONE HYDROCHLORIDE 10 MG/1
20 TABLET ORAL
Status: COMPLETED | OUTPATIENT
Start: 2017-08-19 | End: 2017-08-20

## 2017-08-19 RX ORDER — CLONAZEPAM 1 MG/1
2 TABLET ORAL 2 TIMES DAILY
Status: DISCONTINUED | OUTPATIENT
Start: 2017-08-19 | End: 2017-10-18

## 2017-08-19 RX ORDER — METHADONE HYDROCHLORIDE 10 MG/1
50 TABLET ORAL
Status: DISCONTINUED | OUTPATIENT
Start: 2017-08-19 | End: 2017-08-19

## 2017-08-19 RX ORDER — SODIUM CHLORIDE 9 MG/ML
INJECTION, SOLUTION INTRAVENOUS
Status: COMPLETED
Start: 2017-08-19 | End: 2017-08-19

## 2017-08-19 RX ADMIN — PREGABALIN 150 MG: 150 CAPSULE ORAL at 14:34

## 2017-08-19 RX ADMIN — CLONAZEPAM 1 MG: 1 TABLET ORAL at 08:39

## 2017-08-19 RX ADMIN — CLONAZEPAM 2 MG: 1 TABLET ORAL at 23:27

## 2017-08-19 RX ADMIN — OXYCODONE HYDROCHLORIDE 10 MG: 10 TABLET ORAL at 21:27

## 2017-08-19 RX ADMIN — CEFEPIME 2 G: 2 INJECTION, POWDER, FOR SOLUTION INTRAMUSCULAR; INTRAVENOUS at 21:29

## 2017-08-19 RX ADMIN — SODIUM CHLORIDE 1000 ML: 9 INJECTION, SOLUTION INTRAVENOUS at 06:08

## 2017-08-19 RX ADMIN — METHADONE HYDROCHLORIDE 10 MG: 10 TABLET ORAL at 21:27

## 2017-08-19 RX ADMIN — PREGABALIN 150 MG: 150 CAPSULE ORAL at 08:39

## 2017-08-19 RX ADMIN — ENOXAPARIN SODIUM 40 MG: 100 INJECTION SUBCUTANEOUS at 08:38

## 2017-08-19 RX ADMIN — OXYCODONE HYDROCHLORIDE 10 MG: 10 TABLET ORAL at 02:15

## 2017-08-19 RX ADMIN — PREGABALIN 150 MG: 150 CAPSULE ORAL at 21:27

## 2017-08-19 RX ADMIN — Medication 325 MG: at 17:16

## 2017-08-19 RX ADMIN — CLONAZEPAM 1 MG: 1 TABLET ORAL at 21:26

## 2017-08-19 RX ADMIN — OXYCODONE HYDROCHLORIDE 10 MG: 10 TABLET ORAL at 06:08

## 2017-08-19 RX ADMIN — Medication 325 MG: at 08:39

## 2017-08-19 RX ADMIN — LIDOCAINE 1 APPLICATION: 40 CREAM TOPICAL at 13:15

## 2017-08-19 RX ADMIN — OXYCODONE HYDROCHLORIDE 10 MG: 10 TABLET ORAL at 14:34

## 2017-08-19 RX ADMIN — CEFEPIME 2 G: 2 INJECTION, POWDER, FOR SOLUTION INTRAMUSCULAR; INTRAVENOUS at 06:07

## 2017-08-19 RX ADMIN — METHADONE HYDROCHLORIDE 10 MG: 10 TABLET ORAL at 14:34

## 2017-08-19 RX ADMIN — METHADONE HYDROCHLORIDE 10 MG: 10 TABLET ORAL at 06:08

## 2017-08-19 RX ADMIN — CEFEPIME 2 G: 2 INJECTION, POWDER, FOR SOLUTION INTRAMUSCULAR; INTRAVENOUS at 14:34

## 2017-08-19 RX ADMIN — DOXYCYCLINE 100 MG: 100 TABLET ORAL at 08:39

## 2017-08-19 RX ADMIN — DOXYCYCLINE 100 MG: 100 TABLET ORAL at 21:26

## 2017-08-19 RX ADMIN — LIDOCAINE 1 PATCH: 50 PATCH CUTANEOUS at 13:15

## 2017-08-19 ASSESSMENT — ENCOUNTER SYMPTOMS
CHILLS: 0
COUGH: 0
MYALGIAS: 1
SENSORY CHANGE: 1
WEAKNESS: 0
DIZZINESS: 0
HALLUCINATIONS: 0
SHORTNESS OF BREATH: 0
HEARTBURN: 0
TREMORS: 0
FEVER: 0
NERVOUS/ANXIOUS: 1
MYALGIAS: 0
DEPRESSION: 0
VOMITING: 0
DIARRHEA: 0
PALPITATIONS: 0
ABDOMINAL PAIN: 0
HEADACHES: 1
NAUSEA: 0
NECK PAIN: 0

## 2017-08-19 ASSESSMENT — PAIN SCALES - GENERAL
PAINLEVEL_OUTOF10: 6
PAINLEVEL_OUTOF10: 7
PAINLEVEL_OUTOF10: 0
PAINLEVEL_OUTOF10: 0

## 2017-08-19 NOTE — PROGRESS NOTES
Met with pt at bedside to discuss  Current outpt prescription  Medications.  Pt stated that his pain management doctor in [place] prescribed  Methadone 60 mg TID, hydromorphone 2 mg Q3HPRN breakthrough pain.  clonazepam 2 mg BID.  Pt filled at [email or web address]. [place] [facility] sold their business to [facility] [employer].  Confirmed that the  last outpt prescriptions were as listed on the med rec.    Pt lives in [place] now and would like his prescriptions sent to [facility] on [address].

## 2017-08-19 NOTE — PROGRESS NOTES
[facility] Hospitalist Progress Note    Date of Service: 8/19/2017    Chief Complaint    52 y.o. male hx of PVD s/p L TMA and Rt Bka > 5 years ago  admitted 7/22/2017 with right BKA stump pain and wound drainage in addition to left TMA stump wound (nonhealing) over several months.  s/p Debridement and gastroscoleus resection LLE on 7-25-17 with Dr Shirley. Cultures have grown MRSA / Pseudomonas. He is being treated with IV cefepime and PO doxycycline per ID recommendations. These are to be continued until 08/25/2017. Patient has medical. Difficult disposition. SW continuing to look into placement options at this time. Continuing therapy inpatient at this point in time. Also underwent R BKA revision with Dr Shirley 08/15/2017    Interval Problem Update  Spent a long time explained to him about pain medications. Await ID input. Discussed with pharmacist to review his outpatient home pain meds regime.        Consultants/Specialty  Limb preservation service  Infectious diseases - Last seen by Dr Pino  Orthopedic surgery - Dr Shirley  Psychiatry - Dr Trujillo    Disposition  Difficult disposition. Remains inpatient to continue therapy. SW working on disposition. Discussed on rounds with SW. Continue looking into placement option.       Review of Systems   Constitutional: Negative for fever.   HENT: Negative for hearing loss.    Eyes:        Vision loss rt eye / Evisceration. Trauma history.    Respiratory: Negative for cough and shortness of breath.    Cardiovascular: Negative for chest pain and palpitations.   Gastrointestinal: Negative for heartburn and vomiting.   Genitourinary: Negative for dysuria and urgency.   Musculoskeletal: Positive for joint pain (chronics). Negative for myalgias and neck pain.        R BKA   Skin: Negative for rash.   Neurological: Positive for headaches. Negative for dizziness, tremors and weakness.        Chronic peripheral neuropathy .   Psychiatric/Behavioral: Negative for depression and  hallucinations. The patient is nervous/anxious (improved.).    All other systems reviewed and are negative.     Physical Exam  Laboratory/Imaging   Hemodynamics  Temp (24hrs), Av.8 °C (98.3 °F), Min:36.7 °C (98.1 °F), Max:37.1 °C (98.7 °F)   Temperature: 36.7 °C (98.1 °F)  Pulse  Av.1  Min: 61  Max: 111    Blood Pressure: 107/57 mmHg      Respiratory      Respiration: 18, Pulse Oximetry: 93 %        RUL Breath Sounds: Clear, RML Breath Sounds: Clear, RLL Breath Sounds: Clear, NANCY Breath Sounds: Clear, LLL Breath Sounds: Clear    Fluids    Intake/Output Summary (Last 24 hours) at 17 0740  Last data filed at 17 0600   Gross per 24 hour   Intake   2040 ml   Output   2000 ml   Net     40 ml       Nutrition  Orders Placed This Encounter   Procedures   • DIET ORDER     Standing Status: Standing      Number of Occurrences: 1      Standing Expiration Date:      Order Specific Question:  Diet:     Answer:  Regular [1]      Comments:  regular trays     Physical Exam   Constitutional: He is oriented to person, place, and time. He appears well-developed and well-nourished. No distress.   Eyes: Right eye exhibits no discharge.   Rt eye vision loss, enucleated.    Neck: Neck supple.   Cardiovascular: Normal rate and regular rhythm.    No murmur heard.  Pulmonary/Chest: Effort normal. No respiratory distress.   Abdominal: Soft. Bowel sounds are normal. He exhibits no distension. There is no tenderness.   Musculoskeletal: He exhibits no edema.   Rt leg BKA - incision dry, no dehisc- healed.   Left leg w boot wound vac present.  Rt shoulder no redness or warmth, mild swelling.    Neurological: He is alert and oriented to person, place, and time. Coordination abnormal.   Skin: Skin is warm and dry. He is not diaphoretic. There is erythema.   End of stump on R appears chaffed, wound margins heaped.   Psychiatric: He has a normal mood and affect. His behavior is normal.       Recent Labs      17   0540   08/18/17   0312   WBC  10.1  8.4   RBC  3.71*  3.72*   HEMOGLOBIN  9.1*  9.1*   HEMATOCRIT  30.3*  30.0*   MCV  81.7  80.6*   MCH  24.5*  24.5*   MCHC  30.0*  30.3*   RDW  55.3*  53.3*   PLATELETCT  290  280   MPV  9.6  9.3     Recent Labs      08/17/17   0818   SODIUM  141   POTASSIUM  4.0   CHLORIDE  108   CO2  27   GLUCOSE  111*   BUN  22   CREATININE  0.69   CALCIUM  8.5                      Assessment/Plan     * Skin ulcer of left foot with fat layer exposed (CMS-HCC) (present on admission)  Assessment & Plan  S/p OR debridement and gastroscoleus resection 7/25   Continue wound vac care  w changes three times/wk  Contact isolation for MRSA.  TDWB LLE, WBAT RLE per Ortho recs.  Patient reportedly has restraining orders from much of the Skilled facilites in [place]  Difficult discharge    MRSA (methicillin resistant staph aureus) culture positive (present on admission)  Assessment & Plan  Doxycycline to cover L foot infection thru 8-25-17   Vanco stopped due to ARF    Pseudomonas infection (present on admission)  Assessment & Plan  Left foot infection  continue IV cefepime thru 8-25-17  ID assisting    Anxiety (present on admission)  Assessment & Plan  Depakote, psych assisting, long talk, will add low dose klonopin    Diarrhea (present on admission)  Assessment & Plan  Follow    Peripheral neuropathy (present on admission)  Assessment & Plan  Lyrica      Tobacco abuse (present on admission)  Assessment & Plan  Nicotine replacement PRN  Cessation counseling    Wound of right leg, at BKA site (present on admission)  Assessment & Plan  Culture: VRE  ID following  Wound care  Surgery on      Opiate dependence (CMS-HCC) (present on admission)  Assessment & Plan  Noted Drug seeking behavior inpatient- improved deviance.  Avoid escalating  IV narcotics with no no signs for pain- use primarily for dressing / vac changes .  oxycontin recently stopped-- continue methadone.   Daily wants to change regimen      ABRIL (acute kidney  injury) (CMS-HCC)  Assessment & Plan  resolved        Labs reviewed and Medications reviewed  Rolon catheter: No Rolon      DVT Prophylaxis: Enoxaparin (Lovenox)    Ulcer prophylaxis: Not indicated  Antibiotics: Treating active infection/contamination beyond 24 hours perioperative coverage  Assessed for rehab: Patient was assess for and/or received rehabilitation services during this hospitalization

## 2017-08-19 NOTE — PROGRESS NOTES
"This RN overheard pt and his son discussing ways in which they can \"scam\" the hospital staff and law enforcement to receive free healthcare. The conversation went on to discuss their feelings about certain hospital staff and how they have been treated not to their satisfaction. When this RN entered the room the pt stated \"you don't even know, the hospital is throwing you under the bus about this whole VRE thing\". The son then shouted out to the pt to be quite as if it were a secret he could not discuss. This RN did not interfere, inquire, or engage the pt in any such further conversation. The pt had an irritable mood toward night shift staff the rest of the evening.   "

## 2017-08-19 NOTE — CARE PLAN
Problem: Safety  Goal: Will remain free from falls  Pt will call as needed to assist with movement/ transfers    Problem: Venous Thromboembolism (VTW)/Deep Vein Thrombosis (DVT) Prevention:  Goal: Patient will participate in Venous Thrombosis (VTE)/Deep Vein Thrombosis (DVT)Prevention Measures  RN will administer anticoags as ordered.
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
## 2017-08-19 NOTE — PROGRESS NOTES
"Assumed care of pt at 1900, report given. Pt sitting up in bed this PM with no complaints of severe pain, nausea, or SOB. Pt has right BKA that was revised and is covered in a cast; clean, dry, and intact. Pt is non-weight bearing on that extremity. Pt not allowed to use prothesis and therefore must use wheelchair to move around. Left foot has no toes and wound vac is in place. Extremity is TDWB and must wear a boot when ambulating. Pt had eventful day and is again agitated at staff this evening repeating that he doesn't \"understand why the Dr's have taken away his IV pain medication for breakthrough pain\". Pt's affect was agitated as he grabbed things wilson-fully from staff. Pt is still only pleasant when receiving what he wants and tries to push staff's limits of what he can get away with. Education and teaching provided even though he resists. Son at bedside active with care. Pt and son both updated on current POC to pt's understanding. MRSA and VRE contact precautions in place. Bed in lowest position, call light within reach, and no other needs at this time.  "

## 2017-08-19 NOTE — CARE PLAN
Problem: Safety  Goal: Will remain free from injury  Outcome: PROGRESSING AS EXPECTED  Proper fall precautions in place. Call light within reach and encouraged to use. Hourly rounding in practice. Bed alarm refused.     Problem: Skin Integrity  Goal: Risk for impaired skin integrity will decrease  Outcome: PROGRESSING AS EXPECTED  Pt demonstrates ability to turn self in bed without assistance of staff. Understands importance in prevention of breakdown, ulcers, and potential infection. Hourly rounding in effect.

## 2017-08-19 NOTE — PROGRESS NOTES
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 Takes methadone he states for back pain.  Not diabetic.  Presented to ED with increased pain to L TMA with infected neuropathic ulcer and pain to R BKA.    POD #4 s/p :   1.  Right revision below-knee amputation. 2.  Right lower extremity irrigation and debridement, skin, subcutaneous    tissue to bone by Dr. Shirley  s/p L foot I & D with VAC placement, GSR by Dr. Shirley on 7/25    Blood pressure 118/58, pulse 88, temperature 36.7 °C (98.1 °F), resp. rate 18, height 1.854 m (6' 1\"), weight 65.2 kg (143 lb 11.8 oz), SpO2 94 %.   Sitting up in WC, no c/o pain. Discussing pain medication with pharm tech.    R BKA:  Hard cast in place  Nazario Lee CPO changed yesterday per pt  Hvac also removed yesterday during change.  Pt showed this writer photo of incision. From photo, incision approximated with sutures, without drainage. No erythema noted.  Cast to be changed weekly per        VAC in place to LLE. Changed yesterday. Per wound team, 100% red, granulating   [place] boot to LLE  CROW boot in process. Being made by Nazario CLARKE  Pt contemplating BKA to LLE.       PLAN  -Continue VAC change per IP wound team for LLE.  -continue jd boot at all times to LLE unitl CROW boot available   -NWB RLE, Cast to RLE, change weekly by Nazario Lee CPO  -continue IV abx until 8/25. ID s/o  -nursing to remove sutures to L GSR incision. drsg orders placed      -Discharge planning  Case management working on SNF placement in [place].  No accepting facility as yet  "

## 2017-08-19 NOTE — PROGRESS NOTES
0645 Received report, introduced self to pt, reviewed labs, orders, allergies, code status    1450 sutures removed from L lower leg, gauze and tegaderm now in place as ordered. . No drainage, CDI. PICC dressing changed as well. Redness noted at insertion site.     1713 paged Shen QUIJANO as pt requested as an expecting changed is to happen to the pt's MAR and was started taking place this early afternoon. Pt is growing increasingly frustrated and agitated at the delay.     1734 Shen QUIJANO returned call, states he will talk to the pharmacist but states pt has been on these meds for the past month, little changes may take place, if any.    1900 Handed off to night shift RN. Pt care relinquished.

## 2017-08-20 LAB
BACTERIA SPEC ANAEROBE CULT: NORMAL
SIGNIFICANT IND 70042: NORMAL
SITE SITE: NORMAL
SOURCE SOURCE: NORMAL

## 2017-08-20 PROCEDURE — 700102 HCHG RX REV CODE 250 W/ 637 OVERRIDE(OP): Performed by: INTERNAL MEDICINE

## 2017-08-20 PROCEDURE — A9270 NON-COVERED ITEM OR SERVICE: HCPCS | Performed by: INTERNAL MEDICINE

## 2017-08-20 PROCEDURE — 700102 HCHG RX REV CODE 250 W/ 637 OVERRIDE(OP): Performed by: HOSPITALIST

## 2017-08-20 PROCEDURE — 700111 HCHG RX REV CODE 636 W/ 250 OVERRIDE (IP): Performed by: INTERNAL MEDICINE

## 2017-08-20 PROCEDURE — 770021 HCHG ROOM/CARE - ISO PRIVATE

## 2017-08-20 PROCEDURE — 99232 SBSQ HOSP IP/OBS MODERATE 35: CPT | Performed by: INTERNAL MEDICINE

## 2017-08-20 PROCEDURE — 700105 HCHG RX REV CODE 258: Performed by: INTERNAL MEDICINE

## 2017-08-20 PROCEDURE — 700101 HCHG RX REV CODE 250: Performed by: INTERNAL MEDICINE

## 2017-08-20 PROCEDURE — A9270 NON-COVERED ITEM OR SERVICE: HCPCS | Performed by: NURSE PRACTITIONER

## 2017-08-20 PROCEDURE — A9270 NON-COVERED ITEM OR SERVICE: HCPCS | Performed by: HOSPITALIST

## 2017-08-20 PROCEDURE — 700102 HCHG RX REV CODE 250 W/ 637 OVERRIDE(OP): Performed by: NURSE PRACTITIONER

## 2017-08-20 PROCEDURE — 700105 HCHG RX REV CODE 258

## 2017-08-20 RX ORDER — LINEZOLID 600 MG/1
600 TABLET, FILM COATED ORAL EVERY 12 HOURS
Status: DISPENSED | OUTPATIENT
Start: 2017-08-20 | End: 2017-09-03

## 2017-08-20 RX ORDER — SODIUM CHLORIDE 9 MG/ML
INJECTION, SOLUTION INTRAVENOUS
Status: COMPLETED
Start: 2017-08-20 | End: 2017-08-20

## 2017-08-20 RX ORDER — OXYCODONE HYDROCHLORIDE 5 MG/1
5 TABLET ORAL EVERY 4 HOURS PRN
Status: DISCONTINUED | OUTPATIENT
Start: 2017-08-20 | End: 2017-09-15

## 2017-08-20 RX ORDER — OXYCODONE HYDROCHLORIDE 10 MG/1
10 TABLET ORAL EVERY 4 HOURS PRN
Status: DISCONTINUED | OUTPATIENT
Start: 2017-08-20 | End: 2017-09-15

## 2017-08-20 RX ORDER — METHADONE HYDROCHLORIDE 10 MG/1
10 TABLET ORAL EVERY 8 HOURS
Status: DISCONTINUED | OUTPATIENT
Start: 2017-08-20 | End: 2017-08-22

## 2017-08-20 RX ADMIN — HYDROMORPHONE HYDROCHLORIDE 2 MG: 2 TABLET ORAL at 04:47

## 2017-08-20 RX ADMIN — PREGABALIN 150 MG: 150 CAPSULE ORAL at 14:24

## 2017-08-20 RX ADMIN — DOXYCYCLINE 100 MG: 100 TABLET ORAL at 23:11

## 2017-08-20 RX ADMIN — SODIUM CHLORIDE 1000 ML: 9 INJECTION, SOLUTION INTRAVENOUS at 17:13

## 2017-08-20 RX ADMIN — LINEZOLID 600 MG: 600 TABLET, FILM COATED ORAL at 23:11

## 2017-08-20 RX ADMIN — Medication 325 MG: at 08:22

## 2017-08-20 RX ADMIN — ACETAMINOPHEN 650 MG: 325 TABLET, FILM COATED ORAL at 04:47

## 2017-08-20 RX ADMIN — Medication 325 MG: at 18:33

## 2017-08-20 RX ADMIN — ONDANSETRON 4 MG: 4 TABLET, ORALLY DISINTEGRATING ORAL at 00:55

## 2017-08-20 RX ADMIN — METHADONE HYDROCHLORIDE 10 MG: 10 TABLET ORAL at 23:12

## 2017-08-20 RX ADMIN — OXYCODONE HYDROCHLORIDE 10 MG: 10 TABLET ORAL at 13:17

## 2017-08-20 RX ADMIN — CLONAZEPAM 2 MG: 1 TABLET ORAL at 08:22

## 2017-08-20 RX ADMIN — PREGABALIN 150 MG: 150 CAPSULE ORAL at 08:22

## 2017-08-20 RX ADMIN — METHADONE HYDROCHLORIDE 20 MG: 10 TABLET ORAL at 00:46

## 2017-08-20 RX ADMIN — LIDOCAINE 1 PATCH: 50 PATCH CUTANEOUS at 13:18

## 2017-08-20 RX ADMIN — ENOXAPARIN SODIUM 40 MG: 100 INJECTION SUBCUTANEOUS at 08:22

## 2017-08-20 RX ADMIN — CEFEPIME 2 G: 2 INJECTION, POWDER, FOR SOLUTION INTRAMUSCULAR; INTRAVENOUS at 13:00

## 2017-08-20 RX ADMIN — CLONAZEPAM 2 MG: 1 TABLET ORAL at 23:12

## 2017-08-20 RX ADMIN — DOXYCYCLINE 100 MG: 100 TABLET ORAL at 08:22

## 2017-08-20 RX ADMIN — METHADONE HYDROCHLORIDE 30 MG: 10 TABLET ORAL at 05:12

## 2017-08-20 RX ADMIN — METHADONE HYDROCHLORIDE 10 MG: 10 TABLET ORAL at 14:24

## 2017-08-20 RX ADMIN — PREGABALIN 150 MG: 150 CAPSULE ORAL at 23:12

## 2017-08-20 RX ADMIN — ZOLPIDEM TARTRATE 5 MG: 5 TABLET, FILM COATED ORAL at 23:33

## 2017-08-20 RX ADMIN — CEFEPIME 2 G: 2 INJECTION, POWDER, FOR SOLUTION INTRAMUSCULAR; INTRAVENOUS at 23:12

## 2017-08-20 RX ADMIN — SODIUM CHLORIDE 500 ML: 9 INJECTION, SOLUTION INTRAVENOUS at 05:15

## 2017-08-20 RX ADMIN — OXYCODONE HYDROCHLORIDE 10 MG: 10 TABLET ORAL at 23:33

## 2017-08-20 ASSESSMENT — ENCOUNTER SYMPTOMS
HEARTBURN: 0
NECK PAIN: 0
SHORTNESS OF BREATH: 0
PALPITATIONS: 0
MYALGIAS: 1
TREMORS: 0
CHILLS: 0
DIZZINESS: 0
DIARRHEA: 0
ABDOMINAL PAIN: 0
NAUSEA: 0
COUGH: 0
MYALGIAS: 0
ORTHOPNEA: 0
WEAKNESS: 0
SENSORY CHANGE: 1
FEVER: 0
HEADACHES: 1
TINGLING: 0
DEPRESSION: 0
NERVOUS/ANXIOUS: 1
VOMITING: 0
HALLUCINATIONS: 0

## 2017-08-20 ASSESSMENT — PAIN SCALES - GENERAL
PAINLEVEL_OUTOF10: 7
PAINLEVEL_OUTOF10: 10
PAINLEVEL_OUTOF10: 9
PAINLEVEL_OUTOF10: 7
PAINLEVEL_OUTOF10: 5
PAINLEVEL_OUTOF10: 6
PAINLEVEL_OUTOF10: 7

## 2017-08-20 NOTE — CARE PLAN
Problem: Safety  Goal: Will remain free from injury  Outcome: PROGRESSING AS EXPECTED  Educated on use of call light    Problem: Pain Management  Goal: Pain level will decrease to patient’s comfort goal  Outcome: PROGRESSING AS EXPECTED  Patient will be medicated per MAR
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
## 2017-08-20 NOTE — PROGRESS NOTES
Infectious Disease Progress Note    Author: Bekah Mckeon M.D. DOS & Time created: 8/20/2017  4:15 PM    CC: FU right BKA stump infection and nonhealing left TMA stump wound     Interval History:  52 y.o. WM admitted 7/22/2017 with right BKA infection and nonhealing left TMA stump wound   7/23 AF WBC 7.6 less drainage from wounds Asking if he will need another BKA  7/24/17- no fevers. WBC 10.6. Complains of chills. Complains of 10 out of 10 pain in the left foot. Complains of drainage from the right stump.  7/25/17- complains of pain in the foot. Had low-grade fevers up to 100.7 last night . Foot cultures are showing MRSA and Pseudomonas  7/26/17- no fevers. Complains of burning in his left leg. No discharge noted from his right stump  7/28/17- no fevers. Complains of ongoing pain.  7/29/17- no fevers. No labs available  7/30/17-MAXIMUM TEMPERATURE 99.5. Ongoing pain issues. WBC 9000. Creatinine 1.17  7/31 AF lots of c/o neuropathy-feels like stump is in ice water  8/1 AF no new complaints- +phantom limb pain  8/2 AF pain better controlled Having loose stools. Wants a psych consult  8/3 AF in WC today No new complaints  8/4 AF WBC 9.2 no new complaints-wants to see psych again  8/5 AF WBC 9.2 seen during dressing change  8/7 AF, no CBC, tired of infections, thinking about left BKA, wants to speak to Dr. Shirley  8/8 AF, no CBC, no longer wants a BKA, wants to try abx first, PICC came out a little after his TV remote hit his PICC  8/9 AF, no CBC, ambulating in halls without issues  8/19/17-no fevers .Labs Reviewed, Medications Reviewed, Radiology Reviewed and Wound Reviewed.  8/20/17-denies any fever. Complains of significant pain in his BKA site  Review of Systems:  Review of Systems   Constitutional: Negative for fever and chills.   Respiratory: Negative for cough and shortness of breath.    Cardiovascular: Negative for chest pain.   Gastrointestinal: Negative for nausea, vomiting, abdominal pain and diarrhea.    Genitourinary: Negative for dysuria.   Musculoskeletal: Positive for myalgias and joint pain.   Neurological: Positive for sensory change.   All other systems reviewed and are negative.      Hemodynamics:  Temp (24hrs), Av.6 °C (97.8 °F), Min:36.3 °C (97.4 °F), Max:36.8 °C (98.2 °F)  Temperature: 36.3 °C (97.4 °F)  Pulse  Av.1  Min: 61  Max: 111   Blood Pressure: 108/67 mmHg       Physical Exam:  Physical Exam   Constitutional: He is oriented to person, place, and time. He appears well-developed. No distress.   Disheveled   HENT:   Head: Normocephalic.   Poor dentition     Eyes:   Blind right eye   Neck: Neck supple.   Cardiovascular: Normal rate.    Pulmonary/Chest: Effort normal. No respiratory distress.   Abdominal: Soft. He exhibits no distension. There is no tenderness.   Musculoskeletal: He exhibits no edema.   See pictures    LUE PICC nontender   Neurological: He is alert and oriented to person, place, and time.   Skin: No rash noted.   tattoos   Nursing note and vitals reviewed.      Labs:  Recent Labs      17   0312   WBC  8.4   RBC  3.72*   HEMOGLOBIN  9.1*   HEMATOCRIT  30.0*   MCV  80.6*   MCH  24.5*   RDW  53.3*   PLATELETCT  280   MPV  9.3     No results for input(s): SODIUM, POTASSIUM, CHLORIDE, CO2, GLUCOSE, BUN, CPKTOTAL in the last 72 hours.  No results for input(s): ALBUMIN, TBILIRUBIN, ALKPHOSPHAT, TOTPROTEIN, ALTSGPT, ASTSGOT, CREATININE in the last 72 hours.  Results     Procedure Component Value Units Date/Time    CULTURE TISSUE W/ GRM STAIN [[id number]]  (Abnormal)  (Susceptibility) Collected:  08/15/17 1318    Order Status:  Completed Specimen Information:  Tissue Updated:  17 9840     Significant Indicator POS (POS)      Source TISS      Site Right BKA Stump      Tissue Culture -- (A)      Gram Stain Result --      Result:        Few WBCs.  No organisms seen.       Tissue Culture -- (A)      Result:        Enterococcus faecium  Light growth  Combination therapy with  ampicillin, penicillin, or  vancomycin (for susceptible strains) plus an aminoglycoside  is usually indicated for serious enterococcal infections,  such as endocarditis unless high-level resistance to both  gentamicin and streptomycin is documented; such combinations  are predicted to result in synergistic killing of the  Enterococcus.  VANCOMYCIN RESISTANT ENTEROCOCCI(VRE)  The susceptibility profile for this organism indicates that  Penicillin would not be an effective component of combination  therapy.  The susceptibility profile for this organism indicates that  Streptomycin would not be an effective component of  combination therapy.  The susceptibility profile for this organism indicates that  Vancomycin would not be an effective component of combination  therapy.  VANCOMYCIN RESISTANT ENTEROCOCCI(VRE)      Narrative:      CALL  [place]  141 tel. [phone],  CALLED  141 tel. [phone] 08/18/2017, 09:28, RB PERF. RESULTS CALLED TO:RN  [id number]    Culture & Susceptibility     ENTEROCOCCUS FAECIUM     Antibiotic Sensitivity Microscan Unit Status    Ampicillin Resistant >8 mcg/mL Final    Daptomycin Sensitive 4 mcg/mL Final    Gent Synergy Sensitive <=500 mcg/mL Final    Linezolid Sensitive 2 mcg/mL Final    Penicillin Resistant >8 mcg/mL Final    Vancomycin Resistant >16 mcg/mL Final                       ANAEROBIC CULTURE [[id number]] Collected:  08/15/17 1318    Order Status:  Completed Specimen Information:  Tissue Updated:  08/18/17 1626     Significant Indicator NEG      Source TISS      Site Right BKA Stump      Anaerobic Culture, Culture Res No Anaerobes isolated.     Narrative:      CALL  [place]  141 tel. [phone],  CALLED  141 tel. [phone] 08/18/2017, 09:28, RB PERF. RESULTS CALLED TO:RN  [id number]    CULTURE TISSUE W/ GRM STAIN [[id number]] Collected:  08/15/17 1345    Order Status:  Completed Specimen Information:  Bone Updated:  08/18/17 0921     Gram Stain Result No organisms seen.      Significant  Indicator NEG      Source BONE      Site Right Fibula      Tissue Culture No growth at 72 hours.     ANAEROBIC CULTURE [[id number]] Collected:  08/15/17 1345    Order Status:  Completed Specimen Information:  Bone Updated:  08/18/17 0921     Significant Indicator NEG      Source BONE      Site Right Fibula      Anaerobic Culture, Culture Res Culture in progress.     GRAM STAIN [[id number]] Collected:  08/15/17 1318    Order Status:  Completed Specimen Information:  Tissue Updated:  08/16/17 0706     Significant Indicator .      Source TISS      Site Right BKA Stump      Gram Stain Result --      Result:        Few WBCs.  No organisms seen.      GRAM STAIN [[id number]] Collected:  08/15/17 1345    Order Status:  Completed Specimen Information:  Bone Updated:  08/16/17 0600     Significant Indicator .      Source BONE      Site Right Fibula      Gram Stain Result No organisms seen.           Fluids:  Intake/Output       08/18/17 0700 - 08/19/17 0659 08/19/17 0700 - 08/20/17 0659 08/20/17 0700 - 08/21/17 0659      9943-1652 0268-8405 Total 2851-6812 7622-0919 Total 9662-8974 2836-2895 Total       Intake    P.O.  680  600 1280  720  600 1320  --  -- --    P.O.   -- -- --    I.V.  300  560 860  --  -- --  100  -- 100    IV Volume -- 360 360 -- -- -- -- -- --    IV Volume (NS) 200 -- 200 -- -- -- -- -- --    IV Piggyback Volume 100 200 300 -- -- -- 100 -- 100    Total Intake 980 1160 2140  100 -- 100       Output    Urine  1400  1000 2400  1200  850 2050  600  -- 600    Void (ml) 1400 1000 2400 2428 398 6875 600 -- 600    Drains  0  -- 0  --  -- --  --  -- --    Hemovac 1 0 -- 0 -- -- -- -- -- --    Stool  --  -- --  --  -- --  --  -- --    Number of Times Stooled -- -- -- 1 x 0 x 1 x -- -- --    Total Output 1400 1000 2400 0241 582 8048 600 -- 600       Net I/O     -420 160 -260 -480 -250 -730 -500 -- -500             Assessment:  Active Hospital Problems    Diagnosis   • Skin ulcer of  left foot with fat layer exposed (CMS-HCC) [L97.522]   • Wound of right leg, at BKA site [S81.801A]   • Peripheral neuropathy [G62.9]   • Tobacco abuse [Z72.0]       Plan:  Skin ulcer of left foot with fat layer exposed (CMS-HCC), improving  Afebrile  No leukocytosis  S/p OR 7/25/17  Cultures  MRSA and Pseudomonas  DC'd vanco due to nephrotoxicity  Continue doxy  and cefepime for PSAR  Continue antibiotics through 8/25/17   No once daily options  Wound care  Cultures have been vre    Diarrhea, stable  Cdiff neg  Hold stool softeners    Right BKA stump  Infection  Status post revision on 8/15/17  Cultures are vre  Add Zyvox  End point clinical
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
## 2017-08-20 NOTE — PROGRESS NOTES
Assumed care of patient.  He is alert and oriented times four, complaining of pain of 6/10 at this current time.  He is is isolation for VRE and a  is stationed outside his room for apparent overnight agitation with staff.  The patient has a right BKA in a hard cast and a wound vac on the left foot.  The patient is able to move and transfer independently from his wheelchair.  Patient denies pain intervention at this time, would like to wait until later when he needs it more.  All his needs are met at this time, he is in his wheelchair and educated on use of call light.
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
## 2017-08-20 NOTE — PROGRESS NOTES
Assumed care of patient from day RN. A&Ox 4. Pt is agitated. Pt repeatedly states that he is being denied pain medication by Dr. Gotti. Pt is up independently, uses a wheelchair. Room air, sating at 90%. Moves all extremities. Reports baseline numbness in finger tips, denies tingling. Walking book on left foot. Voiding, hyperactive BS, + flatus, LBM 8/19. Regular diet, tolerating, denies nausea/ vomiting. Wound(s): right BKA, dressing in place, CDI. Left MLA wound vac in place and operating. Patient reports 7 out of 10 pain, medicated per MAR. Discussed POC, all questions answered at this time. Bed is locked and in the lowest position, call light within reach, Q 2 hour rounding in place, will continue to monitor.

## 2017-08-20 NOTE — PROGRESS NOTES
Security sitter at pt door. Per Dr. Debbie Harris, personal items including wheelchair and tray can stay in room. Pt can remain in street clothing.

## 2017-08-20 NOTE — PROGRESS NOTES
[facility] Hospitalist Progress Note    Date of Service: 8/20/2017    Chief Complaint    52 y.o. male hx of PVD s/p L TMA and Rt Bka > 5 years ago  admitted 7/22/2017 with right BKA stump pain and wound drainage in addition to left TMA stump wound (nonhealing) over several months.  s/p Debridement and gastroscoleus resection LLE on 7-25-17 with Dr Shirley. Cultures have grown MRSA / Pseudomonas. He is being treated with IV cefepime and PO doxycycline per ID recommendations. These are to be continued until 08/25/2017. Patient has medical. Difficult disposition. SW continuing to look into placement options at this time. Continuing therapy inpatient at this point in time. Also underwent R BKA revision with Dr Shirley 08/15/2017    Interval Problem Update  Still complaint about pain in his legs wound.        Consultants/Specialty  Limb preservation service  Infectious diseases - Last seen by Dr Pino  Orthopedic surgery - Dr Shirley  Psychiatry - Dr Trujillo    Disposition  Difficult disposition. Remains inpatient to continue therapy. SW working on disposition. Discussed on rounds with SW. Continue looking into placement option.       Review of Systems   Constitutional: Negative for fever and chills.   HENT: Negative for hearing loss.    Eyes:        Vision loss rt eye / Evisceration. Trauma history.    Respiratory: Negative for cough and shortness of breath.    Cardiovascular: Negative for chest pain, palpitations and orthopnea.   Gastrointestinal: Negative for heartburn, nausea and vomiting.   Genitourinary: Negative for dysuria, urgency and frequency.   Musculoskeletal: Positive for joint pain (chronics). Negative for myalgias and neck pain.        R BKA   Skin: Negative for rash.   Neurological: Positive for headaches. Negative for dizziness, tingling, tremors and weakness.        Chronic peripheral neuropathy .   Psychiatric/Behavioral: Negative for depression and hallucinations. The patient is nervous/anxious  (improved.).       Physical Exam  Laboratory/Imaging   Hemodynamics  Temp (24hrs), Av.6 °C (97.8 °F), Min:36.4 °C (97.5 °F), Max:36.7 °C (98.1 °F)   Temperature: 36.7 °C (98.1 °F)  Pulse  Av.1  Min: 61  Max: 111    Blood Pressure: 109/60 mmHg      Respiratory      Respiration: 18, Pulse Oximetry: 95 %        RUL Breath Sounds: Clear, RML Breath Sounds: Clear, RLL Breath Sounds: Clear, NANCY Breath Sounds: Clear, LLL Breath Sounds: Clear    Fluids    Intake/Output Summary (Last 24 hours) at 17 0756  Last data filed at 17 0400   Gross per 24 hour   Intake   1320 ml   Output   2050 ml   Net   -730 ml       Nutrition  Orders Placed This Encounter   Procedures   • DIET ORDER     Standing Status: Standing      Number of Occurrences: 1      Standing Expiration Date:      Order Specific Question:  Diet:     Answer:  Regular [1]      Comments:  regular trays     Physical Exam   Constitutional: He is oriented to person, place, and time. He appears well-developed and well-nourished. No distress.   HENT:   Head: Normocephalic and atraumatic.   Eyes: Conjunctivae are normal. Right eye exhibits no discharge.   Rt eye vision loss, enucleated.    Neck: Normal range of motion. Neck supple.   Cardiovascular: Normal rate and regular rhythm.    No murmur heard.  Pulmonary/Chest: Effort normal. No respiratory distress.   Abdominal: Soft. Bowel sounds are normal. He exhibits no distension. There is no tenderness.   Musculoskeletal: He exhibits no edema.   Rt leg BKA - incision dry, no dehisc- healed.   Left leg w boot wound vac present.  Rt shoulder no redness or warmth, mild swelling.    Neurological: He is alert and oriented to person, place, and time. Coordination abnormal.   Skin: Skin is warm and dry. He is not diaphoretic. There is erythema.   End of stump on R appears chaffed, wound margins heaped.   Psychiatric: He has a normal mood and affect. His behavior is normal.       Recent Labs      17   9348   08/18/17   0312   WBC  10.1  8.4   RBC  3.71*  3.72*   HEMOGLOBIN  9.1*  9.1*   HEMATOCRIT  30.3*  30.0*   MCV  81.7  80.6*   MCH  24.5*  24.5*   MCHC  30.0*  30.3*   RDW  55.3*  53.3*   PLATELETCT  290  280   MPV  9.6  9.3     Recent Labs      08/17/17   0818   SODIUM  141   POTASSIUM  4.0   CHLORIDE  108   CO2  27   GLUCOSE  111*   BUN  22   CREATININE  0.69   CALCIUM  8.5                      Assessment/Plan     * Skin ulcer of left foot with fat layer exposed (CMS-HCC) (present on admission)  Assessment & Plan  S/p OR debridement and gastroscoleus resection 7/25   Continue wound vac care  w changes three times/wk  Contact isolation for MRSA.  TDWB LLE, WBAT RLE per Ortho recs.  Patient reportedly has restraining orders from much of the Skilled facilites in [place]  Difficult discharge    MRSA (methicillin resistant staph aureus) culture positive (present on admission)  Assessment & Plan  Doxycycline to cover L foot infection thru 8-25-17   Vanco stopped due to ARF    Pseudomonas infection (present on admission)  Assessment & Plan  Left foot infection  continue IV cefepime thru 8-25-17  ID assisting    Anxiety (present on admission)  Assessment & Plan  Depakote, psych assisting, long talk, will add low dose klonopin    Diarrhea (present on admission)  Assessment & Plan  Follow    Peripheral neuropathy (present on admission)  Assessment & Plan  Lyrica      Tobacco abuse (present on admission)  Assessment & Plan  Nicotine replacement PRN  Cessation counseling    Wound of right leg, at BKA site (present on admission)  Assessment & Plan  Culture: VRE  ID following  Wound care  Surgery on      Opiate dependence (CMS-HCC) (present on admission)  Assessment & Plan  Noted Drug seeking behavior inpatient- improved deviance.  Avoid escalating  IV narcotics with no no signs for pain- use primarily for dressing / vac changes .  oxycontin recently stopped-- continue methadone.   Daily wants to change regimen      ABRIL (acute kidney  injury) (CMS-HCC)  Assessment & Plan  resolved        Labs reviewed and Medications reviewed  Rolon catheter: No Rolon      DVT Prophylaxis: Enoxaparin (Lovenox)    Ulcer prophylaxis: Not indicated  Antibiotics: Treating active infection/contamination beyond 24 hours perioperative coverage  Assessed for rehab: Patient was assess for and/or received rehabilitation services during this hospitalization

## 2017-08-20 NOTE — PROGRESS NOTES
Infectious Disease Progress Note    Author: Bekah Mckeon M.D. DOS & Time created: 8/19/2017  5:47 PM    CC: FU right BKA stump infection and nonhealing left TMA stump wound     Interval History:  52 y.o. WM admitted 7/22/2017 with right BKA infection and nonhealing left TMA stump wound   7/23 AF WBC 7.6 less drainage from wounds Asking if he will need another BKA  7/24/17- no fevers. WBC 10.6. Complains of chills. Complains of 10 out of 10 pain in the left foot. Complains of drainage from the right stump.  7/25/17- complains of pain in the foot. Had low-grade fevers up to 100.7 last night . Foot cultures are showing MRSA and Pseudomonas  7/26/17- no fevers. Complains of burning in his left leg. No discharge noted from his right stump  7/28/17- no fevers. Complains of ongoing pain.  7/29/17- no fevers. No labs available  7/30/17-MAXIMUM TEMPERATURE 99.5. Ongoing pain issues. WBC 9000. Creatinine 1.17  7/31 AF lots of c/o neuropathy-feels like stump is in ice water  8/1 AF no new complaints- +phantom limb pain  8/2 AF pain better controlled Having loose stools. Wants a psych consult  8/3 AF in WC today No new complaints  8/4 AF WBC 9.2 no new complaints-wants to see psych again  8/5 AF WBC 9.2 seen during dressing change  8/7 AF, no CBC, tired of infections, thinking about left BKA, wants to speak to Dr. Shirley  8/8 AF, no CBC, no longer wants a BKA, wants to try abx first, PICC came out a little after his TV remote hit his PICC  8/9 AF, no CBC, ambulating in halls without issues  8/19/17-  Labs Reviewed, Medications Reviewed, Radiology Reviewed and Wound Reviewed.    Review of Systems:  Review of Systems   Constitutional: Negative for fever and chills.   Respiratory: Negative for cough and shortness of breath.    Cardiovascular: Negative for chest pain.   Gastrointestinal: Negative for nausea, vomiting, abdominal pain and diarrhea.   Genitourinary: Negative for dysuria.   Musculoskeletal: Positive for myalgias  and joint pain.   Neurological: Positive for sensory change.   All other systems reviewed and are negative.      Hemodynamics:  Temp (24hrs), Av.7 °C (98.1 °F), Min:36.6 °C (97.9 °F), Max:36.7 °C (98.1 °F)  Temperature: 36.6 °C (97.9 °F)  Pulse  Av  Min: 61  Max: 111   Blood Pressure: 112/59 mmHg       Physical Exam:  Physical Exam   Constitutional: He is oriented to person, place, and time. He appears well-developed. No distress.   Disheveled   HENT:   Head: Normocephalic.   Poor dentition     Eyes:   Blind right eye   Neck: Neck supple.   Cardiovascular: Normal rate.    Pulmonary/Chest: Effort normal. No respiratory distress.   Abdominal: Soft. He exhibits no distension. There is no tenderness.   Musculoskeletal: He exhibits no edema.   See pictures    LUE PICC nontender   Neurological: He is alert and oriented to person, place, and time.   Skin: No rash noted.   tattoos   Nursing note and vitals reviewed.      Labs:  Recent Labs      17   0818  17   0312   WBC  10.1  8.4   RBC  3.71*  3.72*   HEMOGLOBIN  9.1*  9.1*   HEMATOCRIT  30.3*  30.0*   MCV  81.7  80.6*   MCH  24.5*  24.5*   RDW  55.3*  53.3*   PLATELETCT  290  280   MPV  9.6  9.3     Recent Labs      17   0818   SODIUM  141   POTASSIUM  4.0   CHLORIDE  108   CO2  27   GLUCOSE  111*   BUN  22     Recent Labs      17   0818   CREATININE  0.69     Results     Procedure Component Value Units Date/Time    CULTURE TISSUE W/ GRM STAIN [[id number]]  (Abnormal)  (Susceptibility) Collected:  08/15/17 1318    Order Status:  Completed Specimen Information:  Tissue Updated:  17 1626     Significant Indicator POS (POS)      Source TISS      Site Right BKA Stump      Tissue Culture -- (A)      Gram Stain Result --      Result:        Few WBCs.  No organisms seen.       Tissue Culture -- (A)      Result:        Enterococcus faecium  Light growth  Combination therapy with ampicillin, penicillin, or  vancomycin (for susceptible  strains) plus an aminoglycoside  is usually indicated for serious enterococcal infections,  such as endocarditis unless high-level resistance to both  gentamicin and streptomycin is documented; such combinations  are predicted to result in synergistic killing of the  Enterococcus.  VANCOMYCIN RESISTANT ENTEROCOCCI(VRE)  The susceptibility profile for this organism indicates that  Penicillin would not be an effective component of combination  therapy.  The susceptibility profile for this organism indicates that  Streptomycin would not be an effective component of  combination therapy.  The susceptibility profile for this organism indicates that  Vancomycin would not be an effective component of combination  therapy.  VANCOMYCIN RESISTANT ENTEROCOCCI(VRE)      Narrative:      CALL  [place]  141 tel. [phone],  CALLED  141 tel. [phone] 08/18/2017, 09:28, RB PERF. RESULTS CALLED TO:RN  [id number]    Culture & Susceptibility     ENTEROCOCCUS FAECIUM     Antibiotic Sensitivity Microscan Unit Status    Ampicillin Resistant >8 mcg/mL Final    Daptomycin Sensitive 4 mcg/mL Final    Gent Synergy Sensitive <=500 mcg/mL Final    Linezolid Sensitive 2 mcg/mL Final    Penicillin Resistant >8 mcg/mL Final    Vancomycin Resistant >16 mcg/mL Final                       ANAEROBIC CULTURE [[id number]] Collected:  08/15/17 1318    Order Status:  Completed Specimen Information:  Tissue Updated:  08/18/17 1626     Significant Indicator NEG      Source TISS      Site Right BKA Stump      Anaerobic Culture, Culture Res No Anaerobes isolated.     Narrative:      CALL  [place]  141 tel. [phone],  CALLED  141 tel. [phone] 08/18/2017, 09:28, RB PERF. RESULTS CALLED TO:RN  [id number]    CULTURE TISSUE W/ GRM STAIN [[id number]] Collected:  08/15/17 1345    Order Status:  Completed Specimen Information:  Bone Updated:  08/18/17 0921     Gram Stain Result No organisms seen.      Significant Indicator NEG      Source BONE      Site Right Fibula       Tissue Culture No growth at 72 hours.     ANAEROBIC CULTURE [[id number]] Collected:  08/15/17 1345    Order Status:  Completed Specimen Information:  Bone Updated:  08/18/17 0921     Significant Indicator NEG      Source BONE      Site Right Fibula      Anaerobic Culture, Culture Res Culture in progress.     GRAM STAIN [[id number]] Collected:  08/15/17 1318    Order Status:  Completed Specimen Information:  Tissue Updated:  08/16/17 0706     Significant Indicator .      Source TISS      Site Right BKA Stump      Gram Stain Result --      Result:        Few WBCs.  No organisms seen.      GRAM STAIN [[id number]] Collected:  08/15/17 1345    Order Status:  Completed Specimen Information:  Bone Updated:  08/16/17 0600     Significant Indicator .      Source BONE      Site Right Fibula      Gram Stain Result No organisms seen.           Fluids:  Intake/Output       08/17/17 0700 - 08/18/17 0659 08/18/17 0700 - 08/19/17 0659 08/19/17 0700 - 08/20/17 0659      [id number] [id number] Total 9346-0431 4956-2131 Total 8147-1015 8883-3026 Total       Intake    P.O.  720  480 1200  680  600 1280  720  -- 720    P.O.   720 -- 720    I.V.  560  340 900  300  560 860  --  -- --    IV Volume -- -- -- -- 360 360 -- -- --    IV Volume (NS) 360 240 600 200 -- 200 -- -- --    IV Piggyback Volume 200 100 300 100 200 300 -- -- --    Total Intake 3464 291 2555 980 1160 2140 720 -- 720       Output    Urine  1300  1100 2400  1400  1000 2400  1200  -- 1200    Void (ml) 1300 1100 2400 1400 1000 2400 1200 -- 1200    Drains  0  15 15  0  -- 0  --  -- --    Hemovac 1 0 15 15 0 -- 0 -- -- --    Stool  --  -- --  --  -- --  --  -- --    Number of Times Stooled -- 1 x 1 x -- -- -- 1 x -- 1 x    Total Output 1300 1115 2415 1400 1000 2400 1200 -- 1200       Net I/O     -20 -295 -315 -420 160 -260 -480 -- -480             Assessment:  Active Hospital Problems    Diagnosis   • Skin ulcer of left foot with fat layer exposed (CMS-[facility])  "[L93.033]   • Wound of right leg, at BKA site [S81.801A]   • Peripheral neuropathy [G62.9]   • Tobacco abuse [Z72.0]       Plan:  Skin ulcer of left foot with fat layer exposed (CMS-HCC), improving  Afebrile  No leukocytosis  S/p OR 7/25/17  Cultures  MRSA and Pseudomonas  DC'd vanco due to nephrotoxicity  Continue doxy (no Zyvox due to neuropathy) and cefepime for PSAR  Continue antibiotics through 8/25/17   No once daily options  Wound care  Cultures have been vre    Diarrhea, stable  Cdiff neg  Hold stool softeners    Drainage from right BKA site- improved  Afebrile   No leukocytosis  No further drainage  Persistent pain    Prognosis for limb salvage poor, LLE    Placement will be difficult-states \"half\" the facilities in [place] have restraining orders against him    Appreciate psych eval and recommendations        "

## 2017-08-20 NOTE — PROGRESS NOTES
Bed alarm refused, education given regarding the need and patient continues to refuse. Pt is A& O x4. IV pole is on the same side as the bathroom, lower bedrails are down.

## 2017-08-21 PROCEDURE — 700102 HCHG RX REV CODE 250 W/ 637 OVERRIDE(OP): Performed by: INTERNAL MEDICINE

## 2017-08-21 PROCEDURE — A9270 NON-COVERED ITEM OR SERVICE: HCPCS | Performed by: INTERNAL MEDICINE

## 2017-08-21 PROCEDURE — A9270 NON-COVERED ITEM OR SERVICE: HCPCS | Performed by: NURSE PRACTITIONER

## 2017-08-21 PROCEDURE — 700111 HCHG RX REV CODE 636 W/ 250 OVERRIDE (IP): Performed by: INTERNAL MEDICINE

## 2017-08-21 PROCEDURE — 97110 THERAPEUTIC EXERCISES: CPT

## 2017-08-21 PROCEDURE — 99232 SBSQ HOSP IP/OBS MODERATE 35: CPT | Performed by: INTERNAL MEDICINE

## 2017-08-21 PROCEDURE — 700101 HCHG RX REV CODE 250: Performed by: INTERNAL MEDICINE

## 2017-08-21 PROCEDURE — 700105 HCHG RX REV CODE 258: Performed by: INTERNAL MEDICINE

## 2017-08-21 PROCEDURE — 770021 HCHG ROOM/CARE - ISO PRIVATE

## 2017-08-21 PROCEDURE — 97605 NEG PRS WND THER DME<=50SQCM: CPT

## 2017-08-21 PROCEDURE — 700102 HCHG RX REV CODE 250 W/ 637 OVERRIDE(OP): Performed by: NURSE PRACTITIONER

## 2017-08-21 RX ADMIN — LINEZOLID 600 MG: 600 TABLET, FILM COATED ORAL at 07:55

## 2017-08-21 RX ADMIN — LIDOCAINE 1 APPLICATION: 40 CREAM TOPICAL at 02:31

## 2017-08-21 RX ADMIN — PREGABALIN 150 MG: 150 CAPSULE ORAL at 21:04

## 2017-08-21 RX ADMIN — OXYCODONE HYDROCHLORIDE 10 MG: 10 TABLET ORAL at 17:40

## 2017-08-21 RX ADMIN — OXYCODONE HYDROCHLORIDE 10 MG: 10 TABLET ORAL at 13:08

## 2017-08-21 RX ADMIN — ACETAMINOPHEN 650 MG: 325 TABLET, FILM COATED ORAL at 02:30

## 2017-08-21 RX ADMIN — OXYCODONE HYDROCHLORIDE 10 MG: 10 TABLET ORAL at 07:55

## 2017-08-21 RX ADMIN — ZOLPIDEM TARTRATE 5 MG: 5 TABLET, FILM COATED ORAL at 22:49

## 2017-08-21 RX ADMIN — CLONAZEPAM 2 MG: 1 TABLET ORAL at 21:04

## 2017-08-21 RX ADMIN — LINEZOLID 600 MG: 600 TABLET, FILM COATED ORAL at 21:04

## 2017-08-21 RX ADMIN — ENOXAPARIN SODIUM 40 MG: 100 INJECTION SUBCUTANEOUS at 08:00

## 2017-08-21 RX ADMIN — CEFEPIME 2 G: 2 INJECTION, POWDER, FOR SOLUTION INTRAMUSCULAR; INTRAVENOUS at 21:05

## 2017-08-21 RX ADMIN — METHADONE HYDROCHLORIDE 10 MG: 10 TABLET ORAL at 06:57

## 2017-08-21 RX ADMIN — Medication 325 MG: at 07:56

## 2017-08-21 RX ADMIN — CEFEPIME 2 G: 2 INJECTION, POWDER, FOR SOLUTION INTRAMUSCULAR; INTRAVENOUS at 06:57

## 2017-08-21 RX ADMIN — DOXYCYCLINE 100 MG: 100 TABLET ORAL at 21:04

## 2017-08-21 RX ADMIN — Medication 325 MG: at 17:40

## 2017-08-21 RX ADMIN — LIDOCAINE 1 PATCH: 50 PATCH CUTANEOUS at 13:07

## 2017-08-21 RX ADMIN — DOXYCYCLINE 100 MG: 100 TABLET ORAL at 07:55

## 2017-08-21 RX ADMIN — CLONAZEPAM 2 MG: 1 TABLET ORAL at 07:55

## 2017-08-21 RX ADMIN — PREGABALIN 150 MG: 150 CAPSULE ORAL at 14:02

## 2017-08-21 RX ADMIN — METHADONE HYDROCHLORIDE 10 MG: 10 TABLET ORAL at 22:49

## 2017-08-21 RX ADMIN — PREGABALIN 150 MG: 150 CAPSULE ORAL at 07:55

## 2017-08-21 RX ADMIN — CEFEPIME 2 G: 2 INJECTION, POWDER, FOR SOLUTION INTRAMUSCULAR; INTRAVENOUS at 14:03

## 2017-08-21 RX ADMIN — METHADONE HYDROCHLORIDE 10 MG: 10 TABLET ORAL at 14:03

## 2017-08-21 ASSESSMENT — ENCOUNTER SYMPTOMS
DEPRESSION: 0
HEARTBURN: 0
MYALGIAS: 1
ABDOMINAL PAIN: 0
DIARRHEA: 0
TINGLING: 0
ORTHOPNEA: 0
HALLUCINATIONS: 0
SENSORY CHANGE: 1
VOMITING: 0
MYALGIAS: 0
NERVOUS/ANXIOUS: 1
NAUSEA: 0
NECK PAIN: 0
TREMORS: 0
FEVER: 0
SHORTNESS OF BREATH: 0
COUGH: 0
WEAKNESS: 0
CHILLS: 0
PALPITATIONS: 0
HEADACHES: 1
DIZZINESS: 0

## 2017-08-21 ASSESSMENT — PAIN SCALES - GENERAL
PAINLEVEL_OUTOF10: 6
PAINLEVEL_OUTOF10: 6
PAINLEVEL_OUTOF10: 4
PAINLEVEL_OUTOF10: 4
PAINLEVEL_OUTOF10: 6
PAINLEVEL_OUTOF10: 4

## 2017-08-21 ASSESSMENT — COGNITIVE AND FUNCTIONAL STATUS - GENERAL
STANDING UP FROM CHAIR USING ARMS: A LITTLE
SUGGESTED CMS G CODE MODIFIER MOBILITY: CK
WALKING IN HOSPITAL ROOM: A LOT
MOBILITY SCORE: 15
CLIMB 3 TO 5 STEPS WITH RAILING: TOTAL
MOVING FROM LYING ON BACK TO SITTING ON SIDE OF FLAT BED: A LITTLE
MOVING TO AND FROM BED TO CHAIR: A LITTLE
TURNING FROM BACK TO SIDE WHILE IN FLAT BAD: A LITTLE

## 2017-08-21 NOTE — DISCHARGE PLANNING
Referrals in progress to [place] SNFs. Awaiting answers from referrals in progress before able to expand the search outside of the group previously listed. [facility] awaiting acceptances or denials.

## 2017-08-21 NOTE — THERAPY
"Pt agreeable to treatment; extremely verbose; educating and practicing HEP and providing hand out; educating pt on progression of program; pt will benefit from one to 2 follow ups to monitor and progress HEP.     Physical Therapy Treatment completed.   Bed Mobility:  Supine to Sit: Modified Independent  Transfers: Sit to Stand: Supervised  Gait: Level Of Assist: Transfers only  Plan of Care: Will benefit from Physical Therapy 2 times per week  Discharge Recommendations: Equipment: Will Continue to Assess for Equipment Needs. Post-acute therapy Discharge to a transitional care facility for continued skilled therapy services.    Sandra Lofton PT, [phone]     See \"Rehab Therapy-Acute\" Patient Summary Report for complete documentation.       "

## 2017-08-21 NOTE — PROGRESS NOTES
Pharmacy Pharmacotherapy Consult for LOS >30 days    Admit Date: 7/22/2017      Medications were reviewed for appropriateness and ongoing need.     Current Facility-Administered Medications   Medication Dose Route Frequency Provider Last Rate Last Dose   • methadone (DOLOPHINE) tablet 10 mg  10 mg Oral Q8HRS Med Gotti M.D.   10 mg at 08/21/17 0657   • oxycodone immediate-release (ROXICODONE) tablet 5 mg  5 mg Oral Q4HRS PRN Med Gotti M.D.        Or   • oxycodone immediate release (ROXICODONE) tablet 10 mg  10 mg Oral Q4HRS PRN Med Gotti M.D.   10 mg at 08/21/17 1308   • linezolid (ZYVOX) tablet 600 mg  600 mg Oral Q12HRS Bekah Mckeon M.D.   600 mg at 08/21/17 0755   • clonazepam (KLONOPIN) tablet 2 mg  2 mg Oral BID Debbie Harris A.P.R.NMoy   2 mg at 08/21/17 0755   • lidocaine (LMX) 4 % cream 1 Application  1 Application Topical PRN Amarilis Ledesma M.D.   1 Application at 08/21/17 0231   • lidocaine (LIDODERM) 5 % 1 Patch  1 Patch Transdermal Q24HR Jeff Crystal M.D.   Stopped at 08/20/17 1245   • nicotine (NICODERM) 7 MG/24HR 7 mg  7 mg Transdermal Daily-0600 Jeff Crystal M.D.   7 mg at 08/06/17 0548    And   • nicotine polacrilex (NICORETTE) 2 MG piece 2 mg  2 mg Oral Q HOUR PRN Jeff Crystal M.D.       • cefepime (MAXIPIME) 2 g in  mL IVPB  2 g Intravenous Q8HRS Lizzette Pino M.D.   Stopped at 08/21/17 0727   • doxycycline monohydrate (ADOXA) tablet 100 mg  100 mg Oral Q12HRS Lizzette Pino M.D.   100 mg at 08/21/17 0755   • pregabalin (LYRICA) capsule 150 mg  150 mg Oral TID Mariely Bermudez M.D.   150 mg at 08/21/17 0755   • normal saline PF 10-20 mL  10-20 mL Intravenous PRN Bekah Mckeon M.D.       • lidocaine (LIDODERM) 5 % 1 Patch  1 Patch Transdermal Q24HR Mariely Bermudez M.D.   1 Patch at 08/21/17 1307   • zolpidem (AMBIEN) tablet 5 mg  5 mg Oral HS PRN - MR X 1 Juan Alberto Shetty M.D.   5 mg at 08/20/17 0491   • enoxaparin (LOVENOX) inj 40 mg  40 mg Subcutaneous DAILY  Manuel Reece M.D.   40 mg at 08/21/17 0800   • ondansetron (ZOFRAN ODT) dispertab 4 mg  4 mg Oral Q4HRS PRN Manuel Reece M.D.   4 mg at 08/20/17 0055   • ondansetron (ZOFRAN) syringe/vial injection 4 mg  4 mg Intravenous Q4HRS PRN Manuel Reece M.D.   4 mg at 07/28/17 2024   • acetaminophen (TYLENOL) tablet 650 mg  650 mg Oral Q6HRS PRN Manuel Reece M.D.   650 mg at 08/21/17 0230   • ferrous sulfate tablet 325 mg  325 mg Oral BID WITH MEALS Manuel Reece M.D.   325 mg at 08/21/17 0756       Recommendations:  No recommendations at this time.    Kinza Mccoy, Pharmacy Intern    --- Agree with recommendations. Patient is using all current medications regularly. Will update MD that a 30 day review was performed.  Nany Zazueta, PharmD

## 2017-08-21 NOTE — CARE PLAN
"Problem: Infection  Goal: Will remain free from infection  Outcome: PROGRESSING AS EXPECTED  Infectious disease following, antibiotics on board,   Educated on hand washing before meals and after bathrooming.     Problem: Mobility  Goal: Risk for activity intolerance will decrease  Outcome: PROGRESSING SLOWER THAN EXPECTED  Patient was discouraged to get oob today, \"i am tired and dont wanna get up in my chair now\".        "

## 2017-08-21 NOTE — PROGRESS NOTES
[facility] Hospitalist Progress Note    Date of Service: 8/21/2017    Chief Complaint    52 y.o. male hx of PVD s/p L TMA and Rt Bka > 5 years ago  admitted 7/22/2017 with right BKA stump pain and wound drainage in addition to left TMA stump wound (nonhealing) over several months.  s/p Debridement and gastroscoleus resection LLE on 7-25-17 with Dr Shirley. Cultures have grown MRSA / Pseudomonas. He is being treated with IV cefepime and PO doxycycline per ID recommendations. These are to be continued until 08/25/2017. Patient has medical. Difficult disposition. SW continuing to look into placement options at this time. Continuing therapy inpatient at this point in time. Also underwent R BKA revision with Dr Shirley 08/15/2017    Interval Problem Update  Still complaint about pain in his legs wound.        Consultants/Specialty  Limb preservation service  Infectious diseases - Last seen by Dr Pino  Orthopedic surgery - Dr Shirley  Psychiatry - Dr Trujillo    Disposition  Difficult disposition. Remains inpatient to continue therapy. SW working on disposition. Discussed on rounds with SW. Continue looking into placement option.       Review of Systems   Constitutional: Negative for fever and chills.   HENT: Negative for hearing loss.    Eyes:        Vision loss rt eye / Evisceration. Trauma history.    Respiratory: Negative for cough and shortness of breath.    Cardiovascular: Negative for chest pain, palpitations and orthopnea.   Gastrointestinal: Negative for heartburn, nausea and vomiting.   Genitourinary: Negative for dysuria, urgency and frequency.   Musculoskeletal: Positive for joint pain (chronics). Negative for myalgias and neck pain.        R BKA   Skin: Negative for rash.   Neurological: Positive for headaches. Negative for dizziness, tingling, tremors and weakness.        Chronic peripheral neuropathy .   Psychiatric/Behavioral: Negative for depression and hallucinations. The patient is nervous/anxious  (improved.).       Physical Exam  Laboratory/Imaging   Hemodynamics  Temp (24hrs), Av.7 °C (98.1 °F), Min:36.3 °C (97.4 °F), Max:37 °C (98.6 °F)   Temperature: 37 °C (98.6 °F)  Pulse  Av.8  Min: 61  Max: 111    Blood Pressure: 106/71 mmHg      Respiratory      Respiration: 18, Pulse Oximetry: 98 %        RUL Breath Sounds: Clear, RML Breath Sounds: Clear, RLL Breath Sounds: Clear, NANCY Breath Sounds: Clear, LLL Breath Sounds: Clear    Fluids    Intake/Output Summary (Last 24 hours) at 17 0746  Last data filed at 17 0200   Gross per 24 hour   Intake    940 ml   Output   3300 ml   Net  -2360 ml       Nutrition  Orders Placed This Encounter   Procedures   • DIET ORDER     Standing Status: Standing      Number of Occurrences: 1      Standing Expiration Date:      Order Specific Question:  Diet:     Answer:  Regular [1]      Comments:  regular trays     Physical Exam   Constitutional: He is oriented to person, place, and time. He appears well-developed and well-nourished. No distress.   HENT:   Head: Normocephalic and atraumatic.   Eyes: Conjunctivae are normal. Right eye exhibits no discharge.   Rt eye vision loss, enucleated.    Neck: Normal range of motion. Neck supple.   Cardiovascular: Normal rate and regular rhythm.    No murmur heard.  Pulmonary/Chest: Effort normal. No respiratory distress.   Abdominal: Soft. Bowel sounds are normal. He exhibits no distension. There is no tenderness.   Musculoskeletal: He exhibits no edema.   Rt leg BKA - incision dry, no dehisc- healed.   Left leg w boot wound vac present.  Rt shoulder no redness or warmth, mild swelling.    Neurological: He is alert and oriented to person, place, and time. Coordination abnormal.   Skin: Skin is warm and dry. He is not diaphoretic. There is erythema.   End of stump on R appears chaffed, wound margins heaped.   Psychiatric: He has a normal mood and affect. His behavior is normal.                                Assessment/Plan      * Skin ulcer of left foot with fat layer exposed (CMS-HCC) (present on admission)  Assessment & Plan  S/p OR debridement and gastroscoleus resection 7/25   Continue wound vac care  w changes three times/wk  Contact isolation for MRSA.  TDWB LLE, WBAT RLE per Ortho recs.  Patient reportedly has restraining orders from much of the Skilled facilites in [place]  Difficult discharge    MRSA (methicillin resistant staph aureus) culture positive (present on admission)  Assessment & Plan  Doxycycline to cover L foot infection thru 8-25-17   Vanco stopped due to ARF    Pseudomonas infection (present on admission)  Assessment & Plan  Left foot infection  continue IV cefepime thru 8-25-17  ID assisting    Anxiety (present on admission)  Assessment & Plan  Depakote, psych assisting, long talk, will add low dose klonopin    Diarrhea (present on admission)  Assessment & Plan  Follow    Peripheral neuropathy (present on admission)  Assessment & Plan  Lyrica      Tobacco abuse (present on admission)  Assessment & Plan  Nicotine replacement PRN  Cessation counseling    Wound of right leg, at BKA site (present on admission)  Assessment & Plan  Culture: VRE  ID following  Wound care  Surgery on      Opiate dependence (CMS-HCC) (present on admission)  Assessment & Plan  Noted Drug seeking behavior inpatient- improved deviance.  Avoid escalating  IV narcotics with no no signs for pain- use primarily for dressing / vac changes .  oxycontin recently stopped-- continue methadone.   Daily wants to change regimen      ABRIL (acute kidney injury) (CMS-HCC)  Assessment & Plan  resolved        Labs reviewed and Medications reviewed  Rolon catheter: No Rolon      DVT Prophylaxis: Enoxaparin (Lovenox)    Ulcer prophylaxis: Not indicated  Antibiotics: Treating active infection/contamination beyond 24 hours perioperative coverage  Assessed for rehab: Patient was assess for and/or received rehabilitation services during this hospitalization

## 2017-08-21 NOTE — PROGRESS NOTES
Infectious Disease Progress Note    Author: Joie Ramon M.D. DOS & Time created: 8/21/2017  11:06 AM    CC: FU right BKA stump infection and nonhealing left TMA stump wound     Interval History:  52 y.o. WM admitted 7/22/2017 with right BKA infection and nonhealing left TMA stump wound   7/23 AF WBC 7.6 less drainage from wounds Asking if he will need another BKA  7/24/17- no fevers. WBC 10.6. Complains of chills. Complains of 10 out of 10 pain in the left foot. Complains of drainage from the right stump.  7/25/17- complains of pain in the foot. Had low-grade fevers up to 100.7 last night . Foot cultures are showing MRSA and Pseudomonas  7/26/17- no fevers. Complains of burning in his left leg. No discharge noted from his right stump  7/28/17- no fevers. Complains of ongoing pain.  7/29/17- no fevers. No labs available  7/30/17-MAXIMUM TEMPERATURE 99.5. Ongoing pain issues. WBC 9000. Creatinine 1.17  7/31 AF lots of c/o neuropathy-feels like stump is in ice water  8/1 AF no new complaints- +phantom limb pain  8/2 AF pain better controlled Having loose stools. Wants a psych consult  8/3 AF in WC today No new complaints  8/4 AF WBC 9.2 no new complaints-wants to see psych again  8/5 AF WBC 9.2 seen during dressing change  8/7 AF, no CBC, tired of infections, thinking about left BKA, wants to speak to Dr. Shirley  8/8 AF, no CBC, no longer wants a BKA, wants to try abx first, PICC came out a little after his TV remote hit his PICC  8/9 AF, no CBC, ambulating in halls without issues  8/19/17-no fevers .Labs Reviewed, Medications Reviewed, Radiology Reviewed and Wound Reviewed.  8/20/17-denies any fever. Complains of significant pain in his BKA site  8/21 AF no acute events or new complaints  Review of Systems:  Review of Systems   Constitutional: Negative for fever and chills.   Respiratory: Negative for cough and shortness of breath.    Cardiovascular: Negative for chest pain.   Gastrointestinal: Negative for  nausea, vomiting, abdominal pain and diarrhea.   Genitourinary: Negative for dysuria.   Musculoskeletal: Positive for myalgias and joint pain.   Neurological: Positive for sensory change.   All other systems reviewed and are negative.      Hemodynamics:  Temp (24hrs), Av.7 °C (98 °F), Min:36.3 °C (97.4 °F), Max:37 °C (98.6 °F)  Temperature: 37 °C (98.6 °F)  Pulse  Av.8  Min: 61  Max: 111   Blood Pressure: 106/71 mmHg       Physical Exam:  Physical Exam   Constitutional: He is oriented to person, place, and time. He appears well-developed. No distress.   Disheveled   HENT:   Head: Normocephalic.   Poor dentition     Eyes:   Blind right eye   Neck: Neck supple.   Cardiovascular: Normal rate.    Pulmonary/Chest: Effort normal. No respiratory distress.   Abdominal: Soft. He exhibits no distension. There is no tenderness.   Musculoskeletal: He exhibits no edema.   Right stump dressed  VAC LLE  LUE PICC nontender   Neurological: He is alert and oriented to person, place, and time.   Skin: No rash noted.   tattoos   Nursing note and vitals reviewed.      Labs:  No results for input(s): WBC, RBC, HEMOGLOBIN, HEMATOCRIT, MCV, MCH, RDW, PLATELETCT, MPV, NEUTSPOLYS, LYMPHOCYTES, MONOCYTES, EOSINOPHILS, BASOPHILS, RBCMORPHOLO in the last 72 hours.  No results for input(s): SODIUM, POTASSIUM, CHLORIDE, CO2, GLUCOSE, BUN, CPKTOTAL in the last 72 hours.  No results for input(s): ALBUMIN, TBILIRUBIN, ALKPHOSPHAT, TOTPROTEIN, ALTSGPT, ASTSGOT, CREATININE in the last 72 hours.  Results     Procedure Component Value Units Date/Time    CULTURE TISSUE W/ GRM STAIN [[id number]]  (Abnormal)  (Susceptibility) Collected:  08/15/17 1318    Order Status:  Completed Specimen Information:  Tissue Updated:  17 2754     Significant Indicator POS (POS)      Source TISS      Site Right BKA Stump      Tissue Culture -- (A)      Gram Stain Result --      Result:        Few WBCs.  No organisms seen.       Tissue Culture -- (A)       Result:        Enterococcus faecium  Light growth  VANCOMYCIN RESISTANT ENTEROCOCCI(VRE)      ANAEROBIC CULTURE [[id number]] Collected:  08/15/17 1318    Order Status:  Completed Specimen Information:  Tissue Updated:  08/18/17 1626     Significant Indicator NEG      Source TISS      Site Right BKA Stump      Anaerobic Culture, Culture Res No Anaerobes isolated.     CULTURE TISSUE W/ GRM STAIN [[id number]] Collected:  08/15/17 1345    Order Status:  Completed Specimen Information:  Bone Updated:  08/18/17 0921     Gram Stain Result No organisms seen.      Significant Indicator NEG      Source BONE      Site Right Fibula      Tissue Culture No growth at 72 hours.     ANAEROBIC CULTURE [[id number]] Collected:  08/15/17 1345    Order Status:  Completed Specimen Information:  Bone Updated:  08/18/17 0921     Significant Indicator NEG      Source BONE      Site Right Fibula      Anaerobic Culture, Culture Res Culture in progress.     GRAM STAIN [[id number]] Collected:  08/15/17 1318    Order Status:  Completed Specimen Information:  Tissue Updated:  08/16/17 0706     Significant Indicator .      Source TISS      Site Right BKA Stump      Gram Stain Result --      Result:        Few WBCs.  No organisms seen.      GRAM STAIN [[id number]] Collected:  08/15/17 1345    Order Status:  Completed Specimen Information:  Bone Updated:  08/16/17 0600     Significant Indicator .      Source BONE      Site Right Fibula      Gram Stain Result No organisms seen.           Fluids:  Intake/Output       08/19/17 0700 - 08/20/17 0659 08/20/17 0700 - 08/21/17 0659 08/21/17 0700 - 08/22/17 0659      6038-8779 4364-5921 Total 9731-7716 4738-8972 Total 1189-1413 1644-2188 Total       Intake    P.O.  720  600 1320  840  -- 840  360  -- 360    P.O.  840 -- 840 360 -- 360    I.V.  --  -- --  100  -- 100  --  -- --    IV Piggyback Volume -- -- -- 100 -- 100 -- -- --    Total Intake  940 -- 940 360 -- 360       Output    Urine   1200  850 2050  1700  1600 3300  --  -- --    Void (ml) 6591 244 4961 1700 1600 3300 -- -- --    Stool  --  -- --  --  -- --  --  -- --    Number of Times Stooled 1 x 0 x 1 x -- -- -- -- -- --    Total Output 9739 823 2076 1700 1600 3300 -- -- --       Net I/O     -480 -980 -530 -687 -6043 -3528 360 -- 360             Assessment:  Active Hospital Problems    Diagnosis   • Skin ulcer of left foot with fat layer exposed (CMS-HCC) [L97.522]   • Wound of right leg, at BKA site [S81.801A]   • Peripheral neuropathy [G62.9]   • Tobacco abuse [Z72.0]       Plan:  Skin ulcer of left foot with fat layer exposed (CMS-[facility]), improving  Afebrile  No leukocytosis  S/p OR 7/25/17  Cultures  MRSA and Pseudomonas  Continue doxy  and cefepime for PSAR  Continue above antibiotics through 8/25/17   No once daily options  Wound care    Diarrhea, stable  Cdiff neg  Hold stool softeners    Right BKA stump  Infection  Status post revision on 8/15/17  Cultures are VRE  Continue Zyvox for 14 days  Stop date 9/3/2017

## 2017-08-21 NOTE — PROGRESS NOTES
Report received from RN, assumed Care. AOx4, responds appropriately.      Medicated for pain at this time.  Heat pack also in use for pain control.   Boot to RLE intact.   LLE with boot in place at all times per active order, wound vac changed by wound today.  PICC LUE TKO. + Iv abx   Tolerating diet.   Reports regular BM's    Plan of care discussed, all questions answered.      Up self, transfers independently to wheelchair, refuses to call before.      Call light and belongings within reach, bed alarm refused, bed in lowest locked position.  Hourly rounding in place, Will monitor frequently.

## 2017-08-22 PROCEDURE — 700102 HCHG RX REV CODE 250 W/ 637 OVERRIDE(OP): Performed by: INTERNAL MEDICINE

## 2017-08-22 PROCEDURE — A9270 NON-COVERED ITEM OR SERVICE: HCPCS | Performed by: INTERNAL MEDICINE

## 2017-08-22 PROCEDURE — 700105 HCHG RX REV CODE 258: Performed by: INTERNAL MEDICINE

## 2017-08-22 PROCEDURE — 770021 HCHG ROOM/CARE - ISO PRIVATE

## 2017-08-22 PROCEDURE — 700101 HCHG RX REV CODE 250: Performed by: INTERNAL MEDICINE

## 2017-08-22 PROCEDURE — 700111 HCHG RX REV CODE 636 W/ 250 OVERRIDE (IP): Performed by: INTERNAL MEDICINE

## 2017-08-22 PROCEDURE — 99232 SBSQ HOSP IP/OBS MODERATE 35: CPT | Performed by: INTERNAL MEDICINE

## 2017-08-22 RX ORDER — METHADONE HYDROCHLORIDE 10 MG/1
15 TABLET ORAL EVERY 8 HOURS
Status: DISCONTINUED | OUTPATIENT
Start: 2017-08-22 | End: 2017-08-23

## 2017-08-22 RX ADMIN — METHADONE HYDROCHLORIDE 15 MG: 10 TABLET ORAL at 21:34

## 2017-08-22 RX ADMIN — CEFEPIME 2 G: 2 INJECTION, POWDER, FOR SOLUTION INTRAMUSCULAR; INTRAVENOUS at 21:35

## 2017-08-22 RX ADMIN — LINEZOLID 600 MG: 600 TABLET, FILM COATED ORAL at 09:00

## 2017-08-22 RX ADMIN — PREGABALIN 150 MG: 150 CAPSULE ORAL at 21:35

## 2017-08-22 RX ADMIN — ENOXAPARIN SODIUM 40 MG: 100 INJECTION SUBCUTANEOUS at 09:28

## 2017-08-22 RX ADMIN — OXYCODONE HYDROCHLORIDE 10 MG: 10 TABLET ORAL at 01:46

## 2017-08-22 RX ADMIN — Medication 325 MG: at 06:08

## 2017-08-22 RX ADMIN — PREGABALIN 150 MG: 150 CAPSULE ORAL at 09:29

## 2017-08-22 RX ADMIN — ONDANSETRON 4 MG: 2 INJECTION INTRAMUSCULAR; INTRAVENOUS at 01:46

## 2017-08-22 RX ADMIN — OXYCODONE HYDROCHLORIDE 10 MG: 10 TABLET ORAL at 10:52

## 2017-08-22 RX ADMIN — CEFEPIME 2 G: 2 INJECTION, POWDER, FOR SOLUTION INTRAMUSCULAR; INTRAVENOUS at 15:50

## 2017-08-22 RX ADMIN — OXYCODONE HYDROCHLORIDE 10 MG: 10 TABLET ORAL at 15:49

## 2017-08-22 RX ADMIN — CLONAZEPAM 2 MG: 1 TABLET ORAL at 09:29

## 2017-08-22 RX ADMIN — METHADONE HYDROCHLORIDE 15 MG: 10 TABLET ORAL at 15:49

## 2017-08-22 RX ADMIN — LIDOCAINE 1 PATCH: 50 PATCH CUTANEOUS at 10:57

## 2017-08-22 RX ADMIN — CEFEPIME 2 G: 2 INJECTION, POWDER, FOR SOLUTION INTRAMUSCULAR; INTRAVENOUS at 06:06

## 2017-08-22 RX ADMIN — LINEZOLID 600 MG: 600 TABLET, FILM COATED ORAL at 21:35

## 2017-08-22 RX ADMIN — DOXYCYCLINE 100 MG: 100 TABLET ORAL at 09:29

## 2017-08-22 RX ADMIN — OXYCODONE HYDROCHLORIDE 10 MG: 10 TABLET ORAL at 06:09

## 2017-08-22 RX ADMIN — LIDOCAINE 1 PATCH: 50 PATCH CUTANEOUS at 10:59

## 2017-08-22 RX ADMIN — CLONAZEPAM 2 MG: 1 TABLET ORAL at 21:35

## 2017-08-22 RX ADMIN — ACETAMINOPHEN 650 MG: 325 TABLET, FILM COATED ORAL at 18:49

## 2017-08-22 RX ADMIN — PREGABALIN 150 MG: 150 CAPSULE ORAL at 15:49

## 2017-08-22 RX ADMIN — Medication 325 MG: at 15:49

## 2017-08-22 RX ADMIN — DOXYCYCLINE 100 MG: 100 TABLET ORAL at 21:35

## 2017-08-22 RX ADMIN — METHADONE HYDROCHLORIDE 10 MG: 10 TABLET ORAL at 06:09

## 2017-08-22 ASSESSMENT — ENCOUNTER SYMPTOMS
SENSORY CHANGE: 1
HEADACHES: 1
DIZZINESS: 0
TINGLING: 0
MYALGIAS: 0
HEARTBURN: 0
NECK PAIN: 0
DEPRESSION: 0
VOMITING: 0
NAUSEA: 1
PALPITATIONS: 0
ORTHOPNEA: 0
TREMORS: 0
FEVER: 0
MYALGIAS: 1
WEAKNESS: 0
DIARRHEA: 0
NAUSEA: 0
SHORTNESS OF BREATH: 0
COUGH: 0
NERVOUS/ANXIOUS: 1
CHILLS: 0
HALLUCINATIONS: 0
ABDOMINAL PAIN: 0

## 2017-08-22 ASSESSMENT — PAIN SCALES - GENERAL
PAINLEVEL_OUTOF10: 5
PAINLEVEL_OUTOF10: 5
PAINLEVEL_OUTOF10: 6
PAINLEVEL_OUTOF10: 3
PAINLEVEL_OUTOF10: 4

## 2017-08-22 NOTE — PROGRESS NOTES
[facility] Hospitalist Progress Note    Date of Service: 8/22/2017    Chief Complaint    52 y.o. male hx of PVD s/p L TMA and Rt Bka > 5 years ago  admitted 7/22/2017 with right BKA stump pain and wound drainage in addition to left TMA stump wound (nonhealing) over several months.  s/p Debridement and gastroscoleus resection LLE on 7-25-17 with Dr Shirley. Cultures have grown MRSA / Pseudomonas. He is being treated with IV cefepime and PO doxycycline per ID recommendations. These are to be continued until 08/25/2017. Patient has medical. Difficult disposition. SW continuing to look into placement options at this time. Continuing therapy inpatient at this point in time. Also underwent R BKA revision with Dr Shirley 08/15/2017    Interval Problem Update  8/22 spent long time discussing about pain medication. Patient claims he still have severe pain however when I walked in to patient's room patient was sleeping comfortably in bed. Per chart review patient does have history of drug seeking behavior. However revealed with the pharmacist patient was prescribed with methadone 60 mg 3 times a day as outpatient. Still complaint about pain in his legs wound. Patient otherwise denies fever, chills, nausea, vomiting, adb pain, SOB, CP, headache, constipation, diarrhea, cough, or sputum.    Consultants/Specialty  Limb preservation service  Infectious diseases - Last seen by Dr Pino  Orthopedic surgery - Dr Shirley  Psychiatry - Dr Trujillo    Disposition  Difficult disposition. Remains inpatient to continue therapy. SW working on disposition. Discussed on rounds with SW. Continue looking into placement option.       Review of Systems   Constitutional: Negative for fever and chills.   HENT: Negative for hearing loss.    Eyes:        Vision loss rt eye / Evisceration. Trauma history.    Respiratory: Negative for cough and shortness of breath.    Cardiovascular: Negative for chest pain, palpitations and orthopnea.    Gastrointestinal: Negative for heartburn, nausea and vomiting.   Genitourinary: Negative for dysuria, urgency and frequency.   Musculoskeletal: Positive for joint pain (chronics). Negative for myalgias and neck pain.        R BKA   Skin: Negative for rash.   Neurological: Positive for headaches. Negative for dizziness, tingling, tremors and weakness.        Chronic peripheral neuropathy .   Psychiatric/Behavioral: Negative for depression and hallucinations. The patient is nervous/anxious (improved.).       Physical Exam  Laboratory/Imaging   Hemodynamics  Temp (24hrs), Av.6 °C (97.9 °F), Min:36.3 °C (97.3 °F), Max:36.9 °C (98.4 °F)   Temperature: 36.7 °C (98 °F)  Pulse  Av.9  Min: 61  Max: 111    Blood Pressure: 118/61 mmHg      Respiratory      Respiration: 18, Pulse Oximetry: 94 %        RUL Breath Sounds: Clear, RML Breath Sounds: Clear, RLL Breath Sounds: Clear, NANYC Breath Sounds: Clear, LLL Breath Sounds: Clear    Fluids    Intake/Output Summary (Last 24 hours) at 17 0725  Last data filed at 17 0400   Gross per 24 hour   Intake   1120 ml   Output   3425 ml   Net  -2305 ml       Nutrition  Orders Placed This Encounter   Procedures   • DIET ORDER     Standing Status: Standing      Number of Occurrences: 1      Standing Expiration Date:      Order Specific Question:  Diet:     Answer:  Regular [1]      Comments:  regular trays     Physical Exam   Constitutional: He is oriented to person, place, and time. He appears well-developed and well-nourished. No distress.   HENT:   Head: Normocephalic and atraumatic.   Eyes: Conjunctivae are normal. Right eye exhibits no discharge.   Rt eye vision loss, enucleated.    Neck: Normal range of motion. Neck supple.   Cardiovascular: Normal rate and regular rhythm.    No murmur heard.  Pulmonary/Chest: Effort normal. No respiratory distress.   Abdominal: Soft. Bowel sounds are normal. He exhibits no distension. There is no tenderness.   Musculoskeletal: He  exhibits no edema.   Rt leg BKA - incision dry, no dehisc- healed.   Left leg w boot wound vac present.  Rt shoulder no redness or warmth, mild swelling.    Neurological: He is alert and oriented to person, place, and time. Coordination abnormal.   Skin: Skin is warm and dry. He is not diaphoretic. There is erythema.   End of stump on R appears chaffed, wound margins heaped.   Psychiatric: He has a normal mood and affect. His behavior is normal.                                Assessment/Plan     * Skin ulcer of left foot with fat layer exposed (CMS-HCC) (present on admission)  Assessment & Plan  S/p OR debridement and gastroscoleus resection 7/25   Continue wound vac care  w changes three times/wk  Contact isolation for MRSA.  TDWB LLE, WBAT RLE per Ortho recs.  Patient reportedly has restraining orders from much of the Skilled facilites in [place]  Difficult discharge  Cont to monitor     MRSA (methicillin resistant staph aureus) culture positive (present on admission)  Assessment & Plan  Doxycycline to cover L foot infection thru 8-25-17   Vanco stopped due to ARF  ID to f/u    Pseudomonas infection (present on admission)  Assessment & Plan  Left foot infection  continue IV cefepime thru 8-25-17  ID assisting    Anxiety (present on admission)  Assessment & Plan  Depakote, psych assisting, long talk, will add low dose klonopin  Mood stable today  Cont to monitoir    Diarrhea (present on admission)  Assessment & Plan  Follow  resolved    Peripheral neuropathy (present on admission)  Assessment & Plan  Lyrica to cont    Tobacco abuse (present on admission)  Assessment & Plan  Nicotine replacement PRN  Cessation counseling    Wound of right leg, at BKA site (present on admission)  Assessment & Plan  Culture: VRE  ID following  Wound care  Surgery on    Opiate dependence (CMS-HCC) (present on admission)  Assessment & Plan  Noted Drug seeking behavior inpatient- improved deviance.  Avoid escalating  IV narcotics with no no  signs for pain- use primarily for dressing / vac changes .  oxycontin recently stopped-- continue methadone. Verified with patient's outpatient pharmacy patient was taking methadone 60 mg 3 times a day. Currently on 10 mg 3 times a day  Patient's pain has been managed by outpatient pain management in [place]  Will slowly increase methadone back to 15 mg 3 times a day for now  Daily wants to change regimen      ABRIL (acute kidney injury) (CMS-[facility])  Assessment & Plan  resolved      Labs reviewed and Medications reviewed  Rolon catheter: No Rolon      DVT Prophylaxis: Enoxaparin (Lovenox)    Ulcer prophylaxis: Not indicated  Antibiotics: Treating active infection/contamination beyond 24 hours perioperative coverage  Assessed for rehab: Patient was assess for and/or received rehabilitation services during this hospitalization

## 2017-08-22 NOTE — PROGRESS NOTES
Infectious Disease Progress Note    Author: Joie Ramon M.D. DOS & Time created: 8/22/2017  11:15 AM    CC: FU right BKA stump infection and nonhealing left TMA stump wound     Interval History:  52 y.o. WM admitted 7/22/2017 with right BKA infection and nonhealing left TMA stump wound   7/23 AF WBC 7.6 less drainage from wounds Asking if he will need another BKA  7/24/17- no fevers. WBC 10.6. Complains of chills. Complains of 10 out of 10 pain in the left foot. Complains of drainage from the right stump.  7/25/17- complains of pain in the foot. Had low-grade fevers up to 100.7 last night . Foot cultures are showing MRSA and Pseudomonas  7/26/17- no fevers. Complains of burning in his left leg. No discharge noted from his right stump  7/28/17- no fevers. Complains of ongoing pain.  7/29/17- no fevers. No labs available  7/30/17-MAXIMUM TEMPERATURE 99.5. Ongoing pain issues. WBC 9000. Creatinine 1.17  7/31 AF lots of c/o neuropathy-feels like stump is in ice water  8/1 AF no new complaints- +phantom limb pain  8/2 AF pain better controlled Having loose stools. Wants a psych consult  8/3 AF in WC today No new complaints  8/4 AF WBC 9.2 no new complaints-wants to see psych again  8/5 AF WBC 9.2 seen during dressing change  8/7 AF, no CBC, tired of infections, thinking about left BKA, wants to speak to Dr. Shirley  8/8 AF, no CBC, no longer wants a BKA, wants to try abx first, PICC came out a little after his TV remote hit his PICC  8/9 AF, no CBC, ambulating in halls without issues  8/20/17-denies any fever. Complains of significant pain in his BKA site  8/21 AF no acute events or new complaints  8/22 AF c/o nausea last night    .Labs Reviewed, Medications Reviewed, Radiology Reviewed and Wound Reviewed.    Review of Systems:  Review of Systems   Constitutional: Negative for fever and chills.   Respiratory: Negative for cough and shortness of breath.    Cardiovascular: Negative for chest pain.    Gastrointestinal: Positive for nausea. Negative for vomiting, abdominal pain and diarrhea.   Genitourinary: Negative for dysuria.   Musculoskeletal: Positive for myalgias and joint pain.   Neurological: Positive for sensory change.   All other systems reviewed and are negative.      Hemodynamics:  Temp (24hrs), Av.6 °C (97.8 °F), Min:36.3 °C (97.3 °F), Max:36.9 °C (98.4 °F)  Temperature: 36.3 °C (97.3 °F)  Pulse  Av  Min: 61  Max: 111   Blood Pressure: 126/70 mmHg       Physical Exam:  Physical Exam   Constitutional: He is oriented to person, place, and time. He appears well-developed. No distress.   Disheveled   HENT:   Head: Normocephalic.   Poor dentition     Eyes:   Blind right eye   Neck: Neck supple.   Cardiovascular: Normal rate.    Pulmonary/Chest: Effort normal. No respiratory distress.   Abdominal: Soft. He exhibits no distension. There is no tenderness.   Musculoskeletal: He exhibits no edema.   Right stump dressed  VAC LLE  LUE PICC nontender   Neurological: He is alert and oriented to person, place, and time.   Skin: No rash noted.   tattoos   Nursing note and vitals reviewed.      Labs:  No results for input(s): WBC, RBC, HEMOGLOBIN, HEMATOCRIT, MCV, MCH, RDW, PLATELETCT, MPV, NEUTSPOLYS, LYMPHOCYTES, MONOCYTES, EOSINOPHILS, BASOPHILS, RBCMORPHOLO in the last 72 hours.  No results for input(s): SODIUM, POTASSIUM, CHLORIDE, CO2, GLUCOSE, BUN, CPKTOTAL in the last 72 hours.  No results for input(s): ALBUMIN, TBILIRUBIN, ALKPHOSPHAT, TOTPROTEIN, ALTSGPT, ASTSGOT, CREATININE in the last 72 hours.  Results     Procedure Component Value Units Date/Time    CULTURE TISSUE W/ GRM STAIN [[id number]]  (Abnormal)  (Susceptibility) Collected:  08/15/17 1318    Order Status:  Completed Specimen Information:  Tissue Updated:  17 7827     Significant Indicator POS (POS)      Source TISS      Site Right BKA Stump      Tissue Culture -- (A)      Gram Stain Result --      Result:        Few WBCs.  No  organisms seen.       Tissue Culture -- (A)      Result:        Enterococcus faecium  Light growth  VANCOMYCIN RESISTANT ENTEROCOCCI(VRE)      ANAEROBIC CULTURE [[id number]] Collected:  08/15/17 1318    Order Status:  Completed Specimen Information:  Tissue Updated:  08/18/17 1626     Significant Indicator NEG      Source TISS      Site Right BKA Stump      Anaerobic Culture, Culture Res No Anaerobes isolated.     CULTURE TISSUE W/ GRM STAIN [[id number]] Collected:  08/15/17 1345    Order Status:  Completed Specimen Information:  Bone Updated:  08/18/17 0921     Gram Stain Result No organisms seen.      Significant Indicator NEG      Source BONE      Site Right Fibula      Tissue Culture No growth at 72 hours.     ANAEROBIC CULTURE [[id number]] Collected:  08/15/17 1345    Order Status:  Completed Specimen Information:  Bone Updated:  08/18/17 0921     Significant Indicator NEG      Source BONE      Site Right Fibula      Anaerobic Culture, Culture Res Culture in progress.     GRAM STAIN [[id number]] Collected:  08/15/17 1318    Order Status:  Completed Specimen Information:  Tissue Updated:  08/16/17 0706     Significant Indicator .      Source TISS      Site Right BKA Stump      Gram Stain Result --      Result:        Few WBCs.  No organisms seen.      GRAM STAIN [[id number]] Collected:  08/15/17 1345    Order Status:  Completed Specimen Information:  Bone Updated:  08/16/17 0600     Significant Indicator .      Source BONE      Site Right Fibula      Gram Stain Result No organisms seen.           Fluids:  Intake/Output       08/20/17 0700 - 08/21/17 0659 08/21/17 0700 - 08/22/17 0659 08/22/17 0700 - 08/23/17 0659      6637-5448 5315-9868 Total 9550-1614 3763-9874 Total 2420-6015 6350-8647 Total       Intake    P.O.  840  -- 840  920  -- 920  --  -- --    P.O. 840 -- 840 920 -- 920 -- -- --    I.V.  100  -- 100  100  100 200  --  -- --    IV Piggyback Volume 100 -- 100 100 100 200 -- -- --    Total Intake 940 -- 940  4995 713 1607 -- -- --       Output    Urine  1700  1600 3300  1875  1550 3425  --  -- --    Void (ml) 1700 1600 3300 1875 1550 3425 -- -- --    Stool  --  -- --  --  -- --  --  -- --    Number of Times Stooled -- -- -- -- 0 x 0 x -- -- --    Total Output 1700 1600 3300 1875 1550 3425 -- -- --       Net I/O     -760 -1600 -2360 -115 -8231 -3223 -- -- --             Assessment:  Active Hospital Problems    Diagnosis   • Skin ulcer of left foot with fat layer exposed (CMS-[facility]) [L97.522]   • Wound of right leg, at BKA site [S81.801A]   • Peripheral neuropathy [G62.9]   • Tobacco abuse [Z72.0]       Plan:  Skin ulcer of left foot with fat layer exposed (CMS-[facility]), improving  Afebrile  No leukocytosis  S/p OR 7/25/17  Cultures  MRSA and Pseudomonas  Continue doxy  and cefepime for PSAR  Stop date 8/25/17   No once daily options  Wound care    Nausea  No other assoc sxs  Only at night  Per PCT    Right BKA stump  Infection  Status post revision on 8/15/17  Cultures are VRE  Continue Zyvox for 14 days  Stop date 9/3/2017  Monitor CBC while on Zyvox-check today

## 2017-08-22 NOTE — WOUND TEAM
[facility]  Inpatient Services  Wound and Skin Care Treatment Note    Admission Date:  07/22/17     HPI, PMH, SH: Reviewed  Unit where seen by Wound Team:  T4    WOUND CONSULT RELATED TO:   Scheduled NPWT dressing change left plantar foot      SUBJECTIVE:  Pleasant/agreeable    Self Report / Pain Level:  Tolerated well with PO pre medication     OBJECTIVE:    Previous NPWT dressing loose; no alarm    WOUND TYPE, LOCATION, CHARACTERISTICS (Pressure ulcers: location, stage, POA or date identified)    Location/type of wound: Open surgical wound left plantar foot      Periwound:     macerated around wound and in between remaining amputation site of pervious digit; callous-like tissue      Drainage:     scant serosanguinous    Tissue Type and %:    100% red/pink  Wound Edges:    Attached/macerated    Odor:     none  Exposed structure(s):  Bone palpated    S&S of Infection:     none      Measurements: (cm)  (Taken 08/16/17)   Length:    5.2   Width:     5.5  Depth:    ~ 0.2      INTERVENTIONS BY WOUND TEAM:  Removed prev dressing and irrigated wound with wound cleanser.  Benzoin and drape to bull wound skin as needed. Small piece of paste ring used to seal fissure on lateral side.  Covered wound with 1 piece black foam with a bridge and button to dorsum of foot. Secured with drape and applied cont neg pressure at 125mmhg. Placed a non-adhesive foam under the forefoot to hopefully absorb any moisture. Total black foam=3 pieces.    Interdisciplinary consultation:   Staff RN, patient      EVALUATION:  wnd stable; bull-wnd maceration and moisture remains an issue     Factors affecting wound healing:  Neuropathy, tobacco abuse     Goals:  Decrease in wound size by 5% every 3 weeks -- no maceration    NURSING PLAN OF CARE ORDERS (x):    Dressing changes: See Dressing Maintenance orders:  x  Skin care: See Skin Care orders: x  Rectal tube care: See Rectal Tube Care orders:   Other orders:    WOUND TEAM PLAN OF  CARE (x):   NPWT change 3 x week:   x     Dressing changes by wound team:       Follow up as needed:     x  Other (explain):    Anticipated discharge plans (x):  SNF:           Home Care:           Outpatient Wound Center:   x      Self Care:            Other:

## 2017-08-22 NOTE — PROGRESS NOTES
A/Ox4, VSS, sitting in WC.  Pt c/o pain 4/10. Medicated per MAR.  Pt c/o nausea, zofran administered.  LLE boot in place, wound vac functioning appropriately.  Cast to RLE intact.  TKO and IV abx to LUE PICC, patent.  Tolerating diet.  RA.  Transfers to  and bed on his own.  Champ light within reach, calls appropriately.  Bed in low and locked position.  All needs met at this time.

## 2017-08-22 NOTE — PROGRESS NOTES
Custom order completed by [employer].  If any further assistance needed, please call extension [phone] or place order for Ortho Technician assistance as a communication order in [employer].

## 2017-08-23 PROCEDURE — 700102 HCHG RX REV CODE 250 W/ 637 OVERRIDE(OP): Performed by: INTERNAL MEDICINE

## 2017-08-23 PROCEDURE — A9270 NON-COVERED ITEM OR SERVICE: HCPCS | Performed by: INTERNAL MEDICINE

## 2017-08-23 PROCEDURE — 97605 NEG PRS WND THER DME<=50SQCM: CPT

## 2017-08-23 PROCEDURE — 700105 HCHG RX REV CODE 258

## 2017-08-23 PROCEDURE — 700105 HCHG RX REV CODE 258: Performed by: INTERNAL MEDICINE

## 2017-08-23 PROCEDURE — 700111 HCHG RX REV CODE 636 W/ 250 OVERRIDE (IP): Performed by: INTERNAL MEDICINE

## 2017-08-23 PROCEDURE — 700101 HCHG RX REV CODE 250: Performed by: INTERNAL MEDICINE

## 2017-08-23 PROCEDURE — 99232 SBSQ HOSP IP/OBS MODERATE 35: CPT | Performed by: INTERNAL MEDICINE

## 2017-08-23 PROCEDURE — 770021 HCHG ROOM/CARE - ISO PRIVATE

## 2017-08-23 RX ORDER — METHADONE HYDROCHLORIDE 10 MG/1
20 TABLET ORAL EVERY 8 HOURS
Status: DISCONTINUED | OUTPATIENT
Start: 2017-08-23 | End: 2017-10-18 | Stop reason: HOSPADM

## 2017-08-23 RX ORDER — MORPHINE SULFATE 4 MG/ML
2 INJECTION, SOLUTION INTRAMUSCULAR; INTRAVENOUS ONCE
Status: COMPLETED | OUTPATIENT
Start: 2017-08-23 | End: 2017-08-23

## 2017-08-23 RX ORDER — SODIUM CHLORIDE 9 MG/ML
INJECTION, SOLUTION INTRAVENOUS
Status: COMPLETED
Start: 2017-08-23 | End: 2017-08-23

## 2017-08-23 RX ADMIN — METHADONE HYDROCHLORIDE 20 MG: 10 TABLET ORAL at 14:55

## 2017-08-23 RX ADMIN — Medication 325 MG: at 08:21

## 2017-08-23 RX ADMIN — SODIUM CHLORIDE: 9 INJECTION, SOLUTION INTRAVENOUS at 05:45

## 2017-08-23 RX ADMIN — Medication 325 MG: at 16:50

## 2017-08-23 RX ADMIN — PREGABALIN 150 MG: 150 CAPSULE ORAL at 21:28

## 2017-08-23 RX ADMIN — ENOXAPARIN SODIUM 40 MG: 100 INJECTION SUBCUTANEOUS at 08:22

## 2017-08-23 RX ADMIN — METHADONE HYDROCHLORIDE 15 MG: 10 TABLET ORAL at 05:50

## 2017-08-23 RX ADMIN — ZOLPIDEM TARTRATE 5 MG: 5 TABLET, FILM COATED ORAL at 01:02

## 2017-08-23 RX ADMIN — CEFEPIME 2 G: 2 INJECTION, POWDER, FOR SOLUTION INTRAMUSCULAR; INTRAVENOUS at 14:58

## 2017-08-23 RX ADMIN — DOXYCYCLINE 100 MG: 100 TABLET ORAL at 08:20

## 2017-08-23 RX ADMIN — LIDOCAINE 1 APPLICATION: 40 CREAM TOPICAL at 21:29

## 2017-08-23 RX ADMIN — LINEZOLID 600 MG: 600 TABLET, FILM COATED ORAL at 21:29

## 2017-08-23 RX ADMIN — OXYCODONE HYDROCHLORIDE 10 MG: 10 TABLET ORAL at 16:50

## 2017-08-23 RX ADMIN — CEFEPIME 2 G: 2 INJECTION, POWDER, FOR SOLUTION INTRAMUSCULAR; INTRAVENOUS at 23:01

## 2017-08-23 RX ADMIN — OXYCODONE HYDROCHLORIDE 10 MG: 10 TABLET ORAL at 23:11

## 2017-08-23 RX ADMIN — CLONAZEPAM 2 MG: 1 TABLET ORAL at 08:21

## 2017-08-23 RX ADMIN — PREGABALIN 150 MG: 150 CAPSULE ORAL at 08:21

## 2017-08-23 RX ADMIN — LINEZOLID 600 MG: 600 TABLET, FILM COATED ORAL at 08:20

## 2017-08-23 RX ADMIN — CEFEPIME 2 G: 2 INJECTION, POWDER, FOR SOLUTION INTRAMUSCULAR; INTRAVENOUS at 05:50

## 2017-08-23 RX ADMIN — LIDOCAINE 1 PATCH: 50 PATCH CUTANEOUS at 11:36

## 2017-08-23 RX ADMIN — Medication 10 ML: at 23:03

## 2017-08-23 RX ADMIN — LIDOCAINE 1 PATCH: 50 PATCH CUTANEOUS at 11:37

## 2017-08-23 RX ADMIN — ZOLPIDEM TARTRATE 5 MG: 5 TABLET, FILM COATED ORAL at 23:11

## 2017-08-23 RX ADMIN — OXYCODONE HYDROCHLORIDE 10 MG: 10 TABLET ORAL at 01:02

## 2017-08-23 RX ADMIN — CLONAZEPAM 2 MG: 1 TABLET ORAL at 21:28

## 2017-08-23 RX ADMIN — OXYCODONE HYDROCHLORIDE 10 MG: 10 TABLET ORAL at 08:21

## 2017-08-23 RX ADMIN — METHADONE HYDROCHLORIDE 20 MG: 10 TABLET ORAL at 21:29

## 2017-08-23 RX ADMIN — DOXYCYCLINE 100 MG: 100 TABLET ORAL at 21:29

## 2017-08-23 RX ADMIN — PREGABALIN 150 MG: 150 CAPSULE ORAL at 14:55

## 2017-08-23 RX ADMIN — MORPHINE SULFATE 2 MG: 4 INJECTION INTRAVENOUS at 11:35

## 2017-08-23 ASSESSMENT — PAIN SCALES - GENERAL
PAINLEVEL_OUTOF10: 8
PAINLEVEL_OUTOF10: 3
PAINLEVEL_OUTOF10: 3
PAINLEVEL_OUTOF10: 5
PAINLEVEL_OUTOF10: 7
PAINLEVEL_OUTOF10: 6
PAINLEVEL_OUTOF10: 3
PAINLEVEL_OUTOF10: 3
PAINLEVEL_OUTOF10: 0

## 2017-08-23 ASSESSMENT — ENCOUNTER SYMPTOMS
HALLUCINATIONS: 0
ORTHOPNEA: 0
CLAUDICATION: 0
COUGH: 0
BACK PAIN: 0
HEARTBURN: 0
WEAKNESS: 0
TREMORS: 0
TINGLING: 0
NERVOUS/ANXIOUS: 1
CHILLS: 0
SPUTUM PRODUCTION: 0
FEVER: 0
DIARRHEA: 0
SENSORY CHANGE: 1
HEADACHES: 1
DIZZINESS: 0
VOMITING: 0
NAUSEA: 1
WEIGHT LOSS: 1
MYALGIAS: 0
ABDOMINAL PAIN: 0
NECK PAIN: 0
DEPRESSION: 0
NAUSEA: 0
SHORTNESS OF BREATH: 0
MYALGIAS: 1

## 2017-08-23 NOTE — DISCHARGE PLANNING
"Case discussed in rounds. Pt will finish IVABX 8/25. Met with pt to discuss possibility of going home with vac and f/u at outpt clinic. Pt does not feel that he is \"well enough or strong enough\" to care for self at home. Lives with room-mate in [place], [place]. Has no DME and states he was \"bed-bound\" prior to coming to [place]. Will continue to pursue SNF in [facility]. Pt provided with phone #s of [employer] workers who have been working on case.   "

## 2017-08-23 NOTE — PROGRESS NOTES
"Pt is upset due to the fact that he does not have dilaudid for pain. This RN explained to him that he is receiving methadone and oxy for pain as well as tylenol pt stated \"that shit doesn't work I have the numbers I need\". This RN explained to him that he has not recieved dilaudid since 0400 on the 8/20, and the MD's transitioned to oral pain management. Pt is upset, Tylenol administered  and Ice provided.  "

## 2017-08-23 NOTE — PROGRESS NOTES
"Pt c/o pain \"like its in ice\" to the RLE at [facility], with mild \"phantom pain\". Explained meds were not due for an hour, Ice Provided for pain and distraction/repositioning provided.  "

## 2017-08-23 NOTE — PROGRESS NOTES
Infectious Disease Progress Note    Author: Joie Raomn M.D. DOS & Time created: 8/23/2017  1:44 PM    CC: FU right BKA stump infection and nonhealing left TMA stump wound     Interval History:  52 y.o. WM admitted 7/22/2017 with right BKA infection and nonhealing left TMA stump wound   7/23 AF WBC 7.6 less drainage from wounds Asking if he will need another BKA  7/24/17- no fevers. WBC 10.6. Complains of chills. Complains of 10 out of 10 pain in the left foot. Complains of drainage from the right stump.  7/25/17- complains of pain in the foot. Had low-grade fevers up to 100.7 last night . Foot cultures are showing MRSA and Pseudomonas  7/26/17- no fevers. Complains of burning in his left leg. No discharge noted from his right stump  7/28/17- no fevers. Complains of ongoing pain.  7/29/17- no fevers. No labs available  7/30/17-MAXIMUM TEMPERATURE 99.5. Ongoing pain issues. WBC 9000. Creatinine 1.17  7/31 AF lots of c/o neuropathy-feels like stump is in ice water  8/1 AF no new complaints- +phantom limb pain  8/2 AF pain better controlled Having loose stools. Wants a psych consult  8/3 AF in WC today No new complaints  8/4 AF WBC 9.2 no new complaints-wants to see psych again  8/5 AF WBC 9.2 seen during dressing change  8/7 AF, no CBC, tired of infections, thinking about left BKA, wants to speak to Dr. Shriley  8/8 AF, no CBC, no longer wants a BKA, wants to try abx first, PICC came out a little after his TV remote hit his PICC  8/9 AF, no CBC, ambulating in halls without issues  8/20/17-denies any fever. Complains of significant pain in his BKA site  8/21 AF no acute events or new complaints  8/22 AF c/o nausea last night  8/23 AF OOB in wheelchair again today. CBC not done  .Labs Reviewed, Medications Reviewed, Radiology Reviewed and Wound Reviewed.    Review of Systems:  Review of Systems   Constitutional: Negative for fever and chills.   Respiratory: Negative for cough and shortness of breath.     Cardiovascular: Negative for chest pain.   Gastrointestinal: Positive for nausea. Negative for vomiting, abdominal pain and diarrhea.   Genitourinary: Negative for dysuria.   Musculoskeletal: Positive for myalgias and joint pain.   Neurological: Positive for sensory change.   All other systems reviewed and are negative.      Hemodynamics:  Temp (24hrs), Av.6 °C (97.9 °F), Min:36.3 °C (97.3 °F), Max:36.7 °C (98.1 °F)  Temperature: 36.7 °C (98 °F)  Pulse  Av.1  Min: 61  Max: 111   Blood Pressure: 114/61 mmHg       Physical Exam:  Physical Exam   Constitutional: He is oriented to person, place, and time. He appears well-developed. No distress.   Disheveled   HENT:   Head: Normocephalic.   Poor dentition     Eyes:   Blind right eye   Neck: Neck supple.   Cardiovascular: Normal rate.    Pulmonary/Chest: Effort normal. No respiratory distress.   Abdominal: Soft. He exhibits no distension. There is no tenderness.   Musculoskeletal: He exhibits no edema.   Right stump dressed  VAC LLE  LUE PICC nontender   Neurological: He is alert and oriented to person, place, and time.   Skin: No rash noted.   tattoos   Nursing note and vitals reviewed.      Labs:  No results for input(s): WBC, RBC, HEMOGLOBIN, HEMATOCRIT, MCV, MCH, RDW, PLATELETCT, MPV, NEUTSPOLYS, LYMPHOCYTES, MONOCYTES, EOSINOPHILS, BASOPHILS, RBCMORPHOLO in the last 72 hours.  No results for input(s): SODIUM, POTASSIUM, CHLORIDE, CO2, GLUCOSE, BUN, CPKTOTAL in the last 72 hours.  No results for input(s): ALBUMIN, TBILIRUBIN, ALKPHOSPHAT, TOTPROTEIN, ALTSGPT, ASTSGOT, CREATININE in the last 72 hours.  Results     Procedure Component Value Units Date/Time    CULTURE TISSUE W/ GRM STAIN [[id number]]  (Abnormal)  (Susceptibility) Collected:  08/15/17 1318    Order Status:  Completed Specimen Information:  Tissue Updated:  17 5475     Significant Indicator POS (POS)      Source TISS      Site Right BKA Stump      Tissue Culture -- (A)      Gram Stain  Result --      Result:        Few WBCs.  No organisms seen.       Tissue Culture -- (A)      Result:        Enterococcus faecium  Light growth  VANCOMYCIN RESISTANT ENTEROCOCCI(VRE)      ANAEROBIC CULTURE [[id number]] Collected:  08/15/17 1318    Order Status:  Completed Specimen Information:  Tissue Updated:  08/18/17 1626     Significant Indicator NEG      Source TISS      Site Right BKA Stump      Anaerobic Culture, Culture Res No Anaerobes isolated.     CULTURE TISSUE W/ GRM STAIN [[id number]] Collected:  08/15/17 1345    Order Status:  Completed Specimen Information:  Bone Updated:  08/18/17 0921     Gram Stain Result No organisms seen.      Significant Indicator NEG      Source BONE      Site Right Fibula      Tissue Culture No growth at 72 hours.     ANAEROBIC CULTURE [[id number]] Collected:  08/15/17 1345    Order Status:  Completed Specimen Information:  Bone Updated:  08/18/17 0921     Significant Indicator NEG      Source BONE      Site Right Fibula      Anaerobic Culture, Culture Res Culture in progress.     GRAM STAIN [[id number]] Collected:  08/15/17 1318    Order Status:  Completed Specimen Information:  Tissue Updated:  08/16/17 0706     Significant Indicator .      Source TISS      Site Right BKA Stump      Gram Stain Result --      Result:        Few WBCs.  No organisms seen.      GRAM STAIN [[id number]] Collected:  08/15/17 1345    Order Status:  Completed Specimen Information:  Bone Updated:  08/16/17 0600     Significant Indicator .      Source BONE      Site Right Fibula      Gram Stain Result No organisms seen.           Fluids:  Intake/Output       08/21/17 0700 - 08/22/17 0659 08/22/17 0700 - 08/23/17 0659 08/23/17 0700 - 08/24/17 0659      [id number] 2390-3861 Total 9761-5271 0661-7366 Total 4150-1954 0625-4600 Total       Intake    P.O.  920  -- 920  --  800 800  --  -- --    P.O. 920 -- 920 -- 800 800 -- -- --    I.V.  100  100 200  --  -- --  --  -- --    IV Piggyback Volume 100 100 200 -- --  -- -- -- --    Total Intake 5793 750 2932 -- 800 800 -- -- --       Output    Urine  1875 1550 3425  --  400 400  --  -- --    Number of Times Voided -- -- -- -- 1 x 1 x -- -- --    Void (ml) 1875 1550 3425 -- 400 400 -- -- --    Stool  --  -- --  --  -- --  --  -- --    Number of Times Stooled -- 0 x 0 x -- 1 x 1 x -- -- --    Total Output 1875 1550 3425 -- 400 400 -- -- --       Net I/O     -040 -3230 -9035 -- 400 400 -- -- --             Assessment:  Active Hospital Problems    Diagnosis   • Skin ulcer of left foot with fat layer exposed (CMS-[facility]) [L97.522]   • Wound of right leg, at BKA site [S81.801A]   • Peripheral neuropathy [G62.9]   • Tobacco abuse [Z72.0]       Plan:  Skin ulcer of left foot with fat layer exposed (CMS-[facility]), improving  Afebrile  No leukocytosis  S/p OR 7/25/17  Cultures  MRSA and Pseudomonas  Continue doxy  and cefepime for PSAR  Stop date 8/25/17   No once daily options  Wound care    Right BKA stump  Infection  Status post revision on 8/15/17  Cultures are VRE  Continue Zyvox for 14 days  Stop date 9/3/2017  Monitor CBC while on Zyvox-reordered    Difficult discharge

## 2017-08-23 NOTE — CARE PLAN
Problem: Communication  Goal: The ability to communicate needs accurately and effectively will improve  Intervention: Educate patient and significant other/support system about the plan of care, procedures, treatments, medications and allow for questions  White board updated with [facility] staff and return time.  PT notified of hourly rounding and unit routine.   Appropriate signs in place at doorway for pt.       Problem: Infection  Goal: Will remain free from infection  Intervention: Implement standard precautions and perform hand washing before and after patient contact  IV Hand hygiene implemented on every encounter.   [place] PPE used  CHG bath complete   Scrubbed the hub for a minimum of 15 seconds prior to accessing the PICC line.   IV ABX in administered per MAR

## 2017-08-23 NOTE — PROGRESS NOTES
[facility] Hospitalist Progress Note    Date of Service: 8/23/2017    Chief Complaint    52 y.o. male hx of PVD s/p L TMA and Rt Bka > 5 years ago  admitted 7/22/2017 with right BKA stump pain and wound drainage in addition to left TMA stump wound (nonhealing) over several months.  s/p Debridement and gastroscoleus resection LLE on 7-25-17 with Dr Shirley. Cultures have grown MRSA / Pseudomonas. He is being treated with IV cefepime and PO doxycycline per ID recommendations. These are to be continued until 08/25/2017. Patient has medical. Difficult disposition. SW continuing to look into placement options at this time. Continuing therapy inpatient at this point in time. Also underwent R BKA revision with Dr Shirley 08/15/2017    Interval Problem Update  8/22 spent long time discussing about pain medication. Patient claims he still have severe pain however when I walked in to patient's room patient was sleeping comfortably in bed. Per chart review patient does have history of drug seeking behavior. However revealed with the pharmacist patient was prescribed with methadone 60 mg 3 times a day as outpatient. Still complaint about pain in his legs wound. Patient otherwise denies fever, chills, nausea, vomiting, adb pain, SOB, CP, headache, constipation, diarrhea, cough, or sputum.    8/23 still complained of R shoulder and leg pain. We'll increase methadone to 20 mg 3 times a day. Patient is educated that narcotics should not be used for chronic pain medication. Methadone is used for previous history of narcotics use and prevent narcotics withdrawal.    Consultants/Specialty  Limb preservation service  Infectious diseases - Last seen by Dr Pino  Orthopedic surgery - Dr Shirley  Psychiatry - Dr Trujillo    Disposition  Difficult disposition. Remains inpatient to continue therapy. SW working on disposition. Discussed on rounds with SW. Continue looking into placement option.       Review of Systems   Constitutional: Positive  for weight loss. Negative for chills.   HENT: Negative for hearing loss.    Eyes:        Vision loss rt eye / Evisceration. Trauma history.    Respiratory: Negative for cough, sputum production and shortness of breath.    Cardiovascular: Negative for chest pain, orthopnea and claudication.   Gastrointestinal: Negative for heartburn, nausea, vomiting and diarrhea.   Genitourinary: Negative for dysuria, frequency and hematuria.   Musculoskeletal: Positive for joint pain (chronics). Negative for myalgias, back pain and neck pain.        R BKA   Skin: Negative for rash.   Neurological: Positive for headaches. Negative for dizziness, tingling, tremors and weakness.        Chronic peripheral neuropathy .   Psychiatric/Behavioral: Negative for depression and hallucinations. The patient is nervous/anxious (improved.).       Physical Exam  Laboratory/Imaging   Hemodynamics  Temp (24hrs), Av.5 °C (97.7 °F), Min:36.3 °C (97.3 °F), Max:36.7 °C (98.1 °F)   Temperature: 36.3 °C (97.3 °F)  Pulse  Av.1  Min: 61  Max: 111    Blood Pressure: 118/76 mmHg      Respiratory      Respiration: 16, Pulse Oximetry: 93 %        RUL Breath Sounds: Diminished, RML Breath Sounds: Diminished, RLL Breath Sounds: Diminished, NANCY Breath Sounds: Diminished, LLL Breath Sounds: Diminished    Fluids    Intake/Output Summary (Last 24 hours) at 17 0732  Last data filed at 17 0500   Gross per 24 hour   Intake    800 ml   Output    400 ml   Net    400 ml       Nutrition  Orders Placed This Encounter   Procedures   • DIET ORDER     Standing Status: Standing      Number of Occurrences: 1      Standing Expiration Date:      Order Specific Question:  Diet:     Answer:  Regular [1]      Comments:  regular trays     Physical Exam   Constitutional: He is oriented to person, place, and time. He appears well-developed and well-nourished.   HENT:   Head: Normocephalic.   Mouth/Throat: Oropharynx is clear and moist.   Eyes: EOM are normal. Right  eye exhibits no discharge.   Rt eye vision loss, enucleated.    Neck: Normal range of motion. Neck supple. No JVD present. No tracheal deviation present.   Cardiovascular: Normal rate and regular rhythm.  Exam reveals no friction rub.    No murmur heard.  Pulmonary/Chest: Effort normal. No respiratory distress. He has no rales.   Abdominal: Soft. Bowel sounds are normal. He exhibits no distension. There is no tenderness. There is no guarding.   Musculoskeletal: He exhibits no edema.   Rt leg BKA - incision dry, no dehisc- healed.   Left leg w boot wound vac present.  Rt shoulder no redness or warmth, mild swelling.    Neurological: He is alert and oriented to person, place, and time. Coordination abnormal.   Skin: Skin is warm and dry. He is not diaphoretic. There is erythema.   End of stump on R appears chaffed, wound margins heaped.   Psychiatric: He has a normal mood and affect. His behavior is normal.                                Assessment/Plan     * Skin ulcer of left foot with fat layer exposed (CMS-HCC) (present on admission)  Assessment & Plan  S/p OR debridement and gastroscoleus resection 7/25   Continue wound vac care  w changes three times/wk  Contact isolation for MRSA.  TDWB LLE, WBAT RLE per Ortho recs.  Patient reportedly has restraining orders from much of the Skilled facilites in [place]  Difficult discharge  Cont to monitor     MRSA (methicillin resistant staph aureus) culture positive (present on admission)  Assessment & Plan  Doxycycline to cover L foot infection thru 8-25-17   Vanco stopped due to ARF  ID to f/u    Pseudomonas infection (present on admission)  Assessment & Plan  Left foot infection  continue IV cefepime thru 8-25-17  ID assisting    Anxiety (present on admission)  Assessment & Plan  Depakote, psych assisting, long talk, will add low dose klonopin  Mood stable today  Cont to monitoir    Diarrhea (present on admission)  Assessment & Plan  Follow  resolved    Peripheral neuropathy  (present on admission)  Assessment & Plan  Lyrica to cont    Tobacco abuse (present on admission)  Assessment & Plan  Nicotine replacement PRN  Cessation counseling    Wound of right leg, at BKA site (present on admission)  Assessment & Plan  Culture: VRE  ID following  Wound care  Surgery on    Opiate dependence (CMS-HCC) (present on admission)  Assessment & Plan  Noted Drug seeking behavior inpatient- improved deviance.  Avoid escalating  IV narcotics with no no signs for pain- use primarily for dressing / vac changes .  oxycontin recently stopped-- continue methadone. Verified with patient's outpatient pharmacy patient was taking methadone 60 mg 3 times a day. Currently on 10 mg 3 times a day  Patient's pain has been managed by outpatient pain management in [place]  Will slowly increase methadone back to 20 mg 3 times a day for now  Daily wants to change regimen      ABRIL (acute kidney injury) (CMS-HCC)  Assessment & Plan  resolved      Labs reviewed and Medications reviewed  Rolon catheter: No Rolon      DVT Prophylaxis: Enoxaparin (Lovenox)    Ulcer prophylaxis: Not indicated  Antibiotics: Treating active infection/contamination beyond 24 hours perioperative coverage  Assessed for rehab: Patient was assess for and/or received rehabilitation services during this hospitalization   For complexity-based billing, please refer to the history, exam, and decison making above. In addition, I spent 35 minutes caring for the patient today. More than 50% of the time was spent counseling and coordinating care.    I have discussed with RN and EDGARDO and SW and other consultants about patient's plan.

## 2017-08-24 PROCEDURE — A9270 NON-COVERED ITEM OR SERVICE: HCPCS | Performed by: INTERNAL MEDICINE

## 2017-08-24 PROCEDURE — 700111 HCHG RX REV CODE 636 W/ 250 OVERRIDE (IP): Performed by: INTERNAL MEDICINE

## 2017-08-24 PROCEDURE — 700105 HCHG RX REV CODE 258

## 2017-08-24 PROCEDURE — 302152 K-PAD 12X17: Performed by: INTERNAL MEDICINE

## 2017-08-24 PROCEDURE — 700102 HCHG RX REV CODE 250 W/ 637 OVERRIDE(OP): Performed by: INTERNAL MEDICINE

## 2017-08-24 PROCEDURE — 700101 HCHG RX REV CODE 250: Performed by: INTERNAL MEDICINE

## 2017-08-24 PROCEDURE — 99232 SBSQ HOSP IP/OBS MODERATE 35: CPT | Performed by: INTERNAL MEDICINE

## 2017-08-24 PROCEDURE — 700105 HCHG RX REV CODE 258: Performed by: INTERNAL MEDICINE

## 2017-08-24 PROCEDURE — 770021 HCHG ROOM/CARE - ISO PRIVATE

## 2017-08-24 RX ORDER — SODIUM CHLORIDE 9 MG/ML
INJECTION, SOLUTION INTRAVENOUS
Status: COMPLETED
Start: 2017-08-24 | End: 2017-08-24

## 2017-08-24 RX ADMIN — METHADONE HYDROCHLORIDE 20 MG: 10 TABLET ORAL at 06:02

## 2017-08-24 RX ADMIN — CEFEPIME 2 G: 2 INJECTION, POWDER, FOR SOLUTION INTRAMUSCULAR; INTRAVENOUS at 13:43

## 2017-08-24 RX ADMIN — Medication 325 MG: at 06:02

## 2017-08-24 RX ADMIN — PREGABALIN 150 MG: 150 CAPSULE ORAL at 22:38

## 2017-08-24 RX ADMIN — OXYCODONE HYDROCHLORIDE 10 MG: 10 TABLET ORAL at 18:00

## 2017-08-24 RX ADMIN — METHADONE HYDROCHLORIDE 20 MG: 10 TABLET ORAL at 13:43

## 2017-08-24 RX ADMIN — OXYCODONE HYDROCHLORIDE 10 MG: 10 TABLET ORAL at 13:43

## 2017-08-24 RX ADMIN — LINEZOLID 600 MG: 600 TABLET, FILM COATED ORAL at 08:30

## 2017-08-24 RX ADMIN — CLONAZEPAM 2 MG: 1 TABLET ORAL at 08:31

## 2017-08-24 RX ADMIN — SODIUM CHLORIDE: 9 INJECTION, SOLUTION INTRAVENOUS at 14:00

## 2017-08-24 RX ADMIN — CEFEPIME 2 G: 2 INJECTION, POWDER, FOR SOLUTION INTRAMUSCULAR; INTRAVENOUS at 06:02

## 2017-08-24 RX ADMIN — ACETAMINOPHEN 650 MG: 325 TABLET, FILM COATED ORAL at 02:57

## 2017-08-24 RX ADMIN — LIDOCAINE 1 APPLICATION: 40 CREAM TOPICAL at 21:44

## 2017-08-24 RX ADMIN — CLONAZEPAM 2 MG: 1 TABLET ORAL at 21:44

## 2017-08-24 RX ADMIN — OXYCODONE HYDROCHLORIDE 10 MG: 10 TABLET ORAL at 08:31

## 2017-08-24 RX ADMIN — PREGABALIN 150 MG: 150 CAPSULE ORAL at 08:31

## 2017-08-24 RX ADMIN — LINEZOLID 600 MG: 600 TABLET, FILM COATED ORAL at 21:43

## 2017-08-24 RX ADMIN — OXYCODONE HYDROCHLORIDE 10 MG: 10 TABLET ORAL at 22:38

## 2017-08-24 RX ADMIN — PREGABALIN 150 MG: 150 CAPSULE ORAL at 13:43

## 2017-08-24 RX ADMIN — Medication 325 MG: at 16:34

## 2017-08-24 RX ADMIN — METHADONE HYDROCHLORIDE 20 MG: 10 TABLET ORAL at 21:43

## 2017-08-24 RX ADMIN — LIDOCAINE 1 PATCH: 50 PATCH CUTANEOUS at 11:47

## 2017-08-24 RX ADMIN — CEFEPIME 2 G: 2 INJECTION, POWDER, FOR SOLUTION INTRAMUSCULAR; INTRAVENOUS at 21:44

## 2017-08-24 RX ADMIN — DOXYCYCLINE 100 MG: 100 TABLET ORAL at 08:31

## 2017-08-24 RX ADMIN — DOXYCYCLINE 100 MG: 100 TABLET ORAL at 21:43

## 2017-08-24 RX ADMIN — ENOXAPARIN SODIUM 40 MG: 100 INJECTION SUBCUTANEOUS at 08:31

## 2017-08-24 ASSESSMENT — PAIN SCALES - GENERAL
PAINLEVEL_OUTOF10: 6
PAINLEVEL_OUTOF10: 6
PAINLEVEL_OUTOF10: 7
PAINLEVEL_OUTOF10: 7
PAINLEVEL_OUTOF10: 5
PAINLEVEL_OUTOF10: 7
PAINLEVEL_OUTOF10: 9
PAINLEVEL_OUTOF10: 0

## 2017-08-24 ASSESSMENT — ENCOUNTER SYMPTOMS
WEIGHT LOSS: 1
HEADACHES: 1
DIARRHEA: 0
NAUSEA: 0
ABDOMINAL PAIN: 0
CLAUDICATION: 0
MYALGIAS: 0
DIZZINESS: 0
NERVOUS/ANXIOUS: 1
DEPRESSION: 0
DIARRHEA: 1
SHORTNESS OF BREATH: 0
TREMORS: 0
TINGLING: 0
HALLUCINATIONS: 0
HEARTBURN: 0
FEVER: 0
VOMITING: 0
SENSORY CHANGE: 1
CHILLS: 0
NECK PAIN: 0
BACK PAIN: 0
SPUTUM PRODUCTION: 0
COUGH: 0
MYALGIAS: 1
ORTHOPNEA: 0
WEAKNESS: 0

## 2017-08-24 NOTE — DISCHARGE PLANNING
Called Angelica  at [facility], she said Jose Miguel is the worker handling this case. She will call me back.

## 2017-08-24 NOTE — PROGRESS NOTES
This RN into room, patient asking for pain meds. VSS and patient up in bed watching tv and shaking his head and singing to the music. Per report and MD notes, patient with drug seeking habits and history of drug abuse. This RN pulled 5 of oxy and to room. RN told patient she would give him the 5mg as that was the appropriate dose from the RN standpoint. Patient then cursed at nurse saying she could shove the pill up her or the doctors ass and refused to take the med.

## 2017-08-24 NOTE — DISCHARGE PLANNING
Spoke to Jose Miguel, she requested that we fax referral information to her at . She may be able to help facilitate transfer. Will ask [facility] to fax to Jose Miguel.

## 2017-08-24 NOTE — PROGRESS NOTES
Pt in stable condition, eating breakfast in wheelchair, upset about the dosing of pain medication ordered and wants to speak to the MD about this this morning, otherwise no other complaints, call light within reach, continue with plan of care

## 2017-08-24 NOTE — PROGRESS NOTES
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 Takes methadone he states for back pain.  Not diabetic.  Presentee to ED with increased pain to L TMA with infected neuropathic ulcer and pain to R BKA.      s/p L foot I & D with VAC placement, GSR by Dr. Shirley on 7/25/17  S/p    Right revision below-knee amputation,  Right lower extremity irrigation and debridement, skin, subcutaneous    tissue to bone on 8/15/17    /61 mmHg  Pulse 89  Temp(Src) 36.2 °C (97.1 °F)  Resp 18  Ht 1.854 m (6' 1\")  Wt 65.2 kg (143 lb 11.8 oz)  BMI 18.97 kg/m2  SpO2 95%    R BKA:  Hard cast in place  Due to be changed by Nazario Lee CPO , tomorrow    GSR site well approximated    VAC in place to LLE. Changed yesterday. Per wound team, 100% red, granulating    [place] boot to LLE  CROW boot in process. Being made by Nazario CLARKE  Pt contemplating BKA to LLE.    Difficult discharge.  CM investigating SNF placement in [place]    PLAN  -Continue VAC change per IP wound team for LLE.  -continue jd boot at all times to LLE unitl CROW boot available    -NWB RLE, Cast to RLE, change weekly by Nazario Lee CPO  -continue IV abx until 8/25. ID s/o    "

## 2017-08-24 NOTE — CARE PLAN
Problem: Infection  Goal: Will remain free from infection  Pt will remain free from further signs of infection

## 2017-08-24 NOTE — CARE PLAN
Problem: Communication  Goal: The ability to communicate needs accurately and effectively will improve  Pt will vocalize all needs and use call light when need assistance

## 2017-08-24 NOTE — PROGRESS NOTES
[facility]ist Progress Note    Date of Service: 8/24/2017    Chief Complaint    52 y.o. male hx of PVD s/p L TMA and Rt Bka > 5 years ago  admitted 7/22/2017 with right BKA stump pain and wound drainage in addition to left TMA stump wound (nonhealing) over several months.  s/p Debridement and gastroscoleus resection LLE on 7-25-17 with Dr Shirley. Cultures have grown MRSA / Pseudomonas. He is being treated with IV cefepime and PO doxycycline per ID recommendations. These are to be continued until 08/25/2017. Patient has medical. Difficult disposition. SW continuing to look into placement options at this time. Continuing therapy inpatient at this point in time. Also underwent R BKA revision with Dr Shirley 08/15/2017    Interval Problem Update  8/22 spent long time discussing about pain medication. Patient claims he still have severe pain however when I walked in to patient's room patient was sleeping comfortably in bed. Per chart review patient does have history of drug seeking behavior. However revealed with the pharmacist patient was prescribed with methadone 60 mg 3 times a day as outpatient. Still complaint about pain in his legs wound. Patient otherwise denies fever, chills, nausea, vomiting, adb pain, SOB, CP, headache, constipation, diarrhea, cough, or sputum.    8/23 still complained of R shoulder and leg pain. We'll increase methadone to 20 mg 3 times a day. Patient is educated that narcotics should not be used for chronic pain medication. Methadone is used for previous history of narcotics use and prevent narcotics withdrawal.    8/24 patient complained of loose stool today however not watery. Patient said it is likely due to Zyvox. Patient otherwise denies fever, chills, nausea or vomiting.    Consultants/Specialty  Limb preservation service  Infectious diseases - Last seen by Dr Pino  Orthopedic surgery - Dr Shirley  Psychiatry - Dr Trujillo    Disposition  Difficult disposition. Remains inpatient  to continue therapy. SW working on disposition. Discussed on rounds with SW. Continue looking into placement option.       Review of Systems   Constitutional: Positive for weight loss. Negative for chills.   HENT: Negative for hearing loss and tinnitus.    Eyes:        Vision loss rt eye / Evisceration. Trauma history.    Respiratory: Negative for cough, sputum production and shortness of breath.    Cardiovascular: Negative for chest pain, orthopnea and claudication.   Gastrointestinal: Positive for diarrhea. Negative for heartburn and nausea.   Genitourinary: Negative for dysuria, frequency and hematuria.   Musculoskeletal: Positive for joint pain (chronics). Negative for myalgias, back pain and neck pain.        R BKA   Skin: Negative for itching and rash.   Neurological: Positive for headaches. Negative for dizziness, tingling, tremors and weakness.        Chronic peripheral neuropathy .   Psychiatric/Behavioral: Negative for depression and hallucinations. The patient is nervous/anxious (improved.).       Physical Exam  Laboratory/Imaging   Hemodynamics  Temp (24hrs), Av.3 °C (97.3 °F), Min:36.1 °C (96.9 °F), Max:36.7 °C (98 °F)   Temperature: 36.2 °C (97.1 °F)  Pulse  Av.1  Min: 61  Max: 111    Blood Pressure: 108/61 mmHg      Respiratory      Respiration: 16, Pulse Oximetry: 95 %             Fluids    Intake/Output Summary (Last 24 hours) at 17 0754  Last data filed at 17 0400   Gross per 24 hour   Intake    500 ml   Output    700 ml   Net   -200 ml       Nutrition  Orders Placed This Encounter   Procedures   • DIET ORDER     Standing Status: Standing      Number of Occurrences: 1      Standing Expiration Date:      Order Specific Question:  Diet:     Answer:  Regular [1]      Comments:  regular trays     Physical Exam   Constitutional: He is oriented to person, place, and time. He appears well-developed and well-nourished.   HENT:   Head: Normocephalic and atraumatic.   Mouth/Throat:  Oropharynx is clear and moist.   Eyes: EOM are normal. Right eye exhibits no discharge.   Rt eye vision loss, enucleated.    Neck: Normal range of motion. Neck supple. No JVD present. No tracheal deviation present.   Cardiovascular: Normal rate, regular rhythm and normal heart sounds.  Exam reveals no gallop and no friction rub.    No murmur heard.  Pulmonary/Chest: Effort normal. No respiratory distress. He has no wheezes. He has no rales.   Abdominal: Soft. Bowel sounds are normal. He exhibits no distension. There is no tenderness. There is no rebound and no guarding.   Musculoskeletal: He exhibits no edema.   Rt leg BKA - incision dry, no dehisc- healed.   Left leg w boot wound vac present.  Rt shoulder no redness or warmth, mild swelling.    Neurological: He is alert and oriented to person, place, and time. Coordination abnormal.   Skin: Skin is warm and dry. There is erythema.   End of stump on R appears chaffed, wound margins heaped.   Psychiatric: He has a normal mood and affect. His behavior is normal.                                Assessment/Plan     * Skin ulcer of left foot with fat layer exposed (CMS-HCC) (present on admission)  Assessment & Plan  S/p OR debridement and gastroscoleus resection 7/25   Continue wound vac care  w changes three times/wk  Contact isolation for MRSA.  TDWB LLE, WBAT RLE per Ortho recs.  Patient reportedly has restraining orders from much of the Skilled facilites in [place]  Difficult discharge  Cont to monitor     MRSA (methicillin resistant staph aureus) culture positive (present on admission)  Assessment & Plan  Doxycycline to cover L foot infection thru 8-25-17   Vanco stopped due to ARF  ID to f/u    Pseudomonas infection (present on admission)  Assessment & Plan  Left foot infection  continue IV cefepime thru 8-25-17  ID assisting    Anxiety (present on admission)  Assessment & Plan  Depakote, psych assisting, long talk, will add low dose klonopin  Mood stable today  Cont to  monitoir    Diarrhea (present on admission)  Assessment & Plan  Follow  resolved    Peripheral neuropathy (present on admission)  Assessment & Plan  Lyrica to cont    Tobacco abuse (present on admission)  Assessment & Plan  Nicotine replacement PRN  Cessation counseling    Wound of right leg, at BKA site (present on admission)  Assessment & Plan  Culture: VRE  ID following  Wound care  Surgery on    Opiate dependence (CMS-HCC) (present on admission)  Assessment & Plan  Noted Drug seeking behavior inpatient- improved deviance.  Avoid escalating  IV narcotics with no no signs for pain- use primarily for dressing / vac changes .  oxycontin recently stopped-- continue methadone. Verified with patient's outpatient pharmacy patient was taking methadone 60 mg 3 times a day. Currently on 10 mg 3 times a day  Patient's pain has been managed by outpatient pain management in [place]  Will slowly increase methadone back to 20 mg 3 times a day for now  Daily wants to change regimen      ABRIL (acute kidney injury) (CMS-HCC)  Assessment & Plan  resolved      Labs reviewed and Medications reviewed  Rolon catheter: No Rolon      DVT Prophylaxis: Enoxaparin (Lovenox)    Ulcer prophylaxis: Not indicated  Antibiotics: Treating active infection/contamination beyond 24 hours perioperative coverage  Assessed for rehab: Patient was assess for and/or received rehabilitation services during this hospitalization   For complexity-based billing, please refer to the history, exam, and decison making above. In addition, I spent 35 minutes caring for the patient today. More than 50% of the time was spent counseling and coordinating care.    I have discussed with RN and EDGARDO and SW and other consultants about patient's plan.

## 2017-08-24 NOTE — WOUND TEAM
[facility]  Inpatient Services  Wound and Skin Care Treatment Note    Admission Date:  07/22/17     HPI, PMH, SH: Reviewed  Unit where seen by Wound Team:  T4    WOUND CONSULT RELATED TO:   Scheduled NPWT dressing change left plantar foot      SUBJECTIVE:  Pleasant/agreeable    Self Report / Pain Level:  Tolerated well    OBJECTIVE:    Previous NPWT dressing intact    WOUND TYPE, LOCATION, CHARACTERISTICS (Pressure ulcers: location, stage, POA or date identified)    Location/type of wound: Open surgical wound left plantar foot      Periwound:     macerated around wound and in between remaining amputation site of pervious digit; callous-like tissue      Drainage:     scant serosanguinous    Tissue Type and %:    100% red/pink  Wound Edges:    Attached/macerated    Odor:     none  Exposed structure(s):  Bone palpated    S&S of Infection:     none      Measurements: (cm)  (Taken 08/23/17)   Length:    5.0  Width:     4.0  Depth:    0.3      INTERVENTIONS BY WOUND TEAM:  Removed prev dressing and irrigated wound with wound cleanser.  Measurements and photograph done. Benzoin and drape to bull wound skin as needed. Small piece of paste ring used to seal fissure on lateral side.  Covered wound with 1 piece black foam with a bridge and button to dorsum of foot. Secured with drape and applied cont neg pressure at 125mmhg. Placed a non-adhesive foam under the forefoot to hopefully absorb any moisture. Total black foam=3 pieces.    Interdisciplinary consultation:   Staff RN, patient      EVALUATION:  Measurements smaller; remains clean, no odor; should cont to slowly progress    Factors affecting wound healing:  Neuropathy, tobacco abuse     Goals:  Decrease in wound size by 5% every 3 weeks -- no maceration    NURSING PLAN OF CARE ORDERS (x):    Dressing changes: See Dressing Maintenance orders:  x  Skin care: See Skin Care orders: x  Rectal tube care: See Rectal Tube Care orders:   Other orders:    WOUND  TEAM PLAN OF CARE (x):   NPWT change 3 x week:   x     Dressing changes by wound team:       Follow up as needed:     x  Other (explain):    Anticipated discharge plans (x):  SNF:           Home Care:           Outpatient Wound Center:   x      Self Care:            Other:

## 2017-08-24 NOTE — PROGRESS NOTES
Infectious Disease Progress Note    Author: Joie Ramon M.D. DOS & Time created: 8/24/2017  12:35 PM    CC: FU right BKA stump infection and nonhealing left TMA stump wound     Interval History:  52 y.o. WM admitted 7/22/2017 with right BKA infection and nonhealing left TMA stump wound   7/23 AF WBC 7.6 less drainage from wounds Asking if he will need another BKA  7/24/17- no fevers. WBC 10.6. Complains of chills. Complains of 10 out of 10 pain in the left foot. Complains of drainage from the right stump.  7/25/17- complains of pain in the foot. Had low-grade fevers up to 100.7 last night . Foot cultures are showing MRSA and Pseudomonas  7/26/17- no fevers. Complains of burning in his left leg. No discharge noted from his right stump  7/28/17- no fevers. Complains of ongoing pain.  7/29/17- no fevers. No labs available  7/30/17-MAXIMUM TEMPERATURE 99.5. Ongoing pain issues. WBC 9000. Creatinine 1.17  7/31 AF lots of c/o neuropathy-feels like stump is in ice water  8/1 AF no new complaints- +phantom limb pain  8/2 AF pain better controlled Having loose stools. Wants a psych consult  8/3 AF in WC today No new complaints  8/4 AF WBC 9.2 no new complaints-wants to see psych again  8/5 AF WBC 9.2 seen during dressing change  8/7 AF, no CBC, tired of infections, thinking about left BKA, wants to speak to Dr. Shirley  8/8 AF, no CBC, no longer wants a BKA, wants to try abx first, PICC came out a little after his TV remote hit his PICC  8/9 AF, no CBC, ambulating in halls without issues  8/20/17-denies any fever. Complains of significant pain in his BKA site  8/21 AF no acute events or new complaints  8/22 AF c/o nausea last night  8/23 AF OOB in wheelchair again today. CBC not done  8/24 AF labs still not done complaints about pain meds  .Labs Reviewed, Medications Reviewed, Radiology Reviewed and Wound Reviewed.    Review of Systems:  Review of Systems   Constitutional: Negative for fever and chills.    Respiratory: Negative for cough and shortness of breath.    Cardiovascular: Negative for chest pain.   Gastrointestinal: Negative for vomiting, abdominal pain and diarrhea.   Genitourinary: Negative for dysuria.   Musculoskeletal: Positive for myalgias and joint pain.   Neurological: Positive for sensory change.   All other systems reviewed and are negative.      Hemodynamics:  Temp (24hrs), Av.1 °C (97 °F), Min:36.1 °C (96.9 °F), Max:36.2 °C (97.1 °F)  Temperature: 36.2 °C (97.1 °F)  Pulse  Av.1  Min: 61  Max: 111   Blood Pressure: 107/61 mmHg       Physical Exam:  Physical Exam   Constitutional: He is oriented to person, place, and time. He appears well-developed. No distress.   Disheveled   HENT:   Head: Normocephalic.   Poor dentition     Eyes:   Blind right eye   Neck: Neck supple.   Cardiovascular: Normal rate.    Pulmonary/Chest: Effort normal. No respiratory distress.   Abdominal: Soft. He exhibits no distension. There is no tenderness.   Musculoskeletal: He exhibits no edema.   Right stump dressed  VAC LLE  LUE PICC nontender   Neurological: He is alert and oriented to person, place, and time.   Skin: No rash noted.   tattoos   Nursing note and vitals reviewed.      Labs:  No results for input(s): WBC, RBC, HEMOGLOBIN, HEMATOCRIT, MCV, MCH, RDW, PLATELETCT, MPV, NEUTSPOLYS, LYMPHOCYTES, MONOCYTES, EOSINOPHILS, BASOPHILS, RBCMORPHOLO in the last 72 hours.  No results for input(s): SODIUM, POTASSIUM, CHLORIDE, CO2, GLUCOSE, BUN, CPKTOTAL in the last 72 hours.  No results for input(s): ALBUMIN, TBILIRUBIN, ALKPHOSPHAT, TOTPROTEIN, ALTSGPT, ASTSGOT, CREATININE in the last 72 hours.  Results     Procedure Component Value Units Date/Time    CULTURE TISSUE W/ GRM STAIN [[id number]]  (Abnormal)  (Susceptibility) Collected:  08/15/17 1318    Order Status:  Completed Specimen Information:  Tissue Updated:  17 6337     Significant Indicator POS (POS)      Source TISS      Site Right BKA Stump       Tissue Culture -- (A)      Gram Stain Result --      Result:        Few WBCs.  No organisms seen.       Tissue Culture -- (A)      Result:        Enterococcus faecium  Light growth  VANCOMYCIN RESISTANT ENTEROCOCCI(VRE)      ANAEROBIC CULTURE [[id number]] Collected:  08/15/17 1318    Order Status:  Completed Specimen Information:  Tissue Updated:  08/18/17 1626     Significant Indicator NEG      Source TISS      Site Right BKA Stump      Anaerobic Culture, Culture Res No Anaerobes isolated.     CULTURE TISSUE W/ GRM STAIN [[id number]] Collected:  08/15/17 1345    Order Status:  Completed Specimen Information:  Bone Updated:  08/18/17 0921     Gram Stain Result No organisms seen.      Significant Indicator NEG      Source BONE      Site Right Fibula      Tissue Culture No growth at 72 hours.     ANAEROBIC CULTURE [[id number]] Collected:  08/15/17 1345    Order Status:  Completed Specimen Information:  Bone Updated:  08/18/17 0921     Significant Indicator NEG      Source BONE      Site Right Fibula      Anaerobic Culture, Culture Res Culture in progress.     GRAM STAIN [[id number]] Collected:  08/15/17 1318    Order Status:  Completed Specimen Information:  Tissue Updated:  08/16/17 0706     Significant Indicator .      Source TISS      Site Right BKA Stump      Gram Stain Result --      Result:        Few WBCs.  No organisms seen.      GRAM STAIN [[id number]] Collected:  08/15/17 1345    Order Status:  Completed Specimen Information:  Bone Updated:  08/16/17 0600     Significant Indicator .      Source BONE      Site Right Fibula      Gram Stain Result No organisms seen.           Fluids:  Intake/Output       08/22/17 0700 - 08/23/17 0659 08/23/17 0700 - 08/24/17 0659 08/24/17 0700 - 08/25/17 0659      [id number] [id number] Total 6365-5047 7368-1413 Total 2996-2131 9255-0806 Total       Intake    P.O.  --  800 800  --  500 500  --  -- --    P.O. -- 800 800 -- 500 500 -- -- --    Total Intake -- 800 800 -- 500 500 -- -- --        Output    Urine  --  400 400  --  700 700  --  -- --    Number of Times Voided -- 1 x 1 x -- -- -- -- -- --    Void (ml) -- 400 400 -- 700 700 -- -- --    Stool  --  -- --  --  -- --  --  -- --    Number of Times Stooled -- 1 x 1 x -- -- -- -- -- --    Total Output -- 400 400 -- 700 700 -- -- --       Net I/O     -- 400 400 -- -200 -200 -- -- --             Assessment:  Active Hospital Problems    Diagnosis   • Skin ulcer of left foot with fat layer exposed (CMS-[facility]) [L97.522]   • Wound of right leg, at BKA site [S81.801A]   • Peripheral neuropathy [G62.9]   • Tobacco abuse [Z72.0]       Plan:  Skin ulcer of left foot with fat layer exposed (CMS-[facility]), improving  Afebrile  No leukocytosis  S/p OR 7/25/17  Cultures  MRSA and Pseudomonas  Continue doxy  and cefepime for PSAR  Stop date 8/25/17   No once daily options  Wound care    Right BKA stump  Infection  Status post revision on 8/15/17  Cultures are VRE  Continue Zyvox for 14 days  Stop date 9/3/2017  Monitor CBC while on Zyvox-reordered again today    Difficult discharge

## 2017-08-25 LAB
ANION GAP SERPL CALC-SCNC: 8 MMOL/L (ref 0–11.9)
BASOPHILS # BLD AUTO: 0.5 % (ref 0–1.8)
BASOPHILS # BLD: 0.03 K/UL (ref 0–0.12)
BUN SERPL-MCNC: 25 MG/DL (ref 8–22)
CALCIUM SERPL-MCNC: 8.9 MG/DL (ref 8.5–10.5)
CHLORIDE SERPL-SCNC: 105 MMOL/L (ref 96–112)
CO2 SERPL-SCNC: 25 MMOL/L (ref 20–33)
CREAT SERPL-MCNC: 0.67 MG/DL (ref 0.5–1.4)
EOSINOPHIL # BLD AUTO: 0.23 K/UL (ref 0–0.51)
EOSINOPHIL NFR BLD: 3.9 % (ref 0–6.9)
ERYTHROCYTE [DISTWIDTH] IN BLOOD BY AUTOMATED COUNT: 50.8 FL (ref 35.9–50)
GFR SERPL CREATININE-BSD FRML MDRD: >60 ML/MIN/1.73 M 2
GLUCOSE SERPL-MCNC: 140 MG/DL (ref 65–99)
HCT VFR BLD AUTO: 32.6 % (ref 42–52)
HGB BLD-MCNC: 10.3 G/DL (ref 14–18)
IMM GRANULOCYTES # BLD AUTO: 0.07 K/UL (ref 0–0.11)
IMM GRANULOCYTES NFR BLD AUTO: 1.2 % (ref 0–0.9)
LYMPHOCYTES # BLD AUTO: 2.17 K/UL (ref 1–4.8)
LYMPHOCYTES NFR BLD: 36.8 % (ref 22–41)
MCH RBC QN AUTO: 25.4 PG (ref 27–33)
MCHC RBC AUTO-ENTMCNC: 31.6 G/DL (ref 33.7–35.3)
MCV RBC AUTO: 80.5 FL (ref 81.4–97.8)
MONOCYTES # BLD AUTO: 0.44 K/UL (ref 0–0.85)
MONOCYTES NFR BLD AUTO: 7.5 % (ref 0–13.4)
NEUTROPHILS # BLD AUTO: 2.95 K/UL (ref 1.82–7.42)
NEUTROPHILS NFR BLD: 50.1 % (ref 44–72)
NRBC # BLD AUTO: 0 K/UL
NRBC BLD AUTO-RTO: 0 /100 WBC
PLATELET # BLD AUTO: 322 K/UL (ref 164–446)
PMV BLD AUTO: 9.4 FL (ref 9–12.9)
POTASSIUM SERPL-SCNC: 3.9 MMOL/L (ref 3.6–5.5)
RBC # BLD AUTO: 4.05 M/UL (ref 4.7–6.1)
SODIUM SERPL-SCNC: 138 MMOL/L (ref 135–145)
WBC # BLD AUTO: 5.9 K/UL (ref 4.8–10.8)

## 2017-08-25 PROCEDURE — 700102 HCHG RX REV CODE 250 W/ 637 OVERRIDE(OP): Performed by: INTERNAL MEDICINE

## 2017-08-25 PROCEDURE — A9270 NON-COVERED ITEM OR SERVICE: HCPCS | Performed by: INTERNAL MEDICINE

## 2017-08-25 PROCEDURE — 700111 HCHG RX REV CODE 636 W/ 250 OVERRIDE (IP): Performed by: INTERNAL MEDICINE

## 2017-08-25 PROCEDURE — 700101 HCHG RX REV CODE 250: Performed by: INTERNAL MEDICINE

## 2017-08-25 PROCEDURE — 770021 HCHG ROOM/CARE - ISO PRIVATE

## 2017-08-25 PROCEDURE — 80048 BASIC METABOLIC PNL TOTAL CA: CPT

## 2017-08-25 PROCEDURE — 99232 SBSQ HOSP IP/OBS MODERATE 35: CPT | Performed by: INTERNAL MEDICINE

## 2017-08-25 PROCEDURE — 700105 HCHG RX REV CODE 258: Performed by: INTERNAL MEDICINE

## 2017-08-25 PROCEDURE — 85025 COMPLETE CBC W/AUTO DIFF WBC: CPT

## 2017-08-25 PROCEDURE — 97605 NEG PRS WND THER DME<=50SQCM: CPT

## 2017-08-25 RX ORDER — MORPHINE SULFATE 4 MG/ML
2 INJECTION, SOLUTION INTRAMUSCULAR; INTRAVENOUS ONCE
Status: COMPLETED | OUTPATIENT
Start: 2017-08-25 | End: 2017-08-25

## 2017-08-25 RX ADMIN — CEFEPIME 2 G: 2 INJECTION, POWDER, FOR SOLUTION INTRAMUSCULAR; INTRAVENOUS at 05:09

## 2017-08-25 RX ADMIN — OXYCODONE HYDROCHLORIDE 10 MG: 10 TABLET ORAL at 21:48

## 2017-08-25 RX ADMIN — ZOLPIDEM TARTRATE 5 MG: 5 TABLET, FILM COATED ORAL at 01:23

## 2017-08-25 RX ADMIN — LIDOCAINE 1 PATCH: 50 PATCH CUTANEOUS at 11:10

## 2017-08-25 RX ADMIN — ZOLPIDEM TARTRATE 5 MG: 5 TABLET, FILM COATED ORAL at 21:48

## 2017-08-25 RX ADMIN — METHADONE HYDROCHLORIDE 20 MG: 10 TABLET ORAL at 13:36

## 2017-08-25 RX ADMIN — Medication 325 MG: at 16:23

## 2017-08-25 RX ADMIN — CLONAZEPAM 2 MG: 1 TABLET ORAL at 08:38

## 2017-08-25 RX ADMIN — DOXYCYCLINE 100 MG: 100 TABLET ORAL at 08:38

## 2017-08-25 RX ADMIN — ENOXAPARIN SODIUM 40 MG: 100 INJECTION SUBCUTANEOUS at 08:37

## 2017-08-25 RX ADMIN — PREGABALIN 150 MG: 150 CAPSULE ORAL at 20:49

## 2017-08-25 RX ADMIN — PREGABALIN 150 MG: 150 CAPSULE ORAL at 08:38

## 2017-08-25 RX ADMIN — LINEZOLID 600 MG: 600 TABLET, FILM COATED ORAL at 08:37

## 2017-08-25 RX ADMIN — MORPHINE SULFATE 2 MG: 4 INJECTION INTRAVENOUS at 14:07

## 2017-08-25 RX ADMIN — ACETAMINOPHEN 650 MG: 325 TABLET, FILM COATED ORAL at 01:23

## 2017-08-25 RX ADMIN — CLONAZEPAM 2 MG: 1 TABLET ORAL at 20:49

## 2017-08-25 RX ADMIN — OXYCODONE HYDROCHLORIDE 10 MG: 10 TABLET ORAL at 11:10

## 2017-08-25 RX ADMIN — Medication 325 MG: at 08:38

## 2017-08-25 RX ADMIN — METHADONE HYDROCHLORIDE 20 MG: 10 TABLET ORAL at 05:08

## 2017-08-25 RX ADMIN — OXYCODONE HYDROCHLORIDE 10 MG: 10 TABLET ORAL at 16:24

## 2017-08-25 RX ADMIN — LIDOCAINE 1 APPLICATION: 40 CREAM TOPICAL at 20:50

## 2017-08-25 RX ADMIN — ALTEPLASE 2 MG: 2.2 INJECTION, POWDER, LYOPHILIZED, FOR SOLUTION INTRAVENOUS at 08:37

## 2017-08-25 RX ADMIN — LIDOCAINE 1 APPLICATION: 40 CREAM TOPICAL at 16:25

## 2017-08-25 RX ADMIN — OXYCODONE HYDROCHLORIDE 10 MG: 10 TABLET ORAL at 05:49

## 2017-08-25 RX ADMIN — PREGABALIN 150 MG: 150 CAPSULE ORAL at 13:36

## 2017-08-25 RX ADMIN — LINEZOLID 600 MG: 600 TABLET, FILM COATED ORAL at 20:49

## 2017-08-25 RX ADMIN — METHADONE HYDROCHLORIDE 20 MG: 10 TABLET ORAL at 20:49

## 2017-08-25 ASSESSMENT — ENCOUNTER SYMPTOMS
NAUSEA: 0
ABDOMINAL PAIN: 0
WEAKNESS: 0
MYALGIAS: 1
SHORTNESS OF BREATH: 0
HEADACHES: 0
CHILLS: 0
NECK PAIN: 0
HEARTBURN: 0
DIZZINESS: 0
VOMITING: 0
NERVOUS/ANXIOUS: 1
TREMORS: 0
FEVER: 0
ORTHOPNEA: 0
COUGH: 0
CLAUDICATION: 0
HALLUCINATIONS: 0
WEIGHT LOSS: 1
DEPRESSION: 0
DIARRHEA: 0
SPUTUM PRODUCTION: 0
TINGLING: 0
SENSORY CHANGE: 1
MYALGIAS: 0

## 2017-08-25 ASSESSMENT — PAIN SCALES - GENERAL
PAINLEVEL_OUTOF10: 5

## 2017-08-25 NOTE — PROGRESS NOTES
Infectious Disease Progress Note    Author: Joie Ramon M.D. DOS & Time created: 8/25/2017  3:39 PM    CC: FU right BKA stump infection and nonhealing left TMA stump wound     Interval History:  52 y.o. WM admitted 7/22/2017 with right BKA infection and nonhealing left TMA stump wound   7/23 AF WBC 7.6 less drainage from wounds Asking if he will need another BKA  7/24/17- no fevers. WBC 10.6. Complains of chills. Complains of 10 out of 10 pain in the left foot. Complains of drainage from the right stump.  7/25/17- complains of pain in the foot. Had low-grade fevers up to 100.7 last night . Foot cultures are showing MRSA and Pseudomonas  7/26/17- no fevers. Complains of burning in his left leg. No discharge noted from his right stump  7/28/17- no fevers. Complains of ongoing pain.  7/29/17- no fevers. No labs available  7/30/17-MAXIMUM TEMPERATURE 99.5. Ongoing pain issues. WBC 9000. Creatinine 1.17  7/31 AF lots of c/o neuropathy-feels like stump is in ice water  8/1 AF no new complaints- +phantom limb pain  8/2 AF pain better controlled Having loose stools. Wants a psych consult  8/3 AF in WC today No new complaints  8/4 AF WBC 9.2 no new complaints-wants to see psych again  8/5 AF WBC 9.2 seen during dressing change  8/7 AF, no CBC, tired of infections, thinking about left BKA, wants to speak to Dr. Shirley  8/8 AF, no CBC, no longer wants a BKA, wants to try abx first, PICC came out a little after his TV remote hit his PICC  8/9 AF, no CBC, ambulating in halls without issues  8/20/17-denies any fever. Complains of significant pain in his BKA site  8/21 AF no acute events or new complaints  8/22 AF c/o nausea last night  8/23 AF OOB in wheelchair again today. CBC not done  8/24 AF labs still not done complaints about pain meds  8/25 AF WBC 5.9 platelets stable completes IV abx today. No new complaints  .Labs Reviewed, Medications Reviewed, Radiology Reviewed and Wound Reviewed.    Review of  Systems:  Review of Systems   Constitutional: Negative for fever and chills.   Respiratory: Negative for cough and shortness of breath.    Cardiovascular: Negative for chest pain.   Gastrointestinal: Negative for vomiting, abdominal pain and diarrhea.   Genitourinary: Negative for dysuria.   Musculoskeletal: Positive for myalgias and joint pain.   Neurological: Positive for sensory change.   All other systems reviewed and are negative.      Hemodynamics:  Temp (24hrs), Av.3 °C (97.3 °F), Min:36 °C (96.8 °F), Max:36.6 °C (97.8 °F)  Temperature: 36.6 °C (97.8 °F)  Pulse  Av.2  Min: 61  Max: 111   Blood Pressure: 113/76 mmHg       Physical Exam:  Physical Exam   Constitutional: He is oriented to person, place, and time. He appears well-developed. No distress.   Disheveled   HENT:   Head: Normocephalic.   Poor dentition     Eyes:   Blind right eye   Neck: Neck supple.   Cardiovascular: Normal rate.    Pulmonary/Chest: Effort normal. No respiratory distress.   Abdominal: Soft. He exhibits no distension. There is no tenderness.   Musculoskeletal: He exhibits no edema.   Right stump dressed  VAC LLE  LUE PICC nontender   Neurological: He is alert and oriented to person, place, and time.   Skin: No rash noted.   tattoos   Nursing note and vitals reviewed.      Labs:  Recent Labs      17   0930   WBC  5.9   RBC  4.05*   HEMOGLOBIN  10.3*   HEMATOCRIT  32.6*   MCV  80.5*   MCH  25.4*   RDW  50.8*   PLATELETCT  322   MPV  9.4   NEUTSPOLYS  50.10   LYMPHOCYTES  36.80   MONOCYTES  7.50   EOSINOPHILS  3.90   BASOPHILS  0.50     Recent Labs      17   0930   SODIUM  138   POTASSIUM  3.9   CHLORIDE  105   CO2  25   GLUCOSE  140*   BUN  25*     Recent Labs      17   0930   CREATININE  0.67     Results     Procedure Component Value Units Date/Time    CULTURE TISSUE W/ GRM STAIN [[id number]]  (Abnormal)  (Susceptibility) Collected:  08/15/17 1318    Order Status:  Completed Specimen Information:  Tissue  Updated:  08/18/17 1626     Significant Indicator POS (POS)      Source TISS      Site Right BKA Stump      Tissue Culture -- (A)      Gram Stain Result --      Result:        Few WBCs.  No organisms seen.       Tissue Culture -- (A)      Result:        Enterococcus faecium  Light growth  VANCOMYCIN RESISTANT ENTEROCOCCI(VRE)      ANAEROBIC CULTURE [[id number]] Collected:  08/15/17 1318    Order Status:  Completed Specimen Information:  Tissue Updated:  08/18/17 1626     Significant Indicator NEG      Source TISS      Site Right BKA Stump      Anaerobic Culture, Culture Res No Anaerobes isolated.     CULTURE TISSUE W/ GRM STAIN [[id number]] Collected:  08/15/17 1345    Order Status:  Completed Specimen Information:  Bone Updated:  08/18/17 0921     Gram Stain Result No organisms seen.      Significant Indicator NEG      Source BONE      Site Right Fibula      Tissue Culture No growth at 72 hours.     ANAEROBIC CULTURE [[id number]] Collected:  08/15/17 1345    Order Status:  Completed Specimen Information:  Bone Updated:  08/18/17 0921     Significant Indicator NEG      Source BONE      Site Right Fibula      Anaerobic Culture, Culture Res Culture in progress.     GRAM STAIN [[id number]] Collected:  08/15/17 1318    Order Status:  Completed Specimen Information:  Tissue Updated:  08/16/17 0706     Significant Indicator .      Source TISS      Site Right BKA Stump      Gram Stain Result --      Result:        Few WBCs.  No organisms seen.      GRAM STAIN [[id number]] Collected:  08/15/17 1345    Order Status:  Completed Specimen Information:  Bone Updated:  08/16/17 0600     Significant Indicator .      Source BONE      Site Right Fibula      Gram Stain Result No organisms seen.           Fluids:  Intake/Output       08/23/17 0700 - 08/24/17 0659 08/24/17 0700 - 08/25/17 0659 08/25/17 0700 - 08/26/17 0659      [id number] Total 0493-6966 2963-1665 Total 8656-9255 7557-0561 Total       Intake    P.O.  --  500 500   1400  120 1520  --  -- --    P.O. --  120 1520 -- -- --    I.V.  --  -- --  --  222 222  --  -- --    IV Volume -- -- -- -- 222 222 -- -- --    Total Intake --  342 1742 -- -- --       Output    Urine  --  700 700  750  700 1450  --  -- --    Number of Times Voided -- -- -- -- 1 x 1 x -- -- --    Void (ml) -- 700 700  -- -- --    Stool  --  -- --  --  -- --  --  -- --    Number of Times Stooled -- -- -- -- 1 x 1 x -- -- --    Total Output -- 700 700  -- -- --       Net I/O     -- -200 -200 650 -358 292 -- -- --             Assessment:  Active Hospital Problems    Diagnosis   • Skin ulcer of left foot with fat layer exposed (CMS-[facility]) [L97.522]   • Wound of right leg, at BKA site [S81.801A]   • Peripheral neuropathy [G62.9]   • Tobacco abuse [Z72.0]       Plan:  Skin ulcer of left foot with fat layer exposed (CMS-[facility]), improving  Afebrile  No leukocytosis  S/p OR 7/25/17  Cultures  MRSA and Pseudomonas  Continue doxy  and cefepime for PSAR  Stop date 8/25/17 today  No once daily options  Wound care    Right BKA stump  Infection  Status post revision on 8/15/17  Cultures are VRE  Continue Zyvox for 14 days  Stop date 9/3/2017  Monitor CBC while on Zyvox-    Difficult discharge  Will sign off-please reconsult if needed

## 2017-08-25 NOTE — CARE PLAN
Problem: Communication  Goal: The ability to communicate needs accurately and effectively will improve  Outcome: PROGRESSING AS EXPECTED  Reoriented pt to environment as needed; white board updated with [facility] staff and return time. Pt notified of hourly rounding and unit routine.    Appropriate signs in place at doorway for pt. Pt allowed time to ask questions; no questions at this time.    Problem: Pain Management  Goal: Pain level will decrease to patient’s comfort goal  Outcome: PROGRESSING AS EXPECTED  Pt assessed for pain level routinely. Pt educated about around-the-clock px management and non-pharmacologic techniques. Pt verbalized understanding. PRN and scheduled pain medication given per MAR.

## 2017-08-25 NOTE — CARE PLAN
Problem: Infection  Goal: Will remain free from infection  Pt will have no further signs of infection

## 2017-08-25 NOTE — PROGRESS NOTES
Pt A&O x 4. PRN pain medication given per MAR. Pt on RA with no shortness of breath. Offloading boot in place.  Cast intact on RLE; wound vac running continuously to LLE.  PICC dressing intact; flushed with no difficulty. CHG bath given. Pt uses call light appropriately. Personal belongings and call light within reach. Discussed POC, safety, and mobility; pt has no questions at this time. Bed in lowest position with treaded socks on pt. Hourly rounding in place.

## 2017-08-25 NOTE — PROGRESS NOTES
[facility] Hospitalist Progress Note    Date of Service: 8/25/2017    Chief Complaint    52 y.o. male hx of PVD s/p L TMA and Rt Bka > 5 years ago  admitted 7/22/2017 with right BKA stump pain and wound drainage in addition to left TMA stump wound (nonhealing) over several months.  s/p Debridement and gastroscoleus resection LLE on 7-25-17 with Dr Shirley. Cultures have grown MRSA / Pseudomonas. He is being treated with IV cefepime and PO doxycycline per ID recommendations. These are to be continued until 08/25/2017. Patient has medical. Difficult disposition. SW continuing to look into placement options at this time. Continuing therapy inpatient at this point in time. Also underwent R BKA revision with Dr Shirley 08/15/2017    Interval Problem Update  8/22 spent long time discussing about pain medication. Patient claims he still have severe pain however when I walked in to patient's room patient was sleeping comfortably in bed. Per chart review patient does have history of drug seeking behavior. However revealed with the pharmacist patient was prescribed with methadone 60 mg 3 times a day as outpatient. Still complaint about pain in his legs wound. Patient otherwise denies fever, chills, nausea, vomiting, adb pain, SOB, CP, headache, constipation, diarrhea, cough, or sputum.    8/23 still complained of R shoulder and leg pain. We'll increase methadone to 20 mg 3 times a day. Patient is educated that narcotics should not be used for chronic pain medication. Methadone is used for previous history of narcotics use and prevent narcotics withdrawal.    8/24 patient complained of loose stool today however not watery. Patient said it is likely due to Zyvox. Patient otherwise denies fever, chills, nausea or vomiting.  8/25 patient did not complain pain today. No fever, chills, nausea vomiting. Questioning about course of antibiotics explained.    Consultants/Specialty  Limb preservation service  Infectious diseases - Last seen  by Dr Pino  Orthopedic surgery - Dr Shirley  Psychiatry - Dr Trujillo    Disposition  Difficult disposition. Remains inpatient to continue therapy. SW working on disposition. Discussed on rounds with SW. Continue looking into placement option.       Review of Systems   Constitutional: Positive for weight loss. Negative for chills.   HENT: Negative for hearing loss and tinnitus.    Eyes:        Vision loss rt eye / Evisceration. Trauma history.    Respiratory: Negative for cough, sputum production and shortness of breath.    Cardiovascular: Negative for chest pain, orthopnea and claudication.   Gastrointestinal: Negative for heartburn, nausea and diarrhea.   Genitourinary: Negative for dysuria, frequency and hematuria.   Musculoskeletal: Positive for joint pain (chronics). Negative for myalgias and neck pain.        R BKA   Skin: Negative for itching.   Neurological: Negative for dizziness, tingling, tremors, weakness and headaches.        Chronic peripheral neuropathy .   Psychiatric/Behavioral: Negative for depression and hallucinations. The patient is nervous/anxious (improved.).       Physical Exam  Laboratory/Imaging   Hemodynamics  Temp (24hrs), Av.2 °C (97.1 °F), Min:36 °C (96.8 °F), Max:36.3 °C (97.4 °F)   Temperature: 36.3 °C (97.4 °F)  Pulse  Av.1  Min: 61  Max: 111    Blood Pressure: 112/65 mmHg      Respiratory      Respiration: 18, Pulse Oximetry: 94 %        RUL Breath Sounds: Diminished, RML Breath Sounds: Diminished, RLL Breath Sounds: Diminished, NANCY Breath Sounds: Diminished, LLL Breath Sounds: Diminished    Fluids    Intake/Output Summary (Last 24 hours) at 17 0801  Last data filed at 17 0550   Gross per 24 hour   Intake   1382 ml   Output    950 ml   Net    432 ml       Nutrition  Orders Placed This Encounter   Procedures   • DIET ORDER     Standing Status: Standing      Number of Occurrences: 1      Standing Expiration Date:      Order Specific Question:  Diet:     Answer:   Regular [1]      Comments:  regular trays     Physical Exam   Constitutional: He is oriented to person, place, and time. He appears well-developed.   HENT:   Head: Atraumatic.   Mouth/Throat: Oropharynx is clear and moist.   Eyes: EOM are normal. Right eye exhibits no discharge.   Rt eye vision loss, enucleated.    Neck: Normal range of motion. Neck supple. No JVD present. No tracheal deviation present.   Cardiovascular: Normal rate and regular rhythm.  Exam reveals no gallop and no friction rub.    No murmur heard.  Pulmonary/Chest: Effort normal. No respiratory distress. He has no rales. He exhibits no tenderness.   Abdominal: Soft. Bowel sounds are normal. He exhibits no mass. There is no tenderness. There is no rebound and no guarding.   Musculoskeletal: He exhibits no edema.   Rt leg BKA - incision dry, no dehisc- healed.   Left leg w boot wound vac present.  Rt shoulder no redness or warmth, mild swelling.    Neurological: He is alert and oriented to person, place, and time. Coordination abnormal.   Skin: Skin is warm and dry. He is not diaphoretic. There is erythema.   End of stump on R appears chaffed, wound margins heaped.   Psychiatric: He has a normal mood and affect. His behavior is normal.                                Assessment/Plan     * Skin ulcer of left foot with fat layer exposed (CMS-HCC) (present on admission)  Assessment & Plan  S/p OR debridement and gastroscoleus resection 7/25   Continue wound vac care  w changes three times/wk  Contact isolation for MRSA.  TDWB LLE, WBAT RLE per Ortho recs.  Patient reportedly has restraining orders from much of the Skilled facilites in [place]  Difficult discharge  Cont to monitor     MRSA (methicillin resistant staph aureus) culture positive (present on admission)  Assessment & Plan  Doxycycline to cover L foot infection thru 8-25-17   Vanco stopped due to ARF  ID to f/u    Pseudomonas infection (present on admission)  Assessment & Plan  Left foot  infection  continue IV cefepime thru 8-25-17  ID assisting    Anxiety (present on admission)  Assessment & Plan  Depakote, psych assisting, long talk, will add low dose klonopin  Mood stable today  Cont to monitoir    Diarrhea (present on admission)  Assessment & Plan  Follow  resolved    Peripheral neuropathy (present on admission)  Assessment & Plan  Lyrica to cont    Tobacco abuse (present on admission)  Assessment & Plan  Nicotine replacement PRN  Cessation counseling    Wound of right leg, at BKA site (present on admission)  Assessment & Plan  Culture: VRE  ID following  Wound care  Surgery on    Opiate dependence (CMS-HCC) (present on admission)  Assessment & Plan  Noted Drug seeking behavior inpatient- improved deviance.  Avoid escalating  IV narcotics with no no signs for pain- use primarily for dressing / vac changes .  oxycontin recently stopped-- continue methadone. Verified with patient's outpatient pharmacy patient was taking methadone 60 mg 3 times a day. Currently on 10 mg 3 times a day  Patient's pain has been managed by outpatient pain management in [place]  Will slowly increase methadone back to 20 mg 3 times a day for now  Daily wants to change regimen      ABRIL (acute kidney injury) (CMS-HCC)  Assessment & Plan  resolved    Need another day of antibiotics finish by tomorrow we'll continue by mouth   Labs reviewed and Medications reviewed  Rolon catheter: No Rolon      DVT Prophylaxis: Enoxaparin (Lovenox)    Ulcer prophylaxis: Not indicated  Antibiotics: Treating active infection/contamination beyond 24 hours perioperative coverage  Assessed for rehab: Patient was assess for and/or received rehabilitation services during this hospitalization   For complexity-based billing, please refer to the history, exam, and decison making above. In addition, I spent 35 minutes caring for the patient today. More than 50% of the time was spent counseling and coordinating care.    I have discussed with RN and  CM and TERRIE and other consultants about patient's plan.

## 2017-08-26 LAB
STOOL OTHER ELEMENTS 1951: NORMAL
WBC STL QL MICRO: NORMAL

## 2017-08-26 PROCEDURE — 700111 HCHG RX REV CODE 636 W/ 250 OVERRIDE (IP): Performed by: INTERNAL MEDICINE

## 2017-08-26 PROCEDURE — 700102 HCHG RX REV CODE 250 W/ 637 OVERRIDE(OP): Performed by: INTERNAL MEDICINE

## 2017-08-26 PROCEDURE — A9270 NON-COVERED ITEM OR SERVICE: HCPCS | Performed by: INTERNAL MEDICINE

## 2017-08-26 PROCEDURE — 89055 LEUKOCYTE ASSESSMENT FECAL: CPT

## 2017-08-26 PROCEDURE — 770021 HCHG ROOM/CARE - ISO PRIVATE

## 2017-08-26 PROCEDURE — 700101 HCHG RX REV CODE 250: Performed by: INTERNAL MEDICINE

## 2017-08-26 PROCEDURE — 99232 SBSQ HOSP IP/OBS MODERATE 35: CPT | Performed by: INTERNAL MEDICINE

## 2017-08-26 RX ORDER — LOPERAMIDE HYDROCHLORIDE 2 MG/1
2 CAPSULE ORAL ONCE
Status: COMPLETED | OUTPATIENT
Start: 2017-08-26 | End: 2017-08-26

## 2017-08-26 RX ADMIN — Medication 325 MG: at 16:43

## 2017-08-26 RX ADMIN — PREGABALIN 150 MG: 150 CAPSULE ORAL at 20:02

## 2017-08-26 RX ADMIN — Medication 325 MG: at 08:56

## 2017-08-26 RX ADMIN — PREGABALIN 150 MG: 150 CAPSULE ORAL at 14:00

## 2017-08-26 RX ADMIN — METHADONE HYDROCHLORIDE 20 MG: 10 TABLET ORAL at 05:22

## 2017-08-26 RX ADMIN — LINEZOLID 600 MG: 600 TABLET, FILM COATED ORAL at 08:56

## 2017-08-26 RX ADMIN — OXYCODONE HYDROCHLORIDE 10 MG: 10 TABLET ORAL at 20:02

## 2017-08-26 RX ADMIN — PREGABALIN 150 MG: 150 CAPSULE ORAL at 08:56

## 2017-08-26 RX ADMIN — OXYCODONE HYDROCHLORIDE 10 MG: 10 TABLET ORAL at 15:11

## 2017-08-26 RX ADMIN — LOPERAMIDE HYDROCHLORIDE 2 MG: 2 CAPSULE ORAL at 15:10

## 2017-08-26 RX ADMIN — OXYCODONE HYDROCHLORIDE 10 MG: 10 TABLET ORAL at 11:14

## 2017-08-26 RX ADMIN — LIDOCAINE 1 PATCH: 50 PATCH CUTANEOUS at 11:15

## 2017-08-26 RX ADMIN — OXYCODONE HYDROCHLORIDE 10 MG: 10 TABLET ORAL at 06:35

## 2017-08-26 RX ADMIN — LIDOCAINE 1 APPLICATION: 40 CREAM TOPICAL at 11:15

## 2017-08-26 RX ADMIN — ENOXAPARIN SODIUM 40 MG: 100 INJECTION SUBCUTANEOUS at 08:56

## 2017-08-26 RX ADMIN — CLONAZEPAM 2 MG: 1 TABLET ORAL at 20:02

## 2017-08-26 RX ADMIN — METHADONE HYDROCHLORIDE 20 MG: 10 TABLET ORAL at 14:00

## 2017-08-26 RX ADMIN — CLONAZEPAM 2 MG: 1 TABLET ORAL at 08:56

## 2017-08-26 RX ADMIN — METHADONE HYDROCHLORIDE 20 MG: 10 TABLET ORAL at 20:02

## 2017-08-26 ASSESSMENT — ENCOUNTER SYMPTOMS
CLAUDICATION: 0
COUGH: 0
NECK PAIN: 0
DEPRESSION: 0
SHORTNESS OF BREATH: 0
DIZZINESS: 0
CHILLS: 0
HEARTBURN: 0
DIARRHEA: 1
WEAKNESS: 0
TINGLING: 0
PALPITATIONS: 0
ORTHOPNEA: 0
SPUTUM PRODUCTION: 0
MYALGIAS: 0
TREMORS: 0
NERVOUS/ANXIOUS: 1
HEADACHES: 0
WEIGHT LOSS: 0
HALLUCINATIONS: 0

## 2017-08-26 ASSESSMENT — PAIN SCALES - GENERAL
PAINLEVEL_OUTOF10: 5
PAINLEVEL_OUTOF10: 5
PAINLEVEL_OUTOF10: 7
PAINLEVEL_OUTOF10: 5
PAINLEVEL_OUTOF10: 7
PAINLEVEL_OUTOF10: 5

## 2017-08-26 NOTE — PROGRESS NOTES
[facility] Hospitalist Progress Note    Date of Service: 8/26/2017    Chief Complaint    52 y.o. male hx of PVD s/p L TMA and Rt Bka > 5 years ago  admitted 7/22/2017 with right BKA stump pain and wound drainage in addition to left TMA stump wound (nonhealing) over several months.  s/p Debridement and gastroscoleus resection LLE on 7-25-17 with Dr Shirley. Cultures have grown MRSA / Pseudomonas. He is being treated with IV cefepime and PO doxycycline per ID recommendations. These are to be continued until 08/25/2017. Patient has medical. Difficult disposition. SW continuing to look into placement options at this time. Continuing therapy inpatient at this point in time. Also underwent R BKA revision with Dr Shirley 08/15/2017    Interval Problem Update  8/22 spent long time discussing about pain medication. Patient claims he still have severe pain however when I walked in to patient's room patient was sleeping comfortably in bed. Per chart review patient does have history of drug seeking behavior. However revealed with the pharmacist patient was prescribed with methadone 60 mg 3 times a day as outpatient. Still complaint about pain in his legs wound. Patient otherwise denies fever, chills, nausea, vomiting, adb pain, SOB, CP, headache, constipation, diarrhea, cough, or sputum.    8/23 still complained of R shoulder and leg pain. We'll increase methadone to 20 mg 3 times a day. Patient is educated that narcotics should not be used for chronic pain medication. Methadone is used for previous history of narcotics use and prevent narcotics withdrawal.    8/24 patient complained of loose stool today however not watery. Patient said it is likely due to Zyvox. Patient otherwise denies fever, chills, nausea or vomiting.  8/25 patient did not complain pain today. No fever, chills, nausea vomiting. Questioning about course of antibiotics explained.  8/26 stable and no significant CC overnight. Some loose stool. Patient otherwise  denies fever, chills, nausea, vomiting, adb pain, SOB, CP, headache, constipation,  cough, or sputum.      Consultants/Specialty  Limb preservation service  Infectious diseases - Last seen by Dr Pino  Orthopedic surgery - Dr Shirley  Psychiatry - Dr Trujillo    Disposition  Difficult disposition. Remains inpatient to continue therapy. SW working on disposition. Discussed on rounds with SW. Continue looking into placement option.       Review of Systems   Constitutional: Negative for chills and weight loss.   HENT: Negative for hearing loss and tinnitus.    Eyes:        Vision loss rt eye / Evisceration. Trauma history.    Respiratory: Negative for cough, sputum production and shortness of breath.    Cardiovascular: Negative for chest pain, palpitations, orthopnea and claudication.   Gastrointestinal: Positive for diarrhea. Negative for heartburn.   Genitourinary: Negative for dysuria and frequency.   Musculoskeletal: Positive for joint pain (chronics). Negative for myalgias and neck pain.        R BKA   Skin: Negative for itching.   Neurological: Negative for dizziness, tingling, tremors, weakness and headaches.        Chronic peripheral neuropathy .   Psychiatric/Behavioral: Negative for depression and hallucinations. The patient is nervous/anxious (improved.).       Physical Exam  Laboratory/Imaging   Hemodynamics  Temp (24hrs), Av.6 °C (97.8 °F), Min:36 °C (96.8 °F), Max:36.8 °C (98.3 °F)   Temperature: 36.6 °C (97.8 °F)  Pulse  Av.2  Min: 61  Max: 111    Blood Pressure: 108/68      Respiratory      Respiration: 18, Pulse Oximetry: 97 %             Fluids    Intake/Output Summary (Last 24 hours) at 17 0816  Last data filed at 17 2100   Gross per 24 hour   Intake              360 ml   Output             1000 ml   Net             -640 ml       Nutrition  Orders Placed This Encounter   Procedures   • DIET ORDER     Standing Status:   Standing     Number of Occurrences:   1     Order Specific  Question:   Diet:     Answer:   Regular [1]     Comments:   regular trays     Physical Exam   Constitutional: He is oriented to person, place, and time. He appears well-developed.   HENT:   Head: Atraumatic.   Mouth/Throat: Oropharynx is clear and moist.   Eyes: EOM are normal. Right eye exhibits no discharge.   Rt eye vision loss, enucleated.    Neck: Normal range of motion. Neck supple. No JVD present. No tracheal deviation present.   Cardiovascular: Normal rate and regular rhythm.  Exam reveals no gallop and no friction rub.    No murmur heard.  Pulmonary/Chest: Effort normal. No respiratory distress. He has no rales. He exhibits no tenderness.   Abdominal: Soft. Bowel sounds are normal. He exhibits no mass. There is no tenderness. There is no rebound and no guarding.   Musculoskeletal: He exhibits no edema.   Rt leg BKA - incision dry, no dehisc- healed.   Left leg w boot wound vac present.  Rt shoulder no redness or warmth, mild swelling.    Neurological: He is alert and oriented to person, place, and time. Coordination abnormal.   Skin: Skin is warm and dry. He is not diaphoretic. There is erythema.   End of stump on R appears chaffed, wound margins heaped.   Psychiatric: He has a normal mood and affect. His behavior is normal.       Recent Labs      08/25/17   0930   WBC  5.9   RBC  4.05*   HEMOGLOBIN  10.3*   HEMATOCRIT  32.6*   MCV  80.5*   MCH  25.4*   MCHC  31.6*   RDW  50.8*   PLATELETCT  322   MPV  9.4     Recent Labs      08/25/17   0930   SODIUM  138   POTASSIUM  3.9   CHLORIDE  105   CO2  25   GLUCOSE  140*   BUN  25*   CREATININE  0.67   CALCIUM  8.9                      Assessment/Plan     * Skin ulcer of left foot with fat layer exposed (CMS-HCC)- (present on admission)   Assessment & Plan    S/p OR debridement and gastroscoleus resection 7/25   Continue wound vac care  w changes three times/wk  Contact isolation for MRSA.  TDWB LLE, WBAT RLE per Ortho recs.  Patient reportedly has restraining  orders from much of the Skilled facilites in [place]  Difficult discharge  Cont to monitor         Pseudomonas infection- (present on admission)   Assessment & Plan    Left foot infection  continue IV cefepime thru 8-25-17  ID assisting        MRSA (methicillin resistant staph aureus) culture positive- (present on admission)   Assessment & Plan    Doxycycline to cover L foot infection thru 8-25-17   Vanco stopped due to ARF  ID to f/u        Anxiety- (present on admission)   Assessment & Plan    Depakote, psych assisting, long talk, will add low dose klonopin  Mood stable today  Cont to monitoir        Diarrhea- (present on admission)   Assessment & Plan    Follow  resolved        ABRIL (acute kidney injury) (CMS-[facility])   Assessment & Plan    resolved        Opiate dependence (CMS-HCC)- (present on admission)   Assessment & Plan    Noted Drug seeking behavior inpatient- improved deviance.  Avoid escalating  IV narcotics with no no signs for pain- use primarily for dressing / vac changes .  oxycontin recently stopped-- continue methadone. Verified with patient's outpatient pharmacy patient was taking methadone 60 mg 3 times a day. Currently on 10 mg 3 times a day  Patient's pain has been managed by outpatient pain management in [place]  Will slowly increase methadone back to 20 mg 3 times a day for now  Daily wants to change regimen          Wound of right leg, at BKA site- (present on admission)   Assessment & Plan    Culture: VRE  ID following  Wound care  Surgery on        Tobacco abuse- (present on admission)   Assessment & Plan    Nicotine replacement PRN  Cessation counseling        Peripheral neuropathy- (present on admission)   Assessment & Plan    Lyrica to cont        complaint Loose stool, not watery. Stool sample collected.   Core Measures For complexity-based billing, please refer to the history, exam, and decison making above. In addition, I spent 35 minutes caring for the patient today. More than 50% of the  time was spent counseling and coordinating care.    I have discussed with RN and CM and SW and other consultants about patient's plan.

## 2017-08-26 NOTE — CARE PLAN
Problem: Safety  Goal: Will remain free from injury  Outcome: PROGRESSING AS EXPECTED  Provided assistance with ambulation. Fall prevention measures in place. Hourly rounds ongoing.    Problem: Pain Management  Goal: Pain level will decrease to patient’s comfort goal  Outcome: PROGRESSING AS EXPECTED  Pain medications given per MAR. Other non-pharmacologic measures for pain initiated.

## 2017-08-26 NOTE — WOUND TEAM
"[facility]  Inpatient Services  Wound and Skin Care Treatment Note    Admission Date:  07/22/17     HPI, PMH, SH: Reviewed  Unit where seen by Wound Team:  [id number]    WOUND CONSULT RELATED TO:   Scheduled NPWT dressing change left plantar foot      SUBJECTIVE:  \"I didn't know it was loose because it didn't alarm\"    Self Report / Pain Level:  Tolerated well    OBJECTIVE:    Previous NPWT dressing dislodged with trac pad intact with suction    WOUND TYPE, LOCATION, CHARACTERISTICS (Pressure ulcers: location, stage, POA or date identified)    Location/type of wound: Open surgical wound left plantar foot      Periwound:     macerated around wound and in between remaining amputation site of pervious digit; callous-like tissue      Drainage:     scant serosanguinous    Tissue Type and %:    100% red/pink  Wound Edges:    Attached/macerated    Odor:     none  Exposed structure(s):  Bone palpated    S&S of Infection:     none      Measurements: (cm)  (Taken 08/23/17)   Length:    5.0  Width:     4.0  Depth:    0.3      INTERVENTIONS BY WOUND TEAM:  Removed old dressing and irrigated wound with wound cleanser.  Benzoin and drape to bull wound skin as needed. Small piece of paste ring used to seal fissure on lateral side.  Covered wound with 1 piece black foam with a bridge and button to dorsum of foot. Secured with drape and resumed NPWT at 125mmhg continuously.  Placed a non-adhesive foam under the forefoot to hopefully absorb any moisture. Total black foam=3 pieces.    Interdisciplinary consultation:   Staff RN, patient      EVALUATION:  Measurements smaller; remains clean, no odor; should cont to slowly progress    Factors affecting wound healing:  Neuropathy, tobacco abuse     Goals:  Decrease in wound size by 5% every 3 weeks -- no maceration    NURSING PLAN OF CARE ORDERS (x):    Dressing changes: See Dressing Maintenance orders:  X  Skin care: See Skin Care orders:   Rectal tube care: See Rectal Tube " Care orders:   Other orders:    WOUND TEAM PLAN OF CARE (x):   NPWT change 3 x week:   X  Dressing changes by wound team:       Follow up as needed:       Other (explain):    Anticipated discharge plans (x):  SNF:           Home Care:           Outpatient Wound Center:         Self Care:            Other:  TBD

## 2017-08-26 NOTE — PROGRESS NOTES
Pharmacy Pharmacotherapy Consult   LOS >30 days  Admit Date: 7/22/2017    Medications were reviewed for appropriateness and ongoing need.   Current Facility-Administered Medications   Medication Dose Route Frequency Provider Last Rate Last Dose   • methadone (DOLOPHINE) tablet 20 mg  20 mg Oral Q8HRS Sara Jack M.D.   20 mg at 08/26/17 1400   • oxycodone immediate-release (ROXICODONE) tablet 5 mg  5 mg Oral Q4HRS PRN Med Gotti M.D.        Or   • oxycodone immediate release (ROXICODONE) tablet 10 mg  10 mg Oral Q4HRS PRN Med Gotti M.D.   10 mg at 08/26/17 1114   • linezolid (ZYVOX) tablet 600 mg  600 mg Oral Q12HRS Med Gotti M.D.   600 mg at 08/26/17 0856   • clonazepam (KLONOPIN) tablet 2 mg  2 mg Oral BID Med Gotti M.D.   2 mg at 08/26/17 0856   • lidocaine (LMX) 4 % cream 1 Application  1 Application Topical PRN Med Gotti M.D.   1 Application at 08/26/17 1115   • lidocaine (LIDODERM) 5 % 1 Patch  1 Patch Transdermal Q24HR Med Gotti M.D.   1 Patch at 08/26/17 1115   • nicotine (NICODERM) 7 MG/24HR 7 mg  7 mg Transdermal Daily-0600 Jeff Crystal M.D.   7 mg at 08/06/17 0548    And   • nicotine polacrilex (NICORETTE) 2 MG piece 2 mg  2 mg Oral Q HOUR PRN Jeff Crystal M.D.       • pregabalin (LYRICA) capsule 150 mg  150 mg Oral TID Med Gotti M.D.   150 mg at 08/26/17 1400   • normal saline PF 10-20 mL  10-20 mL Intravenous PRN Bekah Mckeon M.D.   10 mL at 08/23/17 2303   • lidocaine (LIDODERM) 5 % 1 Patch  1 Patch Transdermal Q24HR Med Gotti M.D.   Stopped at 08/26/17 1100   • zolpidem (AMBIEN) tablet 5 mg  5 mg Oral HS PRN - MR X 1 eMd Gotti M.D.   5 mg at 08/25/17 2148   • enoxaparin (LOVENOX) inj 40 mg  40 mg Subcutaneous DAILY Med Gotti M.D.   40 mg at 08/26/17 0856   • ondansetron (ZOFRAN ODT) dispertab 4 mg  4 mg Oral Q4HRS PRN Med Gotti M.D.   4 mg at 08/20/17 0055   • ondansetron (ZOFRAN) syringe/vial injection 4 mg  4 mg Intravenous Q4HRS PRN Med Gotti M.D.   4 mg at 08/22/17 0146    • acetaminophen (TYLENOL) tablet 650 mg  650 mg Oral Q6HRS PRN Med Gotti M.D.   650 mg at 08/25/17 0123   • ferrous sulfate tablet 325 mg  325 mg Oral BID WITH MEALS Med Gotti M.D.   325 mg at 08/26/17 0856     Recommendations:  1. ID consulted and following for multiple complex infections. Stops dates in place for antimicrobial therapy. Recommend no changes at this time.  2. Oral iron supplementation for noted anemia (appears to be tolerating without issue).  3. Appropriate PRN agents located on MAR (e.g N/V & pain control)  · Appears to regularly use PRN oxycodone. May consider dose titration of maintenance methadone given noted opioid dependence prior to arrival.  · Minimal use of PRN ondansetron. May consider baseline ECG to ensure no prolonged QTc given expected duration of stay.       4. Appropriate use of pregabalin given noted neuropathy (status-post BKA and multiple surgical interventions). Recommend no changes at this time.       5. Appropriate consultation of psychiatry service given likely difficult disposition noted in most recent progress notes. Recommend no changes at this time.    Andrzej Arce, PharmD

## 2017-08-26 NOTE — CARE PLAN
Problem: Skin Integrity  Goal: Risk for impaired skin integrity will decrease  Pt will not have any skin break down during shift

## 2017-08-26 NOTE — PROGRESS NOTES
Pt in stable condition, complaining of lots of bowel movements he thinks is related to oral antibiotics, mentioned this to Dr. Jack this morning who ordered a stool sample to be sent, supplies given to patient and understands to call when he has sample, pain controlled at this time, no other issues, call light within reach, will continue with plan of care

## 2017-08-27 PROCEDURE — 700102 HCHG RX REV CODE 250 W/ 637 OVERRIDE(OP): Performed by: INTERNAL MEDICINE

## 2017-08-27 PROCEDURE — A9270 NON-COVERED ITEM OR SERVICE: HCPCS | Performed by: INTERNAL MEDICINE

## 2017-08-27 PROCEDURE — 700111 HCHG RX REV CODE 636 W/ 250 OVERRIDE (IP): Performed by: INTERNAL MEDICINE

## 2017-08-27 PROCEDURE — 700101 HCHG RX REV CODE 250: Performed by: INTERNAL MEDICINE

## 2017-08-27 PROCEDURE — 770021 HCHG ROOM/CARE - ISO PRIVATE

## 2017-08-27 PROCEDURE — 99232 SBSQ HOSP IP/OBS MODERATE 35: CPT | Performed by: INTERNAL MEDICINE

## 2017-08-27 RX ORDER — LOPERAMIDE HYDROCHLORIDE 2 MG/1
2 CAPSULE ORAL 3 TIMES DAILY PRN
Status: DISCONTINUED | OUTPATIENT
Start: 2017-08-27 | End: 2017-10-18 | Stop reason: HOSPADM

## 2017-08-27 RX ADMIN — Medication 325 MG: at 17:00

## 2017-08-27 RX ADMIN — PREGABALIN 150 MG: 150 CAPSULE ORAL at 20:47

## 2017-08-27 RX ADMIN — CLONAZEPAM 2 MG: 1 TABLET ORAL at 08:01

## 2017-08-27 RX ADMIN — LIDOCAINE 1 PATCH: 50 PATCH CUTANEOUS at 10:46

## 2017-08-27 RX ADMIN — METHADONE HYDROCHLORIDE 20 MG: 10 TABLET ORAL at 06:19

## 2017-08-27 RX ADMIN — LOPERAMIDE HYDROCHLORIDE 2 MG: 2 CAPSULE ORAL at 12:26

## 2017-08-27 RX ADMIN — OXYCODONE HYDROCHLORIDE 10 MG: 10 TABLET ORAL at 10:47

## 2017-08-27 RX ADMIN — LOPERAMIDE HYDROCHLORIDE 2 MG: 2 CAPSULE ORAL at 20:46

## 2017-08-27 RX ADMIN — CLONAZEPAM 2 MG: 1 TABLET ORAL at 20:47

## 2017-08-27 RX ADMIN — METHADONE HYDROCHLORIDE 20 MG: 10 TABLET ORAL at 14:14

## 2017-08-27 RX ADMIN — OXYCODONE HYDROCHLORIDE 10 MG: 10 TABLET ORAL at 15:25

## 2017-08-27 RX ADMIN — Medication 325 MG: at 08:01

## 2017-08-27 RX ADMIN — LIDOCAINE 1 APPLICATION: 40 CREAM TOPICAL at 20:46

## 2017-08-27 RX ADMIN — OXYCODONE HYDROCHLORIDE 10 MG: 10 TABLET ORAL at 20:47

## 2017-08-27 RX ADMIN — METHADONE HYDROCHLORIDE 20 MG: 10 TABLET ORAL at 20:46

## 2017-08-27 RX ADMIN — OXYCODONE HYDROCHLORIDE 10 MG: 10 TABLET ORAL at 06:19

## 2017-08-27 RX ADMIN — PREGABALIN 150 MG: 150 CAPSULE ORAL at 14:14

## 2017-08-27 RX ADMIN — PREGABALIN 150 MG: 150 CAPSULE ORAL at 08:01

## 2017-08-27 RX ADMIN — ENOXAPARIN SODIUM 40 MG: 100 INJECTION SUBCUTANEOUS at 08:01

## 2017-08-27 ASSESSMENT — PAIN SCALES - GENERAL
PAINLEVEL_OUTOF10: 3
PAINLEVEL_OUTOF10: 3
PAINLEVEL_OUTOF10: 7
PAINLEVEL_OUTOF10: 3
PAINLEVEL_OUTOF10: 3
PAINLEVEL_OUTOF10: 7

## 2017-08-27 ASSESSMENT — ENCOUNTER SYMPTOMS
SPUTUM PRODUCTION: 0
DIARRHEA: 1
TINGLING: 0
HEARTBURN: 0
WEIGHT LOSS: 0
MYALGIAS: 0
HEADACHES: 0
NECK PAIN: 0
COUGH: 0
ORTHOPNEA: 0
WEAKNESS: 0
NERVOUS/ANXIOUS: 1
DIZZINESS: 0
TREMORS: 0
NAUSEA: 0
DEPRESSION: 0
CLAUDICATION: 0
HALLUCINATIONS: 0

## 2017-08-27 NOTE — PROGRESS NOTES
Assumed care of pt at 0700, pt AAOx4 and denies need for PRN pain medication at this time. Pt up in wheelchair. Contact precautions in place. Pt refusing zyvox this AM despite education and understanding of education. WOund vac in place, dressing CDI.  Hourly rounding in place.

## 2017-08-27 NOTE — PROGRESS NOTES
Pt refusing oral antibx zyvox, education given. He wants to see id md waggoner. Complain regarding diarrhea following antibx

## 2017-08-27 NOTE — CARE PLAN
Problem: Communication  Goal: The ability to communicate needs accurately and effectively will improve  Discussed POC, pt communicates questions and poc well. Pt and family understand poc.    Problem: Safety  Goal: Will remain free from injury  Educated on safety measures, using call light ect    Problem: Pain Management  Goal: Pain level will decrease to patient's comfort goal  Pt states pain management plan working for them, pain controlled

## 2017-08-27 NOTE — PROGRESS NOTES
[facility] Hospitalist Progress Note    Date of Service: 8/27/2017    Chief Complaint    52 y.o. male hx of PVD s/p L TMA and Rt Bka > 5 years ago  admitted 7/22/2017 with right BKA stump pain and wound drainage in addition to left TMA stump wound (nonhealing) over several months.  s/p Debridement and gastroscoleus resection LLE on 7-25-17 with Dr Shirley. Cultures have grown MRSA / Pseudomonas. He is being treated with IV cefepime and PO doxycycline per ID recommendations. These are to be continued until 08/25/2017. Patient has medical. Difficult disposition. SW continuing to look into placement options at this time. Continuing therapy inpatient at this point in time. Also underwent R BKA revision with Dr Shirley 08/15/2017    Interval Problem Update  8/22 spent long time discussing about pain medication. Patient claims he still have severe pain however when I walked in to patient's room patient was sleeping comfortably in bed. Per chart review patient does have history of drug seeking behavior. However revealed with the pharmacist patient was prescribed with methadone 60 mg 3 times a day as outpatient. Still complaint about pain in his legs wound. Patient otherwise denies fever, chills, nausea, vomiting, adb pain, SOB, CP, headache, constipation, diarrhea, cough, or sputum.    8/23 still complained of R shoulder and leg pain. We'll increase methadone to 20 mg 3 times a day. Patient is educated that narcotics should not be used for chronic pain medication. Methadone is used for previous history of narcotics use and prevent narcotics withdrawal.    8/24 patient complained of loose stool today however not watery. Patient said it is likely due to Zyvox. Patient otherwise denies fever, chills, nausea or vomiting.  8/25 patient did not complain pain today. No fever, chills, nausea vomiting. Questioning about course of antibiotics explained.  8/26 stable and no significant CC overnight. Some loose stool. Patient otherwise  denies fever, chills, nausea, vomiting, adb pain, SOB, CP, headache, constipation,  cough, or sputum.   stool WBC (-), started Imodium 3 times a day when necessary. Otherwise remaining afebrile, denies fever or chills. Also change diet to lactose free diet.      Consultants/Specialty  Limb preservation service  Infectious diseases - Last seen by Dr Pino  Orthopedic surgery - Dr Shirley  Psychiatry - Dr Trujillo    Disposition  Difficult disposition. Remains inpatient to continue therapy. SW working on disposition. Discussed on rounds with SW. Continue looking into placement option.       Review of Systems   Constitutional: Negative for weight loss.   HENT: Negative for hearing loss and tinnitus.    Eyes:        Vision loss rt eye / Evisceration. Trauma history.    Respiratory: Negative for cough and sputum production.    Cardiovascular: Negative for chest pain, orthopnea and claudication.   Gastrointestinal: Positive for diarrhea. Negative for heartburn and nausea.   Genitourinary: Negative for dysuria and frequency.   Musculoskeletal: Positive for joint pain (chronics). Negative for myalgias and neck pain.        R BKA   Skin: Negative for itching.   Neurological: Negative for dizziness, tingling, tremors, weakness and headaches.        Chronic peripheral neuropathy .   Psychiatric/Behavioral: Negative for depression and hallucinations. The patient is nervous/anxious (improved.).       Physical Exam  Laboratory/Imaging   Hemodynamics  Temp (24hrs), Av.4 °C (97.5 °F), Min:36.1 °C (96.9 °F), Max:36.6 °C (97.9 °F)   Temperature: 36.6 °C (97.9 °F)  Pulse  Av.1  Min: 61  Max: 111    Blood Pressure: 110/70      Respiratory      Respiration: 16, Pulse Oximetry: 94 %        RUL Breath Sounds: Diminished, RML Breath Sounds: Diminished, RLL Breath Sounds: Diminished, NANCY Breath Sounds: Diminished, LLL Breath Sounds: Diminished    Fluids    Intake/Output Summary (Last 24 hours) at 17 0834  Last data filed  at 08/27/17 0811   Gross per 24 hour   Intake              120 ml   Output              850 ml   Net             -730 ml       Nutrition  Orders Placed This Encounter   Procedures   • DIET ORDER     Standing Status:   Standing     Number of Occurrences:   1     Order Specific Question:   Diet:     Answer:   Regular [1]     Comments:   regular trays     Physical Exam   Constitutional: He is oriented to person, place, and time. He appears well-developed and well-nourished.   HENT:   Right Ear: External ear normal.   Left Ear: External ear normal.   Mouth/Throat: Oropharynx is clear and moist.   Eyes: EOM are normal. Right eye exhibits no discharge.   Rt eye vision loss, enucleated.    Neck: Normal range of motion. Neck supple. No JVD present. No tracheal deviation present.   Cardiovascular: Normal rate and regular rhythm.  Exam reveals no gallop and no friction rub.    No murmur heard.  Pulmonary/Chest: Effort normal. No respiratory distress. He has no wheezes. He exhibits no tenderness.   Abdominal: Soft. Bowel sounds are normal. He exhibits no distension and no mass. There is no tenderness. There is no guarding.   Musculoskeletal: He exhibits no edema.   Rt leg BKA - incision dry, no dehisc- healed.   Left leg w boot wound vac present.  Rt shoulder no redness or warmth, mild swelling.    Neurological: He is alert and oriented to person, place, and time. Coordination abnormal.   Skin: Skin is warm and dry. He is not diaphoretic. There is erythema.   End of stump on R appears chaffed, wound margins heaped.   Psychiatric: He has a normal mood and affect. His behavior is normal.       Recent Labs      08/25/17   0930   WBC  5.9   RBC  4.05*   HEMOGLOBIN  10.3*   HEMATOCRIT  32.6*   MCV  80.5*   MCH  25.4*   MCHC  31.6*   RDW  50.8*   PLATELETCT  322   MPV  9.4     Recent Labs      08/25/17   0930   SODIUM  138   POTASSIUM  3.9   CHLORIDE  105   CO2  25   GLUCOSE  140*   BUN  25*   CREATININE  0.67   CALCIUM  8.9                       Assessment/Plan     * Skin ulcer of left foot with fat layer exposed (CMS-HCC)- (present on admission)   Assessment & Plan    S/p OR debridement and gastroscoleus resection 7/25   Continue wound vac care  w changes three times/wk  Contact isolation for MRSA.  TDWB LLE, WBAT RLE per Ortho recs.  Patient reportedly has restraining orders from much of the Skilled facilites in [place]  Difficult discharge  Cont to monitor         Pseudomonas infection- (present on admission)   Assessment & Plan    Left foot infection  continue IV cefepime thru 8-25-17  ID assisting        MRSA (methicillin resistant staph aureus) culture positive- (present on admission)   Assessment & Plan    Doxycycline to cover L foot infection thru 8-25-17   Vanco stopped due to ARF  ID to f/u        Anxiety- (present on admission)   Assessment & Plan    Depakote, psych assisting, long talk, will add low dose klonopin  Mood stable today  Cont to monitoir        Diarrhea- (present on admission)   Assessment & Plan    Follow  resolved        ABRIL (acute kidney injury) (CMS-HCC)   Assessment & Plan    resolved        Opiate dependence (CMS-HCC)- (present on admission)   Assessment & Plan    Noted Drug seeking behavior inpatient- improved deviance.  Avoid escalating  IV narcotics with no no signs for pain- use primarily for dressing / vac changes .  oxycontin recently stopped-- continue methadone. Verified with patient's outpatient pharmacy patient was taking methadone 60 mg 3 times a day. Currently on 10 mg 3 times a day  Patient's pain has been managed by outpatient pain management in [place]  Will slowly increase methadone back to 20 mg 3 times a day for now  Daily wants to change regimen          Wound of right leg, at BKA site- (present on admission)   Assessment & Plan    Culture: VRE  ID following  Wound care  Surgery on        Tobacco abuse- (present on admission)   Assessment & Plan    Nicotine replacement PRN  Cessation counseling         Peripheral neuropathy- (present on admission)   Assessment & Plan    Lyrica to cont        complaint Loose stool, not watery. Stool sample collected.   Stool no signs of infection, WBC (-), start with Imodium 3 times a day when necessary   Change diet to a lactose-free diet   Core Measures For complexity-based billing, please refer to the history, exam, and decison making above. In addition, I spent 35 minutes caring for the patient today. More than 50% of the time was spent counseling and coordinating care.    I have discussed with RN and EDGARDO and SW and other consultants about patient's plan.

## 2017-08-28 PROCEDURE — 97535 SELF CARE MNGMENT TRAINING: CPT

## 2017-08-28 PROCEDURE — 700102 HCHG RX REV CODE 250 W/ 637 OVERRIDE(OP): Performed by: INTERNAL MEDICINE

## 2017-08-28 PROCEDURE — A9270 NON-COVERED ITEM OR SERVICE: HCPCS | Performed by: INTERNAL MEDICINE

## 2017-08-28 PROCEDURE — 700101 HCHG RX REV CODE 250: Performed by: INTERNAL MEDICINE

## 2017-08-28 PROCEDURE — 700111 HCHG RX REV CODE 636 W/ 250 OVERRIDE (IP): Performed by: INTERNAL MEDICINE

## 2017-08-28 PROCEDURE — 97530 THERAPEUTIC ACTIVITIES: CPT

## 2017-08-28 PROCEDURE — 97605 NEG PRS WND THER DME<=50SQCM: CPT

## 2017-08-28 PROCEDURE — 99232 SBSQ HOSP IP/OBS MODERATE 35: CPT | Performed by: INTERNAL MEDICINE

## 2017-08-28 PROCEDURE — 770021 HCHG ROOM/CARE - ISO PRIVATE

## 2017-08-28 RX ORDER — MORPHINE SULFATE 4 MG/ML
2 INJECTION, SOLUTION INTRAMUSCULAR; INTRAVENOUS ONCE
Status: COMPLETED | OUTPATIENT
Start: 2017-08-28 | End: 2017-08-28

## 2017-08-28 RX ADMIN — CLONAZEPAM 2 MG: 1 TABLET ORAL at 08:49

## 2017-08-28 RX ADMIN — LOPERAMIDE HYDROCHLORIDE 2 MG: 2 CAPSULE ORAL at 08:50

## 2017-08-28 RX ADMIN — MORPHINE SULFATE 2 MG: 4 INJECTION INTRAVENOUS at 10:57

## 2017-08-28 RX ADMIN — ENOXAPARIN SODIUM 40 MG: 100 INJECTION SUBCUTANEOUS at 08:49

## 2017-08-28 RX ADMIN — METHADONE HYDROCHLORIDE 20 MG: 10 TABLET ORAL at 14:02

## 2017-08-28 RX ADMIN — LIDOCAINE 1 PATCH: 50 PATCH CUTANEOUS at 12:38

## 2017-08-28 RX ADMIN — OXYCODONE HYDROCHLORIDE 10 MG: 10 TABLET ORAL at 08:49

## 2017-08-28 RX ADMIN — PREGABALIN 150 MG: 150 CAPSULE ORAL at 14:02

## 2017-08-28 RX ADMIN — Medication 325 MG: at 18:00

## 2017-08-28 RX ADMIN — PREGABALIN 150 MG: 150 CAPSULE ORAL at 08:49

## 2017-08-28 RX ADMIN — OXYCODONE HYDROCHLORIDE 10 MG: 10 TABLET ORAL at 18:00

## 2017-08-28 RX ADMIN — LIDOCAINE 1 APPLICATION: 40 CREAM TOPICAL at 19:40

## 2017-08-28 RX ADMIN — PREGABALIN 150 MG: 150 CAPSULE ORAL at 19:40

## 2017-08-28 RX ADMIN — Medication 325 MG: at 08:49

## 2017-08-28 RX ADMIN — METHADONE HYDROCHLORIDE 20 MG: 10 TABLET ORAL at 22:00

## 2017-08-28 RX ADMIN — OXYCODONE HYDROCHLORIDE 10 MG: 10 TABLET ORAL at 03:53

## 2017-08-28 RX ADMIN — METHADONE HYDROCHLORIDE 20 MG: 10 TABLET ORAL at 05:30

## 2017-08-28 RX ADMIN — CLONAZEPAM 2 MG: 1 TABLET ORAL at 19:40

## 2017-08-28 RX ADMIN — OXYCODONE HYDROCHLORIDE 10 MG: 10 TABLET ORAL at 14:02

## 2017-08-28 RX ADMIN — ONDANSETRON 4 MG: 4 TABLET, ORALLY DISINTEGRATING ORAL at 16:49

## 2017-08-28 ASSESSMENT — PAIN SCALES - GENERAL
PAINLEVEL_OUTOF10: 4
PAINLEVEL_OUTOF10: 5
PAINLEVEL_OUTOF10: 4
PAINLEVEL_OUTOF10: 5

## 2017-08-28 ASSESSMENT — PATIENT HEALTH QUESTIONNAIRE - PHQ9
SUM OF ALL RESPONSES TO PHQ9 QUESTIONS 1 AND 2: 0
2. FEELING DOWN, DEPRESSED, IRRITABLE, OR HOPELESS: NOT AT ALL
SUM OF ALL RESPONSES TO PHQ QUESTIONS 1-9: 0
1. LITTLE INTEREST OR PLEASURE IN DOING THINGS: NOT AT ALL

## 2017-08-28 ASSESSMENT — ENCOUNTER SYMPTOMS
NERVOUS/ANXIOUS: 1
DEPRESSION: 0
TINGLING: 0
WEAKNESS: 0
CLAUDICATION: 0
HALLUCINATIONS: 0
WEIGHT LOSS: 0
MYALGIAS: 0
HEADACHES: 0
TREMORS: 0
DIARRHEA: 1
SPUTUM PRODUCTION: 0
ORTHOPNEA: 0
COUGH: 0
NAUSEA: 0
HEARTBURN: 0
CHILLS: 0
DIZZINESS: 0
NECK PAIN: 0

## 2017-08-28 ASSESSMENT — COGNITIVE AND FUNCTIONAL STATUS - GENERAL
WALKING IN HOSPITAL ROOM: TOTAL
MOBILITY SCORE: 18
CLIMB 3 TO 5 STEPS WITH RAILING: TOTAL
SUGGESTED CMS G CODE MODIFIER MOBILITY: CK

## 2017-08-28 ASSESSMENT — GAIT ASSESSMENTS: GAIT LEVEL OF ASSIST: UNABLE TO PARTICIPATE

## 2017-08-28 NOTE — THERAPY
"Physical Therapy Treatment completed.   Bed Mobility:  Supine to Sit: Modified Independent  Transfers: Sit to Stand: Modified Independent  Gait: Level Of Assist: Unable to Participate. Pt is non-amb @ this time, Heel WB for transfers only in boot.        Plan of Care: Will benefit from Physical Therapy 1 times per week  Discharge Recommendations: Equipment: Will Continue to Assess for Equipment Needs. Post-acute therapy Discharge to home with outpatient or home health for additional skilled therapy services.     See \"Rehab Therapy-Acute\" Patient Summary Report for complete documentation.       "

## 2017-08-28 NOTE — PROGRESS NOTES
Pt stable condition, taking  Shower this morning, still refusing zyvox, up set about his diet change to no lactose products, wound vac change scheduled for today, call light within reach, continue to monitor

## 2017-08-28 NOTE — CARE PLAN
Problem: Infection  Goal: Will remain free from infection  Pt will remain free from signs of infection

## 2017-08-28 NOTE — PROGRESS NOTES
[facility] Hospitalist Progress Note    Date of Service: 8/28/2017    Chief Complaint    52 y.o. male hx of PVD s/p L TMA and Rt Bka > 5 years ago  admitted 7/22/2017 with right BKA stump pain and wound drainage in addition to left TMA stump wound (nonhealing) over several months.  s/p Debridement and gastroscoleus resection LLE on 7-25-17 with Dr Shirley. Cultures have grown MRSA / Pseudomonas. He is being treated with IV cefepime and PO doxycycline per ID recommendations. These are to be continued until 08/25/2017. Patient has medical. Difficult disposition. SW continuing to look into placement options at this time. Continuing therapy inpatient at this point in time. Also underwent R BKA revision with Dr Shirley 08/15/2017    Interval Problem Update  8/22 spent long time discussing about pain medication. Patient claims he still have severe pain however when I walked in to patient's room patient was sleeping comfortably in bed. Per chart review patient does have history of drug seeking behavior. However revealed with the pharmacist patient was prescribed with methadone 60 mg 3 times a day as outpatient. Still complaint about pain in his legs wound. Patient otherwise denies fever, chills, nausea, vomiting, adb pain, SOB, CP, headache, constipation, diarrhea, cough, or sputum.    8/23 still complained of R shoulder and leg pain. We'll increase methadone to 20 mg 3 times a day. Patient is educated that narcotics should not be used for chronic pain medication. Methadone is used for previous history of narcotics use and prevent narcotics withdrawal.    8/24 patient complained of loose stool today however not watery. Patient said it is likely due to Zyvox. Patient otherwise denies fever, chills, nausea or vomiting.  8/25 patient did not complain pain today. No fever, chills, nausea vomiting. Questioning about course of antibiotics explained.  8/26 stable and no significant CC overnight. Some loose stool. Patient otherwise  denies fever, chills, nausea, vomiting, adb pain, SOB, CP, headache, constipation,  cough, or sputum.   stool WBC (-), started Imodium 3 times a day when necessary. Otherwise remaining afebrile, denies fever or chills. Also change diet to lactose free diet.   patient still believes Zyvox caused diarrhea, has been refusing. Otherwise remain stable. Patient denies watery stool, but loose stool.    Consultants/Specialty  Limb preservation service  Infectious diseases - Last seen by Dr Pino  Orthopedic surgery - Dr Shirley  Psychiatry - Dr Trujillo    Disposition  Difficult disposition. Remains inpatient to continue therapy. SW working on disposition. Discussed on rounds with SW. Continue looking into placement option.       Review of Systems   Constitutional: Negative for chills and weight loss.   HENT: Negative for hearing loss and tinnitus.    Eyes:        Vision loss rt eye / Evisceration. Trauma history.    Respiratory: Negative for cough and sputum production.    Cardiovascular: Negative for orthopnea and claudication.   Gastrointestinal: Positive for diarrhea. Negative for heartburn and nausea.   Genitourinary: Negative for dysuria.   Musculoskeletal: Positive for joint pain (chronics). Negative for myalgias and neck pain.        R BKA   Skin: Negative for itching.   Neurological: Negative for dizziness, tingling, tremors, weakness and headaches.        Chronic peripheral neuropathy .   Psychiatric/Behavioral: Negative for depression and hallucinations. The patient is nervous/anxious (improved.).       Physical Exam  Laboratory/Imaging   Hemodynamics  Temp (24hrs), Av.6 °C (97.9 °F), Min:36.4 °C (97.5 °F), Max:36.7 °C (98.1 °F)   Temperature: 36.7 °C (98.1 °F)  Pulse  Av.1  Min: 61  Max: 111    Blood Pressure: 102/67      Respiratory      Respiration: 16, Pulse Oximetry: 94 %        RUL Breath Sounds: Diminished, RML Breath Sounds: Diminished, RLL Breath Sounds: Diminished, NANCY Breath Sounds:  Diminished, LLL Breath Sounds: Diminished    Fluids    Intake/Output Summary (Last 24 hours) at 08/28/17 0801  Last data filed at 08/28/17 0434   Gross per 24 hour   Intake              120 ml   Output             1450 ml   Net            -1330 ml       Nutrition  Orders Placed This Encounter   Procedures   • DIET ORDER     Standing Status:   Standing     Number of Occurrences:   1     Order Specific Question:   Diet:     Answer:   Regular [1]     Comments:   regular trays     Order Specific Question:   Miscellaneous modifications:     Answer:   Lactose Free per MD [5]     Physical Exam   Constitutional: He is oriented to person, place, and time. He appears well-developed.   HENT:   Right Ear: External ear normal.   Left Ear: External ear normal.   Mouth/Throat: Oropharynx is clear and moist.   Eyes: EOM are normal. Right eye exhibits no discharge.   Rt eye vision loss, enucleated.    Neck: Normal range of motion. Neck supple. No JVD present. No tracheal deviation present.   Cardiovascular: Normal rate and regular rhythm.  Exam reveals no gallop.    No murmur heard.  Pulmonary/Chest: Effort normal. No respiratory distress. He has no rales. He exhibits no tenderness.   Abdominal: Soft. Bowel sounds are normal. He exhibits no mass. There is no tenderness. There is no rebound and no guarding.   Musculoskeletal: He exhibits no edema.   Rt leg BKA - incision dry, no dehisc- healed.   Left leg w boot wound vac present.  Rt shoulder no redness or warmth, mild swelling.    Neurological: He is alert and oriented to person, place, and time. Coordination abnormal.   Skin: Skin is warm and dry. He is not diaphoretic. There is erythema.   End of stump on R appears chaffed, wound margins heaped.   Psychiatric: He has a normal mood and affect. His behavior is normal.       Recent Labs      08/25/17   0930   WBC  5.9   RBC  4.05*   HEMOGLOBIN  10.3*   HEMATOCRIT  32.6*   MCV  80.5*   MCH  25.4*   MCHC  31.6*   RDW  50.8*    PLATELETCT  322   MPV  9.4     Recent Labs      08/25/17   0930   SODIUM  138   POTASSIUM  3.9   CHLORIDE  105   CO2  25   GLUCOSE  140*   BUN  25*   CREATININE  0.67   CALCIUM  8.9                      Assessment/Plan     * Skin ulcer of left foot with fat layer exposed (CMS-HCC)- (present on admission)   Assessment & Plan    S/p OR debridement and gastroscoleus resection 7/25   Continue wound vac care  w changes three times/wk  Contact isolation for MRSA.  TDWB LLE, WBAT RLE per Ortho recs.  Patient reportedly has restraining orders from much of the Skilled facilites in [place]  Difficult discharge  Cont to monitor         Pseudomonas infection- (present on admission)   Assessment & Plan    Left foot infection  continue IV cefepime thru 8-25-17  ID assisting        MRSA (methicillin resistant staph aureus) culture positive- (present on admission)   Assessment & Plan    Doxycycline to cover L foot infection thru 8-25-17   Vanco stopped due to ARF  ID to f/u        Anxiety- (present on admission)   Assessment & Plan    Depakote, psych assisting, long talk, will add low dose klonopin  Mood stable today  Cont to monitoir        Diarrhea- (present on admission)   Assessment & Plan    Follow  resolved        ABRIL (acute kidney injury) (CMS-HCC)   Assessment & Plan    resolved        Opiate dependence (CMS-HCC)- (present on admission)   Assessment & Plan    Noted Drug seeking behavior inpatient- improved deviance.  Avoid escalating  IV narcotics with no no signs for pain- use primarily for dressing / vac changes .  oxycontin recently stopped-- continue methadone. Verified with patient's outpatient pharmacy patient was taking methadone 60 mg 3 times a day. Currently on 10 mg 3 times a day  Patient's pain has been managed by outpatient pain management in [place]  Will slowly increase methadone back to 20 mg 3 times a day for now  Daily wants to change regimen          Wound of right leg, at BKA site- (present on admission)    Assessment & Plan    Culture: VRE  ID following  Wound care  Surgery on        Tobacco abuse- (present on admission)   Assessment & Plan    Nicotine replacement PRN  Cessation counseling        Peripheral neuropathy- (present on admission)   Assessment & Plan    Lyrica to cont        complaint Loose stool, not watery. Stool sample collected.   Stool no signs of infection, WBC (-), start with Imodium 3 times a day when necessary   Change diet to a lactose-free diet seams not making difference, will change back to regular  Refuse Zyvox twice even with education   Quality-Core Measures For complexity-based billing, please refer to the history, exam, and decison making above. In addition, I spent 35 minutes caring for the patient today. More than 50% of the time was spent counseling and coordinating care.    I have discussed with RN and EDGARDO and SW and other consultants about patient's plan.

## 2017-08-28 NOTE — CARE PLAN
Problem: Skin Integrity  Goal: Risk for impaired skin integrity will decrease  Pt will not have any skin breakdown during shift

## 2017-08-28 NOTE — PROGRESS NOTES
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 Takes methadone he states for back pain.  Not diabetic.  Presentee to ED with increased pain to L TMA with infected neuropathic ulcer and pain to R BKA.      s/p L foot I & D with VAC placement, GSR by Dr. Shirley on 7/25/17  S/p    Right revision below-knee amputation,  Right lower extremity irrigation and debridement, skin, subcutaneous    tissue to bone on 8/15/17    /81   Pulse 85   Temp 36.2 °C (97.1 °F)   Resp 18   Ht 1.854 m (6' 1\")   Wt 65.2 kg (143 lb 11.8 oz)   SpO2 94%   BMI 18.96 kg/m²     R BKA:  Hard cast in place  Changed on Friday per pt. Pt c/o tightness to cast to Yaima PTA. Nazario CLARKE contacted by Yaima to adjust cast      L GSR site   well approximated- [place]    L TMA  Assessed during VAC change today  100% red, granular tissue with callus to proximal periwound.   No progress to wound.   D/W Dr. Shirley. Recommended biologic to be used during next VAC change.       Erath boot to LLE  CROW boot in process. Being made by Nazario CLARKE    IV abx completed, refusing zyvox  Difficult discharge.  CM working on SNF placement in [place]      PLAN  -Continue VAC change per IP wound team for LLE. Biologic to be applied during next change.   -continue jd boot at all times to LLE unitl CROW boot available    -NWB RLE, Cast to RLE, change weekly by Nazario Lee CPO          "

## 2017-08-29 LAB
ANION GAP SERPL CALC-SCNC: 6 MMOL/L (ref 0–11.9)
BASOPHILS # BLD AUTO: 0.4 % (ref 0–1.8)
BASOPHILS # BLD: 0.03 K/UL (ref 0–0.12)
BUN SERPL-MCNC: 25 MG/DL (ref 8–22)
CALCIUM SERPL-MCNC: 9.2 MG/DL (ref 8.5–10.5)
CHLORIDE SERPL-SCNC: 102 MMOL/L (ref 96–112)
CO2 SERPL-SCNC: 29 MMOL/L (ref 20–33)
CREAT SERPL-MCNC: 0.69 MG/DL (ref 0.5–1.4)
EOSINOPHIL # BLD AUTO: 0.2 K/UL (ref 0–0.51)
EOSINOPHIL NFR BLD: 2.5 % (ref 0–6.9)
ERYTHROCYTE [DISTWIDTH] IN BLOOD BY AUTOMATED COUNT: 51.8 FL (ref 35.9–50)
GFR SERPL CREATININE-BSD FRML MDRD: >60 ML/MIN/1.73 M 2
GLUCOSE SERPL-MCNC: 123 MG/DL (ref 65–99)
HCT VFR BLD AUTO: 33.7 % (ref 42–52)
HGB BLD-MCNC: 10.7 G/DL (ref 14–18)
IMM GRANULOCYTES # BLD AUTO: 0.06 K/UL (ref 0–0.11)
IMM GRANULOCYTES NFR BLD AUTO: 0.7 % (ref 0–0.9)
LYMPHOCYTES # BLD AUTO: 2.43 K/UL (ref 1–4.8)
LYMPHOCYTES NFR BLD: 30.3 % (ref 22–41)
MCH RBC QN AUTO: 25.7 PG (ref 27–33)
MCHC RBC AUTO-ENTMCNC: 31.8 G/DL (ref 33.7–35.3)
MCV RBC AUTO: 80.8 FL (ref 81.4–97.8)
MONOCYTES # BLD AUTO: 0.65 K/UL (ref 0–0.85)
MONOCYTES NFR BLD AUTO: 8.1 % (ref 0–13.4)
NEUTROPHILS # BLD AUTO: 4.66 K/UL (ref 1.82–7.42)
NEUTROPHILS NFR BLD: 58 % (ref 44–72)
NRBC # BLD AUTO: 0 K/UL
NRBC BLD AUTO-RTO: 0 /100 WBC
PLATELET # BLD AUTO: 274 K/UL (ref 164–446)
PMV BLD AUTO: 9 FL (ref 9–12.9)
POTASSIUM SERPL-SCNC: 3.8 MMOL/L (ref 3.6–5.5)
RBC # BLD AUTO: 4.17 M/UL (ref 4.7–6.1)
SODIUM SERPL-SCNC: 137 MMOL/L (ref 135–145)
WBC # BLD AUTO: 8 K/UL (ref 4.8–10.8)

## 2017-08-29 PROCEDURE — A9270 NON-COVERED ITEM OR SERVICE: HCPCS | Performed by: INTERNAL MEDICINE

## 2017-08-29 PROCEDURE — 700102 HCHG RX REV CODE 250 W/ 637 OVERRIDE(OP): Performed by: INTERNAL MEDICINE

## 2017-08-29 PROCEDURE — 770021 HCHG ROOM/CARE - ISO PRIVATE

## 2017-08-29 PROCEDURE — 85025 COMPLETE CBC W/AUTO DIFF WBC: CPT

## 2017-08-29 PROCEDURE — 99232 SBSQ HOSP IP/OBS MODERATE 35: CPT | Performed by: INTERNAL MEDICINE

## 2017-08-29 PROCEDURE — 700111 HCHG RX REV CODE 636 W/ 250 OVERRIDE (IP): Performed by: INTERNAL MEDICINE

## 2017-08-29 PROCEDURE — 80048 BASIC METABOLIC PNL TOTAL CA: CPT

## 2017-08-29 PROCEDURE — 700101 HCHG RX REV CODE 250: Performed by: INTERNAL MEDICINE

## 2017-08-29 RX ORDER — ACETAMINOPHEN 325 MG/1
650 TABLET ORAL EVERY 6 HOURS
Status: DISCONTINUED | OUTPATIENT
Start: 2017-08-29 | End: 2017-10-18 | Stop reason: HOSPADM

## 2017-08-29 RX ADMIN — ACETAMINOPHEN 650 MG: 325 TABLET, FILM COATED ORAL at 16:47

## 2017-08-29 RX ADMIN — CLONAZEPAM 2 MG: 1 TABLET ORAL at 20:10

## 2017-08-29 RX ADMIN — OXYCODONE HYDROCHLORIDE 10 MG: 10 TABLET ORAL at 21:41

## 2017-08-29 RX ADMIN — OXYCODONE HYDROCHLORIDE 10 MG: 10 TABLET ORAL at 07:35

## 2017-08-29 RX ADMIN — ENOXAPARIN SODIUM 40 MG: 100 INJECTION SUBCUTANEOUS at 09:21

## 2017-08-29 RX ADMIN — OXYCODONE HYDROCHLORIDE 10 MG: 10 TABLET ORAL at 11:44

## 2017-08-29 RX ADMIN — OXYCODONE HYDROCHLORIDE 10 MG: 10 TABLET ORAL at 16:47

## 2017-08-29 RX ADMIN — METHADONE HYDROCHLORIDE 20 MG: 10 TABLET ORAL at 14:01

## 2017-08-29 RX ADMIN — PREGABALIN 150 MG: 150 CAPSULE ORAL at 20:10

## 2017-08-29 RX ADMIN — PREGABALIN 150 MG: 150 CAPSULE ORAL at 09:21

## 2017-08-29 RX ADMIN — LIDOCAINE 1 APPLICATION: 40 CREAM TOPICAL at 20:11

## 2017-08-29 RX ADMIN — METHADONE HYDROCHLORIDE 20 MG: 10 TABLET ORAL at 20:10

## 2017-08-29 RX ADMIN — METHADONE HYDROCHLORIDE 20 MG: 10 TABLET ORAL at 04:32

## 2017-08-29 RX ADMIN — CLONAZEPAM 2 MG: 1 TABLET ORAL at 09:21

## 2017-08-29 RX ADMIN — Medication 325 MG: at 07:36

## 2017-08-29 RX ADMIN — LOPERAMIDE HYDROCHLORIDE 2 MG: 2 CAPSULE ORAL at 09:27

## 2017-08-29 RX ADMIN — Medication 325 MG: at 16:47

## 2017-08-29 RX ADMIN — NICOTINE 7 MG: 7 PATCH, EXTENDED RELEASE TRANSDERMAL at 04:32

## 2017-08-29 RX ADMIN — OXYCODONE HYDROCHLORIDE 10 MG: 10 TABLET ORAL at 01:22

## 2017-08-29 RX ADMIN — ZOLPIDEM TARTRATE 5 MG: 5 TABLET, FILM COATED ORAL at 21:41

## 2017-08-29 RX ADMIN — ZOLPIDEM TARTRATE 5 MG: 5 TABLET, FILM COATED ORAL at 01:22

## 2017-08-29 RX ADMIN — PREGABALIN 150 MG: 150 CAPSULE ORAL at 15:36

## 2017-08-29 ASSESSMENT — ENCOUNTER SYMPTOMS
DIARRHEA: 1
WEIGHT LOSS: 0
COUGH: 0
MYALGIAS: 0
WEAKNESS: 0
DIZZINESS: 0
ABDOMINAL PAIN: 0
HALLUCINATIONS: 0
HEADACHES: 0
CHILLS: 0
VOMITING: 0
NECK PAIN: 0
DEPRESSION: 0
SHORTNESS OF BREATH: 0
NERVOUS/ANXIOUS: 0

## 2017-08-29 ASSESSMENT — PAIN SCALES - GENERAL
PAINLEVEL_OUTOF10: 7
PAINLEVEL_OUTOF10: 8
PAINLEVEL_OUTOF10: 7
PAINLEVEL_OUTOF10: 8
PAINLEVEL_OUTOF10: 7

## 2017-08-29 NOTE — CARE PLAN
Problem: Safety  Goal: Will remain free from injury  Treaded socks in place, bed in the lowest position,  call light and belongings within reach, pt call for assistance appropriately    Problem: Venous Thromboembolism (VTW)/Deep Vein Thrombosis (DVT) Prevention:  Goal: Patient will participate in Venous Thrombosis (VTE)/Deep Vein Thrombosis (DVT)Prevention Measures  lovenox per MAR    Problem: Skin Integrity  Goal: Risk for impaired skin integrity will decrease   bandar risk assessment, pt turns self from side to side    Problem: Pain Management  Goal: Pain level will decrease to patient's comfort goal  Medicated with oxy 10 and methadone per MAR with adequate pain control, hourly rounding in progress

## 2017-08-29 NOTE — PROGRESS NOTES
[facility]ist Progress Note    Date of Service: 8/29/2017    Chief Complaint    52 y.o. male hx of PVD s/p L TMA and Rt Bka > 5 years ago  admitted 7/22/2017 with right BKA stump pain and wound drainage in addition to left TMA stump wound (nonhealing) over several months.  s/p Debridement and gastroscoleus resection LLE on 7-25-17 with Dr Shirley. Cultures have grown MRSA / Pseudomonas. He is being treated with IV cefepime and PO doxycycline per ID recommendations. These are to be continued until 08/25/2017. Patient has medical. Difficult disposition. SW continuing to look into placement options at this time. Continuing therapy inpatient at this point in time. Also underwent R BKA revision with Dr Shirley 08/15/2017    Interval Problem Update  Chronic pain-patient is very rude this morning, using also multiple expletives, claims is in a lot of pain once IV pain meds. Says on a walk right out a year with my boot. Continues to refuse Zyvox.    Other and that he refused to talk to me.    Consultants/Specialty  Limb preservation service  Infectious diseases - Last seen by Dr Pino  Orthopedic surgery - Dr Shirley  Psychiatry - Dr Trujillo    Disposition  Difficult disposition. Remains inpatient to continue therapy. SW working on disposition. Discussed on rounds with SW. Continue looking into placement option. No new options today      Review of Systems   Constitutional: Negative for chills and weight loss.   Eyes:        Vision loss rt eye / Evisceration. Trauma history. No change today   Respiratory: Negative for cough and shortness of breath.    Cardiovascular: Negative for chest pain.   Gastrointestinal: Positive for diarrhea. Negative for abdominal pain and vomiting.   Genitourinary: Negative for dysuria.   Musculoskeletal: Positive for joint pain (chronics). Negative for myalgias and neck pain.        R BKA  Generalized body pain once IV pain meds   Skin: Negative for rash.   Neurological: Negative for  dizziness, weakness and headaches.        Chronic peripheral neuropathy .   Psychiatric/Behavioral: Negative for depression and hallucinations. The patient is not nervous/anxious (improved.).         Angry      Physical Exam  Laboratory/Imaging   Hemodynamics  Temp (24hrs), Av.8 °C (98.2 °F), Min:36.7 °C (98 °F), Max:36.9 °C (98.4 °F)   Temperature: 36.9 °C (98.4 °F)  Pulse  Av  Min: 61  Max: 111    Blood Pressure: 116/64      Respiratory      Respiration: 16, Pulse Oximetry: 99 %        RUL Breath Sounds: Diminished, RML Breath Sounds: Diminished, RLL Breath Sounds: Diminished, NANCY Breath Sounds: Diminished, LLL Breath Sounds: Diminished    Fluids    Intake/Output Summary (Last 24 hours) at 17 1515  Last data filed at 17 0736   Gross per 24 hour   Intake                0 ml   Output              500 ml   Net             -500 ml       Nutrition  Orders Placed This Encounter   Procedures   • DIET ORDER     Standing Status:   Standing     Number of Occurrences:   1     Order Specific Question:   Diet:     Answer:   Regular [1]     Comments:   regular trays     Physical Exam   Constitutional: He is oriented to person, place, and time. He appears well-developed.   Angry   HENT:   Right Ear: External ear normal.   Left Ear: External ear normal.   Mouth/Throat: Oropharynx is clear and moist.   Eyes: EOM are normal. Right eye exhibits no discharge.   Rt eye vision loss, enucleated. No changes today   Neck: Normal range of motion. Neck supple.   Cardiovascular: Normal rate, regular rhythm and normal heart sounds.    No murmur heard.  Pulmonary/Chest: Effort normal. No stridor. No respiratory distress.   Abdominal: Soft. Bowel sounds are normal. There is no tenderness.   Musculoskeletal: He exhibits no edema.   Rt leg BKA - incision dry, no dehisc- healed. No changes today  Left leg w boot wound vac present, scant drainage in the vacuum   Neurological: He is alert and oriented to person, place, and time.  Coordination (Unchanged) abnormal.   Skin: Skin is warm and dry. He is not diaphoretic. There is erythema.   End of stump on R appears chaffed, wound margins heaped. Could not get a good visualization as the patient's or refused to let me see stump, while was in there he continued to smashing into his prosthetic   Psychiatric: He has a normal mood and affect. His behavior is normal.       Recent Labs      08/29/17   0440   WBC  8.0   RBC  4.17*   HEMOGLOBIN  10.7*   HEMATOCRIT  33.7*   MCV  80.8*   MCH  25.7*   MCHC  31.8*   RDW  51.8*   PLATELETCT  274   MPV  9.0     Recent Labs      08/29/17   0440   SODIUM  137   POTASSIUM  3.8   CHLORIDE  102   CO2  29   GLUCOSE  123*   BUN  25*   CREATININE  0.69   CALCIUM  9.2                      Assessment/Plan     * Skin ulcer of left foot with fat layer exposed (CMS-HCC)- (present on admission)   Assessment & Plan    S/p OR debridement and gastroscoleus resection 7/25   Continue wound vac care  w changes three times/wk  Contact isolation for MRSA.  TDWB LLE, WBAT RLE per Ortho recs.  Patient reportedly has restraining orders from much of the Skilled facilites in [place]  Difficult discharge  Cont to monitor         Pseudomonas infection- (present on admission)   Assessment & Plan    Left foot infection  continue IV cefepime thru 8-25-17  ID assisting        MRSA (methicillin resistant staph aureus) culture positive- (present on admission)   Assessment & Plan    Doxycycline to cover L foot infection thru 8-25-17   Vanco stopped due to ARF  ID to f/u        Anxiety- (present on admission)   Assessment & Plan    Depakote, psych assisting, long talk, will add low dose klonopin  Mood stable today  Cont to monitoir        Diarrhea- (present on admission)   Assessment & Plan    Follow  resolved        ABRIL (acute kidney injury) (CMS-HCC)   Assessment & Plan    resolved        Opiate dependence (CMS-HCC)- (present on admission)   Assessment & Plan    Noted Drug seeking behavior  inpatient- improved deviance.  Avoid escalating  IV narcotics with no no signs for pain- use primarily for dressing / vac changes .  oxycontin recently stopped-- continue methadone. Verified with patient's outpatient pharmacy patient was taking methadone 60 mg 3 times a day. Currently on 10 mg 3 times a day  Patient's pain has been managed by outpatient pain management in [place]  Will slowly increase methadone back to 20 mg 3 times a day for now  Daily wants to change regimen          Wound of right leg, at BKA site- (present on admission)   Assessment & Plan    Culture: VRE  ID following  Wound care  Surgery on        Tobacco abuse- (present on admission)   Assessment & Plan    Nicotine replacement PRN  Cessation counseling        Peripheral neuropathy- (present on admission)   Assessment & Plan    Lyrica to cont        complaint Loose stool, not watery. Stool sample collected.   Stool no signs of infection, WBC (-), start with Imodium 3 times a day when necessary   Change diet to a lactose-free diet seams not making difference, will change back to regular  Refuse Zyvox twice even with education     Reviewed items::  Labs reviewed and Medications reviewed  Rolon catheter::  No Rolon  DVT prophylaxis pharmacological::  Enoxaparin (Lovenox)      This dictation was created using voice recognition software. The accuracy of the dictation is limited to the abilities of the software. I expect there may be some errors of grammar and possibly content.

## 2017-08-29 NOTE — WOUND TEAM
[facility]  Inpatient Services  Wound and Skin Care Treatment Note    Admission Date:  07/22/17     HPI, PMH, SH: Reviewed  Unit where seen by Wound Team:  T3    WOUND CONSULT RELATED TO:   Scheduled NPWT dressing change left plantar foot      SUBJECTIVE:  Pleasant/agreeable    Self Report / Pain Level:  Tolerated well    OBJECTIVE:    Previous NPWT dressing intact    WOUND TYPE, LOCATION, CHARACTERISTICS (Pressure ulcers: location, stage, POA or date identified)    Location/type of wound: Open surgical wound left plantar foot      Periwound:     macerated around wound and in between remaining amputation site of pervious digit; callous-like tissue      Drainage:     scant serosanguinous    Tissue Type and %:    100% pink/red  Wound Edges:    Attached/macerated    Odor:     none  Exposed structure(s):  Bone palpated    S&S of Infection:     none      Measurements: (cm)  (Taken 08/23/17)   Length:    5.0  Width:     4.0  Depth:     0.3      INTERVENTIONS BY WOUND TEAM:  Removed prev dressing and irrigated wound with wound cleanser.  Benzoin and drape to bull wound skin as needed. Small piece of paste ring used to seal fissure on lateral side.  Covered wound with 1 piece black foam with a bridge and button to dorsum of foot. Secured with drape and applied cont neg pressure at 125mmhg. Placed a non-adhesive foam under the forefoot to hopefully absorb any moisture. Total black foam=3 pieces.    Interdisciplinary consultation:   Staff RN, patient      EVALUATION:  wnd stable; no significant changes    Factors affecting wound healing:  Neuropathy, tobacco abuse     Goals:  Decrease in wound size by 5% every 3 weeks -- no maceration    NURSING PLAN OF CARE ORDERS (x):    Dressing changes: See Dressing Maintenance orders:  x  Skin care: See Skin Care orders: x  Rectal tube care: See Rectal Tube Care orders:   Other orders:    WOUND TEAM PLAN OF CARE (x):   NPWT change 3 x week:   x     Dressing changes by  wound team:       Follow up as needed:     x  Other (explain):    Anticipated discharge plans (x):  SNF:           Home Care:           Outpatient Wound Center:   x      Self Care:            Other:

## 2017-08-29 NOTE — PROGRESS NOTES
Report received. Assumed care. Pt in bed awke. A/O x4. VSS. Responds appropriately. C/O pain, medicated per MAR no SOB. Assessment complete. Wound vac to the left leg in place with scant serosanguaneous output, dressing cdi, walking boot on. Right AKA noted, lula. PICC to the left arm in place, patent with positive blood return.  Discussed POC, pain control, antbx, safety, DC planning , pt verbalizes understanding. Explained importance of calling before getting OOB. Call light and belongings within reach.Pt refuses bed alarm despite education.  Bed in the lowest position. Treaded socks in place. Hourly rounding in progress. Will continue to monitor .

## 2017-08-29 NOTE — DISCHARGE PLANNING
Met with pt to discuss post acute plans. Pt continues to state that he is unable to return to home at this time. He feels he needs continued PT/OT for strengthening and conditioning in order to be able to return to home. IVABX complete, continue po, and wound vac. [facility] workerJose Miguel  and left message requesting return call.

## 2017-08-29 NOTE — PROGRESS NOTES
Knee immobilizer was delivered and fitted to patient.  If any further assistance needed, please call extension [phone] or place order for Ortho Technician assistance as a communication order in EPIC. '

## 2017-08-29 NOTE — DISCHARGE PLANNING
Jose Miguel called back, she states she still has not received (from us) a list of facilities that we have referred to. Asked [facility] to again fax that list to Jose Miguel at .

## 2017-08-30 PROCEDURE — 770021 HCHG ROOM/CARE - ISO PRIVATE

## 2017-08-30 PROCEDURE — A9270 NON-COVERED ITEM OR SERVICE: HCPCS | Performed by: INTERNAL MEDICINE

## 2017-08-30 PROCEDURE — 700102 HCHG RX REV CODE 250 W/ 637 OVERRIDE(OP): Performed by: INTERNAL MEDICINE

## 2017-08-30 PROCEDURE — 97605 NEG PRS WND THER DME<=50SQCM: CPT

## 2017-08-30 PROCEDURE — 99232 SBSQ HOSP IP/OBS MODERATE 35: CPT | Performed by: INTERNAL MEDICINE

## 2017-08-30 PROCEDURE — 700101 HCHG RX REV CODE 250: Performed by: INTERNAL MEDICINE

## 2017-08-30 PROCEDURE — 700111 HCHG RX REV CODE 636 W/ 250 OVERRIDE (IP): Performed by: INTERNAL MEDICINE

## 2017-08-30 RX ORDER — MORPHINE SULFATE 4 MG/ML
2 INJECTION, SOLUTION INTRAMUSCULAR; INTRAVENOUS ONCE
Status: COMPLETED | OUTPATIENT
Start: 2017-08-30 | End: 2017-08-30

## 2017-08-30 RX ADMIN — PREGABALIN 150 MG: 150 CAPSULE ORAL at 08:05

## 2017-08-30 RX ADMIN — Medication 325 MG: at 08:05

## 2017-08-30 RX ADMIN — ENOXAPARIN SODIUM 40 MG: 100 INJECTION SUBCUTANEOUS at 08:04

## 2017-08-30 RX ADMIN — METHADONE HYDROCHLORIDE 20 MG: 10 TABLET ORAL at 15:31

## 2017-08-30 RX ADMIN — METHADONE HYDROCHLORIDE 20 MG: 10 TABLET ORAL at 22:33

## 2017-08-30 RX ADMIN — LIDOCAINE 1 PATCH: 50 PATCH CUTANEOUS at 11:42

## 2017-08-30 RX ADMIN — CLONAZEPAM 2 MG: 1 TABLET ORAL at 08:05

## 2017-08-30 RX ADMIN — OXYCODONE HYDROCHLORIDE 10 MG: 10 TABLET ORAL at 05:13

## 2017-08-30 RX ADMIN — LINEZOLID 600 MG: 600 TABLET, FILM COATED ORAL at 22:33

## 2017-08-30 RX ADMIN — PREGABALIN 150 MG: 150 CAPSULE ORAL at 22:33

## 2017-08-30 RX ADMIN — LOPERAMIDE HYDROCHLORIDE 2 MG: 2 CAPSULE ORAL at 11:42

## 2017-08-30 RX ADMIN — Medication 325 MG: at 16:37

## 2017-08-30 RX ADMIN — PREGABALIN 150 MG: 150 CAPSULE ORAL at 15:31

## 2017-08-30 RX ADMIN — METHADONE HYDROCHLORIDE 20 MG: 10 TABLET ORAL at 06:00

## 2017-08-30 RX ADMIN — ACETAMINOPHEN 650 MG: 325 TABLET, FILM COATED ORAL at 16:37

## 2017-08-30 RX ADMIN — LIDOCAINE 1 APPLICATION: 40 CREAM TOPICAL at 11:49

## 2017-08-30 RX ADMIN — CLONAZEPAM 2 MG: 1 TABLET ORAL at 22:33

## 2017-08-30 RX ADMIN — OXYCODONE HYDROCHLORIDE 10 MG: 10 TABLET ORAL at 16:38

## 2017-08-30 RX ADMIN — OXYCODONE HYDROCHLORIDE 10 MG: 10 TABLET ORAL at 22:34

## 2017-08-30 RX ADMIN — MORPHINE SULFATE 2 MG: 4 INJECTION INTRAVENOUS at 08:40

## 2017-08-30 RX ADMIN — ACETAMINOPHEN 650 MG: 325 TABLET, FILM COATED ORAL at 06:00

## 2017-08-30 RX ADMIN — ACETAMINOPHEN 650 MG: 325 TABLET, FILM COATED ORAL at 11:42

## 2017-08-30 RX ADMIN — OXYCODONE HYDROCHLORIDE 10 MG: 10 TABLET ORAL at 11:42

## 2017-08-30 ASSESSMENT — PATIENT HEALTH QUESTIONNAIRE - PHQ9
2. FEELING DOWN, DEPRESSED, IRRITABLE, OR HOPELESS: NOT AT ALL
SUM OF ALL RESPONSES TO PHQ9 QUESTIONS 1 AND 2: 0
1. LITTLE INTEREST OR PLEASURE IN DOING THINGS: NOT AT ALL
SUM OF ALL RESPONSES TO PHQ QUESTIONS 1-9: 0

## 2017-08-30 ASSESSMENT — PAIN SCALES - GENERAL
PAINLEVEL_OUTOF10: 6
PAINLEVEL_OUTOF10: 6
PAINLEVEL_OUTOF10: 4
PAINLEVEL_OUTOF10: 6
PAINLEVEL_OUTOF10: 7
PAINLEVEL_OUTOF10: 6
PAINLEVEL_OUTOF10: 6
PAINLEVEL_OUTOF10: 4
PAINLEVEL_OUTOF10: 6

## 2017-08-30 ASSESSMENT — ENCOUNTER SYMPTOMS
SHORTNESS OF BREATH: 0
PALPITATIONS: 0
ABDOMINAL PAIN: 0
WEAKNESS: 0
ORTHOPNEA: 0
HEADACHES: 0
DEPRESSION: 0
DIZZINESS: 0
DIARRHEA: 0
FEVER: 0
MYALGIAS: 0
COUGH: 0
VOMITING: 0
NECK PAIN: 0

## 2017-08-30 NOTE — CARE PLAN
Problem: Safety  Goal: Will remain free from injury  Outcome: PROGRESSING AS EXPECTED  Safety precautions in place. Call light within reach. Bed is low and in locked position. Hourly rounding in place. Pt is up self.     Problem: Discharge Barriers/Planning  Goal: Patient's continuum of care needs will be met  Outcome: PROGRESSING AS EXPECTED  Per  notes, SNF referrals have been sent.     Problem: Pain Management  Goal: Pain level will decrease to patient's comfort goal  Outcome: PROGRESSING AS EXPECTED  Pt has been taking oxycodone 10 mg prn for pain. Educated on deep breathing exercises to help relieve pain. Pt verbalized understanding. Will continue to assess for pain.

## 2017-08-30 NOTE — WOUND TEAM
TC from staff RN that patient pulled off trac pad with shower.  Applied benzoin to drape and replaced trac pad.  Staff RN observed so she can do if needed.

## 2017-08-30 NOTE — PROGRESS NOTES
Report received from the night nurse. Assumed care. Reviewed labs and medications.   Pt is A&O x 4.   Medicated per MAR  Refused Zyvox.   Wound vac to left foot in place. Dressing looks CDI. Wound vac change today. Received the order for one time dose of 2mg of Morphine for wound vac dressing change.    Patient is up in wheelchair. Safety precautions in place. Bed in lowered and locked position. Call light within reach. Updated on plan of care.

## 2017-08-30 NOTE — PROGRESS NOTES
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 Takes methadone he states for back pain.  Not diabetic.  Presentee to ED with increased pain to L TMA with infected neuropathic ulcer and pain to R BKA.        s/p L foot I & D with VAC placement, GSR by Dr. Shirley on 7/25/17  S/p    Right revision below-knee amputation,  Right lower extremity irrigation and debridement, skin, subcutaneous  tissue to bone on 8/15/17     /66   Pulse 89   Temp 36.6 °C (97.8 °F)   Resp 17   Ht 1.854 m (6' 1\")   Wt 65.2 kg (143 lb 11.8 oz)   SpO2 93%   BMI 18.96 kg/m²      R BKA:  Hard cast in place       L GSR site   well approximated- [place]     L TMA  Assessed during VAC change today  100% red, granular tissue with callus to proximal periwound.   No significant progress to wound area.       [place] boot to LLE       IV abx completed, refusing zyvox  Difficult discharge.  CM working on SNF placement in [place]        PLAN  -Continue VAC change per IP wound team for LLE.   -looking into access for biologic   -continue jd boot at all times to LLE unitl CROW boot available    -NWB RLE, Cast to RLE, change weekly by Nazario Lee CPO  "

## 2017-08-30 NOTE — PROGRESS NOTES
Per MD Dr. Victoria, need to insert new PIV before discontinuing PICC line. Unable to insert new PIV at this time, pt is hard stick. Awaiting US PIV.

## 2017-08-30 NOTE — PROGRESS NOTES
[facility]ist Progress Note    Date of Service: 8/30/2017    Chief Complaint    52 y.o. male hx of PVD s/p L TMA and Rt Bka > 5 years ago  admitted 7/22/2017 with right BKA stump pain and wound drainage in addition to left TMA stump wound (nonhealing) over several months.  s/p Debridement and gastroscoleus resection LLE on 7-25-17 with Dr Shirley. Cultures have grown MRSA / Pseudomonas. He is being treated with IV cefepime and PO doxycycline per ID recommendations. These are to be continued until 08/25/2017. Patient has medical. Difficult disposition. SW continuing to look into placement options at this time. Continuing therapy inpatient at this point in time. Also underwent R BKA revision with Dr Shirley 08/15/2017    Interval Problem Update  Chronic pain-patient is in much a better moood. Apologized for his behavior today.    Wounds-wound vac exchange today. Tolerating it well. AFebrile.     Other and that he refused to talk to me.    Consultants/Specialty  Limb preservation service  Infectious diseases - Last seen by Dr Pino  Orthopedic surgery - Dr Shirley  Psychiatry - Dr Trujillo    Disposition  Difficult disposition. Remains inpatient to continue therapy. SW working on disposition. Discussed on rounds with SW. Continue looking into placement option. No new options today      Review of Systems   Constitutional: Negative for fever.   HENT: Negative for hearing loss and tinnitus.    Eyes:        Vision loss rt eye / Evisceration. Trauma history. No change today   Respiratory: Negative for cough and shortness of breath.    Cardiovascular: Negative for palpitations and orthopnea.   Gastrointestinal: Negative for abdominal pain, diarrhea and vomiting.   Genitourinary: Negative for dysuria.   Musculoskeletal: Positive for joint pain (chronics). Negative for myalgias and neck pain.        R BKA  A little bit better controlled today   Skin: Negative for rash.   Neurological: Negative for dizziness, weakness and  headaches.        Chronic peripheral neuropathy .   Psychiatric/Behavioral: Negative for depression and suicidal ideas. Nervous/anxious: improved.       Physical Exam  Laboratory/Imaging   Hemodynamics  Temp (24hrs), Av.6 °C (97.8 °F), Min:36.3 °C (97.4 °F), Max:36.8 °C (98.3 °F)   Temperature: 36.6 °C (97.8 °F)  Pulse  Av.9  Min: 61  Max: 111    Blood Pressure: 115/66      Respiratory      Respiration: 17, Pulse Oximetry: 93 %        RUL Breath Sounds: Clear, RML Breath Sounds: Diminished, RLL Breath Sounds: Diminished, NANCY Breath Sounds: Clear, LLL Breath Sounds: Diminished    Fluids    Intake/Output Summary (Last 24 hours) at 17 1537  Last data filed at 17 1534   Gross per 24 hour   Intake              480 ml   Output             1950 ml   Net            -1470 ml       Nutrition  Orders Placed This Encounter   Procedures   • DIET ORDER     Standing Status:   Standing     Number of Occurrences:   1     Order Specific Question:   Diet:     Answer:   Regular [1]     Comments:   regular trays     Physical Exam   Constitutional: He is oriented to person, place, and time. He appears well-developed.   Much calmer today   HENT:   Right Ear: External ear normal.   Left Ear: External ear normal.   Mouth/Throat: Oropharynx is clear and moist.   Eyes: EOM are normal. No scleral icterus.   Rt eye vision loss, enucleated. No changes today   Neck: Normal range of motion. Neck supple. No JVD present.   Cardiovascular: Normal rate, regular rhythm and normal heart sounds.    No murmur heard.  Pulmonary/Chest: Effort normal. He has no wheezes. He has no rales.   Abdominal: Soft. Bowel sounds are normal. He exhibits no distension.   Musculoskeletal: He exhibits no edema.   Rt leg BKA - incision dry, no dehisc- healed. No changes today  Left leg w boot wound vac present, scant drainage in the vacuum   Neurological: He is alert and oriented to person, place, and time. Coordination (Unchanged) abnormal.   Skin:  Skin is warm and dry. He is not diaphoretic. There is erythema.   End of stump on R appears chaffed, wound margins healed. About the same today, wound vac in place   Psychiatric: He has a normal mood and affect. His behavior is normal.       Recent Labs      08/29/17   0440   WBC  8.0   RBC  4.17*   HEMOGLOBIN  10.7*   HEMATOCRIT  33.7*   MCV  80.8*   MCH  25.7*   MCHC  31.8*   RDW  51.8*   PLATELETCT  274   MPV  9.0     Recent Labs      08/29/17   0440   SODIUM  137   POTASSIUM  3.8   CHLORIDE  102   CO2  29   GLUCOSE  123*   BUN  25*   CREATININE  0.69   CALCIUM  9.2                      Assessment/Plan     * Skin ulcer of left foot with fat layer exposed (CMS-HCC)- (present on admission)   Assessment & Plan    S/p OR debridement and gastroscoleus resection 7/25   Continue wound vac care  w changes three times/wk  Contact isolation for MRSA.  TDWB LLE, WBAT RLE per Ortho recs.  Patient reportedly has restraining orders from much of the Skilled facilites in [place]  Difficult discharge, remained so  Cont to monitor         Pseudomonas infection- (present on admission)   Assessment & Plan    Left foot infection  -Status post IV cefepime which finished on August 25, 2017        MRSA (methicillin resistant staph aureus) culture positive- (present on admission)   Assessment & Plan    Status post doxycycline, finished on August 25, 2017, infectious disease is following        Anxiety- (present on admission)   Assessment & Plan    Depakote, continue Klonopin, psychiatry is following  -tolerating somewhat well        Diarrhea- (present on admission)   Assessment & Plan    Resolved        ABRIL (acute kidney injury) (CMS-HCC)   Assessment & Plan    resolved        Opiate dependence (CMS-HCC)- (present on admission)   Assessment & Plan    Noted Drug seeking behavior inpatient-much better today  -only IV pain meds before wound vac changes  Avoid escalating  IV narcotics with no no signs for pain- use primarily for dressing / vac  changes .  oxycontin recently stopped-- continue methadone. Verified with patient's outpatient pharmacy patient was taking methadone 60 mg 3 times a day. Currently on 10 mg 3 times a day  Patient's pain has been managed by outpatient pain management in [place]  Continue methadone at 20 mg 3 times, consider increasing soon  -[place] asking for IV pain meds which is not appropriate at this time, explaining the reasons why to the patient and he started cussing at me          Wound of right leg, at BKA site- (present on admission)   Assessment & Plan    Culture: VRE  ID following  Wound care  Surgery on  -On Zyvox which is supposed to finish on September 3, the patient has refuse doses last 4 days  -try to remove PICC line if possible given infection risk        Tobacco abuse- (present on admission)   Assessment & Plan    Nicotine replacement PRN  Cessation counseling        Peripheral neuropathy- (present on admission)   Assessment & Plan    Lyrica to cont        complaint Loose stool, not watery. Stool sample collected.   Stool no signs of infection, WBC (-), start with Imodium 3 times a day when necessary   Change diet to a lactose-free diet seams not making difference, will change back to regular  Refuse Zyvox twice even with education     Reviewed items::  Labs reviewed and Medications reviewed  Rolon catheter::  No Rolon  DVT prophylaxis pharmacological::  Enoxaparin (Lovenox)      This dictation was created using voice recognition software. The accuracy of the dictation is limited to the abilities of the software. I expect there may be some errors of grammar and possibly content.

## 2017-08-31 ENCOUNTER — APPOINTMENT (OUTPATIENT)
Dept: RADIOLOGY | Facility: MEDICAL CENTER | Age: 52
DRG: 464 | End: 2017-08-31
Attending: INTERNAL MEDICINE
Payer: COMMERCIAL

## 2017-08-31 PROCEDURE — A9270 NON-COVERED ITEM OR SERVICE: HCPCS | Performed by: INTERNAL MEDICINE

## 2017-08-31 PROCEDURE — 700101 HCHG RX REV CODE 250: Performed by: INTERNAL MEDICINE

## 2017-08-31 PROCEDURE — 700102 HCHG RX REV CODE 250 W/ 637 OVERRIDE(OP): Performed by: INTERNAL MEDICINE

## 2017-08-31 PROCEDURE — 700111 HCHG RX REV CODE 636 W/ 250 OVERRIDE (IP): Performed by: INTERNAL MEDICINE

## 2017-08-31 PROCEDURE — 770021 HCHG ROOM/CARE - ISO PRIVATE

## 2017-08-31 PROCEDURE — 99232 SBSQ HOSP IP/OBS MODERATE 35: CPT | Performed by: INTERNAL MEDICINE

## 2017-08-31 RX ADMIN — ACETAMINOPHEN 650 MG: 325 TABLET, FILM COATED ORAL at 00:00

## 2017-08-31 RX ADMIN — PREGABALIN 150 MG: 150 CAPSULE ORAL at 09:41

## 2017-08-31 RX ADMIN — Medication 325 MG: at 18:19

## 2017-08-31 RX ADMIN — METHADONE HYDROCHLORIDE 20 MG: 10 TABLET ORAL at 05:54

## 2017-08-31 RX ADMIN — OXYCODONE HYDROCHLORIDE 10 MG: 10 TABLET ORAL at 22:08

## 2017-08-31 RX ADMIN — METHADONE HYDROCHLORIDE 20 MG: 10 TABLET ORAL at 22:08

## 2017-08-31 RX ADMIN — CLONAZEPAM 2 MG: 1 TABLET ORAL at 09:41

## 2017-08-31 RX ADMIN — ACETAMINOPHEN 650 MG: 325 TABLET, FILM COATED ORAL at 13:52

## 2017-08-31 RX ADMIN — Medication 325 MG: at 09:41

## 2017-08-31 RX ADMIN — OXYCODONE HYDROCHLORIDE 10 MG: 10 TABLET ORAL at 09:41

## 2017-08-31 RX ADMIN — LOPERAMIDE HYDROCHLORIDE 2 MG: 2 CAPSULE ORAL at 09:45

## 2017-08-31 RX ADMIN — LIDOCAINE 1 APPLICATION: 40 CREAM TOPICAL at 09:46

## 2017-08-31 RX ADMIN — LINEZOLID 600 MG: 600 TABLET, FILM COATED ORAL at 21:00

## 2017-08-31 RX ADMIN — ACETAMINOPHEN 650 MG: 325 TABLET, FILM COATED ORAL at 05:54

## 2017-08-31 RX ADMIN — OXYCODONE HYDROCHLORIDE 10 MG: 10 TABLET ORAL at 04:52

## 2017-08-31 RX ADMIN — OXYCODONE HYDROCHLORIDE 10 MG: 10 TABLET ORAL at 18:19

## 2017-08-31 RX ADMIN — LOPERAMIDE HYDROCHLORIDE 2 MG: 2 CAPSULE ORAL at 00:36

## 2017-08-31 RX ADMIN — ACETAMINOPHEN 650 MG: 325 TABLET, FILM COATED ORAL at 18:19

## 2017-08-31 RX ADMIN — PREGABALIN 150 MG: 150 CAPSULE ORAL at 16:15

## 2017-08-31 RX ADMIN — METHADONE HYDROCHLORIDE 20 MG: 10 TABLET ORAL at 16:15

## 2017-08-31 RX ADMIN — PREGABALIN 150 MG: 150 CAPSULE ORAL at 22:08

## 2017-08-31 RX ADMIN — OXYCODONE HYDROCHLORIDE 10 MG: 10 TABLET ORAL at 13:52

## 2017-08-31 RX ADMIN — LIDOCAINE 1 PATCH: 50 PATCH CUTANEOUS at 13:52

## 2017-08-31 RX ADMIN — CLONAZEPAM 2 MG: 1 TABLET ORAL at 22:08

## 2017-08-31 RX ADMIN — ENOXAPARIN SODIUM 40 MG: 100 INJECTION SUBCUTANEOUS at 09:40

## 2017-08-31 ASSESSMENT — PAIN SCALES - GENERAL
PAINLEVEL_OUTOF10: 4
PAINLEVEL_OUTOF10: 6
PAINLEVEL_OUTOF10: 7
PAINLEVEL_OUTOF10: 4
PAINLEVEL_OUTOF10: 5
PAINLEVEL_OUTOF10: 4
PAINLEVEL_OUTOF10: 6
PAINLEVEL_OUTOF10: 7

## 2017-08-31 ASSESSMENT — ENCOUNTER SYMPTOMS
HEARTBURN: 0
SHORTNESS OF BREATH: 0
HEADACHES: 0
FEVER: 0
DIZZINESS: 0
COUGH: 0
ABDOMINAL PAIN: 0
DEPRESSION: 0
CHILLS: 0

## 2017-08-31 NOTE — PROGRESS NOTES
[facility]ist Progress Note    Date of Service: 8/31/2017    Chief Complaint    52 y.o. male hx of PVD s/p L TMA and Rt Bka > 5 years ago  admitted 7/22/2017 with right BKA stump pain and wound drainage in addition to left TMA stump wound (nonhealing) over several months.  s/p Debridement and gastroscoleus resection LLE on 7-25-17 with Dr Shirley. Cultures have grown MRSA / Pseudomonas. He is being treated with IV cefepime and PO doxycycline per ID recommendations. These are to be continued until 08/25/2017. Patient has medical. Difficult disposition. SW continuing to look into placement options at this time. Continuing therapy inpatient at this point in time. Also underwent R BKA revision with Dr Shirley 08/15/2017    Interval Problem Update  Chronic pain-remains in a good mood today. Moving around the hallway in his wheelchair.    Wounds-no changes. Had a question about VRE. PIV established, removed PICC line today.      Consultants/Specialty  Limb preservation service  Infectious diseases - Last seen by Dr Pino  Orthopedic surgery - Dr Shirley  Psychiatry - Dr Trujillo    Disposition  Difficult disposition. Remains inpatient to continue therapy. SW working on disposition. Discussed on rounds with SW. Continue looking into placement option. No new options today again.       Review of Systems   Constitutional: Negative for chills and fever.   Eyes:        Vision loss rt eye / Evisceration. Trauma history. No change today   Respiratory: Negative for cough and shortness of breath.    Cardiovascular: Negative for chest pain.   Gastrointestinal: Negative for abdominal pain and heartburn.   Genitourinary: Negative for urgency.   Musculoskeletal: Positive for joint pain (chronics).        R BKA  About the same today   Skin: Negative for itching.   Neurological: Negative for dizziness and headaches.        Chronic peripheral neuropathy .   Psychiatric/Behavioral: Negative for depression and suicidal ideas.  Nervous/anxious: improved.       Physical Exam  Laboratory/Imaging   Hemodynamics  Temp (24hrs), Av.6 °C (97.8 °F), Min:36.3 °C (97.4 °F), Max:36.7 °C (98.1 °F)   Temperature: 36.3 °C (97.4 °F)  Pulse  Av.9  Min: 61  Max: 111    Blood Pressure: 117/74      Respiratory      Respiration: 18, Pulse Oximetry: 95 %        RUL Breath Sounds: Clear, RML Breath Sounds: Diminished, RLL Breath Sounds: Diminished, NANCY Breath Sounds: Clear, LLL Breath Sounds: Diminished    Fluids    Intake/Output Summary (Last 24 hours) at 17 1358  Last data filed at 17 0425   Gross per 24 hour   Intake                0 ml   Output              850 ml   Net             -850 ml       Nutrition  Orders Placed This Encounter   Procedures   • DIET ORDER     Standing Status:   Standing     Number of Occurrences:   1     Order Specific Question:   Diet:     Answer:   Regular [1]     Comments:   regular trays     Physical Exam   Constitutional: He is oriented to person, place, and time. He appears well-developed.   Much calmer today   HENT:   Right Ear: External ear normal.   Left Ear: External ear normal.   Mouth/Throat: Oropharynx is clear and moist.   Eyes: EOM are normal. Right eye exhibits no discharge. Left eye exhibits no discharge.   Rt eye vision loss, enucleated. No changes today   Neck: Normal range of motion. Neck supple.   Cardiovascular: Normal rate, regular rhythm and normal heart sounds.  Exam reveals no friction rub.    Pulmonary/Chest: Effort normal. No stridor. No respiratory distress.   Abdominal: Soft. Bowel sounds are normal. There is no tenderness.   Musculoskeletal: He exhibits no edema.   Rt leg BKA - incision dry, remains with no dehiscence, no changes appreciated    Left leg w boot wound vac present, scant drainage in the vacuum, unchanged again today   Neurological: He is alert and oriented to person, place, and time. Coordination (Unchanged) abnormal.   Skin: Skin is warm and dry. He is not  diaphoretic. There is erythema.   End of stump on R appears chaffed, wound margins healed. About the same today, wound vac in place   Psychiatric: He has a normal mood and affect. His behavior is normal.       Recent Labs      08/29/17   0440   WBC  8.0   RBC  4.17*   HEMOGLOBIN  10.7*   HEMATOCRIT  33.7*   MCV  80.8*   MCH  25.7*   MCHC  31.8*   RDW  51.8*   PLATELETCT  274   MPV  9.0     Recent Labs      08/29/17   0440   SODIUM  137   POTASSIUM  3.8   CHLORIDE  102   CO2  29   GLUCOSE  123*   BUN  25*   CREATININE  0.69   CALCIUM  9.2                      Assessment/Plan     * Skin ulcer of left foot with fat layer exposed (CMS-HCC)- (present on admission)   Assessment & Plan    S/p OR debridement and gastroscoleus resection 7/25   Continue wound vac care  w changes three times/wk, slowly healing, remains afebrile  Contact isolation for MRSA.  TDWB LLE, WBAT RLE per Ortho recs.  Patient reportedly has restraining orders from much of the Skilled facilites in [place]  Difficult discharge, remained so  Cont to monitor         Pseudomonas infection- (present on admission)   Assessment & Plan    Left foot infection  -Status post IV cefepime which finished on August 25, 2017        MRSA (methicillin resistant staph aureus) culture positive- (present on admission)   Assessment & Plan    Status post doxycycline, finished on August 25, 2017, infectious disease is following        Anxiety- (present on admission)   Assessment & Plan    Depakote, continue Klonopin, psychiatry is following  -tolerating somewhat well        Diarrhea- (present on admission)   Assessment & Plan    Resolved        ABRIL (acute kidney injury) (CMS-HCC)   Assessment & Plan    resolved        Opiate dependence (CMS-HCC)- (present on admission)   Assessment & Plan    Noted Drug seeking behavior inpatient-remains adequately controlled at this time  Avoid escalating  IV narcotics with no no signs for pain- use primarily for dressing / vac changes .  Patient's  pain has been managed by outpatient pain management in [place]  -Continue methadone at 20 mg 3 times, no changes today  -pain well controlled today, removed PICC line as longer necessary, IV pain meds only before dressing changes        Wound of right leg, at [facility] site- (present on admission)   Assessment & Plan    Culture: VRE  ID following  Wound care  Surgery on  -On Zyvox which is supposed to finish on September 3, the patient has refuse doses last 5 days, continues to refuse  -removed PICC line        Tobacco abuse- (present on admission)   Assessment & Plan    Nicotine replacement PRN  Cessation counseling        Peripheral neuropathy- (present on admission)   Assessment & Plan    Lyrica to cont        complaint Loose stool, not watery. Stool sample collected.   Stool no signs of infection, WBC (-), start with Imodium 3 times a day when necessary   Change diet to a lactose-free diet seams not making difference, will change back to regular  Refuse Zyvox twice even with education     Reviewed items::  Labs reviewed and Medications reviewed  Rolon catheter::  No Rolon  DVT prophylaxis pharmacological::  Enoxaparin (Lovenox)      This dictation was created using voice recognition software. The accuracy of the dictation is limited to the abilities of the software. I expect there may be some errors of grammar and possibly content.

## 2017-08-31 NOTE — CARE PLAN
Problem: Safety  Goal: Will remain free from injury  Outcome: PROGRESSING AS EXPECTED  Safety precautions in place. Education on safety and falls provided to pt.

## 2017-08-31 NOTE — CARE PLAN
Problem: Communication  Goal: The ability to communicate needs accurately and effectively will improve  Outcome: PROGRESSING AS EXPECTED  Pt education provided on pt's need to communicate pain level.

## 2017-08-31 NOTE — WOUND TEAM
[facility]  Inpatient Services  Wound and Skin Care Treatment Note    Admission Date:  07/22/17     HPI, PMH, SH: Reviewed  Unit where seen by Wound Team:  T3    WOUND CONSULT RELATED TO:   Scheduled NPWT dressing change left plantar foot      SUBJECTIVE:  Pleasant/agreeable    Self Report / Pain Level:  Tolerated well    OBJECTIVE:    Previous NPWT dressing intact    WOUND TYPE, LOCATION, CHARACTERISTICS (Pressure ulcers: location, stage, POA or date identified)    Location/type of wound: Open surgical wound left plantar foot      Periwound:     macerated around wound and in between remaining amputation site of pervious digit; callous-like tissue      Drainage:     scant serosanguinous    Tissue Type and %:    100% pink/red  Wound Edges:    Attached/macerated    Odor:     none  Exposed structure(s):  Bone palpated    S&S of Infection:     none      Measurements: (cm)  (Taken 08/30/17)   Length:    4.7  Width:     3.5  Depth:     0.2      INTERVENTIONS BY WOUND TEAM:  Removed prev dressing and irrigated wound with wound cleanser.  Measurements and photograph done. Removed small amt of bull-wnd callous. Benzoin and drape to bull wound skin as needed. Small piece of paste ring used to seal fissure on lateral side.  Covered wound with 1 piece black foam with a bridge and button to dorsum of foot. Secured with drape and applied cont neg pressure at 125mmhg. Placed a non-adhesive foam under the forefoot to hopefully absorb any moisture. Total black foam=3 pieces.    Interdisciplinary consultation:   Staff RN, patient; Alice/[place]     EVALUATION:  Alice from LPS in to have a look; will consider alternative treatment -- measurements smaller; wnd remains clean; making progress but slowly    Factors affecting wound healing:  Neuropathy, tobacco abuse     Goals:  Decrease in wound size by 5% every 3 weeks -- no maceration    NURSING PLAN OF CARE ORDERS (x):    Dressing changes: See Dressing Maintenance orders:   x  Skin care: See Skin Care orders: x  Rectal tube care: See Rectal Tube Care orders:   Other orders:    WOUND TEAM PLAN OF CARE (x):   NPWT change 3 x week:   x     Dressing changes by wound team:       Follow up as needed:     x  Other (explain):    Anticipated discharge plans (x):  SNF:           Home Care:           Outpatient Wound Center:   x      Self Care:            Other:

## 2017-08-31 NOTE — CARE PLAN
Problem: Safety  Goal: Will remain free from injury  Outcome: PROGRESSING AS EXPECTED  Safety precautions in place. Call light within reach. Bed is low and in locked position. Hourly rounding in place. Pt is up self.     Problem: Pain Management  Goal: Pain level will decrease to patient's comfort goal  Outcome: PROGRESSING AS EXPECTED  Pt is getting oxycodone 10 mg q4hr prn for pain. On scheduled tylenol, methadone, lyrica, and lidocaine patch for the back. Pt states these medications control his pain well. Ice pack provided for shoulder pain. Will continue to assess for pain.

## 2017-08-31 NOTE — DISCHARGE PLANNING
Continue to await acceptance at [facility]. Will ask [facility] to check on most recent referrals.

## 2017-09-01 PROCEDURE — 700111 HCHG RX REV CODE 636 W/ 250 OVERRIDE (IP): Performed by: INTERNAL MEDICINE

## 2017-09-01 PROCEDURE — A9270 NON-COVERED ITEM OR SERVICE: HCPCS | Performed by: INTERNAL MEDICINE

## 2017-09-01 PROCEDURE — 700102 HCHG RX REV CODE 250 W/ 637 OVERRIDE(OP): Performed by: INTERNAL MEDICINE

## 2017-09-01 PROCEDURE — 97605 NEG PRS WND THER DME<=50SQCM: CPT

## 2017-09-01 PROCEDURE — 770021 HCHG ROOM/CARE - ISO PRIVATE

## 2017-09-01 PROCEDURE — 700101 HCHG RX REV CODE 250: Performed by: INTERNAL MEDICINE

## 2017-09-01 PROCEDURE — 99232 SBSQ HOSP IP/OBS MODERATE 35: CPT | Performed by: INTERNAL MEDICINE

## 2017-09-01 PROCEDURE — 306263 VAC CANNISTER W/GEL 500ML: Performed by: INTERNAL MEDICINE

## 2017-09-01 RX ORDER — MORPHINE SULFATE 4 MG/ML
2 INJECTION, SOLUTION INTRAMUSCULAR; INTRAVENOUS
Status: DISCONTINUED | OUTPATIENT
Start: 2017-09-01 | End: 2017-09-07

## 2017-09-01 RX ADMIN — CLONAZEPAM 2 MG: 1 TABLET ORAL at 20:57

## 2017-09-01 RX ADMIN — OXYCODONE HYDROCHLORIDE 10 MG: 10 TABLET ORAL at 03:34

## 2017-09-01 RX ADMIN — ACETAMINOPHEN 650 MG: 325 TABLET, FILM COATED ORAL at 11:32

## 2017-09-01 RX ADMIN — PREGABALIN 150 MG: 150 CAPSULE ORAL at 08:49

## 2017-09-01 RX ADMIN — LIDOCAINE 1 PATCH: 50 PATCH CUTANEOUS at 11:33

## 2017-09-01 RX ADMIN — OXYCODONE HYDROCHLORIDE 10 MG: 10 TABLET ORAL at 15:33

## 2017-09-01 RX ADMIN — CLONAZEPAM 2 MG: 1 TABLET ORAL at 08:49

## 2017-09-01 RX ADMIN — OXYCODONE HYDROCHLORIDE 10 MG: 10 TABLET ORAL at 08:50

## 2017-09-01 RX ADMIN — OXYCODONE HYDROCHLORIDE 10 MG: 10 TABLET ORAL at 20:58

## 2017-09-01 RX ADMIN — Medication 325 MG: at 17:12

## 2017-09-01 RX ADMIN — PREGABALIN 150 MG: 150 CAPSULE ORAL at 20:57

## 2017-09-01 RX ADMIN — LOPERAMIDE HYDROCHLORIDE 2 MG: 2 CAPSULE ORAL at 11:32

## 2017-09-01 RX ADMIN — PREGABALIN 150 MG: 150 CAPSULE ORAL at 14:10

## 2017-09-01 RX ADMIN — METHADONE HYDROCHLORIDE 20 MG: 10 TABLET ORAL at 14:10

## 2017-09-01 RX ADMIN — Medication 325 MG: at 08:49

## 2017-09-01 RX ADMIN — MORPHINE SULFATE 2 MG: 4 INJECTION INTRAVENOUS at 10:32

## 2017-09-01 RX ADMIN — ACETAMINOPHEN 650 MG: 325 TABLET, FILM COATED ORAL at 05:53

## 2017-09-01 RX ADMIN — METHADONE HYDROCHLORIDE 20 MG: 10 TABLET ORAL at 20:58

## 2017-09-01 RX ADMIN — ACETAMINOPHEN 650 MG: 325 TABLET, FILM COATED ORAL at 17:12

## 2017-09-01 RX ADMIN — METHADONE HYDROCHLORIDE 20 MG: 10 TABLET ORAL at 05:52

## 2017-09-01 RX ADMIN — ENOXAPARIN SODIUM 40 MG: 100 INJECTION SUBCUTANEOUS at 08:50

## 2017-09-01 ASSESSMENT — PAIN SCALES - GENERAL
PAINLEVEL_OUTOF10: 5
PAINLEVEL_OUTOF10: 3
PAINLEVEL_OUTOF10: 8
PAINLEVEL_OUTOF10: 5
PAINLEVEL_OUTOF10: 4
PAINLEVEL_OUTOF10: 6
PAINLEVEL_OUTOF10: 0
PAINLEVEL_OUTOF10: 5
PAINLEVEL_OUTOF10: 5

## 2017-09-01 ASSESSMENT — ENCOUNTER SYMPTOMS
FEVER: 0
HEADACHES: 0
DEPRESSION: 0
COUGH: 0
DIZZINESS: 0

## 2017-09-01 NOTE — WOUND TEAM
"[facility]  Inpatient Services  Wound and Skin Care Treatment Note    Admission Date:  07/22/17     HPI, PMH, SH: Reviewed  Unit where seen by Wound Team:  T3    WOUND CONSULT RELATED TO:   Scheduled NPWT dressing change left plantar foot      SUBJECTIVE:  \"I got my new boot but it doesn't fit because of the VAC\"    Self Report / Pain Level:  Tolerated well with premedication    OBJECTIVE:    Previous NPWT dressing intact    WOUND TYPE, LOCATION, CHARACTERISTICS (Pressure ulcers: location, stage, POA or date identified)    Location/type of wound: Open surgical wound left plantar foot      Periwound:     macerated around wound and in between remaining amputation site of previous digit; callous-like tissue      Drainage:     scant serosanguinous    Tissue Type and %:    100% pink/red  Wound Edges:    Attached/macerated    Odor:     none  Exposed structure(s):  Bone palpated    S&S of Infection:     none      Measurements: (cm)  (Taken 08/30/17)   Length:    4.7  Width:     3.5  Depth:     0.2      INTERVENTIONS BY WOUND TEAM:  Removed old dressing and cleansed wound with wound cleanser.  Benzoin and drape to bull wound skin and paste ring between digits.  Applied black foam to wound bed and bridged to dorsum of foot securing with trac pad and drape.  Resumed NPWT at 125mmHg continuously.  Non adhesive foam to plantar surface and secured with ACE wrap.  Total pieces black foam=3.  Tried to apply new boot for patient and seems tight in various places on leg and foot.  Patient talking about change in dressing to allow boot to fit.  Replaced jd boot.    Interdisciplinary consultation:   Staff RN, patient     EVALUATION:  Wound clean and granular; LPS to consider other dressing    Factors affecting wound healing:  Neuropathy, tobacco abuse     Goals:  Decrease in wound size by 5% every 3 weeks -- no maceration    NURSING PLAN OF CARE ORDERS (x):    Dressing changes: See Dressing Maintenance orders:  " x  Skin care: See Skin Care orders:   Rectal tube care: See Rectal Tube Care orders:   Other orders:    WOUND TEAM PLAN OF CARE (x):   NPWT change 3 x week:   X     Dressing changes by wound team:       Follow up as needed:       Other (explain):    Anticipated discharge plans (x):  SNF:           Home Care:           Outpatient Wound Center:         Self Care:            Other:  TBD

## 2017-09-01 NOTE — CARE PLAN
Problem: Safety  Goal: Will remain free from injury  Outcome: PROGRESSING AS EXPECTED  Safety precautions in place. Call light within reach. Bed is low and in locked position. Hourly rounding in place. Pt is up self    Problem: Discharge Barriers/Planning  Goal: Patient's continuum of care needs will be met  Outcome: PROGRESSING AS EXPECTED  Per SW notes, awaiting [facility] acceptance.

## 2017-09-02 PROCEDURE — 700101 HCHG RX REV CODE 250: Performed by: INTERNAL MEDICINE

## 2017-09-02 PROCEDURE — 700102 HCHG RX REV CODE 250 W/ 637 OVERRIDE(OP): Performed by: INTERNAL MEDICINE

## 2017-09-02 PROCEDURE — A9270 NON-COVERED ITEM OR SERVICE: HCPCS | Performed by: INTERNAL MEDICINE

## 2017-09-02 PROCEDURE — 770021 HCHG ROOM/CARE - ISO PRIVATE

## 2017-09-02 PROCEDURE — 99232 SBSQ HOSP IP/OBS MODERATE 35: CPT | Performed by: INTERNAL MEDICINE

## 2017-09-02 PROCEDURE — 700111 HCHG RX REV CODE 636 W/ 250 OVERRIDE (IP): Performed by: INTERNAL MEDICINE

## 2017-09-02 RX ADMIN — METHADONE HYDROCHLORIDE 20 MG: 10 TABLET ORAL at 06:09

## 2017-09-02 RX ADMIN — PREGABALIN 150 MG: 150 CAPSULE ORAL at 21:42

## 2017-09-02 RX ADMIN — LIDOCAINE 1 APPLICATION: 40 CREAM TOPICAL at 12:00

## 2017-09-02 RX ADMIN — METHADONE HYDROCHLORIDE 20 MG: 10 TABLET ORAL at 14:06

## 2017-09-02 RX ADMIN — ACETAMINOPHEN 650 MG: 325 TABLET, FILM COATED ORAL at 12:00

## 2017-09-02 RX ADMIN — OXYCODONE HYDROCHLORIDE 10 MG: 10 TABLET ORAL at 22:51

## 2017-09-02 RX ADMIN — OXYCODONE HYDROCHLORIDE 10 MG: 10 TABLET ORAL at 14:06

## 2017-09-02 RX ADMIN — ACETAMINOPHEN 650 MG: 325 TABLET, FILM COATED ORAL at 02:56

## 2017-09-02 RX ADMIN — LIDOCAINE 1 APPLICATION: 40 CREAM TOPICAL at 02:56

## 2017-09-02 RX ADMIN — CLONAZEPAM 2 MG: 1 TABLET ORAL at 08:05

## 2017-09-02 RX ADMIN — Medication 325 MG: at 17:07

## 2017-09-02 RX ADMIN — METHADONE HYDROCHLORIDE 20 MG: 10 TABLET ORAL at 21:42

## 2017-09-02 RX ADMIN — ACETAMINOPHEN 650 MG: 325 TABLET, FILM COATED ORAL at 23:58

## 2017-09-02 RX ADMIN — PREGABALIN 150 MG: 150 CAPSULE ORAL at 14:06

## 2017-09-02 RX ADMIN — PREGABALIN 150 MG: 150 CAPSULE ORAL at 08:05

## 2017-09-02 RX ADMIN — ACETAMINOPHEN 650 MG: 325 TABLET, FILM COATED ORAL at 06:09

## 2017-09-02 RX ADMIN — LIDOCAINE 1 PATCH: 50 PATCH CUTANEOUS at 11:59

## 2017-09-02 RX ADMIN — ENOXAPARIN SODIUM 40 MG: 100 INJECTION SUBCUTANEOUS at 08:05

## 2017-09-02 RX ADMIN — LIDOCAINE 1 APPLICATION: 40 CREAM TOPICAL at 18:48

## 2017-09-02 RX ADMIN — OXYCODONE HYDROCHLORIDE 10 MG: 10 TABLET ORAL at 02:56

## 2017-09-02 RX ADMIN — CLONAZEPAM 2 MG: 1 TABLET ORAL at 21:42

## 2017-09-02 RX ADMIN — Medication 325 MG: at 08:06

## 2017-09-02 RX ADMIN — LINEZOLID 600 MG: 600 TABLET, FILM COATED ORAL at 21:42

## 2017-09-02 RX ADMIN — OXYCODONE HYDROCHLORIDE 10 MG: 10 TABLET ORAL at 18:48

## 2017-09-02 RX ADMIN — ACETAMINOPHEN 650 MG: 325 TABLET, FILM COATED ORAL at 17:07

## 2017-09-02 ASSESSMENT — ENCOUNTER SYMPTOMS
TINGLING: 0
SPUTUM PRODUCTION: 0
ABDOMINAL PAIN: 0
PALPITATIONS: 0
VOMITING: 0
FEVER: 0
HEMOPTYSIS: 0
DEPRESSION: 0
HEADACHES: 0
SHORTNESS OF BREATH: 0
NAUSEA: 0
DIAPHORESIS: 0

## 2017-09-02 ASSESSMENT — PAIN SCALES - GENERAL
PAINLEVEL_OUTOF10: 4
PAINLEVEL_OUTOF10: 6
PAINLEVEL_OUTOF10: 4

## 2017-09-02 NOTE — CARE PLAN
Problem: Safety  Goal: Will remain free from injury    Intervention: Provide assistance with mobility  Pt is independent with transferring from wheelchair to bed. Pt calls for assistance appropriately.       Problem: Pain Management  Goal: Pain level will decrease to patient's comfort goal  Outcome: PROGRESSING AS EXPECTED  Pt calls for pain meds appropriately, also repositions himself.

## 2017-09-02 NOTE — PROGRESS NOTES
Pt resting in bed. VSS. OOB to wheelchair during first 4 hours of shift. Able to get in back without assistance. Prn medication controlling pain and no complaints of nausea. Wound vac in place. Will cont to monitor.

## 2017-09-02 NOTE — CARE PLAN
Problem: Mobility  Goal: Risk for activity intolerance will decrease  Outcome: MET Date Met: 09/01/17

## 2017-09-02 NOTE — PROGRESS NOTES
[facility]ist Progress Note    Date of Service: 9/1/17    Chief Complaint    52 y.o. male hx of PVD s/p L TMA and Rt Bka > 5 years ago  admitted 7/22/2017 with right BKA stump pain and wound drainage in addition to left TMA stump wound (nonhealing) over several months.  s/p Debridement and gastroscoleus resection LLE on 7-25-17 with Dr Shirley. Cultures have grown MRSA / Pseudomonas. He is being treated with IV cefepime and PO doxycycline per ID recommendations. These are to be continued until 08/25/2017. Patient has medical. Difficult disposition. SW continuing to look into placement options at this time. Continuing therapy inpatient at this point in time. Also underwent R BKA revision with Dr Shirley 08/15/2017    Interval Problem Update  Chronic pain-no appreciable changes today. No acute events noted overnight.    Wounds-no changes. Got his wound vac exchanged today without issue. Afebrile.      Consultants/Specialty  Limb preservation service  Infectious diseases - Last seen by Dr Pino  Orthopedic surgery - Dr Shirley  Psychiatry - Dr Trujillo    Disposition  Difficult disposition. Remains inpatient to continue therapy. SW working on disposition. Discussed on rounds with SW. Continue looking into placement option. No new options today again.       Review of Systems   Constitutional: Negative for diaphoresis and fever.   Eyes:        Vision loss rt eye / Evisceration. Trauma history. No change today   Respiratory: Negative for cough, hemoptysis and sputum production.    Cardiovascular: Negative for palpitations.   Gastrointestinal: Negative for nausea and vomiting.   Genitourinary: Negative for urgency.   Musculoskeletal: Positive for joint pain (chronics).        R BKA  About the same today   Skin: Negative for itching.   Neurological: Negative for dizziness and headaches.        Chronic peripheral neuropathy .   Psychiatric/Behavioral: Negative for depression. Nervous/anxious: improved.       Physical  Exam  Laboratory/Imaging   Hemodynamics  Temp (24hrs), Av.7 °C (98.1 °F), Min:36.6 °C (97.8 °F), Max:37.1 °C (98.7 °F)   Temperature: 36.6 °C (97.8 °F)  Pulse  Av.9  Min: 61  Max: 111    Blood Pressure: 103/67      Respiratory      Respiration: 18, Pulse Oximetry: 91 %        RUL Breath Sounds: Clear, RML Breath Sounds: Diminished, RLL Breath Sounds: Diminished, NANCY Breath Sounds: Clear, LLL Breath Sounds: Diminished    Fluids    Intake/Output Summary (Last 24 hours) at 17 0734  Last data filed at 17 0559   Gross per 24 hour   Intake              240 ml   Output             2300 ml   Net            -2060 ml       Nutrition  Orders Placed This Encounter   Procedures   • DIET ORDER     Standing Status:   Standing     Number of Occurrences:   1     Order Specific Question:   Diet:     Answer:   Regular [1]     Comments:   regular trays     Physical Exam   Constitutional: He is oriented to person, place, and time. He appears well-developed.   Much calmer today   HENT:   Right Ear: External ear normal.   Left Ear: External ear normal.   Mouth/Throat: Oropharynx is clear and moist.   Eyes: EOM are normal. No scleral icterus.   Rt eye vision loss, enucleated. No changes today   Neck: Normal range of motion. Neck supple.   Cardiovascular: Normal rate, regular rhythm and normal heart sounds.  Exam reveals no friction rub.    Pulmonary/Chest: Effort normal. No stridor. He has no wheezes. He has no rales.   Abdominal: Soft. Bowel sounds are normal. He exhibits no distension.   Musculoskeletal: He exhibits tenderness and deformity.   Rt leg BKA - incision dry, remains with no dehiscence, no changes appreciated    Left leg w boot wound vac present, scant drainage in the vacuum, unchanged again today   Neurological: He is alert and oriented to person, place, and time. Coordination (Unchanged) abnormal.   Skin: Skin is warm and dry. He is not diaphoretic. There is erythema.   End of stump on R appears chaffed,  wound margins healed. About the same today, wound vac in place   Psychiatric: He has a normal mood and affect. His behavior is normal.                                Assessment/Plan     * Skin ulcer of left foot with fat layer exposed (CMS-HCC)- (present on admission)   Assessment & Plan    S/p OR debridement and gastroscoleus resection 7/25   Continue wound vac care  w changes three times/wk, slowly healing, remains afebrile  Contact isolation for MRSA.  TDWB LLE, WBAT RLE per Ortho recs.  Patient reportedly has restraining orders from much of the Skilled facilites in [place]  Difficult discharge, remained so  Cont to monitor         Pseudomonas infection- (present on admission)   Assessment & Plan    Left foot infection  -Status post IV cefepime which finished on August 25, 2017        MRSA (methicillin resistant staph aureus) culture positive- (present on admission)   Assessment & Plan    Status post doxycycline, finished on August 25, 2017, infectious disease is following        Anxiety- (present on admission)   Assessment & Plan    Depakote, continue Klonopin, psychiatry is following  -tolerating somewhat well        Diarrhea- (present on admission)   Assessment & Plan    Resolved        ABRIL (acute kidney injury) (CMS-HCC)   Assessment & Plan    resolved        Opiate dependence (CMS-HCC)- (present on admission)   Assessment & Plan    Noted Drug seeking behavior inpatient-remains adequately controlled at this time  Avoid escalating  IV narcotics with no no signs for pain- use primarily for dressing / vac changes .  Patient's pain has been managed by outpatient pain management in [place]  -Continue methadone at 20 mg 3 times, no changes today  -pain well controlled today, doing well. No changes planned today        Wound of right leg, at BKA site- (present on admission)   Assessment & Plan    Culture: VRE  ID following  Wound care  Surgery on  -On Zyvox which is supposed to finish on September 3, intermittently  takes this medication, strongly encourage patient to adhere to the regimen.  -removed PICC line        Tobacco abuse- (present on admission)   Assessment & Plan    Nicotine replacement PRN  Cessation counseling        Peripheral neuropathy- (present on admission)   Assessment & Plan    Lyrica to cont            Reviewed items::  Labs reviewed and Medications reviewed  Rolon catheter::  No Rolon  DVT prophylaxis pharmacological::  Enoxaparin (Lovenox)      This dictation was created using voice recognition software. The accuracy of the dictation is limited to the abilities of the software. I expect there may be some errors of grammar and possibly content.

## 2017-09-03 PROCEDURE — 700102 HCHG RX REV CODE 250 W/ 637 OVERRIDE(OP): Performed by: INTERNAL MEDICINE

## 2017-09-03 PROCEDURE — 99232 SBSQ HOSP IP/OBS MODERATE 35: CPT | Performed by: INTERNAL MEDICINE

## 2017-09-03 PROCEDURE — A9270 NON-COVERED ITEM OR SERVICE: HCPCS | Performed by: INTERNAL MEDICINE

## 2017-09-03 PROCEDURE — 700101 HCHG RX REV CODE 250: Performed by: INTERNAL MEDICINE

## 2017-09-03 PROCEDURE — 700111 HCHG RX REV CODE 636 W/ 250 OVERRIDE (IP): Performed by: INTERNAL MEDICINE

## 2017-09-03 PROCEDURE — 770021 HCHG ROOM/CARE - ISO PRIVATE

## 2017-09-03 RX ADMIN — LOPERAMIDE HYDROCHLORIDE 2 MG: 2 CAPSULE ORAL at 12:56

## 2017-09-03 RX ADMIN — LIDOCAINE 1 PATCH: 50 PATCH CUTANEOUS at 12:45

## 2017-09-03 RX ADMIN — Medication 325 MG: at 16:26

## 2017-09-03 RX ADMIN — ENOXAPARIN SODIUM 40 MG: 100 INJECTION SUBCUTANEOUS at 09:26

## 2017-09-03 RX ADMIN — LOPERAMIDE HYDROCHLORIDE 2 MG: 2 CAPSULE ORAL at 21:16

## 2017-09-03 RX ADMIN — CLONAZEPAM 2 MG: 1 TABLET ORAL at 21:11

## 2017-09-03 RX ADMIN — OXYCODONE HYDROCHLORIDE 10 MG: 10 TABLET ORAL at 21:11

## 2017-09-03 RX ADMIN — ACETAMINOPHEN 650 MG: 325 TABLET, FILM COATED ORAL at 06:45

## 2017-09-03 RX ADMIN — CLONAZEPAM 2 MG: 1 TABLET ORAL at 09:26

## 2017-09-03 RX ADMIN — METHADONE HYDROCHLORIDE 20 MG: 10 TABLET ORAL at 12:54

## 2017-09-03 RX ADMIN — ZOLPIDEM TARTRATE 5 MG: 5 TABLET, FILM COATED ORAL at 03:12

## 2017-09-03 RX ADMIN — LIDOCAINE 1 APPLICATION: 40 CREAM TOPICAL at 09:28

## 2017-09-03 RX ADMIN — PREGABALIN 150 MG: 150 CAPSULE ORAL at 09:26

## 2017-09-03 RX ADMIN — Medication 325 MG: at 09:26

## 2017-09-03 RX ADMIN — OXYCODONE HYDROCHLORIDE 10 MG: 10 TABLET ORAL at 03:12

## 2017-09-03 RX ADMIN — LIDOCAINE 1 PATCH: 50 PATCH CUTANEOUS at 09:27

## 2017-09-03 RX ADMIN — ACETAMINOPHEN 650 MG: 325 TABLET, FILM COATED ORAL at 12:54

## 2017-09-03 RX ADMIN — ACETAMINOPHEN 650 MG: 325 TABLET, FILM COATED ORAL at 16:26

## 2017-09-03 RX ADMIN — PREGABALIN 150 MG: 150 CAPSULE ORAL at 21:10

## 2017-09-03 RX ADMIN — OXYCODONE HYDROCHLORIDE 10 MG: 10 TABLET ORAL at 13:37

## 2017-09-03 RX ADMIN — METHADONE HYDROCHLORIDE 20 MG: 10 TABLET ORAL at 06:44

## 2017-09-03 RX ADMIN — PREGABALIN 150 MG: 150 CAPSULE ORAL at 12:55

## 2017-09-03 RX ADMIN — METHADONE HYDROCHLORIDE 20 MG: 10 TABLET ORAL at 21:11

## 2017-09-03 ASSESSMENT — PAIN SCALES - GENERAL
PAINLEVEL_OUTOF10: 3
PAINLEVEL_OUTOF10: 8
PAINLEVEL_OUTOF10: 0
PAINLEVEL_OUTOF10: 8

## 2017-09-03 ASSESSMENT — ENCOUNTER SYMPTOMS
SHORTNESS OF BREATH: 0
DIZZINESS: 0
VOMITING: 0
HEADACHES: 0
NAUSEA: 0
FEVER: 0

## 2017-09-03 NOTE — CARE PLAN
Problem: Safety  Goal: Will remain free from injury  Outcome: PROGRESSING AS EXPECTED  Safety maintained. Bed low and locked position, call light within reach, hourly rounding,  socks on and pt instructed to call before getting oob.

## 2017-09-03 NOTE — PROGRESS NOTES
Pt found attempting to leave the floor and was brought to the unit from the elevators. Pt states he needs change for his $20.00. Confirmed with Dr Victoria that the pt was not allowed off the floor. Pt instructed he is not to leave the floor unless a staff member is with him. Primary nurse walked with pt to get change from the gift shop.

## 2017-09-03 NOTE — PROGRESS NOTES
[facility] Hospitalist Progress Note    Date of Service: 9/1/17    Chief Complaint    52 y.o. male hx of PVD s/p L TMA and Rt Bka > 5 years ago  admitted 7/22/2017 with right BKA stump pain and wound drainage in addition to left TMA stump wound (nonhealing) over several months.  s/p Debridement and gastroscoleus resection LLE on 7-25-17 with Dr Sihrley. Cultures have grown MRSA / Pseudomonas. He is being treated with IV cefepime and PO doxycycline per ID recommendations. These are to be continued until 08/25/2017. Patient has medical. Difficult disposition. SW continuing to look into placement options at this time. Continuing therapy inpatient at this point in time. Also underwent R BKA revision with Dr Shirley 08/15/2017    Interval Problem Update  Chronic pain-no appreciable changes today. PIV was lost, will try for US guided later today.     Wounds-no changes. Patient feels it is doing ok right now. He is very concerned about the VRE in his skin and denies he is taking his zyvox, but it is Documented in the computer he takes it every evening. Remains afebrile.    Consultants/Specialty  Limb preservation service  Infectious diseases - Last seen by Dr Pino  Orthopedic surgery - Dr Shirley  Psychiatry - Dr Trujillo    Disposition  Difficult disposition. Remains inpatient to continue therapy. SW working on disposition. Discussed on rounds with SW. Continue looking into placement option. No new options today again.       Review of Systems   Constitutional: Negative for fever.   Eyes:        Vision loss rt eye / Evisceration. Trauma history. No change today   Respiratory: Negative for shortness of breath.    Cardiovascular: Negative for chest pain.   Gastrointestinal: Negative for nausea and vomiting.   Genitourinary: Negative for dysuria.   Musculoskeletal: Positive for joint pain (chronics).        R BKA  Remains the same today again   Skin: Negative for itching.   Neurological: Negative for dizziness and headaches.         Chronic peripheral neuropathy .   Psychiatric/Behavioral: Negative for suicidal ideas. Nervous/anxious: improved.       Physical Exam  Laboratory/Imaging   Hemodynamics  Temp (24hrs), Av.1 °C (98.7 °F), Min:36.4 °C (97.6 °F), Max:37.7 °C (99.8 °F)   Temperature: 37.3 °C (99.2 °F)  Pulse  Av  Min: 61  Max: 111    Blood Pressure: 100/59      Respiratory      Respiration: 17, Pulse Oximetry: 93 %        RUL Breath Sounds: Clear, RML Breath Sounds: Diminished, RLL Breath Sounds: Diminished, NANCY Breath Sounds: Clear, LLL Breath Sounds: Diminished    Fluids    Intake/Output Summary (Last 24 hours) at 17 1552  Last data filed at 17 1200   Gross per 24 hour   Intake              940 ml   Output             1550 ml   Net             -610 ml       Nutrition  Orders Placed This Encounter   Procedures   • DIET ORDER     Standing Status:   Standing     Number of Occurrences:   1     Order Specific Question:   Diet:     Answer:   Regular [1]     Comments:   regular trays     Physical Exam   Constitutional: He is oriented to person, place, and time. He appears well-developed.   Much calmer today   HENT:   Right Ear: External ear normal.   Left Ear: External ear normal.   Mouth/Throat: Oropharynx is clear and moist.   Eyes: EOM are normal. Right eye exhibits no discharge. Left eye exhibits no discharge.   Rt eye vision loss, enucleated. No changes today   Neck: Normal range of motion. Neck supple.   Cardiovascular: Normal rate, regular rhythm and normal heart sounds.  Exam reveals no gallop.    Pulmonary/Chest: Effort normal. No stridor. He has no wheezes. He has no rales.   Abdominal: Soft. Bowel sounds are normal. He exhibits no distension.   Musculoskeletal: He exhibits tenderness and deformity.   Rt leg BKA - incision dry, remains with no dehiscence, unchanged    Left leg w boot wound vac present, scant drainage in the vacuum, remains the same   Neurological: He is alert and oriented to person, place,  and time. Coordination (Unchanged) abnormal.   Skin: Skin is warm and dry. He is not diaphoretic. There is erythema.   End of stump on R appears chaffed, wound margins healed. About the same today, wound vac in place   Psychiatric: He has a normal mood and affect. His behavior is normal.                                Assessment/Plan     * Skin ulcer of left foot with fat layer exposed (CMS-HCC)- (present on admission)   Assessment & Plan    S/p OR debridement and gastroscoleus resection 7/25   Continue wound vac care  w changes three times/wk, slowly healing, remains afebrile  Contact isolation for MRSA.  TDWB LLE, WBAT RLE per Ortho recs.  Patient reportedly has restraining orders from much of the Skilled facilites in [place]  Difficult discharge, remained so  Cont to monitor         Pseudomonas infection- (present on admission)   Assessment & Plan    Left foot infection  -Status post IV cefepime which finished on August 25, 2017        MRSA (methicillin resistant staph aureus) culture positive- (present on admission)   Assessment & Plan    Status post doxycycline, finished on August 25, 2017, infectious disease is following        Anxiety- (present on admission)   Assessment & Plan    Depakote, continue Klonopin, psychiatry is following  -tolerating somewhat well and adherent to regimen        Diarrhea- (present on admission)   Assessment & Plan    Resolved        ABRIL (acute kidney injury) (CMS-HCC)   Assessment & Plan    resolved        Opiate dependence (CMS-HCC)- (present on admission)   Assessment & Plan    Noted Drug seeking behavior inpatient-remains adequately controlled at this time  Avoid escalating  IV narcotics with no no signs for pain- use primarily for dressing / vac changes .  Patient's pain has been managed by outpatient pain management in [place]  -Continue methadone at 20 mg 3 times daily, no adjustments necessary today  -try to get a PIV going for wound dressing change tomorrow        Wound of  right leg, at BKA site- (present on admission)   Assessment & Plan    Culture: VRE  ID following  Wound care  Surgery on  -On Zyvox which is supposed to finish on September 3, which finishes today  -removed PICC line        Tobacco abuse- (present on admission)   Assessment & Plan    Nicotine replacement PRN  Cessation counseling        Peripheral neuropathy- (present on admission)   Assessment & Plan    Lyrica to cont            Reviewed items::  Labs reviewed and Medications reviewed  Rolon catheter::  No Rolon  DVT prophylaxis pharmacological::  Enoxaparin (Lovenox)      This dictation was created using voice recognition software. The accuracy of the dictation is limited to the abilities of the software. I expect there may be some errors of grammar and possibly content.

## 2017-09-04 PROCEDURE — 700102 HCHG RX REV CODE 250 W/ 637 OVERRIDE(OP): Performed by: INTERNAL MEDICINE

## 2017-09-04 PROCEDURE — 700111 HCHG RX REV CODE 636 W/ 250 OVERRIDE (IP): Performed by: INTERNAL MEDICINE

## 2017-09-04 PROCEDURE — 97605 NEG PRS WND THER DME<=50SQCM: CPT

## 2017-09-04 PROCEDURE — A9270 NON-COVERED ITEM OR SERVICE: HCPCS | Performed by: INTERNAL MEDICINE

## 2017-09-04 PROCEDURE — 770021 HCHG ROOM/CARE - ISO PRIVATE

## 2017-09-04 PROCEDURE — 99232 SBSQ HOSP IP/OBS MODERATE 35: CPT | Performed by: INTERNAL MEDICINE

## 2017-09-04 PROCEDURE — 700101 HCHG RX REV CODE 250: Performed by: INTERNAL MEDICINE

## 2017-09-04 RX ADMIN — PREGABALIN 150 MG: 150 CAPSULE ORAL at 07:54

## 2017-09-04 RX ADMIN — METHADONE HYDROCHLORIDE 20 MG: 10 TABLET ORAL at 22:00

## 2017-09-04 RX ADMIN — CLONAZEPAM 2 MG: 1 TABLET ORAL at 07:54

## 2017-09-04 RX ADMIN — METHADONE HYDROCHLORIDE 20 MG: 10 TABLET ORAL at 16:43

## 2017-09-04 RX ADMIN — MORPHINE SULFATE 2 MG: 4 INJECTION INTRAVENOUS at 08:39

## 2017-09-04 RX ADMIN — METHADONE HYDROCHLORIDE 20 MG: 10 TABLET ORAL at 05:09

## 2017-09-04 RX ADMIN — ACETAMINOPHEN 650 MG: 325 TABLET, FILM COATED ORAL at 17:36

## 2017-09-04 RX ADMIN — ACETAMINOPHEN 650 MG: 325 TABLET, FILM COATED ORAL at 05:09

## 2017-09-04 RX ADMIN — CLONAZEPAM 2 MG: 1 TABLET ORAL at 22:00

## 2017-09-04 RX ADMIN — ENOXAPARIN SODIUM 40 MG: 100 INJECTION SUBCUTANEOUS at 07:54

## 2017-09-04 RX ADMIN — OXYCODONE HYDROCHLORIDE 10 MG: 10 TABLET ORAL at 17:36

## 2017-09-04 RX ADMIN — LIDOCAINE 1 PATCH: 50 PATCH CUTANEOUS at 12:37

## 2017-09-04 RX ADMIN — LIDOCAINE 1 APPLICATION: 40 CREAM TOPICAL at 22:01

## 2017-09-04 RX ADMIN — LOPERAMIDE HYDROCHLORIDE 2 MG: 2 CAPSULE ORAL at 23:12

## 2017-09-04 RX ADMIN — ACETAMINOPHEN 650 MG: 325 TABLET, FILM COATED ORAL at 12:36

## 2017-09-04 RX ADMIN — Medication 325 MG: at 07:54

## 2017-09-04 RX ADMIN — OXYCODONE HYDROCHLORIDE 10 MG: 10 TABLET ORAL at 12:36

## 2017-09-04 RX ADMIN — ACETAMINOPHEN 650 MG: 325 TABLET, FILM COATED ORAL at 01:10

## 2017-09-04 RX ADMIN — OXYCODONE HYDROCHLORIDE 10 MG: 10 TABLET ORAL at 05:09

## 2017-09-04 RX ADMIN — OXYCODONE HYDROCHLORIDE 10 MG: 10 TABLET ORAL at 22:00

## 2017-09-04 RX ADMIN — PREGABALIN 150 MG: 150 CAPSULE ORAL at 22:00

## 2017-09-04 RX ADMIN — LIDOCAINE 1 APPLICATION: 40 CREAM TOPICAL at 12:36

## 2017-09-04 RX ADMIN — Medication 325 MG: at 16:43

## 2017-09-04 RX ADMIN — PREGABALIN 150 MG: 150 CAPSULE ORAL at 14:48

## 2017-09-04 RX ADMIN — OXYCODONE HYDROCHLORIDE 10 MG: 10 TABLET ORAL at 01:10

## 2017-09-04 ASSESSMENT — LIFESTYLE VARIABLES: DO YOU DRINK ALCOHOL: NO

## 2017-09-04 ASSESSMENT — PAIN SCALES - GENERAL
PAINLEVEL_OUTOF10: 6
PAINLEVEL_OUTOF10: 3
PAINLEVEL_OUTOF10: 3

## 2017-09-04 ASSESSMENT — ENCOUNTER SYMPTOMS
COUGH: 0
DIZZINESS: 0
HEADACHES: 0
FEVER: 0
PALPITATIONS: 0
ABDOMINAL PAIN: 0

## 2017-09-04 NOTE — CARE PLAN
Problem: Knowledge Deficit  Goal: Knowledge of disease process/condition, treatment plan, diagnostic tests, and medications will improve  Outcome: PROGRESSING AS EXPECTED      Problem: Pain Management  Goal: Pain level will decrease to patient's comfort goal  Outcome: PROGRESSING AS EXPECTED

## 2017-09-04 NOTE — PROGRESS NOTES
IV was not patent; removed by SUYAPA Graham. Multiple attempts by nursing staff made to insert new IV, with use of vein finder and ultrasound machine, but unable to obtain. MD aware that there is no IV access. Pt administered morphine injection today prior to wound vac dressing change. Only IV meds pt receives are IV pain meds for wound vac dressing changes.

## 2017-09-04 NOTE — PROGRESS NOTES
[facility]    Date of Service: 9/1/17    Chief Complaint    52 y.o. male hx of PVD s/p L TMA and Rt Bka > 5 years ago  admitted 7/22/2017 with right BKA stump pain and wound drainage in addition to left TMA stump wound (nonhealing) over several months.  s/p Debridement and gastroscoleus resection LLE on 7-25-17 with Dr Shirley. Cultures have grown MRSA / Pseudomonas. He is being treated with IV cefepime and PO doxycycline per ID recommendations. These are to be continued until 08/25/2017. Patient has medical. Difficult disposition. SW continuing to look into placement options at this time. Continuing therapy inpatient at this point in time. Also underwent R BKA revision with Dr Shirley 08/15/2017    Interval Problem Update  Chronic pain-no appreciable changes today. Tolerating PO regimen without issue.    Wounds-getting ready for his wound vac change. No other issues or acute events to report today.     Consultants/Specialty  Limb preservation service  Infectious diseases - Last seen by Dr Pino  Orthopedic surgery - Dr Shirley  Psychiatry - Dr Trujillo    Disposition  Difficult disposition. Remains inpatient to continue therapy. SW working on disposition. Discussed on rounds with SW. Continue looking into placement option. No new options today again.       Review of Systems   Constitutional: Negative for fever.   Eyes:        Vision loss rt eye / Evisceration. Trauma history. No change today   Respiratory: Negative for cough.    Cardiovascular: Negative for palpitations.   Gastrointestinal: Negative for abdominal pain.   Genitourinary: Negative for dysuria.   Musculoskeletal: Positive for joint pain (chronics).        R BKA  Mostly during wound vac/dressing changes   Skin: Negative for itching.   Neurological: Negative for dizziness and headaches.        Chronic peripheral neuropathy .   Psychiatric/Behavioral: Negative for suicidal ideas. Nervous/anxious: improved.       Physical Exam   Laboratory/Imaging   Hemodynamics  Temp (24hrs), Av.6 °C (97.8 °F), Min:36.4 °C (97.5 °F), Max:36.7 °C (98 °F)   Temperature: 36.6 °C (97.8 °F)  Pulse  Av  Min: 61  Max: 111    Blood Pressure: (!) 90/55 (R.N. notified)      Respiratory      Respiration: 16, Pulse Oximetry: 94 %        RUL Breath Sounds: Clear, RML Breath Sounds: Diminished, RLL Breath Sounds: Diminished, NANCY Breath Sounds: Clear, LLL Breath Sounds: Diminished    Fluids    Intake/Output Summary (Last 24 hours) at 17 1407  Last data filed at 17 2100   Gross per 24 hour   Intake              600 ml   Output             1050 ml   Net             -450 ml       Nutrition  Orders Placed This Encounter   Procedures   • DIET ORDER     Standing Status:   Standing     Number of Occurrences:   1     Order Specific Question:   Diet:     Answer:   Regular [1]     Comments:   regular trays     Physical Exam   Constitutional: He is oriented to person, place, and time. He appears well-developed and well-nourished.   Calm today   HENT:   Right Ear: External ear normal.   Left Ear: External ear normal.   Mouth/Throat: Oropharynx is clear and moist.   Eyes: EOM are normal. No scleral icterus.   Rt eye vision loss, enucleated. No changes today   Neck: Normal range of motion. Neck supple. No JVD present.   Cardiovascular: Normal rate, regular rhythm and normal heart sounds.    No murmur heard.  Pulmonary/Chest: Effort normal.   Abdominal: Soft. Bowel sounds are normal. There is no tenderness.   Musculoskeletal: He exhibits tenderness and deformity.   Rt leg BKA - incision dry, remains with no dehiscence, unchanged, no issues to report    Left leg w boot wound vac present, scant drainage in the vacuum, remains the same   Neurological: He is alert and oriented to person, place, and time. Coordination (Unchanged) abnormal.   Skin: Skin is warm and dry. There is erythema.   End of stump on R appears chaffed, wound margins healed. About the same today,  wound vac in place   Psychiatric: He has a normal mood and affect. His behavior is normal.                                Assessment/Plan     * Skin ulcer of left foot with fat layer exposed (CMS-HCC)- (present on admission)   Assessment & Plan    S/p OR debridement and gastroscoleus resection 7/25   Continue wound vac care  w changes three times/wk, slowly healing, remains afebrile  Contact isolation for MRSA, finished zyvox for VRE as well on 9/3/17  TDWB LLE, WBAT RLE per Ortho recs.  Patient reportedly has restraining orders from much of the Skilled facilites in [place]  Difficult discharge, remained so  Cont to monitor         Pseudomonas infection- (present on admission)   Assessment & Plan    Left foot infection  -Status post IV cefepime which finished on August 25, 2017        MRSA (methicillin resistant staph aureus) culture positive- (present on admission)   Assessment & Plan    Status post doxycycline, finished on August 25, 2017, infectious disease is following        Anxiety- (present on admission)   Assessment & Plan    Depakote, continue Klonopin, psychiatry is following  -tolerating somewhat well and adherent to regimen        Diarrhea- (present on admission)   Assessment & Plan    Resolved        ABRIL (acute kidney injury) (CMS-HCC)   Assessment & Plan    resolved        Opiate dependence (CMS-HCC)- (present on admission)   Assessment & Plan    Noted Drug seeking behavior inpatient-remains adequately controlled at this time  Avoid escalating  IV narcotics with no no signs for pain- use primarily for dressing / vac changes .  Patient's pain has been managed by outpatient pain management in [place]  -Continue methadone at 20 mg 3 times daily, no adjustments necessary today  -try to get a PIV going for wound dressing change tomorrow        Wound of right leg, at BKA site- (present on admission)   Assessment & Plan    Culture: VRE  ID following  Wound care  Surgery on  -On Zyvox which is supposed to finish  on September 3  -removed PICC line        Tobacco abuse- (present on admission)   Assessment & Plan    Nicotine replacement PRN  Cessation counseling        Peripheral neuropathy- (present on admission)   Assessment & Plan    Lyrica to cont            Reviewed items::  Labs reviewed and Medications reviewed  Rolon catheter::  No Rolon  DVT prophylaxis pharmacological::  Enoxaparin (Lovenox)

## 2017-09-04 NOTE — DIETARY
Nutrition Services: Update  Per wound team note on 9/1 - Open surgical wound left plantar foot. Exposed structure(s): Bone palpated. Measurements: (cm) (Taken 08/30/17) - Length: 4.7, Width: 3.5, Depth: 0.2    Per wound team note on 8/28 - Measurements: (cm) (Taken 08/23/17) - Length: 5.0, Width: 4.0, Depth: 0.3     Wound improving per wound team documentation of measurements, decreasing in size. Pt is on a regular diet and receiving snacks TID. Per chart pt PO %. Pt is eating well. No new wt since 7/23 - 65.2 kg via stand up scale. Discussed obtaining new wt with RN.     Pertinent Labs 8/29: Reviewed.   Pertinent Meds: klonopin, lovenox, ferrous sulfate, dolophine, lyrica, roxicodone (prn)  Fluids: No IV fluids noted in MAR  GI: Last BM 8/31    PLAN/RECOMMEND -   1) Nutrition rep to see patient daily for meal and snack preferences.  2) Encourage PO  3) Obtain new weight. Weekly weights to monitor fluid and nutrition status  4) Obtain supplement order per RD as needed.  5) Vitamin therapy to aide in  wound healing: Multivitamin with minerals daily and 500 mg Vitamin C BID for 14 days    RD available prn

## 2017-09-04 NOTE — CARE PLAN
Problem: Knowledge Deficit  Goal: Knowledge of disease process/condition, treatment plan, diagnostic tests, and medications will improve  Outcome: PROGRESSING SLOWER THAN EXPECTED  Pt refuses his IV antibiotics, per night shift report. Provided education on the importance of antibiotics in the treatment for his infection.     Problem: Psychosocial Needs:  Goal: Level of anxiety will decrease  Outcome: PROGRESSING SLOWER THAN EXPECTED  Pt appears to be very anxious about not having IV access for his IV pain meds during his wound vac changes. Assured him we can provide oral or IM injections, but pt still seems anxious.

## 2017-09-04 NOTE — PROGRESS NOTES
Pt resting in bed. VSS. OOB to wheelchair during first 5 hours of shift. Able to get in back without assistance. Prn medication controlling pain and no complaints of nausea. Wound vac in place. Will cont to monitor

## 2017-09-04 NOTE — PROGRESS NOTES
Unable to obtain updated weight for pt; bed scale does not work and pt can not bear weight for stand up scale.

## 2017-09-04 NOTE — PROGRESS NOTES
"Pt up in chair for breakfast. Pt reports his IV \"isn't working\". Attempted to flush it and doesn't flush. Notified charge nurse to attempt as he is a hard stick. Pt on RA. Safety and fall precautions in place, call light and belongings within reach.   "

## 2017-09-05 PROBLEM — D75.839 THROMBOCYTOSIS: Status: ACTIVE | Noted: 2017-09-05

## 2017-09-05 LAB
ANION GAP SERPL CALC-SCNC: 9 MMOL/L (ref 0–11.9)
BASOPHILS # BLD AUTO: 0.5 % (ref 0–1.8)
BASOPHILS # BLD: 0.04 K/UL (ref 0–0.12)
BUN SERPL-MCNC: 22 MG/DL (ref 8–22)
CALCIUM SERPL-MCNC: 9.2 MG/DL (ref 8.5–10.5)
CHLORIDE SERPL-SCNC: 104 MMOL/L (ref 96–112)
CO2 SERPL-SCNC: 25 MMOL/L (ref 20–33)
CREAT SERPL-MCNC: 0.76 MG/DL (ref 0.5–1.4)
EOSINOPHIL # BLD AUTO: 0.31 K/UL (ref 0–0.51)
EOSINOPHIL NFR BLD: 4.2 % (ref 0–6.9)
ERYTHROCYTE [DISTWIDTH] IN BLOOD BY AUTOMATED COUNT: 50.1 FL (ref 35.9–50)
GFR SERPL CREATININE-BSD FRML MDRD: >60 ML/MIN/1.73 M 2
GLUCOSE SERPL-MCNC: 109 MG/DL (ref 65–99)
HCT VFR BLD AUTO: 36 % (ref 42–52)
HGB BLD-MCNC: 11.2 G/DL (ref 14–18)
IMM GRANULOCYTES # BLD AUTO: 0.04 K/UL (ref 0–0.11)
IMM GRANULOCYTES NFR BLD AUTO: 0.5 % (ref 0–0.9)
LYMPHOCYTES # BLD AUTO: 2.81 K/UL (ref 1–4.8)
LYMPHOCYTES NFR BLD: 38.4 % (ref 22–41)
MCH RBC QN AUTO: 25.1 PG (ref 27–33)
MCHC RBC AUTO-ENTMCNC: 31.1 G/DL (ref 33.7–35.3)
MCV RBC AUTO: 80.5 FL (ref 81.4–97.8)
MONOCYTES # BLD AUTO: 0.53 K/UL (ref 0–0.85)
MONOCYTES NFR BLD AUTO: 7.3 % (ref 0–13.4)
NEUTROPHILS # BLD AUTO: 3.58 K/UL (ref 1.82–7.42)
NEUTROPHILS NFR BLD: 49.1 % (ref 44–72)
NRBC # BLD AUTO: 0 K/UL
NRBC BLD AUTO-RTO: 0 /100 WBC
PLATELET # BLD AUTO: 330 K/UL (ref 164–446)
PMV BLD AUTO: 9.3 FL (ref 9–12.9)
POTASSIUM SERPL-SCNC: 4.2 MMOL/L (ref 3.6–5.5)
RBC # BLD AUTO: 4.47 M/UL (ref 4.7–6.1)
SODIUM SERPL-SCNC: 138 MMOL/L (ref 135–145)
WBC # BLD AUTO: 7.3 K/UL (ref 4.8–10.8)

## 2017-09-05 PROCEDURE — 700111 HCHG RX REV CODE 636 W/ 250 OVERRIDE (IP): Performed by: INTERNAL MEDICINE

## 2017-09-05 PROCEDURE — 80048 BASIC METABOLIC PNL TOTAL CA: CPT

## 2017-09-05 PROCEDURE — 36415 COLL VENOUS BLD VENIPUNCTURE: CPT

## 2017-09-05 PROCEDURE — 85025 COMPLETE CBC W/AUTO DIFF WBC: CPT

## 2017-09-05 PROCEDURE — A9270 NON-COVERED ITEM OR SERVICE: HCPCS | Performed by: INTERNAL MEDICINE

## 2017-09-05 PROCEDURE — 700101 HCHG RX REV CODE 250: Performed by: INTERNAL MEDICINE

## 2017-09-05 PROCEDURE — 99233 SBSQ HOSP IP/OBS HIGH 50: CPT | Performed by: FAMILY MEDICINE

## 2017-09-05 PROCEDURE — 700102 HCHG RX REV CODE 250 W/ 637 OVERRIDE(OP): Performed by: INTERNAL MEDICINE

## 2017-09-05 PROCEDURE — 700111 HCHG RX REV CODE 636 W/ 250 OVERRIDE (IP): Performed by: ORTHOPAEDIC SURGERY

## 2017-09-05 PROCEDURE — 770021 HCHG ROOM/CARE - ISO PRIVATE

## 2017-09-05 RX ORDER — MORPHINE SULFATE 4 MG/ML
2 INJECTION, SOLUTION INTRAMUSCULAR; INTRAVENOUS ONCE
Status: COMPLETED | OUTPATIENT
Start: 2017-09-05 | End: 2017-09-05

## 2017-09-05 RX ADMIN — LOPERAMIDE HYDROCHLORIDE 2 MG: 2 CAPSULE ORAL at 22:00

## 2017-09-05 RX ADMIN — CLONAZEPAM 2 MG: 1 TABLET ORAL at 20:07

## 2017-09-05 RX ADMIN — OXYCODONE HYDROCHLORIDE 10 MG: 10 TABLET ORAL at 06:12

## 2017-09-05 RX ADMIN — METHADONE HYDROCHLORIDE 20 MG: 10 TABLET ORAL at 22:00

## 2017-09-05 RX ADMIN — CLONAZEPAM 2 MG: 1 TABLET ORAL at 08:26

## 2017-09-05 RX ADMIN — OXYCODONE HYDROCHLORIDE 10 MG: 10 TABLET ORAL at 20:07

## 2017-09-05 RX ADMIN — METHADONE HYDROCHLORIDE 20 MG: 10 TABLET ORAL at 07:30

## 2017-09-05 RX ADMIN — PREGABALIN 150 MG: 150 CAPSULE ORAL at 08:25

## 2017-09-05 RX ADMIN — OXYCODONE HYDROCHLORIDE 10 MG: 10 TABLET ORAL at 02:01

## 2017-09-05 RX ADMIN — MORPHINE SULFATE 2 MG: 4 INJECTION INTRAVENOUS at 17:00

## 2017-09-05 RX ADMIN — PREGABALIN 150 MG: 150 CAPSULE ORAL at 15:37

## 2017-09-05 RX ADMIN — ACETAMINOPHEN 650 MG: 325 TABLET, FILM COATED ORAL at 02:00

## 2017-09-05 RX ADMIN — ENOXAPARIN SODIUM 40 MG: 100 INJECTION SUBCUTANEOUS at 08:26

## 2017-09-05 RX ADMIN — PREGABALIN 150 MG: 150 CAPSULE ORAL at 20:07

## 2017-09-05 RX ADMIN — ACETAMINOPHEN 650 MG: 325 TABLET, FILM COATED ORAL at 18:02

## 2017-09-05 RX ADMIN — LIDOCAINE 1 APPLICATION: 40 CREAM TOPICAL at 21:09

## 2017-09-05 RX ADMIN — LIDOCAINE 1 PATCH: 50 PATCH CUTANEOUS at 15:31

## 2017-09-05 RX ADMIN — METHADONE HYDROCHLORIDE 20 MG: 10 TABLET ORAL at 15:38

## 2017-09-05 RX ADMIN — ACETAMINOPHEN 650 MG: 325 TABLET, FILM COATED ORAL at 06:12

## 2017-09-05 RX ADMIN — Medication 325 MG: at 08:26

## 2017-09-05 RX ADMIN — LOPERAMIDE HYDROCHLORIDE 2 MG: 2 CAPSULE ORAL at 15:38

## 2017-09-05 RX ADMIN — OXYCODONE HYDROCHLORIDE 10 MG: 10 TABLET ORAL at 15:30

## 2017-09-05 RX ADMIN — Medication 325 MG: at 18:02

## 2017-09-05 ASSESSMENT — ENCOUNTER SYMPTOMS
SORE THROAT: 0
VOMITING: 0
SHORTNESS OF BREATH: 0
HEARTBURN: 0
BLURRED VISION: 0
FEVER: 0
COUGH: 0
HEADACHES: 0
ABDOMINAL PAIN: 0
NAUSEA: 0
DIZZINESS: 0
WHEEZING: 0
DIARRHEA: 0
CHILLS: 0

## 2017-09-05 ASSESSMENT — PAIN SCALES - GENERAL
PAINLEVEL_OUTOF10: 6
PAINLEVEL_OUTOF10: 10
PAINLEVEL_OUTOF10: 0
PAINLEVEL_OUTOF10: 5
PAINLEVEL_OUTOF10: 8

## 2017-09-05 ASSESSMENT — LIFESTYLE VARIABLES: DO YOU DRINK ALCOHOL: NO

## 2017-09-05 NOTE — PROGRESS NOTES
Order for  [facility] to fit pt for stump  and supplies has been submitted to [facility].

## 2017-09-05 NOTE — WOUND TEAM
[facility]  Inpatient Services  Wound and Skin Care Treatment Note    Admission Date:  07/22/17     HPI, PMH, SH: Reviewed  Unit where seen by Wound Team:  T3    WOUND CONSULT RELATED TO:   Scheduled NPWT dressing change left plantar foot      SUBJECTIVE:  Pleasant/agreeable    Self Report / Pain Level:  Tolerated well    OBJECTIVE:    Previous NPWT dressing intact    WOUND TYPE, LOCATION, CHARACTERISTICS (Pressure ulcers: location, stage, POA or date identified)    Location/type of wound: Open surgical wound left plantar foot      Periwound:     macerated around wound and in between remaining amputation site of pervious digit; callous-like tissue      Drainage:     scant serosanguinous    Tissue Type and %:    100% red/pink  Wound Edges:    Attached/macerated    Odor:     none  Exposed structure(s):  Bone palpated    S&S of Infection:     none      Measurements: (cm)  (Taken 08/30/17)   Length:    4.7  Width:     3.5  Depth:     0.2      INTERVENTIONS BY WOUND TEAM:  Removed prev dressing and irrigated wound with wound cleanser.  Removed small amt of bull-wnd callous. Benzoin and drape to bull wound skin as needed. Small piece of paste ring used to seal fissure on lateral side.  Covered wound with 1 piece black foam with a bridge and button to dorsum of foot. Secured with drape and applied cont neg pressure at 125mmhg. Placed a non-adhesive foam under the forefoot to hopefully absorb any moisture. Total black foam=3 pieces.    Interdisciplinary consultation:   Staff RN, patient    EVALUATION:  wnd stable; no significant changes    Factors affecting wound healing:  Neuropathy, tobacco abuse     Goals:  Decrease in wound size by 5% every 3 weeks -- no maceration    NURSING PLAN OF CARE ORDERS (x):    Dressing changes: See Dressing Maintenance orders:  x  Skin care: See Skin Care orders: x  Rectal tube care: See Rectal Tube Care orders:   Other orders:    WOUND TEAM PLAN OF CARE (x):   NPWT change 3 x  week:   x     Dressing changes by wound team:       Follow up as needed:     x  Other (explain):    Anticipated discharge plans (x):  SNF:           Home Care:           Outpatient Wound Center:   x      Self Care:            Other:

## 2017-09-05 NOTE — PROGRESS NOTES
Pt resting in bed. VSS. Prn medication controlling pain and no complaints of nausea. Wound vac in place. Will cont to monitor

## 2017-09-05 NOTE — PROGRESS NOTES
"Pt states Zyvox \"gives me diarrhea 11 times a day, that's why I don't want it\". Pt states he will be compliant with another antibiotic. Pt refuses Zyvox for this reason.   "

## 2017-09-05 NOTE — PROGRESS NOTES
"Ortho Prog Note    7/25/17 s/p L foot I&D, GSR, vac placement  8/15/17 RIGHT BKA revision    Seems overmedicated this morning, arousable but falls asleep almost immediately    /70   Pulse 78   Temp 36.8 °C (98.3 °F)   Resp 17   Ht 1.854 m (6' 1\")   Wt 65.2 kg (143 lb 11.8 oz)   SpO2 94%   BMI 18.96 kg/m²       LLE: Dressing CDI, vac holding suction. Foot WWP  RLE: Incision healed, sutures in place.  No erythema or dehiscence.          LLE may need biologic and/or repeat operative I&D  TDWB LLE  Continue vac changes and offloading    RLE healed  NWB RLE -  sock per orthotist          Chavez Shirley MD      "

## 2017-09-05 NOTE — DISCHARGE PLANNING
[facility] received notification from the following facilities.     [facility] - The referral has been denied. No opne beds     [facility] - The referral has been denied No open Beds.

## 2017-09-05 NOTE — PROGRESS NOTES
[facility]ist Progress Note    Date of Service: 2017    Chief Complaint  52 y.o. male admitted 2017 with Right BKA and left transmetatarsal amputation stump infections    Interval Problem Update  TM stump - surgery done, cult showed MRSA  BKA stump - surgery done, cult showed VRE and Pseudomonas    Consultants/Specialty  ID - Yenny  Ortho - Fern    Disposition  SNF        Review of Systems   Constitutional: Negative for chills and fever.   HENT: Negative for sore throat.    Eyes: Negative for blurred vision.   Respiratory: Negative for cough, shortness of breath and wheezing.    Cardiovascular: Positive for leg swelling. Negative for chest pain.   Gastrointestinal: Negative for abdominal pain, diarrhea, heartburn, nausea and vomiting.   Genitourinary: Negative for dysuria.   Neurological: Negative for dizziness and headaches.      Physical Exam  Laboratory/Imaging   Hemodynamics  Temp (24hrs), Av.7 °C (98 °F), Min:36.4 °C (97.6 °F), Max:36.8 °C (98.3 °F)   Temperature: 36.6 °C (97.9 °F)  Pulse  Av.8  Min: 61  Max: 111    Blood Pressure: 109/75      Respiratory      Respiration: 17, Pulse Oximetry: 95 %        RUL Breath Sounds: Clear, RML Breath Sounds: Diminished, RLL Breath Sounds: Diminished, NANCY Breath Sounds: Clear, LLL Breath Sounds: Diminished    Fluids    Intake/Output Summary (Last 24 hours) at 17 1632  Last data filed at 17 0400   Gross per 24 hour   Intake              780 ml   Output             1150 ml   Net             -370 ml       Nutrition  Orders Placed This Encounter   Procedures   • DIET ORDER     Standing Status:   Standing     Number of Occurrences:   1     Order Specific Question:   Diet:     Answer:   Regular [1]     Comments:   regular trays     Physical Exam   Constitutional: He is oriented to person, place, and time. He appears well-developed and well-nourished.   HENT:   Head: Normocephalic and atraumatic.   Eyes: Conjunctivae are normal. Pupils are  equal, round, and reactive to light.   Neck: No tracheal deviation present. No thyromegaly present.   Cardiovascular: Normal rate and regular rhythm.    Pulmonary/Chest: Effort normal and breath sounds normal.   Abdominal: Soft. Bowel sounds are normal. He exhibits no distension. There is no tenderness.   Musculoskeletal: He exhibits edema.   R BKA, L transmetatarsal amputation   Lymphadenopathy:     He has no cervical adenopathy.   Neurological: He is alert and oriented to person, place, and time.   Nursing note and vitals reviewed.      Recent Labs      09/05/17 0229   WBC  7.3   RBC  4.47*   HEMOGLOBIN  11.2*   HEMATOCRIT  36.0*   MCV  80.5*   MCH  25.1*   MCHC  31.1*   RDW  50.1*   PLATELETCT  330   MPV  9.3     Recent Labs      09/05/17 0229   SODIUM  138   POTASSIUM  4.2   CHLORIDE  104   CO2  25   GLUCOSE  109*   BUN  22   CREATININE  0.76   CALCIUM  9.2                      Assessment/Plan     * Skin ulcer of left foot with fat layer exposed (CMS-HCC)- (present on admission)   Assessment & Plan    I and D of left foot with gastroscoleus resection and application of wound vac 7/25           Pseudomonas infection- (present on admission)   Assessment & Plan    finished Cefepime 8/25        MRSA (methicillin resistant staph aureus) culture positive- (present on admission)   Assessment & Plan    Finished course of doxycycline 8/25        ABRIL (acute kidney injury) (CMS-HCC)   Assessment & Plan    resolved        Thrombocytosis (CMS-HCC)- (present on admission)   Assessment & Plan    Follow cbc        Anxiety- (present on admission)   Assessment & Plan    Depakote, Klonopin        Diarrhea- (present on admission)   Assessment & Plan    Resolved        Opiate dependence (CMS-HCC)- (present on admission)   Assessment & Plan    Methadone  Issues with drug-seeking        Wound of right leg, at BKA site- (present on admission)   Assessment & Plan    s/p BKA revision        Tobacco abuse- (present on admission)    Assessment & Plan    Nicotine replacement         Peripheral neuropathy- (present on admission)   Assessment & Plan    Lyrica            Reviewed items::  Radiology images reviewed, Labs reviewed and Medications reviewed  Rolon catheter::  No Rolon  DVT prophylaxis pharmacological::  Enoxaparin (Lovenox)  Ulcer Prophylaxis::  No

## 2017-09-05 NOTE — CARE PLAN
Problem: Safety  Goal: Will remain free from falls  Outcome: PROGRESSING AS EXPECTED      Problem: Pain Management  Goal: Pain level will decrease to patient's comfort goal  Outcome: NOT MET

## 2017-09-06 PROBLEM — Z91.199 NONCOMPLIANCE: Status: ACTIVE | Noted: 2017-09-06

## 2017-09-06 PROCEDURE — A9270 NON-COVERED ITEM OR SERVICE: HCPCS | Performed by: INTERNAL MEDICINE

## 2017-09-06 PROCEDURE — 700102 HCHG RX REV CODE 250 W/ 637 OVERRIDE(OP): Performed by: INTERNAL MEDICINE

## 2017-09-06 PROCEDURE — 700101 HCHG RX REV CODE 250: Performed by: INTERNAL MEDICINE

## 2017-09-06 PROCEDURE — 99233 SBSQ HOSP IP/OBS HIGH 50: CPT | Performed by: FAMILY MEDICINE

## 2017-09-06 PROCEDURE — 700111 HCHG RX REV CODE 636 W/ 250 OVERRIDE (IP): Performed by: INTERNAL MEDICINE

## 2017-09-06 PROCEDURE — 770021 HCHG ROOM/CARE - ISO PRIVATE

## 2017-09-06 PROCEDURE — 97605 NEG PRS WND THER DME<=50SQCM: CPT

## 2017-09-06 RX ADMIN — CLONAZEPAM 2 MG: 1 TABLET ORAL at 08:27

## 2017-09-06 RX ADMIN — ACETAMINOPHEN 650 MG: 325 TABLET, FILM COATED ORAL at 06:06

## 2017-09-06 RX ADMIN — PREGABALIN 150 MG: 150 CAPSULE ORAL at 08:27

## 2017-09-06 RX ADMIN — Medication 325 MG: at 17:15

## 2017-09-06 RX ADMIN — ONDANSETRON 4 MG: 4 TABLET, ORALLY DISINTEGRATING ORAL at 17:16

## 2017-09-06 RX ADMIN — PREGABALIN 150 MG: 150 CAPSULE ORAL at 21:36

## 2017-09-06 RX ADMIN — LIDOCAINE 1 APPLICATION: 40 CREAM TOPICAL at 17:16

## 2017-09-06 RX ADMIN — METHADONE HYDROCHLORIDE 20 MG: 10 TABLET ORAL at 21:53

## 2017-09-06 RX ADMIN — Medication 325 MG: at 08:27

## 2017-09-06 RX ADMIN — OXYCODONE HYDROCHLORIDE 10 MG: 10 TABLET ORAL at 06:45

## 2017-09-06 RX ADMIN — METHADONE HYDROCHLORIDE 20 MG: 10 TABLET ORAL at 14:11

## 2017-09-06 RX ADMIN — OXYCODONE HYDROCHLORIDE 10 MG: 10 TABLET ORAL at 17:15

## 2017-09-06 RX ADMIN — OXYCODONE HYDROCHLORIDE 10 MG: 10 TABLET ORAL at 02:43

## 2017-09-06 RX ADMIN — LIDOCAINE 1 PATCH: 50 PATCH CUTANEOUS at 11:08

## 2017-09-06 RX ADMIN — METHADONE HYDROCHLORIDE 20 MG: 10 TABLET ORAL at 06:06

## 2017-09-06 RX ADMIN — ENOXAPARIN SODIUM 40 MG: 100 INJECTION SUBCUTANEOUS at 08:27

## 2017-09-06 RX ADMIN — CLONAZEPAM 2 MG: 1 TABLET ORAL at 21:36

## 2017-09-06 RX ADMIN — MORPHINE SULFATE 2 MG: 4 INJECTION INTRAVENOUS at 14:12

## 2017-09-06 RX ADMIN — OXYCODONE HYDROCHLORIDE 10 MG: 10 TABLET ORAL at 11:08

## 2017-09-06 RX ADMIN — PREGABALIN 150 MG: 150 CAPSULE ORAL at 14:11

## 2017-09-06 ASSESSMENT — ENCOUNTER SYMPTOMS
ABDOMINAL PAIN: 0
HEADACHES: 0
SORE THROAT: 0
SHORTNESS OF BREATH: 0
DIARRHEA: 0
COUGH: 0
BLURRED VISION: 0
HEARTBURN: 0
WHEEZING: 0
DIZZINESS: 0
CHILLS: 0
NAUSEA: 0
VOMITING: 0
FEVER: 0

## 2017-09-06 ASSESSMENT — LIFESTYLE VARIABLES
DO YOU DRINK ALCOHOL: NO
DO YOU DRINK ALCOHOL: NO

## 2017-09-06 ASSESSMENT — PAIN SCALES - GENERAL
PAINLEVEL_OUTOF10: 8
PAINLEVEL_OUTOF10: 5
PAINLEVEL_OUTOF10: 7
PAINLEVEL_OUTOF10: 8
PAINLEVEL_OUTOF10: 8
PAINLEVEL_OUTOF10: 7
PAINLEVEL_OUTOF10: 8
PAINLEVEL_OUTOF10: 3

## 2017-09-06 NOTE — CARE PLAN
Problem: Skin Integrity  Goal: Risk for impaired skin integrity will decrease  Outcome: PROGRESSING AS EXPECTED  Pt able to repositions and turn self side to side. Skin assessed at beginning of shift.     Problem: Pain Management  Goal: Pain level will decrease to patient's comfort goal  Outcome: PROGRESSING AS EXPECTED  Pain relieved with scheduled and PRN medications per MAR.

## 2017-09-06 NOTE — DISCHARGE PLANNING
Spoke to Jose Miguel at [facility], she suggested I contact [facility], [facility], and [facility]. Spoke to [facility], they state they did not receive referral. [facility] will refax to the 4 facilities listed above.

## 2017-09-06 NOTE — DISCHARGE PLANNING
Per the request of the [facility] the referral has been sent to [facility] and [facility].

## 2017-09-06 NOTE — CARE PLAN
Problem: Knowledge Deficit  Goal: Knowledge of the prescribed therapeutic regimen will improve  Went over POC.

## 2017-09-06 NOTE — PROGRESS NOTES
[facility]ist Progress Note    Date of Service: 2017    Chief Complaint  52 y.o. male admitted 2017 with Right BKA and left transmetatarsal amputation stump infections    Interval Problem Update  TM stump - surgery done, cult showed MRSA  BKA stump - surgery done, cult showed VRE and Pseudomonas, did not finish course of Zyvox (1 week left)    Consultants/Specialty  ID - Yenny  Ortho - [facility]    Disposition  SNF        Review of Systems   Constitutional: Negative for chills and fever.   HENT: Negative for sore throat.    Eyes: Negative for blurred vision.   Respiratory: Negative for cough, shortness of breath and wheezing.    Cardiovascular: Positive for leg swelling. Negative for chest pain.   Gastrointestinal: Negative for abdominal pain, diarrhea, heartburn, nausea and vomiting.   Genitourinary: Negative for dysuria.   Neurological: Negative for dizziness and headaches.      Physical Exam  Laboratory/Imaging   Hemodynamics  Temp (24hrs), Av.6 °C (97.8 °F), Min:36.4 °C (97.5 °F), Max:36.8 °C (98.2 °F)   Temperature: 36.4 °C (97.5 °F)  Pulse  Av.7  Min: 61  Max: 111    Blood Pressure: 107/57      Respiratory      Respiration: 17, Pulse Oximetry: 91 %        RUL Breath Sounds: Clear;Diminished, RML Breath Sounds: Diminished, RLL Breath Sounds: Diminished, NANCY Breath Sounds: Clear;Diminished, LLL Breath Sounds: Diminished    Fluids    Intake/Output Summary (Last 24 hours) at 17 1403  Last data filed at 17 0400   Gross per 24 hour   Intake              300 ml   Output             1300 ml   Net            -1000 ml       Nutrition  Orders Placed This Encounter   Procedures   • DIET ORDER     Standing Status:   Standing     Number of Occurrences:   1     Order Specific Question:   Diet:     Answer:   Regular [1]     Comments:   regular trays     Physical Exam   Constitutional: He is oriented to person, place, and time. He appears well-developed and well-nourished.   HENT:   Head:  Normocephalic and atraumatic.   Eyes: Conjunctivae are normal. Pupils are equal, round, and reactive to light.   Neck: No tracheal deviation present. No thyromegaly present.   Cardiovascular: Normal rate and regular rhythm.    Pulmonary/Chest: Effort normal and breath sounds normal.   Abdominal: Soft. Bowel sounds are normal. He exhibits no distension. There is no tenderness.   Musculoskeletal: He exhibits edema.   R BKA, L transmetatarsal amputation   Lymphadenopathy:     He has no cervical adenopathy.   Neurological: He is alert and oriented to person, place, and time.   Nursing note and vitals reviewed.      Recent Labs      09/05/17 0229   WBC  7.3   RBC  4.47*   HEMOGLOBIN  11.2*   HEMATOCRIT  36.0*   MCV  80.5*   MCH  25.1*   MCHC  31.1*   RDW  50.1*   PLATELETCT  330   MPV  9.3     Recent Labs      09/05/17 0229   SODIUM  138   POTASSIUM  4.2   CHLORIDE  104   CO2  25   GLUCOSE  109*   BUN  22   CREATININE  0.76   CALCIUM  9.2                      Assessment/Plan     * Skin ulcer of left foot with fat layer exposed (CMS-HCC)- (present on admission)   Assessment & Plan    I and D of left foot with gastroscoleus resection and application of wound vac 7/25           Noncompliance- (present on admission)   Assessment & Plan    Counseling done on risks and complications        Pseudomonas infection- (present on admission)   Assessment & Plan    finished Cefepime 8/25        MRSA (methicillin resistant staph aureus) culture positive- (present on admission)   Assessment & Plan    Finished course of doxycycline 8/25        ABRIL (acute kidney injury) (CMS-HCC)   Assessment & Plan    resolved        Thrombocytosis (CMS-HCC)- (present on admission)   Assessment & Plan    Follow cbc        Anxiety- (present on admission)   Assessment & Plan    Depakote, Klonopin        Diarrhea- (present on admission)   Assessment & Plan    Resolved        Opiate dependence (CMS-HCC)- (present on admission)   Assessment & Plan     Methadone  Issues with drug-seeking        Wound of right leg, at BKA site- (present on admission)   Assessment & Plan    s/p BKA revision        Tobacco abuse- (present on admission)   Assessment & Plan    Nicotine replacement         Peripheral neuropathy- (present on admission)   Assessment & Plan    Lyrica            Reviewed items::  Radiology images reviewed, Labs reviewed and Medications reviewed  Rolon catheter::  No Rolon  DVT prophylaxis pharmacological::  Enoxaparin (Lovenox)  Ulcer Prophylaxis::  No

## 2017-09-06 NOTE — DISCHARGE PLANNING
[facility] received notification from [facility] in [place], the referral has been denied. The facilities is at max capacity for wound vac's and they do not have a compatible room for the patient's isolation

## 2017-09-06 NOTE — DISCHARGE PLANNING
[facility] received a message from Jeanette at [facility]. The referral has been denied. Not explanation given

## 2017-09-07 PROCEDURE — A9270 NON-COVERED ITEM OR SERVICE: HCPCS | Performed by: INTERNAL MEDICINE

## 2017-09-07 PROCEDURE — 700111 HCHG RX REV CODE 636 W/ 250 OVERRIDE (IP): Performed by: INTERNAL MEDICINE

## 2017-09-07 PROCEDURE — 770021 HCHG ROOM/CARE - ISO PRIVATE

## 2017-09-07 PROCEDURE — 700102 HCHG RX REV CODE 250 W/ 637 OVERRIDE(OP): Performed by: INTERNAL MEDICINE

## 2017-09-07 PROCEDURE — 99232 SBSQ HOSP IP/OBS MODERATE 35: CPT | Performed by: FAMILY MEDICINE

## 2017-09-07 PROCEDURE — 700101 HCHG RX REV CODE 250: Performed by: INTERNAL MEDICINE

## 2017-09-07 RX ORDER — MORPHINE SULFATE 4 MG/ML
4 INJECTION, SOLUTION INTRAMUSCULAR; INTRAVENOUS
Status: DISCONTINUED | OUTPATIENT
Start: 2017-09-07 | End: 2017-09-07

## 2017-09-07 RX ORDER — MORPHINE SULFATE 4 MG/ML
4 INJECTION, SOLUTION INTRAMUSCULAR; INTRAVENOUS
Status: DISCONTINUED | OUTPATIENT
Start: 2017-09-07 | End: 2017-10-18

## 2017-09-07 RX ADMIN — CLONAZEPAM 2 MG: 1 TABLET ORAL at 21:10

## 2017-09-07 RX ADMIN — METHADONE HYDROCHLORIDE 20 MG: 10 TABLET ORAL at 05:50

## 2017-09-07 RX ADMIN — Medication 325 MG: at 08:16

## 2017-09-07 RX ADMIN — PREGABALIN 150 MG: 150 CAPSULE ORAL at 14:26

## 2017-09-07 RX ADMIN — ACETAMINOPHEN 650 MG: 325 TABLET, FILM COATED ORAL at 00:00

## 2017-09-07 RX ADMIN — LOPERAMIDE HYDROCHLORIDE 2 MG: 2 CAPSULE ORAL at 21:13

## 2017-09-07 RX ADMIN — ENOXAPARIN SODIUM 40 MG: 100 INJECTION SUBCUTANEOUS at 08:16

## 2017-09-07 RX ADMIN — METHADONE HYDROCHLORIDE 20 MG: 10 TABLET ORAL at 14:25

## 2017-09-07 RX ADMIN — OXYCODONE HYDROCHLORIDE 10 MG: 10 TABLET ORAL at 17:43

## 2017-09-07 RX ADMIN — Medication 325 MG: at 17:43

## 2017-09-07 RX ADMIN — ACETAMINOPHEN 650 MG: 325 TABLET, FILM COATED ORAL at 17:43

## 2017-09-07 RX ADMIN — ACETAMINOPHEN 650 MG: 325 TABLET, FILM COATED ORAL at 11:36

## 2017-09-07 RX ADMIN — ACETAMINOPHEN 650 MG: 325 TABLET, FILM COATED ORAL at 05:50

## 2017-09-07 RX ADMIN — PREGABALIN 150 MG: 150 CAPSULE ORAL at 08:16

## 2017-09-07 RX ADMIN — CLONAZEPAM 2 MG: 1 TABLET ORAL at 08:16

## 2017-09-07 RX ADMIN — LIDOCAINE 1 APPLICATION: 40 CREAM TOPICAL at 08:16

## 2017-09-07 RX ADMIN — METHADONE HYDROCHLORIDE 20 MG: 10 TABLET ORAL at 21:10

## 2017-09-07 RX ADMIN — OXYCODONE HYDROCHLORIDE 10 MG: 10 TABLET ORAL at 08:20

## 2017-09-07 RX ADMIN — LIDOCAINE 1 APPLICATION: 40 CREAM TOPICAL at 21:14

## 2017-09-07 RX ADMIN — OXYCODONE HYDROCHLORIDE 10 MG: 10 TABLET ORAL at 04:11

## 2017-09-07 RX ADMIN — LIDOCAINE 1 PATCH: 50 PATCH CUTANEOUS at 11:36

## 2017-09-07 RX ADMIN — PREGABALIN 150 MG: 150 CAPSULE ORAL at 21:10

## 2017-09-07 ASSESSMENT — ENCOUNTER SYMPTOMS
DIARRHEA: 0
SHORTNESS OF BREATH: 0
HEARTBURN: 0
SORE THROAT: 0
DIZZINESS: 0
NAUSEA: 0
FEVER: 0
BLURRED VISION: 0
VOMITING: 0
COUGH: 0
WHEEZING: 0
ABDOMINAL PAIN: 0
HEADACHES: 0
CHILLS: 0

## 2017-09-07 ASSESSMENT — PAIN SCALES - GENERAL
PAINLEVEL_OUTOF10: 6
PAINLEVEL_OUTOF10: 8
PAINLEVEL_OUTOF10: 6
PAINLEVEL_OUTOF10: 7
PAINLEVEL_OUTOF10: 8

## 2017-09-07 ASSESSMENT — LIFESTYLE VARIABLES: DO YOU DRINK ALCOHOL: NO

## 2017-09-07 NOTE — DISCHARGE PLANNING
"Received message from [facility], they are \"full\" unable to accept pt.  "

## 2017-09-07 NOTE — WOUND TEAM
[facility]  Inpatient Services  Wound and Skin Care Treatment Note    Admission Date:  07/22/17     HPI, PMH, SH: Reviewed  Unit where seen by Wound Team:  T3    WOUND CONSULT RELATED TO:   Scheduled NPWT dressing change left plantar foot      SUBJECTIVE:  Pleasant/agreeable    Self Report / Pain Level:  Tolerated well    OBJECTIVE:    Previous NPWT dressing intact    WOUND TYPE, LOCATION, CHARACTERISTICS (Pressure ulcers: location, stage, POA or date identified)    Location/type of wound: Open surgical wound left plantar foot      Periwound:     macerated around wound and in between remaining amputation site of pervious digit; callous-like tissue      Drainage:     scant serosanguinous    Tissue Type and %:    100% pale/pink  Wound Edges:    Attached/macerated    Odor:     none  Exposed structure(s):  Bone palpated    S&S of Infection:     none      Measurements: (cm)  (Taken 9/6/17)   Length:    4.6  Width:     3.0  Depth:     0.2      INTERVENTIONS BY WOUND TEAM:  Removed prev dressing and irrigated wound with wound cleanser.  Measurements and photograph done. Benzoin and drape to bull wound skin as needed. Small piece of paste ring used to seal fissure on lateral side.  Covered wound with 1 piece black foam with a bridge and button to dorsum of foot. Secured with drape and applied cont neg pressure at 125mmhg. Placed a non-adhesive foam under the forefoot to hopefully absorb any moisture. Total black foam=3 pieces.    Interdisciplinary consultation:   Staff RN, patient    EVALUATION:  wnd cont to slowly decrease in size; wnd bed more pale today    Factors affecting wound healing:  Neuropathy, tobacco abuse     Goals:  Decrease in wound size by 5% every 3 weeks -- no maceration    NURSING PLAN OF CARE ORDERS (x):    Dressing changes: See Dressing Maintenance orders:  x  Skin care: See Skin Care orders: x  Rectal tube care: See Rectal Tube Care orders:   Other orders:    WOUND TEAM PLAN OF CARE  (x):   NPWT change 3 x week:   x     Dressing changes by wound team:       Follow up as needed:     x  Other (explain):    Anticipated discharge plans (x):  SNF:           Home Care:           Outpatient Wound Center:   x      Self Care:            Other:

## 2017-09-07 NOTE — PROGRESS NOTES
Pt awake and alert, sitting up in bed, was very upset due to no IV, pt has wound vac dressing change today, pt is hard stick, needs ultrasounded guided IV, will work on to get IV, was asking for IM pain medication, informed Dr Beavers, no orders, will call ICU nurse to get an IV, call light within reach, hourly rounds in place, up self to WC without assistance.

## 2017-09-07 NOTE — PROGRESS NOTES
[facility] Hospitalist Progress Note    Date of Service: 2017    Chief Complaint  52 y.o. male admitted 2017 with Right BKA and left transmetatarsal amputation stump infections    Interval Problem Update  TM stump - surgery done, cult showed MRSA  BKA stump - surgery done, cult showed VRE and Pseudomonas, did not finish course of Zyvox (1 week left)  Asking for IV pain meds for dressing changes    Consultants/Specialty  ID - Yenny  Ortho - Fern    Disposition  SNF        Review of Systems   Constitutional: Negative for chills and fever.   HENT: Negative for sore throat.    Eyes: Negative for blurred vision.   Respiratory: Negative for cough, shortness of breath and wheezing.    Cardiovascular: Positive for leg swelling. Negative for chest pain.   Gastrointestinal: Negative for abdominal pain, diarrhea, heartburn, nausea and vomiting.   Genitourinary: Negative for dysuria.   Neurological: Negative for dizziness and headaches.      Physical Exam  Laboratory/Imaging   Hemodynamics  Temp (24hrs), Av.6 °C (97.8 °F), Min:36.1 °C (97 °F), Max:36.9 °C (98.5 °F)   Temperature: 36.2 °C (97.1 °F)  Pulse  Av.5  Min: 58  Max: 111    Blood Pressure: 104/64      Respiratory      Respiration: 18, Pulse Oximetry: 94 %        RUL Breath Sounds: Clear;Diminished, RML Breath Sounds: Diminished, RLL Breath Sounds: Diminished, NANCY Breath Sounds: Clear;Diminished, LLL Breath Sounds: Diminished    Fluids    Intake/Output Summary (Last 24 hours) at 17 1226  Last data filed at 17 0734   Gross per 24 hour   Intake                0 ml   Output              850 ml   Net             -850 ml       Nutrition  Orders Placed This Encounter   Procedures   • DIET ORDER     Standing Status:   Standing     Number of Occurrences:   1     Order Specific Question:   Diet:     Answer:   Regular [1]     Comments:   regular trays     Physical Exam   Constitutional: He is oriented to person, place, and time. He appears  well-developed and well-nourished.   HENT:   Head: Normocephalic and atraumatic.   Eyes: Conjunctivae are normal. Pupils are equal, round, and reactive to light.   Neck: No tracheal deviation present. No thyromegaly present.   Cardiovascular: Normal rate and regular rhythm.    Pulmonary/Chest: Effort normal and breath sounds normal.   Abdominal: Soft. Bowel sounds are normal. He exhibits no distension. There is no tenderness.   Musculoskeletal: He exhibits edema.   R BKA, L transmetatarsal amputation   Lymphadenopathy:     He has no cervical adenopathy.   Neurological: He is alert and oriented to person, place, and time.   Nursing note and vitals reviewed.      Recent Labs      09/05/17 0229   WBC  7.3   RBC  4.47*   HEMOGLOBIN  11.2*   HEMATOCRIT  36.0*   MCV  80.5*   MCH  25.1*   MCHC  31.1*   RDW  50.1*   PLATELETCT  330   MPV  9.3     Recent Labs      09/05/17 0229   SODIUM  138   POTASSIUM  4.2   CHLORIDE  104   CO2  25   GLUCOSE  109*   BUN  22   CREATININE  0.76   CALCIUM  9.2                      Assessment/Plan     * Skin ulcer of left foot with fat layer exposed (CMS-HCC)- (present on admission)   Assessment & Plan    I and D of left foot with gastroscoleus resection and application of wound vac 7/25           Noncompliance- (present on admission)   Assessment & Plan    Counseling done on risks and complications        Pseudomonas infection- (present on admission)   Assessment & Plan    finished Cefepime 8/25        MRSA (methicillin resistant staph aureus) culture positive- (present on admission)   Assessment & Plan    Finished course of Doxycycline 8/25        ABRIL (acute kidney injury) (CMS-HCC)   Assessment & Plan    resolved        Thrombocytosis (CMS-HCC)- (present on admission)   Assessment & Plan    Follow cbc        Anxiety- (present on admission)   Assessment & Plan    Klonopin        Diarrhea- (present on admission)   Assessment & Plan    Resolved        Opiate dependence (CMS-HCC)- (present  on admission)   Assessment & Plan    Methadone  Issues with drug-seeking        Wound of right leg, at BKA site- (present on admission)   Assessment & Plan    s/p BKA revision  Failed to finish course of Zyvox (1 week left)        Tobacco abuse- (present on admission)   Assessment & Plan    Nicotine replacement         Peripheral neuropathy- (present on admission)   Assessment & Plan    Lyrica            Reviewed items::  Radiology images reviewed, Labs reviewed and Medications reviewed  Rolon catheter::  No Rolon  DVT prophylaxis pharmacological::  Enoxaparin (Lovenox)  Ulcer Prophylaxis::  No

## 2017-09-07 NOTE — PROGRESS NOTES
Pt A&OX4, somewhat restless and irritable. Very upset that he did not receive his evening snack. RN retrieved new snack from kitchen. C/o pain 8-9/10 to lower extremities. Dressing to left foot CDI. Right stump well approximated and healing appropriately. Wound vac in place to 125 continuous suction. Pt up self to wheelchair. New IV to right arm dislodged after pt continued to tape and pull at it. Medication given per MAR. Personal items and call light within reach. Rounding in place.

## 2017-09-08 LAB
ANION GAP SERPL CALC-SCNC: 8 MMOL/L (ref 0–11.9)
BASOPHILS # BLD AUTO: 0.5 % (ref 0–1.8)
BASOPHILS # BLD: 0.03 K/UL (ref 0–0.12)
BUN SERPL-MCNC: 23 MG/DL (ref 8–22)
CALCIUM SERPL-MCNC: 9 MG/DL (ref 8.5–10.5)
CHLORIDE SERPL-SCNC: 104 MMOL/L (ref 96–112)
CO2 SERPL-SCNC: 26 MMOL/L (ref 20–33)
CREAT SERPL-MCNC: 0.81 MG/DL (ref 0.5–1.4)
EOSINOPHIL # BLD AUTO: 0.26 K/UL (ref 0–0.51)
EOSINOPHIL NFR BLD: 3.9 % (ref 0–6.9)
ERYTHROCYTE [DISTWIDTH] IN BLOOD BY AUTOMATED COUNT: 48.2 FL (ref 35.9–50)
GFR SERPL CREATININE-BSD FRML MDRD: >60 ML/MIN/1.73 M 2
GLUCOSE SERPL-MCNC: 155 MG/DL (ref 65–99)
HCT VFR BLD AUTO: 34.9 % (ref 42–52)
HGB BLD-MCNC: 10.9 G/DL (ref 14–18)
IMM GRANULOCYTES # BLD AUTO: 0.09 K/UL (ref 0–0.11)
IMM GRANULOCYTES NFR BLD AUTO: 1.4 % (ref 0–0.9)
LYMPHOCYTES # BLD AUTO: 2.9 K/UL (ref 1–4.8)
LYMPHOCYTES NFR BLD: 43.9 % (ref 22–41)
MCH RBC QN AUTO: 25.3 PG (ref 27–33)
MCHC RBC AUTO-ENTMCNC: 31.2 G/DL (ref 33.7–35.3)
MCV RBC AUTO: 81.2 FL (ref 81.4–97.8)
MONOCYTES # BLD AUTO: 0.36 K/UL (ref 0–0.85)
MONOCYTES NFR BLD AUTO: 5.5 % (ref 0–13.4)
NEUTROPHILS # BLD AUTO: 2.96 K/UL (ref 1.82–7.42)
NEUTROPHILS NFR BLD: 44.8 % (ref 44–72)
NRBC # BLD AUTO: 0 K/UL
NRBC BLD AUTO-RTO: 0 /100 WBC
PLATELET # BLD AUTO: 325 K/UL (ref 164–446)
PMV BLD AUTO: 9.2 FL (ref 9–12.9)
POTASSIUM SERPL-SCNC: 3.7 MMOL/L (ref 3.6–5.5)
RBC # BLD AUTO: 4.3 M/UL (ref 4.7–6.1)
SODIUM SERPL-SCNC: 138 MMOL/L (ref 135–145)
WBC # BLD AUTO: 6.6 K/UL (ref 4.8–10.8)

## 2017-09-08 PROCEDURE — 700101 HCHG RX REV CODE 250: Performed by: INTERNAL MEDICINE

## 2017-09-08 PROCEDURE — A9270 NON-COVERED ITEM OR SERVICE: HCPCS | Performed by: INTERNAL MEDICINE

## 2017-09-08 PROCEDURE — 700111 HCHG RX REV CODE 636 W/ 250 OVERRIDE (IP): Performed by: FAMILY MEDICINE

## 2017-09-08 PROCEDURE — 770021 HCHG ROOM/CARE - ISO PRIVATE

## 2017-09-08 PROCEDURE — 99232 SBSQ HOSP IP/OBS MODERATE 35: CPT | Performed by: FAMILY MEDICINE

## 2017-09-08 PROCEDURE — 700102 HCHG RX REV CODE 250 W/ 637 OVERRIDE(OP): Performed by: INTERNAL MEDICINE

## 2017-09-08 PROCEDURE — 700111 HCHG RX REV CODE 636 W/ 250 OVERRIDE (IP): Performed by: INTERNAL MEDICINE

## 2017-09-08 PROCEDURE — 80048 BASIC METABOLIC PNL TOTAL CA: CPT

## 2017-09-08 PROCEDURE — 85025 COMPLETE CBC W/AUTO DIFF WBC: CPT

## 2017-09-08 PROCEDURE — 97605 NEG PRS WND THER DME<=50SQCM: CPT

## 2017-09-08 PROCEDURE — 36415 COLL VENOUS BLD VENIPUNCTURE: CPT

## 2017-09-08 RX ADMIN — METHADONE HYDROCHLORIDE 20 MG: 10 TABLET ORAL at 14:04

## 2017-09-08 RX ADMIN — ENOXAPARIN SODIUM 40 MG: 100 INJECTION SUBCUTANEOUS at 09:02

## 2017-09-08 RX ADMIN — LOPERAMIDE HYDROCHLORIDE 2 MG: 2 CAPSULE ORAL at 17:40

## 2017-09-08 RX ADMIN — OXYCODONE HYDROCHLORIDE 10 MG: 10 TABLET ORAL at 01:16

## 2017-09-08 RX ADMIN — OXYCODONE HYDROCHLORIDE 10 MG: 10 TABLET ORAL at 18:38

## 2017-09-08 RX ADMIN — OXYCODONE HYDROCHLORIDE 10 MG: 10 TABLET ORAL at 12:02

## 2017-09-08 RX ADMIN — ACETAMINOPHEN 650 MG: 325 TABLET, FILM COATED ORAL at 06:09

## 2017-09-08 RX ADMIN — PREGABALIN 150 MG: 150 CAPSULE ORAL at 14:04

## 2017-09-08 RX ADMIN — MORPHINE SULFATE 4 MG: 4 INJECTION INTRAVENOUS at 17:00

## 2017-09-08 RX ADMIN — ACETAMINOPHEN 650 MG: 325 TABLET, FILM COATED ORAL at 00:03

## 2017-09-08 RX ADMIN — Medication 325 MG: at 17:42

## 2017-09-08 RX ADMIN — OXYCODONE HYDROCHLORIDE 10 MG: 10 TABLET ORAL at 06:09

## 2017-09-08 RX ADMIN — CLONAZEPAM 2 MG: 1 TABLET ORAL at 21:57

## 2017-09-08 RX ADMIN — LIDOCAINE 1 PATCH: 50 PATCH CUTANEOUS at 12:04

## 2017-09-08 RX ADMIN — METHADONE HYDROCHLORIDE 20 MG: 10 TABLET ORAL at 21:57

## 2017-09-08 RX ADMIN — Medication 325 MG: at 09:01

## 2017-09-08 RX ADMIN — METHADONE HYDROCHLORIDE 20 MG: 10 TABLET ORAL at 06:09

## 2017-09-08 RX ADMIN — LIDOCAINE 1 APPLICATION: 40 CREAM TOPICAL at 12:06

## 2017-09-08 RX ADMIN — CLONAZEPAM 2 MG: 1 TABLET ORAL at 09:02

## 2017-09-08 RX ADMIN — PREGABALIN 150 MG: 150 CAPSULE ORAL at 09:01

## 2017-09-08 RX ADMIN — ACETAMINOPHEN 650 MG: 325 TABLET, FILM COATED ORAL at 12:02

## 2017-09-08 RX ADMIN — PREGABALIN 150 MG: 150 CAPSULE ORAL at 21:57

## 2017-09-08 RX ADMIN — ACETAMINOPHEN 650 MG: 325 TABLET, FILM COATED ORAL at 17:42

## 2017-09-08 RX ADMIN — LOPERAMIDE HYDROCHLORIDE 2 MG: 2 CAPSULE ORAL at 21:57

## 2017-09-08 ASSESSMENT — ENCOUNTER SYMPTOMS
SHORTNESS OF BREATH: 0
HEADACHES: 0
CHILLS: 0
WHEEZING: 0
VOMITING: 0
SORE THROAT: 0
ABDOMINAL PAIN: 0
DIZZINESS: 0
BLURRED VISION: 0
COUGH: 0
HEARTBURN: 0
DIARRHEA: 0
FEVER: 0
NAUSEA: 0

## 2017-09-08 ASSESSMENT — PAIN SCALES - GENERAL
PAINLEVEL_OUTOF10: 7
PAINLEVEL_OUTOF10: 8
PAINLEVEL_OUTOF10: 6
PAINLEVEL_OUTOF10: 7
PAINLEVEL_OUTOF10: 2
PAINLEVEL_OUTOF10: 6
PAINLEVEL_OUTOF10: 6
PAINLEVEL_OUTOF10: 9
PAINLEVEL_OUTOF10: 6
PAINLEVEL_OUTOF10: 6
PAINLEVEL_OUTOF10: 4
PAINLEVEL_OUTOF10: 5
PAINLEVEL_OUTOF10: 6
PAINLEVEL_OUTOF10: 6

## 2017-09-08 NOTE — CARE PLAN
Problem: Communication  Goal: The ability to communicate needs accurately and effectively will improve  Outcome: PROGRESSING AS EXPECTED  Dressing changed today by wound nurse.    Problem: Pain Management  Goal: Pain level will decrease to patient's comfort goal  Outcome: PROGRESSING AS EXPECTED  Pt complains about pain. MED per MAR.

## 2017-09-08 NOTE — PROGRESS NOTES
Assumed care of pt @0700. Bedside report received. Pt AOX 4. Pt rates pain 6/10. Roxicodone 10 mg given. Dressing to LLE. Wound vac in place. Pt up self to wheelchair. Fall precaution in place. POC discussed with pt, all questions answered at this time. Pt makes needs known, call light within reach, hourly rounding in place.

## 2017-09-08 NOTE — PROGRESS NOTES
[facility] Hospitalist Progress Note    Date of Service: 2017    Chief Complaint  52 y.o. male admitted 2017 with Right BKA and left transmetatarsal amputation stump infections    Interval Problem Update  TM stump - surgery done, cult showed MRSA  BKA stump - surgery done, cult showed VRE and Pseudomonas, did not finish course of Zyvox (1 week left)  Moves around in wheelchair    Consultants/Specialty  ID - Yenny  Ortho - [facility]    Disposition  SNF        Review of Systems   Constitutional: Negative for chills and fever.   HENT: Negative for sore throat.    Eyes: Negative for blurred vision.   Respiratory: Negative for cough, shortness of breath and wheezing.    Cardiovascular: Positive for leg swelling. Negative for chest pain.   Gastrointestinal: Negative for abdominal pain, diarrhea, heartburn, nausea and vomiting.   Genitourinary: Negative for dysuria.   Neurological: Negative for dizziness and headaches.      Physical Exam  Laboratory/Imaging   Hemodynamics  Temp (24hrs), Av.6 °C (97.9 °F), Min:36.1 °C (97 °F), Max:36.9 °C (98.4 °F)   Temperature: 36.9 °C (98.4 °F)  Pulse  Av.5  Min: 58  Max: 111    Blood Pressure: 125/73      Respiratory      Respiration: 20, Pulse Oximetry: 95 %        RUL Breath Sounds: Clear;Diminished, RML Breath Sounds: Diminished, RLL Breath Sounds: Diminished, NANCY Breath Sounds: Clear;Diminished, LLL Breath Sounds: Diminished    Fluids    Intake/Output Summary (Last 24 hours) at 17 1453  Last data filed at 17 1200   Gross per 24 hour   Intake              960 ml   Output              300 ml   Net              660 ml       Nutrition  Orders Placed This Encounter   Procedures   • DIET ORDER     Standing Status:   Standing     Number of Occurrences:   1     Order Specific Question:   Diet:     Answer:   Regular [1]     Comments:   regular trays     Physical Exam   Constitutional: He is oriented to person, place, and time. He appears well-developed and  well-nourished.   HENT:   Head: Normocephalic and atraumatic.   Eyes: Conjunctivae are normal. Pupils are equal, round, and reactive to light.   Neck: No tracheal deviation present. No thyromegaly present.   Cardiovascular: Normal rate and regular rhythm.    Pulmonary/Chest: Effort normal and breath sounds normal.   Abdominal: Soft. Bowel sounds are normal. He exhibits no distension. There is no tenderness.   Musculoskeletal: He exhibits edema.   R BKA, L transmetatarsal amputation  Wound vac at left leg   Lymphadenopathy:     He has no cervical adenopathy.   Neurological: He is alert and oriented to person, place, and time.   Nursing note and vitals reviewed.      Recent Labs      09/08/17   0926   WBC  6.6   RBC  4.30*   HEMOGLOBIN  10.9*   HEMATOCRIT  34.9*   MCV  81.2*   MCH  25.3*   MCHC  31.2*   RDW  48.2   PLATELETCT  325   MPV  9.2     Recent Labs      09/08/17   0926   SODIUM  138   POTASSIUM  3.7   CHLORIDE  104   CO2  26   GLUCOSE  155*   BUN  23*   CREATININE  0.81   CALCIUM  9.0                      Assessment/Plan     * Skin ulcer of left foot with fat layer exposed (CMS-HCC)- (present on admission)   Assessment & Plan    I and D of left foot with gastroscoleus resection and application of wound vac 7/25           Noncompliance- (present on admission)   Assessment & Plan    Counseling done on risks and complications        Pseudomonas infection- (present on admission)   Assessment & Plan    finished Cefepime 8/25        MRSA (methicillin resistant staph aureus) culture positive- (present on admission)   Assessment & Plan    Finished course of Doxycycline 8/25        ABRIL (acute kidney injury) (CMS-HCC)   Assessment & Plan    resolved        Thrombocytosis (CMS-HCC)- (present on admission)   Assessment & Plan    Follow cbc        Anxiety- (present on admission)   Assessment & Plan    Klonopin        Diarrhea- (present on admission)   Assessment & Plan    Resolved        Opiate dependence (CMS-HCC)-  (present on admission)   Assessment & Plan    Methadone, Morphine IM for dressing changes only  Issues with drug-seeking        Wound of right leg, at BKA site- (present on admission)   Assessment & Plan    s/p BKA revision  Failed to finish course of Zyvox (1 week left)        Tobacco abuse- (present on admission)   Assessment & Plan    Nicotine replacement         Peripheral neuropathy- (present on admission)   Assessment & Plan    Lyrica            Reviewed items::  Radiology images reviewed, Labs reviewed and Medications reviewed  Rolon catheter::  No Rolon  DVT prophylaxis pharmacological::  Enoxaparin (Lovenox)  Ulcer Prophylaxis::  No

## 2017-09-08 NOTE — PROGRESS NOTES
LIMB PRESERVATION SERVICE     Pt c/o burning pain from his left Crow boot    TC to [facility] with [facility] .  He will check on patient

## 2017-09-08 NOTE — CARE PLAN
Problem: Skin Integrity  Goal: Risk for impaired skin integrity will decrease  Outcome: PROGRESSING AS EXPECTED  Pt moves and repositions self often. Skin assessed throughout shift.     Problem: Pain Management  Goal: Pain level will decrease to patient's comfort goal  Pt pain managed with scheduled pain medication and PRN 10mg Oxycodone per MAR.

## 2017-09-09 PROCEDURE — 700102 HCHG RX REV CODE 250 W/ 637 OVERRIDE(OP): Performed by: INTERNAL MEDICINE

## 2017-09-09 PROCEDURE — A9270 NON-COVERED ITEM OR SERVICE: HCPCS | Performed by: INTERNAL MEDICINE

## 2017-09-09 PROCEDURE — 770021 HCHG ROOM/CARE - ISO PRIVATE

## 2017-09-09 PROCEDURE — 700101 HCHG RX REV CODE 250: Performed by: INTERNAL MEDICINE

## 2017-09-09 PROCEDURE — 99232 SBSQ HOSP IP/OBS MODERATE 35: CPT | Performed by: FAMILY MEDICINE

## 2017-09-09 PROCEDURE — 700111 HCHG RX REV CODE 636 W/ 250 OVERRIDE (IP): Performed by: INTERNAL MEDICINE

## 2017-09-09 RX ADMIN — LIDOCAINE 1 PATCH: 50 PATCH CUTANEOUS at 11:46

## 2017-09-09 RX ADMIN — PREGABALIN 150 MG: 150 CAPSULE ORAL at 08:49

## 2017-09-09 RX ADMIN — METHADONE HYDROCHLORIDE 20 MG: 10 TABLET ORAL at 05:42

## 2017-09-09 RX ADMIN — OXYCODONE HYDROCHLORIDE 10 MG: 10 TABLET ORAL at 17:12

## 2017-09-09 RX ADMIN — ACETAMINOPHEN 650 MG: 325 TABLET, FILM COATED ORAL at 05:43

## 2017-09-09 RX ADMIN — CLONAZEPAM 2 MG: 1 TABLET ORAL at 21:14

## 2017-09-09 RX ADMIN — OXYCODONE HYDROCHLORIDE 10 MG: 10 TABLET ORAL at 11:45

## 2017-09-09 RX ADMIN — Medication 325 MG: at 08:49

## 2017-09-09 RX ADMIN — OXYCODONE HYDROCHLORIDE 10 MG: 10 TABLET ORAL at 21:15

## 2017-09-09 RX ADMIN — ENOXAPARIN SODIUM 40 MG: 100 INJECTION SUBCUTANEOUS at 08:49

## 2017-09-09 RX ADMIN — ACETAMINOPHEN 650 MG: 325 TABLET, FILM COATED ORAL at 11:45

## 2017-09-09 RX ADMIN — PREGABALIN 150 MG: 150 CAPSULE ORAL at 14:35

## 2017-09-09 RX ADMIN — Medication 325 MG: at 17:12

## 2017-09-09 RX ADMIN — METHADONE HYDROCHLORIDE 20 MG: 10 TABLET ORAL at 21:14

## 2017-09-09 RX ADMIN — METHADONE HYDROCHLORIDE 20 MG: 10 TABLET ORAL at 14:35

## 2017-09-09 RX ADMIN — CLONAZEPAM 2 MG: 1 TABLET ORAL at 08:48

## 2017-09-09 RX ADMIN — PREGABALIN 150 MG: 150 CAPSULE ORAL at 21:14

## 2017-09-09 RX ADMIN — OXYCODONE HYDROCHLORIDE 10 MG: 10 TABLET ORAL at 05:42

## 2017-09-09 RX ADMIN — ACETAMINOPHEN 650 MG: 325 TABLET, FILM COATED ORAL at 00:03

## 2017-09-09 RX ADMIN — OXYCODONE HYDROCHLORIDE 10 MG: 10 TABLET ORAL at 00:03

## 2017-09-09 RX ADMIN — ACETAMINOPHEN 650 MG: 325 TABLET, FILM COATED ORAL at 17:12

## 2017-09-09 ASSESSMENT — ENCOUNTER SYMPTOMS
SHORTNESS OF BREATH: 0
COUGH: 0
SORE THROAT: 0
NAUSEA: 0
WHEEZING: 0
ABDOMINAL PAIN: 0
DIZZINESS: 0
HEARTBURN: 0
BLURRED VISION: 0
CHILLS: 0
FEVER: 0
DIARRHEA: 0
HEADACHES: 0
VOMITING: 0

## 2017-09-09 ASSESSMENT — PAIN SCALES - GENERAL
PAINLEVEL_OUTOF10: 6
PAINLEVEL_OUTOF10: 6
PAINLEVEL_OUTOF10: 8
PAINLEVEL_OUTOF10: 7
PAINLEVEL_OUTOF10: 7
PAINLEVEL_OUTOF10: 6
PAINLEVEL_OUTOF10: 5
PAINLEVEL_OUTOF10: 6

## 2017-09-09 NOTE — PROGRESS NOTES
Pt resting in bed. VSS. Prn medication controlling pain and no complaints of nausea. Will cont to monitor

## 2017-09-09 NOTE — CARE PLAN
Problem: Pain Management  Goal: Pain level will decrease to patient's comfort goal  Outcome: PROGRESSING AS EXPECTED      Problem: Psychosocial Needs:  Goal: Level of anxiety will decrease  Outcome: PROGRESSING AS EXPECTED

## 2017-09-09 NOTE — PROGRESS NOTES
Assumed care of pt @0700. Bedside report received. Pt AOX 4. Pt rates 7/10. Roxicodone 10 mg given. Pt irritable.  Pt educated about importance of wearing gown and gloves when out of room. Pt agrees to wear PPE when out of the room. Wound vac in place ( to LLE). Right BKA. Fall precaution in place. POC discussed with pt, all questions answered at this time. Pt makes needs known, call light within reach, hourly rounding in place.

## 2017-09-09 NOTE — PROGRESS NOTES
Pt gets up ad mireya with a wheelchair and is free to mobilize either in room or in the callaway. Pt has been told multiple times that because he has MRSA and VRE he needs to wear a yellow gown in the callaway, and gloves when touching anything outside his room. He is non compliant and even after communicating importance of him wearing gloves and the PPE equipment he still touches anything and everything outside of the room including nutrition and clean linen areas.  Pt RN and Charge RN notified and aware.

## 2017-09-09 NOTE — PROGRESS NOTES
[facility] Hospitalist Progress Note    Date of Service: 2017    Chief Complaint  52 y.o. male admitted 2017 with Right BKA and left transmetatarsal amputation stump infections    Interval Problem Update  TM stump - surgery done, cult showed MRSA  BKA stump - surgery done, cult showed VRE and Pseudomonas, did not finish course of Zyvox (1 week left)  Dressing change done yesterday and Morphine was effective in controlling pain    Consultants/Specialty  ID - Yenny  Ortho - Fern    Disposition  SNF        Review of Systems   Constitutional: Negative for chills and fever.   HENT: Negative for sore throat.    Eyes: Negative for blurred vision.   Respiratory: Negative for cough, shortness of breath and wheezing.    Cardiovascular: Positive for leg swelling. Negative for chest pain.   Gastrointestinal: Negative for abdominal pain, diarrhea, heartburn, nausea and vomiting.   Genitourinary: Negative for dysuria.   Neurological: Negative for dizziness and headaches.      Physical Exam  Laboratory/Imaging   Hemodynamics  Temp (24hrs), Av.9 °C (98.4 °F), Min:36.6 °C (97.9 °F), Max:37.2 °C (99 °F)   Temperature: 36.8 °C (98.2 °F)  Pulse  Av.3  Min: 58  Max: 111    Blood Pressure: 103/67      Respiratory      Respiration: 18, Pulse Oximetry: 91 %        RUL Breath Sounds: Clear;Diminished, RML Breath Sounds: Diminished, RLL Breath Sounds: Diminished, NANCY Breath Sounds: Clear;Diminished, LLL Breath Sounds: Diminished    Fluids    Intake/Output Summary (Last 24 hours) at 17 1245  Last data filed at 17 1200   Gross per 24 hour   Intake              960 ml   Output             1850 ml   Net             -890 ml       Nutrition  Orders Placed This Encounter   Procedures   • DIET ORDER     Standing Status:   Standing     Number of Occurrences:   1     Order Specific Question:   Diet:     Answer:   Regular [1]     Comments:   regular trays     Physical Exam   Constitutional: He is oriented to person, place,  and time. He appears well-developed and well-nourished.   HENT:   Head: Normocephalic and atraumatic.   Eyes: Conjunctivae are normal. Pupils are equal, round, and reactive to light.   Neck: No tracheal deviation present. No thyromegaly present.   Cardiovascular: Normal rate and regular rhythm.    Pulmonary/Chest: Effort normal and breath sounds normal.   Abdominal: Soft. Bowel sounds are normal. He exhibits no distension. There is no tenderness.   Musculoskeletal: He exhibits edema.   R BKA, L transmetatarsal amputation  Wound vac at left leg   Lymphadenopathy:     He has no cervical adenopathy.   Neurological: He is alert and oriented to person, place, and time.   Nursing note and vitals reviewed.      Recent Labs      09/08/17   0926   WBC  6.6   RBC  4.30*   HEMOGLOBIN  10.9*   HEMATOCRIT  34.9*   MCV  81.2*   MCH  25.3*   MCHC  31.2*   RDW  48.2   PLATELETCT  325   MPV  9.2     Recent Labs      09/08/17   0926   SODIUM  138   POTASSIUM  3.7   CHLORIDE  104   CO2  26   GLUCOSE  155*   BUN  23*   CREATININE  0.81   CALCIUM  9.0                      Assessment/Plan     * Skin ulcer of left foot with fat layer exposed (CMS-HCC)- (present on admission)   Assessment & Plan    I and D of left foot with gastroscoleus resection and application of wound vac 7/25           Noncompliance- (present on admission)   Assessment & Plan    Counseling done on risks and complications        Pseudomonas infection- (present on admission)   Assessment & Plan    finished Cefepime 8/25        MRSA (methicillin resistant staph aureus) culture positive- (present on admission)   Assessment & Plan    Finished course of Doxycycline 8/25        ABRIL (acute kidney injury) (CMS-HCC)   Assessment & Plan    resolved        Thrombocytosis (CMS-HCC)- (present on admission)   Assessment & Plan    Follow cbc        Anxiety- (present on admission)   Assessment & Plan    Klonopin        Diarrhea- (present on admission)   Assessment & Plan    Resolved         Opiate dependence (CMS-HCC)- (present on admission)   Assessment & Plan    Methadone, Morphine IM for dressing changes only  Issues with drug-seeking        Wound of right leg, at BKA site- (present on admission)   Assessment & Plan    s/p BKA revision  Failed to finish course of Zyvox (1 week left)        Tobacco abuse- (present on admission)   Assessment & Plan    Nicotine replacement         Peripheral neuropathy- (present on admission)   Assessment & Plan    Lyrica            Reviewed items::  Radiology images reviewed, Labs reviewed and Medications reviewed  Rolon catheter::  No Rolon  DVT prophylaxis pharmacological::  Enoxaparin (Lovenox)  Ulcer Prophylaxis::  No

## 2017-09-10 LAB — TROPONIN I SERPL-MCNC: <0.01 NG/ML (ref 0–0.04)

## 2017-09-10 PROCEDURE — 700101 HCHG RX REV CODE 250: Performed by: INTERNAL MEDICINE

## 2017-09-10 PROCEDURE — A9270 NON-COVERED ITEM OR SERVICE: HCPCS | Performed by: INTERNAL MEDICINE

## 2017-09-10 PROCEDURE — 99232 SBSQ HOSP IP/OBS MODERATE 35: CPT | Performed by: FAMILY MEDICINE

## 2017-09-10 PROCEDURE — 700102 HCHG RX REV CODE 250 W/ 637 OVERRIDE(OP): Performed by: INTERNAL MEDICINE

## 2017-09-10 PROCEDURE — 770021 HCHG ROOM/CARE - ISO PRIVATE

## 2017-09-10 PROCEDURE — 93005 ELECTROCARDIOGRAM TRACING: CPT | Performed by: FAMILY MEDICINE

## 2017-09-10 PROCEDURE — 700111 HCHG RX REV CODE 636 W/ 250 OVERRIDE (IP): Performed by: INTERNAL MEDICINE

## 2017-09-10 PROCEDURE — 36415 COLL VENOUS BLD VENIPUNCTURE: CPT

## 2017-09-10 PROCEDURE — 84484 ASSAY OF TROPONIN QUANT: CPT

## 2017-09-10 PROCEDURE — 93010 ELECTROCARDIOGRAM REPORT: CPT | Performed by: INTERNAL MEDICINE

## 2017-09-10 RX ADMIN — METHADONE HYDROCHLORIDE 20 MG: 10 TABLET ORAL at 05:57

## 2017-09-10 RX ADMIN — PREGABALIN 150 MG: 150 CAPSULE ORAL at 14:12

## 2017-09-10 RX ADMIN — OXYCODONE HYDROCHLORIDE 10 MG: 10 TABLET ORAL at 20:14

## 2017-09-10 RX ADMIN — CLONAZEPAM 2 MG: 1 TABLET ORAL at 20:09

## 2017-09-10 RX ADMIN — METHADONE HYDROCHLORIDE 20 MG: 10 TABLET ORAL at 20:10

## 2017-09-10 RX ADMIN — ACETAMINOPHEN 650 MG: 325 TABLET, FILM COATED ORAL at 17:59

## 2017-09-10 RX ADMIN — LIDOCAINE 1 APPLICATION: 40 CREAM TOPICAL at 11:58

## 2017-09-10 RX ADMIN — PREGABALIN 150 MG: 150 CAPSULE ORAL at 20:10

## 2017-09-10 RX ADMIN — CLONAZEPAM 2 MG: 1 TABLET ORAL at 08:10

## 2017-09-10 RX ADMIN — ACETAMINOPHEN 650 MG: 325 TABLET, FILM COATED ORAL at 01:10

## 2017-09-10 RX ADMIN — ACETAMINOPHEN 650 MG: 325 TABLET, FILM COATED ORAL at 05:57

## 2017-09-10 RX ADMIN — LIDOCAINE 1 PATCH: 50 PATCH CUTANEOUS at 11:57

## 2017-09-10 RX ADMIN — ACETAMINOPHEN 650 MG: 325 TABLET, FILM COATED ORAL at 11:55

## 2017-09-10 RX ADMIN — Medication 325 MG: at 08:10

## 2017-09-10 RX ADMIN — Medication 325 MG: at 17:59

## 2017-09-10 RX ADMIN — OXYCODONE HYDROCHLORIDE 10 MG: 10 TABLET ORAL at 01:11

## 2017-09-10 RX ADMIN — ENOXAPARIN SODIUM 40 MG: 100 INJECTION SUBCUTANEOUS at 08:10

## 2017-09-10 RX ADMIN — LOPERAMIDE HYDROCHLORIDE 2 MG: 2 CAPSULE ORAL at 01:11

## 2017-09-10 RX ADMIN — METHADONE HYDROCHLORIDE 20 MG: 10 TABLET ORAL at 14:12

## 2017-09-10 RX ADMIN — OXYCODONE HYDROCHLORIDE 10 MG: 10 TABLET ORAL at 11:55

## 2017-09-10 RX ADMIN — OXYCODONE HYDROCHLORIDE 10 MG: 10 TABLET ORAL at 05:58

## 2017-09-10 RX ADMIN — PREGABALIN 150 MG: 150 CAPSULE ORAL at 08:10

## 2017-09-10 RX ADMIN — LOPERAMIDE HYDROCHLORIDE 2 MG: 2 CAPSULE ORAL at 08:10

## 2017-09-10 ASSESSMENT — PAIN SCALES - GENERAL
PAINLEVEL_OUTOF10: 4
PAINLEVEL_OUTOF10: 5
PAINLEVEL_OUTOF10: 6
PAINLEVEL_OUTOF10: 6
PAINLEVEL_OUTOF10: 4
PAINLEVEL_OUTOF10: 3
PAINLEVEL_OUTOF10: 6
PAINLEVEL_OUTOF10: 5

## 2017-09-10 ASSESSMENT — ENCOUNTER SYMPTOMS
VOMITING: 0
BLURRED VISION: 0
WHEEZING: 0
NAUSEA: 0
HEARTBURN: 0
SORE THROAT: 0
COUGH: 0
DIZZINESS: 0
HEADACHES: 0
DIARRHEA: 0
SHORTNESS OF BREATH: 0
ABDOMINAL PAIN: 0
FEVER: 0
CHILLS: 0

## 2017-09-10 NOTE — PROGRESS NOTES
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 Takes methadone he states for back pain.  Not diabetic.  Presented to ED with increased pain to L TMA with infected neuropathic ulcer and pain to R BKA.      s/p L foot I & D with VAC placement, GSR by Dr. Shirley on 7/25/17  S/p Right revision below-knee amputation,  Right lower extremity irrigation and debridement, skin, subcutaneous    tissue to bone on 8/15/17    /63   Pulse 88   Temp 36.8 °C (98.3 °F)   Resp 18   Ht 1.854 m (6' 1\")   Wt 65.2 kg (143 lb 11.8 oz)   SpO2 96%   BMI 18.96 kg/m²     R BKA:  Incision healed, sutures taken out 9/6  Wearing  sock and knee immobilizer when OOB    L GSR site   well approximated- COLUMBA    L TMA  Photo reviewed: 100% red, granular tissue.   Minimal decrease in size.   Cytal to be applied on Wed during VAC change.       Ada boot to LLE  CROW boot too tight for LLE, pt c/o discomfort to calf. CROW boot applied to check fit. After about 10min, large indentations noted to lower leg. At risk for pressure injury. Pt switched back to jd boot.     IV abx completed, ID s/o        PLAN  -Continue VAC change per IP wound team for LLE. Biologic to be applied during change on Wed.   -continue jd boot to be worn when OOB to LLE   -Nazario GOVEA  with Ultra to see pt for prosthesis adjustments and CROW boot adjustment        "

## 2017-09-10 NOTE — PROGRESS NOTES
Pt resting in bed. VSS. Prn medication controlling pain and no complaints of nausea. Wound vac in place. Will cont to monitor.

## 2017-09-10 NOTE — WOUND TEAM
"[facility]  Inpatient Services  Wound and Skin Care Progress Note    Admission Date:  07/22/17     HPI, PMH, SH: Reviewed  Unit where seen by Wound Team:  [id number]    WOUND CONSULT RELATED TO:   Scheduled NPWT dressing change       SUBJECTIVE:  \"I know you're busy\"    Self Report / Pain Level:  Tolerated well with pain medication    OBJECTIVE:    Previous NPWT dressing intact    WOUND TYPE, LOCATION, CHARACTERISTICS (Pressure ulcers: location, stage, POA or date identified)    Location/type of wound: Open surgical wound left plantar foot      Periwound:     macerated around wound and in between remaining amputation site of pervious digit; callous-like tissue      Drainage:     scant serosanguinous    Tissue Type and %:    100% red  Wound Edges:    Attached/macerated    Odor:     none  Exposed structure(s):  Bone palpated    S&S of Infection:     none      Measurements: (cm)  (Taken 9/6/17)   Length:    4.6  Width:     3.0  Depth:     0.2      INTERVENTIONS BY WOUND TEAM:  Removed old dressing and cleansed wound with wound cleanser. Benzoin and drape to bull wound skin as needed. Small piece of paste ring used to seal fissure on lateral side.  Covered wound with 1 piece black foam with a bridge and button to dorsum of foot. Secured with drape and resumed NPWT at 125mmhg continuously.  Total black foam=3 pieces.    Interdisciplinary consultation:   Staff RN, patient    EVALUATION:  Wound slow to contract; talking about LPS applying some sort of biological dressing    Factors affecting wound healing:  Neuropathy, tobacco abuse     Goals:  Decrease in wound size by 5% every 3 weeks -- no maceration    NURSING PLAN OF CARE ORDERS (x):    Dressing changes: See Dressing Maintenance orders:  X  Skin care: See Skin Care orders:   Rectal tube care: See Rectal Tube Care orders:   Other orders:    WOUND TEAM PLAN OF CARE (x):   NPWT change 3 x week:  X     Dressing changes by wound team:       Follow up as needed: "       Other (explain):    Anticipated discharge plans (x):  SNF:           Home Care:           Outpatient Wound Center:         Self Care:            Other:  TBD

## 2017-09-10 NOTE — PROGRESS NOTES
[facility] Hospitalist Progress Note    Date of Service: 9/10/2017    Chief Complaint  52 y.o. male admitted 2017 with Right BKA and left transmetatarsal amputation stump infections    Interval Problem Update  TM stump - surgery done, cult showed MRSA  BKA stump - surgery done, cult showed VRE and Pseudomonas, did not finish course of Zyvox (1 week left)  Wheelchair mobility    Consultants/Specialty  ID - Yenny  Ortho - Fern    Disposition  SNF        Review of Systems   Constitutional: Negative for chills and fever.   HENT: Negative for sore throat.    Eyes: Negative for blurred vision.   Respiratory: Negative for cough, shortness of breath and wheezing.    Cardiovascular: Positive for leg swelling. Negative for chest pain.   Gastrointestinal: Negative for abdominal pain, diarrhea, heartburn, nausea and vomiting.   Genitourinary: Negative for dysuria.   Neurological: Negative for dizziness and headaches.      Physical Exam  Laboratory/Imaging   Hemodynamics  Temp (24hrs), Av.7 °C (98.1 °F), Min:36.4 °C (97.5 °F), Max:37.2 °C (99 °F)   Temperature: 36.8 °C (98.3 °F)  Pulse  Av.2  Min: 58  Max: 111    Blood Pressure: 120/63      Respiratory      Respiration: 18, Pulse Oximetry: 96 %        RUL Breath Sounds: Clear;Diminished, RML Breath Sounds: Diminished, RLL Breath Sounds: Diminished, NANCY Breath Sounds: Clear;Diminished, LLL Breath Sounds: Diminished    Fluids    Intake/Output Summary (Last 24 hours) at 09/10/17 1407  Last data filed at 09/10/17 1200   Gross per 24 hour   Intake             1320 ml   Output             1600 ml   Net             -280 ml       Nutrition  Orders Placed This Encounter   Procedures   • DIET ORDER     Standing Status:   Standing     Number of Occurrences:   1     Order Specific Question:   Diet:     Answer:   Regular [1]     Comments:   regular trays     Physical Exam   Constitutional: He is oriented to person, place, and time. He appears well-developed and well-nourished.    HENT:   Head: Normocephalic and atraumatic.   Eyes: Conjunctivae are normal. Pupils are equal, round, and reactive to light.   Neck: No tracheal deviation present. No thyromegaly present.   Cardiovascular: Normal rate and regular rhythm.    Pulmonary/Chest: Effort normal and breath sounds normal.   Abdominal: Soft. Bowel sounds are normal. He exhibits no distension. There is no tenderness.   Musculoskeletal: He exhibits edema.   R BKA, L transmetatarsal amputation  Wound vac at left leg   Lymphadenopathy:     He has no cervical adenopathy.   Neurological: He is alert and oriented to person, place, and time.   Nursing note and vitals reviewed.      Recent Labs      09/08/17   0926   WBC  6.6   RBC  4.30*   HEMOGLOBIN  10.9*   HEMATOCRIT  34.9*   MCV  81.2*   MCH  25.3*   MCHC  31.2*   RDW  48.2   PLATELETCT  325   MPV  9.2     Recent Labs      09/08/17   0926   SODIUM  138   POTASSIUM  3.7   CHLORIDE  104   CO2  26   GLUCOSE  155*   BUN  23*   CREATININE  0.81   CALCIUM  9.0                      Assessment/Plan     * Skin ulcer of left foot with fat layer exposed (CMS-HCC)- (present on admission)   Assessment & Plan    I and D of left foot with gastroscoleus resection and application of wound vac 7/25   Wound care          Noncompliance- (present on admission)   Assessment & Plan    Counseling done on risks and complications        Pseudomonas infection- (present on admission)   Assessment & Plan    finished Cefepime 8/25        MRSA (methicillin resistant staph aureus) culture positive- (present on admission)   Assessment & Plan    Finished course of Doxycycline 8/25        ABRIL (acute kidney injury) (CMS-HCC)   Assessment & Plan    resolved        Thrombocytosis (CMS-HCC)- (present on admission)   Assessment & Plan    Follow cbc        Anxiety- (present on admission)   Assessment & Plan    Klonopin        Diarrhea- (present on admission)   Assessment & Plan    Resolved        Opiate dependence (CMS-HCC)- (present  on admission)   Assessment & Plan    Methadone, Morphine IM for dressing changes only  Issues with drug-seeking        Wound of right leg, at BKA site- (present on admission)   Assessment & Plan    s/p BKA revision  Failed to finish course of Zyvox (1 week left)        Tobacco abuse- (present on admission)   Assessment & Plan    Nicotine replacement         Peripheral neuropathy- (present on admission)   Assessment & Plan    Lyrica            Reviewed items::  Radiology images reviewed, Labs reviewed and Medications reviewed  Rolon catheter::  No Rolon  DVT prophylaxis pharmacological::  Enoxaparin (Lovenox)  Ulcer Prophylaxis::  No

## 2017-09-10 NOTE — PROGRESS NOTES
Pt educated on risk factors for fall and injury. Pt continues to refuse bed alarm. Pt states they will call for assistance for all transfers and needs using their call light. Signs placed outside door indicating pt's risk, non-slip socks provided and call light is within patient's reach. Pt ad mireya to and from wheelchair per nursing and PT approval.

## 2017-09-11 PROBLEM — R07.89 CHEST WALL PAIN: Status: ACTIVE | Noted: 2017-09-11

## 2017-09-11 LAB — EKG IMPRESSION: NORMAL

## 2017-09-11 PROCEDURE — 97110 THERAPEUTIC EXERCISES: CPT

## 2017-09-11 PROCEDURE — A9270 NON-COVERED ITEM OR SERVICE: HCPCS | Performed by: INTERNAL MEDICINE

## 2017-09-11 PROCEDURE — 99232 SBSQ HOSP IP/OBS MODERATE 35: CPT | Performed by: FAMILY MEDICINE

## 2017-09-11 PROCEDURE — 700111 HCHG RX REV CODE 636 W/ 250 OVERRIDE (IP): Performed by: INTERNAL MEDICINE

## 2017-09-11 PROCEDURE — 700102 HCHG RX REV CODE 250 W/ 637 OVERRIDE(OP): Performed by: INTERNAL MEDICINE

## 2017-09-11 PROCEDURE — 97530 THERAPEUTIC ACTIVITIES: CPT

## 2017-09-11 PROCEDURE — 700101 HCHG RX REV CODE 250: Performed by: INTERNAL MEDICINE

## 2017-09-11 PROCEDURE — 97535 SELF CARE MNGMENT TRAINING: CPT

## 2017-09-11 PROCEDURE — 700111 HCHG RX REV CODE 636 W/ 250 OVERRIDE (IP): Performed by: FAMILY MEDICINE

## 2017-09-11 PROCEDURE — 97605 NEG PRS WND THER DME<=50SQCM: CPT

## 2017-09-11 PROCEDURE — 770021 HCHG ROOM/CARE - ISO PRIVATE

## 2017-09-11 RX ORDER — MORPHINE SULFATE 4 MG/ML
4 INJECTION, SOLUTION INTRAMUSCULAR; INTRAVENOUS ONCE
Status: COMPLETED | OUTPATIENT
Start: 2017-09-11 | End: 2017-09-11

## 2017-09-11 RX ADMIN — ACETAMINOPHEN 650 MG: 325 TABLET, FILM COATED ORAL at 17:05

## 2017-09-11 RX ADMIN — OXYCODONE HYDROCHLORIDE 10 MG: 10 TABLET ORAL at 11:51

## 2017-09-11 RX ADMIN — ACETAMINOPHEN 650 MG: 325 TABLET, FILM COATED ORAL at 00:07

## 2017-09-11 RX ADMIN — OXYCODONE HYDROCHLORIDE 10 MG: 10 TABLET ORAL at 17:05

## 2017-09-11 RX ADMIN — PREGABALIN 150 MG: 150 CAPSULE ORAL at 21:05

## 2017-09-11 RX ADMIN — ACETAMINOPHEN 650 MG: 325 TABLET, FILM COATED ORAL at 11:51

## 2017-09-11 RX ADMIN — Medication 325 MG: at 17:05

## 2017-09-11 RX ADMIN — PREGABALIN 150 MG: 150 CAPSULE ORAL at 14:45

## 2017-09-11 RX ADMIN — METHADONE HYDROCHLORIDE 20 MG: 10 TABLET ORAL at 22:34

## 2017-09-11 RX ADMIN — METHADONE HYDROCHLORIDE 20 MG: 10 TABLET ORAL at 05:57

## 2017-09-11 RX ADMIN — MORPHINE SULFATE 4 MG: 4 INJECTION INTRAVENOUS at 02:44

## 2017-09-11 RX ADMIN — Medication 325 MG: at 07:34

## 2017-09-11 RX ADMIN — LIDOCAINE 1 PATCH: 50 PATCH CUTANEOUS at 11:52

## 2017-09-11 RX ADMIN — LOPERAMIDE HYDROCHLORIDE 2 MG: 2 CAPSULE ORAL at 07:34

## 2017-09-11 RX ADMIN — PREGABALIN 150 MG: 150 CAPSULE ORAL at 07:34

## 2017-09-11 RX ADMIN — OXYCODONE HYDROCHLORIDE 10 MG: 10 TABLET ORAL at 21:04

## 2017-09-11 RX ADMIN — CLONAZEPAM 2 MG: 1 TABLET ORAL at 07:34

## 2017-09-11 RX ADMIN — LIDOCAINE 1 APPLICATION: 40 CREAM TOPICAL at 11:51

## 2017-09-11 RX ADMIN — METHADONE HYDROCHLORIDE 20 MG: 10 TABLET ORAL at 14:45

## 2017-09-11 RX ADMIN — CLONAZEPAM 2 MG: 1 TABLET ORAL at 21:04

## 2017-09-11 RX ADMIN — OXYCODONE HYDROCHLORIDE 10 MG: 10 TABLET ORAL at 00:08

## 2017-09-11 RX ADMIN — MORPHINE SULFATE 4 MG: 4 INJECTION INTRAVENOUS at 08:16

## 2017-09-11 RX ADMIN — OXYCODONE HYDROCHLORIDE 10 MG: 10 TABLET ORAL at 07:34

## 2017-09-11 RX ADMIN — ACETAMINOPHEN 650 MG: 325 TABLET, FILM COATED ORAL at 05:57

## 2017-09-11 RX ADMIN — ENOXAPARIN SODIUM 40 MG: 100 INJECTION SUBCUTANEOUS at 07:34

## 2017-09-11 ASSESSMENT — ENCOUNTER SYMPTOMS
FEVER: 0
HEADACHES: 0
SORE THROAT: 0
BLURRED VISION: 0
NAUSEA: 0
COUGH: 0
DIARRHEA: 0
WHEEZING: 0
SHORTNESS OF BREATH: 0
HEARTBURN: 0
ABDOMINAL PAIN: 0
CHILLS: 0
DIZZINESS: 0
VOMITING: 0

## 2017-09-11 ASSESSMENT — COGNITIVE AND FUNCTIONAL STATUS - GENERAL
MOBILITY SCORE: 18
SUGGESTED CMS G CODE MODIFIER MOBILITY: CK
WALKING IN HOSPITAL ROOM: TOTAL
CLIMB 3 TO 5 STEPS WITH RAILING: TOTAL

## 2017-09-11 ASSESSMENT — PAIN SCALES - GENERAL
PAINLEVEL_OUTOF10: 7
PAINLEVEL_OUTOF10: 4
PAINLEVEL_OUTOF10: 7
PAINLEVEL_OUTOF10: 4
PAINLEVEL_OUTOF10: 3
PAINLEVEL_OUTOF10: 2
PAINLEVEL_OUTOF10: 2
PAINLEVEL_OUTOF10: 0
PAINLEVEL_OUTOF10: 3
PAINLEVEL_OUTOF10: 5
PAINLEVEL_OUTOF10: 6
PAINLEVEL_OUTOF10: 2
PAINLEVEL_OUTOF10: 8

## 2017-09-11 ASSESSMENT — GAIT ASSESSMENTS: GAIT LEVEL OF ASSIST: REFUSED

## 2017-09-11 NOTE — DISCHARGE PLANNING
"Met with pt again to discuss possibility of going home with outpt wound care. Pt continues to express that he \"needs Rehab.\" When pointed out that pt has been mostly independent ADLs lately, he became angry and terminated our conversation.     "

## 2017-09-11 NOTE — CARE PLAN
Problem: Safety  Goal: Will remain free from injury  Outcome: PROGRESSING AS EXPECTED  Explained plan of care to pt as well as instructed to call for help to get in and out of bed from wheelchair. Call bell in reach. Pt gives verbal understanding of need to prevent injury and falls especially after pain medications.

## 2017-09-11 NOTE — PROGRESS NOTES
[facility] Hospitalist Progress Note    Date of Service: 2017    Chief Complaint  52 y.o. male admitted 2017 with Right BKA and left transmetatarsal amputation stump infections    Interval Problem Update  TM stump - surgery done, cult showed MRSA  BKA stump - surgery done, cult showed VRE and Pseudomonas, did not finish course of Zyvox (1 week left)  Wheelchair mobility  Chest wall pain - tender at Right pectoral wing, pt thinks he overexerted himself from pushing on wheelchair, EKG an trop negative    Consultants/Specialty  ID - Yenny  Ortho - Fern    Disposition  SNF        Review of Systems   Constitutional: Negative for chills and fever.   HENT: Negative for sore throat.    Eyes: Negative for blurred vision.   Respiratory: Negative for cough, shortness of breath and wheezing.    Cardiovascular: Positive for leg swelling. Negative for chest pain.   Gastrointestinal: Negative for abdominal pain, diarrhea, heartburn, nausea and vomiting.   Genitourinary: Negative for dysuria.   Neurological: Negative for dizziness and headaches.      Physical Exam  Laboratory/Imaging   Hemodynamics  Temp (24hrs), Av.5 °C (97.7 °F), Min:36.2 °C (97.2 °F), Max:36.7 °C (98.1 °F)   Temperature: 36.7 °C (98.1 °F)  Pulse  Av.2  Min: 58  Max: 111    Blood Pressure: 106/66      Respiratory      Respiration: 18, Pulse Oximetry: 96 %        RUL Breath Sounds: Clear, RML Breath Sounds: Clear, RLL Breath Sounds: Diminished, NANCY Breath Sounds: Clear, LLL Breath Sounds: Diminished    Fluids    Intake/Output Summary (Last 24 hours) at 17 1502  Last data filed at 17 0606   Gross per 24 hour   Intake              360 ml   Output             1250 ml   Net             -890 ml       Nutrition  Orders Placed This Encounter   Procedures   • DIET ORDER     Standing Status:   Standing     Number of Occurrences:   1     Order Specific Question:   Diet:     Answer:   Regular [1]     Comments:   regular trays     Physical Exam    Constitutional: He is oriented to person, place, and time. He appears well-developed and well-nourished.   HENT:   Head: Normocephalic and atraumatic.   Eyes: Conjunctivae are normal. Pupils are equal, round, and reactive to light.   Neck: No tracheal deviation present. No thyromegaly present.   Cardiovascular: Normal rate and regular rhythm.    Pulmonary/Chest: Effort normal and breath sounds normal. He exhibits tenderness.   Abdominal: Soft. Bowel sounds are normal. He exhibits no distension. There is no tenderness.   Musculoskeletal: He exhibits edema.   R BKA, L transmetatarsal amputation  Wound vac at left leg   Lymphadenopathy:     He has no cervical adenopathy.   Neurological: He is alert and oriented to person, place, and time.   Nursing note and vitals reviewed.                               Assessment/Plan     * Skin ulcer of left foot with fat layer exposed (CMS-HCC)- (present on admission)   Assessment & Plan    I and D of left foot with gastroscoleus resection and application of wound vac 7/25   Wound care          Noncompliance- (present on admission)   Assessment & Plan    Counseling done on risks and complications        Pseudomonas infection- (present on admission)   Assessment & Plan    finished Cefepime 8/25        MRSA (methicillin resistant staph aureus) culture positive- (present on admission)   Assessment & Plan    Finished course of Doxycycline 8/25        Chest wall pain   Assessment & Plan    Pain control        ABRIL (acute kidney injury) (CMS-HCC)   Assessment & Plan    resolved        Thrombocytosis (CMS-HCC)- (present on admission)   Assessment & Plan    Follow cbc        Anxiety- (present on admission)   Assessment & Plan    Klonopin        Diarrhea- (present on admission)   Assessment & Plan    Resolved        Opiate dependence (CMS-HCC)- (present on admission)   Assessment & Plan    Methadone, Morphine IM for dressing changes only  Issues with drug-seeking        Wound of right leg,  at BKA site- (present on admission)   Assessment & Plan    s/p BKA revision  Failed to finish course of Zyvox (1 week left)        Tobacco abuse- (present on admission)   Assessment & Plan    Nicotine replacement         Peripheral neuropathy- (present on admission)   Assessment & Plan    Lyrica            Reviewed items::  Radiology images reviewed, Labs reviewed, Medications reviewed and EKG reviewed  Rolon catheter::  No Rolon  DVT prophylaxis pharmacological::  Enoxaparin (Lovenox)  Ulcer Prophylaxis::  No

## 2017-09-11 NOTE — THERAPY
"Physical Therapy Treatment completed.   Bed Mobility:  Supine to Sit: Modified Independent  Transfers: Sit to Stand: Modified Independent  Gait: Level Of Assist: Refused    Plan of Care: Will benefit from Physical Therapy 1 times per week  Discharge Recommendations: Equipment: Will Continue to Assess for Equipment Needs. Post-acute therapy Discharge to home with outpatient or home health for additional skilled therapy services.   Pt is indep around his room and hallway @ w/c level.   See \"Rehab Therapy-Acute\" Patient Summary Report for complete documentation.       "

## 2017-09-11 NOTE — PROGRESS NOTES
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 Takes methadone he states for back pain.  Not diabetic.  Presented to ED with increased pain to L TMA with infected neuropathic ulcer and pain to R BKA.      s/p L foot I & D with VAC placement, GSR by Dr. Shirley on 7/25/17  S/p Right revision below-knee amputation,  Right lower extremity irrigation and debridement, skin, subcutaneous    tissue to bone on 8/15/17    /66   Pulse 88   Temp 36.7 °C (98.1 °F)   Resp 18   Ht 1.854 m (6' 1\")   Wt 65.2 kg (143 lb 11.8 oz)   SpO2 96%   BMI 18.96 kg/m²     R BKA:  Incision healed, sutures taken out 9/6  Wearing  sock and knee immobilizer when OOB    L GSR site   well approximated- COLUMBA    L TMA   assessed during VAC change  100% pink granular tissue. Flush with skin level except for ~0.4cm pocket under skin fold of 5th metatarsal    Cytal to be applied on Wed with next VAC change.       [place] boot to LLE  CROW boot too tight for LLE, pt c/o discomfort to calf. CROW boot applied to check fit. After about 10min, large indentations noted to lower leg. At risk for pressure injury. Pt switched back to jd boot.   Nazario  contacted to see pt.     IV abx completed, ID s/o        PLAN  -Continue VAC change per IP wound team for LLE. Biologic to be applied during change on Wed.   -continue jd boot to be worn when OOB to LLE, HWB with ambulation  -PT for achilles strengthening/stretching exercises LLE  -NWB RLE, Nazario  with Ultra to see pt for prosthesis adjustments for RLE and CROW boot adjustment for LLE        "

## 2017-09-11 NOTE — PROGRESS NOTES
Pt refuses bed alarm. Pt instructed to call for help out of bed to chair and to bathroom. Sign outside pt room for pt risk. Pt wears boot on lt foot. Call light in reach.

## 2017-09-12 LAB
ANION GAP SERPL CALC-SCNC: 9 MMOL/L (ref 0–11.9)
BASOPHILS # BLD AUTO: 0.5 % (ref 0–1.8)
BASOPHILS # BLD: 0.03 K/UL (ref 0–0.12)
BUN SERPL-MCNC: 27 MG/DL (ref 8–22)
CALCIUM SERPL-MCNC: 9.3 MG/DL (ref 8.5–10.5)
CHLORIDE SERPL-SCNC: 102 MMOL/L (ref 96–112)
CO2 SERPL-SCNC: 25 MMOL/L (ref 20–33)
CREAT SERPL-MCNC: 0.95 MG/DL (ref 0.5–1.4)
EOSINOPHIL # BLD AUTO: 0.35 K/UL (ref 0–0.51)
EOSINOPHIL NFR BLD: 5.3 % (ref 0–6.9)
ERYTHROCYTE [DISTWIDTH] IN BLOOD BY AUTOMATED COUNT: 45.3 FL (ref 35.9–50)
GFR SERPL CREATININE-BSD FRML MDRD: >60 ML/MIN/1.73 M 2
GLUCOSE SERPL-MCNC: 122 MG/DL (ref 65–99)
HCT VFR BLD AUTO: 36.7 % (ref 42–52)
HGB BLD-MCNC: 11.7 G/DL (ref 14–18)
IMM GRANULOCYTES # BLD AUTO: 0.09 K/UL (ref 0–0.11)
IMM GRANULOCYTES NFR BLD AUTO: 1.4 % (ref 0–0.9)
LYMPHOCYTES # BLD AUTO: 2.68 K/UL (ref 1–4.8)
LYMPHOCYTES NFR BLD: 40.5 % (ref 22–41)
MCH RBC QN AUTO: 25.3 PG (ref 27–33)
MCHC RBC AUTO-ENTMCNC: 31.9 G/DL (ref 33.7–35.3)
MCV RBC AUTO: 79.3 FL (ref 81.4–97.8)
MONOCYTES # BLD AUTO: 0.52 K/UL (ref 0–0.85)
MONOCYTES NFR BLD AUTO: 7.9 % (ref 0–13.4)
NEUTROPHILS # BLD AUTO: 2.95 K/UL (ref 1.82–7.42)
NEUTROPHILS NFR BLD: 44.4 % (ref 44–72)
NRBC # BLD AUTO: 0 K/UL
NRBC BLD AUTO-RTO: 0 /100 WBC
PLATELET # BLD AUTO: 330 K/UL (ref 164–446)
PMV BLD AUTO: 9.1 FL (ref 9–12.9)
POTASSIUM SERPL-SCNC: 4.3 MMOL/L (ref 3.6–5.5)
RBC # BLD AUTO: 4.63 M/UL (ref 4.7–6.1)
SODIUM SERPL-SCNC: 136 MMOL/L (ref 135–145)
WBC # BLD AUTO: 6.6 K/UL (ref 4.8–10.8)

## 2017-09-12 PROCEDURE — 80048 BASIC METABOLIC PNL TOTAL CA: CPT

## 2017-09-12 PROCEDURE — 700111 HCHG RX REV CODE 636 W/ 250 OVERRIDE (IP): Performed by: INTERNAL MEDICINE

## 2017-09-12 PROCEDURE — 99233 SBSQ HOSP IP/OBS HIGH 50: CPT | Performed by: INTERNAL MEDICINE

## 2017-09-12 PROCEDURE — 700102 HCHG RX REV CODE 250 W/ 637 OVERRIDE(OP): Performed by: INTERNAL MEDICINE

## 2017-09-12 PROCEDURE — 700101 HCHG RX REV CODE 250: Performed by: INTERNAL MEDICINE

## 2017-09-12 PROCEDURE — 36415 COLL VENOUS BLD VENIPUNCTURE: CPT

## 2017-09-12 PROCEDURE — A9270 NON-COVERED ITEM OR SERVICE: HCPCS | Performed by: INTERNAL MEDICINE

## 2017-09-12 PROCEDURE — 85025 COMPLETE CBC W/AUTO DIFF WBC: CPT

## 2017-09-12 PROCEDURE — 770021 HCHG ROOM/CARE - ISO PRIVATE

## 2017-09-12 RX ADMIN — OXYCODONE HYDROCHLORIDE 10 MG: 10 TABLET ORAL at 18:20

## 2017-09-12 RX ADMIN — LIDOCAINE 1 APPLICATION: 40 CREAM TOPICAL at 00:05

## 2017-09-12 RX ADMIN — Medication 325 MG: at 18:20

## 2017-09-12 RX ADMIN — LIDOCAINE 1 PATCH: 50 PATCH CUTANEOUS at 12:40

## 2017-09-12 RX ADMIN — CLONAZEPAM 2 MG: 1 TABLET ORAL at 08:35

## 2017-09-12 RX ADMIN — ACETAMINOPHEN 650 MG: 325 TABLET, FILM COATED ORAL at 05:38

## 2017-09-12 RX ADMIN — METHADONE HYDROCHLORIDE 20 MG: 10 TABLET ORAL at 21:15

## 2017-09-12 RX ADMIN — OXYCODONE HYDROCHLORIDE 10 MG: 10 TABLET ORAL at 01:00

## 2017-09-12 RX ADMIN — METHADONE HYDROCHLORIDE 20 MG: 10 TABLET ORAL at 14:23

## 2017-09-12 RX ADMIN — OXYCODONE HYDROCHLORIDE 10 MG: 10 TABLET ORAL at 22:46

## 2017-09-12 RX ADMIN — METHADONE HYDROCHLORIDE 20 MG: 10 TABLET ORAL at 05:38

## 2017-09-12 RX ADMIN — LIDOCAINE 1 PATCH: 50 PATCH CUTANEOUS at 11:00

## 2017-09-12 RX ADMIN — ACETAMINOPHEN 650 MG: 325 TABLET, FILM COATED ORAL at 00:02

## 2017-09-12 RX ADMIN — OXYCODONE HYDROCHLORIDE 10 MG: 10 TABLET ORAL at 09:15

## 2017-09-12 RX ADMIN — OXYCODONE HYDROCHLORIDE 10 MG: 10 TABLET ORAL at 05:10

## 2017-09-12 RX ADMIN — ENOXAPARIN SODIUM 40 MG: 100 INJECTION SUBCUTANEOUS at 08:35

## 2017-09-12 RX ADMIN — PREGABALIN 150 MG: 150 CAPSULE ORAL at 08:35

## 2017-09-12 RX ADMIN — PREGABALIN 150 MG: 150 CAPSULE ORAL at 21:15

## 2017-09-12 RX ADMIN — Medication 325 MG: at 08:35

## 2017-09-12 RX ADMIN — LOPERAMIDE HYDROCHLORIDE 2 MG: 2 CAPSULE ORAL at 00:03

## 2017-09-12 RX ADMIN — CLONAZEPAM 2 MG: 1 TABLET ORAL at 21:14

## 2017-09-12 RX ADMIN — LIDOCAINE 1 APPLICATION: 40 CREAM TOPICAL at 21:17

## 2017-09-12 RX ADMIN — ACETAMINOPHEN 650 MG: 325 TABLET, FILM COATED ORAL at 12:41

## 2017-09-12 RX ADMIN — ACETAMINOPHEN 650 MG: 325 TABLET, FILM COATED ORAL at 18:20

## 2017-09-12 RX ADMIN — OXYCODONE HYDROCHLORIDE 10 MG: 10 TABLET ORAL at 13:17

## 2017-09-12 RX ADMIN — PREGABALIN 150 MG: 150 CAPSULE ORAL at 14:23

## 2017-09-12 ASSESSMENT — PAIN SCALES - GENERAL
PAINLEVEL_OUTOF10: 4
PAINLEVEL_OUTOF10: 6
PAINLEVEL_OUTOF10: 4
PAINLEVEL_OUTOF10: 8
PAINLEVEL_OUTOF10: 7
PAINLEVEL_OUTOF10: 6
PAINLEVEL_OUTOF10: 8
PAINLEVEL_OUTOF10: 4
PAINLEVEL_OUTOF10: 5
PAINLEVEL_OUTOF10: 5
PAINLEVEL_OUTOF10: 0
PAINLEVEL_OUTOF10: 4
PAINLEVEL_OUTOF10: 7

## 2017-09-12 ASSESSMENT — ENCOUNTER SYMPTOMS
HEADACHES: 0
WHEEZING: 0
NAUSEA: 0
SHORTNESS OF BREATH: 0
VOMITING: 0
HEARTBURN: 0
COUGH: 0
SORE THROAT: 0
CHILLS: 0
DIARRHEA: 0
BLURRED VISION: 0
FEVER: 0
ABDOMINAL PAIN: 0
DIZZINESS: 0

## 2017-09-12 NOTE — DISCHARGE PLANNING
Called [facility] and left message for Jose Miguel requesting call back to discuss plan of care.

## 2017-09-12 NOTE — PROGRESS NOTES
Received report from day RN and assumed care at 1900.  Pt A/O x 4.  Reports pain 6/10, medicated per MAR.  Wound Vac to L foot, patent.  Dressing to L foot, CDI.   Call light within reach.  Bed in lowest position.

## 2017-09-12 NOTE — CARE PLAN
Problem: Safety  Goal: Will remain free from injury  Call light within reach; bed in lowest position; upper side rails up x 2; hourly rounding in place.    Problem: Pain Management  Goal: Pain level will decrease to patient's comfort goal  Pt reports pain 7/10, medicated per MAR; rest/sleep provided.

## 2017-09-12 NOTE — WOUND TEAM
[facility]  Inpatient Services  Wound and Skin Care Treatment Note    Admission Date:  07/22/17     HPI, PMH, SH: Reviewed  Unit where seen by Wound Team:  T3    WOUND CONSULT RELATED TO:   Scheduled NPWT dressing change left plantar foot      SUBJECTIVE:  Pleasant/agreeable    Self Report / Pain Level:  Tolerated well    OBJECTIVE:    Previous NPWT dressing intact    WOUND TYPE, LOCATION, CHARACTERISTICS (Pressure ulcers: location, stage, POA or date identified)    Location/type of wound: Open surgical wound left plantar foot      Periwound:     macerated around wound and in between remaining amputation site of pervious digit; callous-like tissue      Drainage:     scant serosanguinous    Tissue Type and %:    100% pale/pink  Wound Edges:    Attached/macerated    Odor:     none  Exposed structure(s):  Bone palpated    S&S of Infection:     none      Measurements: (cm)  (Taken 9/6/17)   Length:    4.6  Width:     3.0  Depth:     0.2      INTERVENTIONS BY WOUND TEAM:  Removed prev dressing and irrigated wound with wound cleanser.  Benzoin and drape to bull wound skin as needed. Small piece of paste ring used to seal fissure on lateral side.  Covered wound with 1 piece black foam with a bridge and button to dorsum of foot. Secured with drape and applied cont neg pressure at 125mmhg. Placed a mepilex lite under the forefoot to hopefully absorb any moisture. Total black foam=3 pieces.    Interdisciplinary consultation:   Staff RN, patient, LPS    EVALUATION:  wnd stable; Dom from LPS in to have a look; plan is to try using matristem next drsg change    Factors affecting wound healing:  Neuropathy, tobacco abuse     Goals:  Decrease in wound size by 5% every 3 weeks -- no maceration    NURSING PLAN OF CARE ORDERS (x):    Dressing changes: See Dressing Maintenance orders:  x  Skin care: See Skin Care orders: x  Rectal tube care: See Rectal Tube Care orders:   Other orders:    WOUND TEAM PLAN OF CARE (x):    NPWT change 3 x week:   x     Dressing changes by wound team:       Follow up as needed:     x  Other (explain):    Anticipated discharge plans (x):  SNF:           Home Care:           Outpatient Wound Center:   x      Self Care:            Other:

## 2017-09-12 NOTE — PROGRESS NOTES
Pt is AAO x4.  Pt reports a 4/10 pain level.  Understands when next pain med is due.  VS WNL.  Denies chest pain or SOB.  R BKA, dressing CDI.  L dressing and wound vac in place, CDI.  L boot in place.   No IV, MD aware.   POC discussed.  All needs met at this time.  Bed in low position.  Call light within reach.  Rounding in place.

## 2017-09-12 NOTE — CARE PLAN
Problem: Infection  Goal: Will remain free from infection  Contact precaution in place; however, pt refusing to wear gown and gloves when out of room. This RN will continue to reinforce and educate.     Problem: Knowledge Deficit  Goal: Knowledge of disease process/condition, treatment plan, diagnostic tests, and medications will improve  Pt refusing to to DC home, states PT/OT recommend SNF. This RN will investigate and determine POC.     Problem: Pain Management  Goal: Pain level will decrease to patient's comfort goal  Ensuring pain meds are given on time.

## 2017-09-13 LAB
ANION GAP SERPL CALC-SCNC: 7 MMOL/L (ref 0–11.9)
BUN SERPL-MCNC: 30 MG/DL (ref 8–22)
CALCIUM SERPL-MCNC: 9.5 MG/DL (ref 8.5–10.5)
CHLORIDE SERPL-SCNC: 105 MMOL/L (ref 96–112)
CO2 SERPL-SCNC: 28 MMOL/L (ref 20–33)
CREAT SERPL-MCNC: 0.84 MG/DL (ref 0.5–1.4)
ERYTHROCYTE [DISTWIDTH] IN BLOOD BY AUTOMATED COUNT: 49 FL (ref 35.9–50)
GFR SERPL CREATININE-BSD FRML MDRD: >60 ML/MIN/1.73 M 2
GLUCOSE SERPL-MCNC: 128 MG/DL (ref 65–99)
HCT VFR BLD AUTO: 39.3 % (ref 42–52)
HGB BLD-MCNC: 12.4 G/DL (ref 14–18)
MCH RBC QN AUTO: 26 PG (ref 27–33)
MCHC RBC AUTO-ENTMCNC: 31.6 G/DL (ref 33.7–35.3)
MCV RBC AUTO: 82.4 FL (ref 81.4–97.8)
PLATELET # BLD AUTO: 301 K/UL (ref 164–446)
PMV BLD AUTO: 9.3 FL (ref 9–12.9)
POTASSIUM SERPL-SCNC: 4.1 MMOL/L (ref 3.6–5.5)
RBC # BLD AUTO: 4.77 M/UL (ref 4.7–6.1)
SODIUM SERPL-SCNC: 140 MMOL/L (ref 135–145)
WBC # BLD AUTO: 6.1 K/UL (ref 4.8–10.8)

## 2017-09-13 PROCEDURE — A9270 NON-COVERED ITEM OR SERVICE: HCPCS | Performed by: INTERNAL MEDICINE

## 2017-09-13 PROCEDURE — 700101 HCHG RX REV CODE 250: Performed by: INTERNAL MEDICINE

## 2017-09-13 PROCEDURE — 700102 HCHG RX REV CODE 250 W/ 637 OVERRIDE(OP): Performed by: INTERNAL MEDICINE

## 2017-09-13 PROCEDURE — 85027 COMPLETE CBC AUTOMATED: CPT

## 2017-09-13 PROCEDURE — 770021 HCHG ROOM/CARE - ISO PRIVATE

## 2017-09-13 PROCEDURE — 0HRNXK3 REPLACEMENT OF LEFT FOOT SKIN WITH NONAUTOLOGOUS TISSUE SUBSTITUTE, FULL THICKNESS, EXTERNAL APPROACH: ICD-10-PCS | Performed by: ORTHOPAEDIC SURGERY

## 2017-09-13 PROCEDURE — 99231 SBSQ HOSP IP/OBS SF/LOW 25: CPT | Performed by: INTERNAL MEDICINE

## 2017-09-13 PROCEDURE — 36415 COLL VENOUS BLD VENIPUNCTURE: CPT

## 2017-09-13 PROCEDURE — 700111 HCHG RX REV CODE 636 W/ 250 OVERRIDE (IP): Performed by: INTERNAL MEDICINE

## 2017-09-13 PROCEDURE — 80048 BASIC METABOLIC PNL TOTAL CA: CPT

## 2017-09-13 PROCEDURE — 97161 PT EVAL LOW COMPLEX 20 MIN: CPT

## 2017-09-13 PROCEDURE — 700111 HCHG RX REV CODE 636 W/ 250 OVERRIDE (IP): Performed by: FAMILY MEDICINE

## 2017-09-13 RX ORDER — PROCHLORPERAZINE MALEATE 10 MG
10 TABLET ORAL EVERY 6 HOURS PRN
Status: DISCONTINUED | OUTPATIENT
Start: 2017-09-13 | End: 2017-10-18

## 2017-09-13 RX ORDER — LIDOCAINE 40 MG/G
1 CREAM TOPICAL ONCE
Status: COMPLETED | OUTPATIENT
Start: 2017-09-13 | End: 2017-09-13

## 2017-09-13 RX ADMIN — ACETAMINOPHEN 650 MG: 325 TABLET, FILM COATED ORAL at 05:44

## 2017-09-13 RX ADMIN — OXYCODONE HYDROCHLORIDE 10 MG: 10 TABLET ORAL at 03:00

## 2017-09-13 RX ADMIN — LIDOCAINE 1 PATCH: 50 PATCH CUTANEOUS at 10:57

## 2017-09-13 RX ADMIN — Medication 325 MG: at 17:26

## 2017-09-13 RX ADMIN — ACETAMINOPHEN 650 MG: 325 TABLET, FILM COATED ORAL at 10:57

## 2017-09-13 RX ADMIN — ACETAMINOPHEN 650 MG: 325 TABLET, FILM COATED ORAL at 17:26

## 2017-09-13 RX ADMIN — OXYCODONE HYDROCHLORIDE 10 MG: 10 TABLET ORAL at 06:52

## 2017-09-13 RX ADMIN — OXYCODONE HYDROCHLORIDE 10 MG: 10 TABLET ORAL at 19:52

## 2017-09-13 RX ADMIN — OXYCODONE HYDROCHLORIDE 10 MG: 10 TABLET ORAL at 10:57

## 2017-09-13 RX ADMIN — CLONAZEPAM 2 MG: 1 TABLET ORAL at 07:37

## 2017-09-13 RX ADMIN — PREGABALIN 150 MG: 150 CAPSULE ORAL at 07:37

## 2017-09-13 RX ADMIN — PREGABALIN 150 MG: 150 CAPSULE ORAL at 19:52

## 2017-09-13 RX ADMIN — PROCHLORPERAZINE MALEATE 10 MG: 10 TABLET, FILM COATED ORAL at 16:08

## 2017-09-13 RX ADMIN — ACETAMINOPHEN 650 MG: 325 TABLET, FILM COATED ORAL at 23:23

## 2017-09-13 RX ADMIN — ACETAMINOPHEN 650 MG: 325 TABLET, FILM COATED ORAL at 00:03

## 2017-09-13 RX ADMIN — ENOXAPARIN SODIUM 40 MG: 100 INJECTION SUBCUTANEOUS at 07:37

## 2017-09-13 RX ADMIN — PREGABALIN 150 MG: 150 CAPSULE ORAL at 15:00

## 2017-09-13 RX ADMIN — OXYCODONE HYDROCHLORIDE 10 MG: 10 TABLET ORAL at 16:08

## 2017-09-13 RX ADMIN — METHADONE HYDROCHLORIDE 20 MG: 10 TABLET ORAL at 15:00

## 2017-09-13 RX ADMIN — Medication 325 MG: at 07:37

## 2017-09-13 RX ADMIN — MORPHINE SULFATE 4 MG: 4 INJECTION INTRAVENOUS at 14:07

## 2017-09-13 RX ADMIN — CLONAZEPAM 2 MG: 1 TABLET ORAL at 19:52

## 2017-09-13 RX ADMIN — METHADONE HYDROCHLORIDE 20 MG: 10 TABLET ORAL at 05:44

## 2017-09-13 RX ADMIN — METHADONE HYDROCHLORIDE 20 MG: 10 TABLET ORAL at 23:23

## 2017-09-13 RX ADMIN — LIDOCAINE 1 APPLICATION: 40 CREAM TOPICAL at 15:00

## 2017-09-13 ASSESSMENT — PAIN SCALES - GENERAL
PAINLEVEL_OUTOF10: 5
PAINLEVEL_OUTOF10: 6
PAINLEVEL_OUTOF10: 4
PAINLEVEL_OUTOF10: 3
PAINLEVEL_OUTOF10: 3
PAINLEVEL_OUTOF10: 6
PAINLEVEL_OUTOF10: 8
PAINLEVEL_OUTOF10: 5
PAINLEVEL_OUTOF10: 2
PAINLEVEL_OUTOF10: 3
PAINLEVEL_OUTOF10: 5

## 2017-09-13 ASSESSMENT — ENCOUNTER SYMPTOMS
DIZZINESS: 0
COUGH: 0
FEVER: 0
NAUSEA: 0
HEADACHES: 0
WHEEZING: 0
SHORTNESS OF BREATH: 0
DIARRHEA: 0
BLURRED VISION: 0
CHILLS: 0
ABDOMINAL PAIN: 0
SORE THROAT: 0
HEARTBURN: 0
VOMITING: 0

## 2017-09-13 NOTE — PROGRESS NOTES
[facility] Hospitalist Progress Note    Date of Service: 2017    Chief Complaint  52 y.o. male admitted 2017 with Right BKA and left transmetatarsal amputation stump infections    Interval Problem Update  No new issues or concerns.    Consultants/Specialty  ID - Yenny  Ortho - Fern    Disposition  SNF        Review of Systems   Constitutional: Negative for chills and fever.   HENT: Negative for sore throat.    Eyes: Negative for blurred vision.   Respiratory: Negative for cough, shortness of breath and wheezing.    Cardiovascular: Positive for leg swelling. Negative for chest pain.   Gastrointestinal: Negative for abdominal pain, diarrhea, heartburn, nausea and vomiting.   Genitourinary: Negative for dysuria.   Neurological: Negative for dizziness and headaches.      Physical Exam  Laboratory/Imaging   Hemodynamics  Temp (24hrs), Av.7 °C (98.1 °F), Min:36.3 °C (97.3 °F), Max:37.1 °C (98.8 °F)   Temperature: 36.5 °C (97.7 °F)  Pulse  Av.2  Min: 58  Max: 111    Blood Pressure: (!) 99/54      Respiratory      Respiration: 17, Pulse Oximetry: 94 %        RUL Breath Sounds: Clear, RML Breath Sounds: Diminished, RLL Breath Sounds: Diminished, NANCY Breath Sounds: Clear, LLL Breath Sounds: Diminished    Fluids    Intake/Output Summary (Last 24 hours) at 17 1905  Last data filed at 17 1700   Gross per 24 hour   Intake                0 ml   Output             2350 ml   Net            -2350 ml       Nutrition  Orders Placed This Encounter   Procedures   • DIET ORDER     Standing Status:   Standing     Number of Occurrences:   1     Order Specific Question:   Diet:     Answer:   Regular [1]     Comments:   regular trays     Physical Exam   Constitutional: He is oriented to person, place, and time. He appears well-developed and well-nourished.   HENT:   Head: Normocephalic and atraumatic.   Eyes: Conjunctivae are normal. Pupils are equal, round, and reactive to light.   Neck: No tracheal deviation  present. No thyromegaly present.   Cardiovascular: Normal rate and regular rhythm.    Pulmonary/Chest: Effort normal and breath sounds normal. He exhibits tenderness.   Abdominal: Soft. Bowel sounds are normal. He exhibits no distension. There is no tenderness.   Musculoskeletal: He exhibits edema.   R BKA, L transmetatarsal amputation  Wound vac at left leg   Lymphadenopathy:     He has no cervical adenopathy.   Neurological: He is alert and oriented to person, place, and time.   Nursing note and vitals reviewed.      Recent Labs      09/12/17   0149   WBC  6.6   RBC  4.63*   HEMOGLOBIN  11.7*   HEMATOCRIT  36.7*   MCV  79.3*   MCH  25.3*   MCHC  31.9*   RDW  45.3   PLATELETCT  330   MPV  9.1     Recent Labs      09/12/17   0149   SODIUM  136   POTASSIUM  4.3   CHLORIDE  102   CO2  25   GLUCOSE  122*   BUN  27*   CREATININE  0.95   CALCIUM  9.3                      Assessment/Plan     * Skin ulcer of left foot with fat layer exposed (CMS-HCC)- (present on admission)   Assessment & Plan    Presented with right BKA stump pain and wound drainage in addition to left TMA stump wound (nonhealing).    Has history of PVD s/p L TMA and R BKA more than 5 yrs ago in [place] and receiving home health with wound care while living in [facility] while on disability.  I and D of left foot with gastroscoleus resection and application of wound vac 7/25 and then 8/15 by Dr. Shirley  Wound care  Finished courses of antibiotics (see below)  SNF planned as he has wound vac          Chest wall pain   Assessment & Plan    Pain control        Noncompliance- (present on admission)   Assessment & Plan    Counseling done on risks and complications        ABRIL (acute kidney injury) (CMS-HCC)   Assessment & Plan    resolved        Pseudomonas infection- (present on admission)   Assessment & Plan    finished Cefepime 8/25        MRSA (methicillin resistant staph aureus) culture positive- (present on admission)   Assessment & Plan    Finished course  of Doxycycline 8/25        Thrombocytosis (CMS-HCC)- (present on admission)   Assessment & Plan    Follow cbc        Anxiety- (present on admission)   Assessment & Plan    Klonopin        Diarrhea- (present on admission)   Assessment & Plan    Resolved        Opiate dependence (CMS-HCC)- (present on admission)   Assessment & Plan    Methadone, Morphine IM for dressing changes only  Issues with drug-seeking        Wound of right leg, at BKA site- (present on admission)   Assessment & Plan    s/p BKA revision  Failed to finish course of Zyvox (1 week left)        Tobacco abuse- (present on admission)   Assessment & Plan    Nicotine replacement         Peripheral neuropathy- (present on admission)   Assessment & Plan    Lyfeliciaa        I spent 36 minutes, reviewing the chart, notes, vitals, labs, imaging, ordering labs, evaluating Yariel Cheung for assessment, enacting the plan above. Reviewed chart extensively.      Reviewed items::  Radiology images reviewed, Labs reviewed, Medications reviewed and EKG reviewed  Rolon catheter::  No Rolon  DVT prophylaxis pharmacological::  Enoxaparin (Lovenox)  Ulcer Prophylaxis::  No

## 2017-09-13 NOTE — PROGRESS NOTES
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 Takes methadone he states for back pain.  Not diabetic.  Presented to ED on 7/22/17 with increased pain to L TMA with infected neuropathic ulcer and pain to R BKA.        s/p L foot I & D with VAC placement, GSR by Dr. Shirley on 7/25/17  S/p    Right revision below-knee amputation,  Right lower extremity irrigation and debridement, skin, subcutaneous  tissue to bone on 8/15/17    Wound progressing very slowly despite use of NPWT    Pt seen today for application of Acell biologic in an attempt to accelerate wound healing    PROCEDURE:  VAC dressing removed, wound bed prepared by wound team member, Anthony Law, PT  Using sterile technique, 60 mg of Acell Micromatrix powder was hydrated with NS and applied to the wound bed. A 3.0 x 3.5 cm sheet of Acell Cytal wound matrix, 1-layer, hydrated with NS and applied to the wound bed, over the powder    Wound care completed by Anthony/    PLAN  -DC VAC for now  -LPS to reapply Acell 1x/week  -LPS to assess wound every 3 days, change cover dressing as indicated for saturation or displacement  -Nsg to contact LPS if dressing becomes soiled or dislodged.

## 2017-09-13 NOTE — PROGRESS NOTES
Shift report received from day RN, assumed care at 1900. Pt A/O x 4; reports pain 4/10, understands when next pain medication is due. Dressing/wound vac to LLE CDI and running 125 suction. BKA to RLE. On RA. All needs met at this time. Patient has call light and personal belongings within reach. Safety and fall precautions in place. Reviewed current labs, notes, medications, and orders. Bed in lowered and locked position. Call light within reach. Hourly rounding in place.

## 2017-09-13 NOTE — PROGRESS NOTES
Pt is AAO x4.  Pt reports a 3/10 pain level.  Understands when next pain med is due.  VS WNL.  Denies chest pain or SOB.  R BKA, sleave in place CDI.  L dressing and wound vac in place, CDI.  L boot in place.   No IV, MD aware.   POC discussed.  All needs met at this time.  Bed in low position.  Call light within reach.  Rounding in place.

## 2017-09-13 NOTE — CARE PLAN
Problem: Communication  Goal: The ability to communicate needs accurately and effectively will improve    Intervention: Educate patient and significant other/support system about the plan of care, procedures, treatments, medications and allow for questions  POC discussed; allowed time for questions/concerns.      Problem: Skin Integrity  Goal: Risk for impaired skin integrity will decrease  Outcome: PROGRESSING AS EXPECTED  Pt is able to turn by self;

## 2017-09-13 NOTE — CARE PLAN
Problem: Knowledge Deficit  Goal: Knowledge of disease process/condition, treatment plan, diagnostic tests, and medications will improve  This RN observed wound vac to be disconnected. Pt states he removes it at times. This RN explained the importance of keeping wound vac in place at all times. Pt verbally understands. Will closely monitor.     Problem: Skin Integrity  Goal: Risk for impaired skin integrity will decrease  L boot removed and skin assessed. Wound vac tubing creating some skin indentation. Tubing adjusted and boot replaced.

## 2017-09-14 LAB
ERYTHROCYTE [DISTWIDTH] IN BLOOD BY AUTOMATED COUNT: 48.5 FL (ref 35.9–50)
HCT VFR BLD AUTO: 37.2 % (ref 42–52)
HGB BLD-MCNC: 11.6 G/DL (ref 14–18)
MCH RBC QN AUTO: 25.8 PG (ref 27–33)
MCHC RBC AUTO-ENTMCNC: 31.2 G/DL (ref 33.7–35.3)
MCV RBC AUTO: 82.9 FL (ref 81.4–97.8)
PLATELET # BLD AUTO: 266 K/UL (ref 164–446)
PMV BLD AUTO: 9.2 FL (ref 9–12.9)
RBC # BLD AUTO: 4.49 M/UL (ref 4.7–6.1)
WBC # BLD AUTO: 6.8 K/UL (ref 4.8–10.8)

## 2017-09-14 PROCEDURE — 700102 HCHG RX REV CODE 250 W/ 637 OVERRIDE(OP): Performed by: INTERNAL MEDICINE

## 2017-09-14 PROCEDURE — 85027 COMPLETE CBC AUTOMATED: CPT

## 2017-09-14 PROCEDURE — A9270 NON-COVERED ITEM OR SERVICE: HCPCS | Performed by: INTERNAL MEDICINE

## 2017-09-14 PROCEDURE — 36415 COLL VENOUS BLD VENIPUNCTURE: CPT

## 2017-09-14 PROCEDURE — 700111 HCHG RX REV CODE 636 W/ 250 OVERRIDE (IP): Performed by: INTERNAL MEDICINE

## 2017-09-14 PROCEDURE — 99233 SBSQ HOSP IP/OBS HIGH 50: CPT | Performed by: INTERNAL MEDICINE

## 2017-09-14 PROCEDURE — 770021 HCHG ROOM/CARE - ISO PRIVATE

## 2017-09-14 RX ADMIN — PREGABALIN 150 MG: 150 CAPSULE ORAL at 22:00

## 2017-09-14 RX ADMIN — METHADONE HYDROCHLORIDE 20 MG: 10 TABLET ORAL at 05:36

## 2017-09-14 RX ADMIN — OXYCODONE HYDROCHLORIDE 10 MG: 10 TABLET ORAL at 15:03

## 2017-09-14 RX ADMIN — OXYCODONE HYDROCHLORIDE 10 MG: 10 TABLET ORAL at 06:14

## 2017-09-14 RX ADMIN — CLONAZEPAM 2 MG: 1 TABLET ORAL at 22:00

## 2017-09-14 RX ADMIN — LIDOCAINE 1 APPLICATION: 40 CREAM TOPICAL at 08:16

## 2017-09-14 RX ADMIN — PREGABALIN 150 MG: 150 CAPSULE ORAL at 08:16

## 2017-09-14 RX ADMIN — METHADONE HYDROCHLORIDE 20 MG: 10 TABLET ORAL at 15:03

## 2017-09-14 RX ADMIN — PREGABALIN 150 MG: 150 CAPSULE ORAL at 15:03

## 2017-09-14 RX ADMIN — PROCHLORPERAZINE MALEATE 10 MG: 10 TABLET, FILM COATED ORAL at 22:00

## 2017-09-14 RX ADMIN — ENOXAPARIN SODIUM 40 MG: 100 INJECTION SUBCUTANEOUS at 08:16

## 2017-09-14 RX ADMIN — CLONAZEPAM 2 MG: 1 TABLET ORAL at 08:16

## 2017-09-14 RX ADMIN — ACETAMINOPHEN 650 MG: 325 TABLET, FILM COATED ORAL at 05:37

## 2017-09-14 RX ADMIN — OXYCODONE HYDROCHLORIDE 10 MG: 10 TABLET ORAL at 10:14

## 2017-09-14 RX ADMIN — OXYCODONE HYDROCHLORIDE 10 MG: 10 TABLET ORAL at 01:26

## 2017-09-14 RX ADMIN — PROCHLORPERAZINE MALEATE 10 MG: 10 TABLET, FILM COATED ORAL at 08:16

## 2017-09-14 RX ADMIN — Medication 325 MG: at 08:16

## 2017-09-14 RX ADMIN — METHADONE HYDROCHLORIDE 20 MG: 10 TABLET ORAL at 22:00

## 2017-09-14 ASSESSMENT — ENCOUNTER SYMPTOMS
HEARTBURN: 0
CHILLS: 0
DIZZINESS: 0
HEADACHES: 0
ABDOMINAL PAIN: 0
WHEEZING: 0
SHORTNESS OF BREATH: 0
BLURRED VISION: 0
SORE THROAT: 0
DIARRHEA: 0
VOMITING: 0
COUGH: 0
FEVER: 0
NAUSEA: 0

## 2017-09-14 ASSESSMENT — PAIN SCALES - GENERAL
PAINLEVEL_OUTOF10: 7
PAINLEVEL_OUTOF10: 7
PAINLEVEL_OUTOF10: 2
PAINLEVEL_OUTOF10: 6
PAINLEVEL_OUTOF10: 5
PAINLEVEL_OUTOF10: 6
PAINLEVEL_OUTOF10: 6
PAINLEVEL_OUTOF10: 4

## 2017-09-14 NOTE — CONSULTS
"Physical Medicine and Rehabilitation Consultation    Date of Consultation: 9/14/2017  Consulting provider: Elijah Lawson MD  Reason for consultation: assess for acute inpatient rehab appropriateness  Chief complaint: \"I need more rehab\"    HPI: The patient is a 52 y.o. male with a past medical history of PVD, left transmetatarsal amputation, right transtibial amputation, and tobacco use, admitted on 7/22/2017  1:47 PM as a transfer from [facility] with right transtibial amputation infection. Patient reports some time in the last few months his right residual limb opened up and he noted an ulcer on his left TMA residual foot. He moved here a year ago from [place] and hasn't had any wound care. He was unable to use his prosthesis, and was getting around by crawling - has no wheelchair. Has been living in various motels in the city. He was admitted for wound care and is now s/p I&D of the left plantar foot wound with gastroc resection and placement of wound vac on 7/25/2017 by Dr. Shirley, and s/p right transtibial amputation I&D with revision on 8/15/2017 by Dr. Shirely. Infectious disease was consulted, Dr Ramon, and the patient finished antibiotics for pseudomonas and MRSA infection on wounds on 8/25. Patient is currently NWB of the RLE, and heel weight bearing on the LLE. He no longer has wound vacs on his residual limbs, and he was just casted for a new RLE prosthesis by [facility] [employer] 2 days ago.     The patient currently reports frustration that he hasn't been discharged to do more rehab. He is concerned about falling once he gets his new prosthesis due to balance issues with the heel weight bearing on the LLE. He states he doesn't know what caused his amputations/wounds, as he doesn't have diabetes. He reports phantom limb pain in the right LE for about 2 years. Also reports impaired sensation up to his knees bilaterally. Tells me that his plan was to discharge back to [place], where he was living with a friend " (in the basement - which is ground level entry without steps), but that his friend's mother is living there currently and is dying. He states his kids cannot assist him at discharge. He reports he's gained weight since being in the hospital in July.    ROS:  no F/C, N/V, HA, vision changes/dizziness, CP/SOB, abd pain/constipation, diarrhea, dysuria/frequency/urgency, bowel/bladder incontinence, calf pain/swelling.    PMH:  Past Medical History:   Diagnosis Date   • Hx of right BKA (CMS-HCC)    • PVD (peripheral vascular disease) (CMS-HCC)    • Status post transmetatarsal amputation of left foot (CMS-HCC)    • Tobacco use        PSH:  Past Surgical History:   Procedure Laterality Date   • KNEE AMPUTATION BELOW Right 8/15/2017    Procedure: KNEE AMPUTATION BELOW - REVISION;  Surgeon: Chavez Shirley M.D.;  Location: SURGERY [facility];  Service:    • IRRIGATION & DEBRIDEMENT ORTHO Left 7/25/2017    Procedure: IRRIGATION & DEBRIDEMENT ORTHO-FOOT AND GASTROC RECESSION, wound vac placement ;  Surgeon: Chavez Shirley M.D.;  Location: SURGERY [facility];  Service:    • OTHER ORTHOPEDIC SURGERY      right BKA   • OTHER ORTHOPEDIC SURGERY      left foot partial amputation       FHX:  History reviewed. No pertinent family history.    Medications:  Current Facility-Administered Medications   Medication Dose   • prochlorperazine (COMPAZINE) tablet 10 mg  10 mg   • morphine (pf) 4 mg/ml injection 4 mg  4 mg   • acetaminophen (TYLENOL) tablet 650 mg  650 mg   • loperamide (IMODIUM) capsule 2 mg  2 mg   • nicotine (NICODERM) 7 MG/24HR 7 mg  7 mg    And   • nicotine polacrilex (NICORETTE) 2 MG piece 2 mg  2 mg   • normal saline PF 10-20 mL  10-20 mL   • methadone (DOLOPHINE) tablet 20 mg  20 mg   • oxycodone immediate-release (ROXICODONE) tablet 5 mg  5 mg    Or   • oxycodone immediate release (ROXICODONE) tablet 10 mg  10 mg   • clonazepam (KLONOPIN) tablet 2 mg  2 mg   • lidocaine (LMX) 4 % cream 1 Application  1  "Application   • pregabalin (LYRICA) capsule 150 mg  150 mg   • lidocaine (LIDODERM) 5 % 1 Patch  1 Patch   • zolpidem (AMBIEN) tablet 5 mg  5 mg   • enoxaparin (LOVENOX) inj 40 mg  40 mg   • ondansetron (ZOFRAN ODT) dispertab 4 mg  4 mg   • ondansetron (ZOFRAN) syringe/vial injection 4 mg  4 mg   • acetaminophen (TYLENOL) tablet 650 mg  650 mg   • ferrous sulfate tablet 325 mg  325 mg       Allergies:  Allergies   Allergen Reactions   • Chicken Allergy Hives     Skin gets red and blotchy.  Pt wants this in as an allergy    • Turkey Hives     Skin gets red and blotchy.  Pt wants this in as a food allergy   • Succinylcholine      Sister had malignant hyperthermia post administration       Social Hx:  Pre-morbidly, this patient lived in a single level home with no steps to enter, alone and able to care for self. Unclear discharge plan. . Several kids, who cannot assist at discharge.  Employment: used to work laying tile floors and in construction, disabled since his amputations 5 years ago    Prior level of function:   Independent,     Current level of function:  The patient was evaluated by acute care Physical Therapy; currently requiring no assistance for mobility (refusing to walk without a prosthesis).     Physical Exam:  Vitals: Blood pressure 103/58, pulse 81, temperature 36.7 °C (98 °F), resp. rate 18, height 1.854 m (6' 1\"), weight 65.2 kg (143 lb 11.8 oz), SpO2 95 %.  Gen: NAD  HEENT: NC/AT  Cardio: RRR, no mumurs  Pulm: CTAB, with normal respiratory effort  Abd: Soft NTND, active bowel sounds  Ext: No peripheral edema. No calf tenderness. Right transtibial amputation with incision well healed, cylindrical shape. Left trans metatarsal amputation with dressing in place. Medial left knee with unblanchable red area 5-10 minutes after brace was removed.    Mental status:  A&Ox4 (person, place, date, situation)    Motor:  5/5 MMT in UE and LE  Full range of motion of right knee      Sensory:   Decreased " to light touch up to knees bilaterally    Labs:  Recent Labs      09/12/17   0149  09/13/17   0506  09/14/17   0350   RBC  4.63*  4.77  4.49*   HEMOGLOBIN  11.7*  12.4*  11.6*   HEMATOCRIT  36.7*  39.3*  37.2*   PLATELETCT  330  301  266     Recent Labs      09/12/17   0149  09/13/17   0506   SODIUM  136  140   POTASSIUM  4.3  4.1   CHLORIDE  102  105   CO2  25  28   GLUCOSE  122*  128*   BUN  27*  30*   CREATININE  0.95  0.84   CALCIUM  9.3  9.5     Recent Results (from the past 24 hour(s))   CBC WITHOUT DIFFERENTIAL    Collection Time: 09/14/17  3:50 AM   Result Value Ref Range    WBC 6.8 4.8 - 10.8 K/uL    RBC 4.49 (L) 4.70 - 6.10 M/uL    Hemoglobin 11.6 (L) 14.0 - 18.0 g/dL    Hematocrit 37.2 (L) 42.0 - 52.0 %    MCV 82.9 81.4 - 97.8 fL    MCH 25.8 (L) 27.0 - 33.0 pg    MCHC 31.2 (L) 33.7 - 35.3 g/dL    RDW 48.5 35.9 - 50.0 fL    Platelet Count 266 164 - 446 K/uL    MPV 9.2 9.0 - 12.9 fL       ASSESSMENT:    Patient is a 52 y.o. male with a history of a R BKA and L TMA, admitted with wounds and infections of his residual limbs now s/p I&D. Wounds have healed, he has been casted for a new right LE prosthesis, and is heel weight bearing on the left with a boot.    Patient with peripheral neuropathy and decreased sensation up to the knees bilaterally. He will likely be a huge fall risk and will need rehab to for prosthetic training, and to accommodate the left boot/heel weight bearing restriction.     Recommend SNF rehab, with eventual discharge home ambulating with his new prosthesis, with wheel chair mobility as a back up if he is unable to tolerate his new prosthesis.    Also, I noted an area of redness on the medial knee after he removed the left boot. Recommend who ever made this brace to make adjustments, so that he doesn't get a pressure ulcer in this area.    He doesn't require acute inpatient rehabilitation as his medical issues are stable, and he wouldn't need 3 hours of therapy daily nor daily physician  rounding.    Thank you for this consult.    Stephanie Trivedi M.D.  Physical Medicine and Rehabilitation

## 2017-09-14 NOTE — PROGRESS NOTES
"Pt informed that he can not leave the floor without a staff member. This was confirmed with Emily charge nurse and Dr. Urias. The pt is angery and continues to call primary nurse names such as \"Bitch\" from his room.  "

## 2017-09-14 NOTE — PROGRESS NOTES
[facility] Hospitalist Progress Note    Date of Service: 2017    Chief Complaint  52 y.o. male admitted 2017 with Right BKA and left transmetatarsal amputation stump infections    Interval Problem Update  No new issues or concerns. Awaiting SNF.    Consultants/Specialty  ID - Yenny  Ortho - Fern    Disposition  SNF        Review of Systems   Constitutional: Negative for chills and fever.   HENT: Negative for sore throat.    Eyes: Negative for blurred vision.   Respiratory: Negative for cough, shortness of breath and wheezing.    Cardiovascular: Positive for leg swelling. Negative for chest pain.   Gastrointestinal: Negative for abdominal pain, diarrhea, heartburn, nausea and vomiting.   Genitourinary: Negative for dysuria.   Neurological: Negative for dizziness and headaches.      Physical Exam  Laboratory/Imaging   Hemodynamics  Temp (24hrs), Av.2 °C (97.1 °F), Min:35.7 °C (96.3 °F), Max:36.6 °C (97.8 °F)   Temperature: 36.6 °C (97.8 °F)  Pulse  Av.2  Min: 58  Max: 111    Blood Pressure: 113/71      Respiratory      Respiration: 16, Pulse Oximetry: 93 %        RUL Breath Sounds: Clear, RML Breath Sounds: Diminished, RLL Breath Sounds: Diminished, NANCY Breath Sounds: Clear, LLL Breath Sounds: Diminished    Fluids    Intake/Output Summary (Last 24 hours) at 17 1752  Last data filed at 17 1200   Gross per 24 hour   Intake              600 ml   Output              750 ml   Net             -150 ml       Nutrition  Orders Placed This Encounter   Procedures   • DIET ORDER     Standing Status:   Standing     Number of Occurrences:   1     Order Specific Question:   Diet:     Answer:   Regular [1]     Comments:   regular trays     Physical Exam   Constitutional: He is oriented to person, place, and time. He appears well-developed and well-nourished.   HENT:   Head: Normocephalic and atraumatic.   Eyes: Conjunctivae are normal. Pupils are equal, round, and reactive to light.   Neck: No tracheal  deviation present. No thyromegaly present.   Cardiovascular: Normal rate and regular rhythm.    Pulmonary/Chest: Effort normal and breath sounds normal.   Abdominal: Soft. Bowel sounds are normal. He exhibits no distension. There is no tenderness.   Musculoskeletal: He exhibits edema (improved).   R BKA, L transmetatarsal amputation  Wound vac at left leg   Lymphadenopathy:     He has no cervical adenopathy.   Neurological: He is alert and oriented to person, place, and time.   Nursing note and vitals reviewed.      Recent Labs      09/12/17   0149  09/13/17   0506   WBC  6.6  6.1   RBC  4.63*  4.77   HEMOGLOBIN  11.7*  12.4*   HEMATOCRIT  36.7*  39.3*   MCV  79.3*  82.4   MCH  25.3*  26.0*   MCHC  31.9*  31.6*   RDW  45.3  49.0   PLATELETCT  330  301   MPV  9.1  9.3     Recent Labs      09/12/17   0149 09/13/17   0506   SODIUM  136  140   POTASSIUM  4.3  4.1   CHLORIDE  102  105   CO2  25  28   GLUCOSE  122*  128*   BUN  27*  30*   CREATININE  0.95  0.84   CALCIUM  9.3  9.5                      Assessment/Plan     * Skin ulcer of left foot with fat layer exposed (CMS-HCC)- (present on admission)   Assessment & Plan    Presented with right BKA stump pain and wound drainage in addition to left TMA stump wound (nonhealing).    Has history of PVD s/p L TMA and R BKA more than 5 yrs ago in [place] and receiving home health with wound care while living in [facility] while on disability.  I and D of left foot with gastroscoleus resection and application of wound vac 7/25 and then 8/15 by Dr. Shirley  Wound care  Finished courses of antibiotics (see below)  SNF planned as he has wound vac  SW working on it.           Chest wall pain   Assessment & Plan    Pain control        Noncompliance- (present on admission)   Assessment & Plan    Counseling done on risks and complications        ABRIL (acute kidney injury) (CMS-HCC)   Assessment & Plan    resolved        Pseudomonas infection- (present on admission)   Assessment & Plan     finished Cefepime 8/25        MRSA (methicillin resistant staph aureus) culture positive- (present on admission)   Assessment & Plan    Finished course of Doxycycline 8/25        Thrombocytosis (CMS-HCC)- (present on admission)   Assessment & Plan    Follow cbc        Anxiety- (present on admission)   Assessment & Plan    Klonopin        Diarrhea- (present on admission)   Assessment & Plan    Resolved        Opiate dependence (CMS-HCC)- (present on admission)   Assessment & Plan    Methadone, Morphine IM for dressing changes only  Issues with drug-seeking        Wound of right leg, at BKA site- (present on admission)   Assessment & Plan    s/p BKA revision  Failed to finish course of Zyvox (1 week left)        Tobacco abuse- (present on admission)   Assessment & Plan    Nicotine replacement         Peripheral neuropathy- (present on admission)   Assessment & Plan    Jerrod GROSS spent 36 minutes, reviewing the chart, notes, vitals, labs, imaging, ordering labs, [facility], enacting the plan above. Reviewed chart extensively.      Reviewed items::  Radiology images reviewed, Labs reviewed, Medications reviewed and EKG reviewed  Rolon catheter::  No Rolon  DVT prophylaxis pharmacological::  Enoxaparin (Lovenox)  Ulcer Prophylaxis::  No

## 2017-09-14 NOTE — WOUND TEAM
"[facility]  Inpatient Services  Wound and Skin Care Treatment Note    Admission Date:  07/22/17     HPI, PMH, SH: Reviewed  Unit where seen by Wound Team:  T3    WOUND CONSULT RELATED TO:   Scheduled NPWT dressing change left plantar foot      SUBJECTIVE:  Pleasant/agreeable    Self Report / Pain Level:  Tolerated well    OBJECTIVE:    Previous NPWT dressing intact    WOUND TYPE, LOCATION, CHARACTERISTICS (Pressure ulcers: location, stage, POA or date identified)    Location/type of wound: Open surgical wound left plantar foot      Periwound:     macerated around wound and in between remaining amputation site of pervious digit; callous-like tissue      Drainage:     scant serosanguinous    Tissue Type and %:    100% pink/red  Wound Edges:    Attached/macerated    Odor:     none  Exposed structure(s):  Bone palpated    S&S of Infection:     none      Measurements: (cm)  (Taken 9/13/17)   Length:    4.5  Width:     2.3  Depth:     0.2      INTERVENTIONS BY WOUND TEAM:  Removed prev dressing and irrigated wound with wound cleanser.  Measurements and photograph done. Benzoin to bull wound skin as needed. Alice from [facility] in to apply matristem drsg to the wnd bed per protocol; this was secured with steri-strips and covered with a non-adhesive foam -- a piece of silver hydrofiber was placed in the lateral \"fissure\" and secured with a steri-strip -- a sock was used to hold the foam in place and the CROW boot was replaced    Interdisciplinary consultation:   Staff RN, patient, Alice/[place]    EVALUATION:  Measurements slightly smaller again; npwt d/c'ed; LPS will look after the left foot for the next week and will reorder the wnd team as needed    Factors affecting wound healing:  Neuropathy, tobacco abuse     Goals:  Decrease in wound size by 5% every 3 weeks -- no maceration    NURSING PLAN OF CARE ORDERS (x):    Dressing changes: See Dressing Maintenance orders:  x  Skin care: See Skin Care orders: x  Rectal " tube care: See Rectal Tube Care orders:   Other orders:    WOUND TEAM PLAN OF CARE (x):   NPWT change 3 x week:   x     Dressing changes by wound team:       Follow up as needed:     x  Other (explain):    Anticipated discharge plans (x):  SNF:           Home Care:           Outpatient Wound Center:   x      Self Care:            Other:

## 2017-09-14 NOTE — PROGRESS NOTES
Received report from day Rn and assumed care at 1900. Pt A/O x 4. On RA. RLE BKA. Dressing to L foot, CDI, prosthesis in place. Pain 5/10, medicated per MAR. Resting comfortably in bed. All needs met at this time. Call light within reach. Bed in lowest position, Hourly rounding in place.

## 2017-09-14 NOTE — PROGRESS NOTES
[facility] Hospitalist Progress Note    Date of Service: 2017    Chief Complaint  52 y.o. male admitted 2017 with Right BKA and left transmetatarsal amputation stump infections    Interval Problem Update  No new issues or concerns. Wound vac d/jazmín.    Consultants/Specialty  ID - Yenny  Ortho - Fern    Disposition  Rehab        Review of Systems   Constitutional: Negative for chills and fever.   HENT: Negative for sore throat.    Eyes: Negative for blurred vision.   Respiratory: Negative for cough, shortness of breath and wheezing.    Cardiovascular: Positive for leg swelling. Negative for chest pain.   Gastrointestinal: Negative for abdominal pain, diarrhea, heartburn, nausea and vomiting.   Genitourinary: Negative for dysuria.   Neurological: Negative for dizziness and headaches.      Physical Exam  Laboratory/Imaging   Hemodynamics  Temp (24hrs), Av.5 °C (97.7 °F), Min:36.3 °C (97.4 °F), Max:36.7 °C (98 °F)   Temperature: 36.7 °C (98 °F)  Pulse  Av.2  Min: 58  Max: 111    Blood Pressure: 103/58      Respiratory      Respiration: 18, Pulse Oximetry: 95 %        RUL Breath Sounds: Clear, RML Breath Sounds: Diminished, RLL Breath Sounds: Diminished, NANCY Breath Sounds: Clear, LLL Breath Sounds: Diminished    Fluids    Intake/Output Summary (Last 24 hours) at 17 1619  Last data filed at 17 0700   Gross per 24 hour   Intake              360 ml   Output              925 ml   Net             -565 ml       Nutrition  Orders Placed This Encounter   Procedures   • DIET ORDER     Standing Status:   Standing     Number of Occurrences:   1     Order Specific Question:   Diet:     Answer:   Regular [1]     Comments:   regular trays     Physical Exam   Constitutional: He is oriented to person, place, and time. He appears well-developed and well-nourished.   HENT:   Head: Normocephalic and atraumatic.   Eyes: Conjunctivae are normal. Pupils are equal, round, and reactive to light.   Neck: No tracheal  deviation present. No thyromegaly present.   Cardiovascular: Normal rate and regular rhythm.    Pulmonary/Chest: Effort normal and breath sounds normal.   Abdominal: Soft. Bowel sounds are normal. He exhibits no distension. There is no tenderness.   Musculoskeletal: He exhibits edema (improved).   R BKA, L transmetatarsal amputation   Lymphadenopathy:     He has no cervical adenopathy.   Neurological: He is alert and oriented to person, place, and time.   Nursing note and vitals reviewed.      Recent Labs      09/12/17   0149 09/13/17   0506  09/14/17   0350   WBC  6.6  6.1  6.8   RBC  4.63*  4.77  4.49*   HEMOGLOBIN  11.7*  12.4*  11.6*   HEMATOCRIT  36.7*  39.3*  37.2*   MCV  79.3*  82.4  82.9   MCH  25.3*  26.0*  25.8*   MCHC  31.9*  31.6*  31.2*   RDW  45.3  49.0  48.5   PLATELETCT  330  301  266   MPV  9.1  9.3  9.2     Recent Labs      09/12/17 0149 09/13/17   0506   SODIUM  136  140   POTASSIUM  4.3  4.1   CHLORIDE  102  105   CO2  25  28   GLUCOSE  122*  128*   BUN  27*  30*   CREATININE  0.95  0.84   CALCIUM  9.3  9.5                      Assessment/Plan     * Skin ulcer of left foot with fat layer exposed (CMS-HCC)- (present on admission)   Assessment & Plan    Presented with right BKA stump pain and wound drainage in addition to left TMA stump wound (nonhealing).    Has history of PVD s/p L TMA and R BKA more than 5 yrs ago in [place] and receiving home health with wound care while living in [facility] while on disability.  I and D of left foot with gastroscoleus resection and application of wound vac 7/25 and then 8/15 by Dr. Shirley  Wound care  Finished courses of antibiotics (see below)  SNF planned as he has wound vac  SW working on it.           Chest wall pain   Assessment & Plan    Pain control        Noncompliance- (present on admission)   Assessment & Plan    Counseling done on risks and complications        ABRIL (acute kidney injury) (CMS-HCC)   Assessment & Plan    resolved        Pseudomonas  infection- (present on admission)   Assessment & Plan    finished Cefepime 8/25        MRSA (methicillin resistant staph aureus) culture positive- (present on admission)   Assessment & Plan    Finished course of Doxycycline 8/25        Thrombocytosis (CMS-HCC)- (present on admission)   Assessment & Plan    Follow cbc        Anxiety- (present on admission)   Assessment & Plan    Klonopin        Diarrhea- (present on admission)   Assessment & Plan    Resolved        Opiate dependence (CMS-HCC)- (present on admission)   Assessment & Plan    Methadone, Morphine IM for dressing changes only  Issues with drug-seeking        Wound of right leg, at BKA site- (present on admission)   Assessment & Plan    s/p BKA revision  Failed to finish course of Zyvox (1 week left)        Tobacco abuse- (present on admission)   Assessment & Plan    Nicotine replacement         Peripheral neuropathy- (present on admission)   Assessment & Plan    Jerrod GROSS spent 36 minutes, reviewing the chart, notes, vitals, labs, imaging, ordering labs, evaluating Yariel Cheung for assessment, enacting the plan above. Spoke to him about going out of premises and rehabilitation.    Reviewed items::  Radiology images reviewed, Labs reviewed, Medications reviewed and EKG reviewed  Rolon catheter::  No Rolon  DVT prophylaxis pharmacological::  Enoxaparin (Lovenox)  Ulcer Prophylaxis::  No

## 2017-09-14 NOTE — DISCHARGE PLANNING
PMR order received from Dr. Lawson.  [employer] is shown for hid medical provider.  Unfortunately, RIRF is not a benefit from said provider.  However, Yariel would benefit from a Physiatry consult.  Dr. Trivedi to consult.  I do appreciate the referral.

## 2017-09-15 LAB
ANION GAP SERPL CALC-SCNC: 11 MMOL/L (ref 0–11.9)
BUN SERPL-MCNC: 21 MG/DL (ref 8–22)
CALCIUM SERPL-MCNC: 8.9 MG/DL (ref 8.5–10.5)
CHLORIDE SERPL-SCNC: 105 MMOL/L (ref 96–112)
CO2 SERPL-SCNC: 25 MMOL/L (ref 20–33)
CREAT SERPL-MCNC: 0.74 MG/DL (ref 0.5–1.4)
ERYTHROCYTE [DISTWIDTH] IN BLOOD BY AUTOMATED COUNT: 47.2 FL (ref 35.9–50)
GFR SERPL CREATININE-BSD FRML MDRD: >60 ML/MIN/1.73 M 2
GLUCOSE SERPL-MCNC: 148 MG/DL (ref 65–99)
HCT VFR BLD AUTO: 35.7 % (ref 42–52)
HGB BLD-MCNC: 11.3 G/DL (ref 14–18)
MCH RBC QN AUTO: 26 PG (ref 27–33)
MCHC RBC AUTO-ENTMCNC: 31.7 G/DL (ref 33.7–35.3)
MCV RBC AUTO: 82.1 FL (ref 81.4–97.8)
PLATELET # BLD AUTO: 285 K/UL (ref 164–446)
PMV BLD AUTO: 9.5 FL (ref 9–12.9)
POTASSIUM SERPL-SCNC: 3.8 MMOL/L (ref 3.6–5.5)
RBC # BLD AUTO: 4.35 M/UL (ref 4.7–6.1)
SODIUM SERPL-SCNC: 141 MMOL/L (ref 135–145)
WBC # BLD AUTO: 6.6 K/UL (ref 4.8–10.8)

## 2017-09-15 PROCEDURE — 36415 COLL VENOUS BLD VENIPUNCTURE: CPT

## 2017-09-15 PROCEDURE — 770021 HCHG ROOM/CARE - ISO PRIVATE

## 2017-09-15 PROCEDURE — 700102 HCHG RX REV CODE 250 W/ 637 OVERRIDE(OP): Performed by: INTERNAL MEDICINE

## 2017-09-15 PROCEDURE — A9270 NON-COVERED ITEM OR SERVICE: HCPCS | Performed by: INTERNAL MEDICINE

## 2017-09-15 PROCEDURE — 85027 COMPLETE CBC AUTOMATED: CPT

## 2017-09-15 PROCEDURE — 80048 BASIC METABOLIC PNL TOTAL CA: CPT

## 2017-09-15 PROCEDURE — 700111 HCHG RX REV CODE 636 W/ 250 OVERRIDE (IP): Performed by: INTERNAL MEDICINE

## 2017-09-15 PROCEDURE — 700101 HCHG RX REV CODE 250: Performed by: INTERNAL MEDICINE

## 2017-09-15 RX ORDER — HYDROMORPHONE HYDROCHLORIDE 4 MG/1
4 TABLET ORAL EVERY 8 HOURS PRN
Status: DISCONTINUED | OUTPATIENT
Start: 2017-09-15 | End: 2017-10-11

## 2017-09-15 RX ORDER — OXYCODONE HYDROCHLORIDE 10 MG/1
10 TABLET ORAL EVERY 4 HOURS PRN
Status: DISCONTINUED | OUTPATIENT
Start: 2017-09-15 | End: 2017-10-17

## 2017-09-15 RX ORDER — OXYCODONE HYDROCHLORIDE 5 MG/1
5 TABLET ORAL EVERY 4 HOURS PRN
Status: DISCONTINUED | OUTPATIENT
Start: 2017-09-15 | End: 2017-10-18

## 2017-09-15 RX ADMIN — OXYCODONE HYDROCHLORIDE 10 MG: 10 TABLET ORAL at 10:18

## 2017-09-15 RX ADMIN — ACETAMINOPHEN 650 MG: 325 TABLET, FILM COATED ORAL at 00:06

## 2017-09-15 RX ADMIN — PREGABALIN 150 MG: 150 CAPSULE ORAL at 20:56

## 2017-09-15 RX ADMIN — CLONAZEPAM 2 MG: 1 TABLET ORAL at 20:56

## 2017-09-15 RX ADMIN — ACETAMINOPHEN 650 MG: 325 TABLET, FILM COATED ORAL at 16:54

## 2017-09-15 RX ADMIN — OXYCODONE HYDROCHLORIDE 10 MG: 10 TABLET ORAL at 16:54

## 2017-09-15 RX ADMIN — PROCHLORPERAZINE MALEATE 10 MG: 10 TABLET, FILM COATED ORAL at 20:56

## 2017-09-15 RX ADMIN — PREGABALIN 150 MG: 150 CAPSULE ORAL at 09:07

## 2017-09-15 RX ADMIN — OXYCODONE HYDROCHLORIDE 10 MG: 10 TABLET ORAL at 02:40

## 2017-09-15 RX ADMIN — ENOXAPARIN SODIUM 40 MG: 100 INJECTION SUBCUTANEOUS at 09:07

## 2017-09-15 RX ADMIN — ACETAMINOPHEN 650 MG: 325 TABLET, FILM COATED ORAL at 05:59

## 2017-09-15 RX ADMIN — LIDOCAINE 1 PATCH: 50 PATCH CUTANEOUS at 10:18

## 2017-09-15 RX ADMIN — METHADONE HYDROCHLORIDE 20 MG: 10 TABLET ORAL at 13:12

## 2017-09-15 RX ADMIN — PREGABALIN 150 MG: 150 CAPSULE ORAL at 15:26

## 2017-09-15 RX ADMIN — OXYCODONE HYDROCHLORIDE 10 MG: 10 TABLET ORAL at 22:28

## 2017-09-15 RX ADMIN — CLONAZEPAM 2 MG: 1 TABLET ORAL at 09:07

## 2017-09-15 RX ADMIN — Medication 325 MG: at 16:54

## 2017-09-15 RX ADMIN — Medication 325 MG: at 09:07

## 2017-09-15 RX ADMIN — METHADONE HYDROCHLORIDE 20 MG: 10 TABLET ORAL at 20:56

## 2017-09-15 RX ADMIN — METHADONE HYDROCHLORIDE 20 MG: 10 TABLET ORAL at 05:59

## 2017-09-15 RX ADMIN — ACETAMINOPHEN 650 MG: 325 TABLET, FILM COATED ORAL at 13:12

## 2017-09-15 ASSESSMENT — ENCOUNTER SYMPTOMS
HEADACHES: 0
CHILLS: 0
HEARTBURN: 0
WHEEZING: 0
COUGH: 0
FEVER: 0
DIZZINESS: 0
ABDOMINAL PAIN: 0
VOMITING: 0
SHORTNESS OF BREATH: 0
DIARRHEA: 0
SORE THROAT: 0
NAUSEA: 0
BLURRED VISION: 0

## 2017-09-15 ASSESSMENT — PAIN SCALES - GENERAL
PAINLEVEL_OUTOF10: 6

## 2017-09-15 NOTE — CARE PLAN
Problem: Safety  Goal: Will remain free from injury  Outcome: PROGRESSING AS EXPECTED  Treaded socks in place, bed in the lowest position, call light and belongings within reach, pt call for assistance appropriately    Problem: Pain Management  Goal: Pain level will decrease to patient's comfort goal  Outcome: PROGRESSING AS EXPECTED  Pt. Taking oxy 10 with adequate pain control.

## 2017-09-15 NOTE — PROGRESS NOTES
[facility] Hospitalist Progress Note    Date of Service: 9/15/2017    Chief Complaint  52 y.o. male admitted 2017 with Right BKA and left transmetatarsal amputation stump infections    Interval Problem Update  He wanted more pain medicines as he feels bad.     Consultants/Specialty  ID - Yenny  Ortho - Fern    Disposition  Rehab        Review of Systems   Constitutional: Negative for chills and fever.   HENT: Negative for sore throat.    Eyes: Negative for blurred vision.   Respiratory: Negative for cough, shortness of breath and wheezing.    Cardiovascular: Positive for leg swelling. Negative for chest pain.   Gastrointestinal: Negative for abdominal pain, diarrhea, heartburn, nausea and vomiting.   Genitourinary: Negative for dysuria.   Neurological: Negative for dizziness and headaches.      Physical Exam  Laboratory/Imaging   Hemodynamics  Temp (24hrs), Av.4 °C (97.5 °F), Min:36.3 °C (97.4 °F), Max:36.4 °C (97.6 °F)   Temperature: 36.3 °C (97.4 °F)  Pulse  Av.2  Min: 58  Max: 111    Blood Pressure: 105/57      Respiratory      Respiration: 18, Pulse Oximetry: 93 %        RUL Breath Sounds: Clear, RML Breath Sounds: Clear;Diminished, RLL Breath Sounds: Clear;Diminished, NANCY Breath Sounds: Clear, LLL Breath Sounds: Diminished    Fluids    Intake/Output Summary (Last 24 hours) at 09/15/17 1533  Last data filed at 09/15/17 1200   Gross per 24 hour   Intake             1320 ml   Output             1750 ml   Net             -430 ml       Nutrition  Orders Placed This Encounter   Procedures   • DIET ORDER     Standing Status:   Standing     Number of Occurrences:   1     Order Specific Question:   Diet:     Answer:   Regular [1]     Comments:   regular trays     Physical Exam   Constitutional: He is oriented to person, place, and time. He appears well-developed and well-nourished.   HENT:   Head: Normocephalic and atraumatic.   Eyes: Conjunctivae are normal. Pupils are equal, round, and reactive to light.    Neck: No tracheal deviation present. No thyromegaly present.   Cardiovascular: Normal rate and regular rhythm.    Pulmonary/Chest: Effort normal and breath sounds normal.   Abdominal: Soft. Bowel sounds are normal. He exhibits no distension. There is no tenderness.   Musculoskeletal: He exhibits edema (improved) and tenderness (nonspecific, mainly foot but pain out of proportion to exam).   R BKA, L transmetatarsal amputation   Lymphadenopathy:     He has no cervical adenopathy.   Neurological: He is alert and oriented to person, place, and time.   Nursing note and vitals reviewed.      Recent Labs      09/13/17   0506  09/14/17   0350  09/15/17   0356   WBC  6.1  6.8  6.6   RBC  4.77  4.49*  4.35*   HEMOGLOBIN  12.4*  11.6*  11.3*   HEMATOCRIT  39.3*  37.2*  35.7*   MCV  82.4  82.9  82.1   MCH  26.0*  25.8*  26.0*   MCHC  31.6*  31.2*  31.7*   RDW  49.0  48.5  47.2   PLATELETCT  301  266  285   MPV  9.3  9.2  9.5     Recent Labs      09/13/17   0506  09/15/17   0356   SODIUM  140  141   POTASSIUM  4.1  3.8   CHLORIDE  105  105   CO2  28  25   GLUCOSE  128*  148*   BUN  30*  21   CREATININE  0.84  0.74   CALCIUM  9.5  8.9                      Assessment/Plan     * Skin ulcer of left foot with fat layer exposed (CMS-HCC)- (present on admission)   Assessment & Plan    Presented with right BKA stump pain and wound drainage in addition to left TMA stump wound (nonhealing).    Has history of PVD s/p L TMA and R BKA more than 5 yrs ago in [place] and receiving home health with wound care while living in [facility] while on disability.  I and D of left foot with gastroscoleus resection and application of wound vac 7/25 and then 8/15 by Dr. Shirley  Wound care  Finished courses of antibiotics (see below)  Wound vac d/jazmín 9/14  After speaking with Yariel Cheung, he wants to have rehabilitation whether it is here or [place]. He has had rehab before and would like to get stronger. Rehab referral made.          Chest wall pain    Assessment & Plan    Pain control        Noncompliance- (present on admission)   Assessment & Plan    Counseling done on risks and complications        ABRIL (acute kidney injury) (CMS-HCC)   Assessment & Plan    resolved        Pseudomonas infection- (present on admission)   Assessment & Plan    finished Cefepime 8/25        MRSA (methicillin resistant staph aureus) culture positive- (present on admission)   Assessment & Plan    Finished course of Doxycycline 8/25        Thrombocytosis (CMS-HCC)- (present on admission)   Assessment & Plan    Follow cbc        Anxiety- (present on admission)   Assessment & Plan    Klonopin        Diarrhea- (present on admission)   Assessment & Plan    Resolved        Opiate dependence (CMS-HCC)- (present on admission)   Assessment & Plan    Methadone, Morphine IM for dressing changes only  Issues with drug-seeking        Wound of right leg, at BKA site- (present on admission)   Assessment & Plan    s/p BKA revision  Failed to finish course of Zyvox (1 week left)        Tobacco abuse- (present on admission)   Assessment & Plan    Nicotine replacement         Peripheral neuropathy- (present on admission)   Assessment & Plan    Jerrod        I spent 35 minutes, reviewing the chart, notes, vitals, labs, imaging, ordering labs, evaluating Yariel Cheung for assessment, enacting the plan above. Spoke to him about going out of premises and rehabilitation.    Reviewed items::  Radiology images reviewed, Labs reviewed, Medications reviewed and EKG reviewed  Rolon catheter::  No Rolon  DVT prophylaxis pharmacological::  Enoxaparin (Lovenox)  Ulcer Prophylaxis::  No

## 2017-09-15 NOTE — CARE PLAN
Problem: Discharge Barriers/Planning  Goal: Patient's continuum of care needs will be met    Intervention: Assess potential discharge barriers on admission and throughout hospital stay  Wound vac discontinued yesterday, possible discharge to home or rehab, patient aware of plan.       Problem: Skin Integrity  Goal: Risk for impaired skin integrity will decrease    Intervention: Assess risk factors for impaired skin integrity and/or pressure ulcers  Assessed right stump, post-surgical site is intact, no drainage noted, skin is pink, Sleeve applied correctly to right stump. Dressing changed to left foot, dressing is itnact.

## 2017-09-15 NOTE — DISCHARGE PLANNING
Per Dr. Trivedi consult:    Recommend SNF rehab, with eventual discharge home ambulating with his new prosthesis, with wheel chair mobility as a back up if he is unable to tolerate his new prosthesis.     Also, I noted an area of redness on the medial knee after he removed the left boot. Recommend who ever made this brace to make adjustments, so that he doesn't get a pressure ulcer in this area.

## 2017-09-15 NOTE — PROGRESS NOTES
Report received. Assumed care of pt. A/O x4. VSS. Responds appropriately.c/o of pain medicated per MAR. Assessment complete, R BKA, immobilizer in place, dressing to L foot cdi. Pt. Transfers well to wheelchair. Discussed POC,safety, mobility, pain control, pt verbalizes understanding. Explained importance of calling before getting OOB. Call light and belongings within reach.  Bed in the lowest position. Treaded socks in place. Hourly rounding in progress. Will continue to monitor .

## 2017-09-16 LAB
ERYTHROCYTE [DISTWIDTH] IN BLOOD BY AUTOMATED COUNT: 47.5 FL (ref 35.9–50)
HCT VFR BLD AUTO: 36.5 % (ref 42–52)
HGB BLD-MCNC: 11.3 G/DL (ref 14–18)
MCH RBC QN AUTO: 25.3 PG (ref 27–33)
MCHC RBC AUTO-ENTMCNC: 31 G/DL (ref 33.7–35.3)
MCV RBC AUTO: 81.7 FL (ref 81.4–97.8)
PLATELET # BLD AUTO: 253 K/UL (ref 164–446)
PMV BLD AUTO: 9.5 FL (ref 9–12.9)
RBC # BLD AUTO: 4.47 M/UL (ref 4.7–6.1)
WBC # BLD AUTO: 8.9 K/UL (ref 4.8–10.8)

## 2017-09-16 PROCEDURE — A9270 NON-COVERED ITEM OR SERVICE: HCPCS | Performed by: INTERNAL MEDICINE

## 2017-09-16 PROCEDURE — 700102 HCHG RX REV CODE 250 W/ 637 OVERRIDE(OP): Performed by: INTERNAL MEDICINE

## 2017-09-16 PROCEDURE — 700101 HCHG RX REV CODE 250: Performed by: INTERNAL MEDICINE

## 2017-09-16 PROCEDURE — 85027 COMPLETE CBC AUTOMATED: CPT

## 2017-09-16 PROCEDURE — 770021 HCHG ROOM/CARE - ISO PRIVATE

## 2017-09-16 PROCEDURE — 700111 HCHG RX REV CODE 636 W/ 250 OVERRIDE (IP): Performed by: INTERNAL MEDICINE

## 2017-09-16 PROCEDURE — 36415 COLL VENOUS BLD VENIPUNCTURE: CPT

## 2017-09-16 PROCEDURE — 99231 SBSQ HOSP IP/OBS SF/LOW 25: CPT | Performed by: INTERNAL MEDICINE

## 2017-09-16 RX ADMIN — METHADONE HYDROCHLORIDE 20 MG: 10 TABLET ORAL at 15:26

## 2017-09-16 RX ADMIN — OXYCODONE HYDROCHLORIDE 10 MG: 10 TABLET ORAL at 08:22

## 2017-09-16 RX ADMIN — LIDOCAINE 1 APPLICATION: 40 CREAM TOPICAL at 08:34

## 2017-09-16 RX ADMIN — ENOXAPARIN SODIUM 40 MG: 100 INJECTION SUBCUTANEOUS at 08:31

## 2017-09-16 RX ADMIN — METHADONE HYDROCHLORIDE 20 MG: 10 TABLET ORAL at 22:21

## 2017-09-16 RX ADMIN — METHADONE HYDROCHLORIDE 20 MG: 10 TABLET ORAL at 06:18

## 2017-09-16 RX ADMIN — CLONAZEPAM 2 MG: 1 TABLET ORAL at 22:22

## 2017-09-16 RX ADMIN — OXYCODONE HYDROCHLORIDE 10 MG: 10 TABLET ORAL at 03:13

## 2017-09-16 RX ADMIN — ACETAMINOPHEN 650 MG: 325 TABLET, FILM COATED ORAL at 06:18

## 2017-09-16 RX ADMIN — OXYCODONE HYDROCHLORIDE 10 MG: 10 TABLET ORAL at 22:22

## 2017-09-16 RX ADMIN — PROCHLORPERAZINE MALEATE 10 MG: 10 TABLET, FILM COATED ORAL at 17:03

## 2017-09-16 RX ADMIN — HYDROMORPHONE HYDROCHLORIDE 4 MG: 4 TABLET ORAL at 15:38

## 2017-09-16 RX ADMIN — Medication 325 MG: at 17:01

## 2017-09-16 RX ADMIN — ACETAMINOPHEN 650 MG: 325 TABLET, FILM COATED ORAL at 17:01

## 2017-09-16 RX ADMIN — PREGABALIN 150 MG: 150 CAPSULE ORAL at 15:26

## 2017-09-16 RX ADMIN — Medication 325 MG: at 08:31

## 2017-09-16 RX ADMIN — ZOLPIDEM TARTRATE 5 MG: 5 TABLET, FILM COATED ORAL at 00:41

## 2017-09-16 RX ADMIN — CLONAZEPAM 2 MG: 1 TABLET ORAL at 08:31

## 2017-09-16 RX ADMIN — PREGABALIN 150 MG: 150 CAPSULE ORAL at 08:31

## 2017-09-16 RX ADMIN — PREGABALIN 150 MG: 150 CAPSULE ORAL at 22:22

## 2017-09-16 RX ADMIN — LIDOCAINE 1 APPLICATION: 40 CREAM TOPICAL at 22:33

## 2017-09-16 RX ADMIN — LIDOCAINE 1 PATCH: 50 PATCH CUTANEOUS at 12:32

## 2017-09-16 RX ADMIN — ACETAMINOPHEN 650 MG: 325 TABLET, FILM COATED ORAL at 12:31

## 2017-09-16 ASSESSMENT — ENCOUNTER SYMPTOMS
NAUSEA: 0
HEADACHES: 0
BLURRED VISION: 0
HEARTBURN: 0
VOMITING: 0
ABDOMINAL PAIN: 0
DIZZINESS: 0
CHILLS: 0
COUGH: 0
SHORTNESS OF BREATH: 0
SORE THROAT: 0
FEVER: 0
WHEEZING: 0
DIARRHEA: 0

## 2017-09-16 ASSESSMENT — PAIN SCALES - GENERAL
PAINLEVEL_OUTOF10: 8
PAINLEVEL_OUTOF10: 4
PAINLEVEL_OUTOF10: 6
PAINLEVEL_OUTOF10: 6
PAINLEVEL_OUTOF10: 5
PAINLEVEL_OUTOF10: 5
PAINLEVEL_OUTOF10: 2
PAINLEVEL_OUTOF10: 3
PAINLEVEL_OUTOF10: 5
PAINLEVEL_OUTOF10: 6

## 2017-09-16 NOTE — PROGRESS NOTES
[facility] Hospitalist Progress Note    Date of Service: 2017    Chief Complaint  52 y.o. male admitted 2017 with Right BKA and left transmetatarsal amputation stump infections    Interval Problem Update  He wanted more pain medicines but pain out of proportion. Awaiting rehab.    Consultants/Specialty  ID - Yenny  Ortho - Fern    Disposition  Rehab        Review of Systems   Constitutional: Negative for chills and fever.   HENT: Negative for sore throat.    Eyes: Negative for blurred vision.   Respiratory: Negative for cough, shortness of breath and wheezing.    Cardiovascular: Positive for leg swelling. Negative for chest pain.   Gastrointestinal: Negative for abdominal pain, diarrhea, heartburn, nausea and vomiting.   Genitourinary: Negative for dysuria.   Neurological: Negative for dizziness and headaches.      Physical Exam  Laboratory/Imaging   Hemodynamics  Temp (24hrs), Av.4 °C (97.5 °F), Min:36.1 °C (97 °F), Max:36.7 °C (98 °F)   Temperature: 36.2 °C (97.2 °F)  Pulse  Av.1  Min: 58  Max: 111    Blood Pressure: 118/73      Respiratory      Respiration: 18, Pulse Oximetry: 95 %        RUL Breath Sounds: Clear, RML Breath Sounds: Clear;Diminished, RLL Breath Sounds: Clear;Diminished, NANCY Breath Sounds: Clear, LLL Breath Sounds: Clear;Diminished    Fluids    Intake/Output Summary (Last 24 hours) at 17 1312  Last data filed at 17 1300   Gross per 24 hour   Intake              838 ml   Output             1750 ml   Net             -912 ml       Nutrition  Orders Placed This Encounter   Procedures   • DIET ORDER     Standing Status:   Standing     Number of Occurrences:   1     Order Specific Question:   Diet:     Answer:   Regular [1]     Comments:   regular trays     Physical Exam   Constitutional: He is oriented to person, place, and time. He appears well-developed and well-nourished.   HENT:   Head: Normocephalic and atraumatic.   Eyes: Conjunctivae are normal. Pupils are equal,  round, and reactive to light.   Neck: No tracheal deviation present. No thyromegaly present.   Cardiovascular: Normal rate and regular rhythm.    Pulmonary/Chest: Effort normal and breath sounds normal.   Abdominal: Soft. Bowel sounds are normal. He exhibits no distension. There is no tenderness.   Musculoskeletal: He exhibits edema (improved) and tenderness (nonspecific, mainly foot but pain out of proportion to exam).   R BKA, L transmetatarsal amputation   Lymphadenopathy:     He has no cervical adenopathy.   Neurological: He is alert and oriented to person, place, and time.   Nursing note and vitals reviewed.      Recent Labs      09/14/17   0350  09/15/17   0356  09/16/17   0547   WBC  6.8  6.6  8.9   RBC  4.49*  4.35*  4.47*   HEMOGLOBIN  11.6*  11.3*  11.3*   HEMATOCRIT  37.2*  35.7*  36.5*   MCV  82.9  82.1  81.7   MCH  25.8*  26.0*  25.3*   MCHC  31.2*  31.7*  31.0*   RDW  48.5  47.2  47.5   PLATELETCT  266  285  253   MPV  9.2  9.5  9.5     Recent Labs      09/15/17   0356   SODIUM  141   POTASSIUM  3.8   CHLORIDE  105   CO2  25   GLUCOSE  148*   BUN  21   CREATININE  0.74   CALCIUM  8.9                      Assessment/Plan     * Skin ulcer of left foot with fat layer exposed (CMS-HCC)- (present on admission)   Assessment & Plan    Presented with right BKA stump pain and wound drainage in addition to left TMA stump wound (nonhealing).    Has history of PVD s/p L TMA and R BKA more than 5 yrs ago in [place] and receiving home health with wound care while living in [facility] while on disability.  I and D of left foot with gastroscoleus resection and application of wound vac 7/25 and then 8/15 by Dr. Shirley  Wound care  Finished courses of antibiotics (see below)  Wound vac d/jazmín 9/14  After speaking with Yariel Cheung, he wants to have rehabilitation whether it is here or [place]. He has had rehab before and would like to get stronger. Rehab referral made.          Chest wall pain   Assessment & Plan    Pain  control        Noncompliance- (present on admission)   Assessment & Plan    Counseling done on risks and complications        ABRIL (acute kidney injury) (CMS-HCC)   Assessment & Plan    resolved        Pseudomonas infection- (present on admission)   Assessment & Plan    finished Cefepime 8/25        MRSA (methicillin resistant staph aureus) culture positive- (present on admission)   Assessment & Plan    Finished course of Doxycycline 8/25        Thrombocytosis (CMS-HCC)- (present on admission)   Assessment & Plan    Follow cbc        Anxiety- (present on admission)   Assessment & Plan    Klonopin        Diarrhea- (present on admission)   Assessment & Plan    Resolved        Opiate dependence (CMS-HCC)- (present on admission)   Assessment & Plan    Methadone, Morphine IM for dressing changes only  Issues with drug-seeking        Wound of right leg, at BKA site- (present on admission)   Assessment & Plan    s/p BKA revision  Failed to finish course of Zyvox (1 week left)        Tobacco abuse- (present on admission)   Assessment & Plan    Nicotine replacement         Peripheral neuropathy- (present on admission)   Assessment & Plan    Lyrica            Reviewed items::  Radiology images reviewed, Labs reviewed, Medications reviewed and EKG reviewed  Rolon catheter::  No Rolon  DVT prophylaxis pharmacological::  Enoxaparin (Lovenox)  Ulcer Prophylaxis::  No

## 2017-09-16 NOTE — PROGRESS NOTES
"Pt up to chair calm, cooperative, pleasant affect. RN reviewed POC which includes continued pain mgt, wound care. RN encouraged CDB w/ \"I.S.\" will reinforce t/o day. Pt has Lovenox for VTE. Pt is independent w/ turns for skin integrity. Fall and safety precautions in place, pt uses call light appropriately. Hourly rounds ongoing, call light w/in reach.  "

## 2017-09-16 NOTE — CARE PLAN
Problem: Discharge Barriers/Planning  Goal: Patient's continuum of care needs will be met    Intervention: Involve patient and significant other/support system in setting and prioritizing goals for hospital stay and discharge  Referrals sent to SNF in [place], patient stating he is ready to leave, discussed progress in discharge planning, patient verbalized understanding.       Problem: Pain Management  Goal: Pain level will decrease to patient's comfort goal    Intervention: Follow pain managment plan developed in collaboration with patient and Interdisciplinary Team  Patient reporting moderate pain level throughout shift, no indication for PRN IV dilaudid at this time. Patient above to move independently in his wheelchair with no difficulty.

## 2017-09-17 LAB
ERYTHROCYTE [DISTWIDTH] IN BLOOD BY AUTOMATED COUNT: 47 FL (ref 35.9–50)
HCT VFR BLD AUTO: 35.6 % (ref 42–52)
HGB BLD-MCNC: 11.5 G/DL (ref 14–18)
MCH RBC QN AUTO: 26.4 PG (ref 27–33)
MCHC RBC AUTO-ENTMCNC: 32.3 G/DL (ref 33.7–35.3)
MCV RBC AUTO: 81.7 FL (ref 81.4–97.8)
PLATELET # BLD AUTO: 246 K/UL (ref 164–446)
PMV BLD AUTO: 9.5 FL (ref 9–12.9)
RBC # BLD AUTO: 4.36 M/UL (ref 4.7–6.1)
WBC # BLD AUTO: 5.7 K/UL (ref 4.8–10.8)

## 2017-09-17 PROCEDURE — 700111 HCHG RX REV CODE 636 W/ 250 OVERRIDE (IP): Performed by: INTERNAL MEDICINE

## 2017-09-17 PROCEDURE — 700102 HCHG RX REV CODE 250 W/ 637 OVERRIDE(OP): Performed by: INTERNAL MEDICINE

## 2017-09-17 PROCEDURE — 770021 HCHG ROOM/CARE - ISO PRIVATE

## 2017-09-17 PROCEDURE — A9270 NON-COVERED ITEM OR SERVICE: HCPCS | Performed by: INTERNAL MEDICINE

## 2017-09-17 PROCEDURE — 85027 COMPLETE CBC AUTOMATED: CPT

## 2017-09-17 PROCEDURE — 36415 COLL VENOUS BLD VENIPUNCTURE: CPT

## 2017-09-17 PROCEDURE — 99233 SBSQ HOSP IP/OBS HIGH 50: CPT | Performed by: INTERNAL MEDICINE

## 2017-09-17 PROCEDURE — 700101 HCHG RX REV CODE 250: Performed by: INTERNAL MEDICINE

## 2017-09-17 RX ADMIN — Medication 325 MG: at 07:59

## 2017-09-17 RX ADMIN — ACETAMINOPHEN 650 MG: 325 TABLET, FILM COATED ORAL at 05:17

## 2017-09-17 RX ADMIN — ACETAMINOPHEN 650 MG: 325 TABLET, FILM COATED ORAL at 17:54

## 2017-09-17 RX ADMIN — OXYCODONE HYDROCHLORIDE 10 MG: 10 TABLET ORAL at 22:12

## 2017-09-17 RX ADMIN — PREGABALIN 150 MG: 150 CAPSULE ORAL at 07:59

## 2017-09-17 RX ADMIN — PREGABALIN 150 MG: 150 CAPSULE ORAL at 14:34

## 2017-09-17 RX ADMIN — LIDOCAINE 1 PATCH: 50 PATCH CUTANEOUS at 11:54

## 2017-09-17 RX ADMIN — OXYCODONE HYDROCHLORIDE 10 MG: 10 TABLET ORAL at 11:53

## 2017-09-17 RX ADMIN — PROCHLORPERAZINE MALEATE 10 MG: 10 TABLET, FILM COATED ORAL at 09:29

## 2017-09-17 RX ADMIN — METHADONE HYDROCHLORIDE 20 MG: 10 TABLET ORAL at 22:12

## 2017-09-17 RX ADMIN — OXYCODONE HYDROCHLORIDE 10 MG: 10 TABLET ORAL at 18:13

## 2017-09-17 RX ADMIN — METHADONE HYDROCHLORIDE 20 MG: 10 TABLET ORAL at 05:17

## 2017-09-17 RX ADMIN — OXYCODONE HYDROCHLORIDE 10 MG: 10 TABLET ORAL at 03:50

## 2017-09-17 RX ADMIN — ACETAMINOPHEN 650 MG: 325 TABLET, FILM COATED ORAL at 00:41

## 2017-09-17 RX ADMIN — CLONAZEPAM 2 MG: 1 TABLET ORAL at 07:59

## 2017-09-17 RX ADMIN — OXYCODONE HYDROCHLORIDE 10 MG: 10 TABLET ORAL at 08:00

## 2017-09-17 RX ADMIN — ZOLPIDEM TARTRATE 5 MG: 5 TABLET, FILM COATED ORAL at 00:41

## 2017-09-17 RX ADMIN — ACETAMINOPHEN 650 MG: 325 TABLET, FILM COATED ORAL at 11:53

## 2017-09-17 RX ADMIN — METHADONE HYDROCHLORIDE 20 MG: 10 TABLET ORAL at 14:34

## 2017-09-17 RX ADMIN — Medication 325 MG: at 17:54

## 2017-09-17 RX ADMIN — ENOXAPARIN SODIUM 40 MG: 100 INJECTION SUBCUTANEOUS at 08:00

## 2017-09-17 RX ADMIN — LIDOCAINE 1 APPLICATION: 40 CREAM TOPICAL at 07:59

## 2017-09-17 RX ADMIN — CLONAZEPAM 2 MG: 1 TABLET ORAL at 22:13

## 2017-09-17 RX ADMIN — PREGABALIN 150 MG: 150 CAPSULE ORAL at 22:13

## 2017-09-17 ASSESSMENT — PAIN SCALES - GENERAL
PAINLEVEL_OUTOF10: 6
PAINLEVEL_OUTOF10: 6
PAINLEVEL_OUTOF10: 5
PAINLEVEL_OUTOF10: 3
PAINLEVEL_OUTOF10: 4
PAINLEVEL_OUTOF10: 5
PAINLEVEL_OUTOF10: 6
PAINLEVEL_OUTOF10: 7
PAINLEVEL_OUTOF10: 3

## 2017-09-17 ASSESSMENT — ENCOUNTER SYMPTOMS
NAUSEA: 0
HEARTBURN: 0
HEADACHES: 0
WHEEZING: 0
BLURRED VISION: 0
FEVER: 0
DIZZINESS: 0
DIARRHEA: 0
ABDOMINAL PAIN: 0
CHILLS: 0
VOMITING: 0
SORE THROAT: 0
SHORTNESS OF BREATH: 0
COUGH: 0

## 2017-09-17 NOTE — PROGRESS NOTES
Assumed care at 1900 after receiving report from day nurse. Assessment completed as documented. immobilizer boot on left foot, did not remove. A&Ox3, confused to time. Had gone to sleep after administration of medications at 2222, checked on him two hours later and he was asleep. Less than 10 minutes later, he was awake and asking for pain medication. Was unaware that he had only slept 2 hours, believed it was later. Asked him if he wanted something to help him sleep and he said he did. Administered Ambien per order. Checked on him later and he was still awake. Went to get another does per order and when I got into his room again, he was asleep. Medication returned. He is currently asleep w/o s/s of distress or pain.

## 2017-09-17 NOTE — PROGRESS NOTES
[facility] Hospitalist Progress Note    Date of Service: 2017    Chief Complaint  52 y.o. male admitted 2017 with Right BKA and left transmetatarsal amputation stump infections    Interval Problem Update  He wanted more pain medicines but pain out of proportion. This request is unchanged.  He is wanting rehab. He said he is currently unable to go home as his home is being remodeled. He does not want skilled and feels he can do 3 hours of rehab    Consultants/Specialty  ID - Yenny  Ortho - Fern    Disposition  Rehab        Review of Systems   Constitutional: Negative for chills and fever.   HENT: Negative for sore throat.    Eyes: Negative for blurred vision.   Respiratory: Negative for cough, shortness of breath and wheezing.    Cardiovascular: Positive for leg swelling. Negative for chest pain.   Gastrointestinal: Negative for abdominal pain, diarrhea, heartburn, nausea and vomiting.   Genitourinary: Negative for dysuria.   Neurological: Negative for dizziness and headaches.      Physical Exam  Laboratory/Imaging   Hemodynamics  Temp (24hrs), Av.5 °C (97.7 °F), Min:36.4 °C (97.5 °F), Max:36.7 °C (98 °F)   Temperature: 36.4 °C (97.5 °F)  Pulse  Av.1  Min: 58  Max: 111    Blood Pressure: 105/62      Respiratory      Respiration: 20, Pulse Oximetry: 94 %        RUL Breath Sounds: Clear, RML Breath Sounds: Diminished, RLL Breath Sounds: Diminished, NANCY Breath Sounds: Clear, LLL Breath Sounds: Diminished    Fluids    Intake/Output Summary (Last 24 hours) at 17 1126  Last data filed at 17 0043   Gross per 24 hour   Intake              360 ml   Output             1100 ml   Net             -740 ml       Nutrition  Orders Placed This Encounter   Procedures   • DIET ORDER     Standing Status:   Standing     Number of Occurrences:   1     Order Specific Question:   Diet:     Answer:   Regular [1]     Comments:   regular trays     Physical Exam   Constitutional: He is oriented to person, place,  and time. He appears well-developed and well-nourished.   HENT:   Head: Normocephalic and atraumatic.   Eyes: Conjunctivae are normal. Pupils are equal, round, and reactive to light.   Neck: No tracheal deviation present. No thyromegaly present.   Cardiovascular: Normal rate and regular rhythm.    Pulmonary/Chest: Effort normal and breath sounds normal.   Abdominal: Soft. Bowel sounds are normal. He exhibits no distension. There is no tenderness.   Musculoskeletal: He exhibits edema (improved) and tenderness (nonspecific, mainly foot but pain out of proportion to exam).   R BKA, L transmetatarsal amputation   Lymphadenopathy:     He has no cervical adenopathy.   Neurological: He is alert and oriented to person, place, and time.   Nursing note and vitals reviewed.      Recent Labs      09/15/17   0356  09/16/17   0547  09/17/17   0345   WBC  6.6  8.9  5.7   RBC  4.35*  4.47*  4.36*   HEMOGLOBIN  11.3*  11.3*  11.5*   HEMATOCRIT  35.7*  36.5*  35.6*   MCV  82.1  81.7  81.7   MCH  26.0*  25.3*  26.4*   MCHC  31.7*  31.0*  32.3*   RDW  47.2  47.5  47.0   PLATELETCT  285  253  246   MPV  9.5  9.5  9.5     Recent Labs      09/15/17   0356   SODIUM  141   POTASSIUM  3.8   CHLORIDE  105   CO2  25   GLUCOSE  148*   BUN  21   CREATININE  0.74   CALCIUM  8.9                      Assessment/Plan     * Skin ulcer of left foot with fat layer exposed (CMS-HCC)- (present on admission)   Assessment & Plan    Presented with right BKA stump pain and wound drainage in addition to left TMA stump wound (nonhealing).    Has history of PVD s/p L TMA and R BKA more than 5 yrs ago in [place] and receiving home health with wound care while living in [facility] while on disability.  I and D of left foot with gastroscoleus resection and application of wound vac 7/25 and then 8/15 by Dr. Shirley  Wound care  Finished courses of antibiotics (see below)  Wound vac d/jazmín 9/14  After speaking with Yariel Cheung, he wants to have rehabilitation whether it  is here or [place]. He has had rehab before and would like to get stronger. Rehab referral made.          Chest wall pain   Assessment & Plan    Pain control        Noncompliance- (present on admission)   Assessment & Plan    Counseling done on risks and complications        ABRIL (acute kidney injury) (CMS-HCC)   Assessment & Plan    resolved        Pseudomonas infection- (present on admission)   Assessment & Plan    finished Cefepime 8/25        MRSA (methicillin resistant staph aureus) culture positive- (present on admission)   Assessment & Plan    Finished course of Doxycycline 8/25        Thrombocytosis (CMS-HCC)- (present on admission)   Assessment & Plan    Follow cbc        Anxiety- (present on admission)   Assessment & Plan    Klonopin        Diarrhea- (present on admission)   Assessment & Plan    Resolved        Opiate dependence (CMS-HCC)- (present on admission)   Assessment & Plan    Methadone, Morphine IM for dressing changes only  Issues with drug-seeking        Wound of right leg, at BKA site- (present on admission)   Assessment & Plan    s/p BKA revision  Failed to finish course of Zyvox (1 week left)        Tobacco abuse- (present on admission)   Assessment & Plan    Nicotine replacement         Peripheral neuropathy- (present on admission)   Assessment & Plan    Jerrod GROSS spent *35 minutes, reviewing the chart, notes, vitals, labs, imaging, ordering labs, evaluating Yariel Cheung for assessment, enacting the plan above. Spoke with him to listen to his concerns      Reviewed items::  Radiology images reviewed, Labs reviewed, Medications reviewed and EKG reviewed  Rolon catheter::  No Rolon  DVT prophylaxis pharmacological::  Enoxaparin (Lovenox)  Ulcer Prophylaxis::  No

## 2017-09-18 LAB
ANION GAP SERPL CALC-SCNC: 7 MMOL/L (ref 0–11.9)
BUN SERPL-MCNC: 20 MG/DL (ref 8–22)
CALCIUM SERPL-MCNC: 9.1 MG/DL (ref 8.5–10.5)
CHLORIDE SERPL-SCNC: 105 MMOL/L (ref 96–112)
CO2 SERPL-SCNC: 25 MMOL/L (ref 20–33)
CREAT SERPL-MCNC: 0.75 MG/DL (ref 0.5–1.4)
ERYTHROCYTE [DISTWIDTH] IN BLOOD BY AUTOMATED COUNT: 47.1 FL (ref 35.9–50)
GFR SERPL CREATININE-BSD FRML MDRD: >60 ML/MIN/1.73 M 2
GLUCOSE SERPL-MCNC: 120 MG/DL (ref 65–99)
HCT VFR BLD AUTO: 40 % (ref 42–52)
HGB BLD-MCNC: 12.9 G/DL (ref 14–18)
MCH RBC QN AUTO: 26.3 PG (ref 27–33)
MCHC RBC AUTO-ENTMCNC: 32.3 G/DL (ref 33.7–35.3)
MCV RBC AUTO: 81.5 FL (ref 81.4–97.8)
PLATELET # BLD AUTO: 277 K/UL (ref 164–446)
PMV BLD AUTO: 9.4 FL (ref 9–12.9)
POTASSIUM SERPL-SCNC: 4.1 MMOL/L (ref 3.6–5.5)
RBC # BLD AUTO: 4.91 M/UL (ref 4.7–6.1)
SODIUM SERPL-SCNC: 137 MMOL/L (ref 135–145)
WBC # BLD AUTO: 5.7 K/UL (ref 4.8–10.8)

## 2017-09-18 PROCEDURE — A9270 NON-COVERED ITEM OR SERVICE: HCPCS | Performed by: INTERNAL MEDICINE

## 2017-09-18 PROCEDURE — 700102 HCHG RX REV CODE 250 W/ 637 OVERRIDE(OP): Performed by: INTERNAL MEDICINE

## 2017-09-18 PROCEDURE — 36415 COLL VENOUS BLD VENIPUNCTURE: CPT

## 2017-09-18 PROCEDURE — 99232 SBSQ HOSP IP/OBS MODERATE 35: CPT | Performed by: INTERNAL MEDICINE

## 2017-09-18 PROCEDURE — 700111 HCHG RX REV CODE 636 W/ 250 OVERRIDE (IP): Performed by: INTERNAL MEDICINE

## 2017-09-18 PROCEDURE — 80048 BASIC METABOLIC PNL TOTAL CA: CPT

## 2017-09-18 PROCEDURE — 85027 COMPLETE CBC AUTOMATED: CPT

## 2017-09-18 PROCEDURE — 770021 HCHG ROOM/CARE - ISO PRIVATE

## 2017-09-18 PROCEDURE — 700101 HCHG RX REV CODE 250: Performed by: INTERNAL MEDICINE

## 2017-09-18 RX ADMIN — OXYCODONE HYDROCHLORIDE 10 MG: 10 TABLET ORAL at 22:23

## 2017-09-18 RX ADMIN — OXYCODONE HYDROCHLORIDE 10 MG: 10 TABLET ORAL at 01:56

## 2017-09-18 RX ADMIN — PREGABALIN 150 MG: 150 CAPSULE ORAL at 22:24

## 2017-09-18 RX ADMIN — ACETAMINOPHEN 650 MG: 325 TABLET, FILM COATED ORAL at 17:20

## 2017-09-18 RX ADMIN — OXYCODONE HYDROCHLORIDE 10 MG: 10 TABLET ORAL at 10:50

## 2017-09-18 RX ADMIN — METHADONE HYDROCHLORIDE 20 MG: 10 TABLET ORAL at 06:11

## 2017-09-18 RX ADMIN — CLONAZEPAM 2 MG: 1 TABLET ORAL at 07:35

## 2017-09-18 RX ADMIN — ENOXAPARIN SODIUM 40 MG: 100 INJECTION SUBCUTANEOUS at 07:35

## 2017-09-18 RX ADMIN — Medication 325 MG: at 07:35

## 2017-09-18 RX ADMIN — ACETAMINOPHEN 650 MG: 325 TABLET, FILM COATED ORAL at 06:11

## 2017-09-18 RX ADMIN — OXYCODONE HYDROCHLORIDE 10 MG: 10 TABLET ORAL at 06:12

## 2017-09-18 RX ADMIN — PREGABALIN 150 MG: 150 CAPSULE ORAL at 15:00

## 2017-09-18 RX ADMIN — Medication 325 MG: at 17:20

## 2017-09-18 RX ADMIN — LIDOCAINE 1 PATCH: 50 PATCH CUTANEOUS at 10:50

## 2017-09-18 RX ADMIN — LIDOCAINE 1 APPLICATION: 40 CREAM TOPICAL at 10:50

## 2017-09-18 RX ADMIN — METHADONE HYDROCHLORIDE 20 MG: 10 TABLET ORAL at 15:00

## 2017-09-18 RX ADMIN — ACETAMINOPHEN 650 MG: 325 TABLET, FILM COATED ORAL at 10:50

## 2017-09-18 RX ADMIN — METHADONE HYDROCHLORIDE 20 MG: 10 TABLET ORAL at 22:24

## 2017-09-18 RX ADMIN — CLONAZEPAM 2 MG: 1 TABLET ORAL at 22:24

## 2017-09-18 RX ADMIN — PREGABALIN 150 MG: 150 CAPSULE ORAL at 07:35

## 2017-09-18 RX ADMIN — ONDANSETRON 4 MG: 4 TABLET, ORALLY DISINTEGRATING ORAL at 19:38

## 2017-09-18 ASSESSMENT — ENCOUNTER SYMPTOMS
VOMITING: 0
DIARRHEA: 0
ABDOMINAL PAIN: 0
WHEEZING: 0
COUGH: 0
SORE THROAT: 0
HEADACHES: 0
BLURRED VISION: 0
FEVER: 0
CHILLS: 0
SHORTNESS OF BREATH: 0
HEARTBURN: 0
NAUSEA: 0
DIZZINESS: 0

## 2017-09-18 ASSESSMENT — PAIN SCALES - GENERAL
PAINLEVEL_OUTOF10: ASSUMED PAIN PRESENT
PAINLEVEL_OUTOF10: 7
PAINLEVEL_OUTOF10: 3
PAINLEVEL_OUTOF10: 5
PAINLEVEL_OUTOF10: 4
PAINLEVEL_OUTOF10: 7
PAINLEVEL_OUTOF10: 7
PAINLEVEL_OUTOF10: 4

## 2017-09-18 NOTE — CARE PLAN
Problem: Infection  Goal: Will remain free from infection  Pt will remain free from any signs of infection

## 2017-09-18 NOTE — PROGRESS NOTES
Pt in stable condition, up to wheelchair with self, pain controlled at this time, call light within reach, will continue with plan of care

## 2017-09-18 NOTE — DISCHARGE PLANNING
Pt has had physiatry eval and SNF LOC determined. Noted area of redness to L medial knee below boot. Called [facility], spoke to LuisE. He will eval boot and area of concern and make recommendation.   Will also call [facility] for help with SNF placement.

## 2017-09-18 NOTE — CARE PLAN
Problem: Safety  Goal: Will remain free from falls  Pt will remain free from falls during shift

## 2017-09-18 NOTE — DISCHARGE PLANNING
[facility]S received notification from [facility] the referral has been denied no open beds.

## 2017-09-18 NOTE — DISCHARGE PLANNING
Called [facility] and left message requesting call back. Also faxed physiatry eval to [facility].

## 2017-09-18 NOTE — DISCHARGE PLANNING
Spoke to Luis E, the boot is custom made by [employer]. He will call [facility] and request that they come to eval.

## 2017-09-18 NOTE — PROGRESS NOTES
Per pt's case manuel Gutierrez I was asked to look into the pt's concern of some skin break down concerns of leg brace fitting. Upon observance pt stated that [facility] has been working with along with the [facility] wound care team. I contacted team [facility] they said they will follow up with coming into review this concern of brace fitting. [facility] contact info is [phone] if any further question are necessary you can contact them at this number referenced.

## 2017-09-18 NOTE — PROGRESS NOTES
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 Takes methadone he states for back pain.  Not diabetic.  Presented to ED on 7/22/17 with increased pain to L TMA with infected neuropathic ulcer and pain to R BKA.        s/p L foot I & D with VAC placement, GSR by Dr. Shirley on 7/25/17  S/p    Right revision below-knee amputation,  Right lower extremity irrigation and debridement, skin, subcutaneous  tissue to bone on 8/15/17    Acell micromatrix powder and acell cytal applied on 9/13.      CROW boot in place. Boot removed. Indentations to fibula, but blanching. Boot to be worn when OOB. Pt stated Lion from [facility] [employer] saw pt on Monday 9/11 and adjusted CROW boot and took measurements for R BKA prosthesis    Assessed dressing. Dislodged with wound exposed. aquacel ag in place to fold to lateral foot. Wound bed 100% pink, proximal plantar foot and periwound severely macerated. separation to lateral 5th met appears decreased from last assessment.   Skin protectant applied to periwound. Applied adhesive foam to ulcer to control drainage.     PLAN  -LPS to reapply Acell 1x/week with VAC application to control exudate and contain dressing  -nursing to change outer dressing q48hr and PRN drainage or dislodgement.

## 2017-09-18 NOTE — DISCHARGE PLANNING
[facility]S received notification from [facility]. The referral has been denied no isolation beds.

## 2017-09-18 NOTE — CARE PLAN
Received report from day RN, assumed care at 1915; Pt A&Ox4, pain is sitting up on bed; Pt pain is 7/10 on his Lt foot; Pt is up w SA - 1 assist, WC; Pt is on RA w SaO2 >90%; Call light and personal possessions within reach, discussed safety interventions w pt; Reviewed recent notes, labs, diagnostics, MD orders; POC discussed      Problem: Safety  Goal: Will remain free from injury  Outcome: PROGRESSING AS EXPECTED  Place pt call light and belongings within reached, pt calls approriately; Instructed to call for assistance, Risk for fall education implemented; Bed lowered and locked, upper siderails up. Hourly rounding in place      Problem: Pain Management  Goal: Pain level will decrease to patient's comfort goal  Outcome: PROGRESSING AS EXPECTED  Medicated per MAR.       Problem: Discharge Barriers/Planning  Goal: Patient's continuum of care needs will be met  Outcome: PROGRESSING AS EXPECTED  Awaits for SNF placement. SW is following

## 2017-09-19 LAB
ERYTHROCYTE [DISTWIDTH] IN BLOOD BY AUTOMATED COUNT: 49 FL (ref 35.9–50)
HCT VFR BLD AUTO: 37.4 % (ref 42–52)
HGB BLD-MCNC: 11.8 G/DL (ref 14–18)
MCH RBC QN AUTO: 26 PG (ref 27–33)
MCHC RBC AUTO-ENTMCNC: 31.6 G/DL (ref 33.7–35.3)
MCV RBC AUTO: 82.6 FL (ref 81.4–97.8)
PLATELET # BLD AUTO: 288 K/UL (ref 164–446)
PMV BLD AUTO: 9.7 FL (ref 9–12.9)
RBC # BLD AUTO: 4.53 M/UL (ref 4.7–6.1)
WBC # BLD AUTO: 5.8 K/UL (ref 4.8–10.8)

## 2017-09-19 PROCEDURE — 700102 HCHG RX REV CODE 250 W/ 637 OVERRIDE(OP): Performed by: INTERNAL MEDICINE

## 2017-09-19 PROCEDURE — 700111 HCHG RX REV CODE 636 W/ 250 OVERRIDE (IP): Performed by: INTERNAL MEDICINE

## 2017-09-19 PROCEDURE — A9270 NON-COVERED ITEM OR SERVICE: HCPCS | Performed by: INTERNAL MEDICINE

## 2017-09-19 PROCEDURE — 770021 HCHG ROOM/CARE - ISO PRIVATE

## 2017-09-19 PROCEDURE — 85027 COMPLETE CBC AUTOMATED: CPT

## 2017-09-19 PROCEDURE — 700101 HCHG RX REV CODE 250: Performed by: INTERNAL MEDICINE

## 2017-09-19 PROCEDURE — 36415 COLL VENOUS BLD VENIPUNCTURE: CPT

## 2017-09-19 PROCEDURE — 99232 SBSQ HOSP IP/OBS MODERATE 35: CPT | Performed by: INTERNAL MEDICINE

## 2017-09-19 RX ADMIN — PREGABALIN 150 MG: 150 CAPSULE ORAL at 20:14

## 2017-09-19 RX ADMIN — ACETAMINOPHEN 650 MG: 325 TABLET, FILM COATED ORAL at 00:52

## 2017-09-19 RX ADMIN — PREGABALIN 150 MG: 150 CAPSULE ORAL at 14:28

## 2017-09-19 RX ADMIN — METHADONE HYDROCHLORIDE 20 MG: 10 TABLET ORAL at 20:14

## 2017-09-19 RX ADMIN — CLONAZEPAM 2 MG: 1 TABLET ORAL at 20:13

## 2017-09-19 RX ADMIN — PREGABALIN 150 MG: 150 CAPSULE ORAL at 08:44

## 2017-09-19 RX ADMIN — METHADONE HYDROCHLORIDE 20 MG: 10 TABLET ORAL at 06:18

## 2017-09-19 RX ADMIN — OXYCODONE HYDROCHLORIDE 10 MG: 10 TABLET ORAL at 06:18

## 2017-09-19 RX ADMIN — ACETAMINOPHEN 650 MG: 325 TABLET, FILM COATED ORAL at 17:17

## 2017-09-19 RX ADMIN — LIDOCAINE 1 PATCH: 50 PATCH CUTANEOUS at 11:26

## 2017-09-19 RX ADMIN — Medication 325 MG: at 08:44

## 2017-09-19 RX ADMIN — OXYCODONE HYDROCHLORIDE 10 MG: 10 TABLET ORAL at 20:14

## 2017-09-19 RX ADMIN — ACETAMINOPHEN 650 MG: 325 TABLET, FILM COATED ORAL at 06:18

## 2017-09-19 RX ADMIN — ENOXAPARIN SODIUM 40 MG: 100 INJECTION SUBCUTANEOUS at 08:44

## 2017-09-19 RX ADMIN — CLONAZEPAM 2 MG: 1 TABLET ORAL at 08:44

## 2017-09-19 RX ADMIN — Medication 325 MG: at 17:17

## 2017-09-19 RX ADMIN — ACETAMINOPHEN 650 MG: 325 TABLET, FILM COATED ORAL at 11:26

## 2017-09-19 RX ADMIN — METHADONE HYDROCHLORIDE 20 MG: 10 TABLET ORAL at 14:28

## 2017-09-19 RX ADMIN — OXYCODONE HYDROCHLORIDE 10 MG: 10 TABLET ORAL at 11:26

## 2017-09-19 ASSESSMENT — ENCOUNTER SYMPTOMS
SORE THROAT: 0
WHEEZING: 0
HEARTBURN: 0
PHOTOPHOBIA: 0
COUGH: 0
CHILLS: 0
WEIGHT LOSS: 0
DIARRHEA: 0
NAUSEA: 0
FEVER: 0
HEADACHES: 0
BLURRED VISION: 0
SHORTNESS OF BREATH: 0
DIZZINESS: 0
EYE PAIN: 0

## 2017-09-19 ASSESSMENT — PAIN SCALES - GENERAL
PAINLEVEL_OUTOF10: 4
PAINLEVEL_OUTOF10: 4
PAINLEVEL_OUTOF10: 5
PAINLEVEL_OUTOF10: 4
PAINLEVEL_OUTOF10: 0
PAINLEVEL_OUTOF10: 3

## 2017-09-19 NOTE — PROGRESS NOTES
[facility] Hospitalist Progress Note    Date of Service: 2017    Chief Complaint  52 y.o. male admitted 2017 with Right BKA and left transmetatarsal amputation stump infections    Interval Problem Update  Sleeping soundly. Unchanged. Awaiting rehab vs SNF.    Consultants/Specialty  ID - Yenny  Ortho - Fern    Disposition  Rehab vs SNF        Review of Systems   Constitutional: Negative for chills and fever.   HENT: Negative for sore throat.    Eyes: Negative for blurred vision.   Respiratory: Negative for cough, shortness of breath and wheezing.    Cardiovascular: Positive for leg swelling. Negative for chest pain.   Gastrointestinal: Negative for abdominal pain, diarrhea, heartburn, nausea and vomiting.   Genitourinary: Negative for dysuria.   Neurological: Negative for dizziness and headaches.      Physical Exam  Laboratory/Imaging   Hemodynamics  Temp (24hrs), Av.3 °C (97.4 °F), Min:36.1 °C (97 °F), Max:36.7 °C (98 °F)   Temperature: 36.3 °C (97.3 °F)  Pulse  Av  Min: 58  Max: 111    Blood Pressure: 123/63      Respiratory      Respiration: 18, Pulse Oximetry: 93 %        RUL Breath Sounds: Clear, RML Breath Sounds: Diminished, RLL Breath Sounds: Diminished, NANCY Breath Sounds: Clear, LLL Breath Sounds: Diminished    Fluids    Intake/Output Summary (Last 24 hours) at 17 1723  Last data filed at 17 0600   Gross per 24 hour   Intake              480 ml   Output              900 ml   Net             -420 ml       Nutrition  Orders Placed This Encounter   Procedures   • DIET ORDER     Standing Status:   Standing     Number of Occurrences:   1     Order Specific Question:   Diet:     Answer:   Regular [1]     Comments:   regular trays     Physical Exam   Constitutional: He is oriented to person, place, and time. He appears well-developed and well-nourished.   HENT:   Head: Normocephalic and atraumatic.   Eyes: Conjunctivae are normal. Pupils are equal, round, and reactive to light.    Neck: No tracheal deviation present. No thyromegaly present.   Cardiovascular: Normal rate and regular rhythm.    Pulmonary/Chest: Effort normal and breath sounds normal.   Abdominal: Soft. Bowel sounds are normal. He exhibits no distension. There is no tenderness.   Musculoskeletal: He exhibits edema (improved) and tenderness (nonspecific, mainly foot but pain out of proportion to exam).   R BKA, L transmetatarsal amputation   Lymphadenopathy:     He has no cervical adenopathy.   Neurological: He is alert and oriented to person, place, and time.   Nursing note and vitals reviewed.      Recent Labs      09/16/17   0547  09/17/17   0345  09/18/17   0632   WBC  8.9  5.7  5.7   RBC  4.47*  4.36*  4.91   HEMOGLOBIN  11.3*  11.5*  12.9*   HEMATOCRIT  36.5*  35.6*  40.0*   MCV  81.7  81.7  81.5   MCH  25.3*  26.4*  26.3*   MCHC  31.0*  32.3*  32.3*   RDW  47.5  47.0  47.1   PLATELETCT  253  246  277   MPV  9.5  9.5  9.4     Recent Labs      09/18/17   0632   SODIUM  137   POTASSIUM  4.1   CHLORIDE  105   CO2  25   GLUCOSE  120*   BUN  20   CREATININE  0.75   CALCIUM  9.1                      Assessment/Plan     * Skin ulcer of left foot with fat layer exposed (CMS-HCC)- (present on admission)   Assessment & Plan    Presented with right BKA stump pain and wound drainage in addition to left TMA stump wound (nonhealing).    Has history of PVD s/p L TMA and R BKA more than 5 yrs ago in [place] and receiving home health with wound care while living in [facility] while on disability.  I and D of left foot with gastroscoleus resection and application of wound vac 7/25 and then 8/15 by Dr. Shirley  Wound care  Finished courses of antibiotics (see below)  Wound vac d/jazmín 9/14  After speaking with Yariel Cheung, he wants to have rehabilitation whether it is here or [place]. He has had rehab before and would like to get stronger. Rehab referral made.          Chest wall pain   Assessment & Plan    Pain control        Noncompliance-  (present on admission)   Assessment & Plan    Counseling done on risks and complications        ABRIL (acute kidney injury) (CMS-HCC)   Assessment & Plan    resolved        Pseudomonas infection- (present on admission)   Assessment & Plan    finished Cefepime 8/25        MRSA (methicillin resistant staph aureus) culture positive- (present on admission)   Assessment & Plan    Finished course of Doxycycline 8/25        Thrombocytosis (CMS-HCC)- (present on admission)   Assessment & Plan    Follow cbc        Anxiety- (present on admission)   Assessment & Plan    Klonopin        Diarrhea- (present on admission)   Assessment & Plan    Resolved        Opiate dependence (CMS-HCC)- (present on admission)   Assessment & Plan    Methadone, Morphine IM for dressing changes only  Issues with drug-seeking        Wound of right leg, at BKA site- (present on admission)   Assessment & Plan    s/p BKA revision  Failed to finish course of Zyvox (1 week left)        Tobacco abuse- (present on admission)   Assessment & Plan    Nicotine replacement         Peripheral neuropathy- (present on admission)   Assessment & Plan    Lyrica              Reviewed items::  Radiology images reviewed, Labs reviewed, Medications reviewed and EKG reviewed  Rolon catheter::  No Rolon  DVT prophylaxis pharmacological::  Enoxaparin (Lovenox)  Ulcer Prophylaxis::  No

## 2017-09-19 NOTE — DISCHARGE PLANNING
Denials from: [facility], [facility], [facility], [facility], [place].  Will fax therapy notes to [facility].

## 2017-09-19 NOTE — CARE PLAN
Problem: Safety  Goal: Will remain free from falls  Outcome: PROGRESSING AS EXPECTED      Problem: Pain Management  Goal: Pain level will decrease to patient's comfort goal  Outcome: PROGRESSING AS EXPECTED

## 2017-09-19 NOTE — PROGRESS NOTES
[facility] Hospitalist Progress Note    Date of Service: 2017    Chief Complaint  52 y.o. male admitted 2017 with Right BKA and left transmetatarsal amputation stump infections    Interval Problem Update   active and no signficant CC overnight. Patient denies nausea vomiting diarrhea. Patient said left lower extremity off the boot today. Patient is awaiting for wound care nurse for further recommendation    Consultants/Specialty  ID - Yenny  Ortho - Fern    Disposition  Rehab vs SNF        Review of Systems   Constitutional: Negative for chills, fever and weight loss.   HENT: Negative for sore throat.    Eyes: Negative for blurred vision, photophobia and pain.   Respiratory: Negative for cough, shortness of breath and wheezing.    Cardiovascular: Positive for leg swelling. Negative for chest pain.   Gastrointestinal: Negative for diarrhea, heartburn and nausea.   Genitourinary: Negative for dysuria.   Neurological: Negative for dizziness and headaches.      Physical Exam  Laboratory/Imaging   Hemodynamics  Temp (24hrs), Av.4 °C (97.5 °F), Min:36.1 °C (97 °F), Max:36.7 °C (98.1 °F)   Temperature: 36.4 °C (97.6 °F)  Pulse  Av.8  Min: 58  Max: 111    Blood Pressure: 103/75      Respiratory      Respiration: 18, Pulse Oximetry: 96 %        RUL Breath Sounds: Clear, RML Breath Sounds: Diminished, RLL Breath Sounds: Diminished, NANCY Breath Sounds: Clear, LLL Breath Sounds: Diminished    Fluids    Intake/Output Summary (Last 24 hours) at 17 0725  Last data filed at 17 2200   Gross per 24 hour   Intake              240 ml   Output              650 ml   Net             -410 ml       Nutrition  Orders Placed This Encounter   Procedures   • DIET ORDER     Standing Status:   Standing     Number of Occurrences:   1     Order Specific Question:   Diet:     Answer:   Regular [1]     Comments:   regular trays     Physical Exam   Constitutional: He is oriented to person, place, and time. He appears  well-developed and well-nourished.   HENT:   Head: Normocephalic and atraumatic.   Eyes: Conjunctivae are normal. Pupils are equal, round, and reactive to light.   Neck: No tracheal deviation present. No thyromegaly present.   Cardiovascular: Normal rate and regular rhythm.  Exam reveals no friction rub.    No murmur heard.  Pulmonary/Chest: Effort normal and breath sounds normal. No respiratory distress. He has no wheezes. He has no rales.   Abdominal: Soft. Bowel sounds are normal. He exhibits no distension. There is no tenderness. There is no rebound.   Musculoskeletal: He exhibits edema (improved) and tenderness (nonspecific, mainly foot but pain out of proportion to exam).   R BKA, L transmetatarsal amputation   Lymphadenopathy:     He has no cervical adenopathy.   Neurological: He is alert and oriented to person, place, and time.   Nursing note and vitals reviewed.      Recent Labs      09/17/17   0345  09/18/17   0632  09/19/17   0345   WBC  5.7  5.7  5.8   RBC  4.36*  4.91  4.53*   HEMOGLOBIN  11.5*  12.9*  11.8*   HEMATOCRIT  35.6*  40.0*  37.4*   MCV  81.7  81.5  82.6   MCH  26.4*  26.3*  26.0*   MCHC  32.3*  32.3*  31.6*   RDW  47.0  47.1  49.0   PLATELETCT  246  277  288   MPV  9.5  9.4  9.7     Recent Labs      09/18/17   0632   SODIUM  137   POTASSIUM  4.1   CHLORIDE  105   CO2  25   GLUCOSE  120*   BUN  20   CREATININE  0.75   CALCIUM  9.1                      Assessment/Plan     * Skin ulcer of left foot with fat layer exposed (CMS-HCC)- (present on admission)   Assessment & Plan    Presented with right BKA stump pain and wound drainage in addition to left TMA stump wound (nonhealing).    Has history of PVD s/p L TMA and R BKA more than 5 yrs ago in [place] and receiving home health with wound care while living in [facility] while on disability.  I and D of left foot with gastroscoleus resection and application of wound vac 7/25 and then 8/15 by Dr. Shirley  Wound care  Finished courses of antibiotics  (see below)  Wound vac d/jazmín 9/14  After speaking with Yariel Cheung, he wants to have rehabilitation whether it is here or [place]. He has had rehab before and would like to get stronger. Rehab referral made.          Chest wall pain   Assessment & Plan    Pain control        Noncompliance- (present on admission)   Assessment & Plan    Counseling done on risks and complications        ABRIL (acute kidney injury) (CMS-HCC)   Assessment & Plan    resolved        Pseudomonas infection- (present on admission)   Assessment & Plan    finished Cefepime 8/25        MRSA (methicillin resistant staph aureus) culture positive- (present on admission)   Assessment & Plan    Finished course of Doxycycline 8/25        Thrombocytosis (CMS-HCC)- (present on admission)   Assessment & Plan    Follow cbc        Anxiety- (present on admission)   Assessment & Plan    Klonopin        Diarrhea- (present on admission)   Assessment & Plan    Resolved        Opiate dependence (CMS-HCC)- (present on admission)   Assessment & Plan    Methadone, Morphine IM for dressing changes only  Issues with drug-seeking        Wound of right leg, at BKA site- (present on admission)   Assessment & Plan    s/p BKA revision  Failed to finish course of Zyvox (1 week left)        Tobacco abuse- (present on admission)   Assessment & Plan    Nicotine replacement         Peripheral neuropathy- (present on admission)   Assessment & Plan    Lyrica              Reviewed items::  Radiology images reviewed, Labs reviewed, Medications reviewed and EKG reviewed  Rolon catheter::  No Rolon  DVT prophylaxis pharmacological::  Enoxaparin (Lovenox)  Ulcer Prophylaxis::  No     For complexity-based billing, please refer to the history, exam, and decison making above. In addition, I spent 35 minutes caring for the patient today. More than 50% of the time was spent counseling and coordinating care.    I have discussed with RN and CM and SW and other consultants about patient's  plan.

## 2017-09-19 NOTE — DISCHARGE PLANNING
"Called:   [facility] [place], left message with Maxine in admitting requesting call back  Patrick Mantilla, \"mail box full.\"  Iliana Hurtado, left message requesting call back  Carrie Maldonado, \"not contracted with [facility]\"  [place], spoke to Marilyn she will call back.    "

## 2017-09-20 LAB
ERYTHROCYTE [DISTWIDTH] IN BLOOD BY AUTOMATED COUNT: 49.4 FL (ref 35.9–50)
HCT VFR BLD AUTO: 38.2 % (ref 42–52)
HGB BLD-MCNC: 11.9 G/DL (ref 14–18)
MCH RBC QN AUTO: 26.3 PG (ref 27–33)
MCHC RBC AUTO-ENTMCNC: 31.2 G/DL (ref 33.7–35.3)
MCV RBC AUTO: 84.5 FL (ref 81.4–97.8)
PLATELET # BLD AUTO: 264 K/UL (ref 164–446)
PMV BLD AUTO: 9.7 FL (ref 9–12.9)
RBC # BLD AUTO: 4.52 M/UL (ref 4.7–6.1)
WBC # BLD AUTO: 6.4 K/UL (ref 4.8–10.8)

## 2017-09-20 PROCEDURE — A9270 NON-COVERED ITEM OR SERVICE: HCPCS | Performed by: INTERNAL MEDICINE

## 2017-09-20 PROCEDURE — 700111 HCHG RX REV CODE 636 W/ 250 OVERRIDE (IP): Performed by: FAMILY MEDICINE

## 2017-09-20 PROCEDURE — 700102 HCHG RX REV CODE 250 W/ 637 OVERRIDE(OP): Performed by: INTERNAL MEDICINE

## 2017-09-20 PROCEDURE — 85027 COMPLETE CBC AUTOMATED: CPT

## 2017-09-20 PROCEDURE — 36415 COLL VENOUS BLD VENIPUNCTURE: CPT

## 2017-09-20 PROCEDURE — 770021 HCHG ROOM/CARE - ISO PRIVATE

## 2017-09-20 PROCEDURE — 700101 HCHG RX REV CODE 250: Performed by: INTERNAL MEDICINE

## 2017-09-20 PROCEDURE — 700111 HCHG RX REV CODE 636 W/ 250 OVERRIDE (IP): Performed by: INTERNAL MEDICINE

## 2017-09-20 PROCEDURE — 99232 SBSQ HOSP IP/OBS MODERATE 35: CPT | Performed by: INTERNAL MEDICINE

## 2017-09-20 RX ADMIN — METHADONE HYDROCHLORIDE 20 MG: 10 TABLET ORAL at 22:41

## 2017-09-20 RX ADMIN — ACETAMINOPHEN 650 MG: 325 TABLET, FILM COATED ORAL at 11:23

## 2017-09-20 RX ADMIN — MORPHINE SULFATE 4 MG: 4 INJECTION INTRAVENOUS at 15:50

## 2017-09-20 RX ADMIN — CLONAZEPAM 2 MG: 1 TABLET ORAL at 20:02

## 2017-09-20 RX ADMIN — PREGABALIN 150 MG: 150 CAPSULE ORAL at 20:02

## 2017-09-20 RX ADMIN — Medication 325 MG: at 08:09

## 2017-09-20 RX ADMIN — OXYCODONE HYDROCHLORIDE 10 MG: 10 TABLET ORAL at 22:41

## 2017-09-20 RX ADMIN — PREGABALIN 150 MG: 150 CAPSULE ORAL at 08:09

## 2017-09-20 RX ADMIN — OXYCODONE HYDROCHLORIDE 10 MG: 10 TABLET ORAL at 06:30

## 2017-09-20 RX ADMIN — HYDROMORPHONE HYDROCHLORIDE 4 MG: 4 TABLET ORAL at 02:06

## 2017-09-20 RX ADMIN — CLONAZEPAM 2 MG: 1 TABLET ORAL at 08:09

## 2017-09-20 RX ADMIN — LIDOCAINE 1 PATCH: 50 PATCH CUTANEOUS at 11:23

## 2017-09-20 RX ADMIN — ACETAMINOPHEN 650 MG: 325 TABLET, FILM COATED ORAL at 17:53

## 2017-09-20 RX ADMIN — METHADONE HYDROCHLORIDE 20 MG: 10 TABLET ORAL at 14:23

## 2017-09-20 RX ADMIN — Medication 325 MG: at 17:53

## 2017-09-20 RX ADMIN — PREGABALIN 150 MG: 150 CAPSULE ORAL at 14:23

## 2017-09-20 RX ADMIN — METHADONE HYDROCHLORIDE 20 MG: 10 TABLET ORAL at 06:30

## 2017-09-20 RX ADMIN — ENOXAPARIN SODIUM 40 MG: 100 INJECTION SUBCUTANEOUS at 08:09

## 2017-09-20 RX ADMIN — ACETAMINOPHEN 650 MG: 325 TABLET, FILM COATED ORAL at 06:30

## 2017-09-20 ASSESSMENT — ENCOUNTER SYMPTOMS
WEIGHT LOSS: 0
ORTHOPNEA: 0
DIARRHEA: 0
SORE THROAT: 0
BLURRED VISION: 0
HEADACHES: 0
PALPITATIONS: 0
NAUSEA: 0
BLOOD IN STOOL: 0
FEVER: 0
HEARTBURN: 0
PHOTOPHOBIA: 0
SHORTNESS OF BREATH: 0
WHEEZING: 0
EYE PAIN: 0
DIZZINESS: 0
COUGH: 0

## 2017-09-20 ASSESSMENT — PAIN SCALES - GENERAL
PAINLEVEL_OUTOF10: 6
PAINLEVEL_OUTOF10: 6
PAINLEVEL_OUTOF10: 0
PAINLEVEL_OUTOF10: 6
PAINLEVEL_OUTOF10: 5
PAINLEVEL_OUTOF10: 4

## 2017-09-20 NOTE — PROGRESS NOTES
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 Takes methadone he states for back pain.  Not diabetic.  Presented to ED on 7/22/17 with increased pain to L TMA with infected neuropathic ulcer and pain to R BKA.        s/p L foot I & D with VAC placement, GSR by Dr. Shirley on 7/25/17  S/p    Right revision below-knee amputation,  Right lower extremity irrigation and debridement, skin, subcutaneous  tissue to bone on 8/15/17    Wound progressing very slowly despite use of NPWT.  Acell biologic applied to wound on 9/13, NPWT discontinued  LPS f/u on 9/17, dressing found to be dislodged    Pt seen today for 2nd application of Acell biologic in an attempt to accelerate wound healing  Staff has had difficulty keeping dressing intact    PROCEDURE:  Using sterile technique and assist from wound team member, Flakita Vogel RN, a 3.0 x 3.5 cm sheet of Acell Cytal wound matrix, 1-layer, hydrated with NS, cut in half, and applied to the wound bed, end to end.  Adaptic placed over Acell, secured with steristrips, covered with alginate and then foam, secured with hypafix .  Foot and ankle wrapped with kerlix and then coban    PRODUCT: Cytal wound matrix 1-layer 3 x 3.5 cm sheet.  REF [id number]; LOT [id number]    PLAN  -LPS to reapply Acell 1x/week  -LPS to assess wound every 3- 4 days, change cover dressing as indicated for saturation or displacement  -Nsg to contact LPS if dressing becomes soiled or dislodged.

## 2017-09-20 NOTE — CARE PLAN
Problem: Infection  Goal: Will remain free from infection  Pt will show no new signs of infection

## 2017-09-20 NOTE — PROGRESS NOTES
Pt in stable condition, pain controlled, no concerns or needs at this time, call light within reach, will continue with plan of care

## 2017-09-20 NOTE — DISCHARGE PLANNING
Per the request of the CTTM on floor Pat the referral has been resent to [facility] to fax# [phone]

## 2017-09-20 NOTE — DISCHARGE PLANNING
Received call from Piper from [facility]. She has been contacted by Health plan and requests referral. Dinorah has faxed referral to / phone .

## 2017-09-20 NOTE — DISCHARGE PLANNING
Received fax list of Contracted SNFs from [facility] auth nurse. Wong asked [facility] to refer to some of those agencies.

## 2017-09-20 NOTE — PROGRESS NOTES
[facility] Hospitalist Progress Note    Date of Service: 2017    Chief Complaint  52 y.o. male admitted 2017 with Right BKA and left transmetatarsal amputation stump infections    Interval Problem Update   active and no signficant CC overnight. Patient denies nausea vomiting diarrhea. Patient said left lower extremity off the boot today. Patient is awaiting for wound care nurse for further recommendation     patient is stable, no fever, chills, nausea vomiting diarrhea, sleeps soundly this morning.    Consultants/Specialty  ID - Yenny  Ortho - Fern    Disposition  Rehab vs SNF        Review of Systems   Constitutional: Negative for fever and weight loss.   HENT: Negative for sore throat.    Eyes: Negative for blurred vision, photophobia and pain.   Respiratory: Negative for cough, shortness of breath and wheezing.    Cardiovascular: Positive for leg swelling. Negative for chest pain, palpitations and orthopnea.   Gastrointestinal: Negative for blood in stool, diarrhea, heartburn and nausea.   Genitourinary: Negative for dysuria.   Neurological: Negative for dizziness and headaches.      Physical Exam  Laboratory/Imaging   Hemodynamics  Temp (24hrs), Av.6 °C (97.8 °F), Min:36.1 °C (96.9 °F), Max:37 °C (98.6 °F)   Temperature: 37 °C (98.6 °F)  Pulse  Av.7  Min: 58  Max: 111    Blood Pressure: 113/68      Respiratory      Respiration: 18, Pulse Oximetry: 93 %        RUL Breath Sounds: Clear, RML Breath Sounds: Clear, RLL Breath Sounds: Clear, NANCY Breath Sounds: Clear, LLL Breath Sounds: Clear    Fluids  No intake or output data in the 24 hours ending 17 0740    Nutrition  Orders Placed This Encounter   Procedures   • DIET ORDER     Standing Status:   Standing     Number of Occurrences:   1     Order Specific Question:   Diet:     Answer:   Regular [1]     Comments:   regular trays     Physical Exam   Constitutional: He is oriented to person, place, and time. He appears well-nourished.    HENT:   Head: Normocephalic.   Right Ear: External ear normal.   Left Ear: External ear normal.   Eyes: Conjunctivae are normal. Pupils are equal, round, and reactive to light.   Neck: No JVD present. No thyromegaly present.   Cardiovascular: Normal rate and regular rhythm.  Exam reveals no gallop and no friction rub.    Pulmonary/Chest: Effort normal and breath sounds normal. No respiratory distress. He has no wheezes. He exhibits no tenderness.   Abdominal: Soft. Bowel sounds are normal. He exhibits no distension and no mass. There is no tenderness. There is no guarding.   Musculoskeletal: He exhibits edema (improved) and tenderness (nonspecific, mainly foot but pain out of proportion to exam). He exhibits no deformity.   R BKA, L transmetatarsal amputation   Lymphadenopathy:     He has no cervical adenopathy.   Neurological: He is alert and oriented to person, place, and time.   Nursing note and vitals reviewed.      Recent Labs      09/18/17   0632  09/19/17   0345  09/20/17   0339   WBC  5.7  5.8  6.4   RBC  4.91  4.53*  4.52*   HEMOGLOBIN  12.9*  11.8*  11.9*   HEMATOCRIT  40.0*  37.4*  38.2*   MCV  81.5  82.6  84.5   MCH  26.3*  26.0*  26.3*   MCHC  32.3*  31.6*  31.2*   RDW  47.1  49.0  49.4   PLATELETCT  277  288  264   MPV  9.4  9.7  9.7     Recent Labs      09/18/17   0632   SODIUM  137   POTASSIUM  4.1   CHLORIDE  105   CO2  25   GLUCOSE  120*   BUN  20   CREATININE  0.75   CALCIUM  9.1                      Assessment/Plan     * Skin ulcer of left foot with fat layer exposed (CMS-[facility])- (present on admission)   Assessment & Plan    Presented with right BKA stump pain and wound drainage in addition to left TMA stump wound (nonhealing).    Has history of PVD s/p L TMA and R BKA more than 5 yrs ago in [place] and receiving home health with wound care while living in [facility] while on disability.  I and D of left foot with gastroscoleus resection and application of wound vac 7/25 and then 8/15 by   [facility]  Wound care  Finished courses of antibiotics (see below)  Wound vac d/jazmín 9/14  After speaking with Yariel Cheung, he wants to have rehabilitation whether it is here or [place]. He has had rehab before and would like to get stronger. Rehab referral made.          Chest wall pain   Assessment & Plan    Pain control        Noncompliance- (present on admission)   Assessment & Plan    Counseling done on risks and complications        ABRIL (acute kidney injury) (CMS-HCC)   Assessment & Plan    resolved        Pseudomonas infection- (present on admission)   Assessment & Plan    finished Cefepime 8/25        MRSA (methicillin resistant staph aureus) culture positive- (present on admission)   Assessment & Plan    Finished course of Doxycycline 8/25        Thrombocytosis (CMS-HCC)- (present on admission)   Assessment & Plan    Follow cbc        Anxiety- (present on admission)   Assessment & Plan    Klonopin        Diarrhea- (present on admission)   Assessment & Plan    Resolved        Opiate dependence (CMS-HCC)- (present on admission)   Assessment & Plan    Methadone, Morphine IM for dressing changes only  Issues with drug-seeking        Wound of right leg, at BKA site- (present on admission)   Assessment & Plan    s/p BKA revision  Failed to finish course of Zyvox (1 week left)        Tobacco abuse- (present on admission)   Assessment & Plan    Nicotine replacement         Peripheral neuropathy- (present on admission)   Assessment & Plan    Lyrica              Reviewed items::  Radiology images reviewed, Labs reviewed, Medications reviewed and EKG reviewed  Rolon catheter::  No Rolon  DVT prophylaxis pharmacological::  Enoxaparin (Lovenox)  Ulcer Prophylaxis::  No     For complexity-based billing, please refer to the history, exam, and decison making above. In addition, I spent 35 minutes caring for the patient today. More than 50% of the time was spent counseling and coordinating care.    I have discussed with RN  and CM and TERRIE and other consultants about patient's plan.

## 2017-09-21 PROCEDURE — A9270 NON-COVERED ITEM OR SERVICE: HCPCS | Performed by: INTERNAL MEDICINE

## 2017-09-21 PROCEDURE — 700101 HCHG RX REV CODE 250: Performed by: INTERNAL MEDICINE

## 2017-09-21 PROCEDURE — 700102 HCHG RX REV CODE 250 W/ 637 OVERRIDE(OP): Performed by: INTERNAL MEDICINE

## 2017-09-21 PROCEDURE — 99232 SBSQ HOSP IP/OBS MODERATE 35: CPT | Performed by: INTERNAL MEDICINE

## 2017-09-21 PROCEDURE — 700111 HCHG RX REV CODE 636 W/ 250 OVERRIDE (IP): Performed by: INTERNAL MEDICINE

## 2017-09-21 PROCEDURE — 770021 HCHG ROOM/CARE - ISO PRIVATE

## 2017-09-21 RX ADMIN — ACETAMINOPHEN 650 MG: 325 TABLET, FILM COATED ORAL at 05:24

## 2017-09-21 RX ADMIN — METHADONE HYDROCHLORIDE 20 MG: 10 TABLET ORAL at 13:59

## 2017-09-21 RX ADMIN — LIDOCAINE 1 PATCH: 50 PATCH CUTANEOUS at 11:38

## 2017-09-21 RX ADMIN — PREGABALIN 150 MG: 150 CAPSULE ORAL at 21:07

## 2017-09-21 RX ADMIN — OXYCODONE HYDROCHLORIDE 10 MG: 10 TABLET ORAL at 02:57

## 2017-09-21 RX ADMIN — METHADONE HYDROCHLORIDE 20 MG: 10 TABLET ORAL at 05:24

## 2017-09-21 RX ADMIN — Medication 325 MG: at 07:45

## 2017-09-21 RX ADMIN — CLONAZEPAM 2 MG: 1 TABLET ORAL at 21:07

## 2017-09-21 RX ADMIN — OXYCODONE HYDROCHLORIDE 10 MG: 10 TABLET ORAL at 07:45

## 2017-09-21 RX ADMIN — CLONAZEPAM 2 MG: 1 TABLET ORAL at 07:44

## 2017-09-21 RX ADMIN — METHADONE HYDROCHLORIDE 20 MG: 10 TABLET ORAL at 21:07

## 2017-09-21 RX ADMIN — PREGABALIN 150 MG: 150 CAPSULE ORAL at 07:46

## 2017-09-21 RX ADMIN — ACETAMINOPHEN 650 MG: 325 TABLET, FILM COATED ORAL at 11:36

## 2017-09-21 RX ADMIN — HYDROMORPHONE HYDROCHLORIDE 4 MG: 4 TABLET ORAL at 15:45

## 2017-09-21 RX ADMIN — Medication 325 MG: at 16:30

## 2017-09-21 RX ADMIN — OXYCODONE HYDROCHLORIDE 10 MG: 10 TABLET ORAL at 21:07

## 2017-09-21 RX ADMIN — LIDOCAINE 1 APPLICATION: 40 CREAM TOPICAL at 11:39

## 2017-09-21 RX ADMIN — ENOXAPARIN SODIUM 40 MG: 100 INJECTION SUBCUTANEOUS at 07:44

## 2017-09-21 RX ADMIN — PREGABALIN 150 MG: 150 CAPSULE ORAL at 13:59

## 2017-09-21 ASSESSMENT — ENCOUNTER SYMPTOMS
NAUSEA: 0
SPUTUM PRODUCTION: 0
BLOOD IN STOOL: 0
DIAPHORESIS: 0
VOMITING: 0
SORE THROAT: 0
WHEEZING: 0
HEADACHES: 0
WEIGHT LOSS: 0
EYE PAIN: 0
DIZZINESS: 0
ORTHOPNEA: 0
DIARRHEA: 0
PHOTOPHOBIA: 0
CHILLS: 0
COUGH: 0
BLURRED VISION: 0

## 2017-09-21 ASSESSMENT — PAIN SCALES - GENERAL
PAINLEVEL_OUTOF10: 6
PAINLEVEL_OUTOF10: 6
PAINLEVEL_OUTOF10: 7
PAINLEVEL_OUTOF10: 3
PAINLEVEL_OUTOF10: 4

## 2017-09-21 NOTE — CARE PLAN
Problem: Safety  Goal: Will remain free from injury  Outcome: PROGRESSING AS EXPECTED  Patient asked for help in moving items about his room that would have been difficult for him to move himself without possibly falling out of his wheelchair    Problem: Pain Management  Goal: Pain level will decrease to patient's comfort goal  Outcome: PROGRESSING AS EXPECTED  Patient's pain was kept at tolerable levels throughout shift through the administration of prescribed prn medication

## 2017-09-21 NOTE — PROGRESS NOTES
[facility] Hospitalist Progress Note    Date of Service: 2017    Chief Complaint  52 y.o. male admitted 2017 with Right BKA and left transmetatarsal amputation stump infections    Interval Problem Update   active and no signficant CC overnight. Patient denies nausea vomiting diarrhea. Patient said left lower extremity off the boot today. Patient is awaiting for wound care nurse for further recommendation     patient is stable, no fever, chills, nausea vomiting diarrhea, sleeps soundly this morning.     patient has no significant chief complaint overnight. Patient otherwise denies fever, chills, nausea, vomiting, adb pain, SOB, CP, headache, constipation, diarrhea, cough, or sputum.    Consultants/Specialty  ID - Yenny  Ortho - Fern    Disposition  Rehab vs SNF        Review of Systems   Constitutional: Negative for chills, diaphoresis and weight loss.   HENT: Negative for sore throat.    Eyes: Negative for blurred vision, photophobia and pain.   Respiratory: Negative for cough, sputum production and wheezing.    Cardiovascular: Positive for leg swelling. Negative for chest pain and orthopnea.   Gastrointestinal: Negative for blood in stool, diarrhea, nausea and vomiting.   Genitourinary: Negative for dysuria, frequency and urgency.   Neurological: Negative for dizziness and headaches.      Physical Exam  Laboratory/Imaging   Hemodynamics  Temp (24hrs), Av.8 °C (98.2 °F), Min:36.4 °C (97.6 °F), Max:36.9 °C (98.5 °F)   Temperature: 36.9 °C (98.5 °F)  Pulse  Av.6  Min: 58  Max: 111    Blood Pressure: 113/75      Respiratory      Respiration: 16, Pulse Oximetry: 95 %        RUL Breath Sounds: Clear, RML Breath Sounds: Clear, RLL Breath Sounds: Clear, NANCY Breath Sounds: Clear, LLL Breath Sounds: Clear    Fluids    Intake/Output Summary (Last 24 hours) at 17 0736  Last data filed at 17 0100   Gross per 24 hour   Intake              480 ml   Output                0 ml   Net               480 ml       Nutrition  Orders Placed This Encounter   Procedures   • DIET ORDER     Standing Status:   Standing     Number of Occurrences:   1     Order Specific Question:   Diet:     Answer:   Regular [1]     Comments:   regular trays     Physical Exam   Constitutional: He is oriented to person, place, and time. No distress.   HENT:   Head: Normocephalic.   Eyes: Conjunctivae are normal. Pupils are equal, round, and reactive to light.   Neck: No JVD present. No thyromegaly present.   Cardiovascular: Normal rate, regular rhythm and normal heart sounds.    No murmur heard.  Pulmonary/Chest: Effort normal and breath sounds normal. No stridor. No respiratory distress. He has no wheezes.   Abdominal: Soft. Bowel sounds are normal. He exhibits no distension and no mass. There is no tenderness.   Musculoskeletal: He exhibits edema (improved) and tenderness (nonspecific, mainly foot but pain out of proportion to exam). He exhibits no deformity.   R BKA, L transmetatarsal amputation   Lymphadenopathy:     He has no cervical adenopathy.   Neurological: He is alert and oriented to person, place, and time.   Skin: He is not diaphoretic.   Nursing note and vitals reviewed.      Recent Labs      09/19/17   0345  09/20/17   0339   WBC  5.8  6.4   RBC  4.53*  4.52*   HEMOGLOBIN  11.8*  11.9*   HEMATOCRIT  37.4*  38.2*   MCV  82.6  84.5   MCH  26.0*  26.3*   MCHC  31.6*  31.2*   RDW  49.0  49.4   PLATELETCT  288  264   MPV  9.7  9.7                          Assessment/Plan     * Skin ulcer of left foot with fat layer exposed (CMS-HCC)- (present on admission)   Assessment & Plan    Presented with right BKA stump pain and wound drainage in addition to left TMA stump wound (nonhealing).    Has history of PVD s/p L TMA and R BKA more than 5 yrs ago in [place] and receiving home health with wound care while living in [facility] while on disability.  I and D of left foot with gastroscoleus resection and application of wound vac 7/25  and then 8/15 by Dr. Shirley  Wound care  Finished courses of antibiotics (see below)  Wound vac d/jazmín 9/14  After speaking with Yariel Cheung, he wants to have rehabilitation whether it is here or [place]. He has had rehab before and would like to get stronger. Rehab referral made.          Chest wall pain   Assessment & Plan    Pain control        Noncompliance- (present on admission)   Assessment & Plan    Counseling done on risks and complications        ABRIL (acute kidney injury) (CMS-HCC)   Assessment & Plan    resolved        Pseudomonas infection- (present on admission)   Assessment & Plan    finished Cefepime 8/25        MRSA (methicillin resistant staph aureus) culture positive- (present on admission)   Assessment & Plan    Finished course of Doxycycline 8/25        Thrombocytosis (CMS-HCC)- (present on admission)   Assessment & Plan    Follow cbc        Anxiety- (present on admission)   Assessment & Plan    Klonopin        Diarrhea- (present on admission)   Assessment & Plan    Resolved        Opiate dependence (CMS-HCC)- (present on admission)   Assessment & Plan    Methadone, Morphine IM for dressing changes only  Issues with drug-seeking        Wound of right leg, at BKA site- (present on admission)   Assessment & Plan    s/p BKA revision  Failed to finish course of Zyvox (1 week left)        Tobacco abuse- (present on admission)   Assessment & Plan    Nicotine replacement         Peripheral neuropathy- (present on admission)   Assessment & Plan    Lyrica          Still pending placement  Cont monitor    Reviewed items::  Radiology images reviewed, Labs reviewed, Medications reviewed and EKG reviewed  Rolon catheter::  No Rolon  DVT prophylaxis pharmacological::  Enoxaparin (Lovenox)  Ulcer Prophylaxis::  No     For complexity-based billing, please refer to the history, exam, and decison making above. In addition, I spent 35 minutes caring for the patient today. More than 50% of the time was spent  counseling and coordinating care.    I have discussed with RN and CM and SW and other consultants about patient's plan.

## 2017-09-21 NOTE — PROGRESS NOTES
Patient OOB ad mireya to wheelchair, transfers independently with steady pivot. Medicated per prn orders for right BKA pain with good affect.

## 2017-09-22 PROCEDURE — 700102 HCHG RX REV CODE 250 W/ 637 OVERRIDE(OP): Performed by: INTERNAL MEDICINE

## 2017-09-22 PROCEDURE — 770021 HCHG ROOM/CARE - ISO PRIVATE

## 2017-09-22 PROCEDURE — A9270 NON-COVERED ITEM OR SERVICE: HCPCS | Performed by: INTERNAL MEDICINE

## 2017-09-22 PROCEDURE — 99232 SBSQ HOSP IP/OBS MODERATE 35: CPT | Performed by: INTERNAL MEDICINE

## 2017-09-22 PROCEDURE — 700101 HCHG RX REV CODE 250: Performed by: INTERNAL MEDICINE

## 2017-09-22 PROCEDURE — 700111 HCHG RX REV CODE 636 W/ 250 OVERRIDE (IP): Performed by: INTERNAL MEDICINE

## 2017-09-22 RX ADMIN — ACETAMINOPHEN 650 MG: 325 TABLET, FILM COATED ORAL at 05:22

## 2017-09-22 RX ADMIN — OXYCODONE HYDROCHLORIDE 10 MG: 10 TABLET ORAL at 22:12

## 2017-09-22 RX ADMIN — METHADONE HYDROCHLORIDE 20 MG: 10 TABLET ORAL at 22:12

## 2017-09-22 RX ADMIN — PREGABALIN 150 MG: 150 CAPSULE ORAL at 07:51

## 2017-09-22 RX ADMIN — PREGABALIN 150 MG: 150 CAPSULE ORAL at 20:31

## 2017-09-22 RX ADMIN — ZOLPIDEM TARTRATE 5 MG: 5 TABLET, FILM COATED ORAL at 01:50

## 2017-09-22 RX ADMIN — Medication 325 MG: at 07:51

## 2017-09-22 RX ADMIN — OXYCODONE HYDROCHLORIDE 10 MG: 10 TABLET ORAL at 06:36

## 2017-09-22 RX ADMIN — HYDROMORPHONE HYDROCHLORIDE 4 MG: 4 TABLET ORAL at 20:32

## 2017-09-22 RX ADMIN — PREGABALIN 150 MG: 150 CAPSULE ORAL at 14:15

## 2017-09-22 RX ADMIN — LIDOCAINE 1 PATCH: 50 PATCH CUTANEOUS at 11:15

## 2017-09-22 RX ADMIN — OXYCODONE HYDROCHLORIDE 10 MG: 10 TABLET ORAL at 01:50

## 2017-09-22 RX ADMIN — METHADONE HYDROCHLORIDE 20 MG: 10 TABLET ORAL at 05:22

## 2017-09-22 RX ADMIN — CLONAZEPAM 2 MG: 1 TABLET ORAL at 07:50

## 2017-09-22 RX ADMIN — Medication 325 MG: at 16:47

## 2017-09-22 RX ADMIN — HYDROMORPHONE HYDROCHLORIDE 4 MG: 4 TABLET ORAL at 11:14

## 2017-09-22 RX ADMIN — METHADONE HYDROCHLORIDE 20 MG: 10 TABLET ORAL at 14:15

## 2017-09-22 RX ADMIN — CLONAZEPAM 2 MG: 1 TABLET ORAL at 22:12

## 2017-09-22 RX ADMIN — ENOXAPARIN SODIUM 40 MG: 100 INJECTION SUBCUTANEOUS at 07:51

## 2017-09-22 RX ADMIN — OXYCODONE HYDROCHLORIDE 10 MG: 10 TABLET ORAL at 15:44

## 2017-09-22 ASSESSMENT — PAIN SCALES - GENERAL
PAINLEVEL_OUTOF10: 3
PAINLEVEL_OUTOF10: 6
PAINLEVEL_OUTOF10: 4
PAINLEVEL_OUTOF10: 7
PAINLEVEL_OUTOF10: 7

## 2017-09-22 ASSESSMENT — ENCOUNTER SYMPTOMS
EYE PAIN: 0
SPUTUM PRODUCTION: 0
WHEEZING: 0
COUGH: 0
CHILLS: 0
HEADACHES: 0
SORE THROAT: 0
BLURRED VISION: 0
NAUSEA: 0
DIZZINESS: 0
PHOTOPHOBIA: 0
NECK PAIN: 0
BLOOD IN STOOL: 0
ORTHOPNEA: 0
DIARRHEA: 0
DIAPHORESIS: 0

## 2017-09-22 NOTE — CARE PLAN
Problem: Skin Integrity  Goal: Risk for impaired skin integrity will decrease  Outcome: PROGRESSING AS EXPECTED  Pt remained free from skin breakdown throughout shift    Problem: Pain Management  Goal: Pain level will decrease to patient's comfort goal  Pain kept at tolerable levels throughout shift

## 2017-09-22 NOTE — PROGRESS NOTES
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 Takes methadone he states for back pain.  Not diabetic.  Presented to ED on 7/22/17 with increased pain to L TMA with infected neuropathic ulcer and pain to R BKA.        s/p L foot I & D with VAC placement, GSR by Dr. Shirley on 7/25/17  S/p    Right revision below-knee amputation,  Right lower extremity irrigation and debridement, skin, subcutaneous  tissue to bone on 8/15/17    Acell biologic applied to wound on 9/13, and 9/20   NPWT discontinued 9/13       Pt seen today for outer dressing check.  Strike through of drainage noted, plantar aspect of dressing soiled       PROCEDURE: Patient premedicated with oral pain medication by prmary RNCarly  Outer dressing removed, rinsed with NS, covered with Adaptic and alginate, secured with steristrips. Covered with foam dressing, secured with kerlix and then Coban          PLAN  -LPS to reapply Acell 1x/week  -LPS to assess wound every 3- 4 days, change cover dressing as indicated for saturation or displacement  -Nsg to contact LPS if dressing becomes soiled or dislodged.      NOTE: While I was charting on this patient, he called the unit clerkAlicja,  and told her that \"the lady that cut off my dressing took my pain pill\".  Alicja informed me and pt's primary RN, Carly.   Pt's nurse, Carly, unit clerkAlicja, and I went into patient's room to confront him.  Patient denied this claim, stated \"I don't know what you are talking about!\"    "

## 2017-09-22 NOTE — PROGRESS NOTES
[facility]ist Progress Note    Date of Service: 2017    Chief Complaint  52 y.o. male admitted 2017 with Right BKA and left transmetatarsal amputation stump infections    Interval Problem Update   active and no signficant CC overnight. Patient denies nausea vomiting diarrhea. Patient said left lower extremity off the boot today. Patient is awaiting for wound care nurse for further recommendation     patient is stable, no fever, chills, nausea vomiting diarrhea, sleeps soundly this morning.     patient has no significant chief complaint overnight. Patient otherwise denies fever, chills, nausea, vomiting, adb pain, SOB, CP, headache, constipation, diarrhea, cough, or sputum.     off abx. Pain better. No CC awaiting for placement. Patient otherwise denies fever, chills, nausea, vomiting, adb pain, SOB, CP, headache, constipation, diarrhea, cough, or sputum.      Consultants/Specialty  ID - Yenny  Ortho - Fern    Disposition  Rehab vs SNF        Review of Systems   Constitutional: Negative for chills and diaphoresis.   HENT: Negative for sore throat.    Eyes: Negative for blurred vision, photophobia and pain.   Respiratory: Negative for cough, sputum production and wheezing.    Cardiovascular: Positive for leg swelling. Negative for chest pain and orthopnea.   Gastrointestinal: Negative for blood in stool, diarrhea and nausea.   Genitourinary: Negative for dysuria and frequency.   Musculoskeletal: Negative for joint pain and neck pain.   Neurological: Negative for dizziness and headaches.      Physical Exam  Laboratory/Imaging   Hemodynamics  Temp (24hrs), Av.8 °C (98.2 °F), Min:36.7 °C (98 °F), Max:36.8 °C (98.3 °F)   Temperature: 36.8 °C (98.3 °F)  Pulse  Av.6  Min: 58  Max: 111    Blood Pressure: 109/73      Respiratory      Respiration: 16, Pulse Oximetry: 92 %             Fluids    Intake/Output Summary (Last 24 hours) at 17 0807  Last data filed at 17 0507    Gross per 24 hour   Intake             1110 ml   Output             2500 ml   Net            -1390 ml       Nutrition  Orders Placed This Encounter   Procedures   • DIET ORDER     Standing Status:   Standing     Number of Occurrences:   1     Order Specific Question:   Diet:     Answer:   Regular [1]     Comments:   regular trays     Physical Exam   Constitutional: He is oriented to person, place, and time. He appears well-nourished.   HENT:   Head: Normocephalic and atraumatic.   Eyes: Conjunctivae are normal. Pupils are equal, round, and reactive to light.   Neck: No JVD present. No thyromegaly present.   Cardiovascular: Normal rate, regular rhythm and normal heart sounds.  Exam reveals no friction rub.    No murmur heard.  Pulmonary/Chest: Effort normal and breath sounds normal. No stridor. No respiratory distress. He has no rales.   Abdominal: Soft. Bowel sounds are normal. He exhibits no mass. There is no tenderness. There is no rebound.   Musculoskeletal: He exhibits tenderness (nonspecific, mainly foot but pain out of proportion to exam). He exhibits no edema (improved) or deformity.   R BKA, L transmetatarsal amputation   Lymphadenopathy:     He has no cervical adenopathy.   Neurological: He is alert and oriented to person, place, and time.   Skin: He is not diaphoretic.   Nursing note and vitals reviewed.      Recent Labs      09/20/17   0339   WBC  6.4   RBC  4.52*   HEMOGLOBIN  11.9*   HEMATOCRIT  38.2*   MCV  84.5   MCH  26.3*   MCHC  31.2*   RDW  49.4   PLATELETCT  264   MPV  9.7                          Assessment/Plan     * Skin ulcer of left foot with fat layer exposed (CMS-HCC)- (present on admission)   Assessment & Plan    Presented with right BKA stump pain and wound drainage in addition to left TMA stump wound (nonhealing).    Has history of PVD s/p L TMA and R BKA more than 5 yrs ago in [place] and receiving home health with wound care while living in [facility] while on disability.  I and D of  left foot with gastroscoleus resection and application of wound vac 7/25 and then 8/15 by Dr. Shirley  Wound care  Finished courses of antibiotics (see below)  Wound vac d/jazmín 9/14  After speaking with Yariel Cheung, he wants to have rehabilitation whether it is here or [place]. He has had rehab before and would like to get stronger. Rehab referral made.          Chest wall pain   Assessment & Plan    Pain control        Noncompliance- (present on admission)   Assessment & Plan    Counseling done on risks and complications        ABRIL (acute kidney injury) (CMS-HCC)   Assessment & Plan    resolved        Pseudomonas infection- (present on admission)   Assessment & Plan    finished Cefepime 8/25        MRSA (methicillin resistant staph aureus) culture positive- (present on admission)   Assessment & Plan    Finished course of Doxycycline 8/25        Thrombocytosis (CMS-HCC)- (present on admission)   Assessment & Plan    Follow cbc        Anxiety- (present on admission)   Assessment & Plan    Klonopin        Diarrhea- (present on admission)   Assessment & Plan    Resolved        Opiate dependence (CMS-HCC)- (present on admission)   Assessment & Plan    Methadone, Morphine IM for dressing changes only  Issues with drug-seeking        Wound of right leg, at BKA site- (present on admission)   Assessment & Plan    s/p BKA revision  Failed to finish course of Zyvox (1 week left)        Tobacco abuse- (present on admission)   Assessment & Plan    Nicotine replacement         Peripheral neuropathy- (present on admission)   Assessment & Plan    Lyrica          Still pending placement, SW to follow  Cont monitor    Reviewed items::  Radiology images reviewed, Labs reviewed, Medications reviewed and EKG reviewed  Rolon catheter::  No Rolon  DVT prophylaxis pharmacological::  Enoxaparin (Lovenox)  Ulcer Prophylaxis::  No     For complexity-based billing, please refer to the history, exam, and decison making above. In addition, I  spent 35 minutes caring for the patient today. More than 50% of the time was spent counseling and coordinating care.    I have discussed with RN and CM and SW and other consultants about patient's plan.

## 2017-09-23 PROCEDURE — 700102 HCHG RX REV CODE 250 W/ 637 OVERRIDE(OP): Performed by: INTERNAL MEDICINE

## 2017-09-23 PROCEDURE — 700111 HCHG RX REV CODE 636 W/ 250 OVERRIDE (IP): Performed by: FAMILY MEDICINE

## 2017-09-23 PROCEDURE — A9270 NON-COVERED ITEM OR SERVICE: HCPCS | Performed by: INTERNAL MEDICINE

## 2017-09-23 PROCEDURE — 700111 HCHG RX REV CODE 636 W/ 250 OVERRIDE (IP): Performed by: INTERNAL MEDICINE

## 2017-09-23 PROCEDURE — 99232 SBSQ HOSP IP/OBS MODERATE 35: CPT | Performed by: INTERNAL MEDICINE

## 2017-09-23 PROCEDURE — 770021 HCHG ROOM/CARE - ISO PRIVATE

## 2017-09-23 RX ADMIN — ENOXAPARIN SODIUM 40 MG: 100 INJECTION SUBCUTANEOUS at 08:21

## 2017-09-23 RX ADMIN — METHADONE HYDROCHLORIDE 20 MG: 10 TABLET ORAL at 16:26

## 2017-09-23 RX ADMIN — ACETAMINOPHEN 650 MG: 325 TABLET, FILM COATED ORAL at 05:08

## 2017-09-23 RX ADMIN — Medication 325 MG: at 08:21

## 2017-09-23 RX ADMIN — METHADONE HYDROCHLORIDE 20 MG: 10 TABLET ORAL at 23:57

## 2017-09-23 RX ADMIN — CLONAZEPAM 2 MG: 1 TABLET ORAL at 22:09

## 2017-09-23 RX ADMIN — CLONAZEPAM 2 MG: 1 TABLET ORAL at 08:21

## 2017-09-23 RX ADMIN — MORPHINE SULFATE 4 MG: 4 INJECTION INTRAVENOUS at 05:23

## 2017-09-23 RX ADMIN — PREGABALIN 150 MG: 150 CAPSULE ORAL at 08:21

## 2017-09-23 RX ADMIN — METHADONE HYDROCHLORIDE 20 MG: 10 TABLET ORAL at 05:08

## 2017-09-23 RX ADMIN — HYDROMORPHONE HYDROCHLORIDE 4 MG: 4 TABLET ORAL at 14:57

## 2017-09-23 RX ADMIN — LIDOCAINE 1 APPLICATION: 40 CREAM TOPICAL at 05:26

## 2017-09-23 RX ADMIN — HYDROMORPHONE HYDROCHLORIDE 4 MG: 4 TABLET ORAL at 22:09

## 2017-09-23 RX ADMIN — PREGABALIN 150 MG: 150 CAPSULE ORAL at 22:09

## 2017-09-23 RX ADMIN — HYDROMORPHONE HYDROCHLORIDE 4 MG: 4 TABLET ORAL at 05:04

## 2017-09-23 RX ADMIN — PREGABALIN 150 MG: 150 CAPSULE ORAL at 15:10

## 2017-09-23 ASSESSMENT — ENCOUNTER SYMPTOMS
COUGH: 0
DIZZINESS: 0
DOUBLE VISION: 0
BLOOD IN STOOL: 0
VOMITING: 0
EYE PAIN: 0
WHEEZING: 0
PALPITATIONS: 0
NAUSEA: 0
DIARRHEA: 0
SORE THROAT: 0
HEADACHES: 0
ORTHOPNEA: 0
BLURRED VISION: 0
SPUTUM PRODUCTION: 0
FEVER: 0
DIAPHORESIS: 0

## 2017-09-23 ASSESSMENT — PAIN SCALES - GENERAL
PAINLEVEL_OUTOF10: 6
PAINLEVEL_OUTOF10: 10
PAINLEVEL_OUTOF10: 7

## 2017-09-23 NOTE — PROGRESS NOTES
[facility]    Date of Service: 2017    Chief Complaint  52 y.o. male admitted 2017 with Right BKA and left transmetatarsal amputation stump infections    Interval Problem Update   active and no signficant CC overnight. Patient denies nausea vomiting diarrhea. Patient said left lower extremity off the boot today. Patient is awaiting for wound care nurse for further recommendation     patient is stable, no fever, chills, nausea vomiting diarrhea, sleeps soundly this morning.     patient has no significant chief complaint overnight. Patient otherwise denies fever, chills, nausea, vomiting, adb pain, SOB, CP, headache, constipation, diarrhea, cough, or sputum.     off abx. Pain better. No CC awaiting for placement. Patient otherwise denies fever, chills, nausea, vomiting, adb pain, SOB, CP, headache, constipation, diarrhea, cough, or sputum.     patient sleeps soundly this morning. No significant chief complaint. Feedings stable. No significant pain.    Consultants/Specialty  ID - Yenny  Ortho - Fern    Disposition  Rehab vs SNF        Review of Systems   Constitutional: Negative for diaphoresis, fever and malaise/fatigue.   HENT: Negative for sore throat.    Eyes: Negative for blurred vision, double vision and pain.   Respiratory: Negative for cough, sputum production and wheezing.    Cardiovascular: Positive for leg swelling. Negative for palpitations and orthopnea.   Gastrointestinal: Negative for blood in stool, diarrhea, nausea and vomiting.   Genitourinary: Negative for dysuria and frequency.   Musculoskeletal: Negative for joint pain.   Neurological: Negative for dizziness and headaches.      Physical Exam  Laboratory/Imaging   Hemodynamics  Temp (24hrs), Av.5 °C (97.7 °F), Min:36.4 °C (97.6 °F), Max:36.6 °C (97.8 °F)   Temperature:  (no need for vitals per RN)  Pulse  Av.4  Min: 58  Max: 111    Blood Pressure: 100/64      Respiratory       Respiration: 18, Pulse Oximetry: 97 %             Fluids    Intake/Output Summary (Last 24 hours) at 09/23/17 0754  Last data filed at 09/22/17 2030   Gross per 24 hour   Intake              480 ml   Output             1025 ml   Net             -545 ml       Nutrition  Orders Placed This Encounter   Procedures   • DIET ORDER     Standing Status:   Standing     Number of Occurrences:   1     Order Specific Question:   Diet:     Answer:   Regular [1]     Comments:   regular trays     Physical Exam   Constitutional: He is oriented to person, place, and time. No distress.   HENT:   Head: Normocephalic and atraumatic.   Eyes: Conjunctivae are normal. Pupils are equal, round, and reactive to light.   Neck: No JVD present. No thyromegaly present.   Cardiovascular: Normal rate, regular rhythm and normal heart sounds.  Exam reveals no gallop and no friction rub.    Pulmonary/Chest: Effort normal and breath sounds normal. No stridor. He has no wheezes. He has no rales. He exhibits no tenderness.   Abdominal: Soft. Bowel sounds are normal. He exhibits no distension. There is no rebound and no guarding.   Musculoskeletal: He exhibits tenderness (nonspecific, mainly foot but pain out of proportion to exam). He exhibits no edema (improved) or deformity.   R BKA, L transmetatarsal amputation   Lymphadenopathy:     He has no cervical adenopathy.   Neurological: He is alert and oriented to person, place, and time.   Skin: He is not diaphoretic.   Nursing note and vitals reviewed.                               Assessment/Plan     * Skin ulcer of left foot with fat layer exposed (CMS-HCC)- (present on admission)   Assessment & Plan    Presented with right BKA stump pain and wound drainage in addition to left TMA stump wound (nonhealing).    Has history of PVD s/p L TMA and R BKA more than 5 yrs ago in [place] and receiving home health with wound care while living in [facility] while on disability.  I and D of left foot with gastroscoleus  resection and application of wound vac 7/25 and then 8/15 by Dr. Shirley  Wound care  Finished courses of antibiotics (see below)  Wound vac d/jazmín 9/14  After speaking with Yariel Cheung, he wants to have rehabilitation whether it is here or [place]. He has had rehab before and would like to get stronger. Rehab referral made.          Chest wall pain   Assessment & Plan    Pain control        Noncompliance- (present on admission)   Assessment & Plan    Counseling done on risks and complications        ABRIL (acute kidney injury) (CMS-HCC)   Assessment & Plan    resolved        Pseudomonas infection- (present on admission)   Assessment & Plan    finished Cefepime 8/25        MRSA (methicillin resistant staph aureus) culture positive- (present on admission)   Assessment & Plan    Finished course of Doxycycline 8/25        Thrombocytosis (CMS-HCC)- (present on admission)   Assessment & Plan    Follow cbc        Anxiety- (present on admission)   Assessment & Plan    Klonopin        Diarrhea- (present on admission)   Assessment & Plan    Resolved        Opiate dependence (CMS-HCC)- (present on admission)   Assessment & Plan    Methadone, Morphine IM for dressing changes only  Issues with drug-seeking        Wound of right leg, at BKA site- (present on admission)   Assessment & Plan    s/p BKA revision  Failed to finish course of Zyvox (1 week left)        Tobacco abuse- (present on admission)   Assessment & Plan    Nicotine replacement         Peripheral neuropathy- (present on admission)   Assessment & Plan    Lyrica          Still pending placement, SW to follow  Cont monitor    Reviewed items::  Radiology images reviewed, Labs reviewed, Medications reviewed and EKG reviewed  Rolon catheter::  No Rolon  DVT prophylaxis pharmacological::  Enoxaparin (Lovenox)  Ulcer Prophylaxis::  No     For complexity-based billing, please refer to the history, exam, and decison making above. In addition, I spent 35 minutes caring for  the patient today. More than 50% of the time was spent counseling and coordinating care.    I have discussed with RN and CM and SW and other consultants about patient's plan.

## 2017-09-23 NOTE — CARE PLAN
Problem: Pain Management  Goal: Pain level will decrease to patient's comfort goal  Outcome: PROGRESSING AS EXPECTED  Pt medicated per MAR, reports adequate pain relief    Problem: Psychosocial Needs:  Goal: Level of anxiety will decrease  Outcome: PROGRESSING AS EXPECTED  Therapeutic communication employed to decrease pt's anxiety level. Pt expressed calm feeling this evening.

## 2017-09-23 NOTE — PROGRESS NOTES
I went in to assist him on standby and patient was very aggressive and throwing things on the floor. I left the door open when I walked out of the room and he closed it. He seemed very upset.

## 2017-09-23 NOTE — PROGRESS NOTES
Shift report received from night RN, assumed care at 0715. Patient asleep in bed, AO, patient not currently expressing any pain at this time, routinely medicated prn per MAR. Pt up with wheelchair independently, calls appropriately, bed alarm not needed at this time. No PIV and doctor ok with this. O2 RA, SPO2 98%. VTE lovenox. Plan is dc when medically cleared. Discussed POC for multimodal pain management, monitoring VS/labs/I&O, mobility, day time routine, comfort, and safety. Patient has call light and personal belongings within reach. Safety and fall precautions in place. Reviewed current labs, notes, medications, and orders. Hourly rounding in place with RN rounding on odd hours and CNA on even hours.

## 2017-09-23 NOTE — PROGRESS NOTES
"Pt awakened @ 0505 with severe pain to lower back. Pt states \"I can't move, there's something wrong!\" Pt was writhing in the bed and insisted I call the doctor despite being medicated with PRN dilaudid and scheduled pain medication. Heat pack was also applied. Dr Acosta was notified and new orders received. Pt was given a 1x dose of IM morphine and repositioned. Pt continues to c/o 10/10 pain despite interventions.  "

## 2017-09-23 NOTE — PROGRESS NOTES
"Patient called in excrutiating pain, different than he has been having in his back before. He could not distinguish if there was any numbness or tingling but was moving all four extremities. I called Dr. Jack and explained the situation, he did not want to give him any extra doses or change his medications. I went in to speak with patient with his scheduled Lyrica, Methadone, and asked whether he wanted his PRN Dilauded or Oxycodone. He refused all medications and stated \"I could shove them up my ass.\"  "

## 2017-09-23 NOTE — PROGRESS NOTES
"Patient rang call bell and stated to charge nurse he did want his Dilauded now. At 1500 I came into room to give the medicine and at this time he wanted all meds he was due for. I came back with them and he was muttering to himself how there were no nurses that know how to do their job. I stated I was here with the medicine he asked for and he said \"Good, give them to me and get out.\" He was attempting to put on his prosthetic leg, standing and very unsteady on his feet. I tried to help him so he didn't fall and he yelled to \"get out\" and that he didn't want any help from me, to leave, and not come back. I spoke to charge nurse and she stated she would check on him.  "

## 2017-09-24 ENCOUNTER — APPOINTMENT (OUTPATIENT)
Dept: RADIOLOGY | Facility: MEDICAL CENTER | Age: 52
DRG: 464 | End: 2017-09-24
Attending: INTERNAL MEDICINE
Payer: COMMERCIAL

## 2017-09-24 PROCEDURE — 700102 HCHG RX REV CODE 250 W/ 637 OVERRIDE(OP): Performed by: INTERNAL MEDICINE

## 2017-09-24 PROCEDURE — A9270 NON-COVERED ITEM OR SERVICE: HCPCS | Performed by: INTERNAL MEDICINE

## 2017-09-24 PROCEDURE — 99232 SBSQ HOSP IP/OBS MODERATE 35: CPT | Performed by: INTERNAL MEDICINE

## 2017-09-24 PROCEDURE — 700101 HCHG RX REV CODE 250: Performed by: INTERNAL MEDICINE

## 2017-09-24 PROCEDURE — 700111 HCHG RX REV CODE 636 W/ 250 OVERRIDE (IP): Performed by: INTERNAL MEDICINE

## 2017-09-24 PROCEDURE — 770021 HCHG ROOM/CARE - ISO PRIVATE

## 2017-09-24 RX ADMIN — PREGABALIN 150 MG: 150 CAPSULE ORAL at 14:29

## 2017-09-24 RX ADMIN — ACETAMINOPHEN 650 MG: 325 TABLET, FILM COATED ORAL at 12:13

## 2017-09-24 RX ADMIN — METHADONE HYDROCHLORIDE 20 MG: 10 TABLET ORAL at 21:28

## 2017-09-24 RX ADMIN — ACETAMINOPHEN 650 MG: 325 TABLET, FILM COATED ORAL at 07:03

## 2017-09-24 RX ADMIN — ACETAMINOPHEN 650 MG: 325 TABLET, FILM COATED ORAL at 23:10

## 2017-09-24 RX ADMIN — METHADONE HYDROCHLORIDE 20 MG: 10 TABLET ORAL at 14:29

## 2017-09-24 RX ADMIN — OXYCODONE HYDROCHLORIDE 10 MG: 10 TABLET ORAL at 08:37

## 2017-09-24 RX ADMIN — ZOLPIDEM TARTRATE 5 MG: 5 TABLET, FILM COATED ORAL at 00:01

## 2017-09-24 RX ADMIN — HYDROMORPHONE HYDROCHLORIDE 4 MG: 4 TABLET ORAL at 23:10

## 2017-09-24 RX ADMIN — CLONAZEPAM 2 MG: 1 TABLET ORAL at 21:29

## 2017-09-24 RX ADMIN — LIDOCAINE 1 APPLICATION: 40 CREAM TOPICAL at 00:05

## 2017-09-24 RX ADMIN — LIDOCAINE 1 PATCH: 50 PATCH CUTANEOUS at 12:15

## 2017-09-24 RX ADMIN — ENOXAPARIN SODIUM 40 MG: 100 INJECTION SUBCUTANEOUS at 08:38

## 2017-09-24 RX ADMIN — PREGABALIN 150 MG: 150 CAPSULE ORAL at 21:29

## 2017-09-24 RX ADMIN — PREGABALIN 150 MG: 150 CAPSULE ORAL at 08:37

## 2017-09-24 RX ADMIN — HYDROMORPHONE HYDROCHLORIDE 4 MG: 4 TABLET ORAL at 14:29

## 2017-09-24 RX ADMIN — METHADONE HYDROCHLORIDE 20 MG: 10 TABLET ORAL at 07:03

## 2017-09-24 RX ADMIN — OXYCODONE HYDROCHLORIDE 10 MG: 10 TABLET ORAL at 04:00

## 2017-09-24 RX ADMIN — ACETAMINOPHEN 325 MG: 325 TABLET, FILM COATED ORAL at 00:01

## 2017-09-24 RX ADMIN — Medication 325 MG: at 08:37

## 2017-09-24 RX ADMIN — Medication 325 MG: at 17:01

## 2017-09-24 RX ADMIN — ZOLPIDEM TARTRATE 5 MG: 5 TABLET, FILM COATED ORAL at 23:10

## 2017-09-24 RX ADMIN — LIDOCAINE 1 PATCH: 50 PATCH CUTANEOUS at 00:05

## 2017-09-24 RX ADMIN — CLONAZEPAM 2 MG: 1 TABLET ORAL at 08:37

## 2017-09-24 ASSESSMENT — PAIN SCALES - GENERAL
PAINLEVEL_OUTOF10: 7
PAINLEVEL_OUTOF10: 6
PAINLEVEL_OUTOF10: 5
PAINLEVEL_OUTOF10: 6
PAINLEVEL_OUTOF10: 0
PAINLEVEL_OUTOF10: 7
PAINLEVEL_OUTOF10: 8
PAINLEVEL_OUTOF10: 5
PAINLEVEL_OUTOF10: 7
PAINLEVEL_OUTOF10: 0
PAINLEVEL_OUTOF10: 5

## 2017-09-24 ASSESSMENT — ENCOUNTER SYMPTOMS
SPUTUM PRODUCTION: 0
CHILLS: 0
EYE PAIN: 0
CONSTIPATION: 0
BLURRED VISION: 0
ABDOMINAL PAIN: 0
COUGH: 0
SORE THROAT: 0
VOMITING: 0
DIAPHORESIS: 0
PALPITATIONS: 0
WHEEZING: 0
HEADACHES: 0
DOUBLE VISION: 0
DIZZINESS: 0
ORTHOPNEA: 0
HEARTBURN: 0
PHOTOPHOBIA: 0

## 2017-09-24 NOTE — CARE PLAN
Problem: Pain Management  Goal: Pain level will decrease to patient's comfort goal  Patient resting in bed and awoken for assessment. Patient begins to moan in pain when awake and requesting pain medication. Dilaudid PO available and given to patient.  Patient notified of oral pain medication available and Morphine IM is only available during wound care. Patient requesting to be awoken for pain medication.

## 2017-09-24 NOTE — PROGRESS NOTES
"Pt resting in bed sleeping intermittently, calm, cooperative, flat affect. RN reviewed POC which includes pain mgt, abx tx, wound care, and eventual discharge planning after long term care here. RN reinforced importance of CDB w/ \"I.S.\" pt has not been compliant w/ this. Fall and safety precautions in place, pt uses call light appropriately. Pt is independent w/ turns for skin integrity, heel floated and elevated, wound drsg dry. Pt has Lovenox for VTE per MAR. Hourly rounds ongoing, call light w/in reach.   "

## 2017-09-24 NOTE — CARE PLAN
Problem: Safety  Goal: Will remain free from injury  Patient notified that he is at risk for falls with wounds to BLE and pain medications. Patient refused bed alarm. Enriqueta DALE charge notified. Patient independently gets up in wheelchair and wheels himself around the unit.

## 2017-09-24 NOTE — PROGRESS NOTES
Patient is up in wheelchair mobilizing around floor, still very irritated and lashing out verbally.

## 2017-09-24 NOTE — PROGRESS NOTES
Patient notified that combination of pain medications and wounds to BLE places patient at high risk for falls. This RN requested the patient call for assistance to wheelchair and bed alarm be placed on patient to remind him to call for assistance. Patient refused bed alarm. Enriqueta Tovar, charge RN notified. Patient independently gets up to wheelchair and wheels himself around unit.

## 2017-09-24 NOTE — PROGRESS NOTES
[facility]    Date of Service: 9/24/2017    Chief Complaint  52 y.o. male admitted 7/22/2017 with Right BKA and left transmetatarsal amputation stump infections    Interval Problem Update  9/19 active and no signficant CC overnight. Patient denies nausea vomiting diarrhea. Patient said left lower extremity off the boot today. Patient is awaiting for wound care nurse for further recommendation    9/20 patient is stable, no fever, chills, nausea vomiting diarrhea, sleeps soundly this morning.    9/21 patient has no significant chief complaint overnight. Patient otherwise denies fever, chills, nausea, vomiting, adb pain, SOB, CP, headache, constipation, diarrhea, cough, or sputum.    9/22 off abx. Pain better. No CC awaiting for placement. Patient otherwise denies fever, chills, nausea, vomiting, adb pain, SOB, CP, headache, constipation, diarrhea, cough, or sputum.    9/23 patient sleeps soundly this morning. No significant chief complaint. Feedings stable. No significant pain.    9/24 stable but some pain. No fever. Can transport from bed to wheelchair by himself. Patient otherwise denies fever, chills, nausea, vomiting, adb pain, SOB, CP, headache, constipation, diarrhea, cough, or sputum.    Consultants/Specialty  ID - Yenny  Ortho - Fern    Disposition  Rehab vs SNF        Review of Systems   Constitutional: Negative for chills, diaphoresis and malaise/fatigue.   HENT: Negative for sore throat.    Eyes: Negative for blurred vision, double vision, photophobia and pain.   Respiratory: Negative for cough, sputum production and wheezing.    Cardiovascular: Positive for leg swelling. Negative for palpitations and orthopnea.   Gastrointestinal: Negative for abdominal pain, constipation, heartburn and vomiting.   Genitourinary: Negative for dysuria and frequency.   Musculoskeletal: Negative for joint pain.   Neurological: Negative for dizziness and headaches.      Physical Exam   Laboratory/Imaging   Hemodynamics  Temp (24hrs), Av.8 °C (98.3 °F), Min:35.9 °C (96.7 °F), Max:37.6 °C (99.7 °F)   Temperature: 37.3 °C (99.1 °F)  Pulse  Av.5  Min: 58  Max: 111    Blood Pressure: 117/55      Respiratory      Respiration: 16, Pulse Oximetry: 93 %        RUL Breath Sounds: Clear, RML Breath Sounds: Clear, RLL Breath Sounds: Clear, NANCY Breath Sounds: Clear, LLL Breath Sounds: Clear    Fluids    Intake/Output Summary (Last 24 hours) at 17 0739  Last data filed at 17 0900   Gross per 24 hour   Intake              480 ml   Output              600 ml   Net             -120 ml       Nutrition  Orders Placed This Encounter   Procedures   • DIET ORDER     Standing Status:   Standing     Number of Occurrences:   1     Order Specific Question:   Diet:     Answer:   Regular [1]     Comments:   regular trays     Physical Exam   Constitutional: He is oriented to person, place, and time. He appears well-nourished. No distress.   HENT:   Head: Normocephalic and atraumatic.   Eyes: Conjunctivae and EOM are normal. Pupils are equal, round, and reactive to light.   Neck: No thyromegaly present.   Cardiovascular: Normal rate and regular rhythm.  Exam reveals no gallop and no friction rub.    No murmur heard.  Pulmonary/Chest: Effort normal. No stridor. No respiratory distress. He has no wheezes. He has no rales. He exhibits no tenderness.   Abdominal: Soft. He exhibits no distension and no mass. There is no tenderness. There is no rebound and no guarding.   Musculoskeletal: He exhibits tenderness (nonspecific, mainly foot but pain out of proportion to exam). He exhibits no edema (improved).   R BKA, L transmetatarsal amputation   Neurological: He is alert and oriented to person, place, and time.   Skin: He is not diaphoretic.   Nursing note and vitals reviewed.                               Assessment/Plan     * Skin ulcer of left foot with fat layer exposed (CMS-HCC)- (present on admission)    Assessment & Plan    Presented with right BKA stump pain and wound drainage in addition to left TMA stump wound (nonhealing).    Has history of PVD s/p L TMA and R BKA more than 5 yrs ago in [place] and receiving home health with wound care while living in [facility] while on disability.  I and D of left foot with gastroscoleus resection and application of wound vac 7/25 and then 8/15 by Dr. Shirley  Wound care  Finished courses of antibiotics (see below)  Wound vac d/jazmín 9/14  After speaking with Yariel Cheung, he wants to have rehabilitation whether it is here or [place]. He has had rehab before and would like to get stronger. Rehab referral made.          Chest wall pain   Assessment & Plan    Pain control        Noncompliance- (present on admission)   Assessment & Plan    Counseling done on risks and complications        ABRIL (acute kidney injury) (CMS-HCC)   Assessment & Plan    resolved        Pseudomonas infection- (present on admission)   Assessment & Plan    finished Cefepime 8/25        MRSA (methicillin resistant staph aureus) culture positive- (present on admission)   Assessment & Plan    Finished course of Doxycycline 8/25        Thrombocytosis (CMS-HCC)- (present on admission)   Assessment & Plan    Follow cbc        Anxiety- (present on admission)   Assessment & Plan    Klonopin        Diarrhea- (present on admission)   Assessment & Plan    Resolved        Opiate dependence (CMS-HCC)- (present on admission)   Assessment & Plan    Methadone, Morphine IM for dressing changes only  Issues with drug-seeking        Wound of right leg, at BKA site- (present on admission)   Assessment & Plan    s/p BKA revision  Failed to finish course of Zyvox (1 week left)        Tobacco abuse- (present on admission)   Assessment & Plan    Nicotine replacement         Peripheral neuropathy- (present on admission)   Assessment & Plan    Lyrica          Still pending placement, SW to follow  Cont monitor    Reviewed items::   Radiology images reviewed, Labs reviewed, Medications reviewed and EKG reviewed  Rolon catheter::  No Rolon  DVT prophylaxis pharmacological::  Enoxaparin (Lovenox)  Ulcer Prophylaxis::  No     For complexity-based billing, please refer to the history, exam, and decison making above. In addition, I spent 35 minutes caring for the patient today. More than 50% of the time was spent counseling and coordinating care.    I have discussed with RN and CM and SW and other consultants about patient's plan.

## 2017-09-25 PROBLEM — G54.6 PHANTOM PAIN FOLLOWING AMPUTATION OF LOWER LIMB (HCC): Status: ACTIVE | Noted: 2017-09-25

## 2017-09-25 LAB
ALBUMIN SERPL BCP-MCNC: 3.5 G/DL (ref 3.2–4.9)
ALBUMIN/GLOB SERPL: 0.9 G/DL
ALP SERPL-CCNC: 181 U/L (ref 30–99)
ALT SERPL-CCNC: 30 U/L (ref 2–50)
ANION GAP SERPL CALC-SCNC: 9 MMOL/L (ref 0–11.9)
AST SERPL-CCNC: 39 U/L (ref 12–45)
BASOPHILS # BLD AUTO: 0.2 % (ref 0–1.8)
BASOPHILS # BLD: 0.02 K/UL (ref 0–0.12)
BILIRUB SERPL-MCNC: 0.7 MG/DL (ref 0.1–1.5)
BUN SERPL-MCNC: 17 MG/DL (ref 8–22)
CALCIUM SERPL-MCNC: 9.1 MG/DL (ref 8.5–10.5)
CHLORIDE SERPL-SCNC: 101 MMOL/L (ref 96–112)
CO2 SERPL-SCNC: 24 MMOL/L (ref 20–33)
CREAT SERPL-MCNC: 0.71 MG/DL (ref 0.5–1.4)
EOSINOPHIL # BLD AUTO: 0.34 K/UL (ref 0–0.51)
EOSINOPHIL NFR BLD: 3.5 % (ref 0–6.9)
ERYTHROCYTE [DISTWIDTH] IN BLOOD BY AUTOMATED COUNT: 49 FL (ref 35.9–50)
GFR SERPL CREATININE-BSD FRML MDRD: >60 ML/MIN/1.73 M 2
GLOBULIN SER CALC-MCNC: 3.7 G/DL (ref 1.9–3.5)
GLUCOSE SERPL-MCNC: 175 MG/DL (ref 65–99)
HCT VFR BLD AUTO: 37.8 % (ref 42–52)
HGB BLD-MCNC: 12 G/DL (ref 14–18)
IMM GRANULOCYTES # BLD AUTO: 0.05 K/UL (ref 0–0.11)
IMM GRANULOCYTES NFR BLD AUTO: 0.5 % (ref 0–0.9)
LYMPHOCYTES # BLD AUTO: 1.62 K/UL (ref 1–4.8)
LYMPHOCYTES NFR BLD: 16.4 % (ref 22–41)
MCH RBC QN AUTO: 26.3 PG (ref 27–33)
MCHC RBC AUTO-ENTMCNC: 31.7 G/DL (ref 33.7–35.3)
MCV RBC AUTO: 82.7 FL (ref 81.4–97.8)
MONOCYTES # BLD AUTO: 0.75 K/UL (ref 0–0.85)
MONOCYTES NFR BLD AUTO: 7.6 % (ref 0–13.4)
NEUTROPHILS # BLD AUTO: 7.07 K/UL (ref 1.82–7.42)
NEUTROPHILS NFR BLD: 71.8 % (ref 44–72)
NRBC # BLD AUTO: 0 K/UL
NRBC BLD AUTO-RTO: 0 /100 WBC
PLATELET # BLD AUTO: 240 K/UL (ref 164–446)
PMV BLD AUTO: 9.8 FL (ref 9–12.9)
POTASSIUM SERPL-SCNC: 3.6 MMOL/L (ref 3.6–5.5)
PROT SERPL-MCNC: 7.2 G/DL (ref 6–8.2)
RBC # BLD AUTO: 4.57 M/UL (ref 4.7–6.1)
SODIUM SERPL-SCNC: 134 MMOL/L (ref 135–145)
WBC # BLD AUTO: 9.9 K/UL (ref 4.8–10.8)

## 2017-09-25 PROCEDURE — A9270 NON-COVERED ITEM OR SERVICE: HCPCS | Performed by: INTERNAL MEDICINE

## 2017-09-25 PROCEDURE — 700102 HCHG RX REV CODE 250 W/ 637 OVERRIDE(OP): Performed by: INTERNAL MEDICINE

## 2017-09-25 PROCEDURE — 770021 HCHG ROOM/CARE - ISO PRIVATE

## 2017-09-25 PROCEDURE — 700111 HCHG RX REV CODE 636 W/ 250 OVERRIDE (IP): Performed by: INTERNAL MEDICINE

## 2017-09-25 PROCEDURE — 700111 HCHG RX REV CODE 636 W/ 250 OVERRIDE (IP): Performed by: HOSPITALIST

## 2017-09-25 PROCEDURE — 36415 COLL VENOUS BLD VENIPUNCTURE: CPT

## 2017-09-25 PROCEDURE — 700102 HCHG RX REV CODE 250 W/ 637 OVERRIDE(OP): Performed by: HOSPITALIST

## 2017-09-25 PROCEDURE — 99233 SBSQ HOSP IP/OBS HIGH 50: CPT | Performed by: HOSPITALIST

## 2017-09-25 PROCEDURE — A9270 NON-COVERED ITEM OR SERVICE: HCPCS | Performed by: HOSPITALIST

## 2017-09-25 PROCEDURE — 700101 HCHG RX REV CODE 250: Performed by: INTERNAL MEDICINE

## 2017-09-25 PROCEDURE — 80053 COMPREHEN METABOLIC PANEL: CPT

## 2017-09-25 PROCEDURE — 85025 COMPLETE CBC W/AUTO DIFF WBC: CPT

## 2017-09-25 RX ORDER — DRONABINOL 2.5 MG/1
2.5 CAPSULE ORAL
Status: DISCONTINUED | OUTPATIENT
Start: 2017-09-25 | End: 2017-09-29

## 2017-09-25 RX ORDER — GABAPENTIN 100 MG/1
100 CAPSULE ORAL 3 TIMES DAILY
Status: DISCONTINUED | OUTPATIENT
Start: 2017-09-25 | End: 2017-09-27

## 2017-09-25 RX ORDER — LIDOCAINE 40 MG/G
1 CREAM TOPICAL PRN
Status: DISCONTINUED | OUTPATIENT
Start: 2017-09-25 | End: 2017-10-18 | Stop reason: HOSPADM

## 2017-09-25 RX ADMIN — METHADONE HYDROCHLORIDE 20 MG: 10 TABLET ORAL at 13:47

## 2017-09-25 RX ADMIN — GABAPENTIN 100 MG: 100 CAPSULE ORAL at 20:59

## 2017-09-25 RX ADMIN — PREGABALIN 150 MG: 150 CAPSULE ORAL at 11:05

## 2017-09-25 RX ADMIN — OXYCODONE HYDROCHLORIDE 10 MG: 10 TABLET ORAL at 01:10

## 2017-09-25 RX ADMIN — HYDROMORPHONE HYDROCHLORIDE 4 MG: 4 TABLET ORAL at 15:04

## 2017-09-25 RX ADMIN — ENOXAPARIN SODIUM 40 MG: 100 INJECTION SUBCUTANEOUS at 11:04

## 2017-09-25 RX ADMIN — LIDOCAINE 1 PATCH: 50 PATCH CUTANEOUS at 11:05

## 2017-09-25 RX ADMIN — ACETAMINOPHEN 650 MG: 325 TABLET, FILM COATED ORAL at 17:41

## 2017-09-25 RX ADMIN — DRONABINOL 2.5 MG: 2.5 CAPSULE ORAL at 17:41

## 2017-09-25 RX ADMIN — OXYCODONE HYDROCHLORIDE 10 MG: 10 TABLET ORAL at 11:05

## 2017-09-25 RX ADMIN — ACETAMINOPHEN 650 MG: 325 TABLET, FILM COATED ORAL at 23:12

## 2017-09-25 RX ADMIN — CLONAZEPAM 2 MG: 1 TABLET ORAL at 20:59

## 2017-09-25 RX ADMIN — Medication 325 MG: at 17:41

## 2017-09-25 RX ADMIN — METHADONE HYDROCHLORIDE 20 MG: 10 TABLET ORAL at 23:12

## 2017-09-25 RX ADMIN — OXYCODONE HYDROCHLORIDE 10 MG: 10 TABLET ORAL at 17:48

## 2017-09-25 RX ADMIN — MORPHINE SULFATE 4 MG: 4 INJECTION INTRAVENOUS at 16:28

## 2017-09-25 RX ADMIN — HYDROMORPHONE HYDROCHLORIDE 4 MG: 4 TABLET ORAL at 07:00

## 2017-09-25 RX ADMIN — PREGABALIN 150 MG: 150 CAPSULE ORAL at 15:04

## 2017-09-25 RX ADMIN — HYDROMORPHONE HYDROCHLORIDE 4 MG: 4 TABLET ORAL at 23:12

## 2017-09-25 RX ADMIN — ACETAMINOPHEN 650 MG: 325 TABLET, FILM COATED ORAL at 05:14

## 2017-09-25 RX ADMIN — PREGABALIN 150 MG: 150 CAPSULE ORAL at 20:59

## 2017-09-25 RX ADMIN — Medication 325 MG: at 07:00

## 2017-09-25 RX ADMIN — GABAPENTIN 100 MG: 100 CAPSULE ORAL at 15:04

## 2017-09-25 RX ADMIN — METHADONE HYDROCHLORIDE 20 MG: 10 TABLET ORAL at 05:14

## 2017-09-25 RX ADMIN — OXYCODONE HYDROCHLORIDE 10 MG: 10 TABLET ORAL at 05:14

## 2017-09-25 RX ADMIN — ACETAMINOPHEN 650 MG: 325 TABLET, FILM COATED ORAL at 11:04

## 2017-09-25 RX ADMIN — CLONAZEPAM 2 MG: 1 TABLET ORAL at 11:04

## 2017-09-25 ASSESSMENT — ENCOUNTER SYMPTOMS
ORTHOPNEA: 0
EYE PAIN: 0
COUGH: 0
PALPITATIONS: 0
VOMITING: 0
HEADACHES: 0
DOUBLE VISION: 0
SORE THROAT: 0
SPUTUM PRODUCTION: 0
DIAPHORESIS: 0
BLURRED VISION: 0
WHEEZING: 0
CHILLS: 0
HEARTBURN: 0
PHOTOPHOBIA: 0
CONSTIPATION: 0
ABDOMINAL PAIN: 0
DIZZINESS: 0

## 2017-09-25 ASSESSMENT — PAIN SCALES - GENERAL
PAINLEVEL_OUTOF10: 4
PAINLEVEL_OUTOF10: 7
PAINLEVEL_OUTOF10: 7
PAINLEVEL_OUTOF10: 8
PAINLEVEL_OUTOF10: 6
PAINLEVEL_OUTOF10: 8
PAINLEVEL_OUTOF10: 4
PAINLEVEL_OUTOF10: 6
PAINLEVEL_OUTOF10: 8
PAINLEVEL_OUTOF10: 5
PAINLEVEL_OUTOF10: 4
PAINLEVEL_OUTOF10: 5

## 2017-09-25 ASSESSMENT — LIFESTYLE VARIABLES: DO YOU DRINK ALCOHOL: NO

## 2017-09-25 NOTE — CARE PLAN
Problem: Venous Thromboembolism (VTW)/Deep Vein Thrombosis (DVT) Prevention:  Goal: Patient will participate in Venous Thrombosis (VTE)/Deep Vein Thrombosis (DVT)Prevention Measures  Patient receiving Lovenox QD for DVT prophylaxis. Patient up and around in wheelchair; scds refused.

## 2017-09-25 NOTE — PROGRESS NOTES
"Patient dropped Oxycodone on floor. When searching for Oxycodone dropped on ground, this RN found Dilaudid pill under patient's bed. Patient stated, \"I dropped the dilaudid the other night but I never told the nurse because I didn't think she would believe me. Can I have the Dilaudid pill now?\" This RN explained to patient that he is not due for Dilaudid until 7am and patient can receive the Oxycodone now.   "

## 2017-09-25 NOTE — CARE PLAN
Problem: Safety  Goal: Will remain free from injury  Outcome: PROGRESSING AS EXPECTED  Patient educated on fall risks and to call for assistance if needs help. Patient able to get himself in and out of wheelchair without difficulty. Patient refuses bed alarm for safety since increase risk of falls with multiple pain medications.

## 2017-09-25 NOTE — PROGRESS NOTES
"Hourly rounding completed. VSS on room air. Patient fully alert and oriented, follows commands. Moves independently with bed to W/C transfers. Pain controlled by PRN medication. Right BKA site is COLUMBA and clean/dry/intact. Left foot dressing is clean/dry/intact. Changed by Wound Care RN this afternoon around 1645. Frequent requests by patient throughout shift. When staff not immediately available to answer those requests, pt becomes agitated and hostile towards staff \"I've been waiting ten minutes! Took you long enough. Don't you know I've been calling? Maxi! Can't you people do anything right?\" RN apologized for the patient having to wait and educated pt on staff to patient ratios, implored pt to be patient with staff, and to group his requests instead of calling for one single request each time he calls.   "

## 2017-09-26 PROBLEM — R19.7 DIARRHEA: Status: RESOLVED | Noted: 2017-07-31 | Resolved: 2017-09-26

## 2017-09-26 PROBLEM — N17.9 AKI (ACUTE KIDNEY INJURY) (HCC): Status: RESOLVED | Noted: 2017-07-30 | Resolved: 2017-09-26

## 2017-09-26 PROCEDURE — A9270 NON-COVERED ITEM OR SERVICE: HCPCS | Performed by: INTERNAL MEDICINE

## 2017-09-26 PROCEDURE — 700102 HCHG RX REV CODE 250 W/ 637 OVERRIDE(OP): Performed by: INTERNAL MEDICINE

## 2017-09-26 PROCEDURE — 700111 HCHG RX REV CODE 636 W/ 250 OVERRIDE (IP): Performed by: INTERNAL MEDICINE

## 2017-09-26 PROCEDURE — A9270 NON-COVERED ITEM OR SERVICE: HCPCS | Performed by: HOSPITALIST

## 2017-09-26 PROCEDURE — 770021 HCHG ROOM/CARE - ISO PRIVATE

## 2017-09-26 PROCEDURE — 99232 SBSQ HOSP IP/OBS MODERATE 35: CPT | Performed by: HOSPITALIST

## 2017-09-26 PROCEDURE — 700102 HCHG RX REV CODE 250 W/ 637 OVERRIDE(OP): Performed by: HOSPITALIST

## 2017-09-26 RX ADMIN — DRONABINOL 2.5 MG: 2.5 CAPSULE ORAL at 17:00

## 2017-09-26 RX ADMIN — METHADONE HYDROCHLORIDE 20 MG: 10 TABLET ORAL at 05:53

## 2017-09-26 RX ADMIN — DRONABINOL 2.5 MG: 2.5 CAPSULE ORAL at 11:00

## 2017-09-26 RX ADMIN — CLONAZEPAM 2 MG: 1 TABLET ORAL at 21:17

## 2017-09-26 RX ADMIN — OXYCODONE HYDROCHLORIDE 10 MG: 10 TABLET ORAL at 03:16

## 2017-09-26 RX ADMIN — CLONAZEPAM 2 MG: 1 TABLET ORAL at 09:10

## 2017-09-26 RX ADMIN — HYDROMORPHONE HYDROCHLORIDE 4 MG: 4 TABLET ORAL at 19:12

## 2017-09-26 RX ADMIN — ZOLPIDEM TARTRATE 5 MG: 5 TABLET, FILM COATED ORAL at 21:17

## 2017-09-26 RX ADMIN — PREGABALIN 150 MG: 150 CAPSULE ORAL at 21:18

## 2017-09-26 RX ADMIN — METHADONE HYDROCHLORIDE 20 MG: 10 TABLET ORAL at 21:17

## 2017-09-26 RX ADMIN — OXYCODONE HYDROCHLORIDE 10 MG: 10 TABLET ORAL at 07:15

## 2017-09-26 RX ADMIN — ACETAMINOPHEN 650 MG: 325 TABLET, FILM COATED ORAL at 05:53

## 2017-09-26 RX ADMIN — GABAPENTIN 100 MG: 100 CAPSULE ORAL at 21:18

## 2017-09-26 RX ADMIN — HYDROMORPHONE HYDROCHLORIDE 4 MG: 4 TABLET ORAL at 11:14

## 2017-09-26 RX ADMIN — OXYCODONE HYDROCHLORIDE 10 MG: 10 TABLET ORAL at 23:59

## 2017-09-26 RX ADMIN — Medication 325 MG: at 09:10

## 2017-09-26 RX ADMIN — GABAPENTIN 100 MG: 100 CAPSULE ORAL at 09:09

## 2017-09-26 RX ADMIN — ENOXAPARIN SODIUM 40 MG: 100 INJECTION SUBCUTANEOUS at 09:11

## 2017-09-26 RX ADMIN — PREGABALIN 150 MG: 150 CAPSULE ORAL at 09:09

## 2017-09-26 RX ADMIN — Medication 325 MG: at 17:30

## 2017-09-26 ASSESSMENT — ENCOUNTER SYMPTOMS
SPEECH CHANGE: 0
BACK PAIN: 0
ABDOMINAL PAIN: 0
STRIDOR: 0
FLANK PAIN: 0
EYE DISCHARGE: 0
SPUTUM PRODUCTION: 0
SORE THROAT: 0
FEVER: 0
PALPITATIONS: 0
MYALGIAS: 1
EYE REDNESS: 0
VOMITING: 0
WEAKNESS: 1
COUGH: 0
CHILLS: 0
ORTHOPNEA: 0
CONSTIPATION: 0
WHEEZING: 0
FOCAL WEAKNESS: 0
HALLUCINATIONS: 0
DIZZINESS: 0

## 2017-09-26 ASSESSMENT — PAIN SCALES - GENERAL
PAINLEVEL_OUTOF10: 5

## 2017-09-26 NOTE — PROGRESS NOTES
[facility] Hospitalist Progress Note    Date of Service: 9/25/2017    Chief Complaint  52 y.o. male admitted 7/22/2017 with Right BKA and left transmetatarsal amputation stump infections    Interval Problem Update  9/19 active and no signficant CC overnight. Patient denies nausea vomiting diarrhea. Patient said left lower extremity off the boot today. Patient is awaiting for wound care nurse for further recommendation    9/20 patient is stable, no fever, chills, nausea vomiting diarrhea, sleeps soundly this morning.    9/21 patient has no significant chief complaint overnight. Patient otherwise denies fever, chills, nausea, vomiting, adb pain, SOB, CP, headache, constipation, diarrhea, cough, or sputum.    9/22 off abx. Pain better. No CC awaiting for placement. Patient otherwise denies fever, chills, nausea, vomiting, adb pain, SOB, CP, headache, constipation, diarrhea, cough, or sputum.    9/23 patient sleeps soundly this morning. No significant chief complaint. Feedings stable. No significant pain.    9/24 stable but some pain. No fever. Can transport from bed to wheelchair by himself. Patient otherwise denies fever, chills, nausea, vomiting, adb pain, SOB, CP, headache, constipation, diarrhea, cough, or sputum.  9/25:  Spoke with patient extensively about alternative medications, states he used to use THC regularly to help with chronic pain.  Agrees to marinol 2.5 bid and neurontin for phantom limb pain as well as left sided TMA pain.  Leave his current medications at this time.  Hope to wean narcotics as we optimize neurontin, marinol.  Pleasant today.    Consultants/Specialty  ID - Yenny  Ortho - Fern    Disposition  Rehab vs SNF 9/25:  Informed SW patient states his son lives in [place], willing to cancel medical for medicaid, also is a .  Difficult discharge.        Review of Systems   Constitutional: Negative for chills, diaphoresis and malaise/fatigue.   HENT: Negative for sore throat.    Eyes:  Negative for blurred vision, double vision, photophobia and pain.   Respiratory: Negative for cough, sputum production and wheezing.    Cardiovascular: Positive for leg swelling. Negative for palpitations and orthopnea.   Gastrointestinal: Negative for abdominal pain, constipation, heartburn and vomiting.   Genitourinary: Negative for dysuria and frequency.   Musculoskeletal: Negative for joint pain.   Neurological: Negative for dizziness and headaches.      Physical Exam  Laboratory/Imaging   Hemodynamics  Temp (24hrs), Av.6 °C (97.8 °F), Min:36.4 °C (97.6 °F), Max:36.7 °C (98 °F)   Temperature: 36.7 °C (98 °F)  Pulse  Av.4  Min: 58  Max: 111    Blood Pressure: 112/67      Respiratory      Respiration: 17, Pulse Oximetry: 95 %        RUL Breath Sounds: Clear, RML Breath Sounds: Clear, RLL Breath Sounds: Clear, NANCY Breath Sounds: Clear, LLL Breath Sounds: Clear    Fluids    Intake/Output Summary (Last 24 hours) at 17 1703  Last data filed at 17 0400   Gross per 24 hour   Intake                0 ml   Output             1575 ml   Net            -1575 ml       Nutrition  Orders Placed This Encounter   Procedures   • DIET ORDER     Standing Status:   Standing     Number of Occurrences:   1     Order Specific Question:   Diet:     Answer:   Regular [1]     Comments:   regular trays     Physical Exam   Constitutional: He is oriented to person, place, and time. He appears well-nourished. No distress.   HENT:   Head: Normocephalic and atraumatic.   Eyes: Conjunctivae and EOM are normal. Pupils are equal, round, and reactive to light.   Neck: No thyromegaly present.   Cardiovascular: Normal rate and regular rhythm.  Exam reveals no gallop and no friction rub.    No murmur heard.  Pulmonary/Chest: Effort normal. No stridor. No respiratory distress. He has no wheezes. He has no rales. He exhibits no tenderness.   Abdominal: Soft. He exhibits no distension and no mass. There is no tenderness. There is no  rebound and no guarding.   Musculoskeletal: He exhibits tenderness (nonspecific, mainly foot but pain out of proportion to exam). He exhibits no edema (improved).   R BKA, L transmetatarsal amputation   Neurological: He is alert and oriented to person, place, and time.   Skin: He is not diaphoretic.   Nursing note and vitals reviewed.      Recent Labs      09/25/17   0914   WBC  9.9   RBC  4.57*   HEMOGLOBIN  12.0*   HEMATOCRIT  37.8*   MCV  82.7   MCH  26.3*   MCHC  31.7*   RDW  49.0   PLATELETCT  240   MPV  9.8     Recent Labs      09/25/17   0913   SODIUM  134*   POTASSIUM  3.6   CHLORIDE  101   CO2  24   GLUCOSE  175*   BUN  17   CREATININE  0.71   CALCIUM  9.1                      Assessment/Plan     * Skin ulcer of left foot with fat layer exposed (CMS-HCC)- (present on admission)   Assessment & Plan    Presented with right BKA stump pain and wound drainage in addition to left TMA stump wound (nonhealing).    Has history of PVD s/p L TMA and R BKA more than 5 yrs ago in [place] and receiving home health with wound care while living in [facility] while on disability.  I and D of left foot with gastroscoleus resection and application of wound vac 7/25 and then 8/15 by Dr. Shirley  Wound care  Finished courses of antibiotics (see below)  Wound vac d/jazmín 9/14  After speaking with Yariel Cheung, he wants to have rehabilitation whether it is here or [place]. He has had rehab before and would like to get stronger. Rehab referral made.          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  Optimize these medications over time in order to decrease narcotic dependence.        Thrombocytosis (CMS-[facility])- (present on admission)   Assessment & Plan    Follow cbc        Anxiety- (present on admission)   Assessment & Plan    Klonopin        Diarrhea- (present on admission)   Assessment & Plan    Resolved        Opiate dependence (CMS-[facility])- (present on admission)   Assessment & Plan    Methadone, Morphine IM for dressing changes only  Issues with drug-seeking        Wound of right leg, at BKA site- (present on admission)   Assessment & Plan    s/p BKA revision  Failed to finish course of Zyvox (1 week left)        Tobacco abuse- (present on admission)   Assessment & Plan    Nicotine replacement         Peripheral neuropathy- (present on admission)   Assessment & Plan    Lyrica              Reviewed items::  Radiology images reviewed, Labs reviewed, Medications reviewed and EKG reviewed  Rolon catheter::  No Rolon  DVT prophylaxis pharmacological::  Enoxaparin (Lovenox)  Ulcer Prophylaxis::  No     For complexity-based billing, please refer to the history, exam, and decison making above. In addition, I spent 35 minutes caring for the patient today. More than 50% of the time was spent counseling and coordinating care.    I have discussed with RN and EDGARDO and SW and other consultants about patient's plan.
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
## 2017-09-26 NOTE — DISCHARGE PLANNING
Piper called back. ROXANNE has declined, did not give reason. Will ask [facility] to refer to  [facility] Gabbie Fernandez, [employer] Shannon , [facility] and [facility], [facility] and [facility].

## 2017-09-26 NOTE — PROGRESS NOTES
[facility] Hospitalist Progress Note    Date of Service: 2017    Chief Complaint  52 y.o. male hx of Rt BKA, left foot partial amputation admitted 2017 with stump infections    Interval Problem Update    Has completed atbs, afebrile. Discussed with , multi disciplinary team- difficult dc- referrals being made to [place].     Consultants/Specialty  ID - Yenny  Ortho - Fern    Disposition  Rehab vs SNF      Review of Systems   Constitutional: Negative for chills and fever.   HENT: Negative for sore throat.    Eyes: Positive for blurred vision. Negative for discharge and redness.   Respiratory: Negative for cough, sputum production, wheezing and stridor.    Cardiovascular: Positive for leg swelling. Negative for palpitations and orthopnea.   Gastrointestinal: Negative for abdominal pain, constipation and vomiting.   Genitourinary: Negative for flank pain and hematuria.   Musculoskeletal: Positive for joint pain and myalgias. Negative for back pain.        Chronic pain    Neurological: Positive for weakness. Negative for dizziness, speech change and focal weakness.   Psychiatric/Behavioral: Negative for hallucinations.      Physical Exam  Laboratory/Imaging   Hemodynamics  Temp (24hrs), Av.5 °C (97.7 °F), Min:36.1 °C (97 °F), Max:36.8 °C (98.3 °F)   Temperature: 36.1 °C (97 °F)  Pulse  Av.4  Min: 58  Max: 111    Blood Pressure: 127/69      Respiratory      Respiration: 18, Pulse Oximetry: 94 %        RUL Breath Sounds: Clear, RML Breath Sounds: Clear, RLL Breath Sounds: Clear, NANCY Breath Sounds: Clear, LLL Breath Sounds: Clear    Fluids    Intake/Output Summary (Last 24 hours) at 17 2118  Last data filed at 17 1000   Gross per 24 hour   Intake                0 ml   Output             1100 ml   Net            -1100 ml       Nutrition  Orders Placed This Encounter   Procedures   • DIET ORDER     Standing Status:   Standing     Number of Occurrences:   1     Order Specific Question:    Diet:     Answer:   Regular [1]     Comments:   regular trays     Physical Exam   Constitutional: He is oriented to person, place, and time. He appears well-nourished. No distress.   HENT:   Head: Normocephalic and atraumatic.   Eyes: Right eye exhibits no discharge. Left eye exhibits no discharge.   Rt eye deviated , chronic .   Neck: No JVD present. No thyromegaly present.   Cardiovascular: Normal rate and regular rhythm.    No murmur heard.  Pulmonary/Chest: Effort normal. No stridor. He has no wheezes. He has no rales.   Abdominal: Soft. Bowel sounds are normal. He exhibits no distension. There is no tenderness.   Musculoskeletal: He exhibits tenderness. He exhibits no edema (improved).   R BKA, L transmetatarsal amputation- dry , no signif increased warmth, redness- wrapped.    Neurological: He is alert and oriented to person, place, and time. No cranial nerve deficit.   Skin: Skin is warm and dry. He is not diaphoretic. No pallor.   Psychiatric: He has a normal mood and affect. His behavior is normal.   Nursing note and vitals reviewed.      Recent Labs      09/25/17   0914   WBC  9.9   RBC  4.57*   HEMOGLOBIN  12.0*   HEMATOCRIT  37.8*   MCV  82.7   MCH  26.3*   MCHC  31.7*   RDW  49.0   PLATELETCT  240   MPV  9.8     Recent Labs      09/25/17   0913   SODIUM  134*   POTASSIUM  3.6   CHLORIDE  101   CO2  24   GLUCOSE  175*   BUN  17   CREATININE  0.71   CALCIUM  9.1                      Assessment/Plan     * Skin ulcer of left foot with fat layer exposed (CMS-[facility])- (present on admission)   Assessment & Plan    w right BKA stump pain  left TMA stump non healing  Wound due to PVD- post I and D of left foot with gastroscoleus resection and application of wound vac 7/25 and then 8/15 by Dr. Shirley  Off [facility] .  Continue w wound care.  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  Optimize these medications over time in order to decrease narcotic dependence.        Thrombocytosis (CMS-HCC)- (present on admission)   Assessment & Plan    Reactive, resolved.         Anxiety- (present on admission)   Assessment & Plan    Klonopin        Opiate dependence (CMS-HCC)- (present on admission)   Assessment & Plan    Pain control w neurontin  sched  Methadone w prn oral dilaudid - prn   Morphine IM for dressing changes only          Wound of right leg, at BKA site- (present on admission)   Assessment & Plan    s/p BKA revision  Monitor for acute infxn        Tobacco abuse- (present on admission)   Assessment & Plan    Nicotine replacement         Peripheral neuropathy- (present on admission)   Assessment & Plan    Lyrica, neurontin.               Reviewed items::  Radiology images reviewed, Labs reviewed, Medications reviewed and EKG reviewed  Rolon catheter::  No Rolon  DVT prophylaxis pharmacological::  Enoxaparin (Lovenox)  Ulcer Prophylaxis::  No     For complexity-based billing, please refer to the history, exam, and decison making above. In addition, I spent 35 minutes caring for the patient today. More than 50% of the time was spent counseling and coordinating care.    I have discussed with RN and CM and SW and other consultants about patient's plan.

## 2017-09-26 NOTE — CARE PLAN
Problem: Safety  Goal: Will remain free from falls  Outcome: PROGRESSING AS EXPECTED  Frequently refuses staff assistance and does not use call light. Reminded to use call light.

## 2017-09-26 NOTE — PROGRESS NOTES
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 Takes methadone he states for back pain.  Not diabetic.  Presented to ED on 7/22/17 with increased pain to L TMA with infected neuropathic ulcer and pain to R BKA.        s/p L foot I & D with VAC placement, GSR by Dr. Shirley on 7/25/17  S/p Right revision below-knee amputation,  Right lower extremity irrigation and debridement, skin, subcutaneous  tissue to bone on 8/15/17    Acell biologic applied to wound on 9/13, and 9/20  NPWT discontinued 9/13       Pt seen today for outer dressing check with Maria A DALE.        PROCEDURE: Patient premedicated with pain medication by RN  Outer dressing removed, rinsed with NS, covered with Adaptic, secured with sterisptrips, applied alginate,  Covered with foam dressing, secured with ace wrap          PLAN  -LPS to reapply Acell 1x/week  -LPS to assess wound every 3- 4 days, change cover dressing as indicated for saturation or displacement  -Nsg to contact LPS if dressing becomes soiled or dislodged.

## 2017-09-26 NOTE — PROGRESS NOTES
Pharmacy Pharmacotherapy Consult for LOS >30 days    Admit Date: 7/22/2017      Medications were reviewed for appropriateness and ongoing need.     Current Facility-Administered Medications   Medication Dose Route Frequency Provider Last Rate Last Dose   • dronabinol (MARINOL) capsule 2.5 mg  2.5 mg Oral BEFORE LUNCH AND DINNER Magali Jha M.D.       • gabapentin (NEURONTIN) capsule 100 mg  100 mg Oral TID Magali Jha M.D.   100 mg at 09/25/17 1504   • lidocaine (LMX) 4 % cream 1 Application  1 Application Topical PRN Magali Jha M.D.       • HYDROmorphone (DILAUDID) tablet 4 mg  4 mg Oral Q8HRS PRN Elijah Lawson M.D.   4 mg at 09/25/17 1504   • oxycodone immediate-release (ROXICODONE) tablet 5 mg  5 mg Oral Q4HRS PRN Med Gotti M.D.        Or   • oxycodone immediate release (ROXICODONE) tablet 10 mg  10 mg Oral Q4HRS PRN Med Gotti M.D.   10 mg at 09/25/17 1105   • prochlorperazine (COMPAZINE) tablet 10 mg  10 mg Oral Q6HRS PRN Elijah Lawson M.D.   10 mg at 09/17/17 0929   • morphine (pf) 4 mg/ml injection 4 mg  4 mg Intramuscular Q48HRS PRN Magali Jha M.D.   4 mg at 09/25/17 1628   • acetaminophen (TYLENOL) tablet 650 mg  650 mg Oral Q6HRS Nate Victoria M.D.   650 mg at 09/25/17 1104   • loperamide (IMODIUM) capsule 2 mg  2 mg Oral TID PRN Sara Jack M.D.   2 mg at 09/12/17 0003   • methadone (DOLOPHINE) tablet 20 mg  20 mg Oral Q8HRS Sara Jack M.D.   20 mg at 09/25/17 1347   • clonazepam (KLONOPIN) tablet 2 mg  2 mg Oral BID Sara Jack M.D.   2 mg at 09/25/17 1104   • pregabalin (LYRICA) capsule 150 mg  150 mg Oral TID Sara Jack M.D.   150 mg at 09/25/17 1504   • lidocaine (LIDODERM) 5 % 1 Patch  1 Patch Transdermal Q24HR Magali Jha M.D.   1 Patch at 09/25/17 1105   • zolpidem (AMBIEN) tablet 5 mg  5 mg Oral HS PRN - MR X 1 Sara Jack M.D.   5 mg at 09/24/17 2310   • enoxaparin (LOVENOX) inj 40 mg  40 mg Subcutaneous DAILY Sara Jack M.D.   40 mg at 09/25/17 1104   • ondansetron  (ZOFRAN ODT) dispertab 4 mg  4 mg Oral Q4HRS PRN Sara Jack M.D.   4 mg at 09/18/17 1938   • ondansetron (ZOFRAN) syringe/vial injection 4 mg  4 mg Intravenous Q4HRS PRN Sara Jack M.D.   4 mg at 08/22/17 0146   • acetaminophen (TYLENOL) tablet 650 mg  650 mg Oral Q6HRS PRN Sara Jack M.D.   650 mg at 09/17/17 0517   • ferrous sulfate tablet 325 mg  325 mg Oral BID WITH MEALS Sara Jack M.D.   325 mg at 09/25/17 0700       Recommendations:  Consider adjusting APAP to just prn instead of scheduled.     Nohemi Silva, PharmD.

## 2017-09-26 NOTE — DISCHARGE PLANNING
Spoke to Piper from  [facility] in [facility]. She said ROXANNE is reviewing case and she will get back to me today.

## 2017-09-26 NOTE — DISCHARGE PLANNING
Directed by  supervisor, Joi to discuss possibility of dis-enrolling in [place] and [place]ng in [facility] if [place] intends to reside in [place]. Discussed with pt, he plans to go back to [place] as soon as possible. He states he can move back in with friend he was living with. (That friend's mother has , so he can eventually return there.)   SNF referrals still pending.

## 2017-09-27 PROCEDURE — 99232 SBSQ HOSP IP/OBS MODERATE 35: CPT | Performed by: HOSPITALIST

## 2017-09-27 PROCEDURE — 700101 HCHG RX REV CODE 250: Performed by: HOSPITALIST

## 2017-09-27 PROCEDURE — 700102 HCHG RX REV CODE 250 W/ 637 OVERRIDE(OP): Performed by: INTERNAL MEDICINE

## 2017-09-27 PROCEDURE — 700102 HCHG RX REV CODE 250 W/ 637 OVERRIDE(OP): Performed by: HOSPITALIST

## 2017-09-27 PROCEDURE — A9270 NON-COVERED ITEM OR SERVICE: HCPCS | Performed by: INTERNAL MEDICINE

## 2017-09-27 PROCEDURE — 700111 HCHG RX REV CODE 636 W/ 250 OVERRIDE (IP): Performed by: INTERNAL MEDICINE

## 2017-09-27 PROCEDURE — 700111 HCHG RX REV CODE 636 W/ 250 OVERRIDE (IP): Performed by: HOSPITALIST

## 2017-09-27 PROCEDURE — A9270 NON-COVERED ITEM OR SERVICE: HCPCS | Performed by: HOSPITALIST

## 2017-09-27 PROCEDURE — 770021 HCHG ROOM/CARE - ISO PRIVATE

## 2017-09-27 RX ORDER — GABAPENTIN 100 MG/1
200 CAPSULE ORAL 3 TIMES DAILY
Status: DISCONTINUED | OUTPATIENT
Start: 2017-09-27 | End: 2017-09-30

## 2017-09-27 RX ADMIN — Medication 325 MG: at 16:52

## 2017-09-27 RX ADMIN — LIDOCAINE 1 PATCH: 50 PATCH CUTANEOUS at 11:00

## 2017-09-27 RX ADMIN — DRONABINOL 2.5 MG: 2.5 CAPSULE ORAL at 16:50

## 2017-09-27 RX ADMIN — CLONAZEPAM 2 MG: 1 TABLET ORAL at 08:47

## 2017-09-27 RX ADMIN — PREGABALIN 150 MG: 150 CAPSULE ORAL at 08:49

## 2017-09-27 RX ADMIN — OXYCODONE HYDROCHLORIDE 10 MG: 10 TABLET ORAL at 08:49

## 2017-09-27 RX ADMIN — METHADONE HYDROCHLORIDE 20 MG: 10 TABLET ORAL at 14:30

## 2017-09-27 RX ADMIN — PREGABALIN 150 MG: 150 CAPSULE ORAL at 20:31

## 2017-09-27 RX ADMIN — METHADONE HYDROCHLORIDE 20 MG: 10 TABLET ORAL at 20:30

## 2017-09-27 RX ADMIN — DRONABINOL 2.5 MG: 2.5 CAPSULE ORAL at 11:18

## 2017-09-27 RX ADMIN — HYDROMORPHONE HYDROCHLORIDE 4 MG: 4 TABLET ORAL at 04:45

## 2017-09-27 RX ADMIN — METHADONE HYDROCHLORIDE 20 MG: 10 TABLET ORAL at 05:20

## 2017-09-27 RX ADMIN — GABAPENTIN 100 MG: 100 CAPSULE ORAL at 14:29

## 2017-09-27 RX ADMIN — HYDROMORPHONE HYDROCHLORIDE 4 MG: 4 TABLET ORAL at 23:40

## 2017-09-27 RX ADMIN — HYDROMORPHONE HYDROCHLORIDE 4 MG: 4 TABLET ORAL at 12:47

## 2017-09-27 RX ADMIN — ACETAMINOPHEN 325 MG: 325 TABLET, FILM COATED ORAL at 05:20

## 2017-09-27 RX ADMIN — PREGABALIN 150 MG: 150 CAPSULE ORAL at 14:29

## 2017-09-27 RX ADMIN — MORPHINE SULFATE 4 MG: 4 INJECTION INTRAVENOUS at 14:51

## 2017-09-27 RX ADMIN — ENOXAPARIN SODIUM 40 MG: 100 INJECTION SUBCUTANEOUS at 08:50

## 2017-09-27 RX ADMIN — CLONAZEPAM 2 MG: 1 TABLET ORAL at 20:31

## 2017-09-27 RX ADMIN — OXYCODONE HYDROCHLORIDE 10 MG: 10 TABLET ORAL at 16:51

## 2017-09-27 RX ADMIN — ACETAMINOPHEN 325 MG: 325 TABLET, FILM COATED ORAL at 23:40

## 2017-09-27 RX ADMIN — GABAPENTIN 200 MG: 100 CAPSULE ORAL at 20:30

## 2017-09-27 RX ADMIN — Medication 325 MG: at 08:48

## 2017-09-27 RX ADMIN — GABAPENTIN 100 MG: 100 CAPSULE ORAL at 08:47

## 2017-09-27 ASSESSMENT — LIFESTYLE VARIABLES
DO YOU DRINK ALCOHOL: NO
SUBSTANCE_ABUSE: 1

## 2017-09-27 ASSESSMENT — ENCOUNTER SYMPTOMS
ABDOMINAL PAIN: 0
FOCAL WEAKNESS: 0
COUGH: 0
WHEEZING: 0
EYE REDNESS: 0
CONSTIPATION: 0
CHILLS: 0
FEVER: 0
VOMITING: 0
SPUTUM PRODUCTION: 0
PALPITATIONS: 0
SPEECH CHANGE: 0
STRIDOR: 0
HALLUCINATIONS: 0
BLURRED VISION: 1
BACK PAIN: 0
ORTHOPNEA: 0
FLANK PAIN: 0
EYE DISCHARGE: 0
MYALGIAS: 1

## 2017-09-27 ASSESSMENT — PAIN SCALES - GENERAL
PAINLEVEL_OUTOF10: 7
PAINLEVEL_OUTOF10: 2
PAINLEVEL_OUTOF10: 8
PAINLEVEL_OUTOF10: 8
PAINLEVEL_OUTOF10: 6
PAINLEVEL_OUTOF10: 8
PAINLEVEL_OUTOF10: 4
PAINLEVEL_OUTOF10: 6
PAINLEVEL_OUTOF10: 8
PAINLEVEL_OUTOF10: 4
PAINLEVEL_OUTOF10: 7
PAINLEVEL_OUTOF10: 8
PAINLEVEL_OUTOF10: 8

## 2017-09-27 NOTE — PROGRESS NOTES
"Assumed care at 1900 after receiving report from previous nurse. Pt is very agitated and confrontational this evening. Name was changed to an alias d/t situation with son last night ( see note from me dated 09/26/17 1232am) Had not been made aware of change, his dinner tray had been brought into the room and no one had let him know it was there and, per him, it was suggested that he be put into another room but the only one available was 302 and it could be seen into from the visitors elevator which defeated the purpose of the name change. He attested that the nurse that took care of him today was at the station laughing about the proposed change and \"how would he like it if we moved him to that room.\" He was cursing at staff about his meal tray, cursing about the change of name in the computer, Stated that \"anyone could hack into the system and find out he was here just by looking for his birthdate and MRN !\"  "

## 2017-09-27 NOTE — PROGRESS NOTES
[facility]ist Progress Note    Date of Service: 2017    Chief Complaint  52 y.o. male hx of Rt BKA, left foot partial amputation admitted 2017 with stump infections    Interval Problem Update    States that oxycontin not helping with chronic pain - prefers methadone. indep mobility in wheelchair.  Discussed difficult dc with multi disciplinary team.    Consultants/Specialty  ID - Yenny  Ortho - Fern    Disposition  Rehab vs SNF- ref[place] out of state.      Review of Systems   Constitutional: Negative for chills and fever.   HENT: Negative for congestion.    Eyes: Negative for discharge and redness.   Respiratory: Negative for cough, sputum production, wheezing and stridor.    Cardiovascular: Positive for leg swelling. Negative for palpitations and orthopnea.   Gastrointestinal: Negative for abdominal pain, constipation and vomiting.   Genitourinary: Negative for flank pain and hematuria.   Musculoskeletal: Positive for joint pain and myalgias. Negative for back pain.        Chronic pain    Neurological: Negative for speech change and focal weakness.   Psychiatric/Behavioral: Positive for substance abuse. Negative for hallucinations.      Physical Exam  Laboratory/Imaging   Hemodynamics  Temp (24hrs), Av.4 °C (97.6 °F), Min:36.2 °C (97.1 °F), Max:36.7 °C (98 °F)   Temperature: 36.7 °C (98 °F)  Pulse  Av.4  Min: 58  Max: 111    Blood Pressure: 128/75      Respiratory      Respiration: 18, Pulse Oximetry: 93 %        RUL Breath Sounds: Clear, RML Breath Sounds: Clear, RLL Breath Sounds: Clear, NANCY Breath Sounds: Clear, LLL Breath Sounds: Clear    Fluids    Intake/Output Summary (Last 24 hours) at 17 1152  Last data filed at 17 0800   Gross per 24 hour   Intake                0 ml   Output              400 ml   Net             -400 ml       Nutrition  Orders Placed This Encounter   Procedures   • DIET ORDER     Standing Status:   Standing     Number of Occurrences:   1      Order Specific Question:   Diet:     Answer:   Regular [1]     Comments:   regular trays     Physical Exam   Constitutional: He is oriented to person, place, and time. He appears well-nourished. No distress.   HENT:   Head: Normocephalic.   Eyes: Right eye exhibits no discharge. Left eye exhibits no discharge.   Rt eye deviated , chronic .   Neck: Normal range of motion. No JVD present.   Cardiovascular: Normal rate and regular rhythm.    No murmur heard.  Pulmonary/Chest: No stridor. He has no wheezes. He has no rales.   Abdominal: Soft. Bowel sounds are normal. He exhibits no distension. There is no tenderness.   Musculoskeletal: He exhibits tenderness. He exhibits no edema.   R BKA, L transmetatarsal amputation- dry , no signif increased warmth, redness- wrapped.    Neurological: He is alert and oriented to person, place, and time. No cranial nerve deficit.   Skin: Skin is warm and dry. He is not diaphoretic. No pallor.   Psychiatric:   Calm cooperative.    Vitals reviewed.      Recent Labs      09/25/17   0914   WBC  9.9   RBC  4.57*   HEMOGLOBIN  12.0*   HEMATOCRIT  37.8*   MCV  82.7   MCH  26.3*   MCHC  31.7*   RDW  49.0   PLATELETCT  240   MPV  9.8     Recent Labs      09/25/17   0913   SODIUM  134*   POTASSIUM  3.6   CHLORIDE  101   CO2  24   GLUCOSE  175*   BUN  17   CREATININE  0.71   CALCIUM  9.1                      Assessment/Plan     * Skin ulcer of left foot with fat layer exposed (CMS-HCC)- (present on admission)   Assessment & Plan    w right BKA stump pain  left TMA stump non healing  Wound due to PVD- post I and D of left foot with gastroscoleus resection and application of wound vac 7/25 and then 8/15 by Dr. Shirley, Off [facility] .  Continue w wound care.  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)- (present on admission)   Assessment & Plan    Neurontin,  marinol   Increase dose in attempts to decrease narcotics         Pseudomonas  infection- (present on admission)   Assessment & Plan    finished Cefepime 8/25        Chest wall pain   Assessment & Plan    Pain control        Noncompliance- (present on admission)   Assessment & Plan    Counseling done on risks and complications        MRSA (methicillin resistant staph aureus) culture positive- (present on admission)   Assessment & Plan    Completed Doxycycline 8/25        Thrombocytosis (CMS-HCC)- (present on admission)   Assessment & Plan    Reactive, resolved.         Anxiety- (present on admission)   Assessment & Plan    Klonopin        Opiate dependence (CMS-HCC)- (present on admission)   Assessment & Plan    Pain control w neurontin  sched  Methadone w prn oral dilaudid   prn   Morphine IM for dressing changes only          Wound of right leg, at BKA site- (present on admission)   Assessment & Plan    s/p BKA revision  Monitor for acute infxn, fever.        Tobacco abuse- (present on admission)   Assessment & Plan    Nicotine replacement         Peripheral neuropathy- (present on admission)   Assessment & Plan    Lyrica, neurontin.               Reviewed items::  Labs reviewed and Medications reviewed  Rolon catheter::  No Rolon  DVT prophylaxis pharmacological::  Enoxaparin (Lovenox)  Ulcer Prophylaxis::  No     For complexity-based billing, please refer to the history, exam, and decison making above. In addition, I spent 35 minutes caring for the patient today. More than 50% of the time was spent counseling and coordinating care.    I have discussed with RN and CM and SW and other consultants about patient's plan.

## 2017-09-27 NOTE — PROGRESS NOTES
Bedside report received from Carrol DALE.  Patient a/a/o with no s/s of discomfort or distress noted, ambulating self by wheelchair around unit.  Room air, dressings intact, fall precautions in place, pt calls for assist.  POC discharge to rehab or home with friend per pt request.

## 2017-09-27 NOTE — CARE PLAN
Problem: Safety  Goal: Will remain free from injury  Outcome: PROGRESSING AS EXPECTED  Safety precautions in place. Call light within reach. Bed is low and in locked position. Hourly rounding in place. Pt is up self      Problem: Discharge Barriers/Planning  Goal: Patient's continuum of care needs will be met  Outcome: PROGRESSING AS EXPECTED  Plan is to go to friend's house vs SNF per SW notes. SNF referrals pending.

## 2017-09-27 NOTE — PROGRESS NOTES
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 Takes methadone he states for back pain.  Not diabetic.  Presented to ED on 7/22/17 with increased pain to L TMA with infected neuropathic ulcer and pain to R BKA.        s/p L foot I & D with VAC placement, GSR by Dr. Shirley on 7/25/17  S/p    Right revision below-knee amputation,  Right lower extremity irrigation and debridement, skin, subcutaneous  tissue to bone on 8/15/17    Wound progressing very slowly despite use of NPWT.  Acell Cytal biologic applied to wound on 9/13, NPWT discontinued  Weekly applications of Cytal since then with outer dressing changes every 2-3 days PRN    Pt seen today for 3rd application of Cytal in an attempt to accelerate wound healing  Staff has had difficulty keeping dressing intact    PROCEDURE:  Using sterile technique and assist from primary RN, Maureen Diop, a 3.0 x 3.5 cm sheet of Acell Cytal wound matrix, 1-layer, hydrated with NS,  and applied to the wound bed..  Adaptic placed over product, secured with steristrips, covered with alginate and then foam, secured with hypafix .  Foot and ankle wrapped with kerlix and then coban    PRODUCT: Cytal wound matrix 1-layer 3 x 3.5 cm sheet.  REF [id number]  [id number]; LOT [id number]    WOUND DIMENSIONS: 3.1 x 2.2 x 0.1 cm  (photo in EPIC)    PLAN  -LPS to reapply Acell Cytal 1x/week  -LPS to assess wound every 3- 4 days, change cover dressing as indicated for saturation or displacement  -Nsg to contact LPS if dressing becomes soiled or dislodged.

## 2017-09-27 NOTE — PROGRESS NOTES
Wound care Nurse here to change dressing.. Patient medicated prior to procedure with PRN Morphine per order.

## 2017-09-27 NOTE — PROGRESS NOTES
"Oxycodone given to the pt. Pt asked when he could take the Dilaudid. RN informed the pt that he could take it at 0312. Pt agitated and stated \"No, it is available at 0400. People don't know anything. Don't wake me up for the next pain pill\". Educated the pt that Dilaudid is available every 8 hours. Pt does not want to talk.   "

## 2017-09-27 NOTE — CARE PLAN
Problem: Safety  Goal: Will remain free from injury    Intervention: Provide assistance with mobility  Hourly rounding, safety education, fall precautions, pt calls for assist and ambulates self       Problem: Pain Management  Goal: Pain level will decrease to patient's comfort goal    Intervention: Follow pain managment plan developed in collaboration with patient and Interdisciplinary Team  Pain meds given as needed per orders

## 2017-09-28 PROBLEM — D75.839 THROMBOCYTOSIS: Status: RESOLVED | Noted: 2017-09-05 | Resolved: 2017-09-28

## 2017-09-28 PROCEDURE — 700102 HCHG RX REV CODE 250 W/ 637 OVERRIDE(OP): Performed by: INTERNAL MEDICINE

## 2017-09-28 PROCEDURE — A9270 NON-COVERED ITEM OR SERVICE: HCPCS | Performed by: HOSPITALIST

## 2017-09-28 PROCEDURE — 700102 HCHG RX REV CODE 250 W/ 637 OVERRIDE(OP): Performed by: HOSPITALIST

## 2017-09-28 PROCEDURE — 97535 SELF CARE MNGMENT TRAINING: CPT

## 2017-09-28 PROCEDURE — 700111 HCHG RX REV CODE 636 W/ 250 OVERRIDE (IP): Performed by: INTERNAL MEDICINE

## 2017-09-28 PROCEDURE — 700101 HCHG RX REV CODE 250: Performed by: HOSPITALIST

## 2017-09-28 PROCEDURE — A9270 NON-COVERED ITEM OR SERVICE: HCPCS | Performed by: INTERNAL MEDICINE

## 2017-09-28 PROCEDURE — 99232 SBSQ HOSP IP/OBS MODERATE 35: CPT | Performed by: HOSPITALIST

## 2017-09-28 PROCEDURE — 770021 HCHG ROOM/CARE - ISO PRIVATE

## 2017-09-28 RX ADMIN — Medication 325 MG: at 08:05

## 2017-09-28 RX ADMIN — METHADONE HYDROCHLORIDE 20 MG: 10 TABLET ORAL at 20:24

## 2017-09-28 RX ADMIN — OXYCODONE HYDROCHLORIDE 10 MG: 10 TABLET ORAL at 21:40

## 2017-09-28 RX ADMIN — METHADONE HYDROCHLORIDE 20 MG: 10 TABLET ORAL at 14:19

## 2017-09-28 RX ADMIN — OXYCODONE HYDROCHLORIDE 10 MG: 10 TABLET ORAL at 04:50

## 2017-09-28 RX ADMIN — DRONABINOL 2.5 MG: 2.5 CAPSULE ORAL at 11:18

## 2017-09-28 RX ADMIN — GABAPENTIN 200 MG: 100 CAPSULE ORAL at 20:24

## 2017-09-28 RX ADMIN — DRONABINOL 2.5 MG: 2.5 CAPSULE ORAL at 16:42

## 2017-09-28 RX ADMIN — CLONAZEPAM 2 MG: 1 TABLET ORAL at 08:05

## 2017-09-28 RX ADMIN — HYDROMORPHONE HYDROCHLORIDE 4 MG: 4 TABLET ORAL at 16:42

## 2017-09-28 RX ADMIN — PREGABALIN 150 MG: 150 CAPSULE ORAL at 20:26

## 2017-09-28 RX ADMIN — PREGABALIN 150 MG: 150 CAPSULE ORAL at 08:05

## 2017-09-28 RX ADMIN — LIDOCAINE 1 PATCH: 50 PATCH CUTANEOUS at 11:18

## 2017-09-28 RX ADMIN — Medication 325 MG: at 16:42

## 2017-09-28 RX ADMIN — ACETAMINOPHEN 650 MG: 325 TABLET, FILM COATED ORAL at 16:42

## 2017-09-28 RX ADMIN — GABAPENTIN 200 MG: 100 CAPSULE ORAL at 14:19

## 2017-09-28 RX ADMIN — OXYCODONE HYDROCHLORIDE 10 MG: 10 TABLET ORAL at 12:08

## 2017-09-28 RX ADMIN — ACETAMINOPHEN 650 MG: 325 TABLET, FILM COATED ORAL at 11:18

## 2017-09-28 RX ADMIN — GABAPENTIN 200 MG: 100 CAPSULE ORAL at 08:06

## 2017-09-28 RX ADMIN — ACETAMINOPHEN 325 MG: 325 TABLET, FILM COATED ORAL at 05:43

## 2017-09-28 RX ADMIN — METHADONE HYDROCHLORIDE 20 MG: 10 TABLET ORAL at 05:43

## 2017-09-28 RX ADMIN — CLONAZEPAM 2 MG: 1 TABLET ORAL at 20:24

## 2017-09-28 RX ADMIN — ENOXAPARIN SODIUM 40 MG: 100 INJECTION SUBCUTANEOUS at 08:06

## 2017-09-28 RX ADMIN — PREGABALIN 150 MG: 150 CAPSULE ORAL at 14:19

## 2017-09-28 RX ADMIN — HYDROMORPHONE HYDROCHLORIDE 4 MG: 4 TABLET ORAL at 08:06

## 2017-09-28 ASSESSMENT — PAIN SCALES - GENERAL
PAINLEVEL_OUTOF10: 8
PAINLEVEL_OUTOF10: 4
PAINLEVEL_OUTOF10: 8
PAINLEVEL_OUTOF10: 6
PAINLEVEL_OUTOF10: 4
PAINLEVEL_OUTOF10: 4

## 2017-09-28 ASSESSMENT — ENCOUNTER SYMPTOMS
FLANK PAIN: 0
NERVOUS/ANXIOUS: 1
SHORTNESS OF BREATH: 0
PALPITATIONS: 0
CONSTIPATION: 0
SPEECH CHANGE: 0
BACK PAIN: 0
FEVER: 0
VOMITING: 0
COUGH: 0
HALLUCINATIONS: 0
FOCAL WEAKNESS: 0
MYALGIAS: 1
ORTHOPNEA: 0
CHILLS: 0
ABDOMINAL PAIN: 0

## 2017-09-28 ASSESSMENT — LIFESTYLE VARIABLES: SUBSTANCE_ABUSE: 1

## 2017-09-28 NOTE — CARE PLAN
Problem: Safety  Goal: Will remain free from injury  Outcome: PROGRESSING AS EXPECTED  Safety precautions in place. Call light within reach. Bed is low and in locked position. Hourly rounding in place. Pt is up self    Problem: Pain Management  Goal: Pain level will decrease to patient's comfort goal  Outcome: PROGRESSING AS EXPECTED  Pt getting Oxycodone prn and Dilaudid PO prn for pain. Pt also on scheduled tylenol, Lyrica, gabapentin, and methadone. Pt states his pain is controlled. Will continue to assess for pain.

## 2017-09-28 NOTE — FACE TO FACE
Face to Face Note  -  Durable Medical Equipment    Jeff Crystal M.D. - NPI: [id number]  I certify that this patient is under my care and that they had a durable medical equipment(DME)face to face encounter by myself that meets the physician DME face-to-face encounter requirements with this patient on:    Date of encounter:   Patient:                    MRN:                       YOB: 2019  Pollo Black  [id number]  1965     The encounter with the patient was in whole, or in part, for the following medical condition, which is the primary reason for durable medical equipment:  Post amputation     I certify that, based on my findings, the following durable medical equipment is medically necessary:  Other DME Equipment - definitive laminated socket .    HOME O2 Saturation Measurements:(Values must be present for Home Oxygen orders)         ,     ,         My Clinical findings support the need for the above equipment due to:  Abnormal Gait    Supporting Symptoms: unsteady mobility , post amputation

## 2017-09-28 NOTE — CARE PLAN
Problem: Skin Integrity  Goal: Risk for impaired skin integrity will decrease    Intervention: Assess risk factors for impaired skin integrity and/or pressure ulcers  Pt will not have any skin breakdown

## 2017-09-28 NOTE — DISCHARGE PLANNING
Physician discussed possible d/c to local shelter with pt. Will need W/C, wound care follow up, and prosthesis follow up as outpt. Pt resistant to this idea and continues to request rehab. Have not yet exhausted all contracted SNFs in [place]. Will continue to follow to coordinate post acute services.

## 2017-09-28 NOTE — PROGRESS NOTES
Pt in stable condition, upset he is not aloud to leave the floor, eating breakfast in wheel chair, call light within reach, continue with plan of care

## 2017-09-28 NOTE — DISCHARGE PLANNING
SNF referrals expanded in the [place].     Resent referral to [facility] per CM Pat.

## 2017-09-28 NOTE — PROGRESS NOTES
Pt continues to c/o pain and discomfort through out the day. Pt able to attend to ADLS and mobile through out the day napping occasionally. Patient encourage to try relaxation and quiet time to alleviate anxiety and pain.

## 2017-09-28 NOTE — PROGRESS NOTES
[facility] Hospitalist Progress Note    Date of Service: 2017    Chief Complaint  52 y.o. male hx of Rt BKA, left foot partial amputation admitted 2017 with stump infections    Interval Problem Update    Receiving wound care. Discussed with / multi disc team- will work on dc to shelter when wound stabilized, less intense dressing changes.     Consultants/Specialty  ID - Yenny  Ortho - Fern    Disposition  Rehab vs SNF- referrals made out of state.      Review of Systems   Constitutional: Negative for chills and fever.   Respiratory: Negative for cough and shortness of breath.    Cardiovascular: Positive for leg swelling. Negative for palpitations and orthopnea.   Gastrointestinal: Negative for abdominal pain, constipation and vomiting.   Genitourinary: Negative for flank pain and hematuria.   Musculoskeletal: Positive for joint pain and myalgias. Negative for back pain.        Chronic pain    Neurological: Negative for speech change and focal weakness.   Psychiatric/Behavioral: Positive for substance abuse. Negative for hallucinations. The patient is nervous/anxious.       Physical Exam  Laboratory/Imaging   Hemodynamics  Temp (24hrs), Av.4 °C (97.6 °F), Min:36.2 °C (97.1 °F), Max:36.7 °C (98 °F)   Temperature: 36.2 °C (97.1 °F)  Pulse  Av.3  Min: 58  Max: 111    Blood Pressure: 111/71      Respiratory      Respiration: 18, Pulse Oximetry: 94 %        RUL Breath Sounds: Clear, RML Breath Sounds: Clear, RLL Breath Sounds: Clear, NANCY Breath Sounds: Clear, LLL Breath Sounds: Clear    Fluids    Intake/Output Summary (Last 24 hours) at 17 1017  Last data filed at 17 0800   Gross per 24 hour   Intake              600 ml   Output             1700 ml   Net            -1100 ml       Nutrition  Orders Placed This Encounter   Procedures   • DIET ORDER     Standing Status:   Standing     Number of Occurrences:   1     Order Specific Question:   Diet:     Answer:   Regular [1]      Comments:   regular trays     Physical Exam   Constitutional: He is oriented to person, place, and time. He appears well-nourished. No distress.   Eyes: Right eye exhibits no discharge. Left eye exhibits no discharge.   Rt eye deviated , chronic .   Neck: Normal range of motion.   Cardiovascular: Normal rate and regular rhythm.    No murmur heard.  Pulmonary/Chest: No stridor. He has no wheezes. He has no rales.   Abdominal: Soft. Bowel sounds are normal. He exhibits no distension. There is no tenderness.   Musculoskeletal: He exhibits no edema.   R BKA, L transmetatarsal amputation- dry , no signif increased warmth, redness- wrapped.    Neurological: He is alert and oriented to person, place, and time. No cranial nerve deficit.   Skin: Skin is warm and dry. He is not diaphoretic. No pallor.   Psychiatric:   Calm cooperative.    Vitals reviewed.                               Assessment/Plan     * Skin ulcer of left foot with fat layer exposed (CMS-HCC)- (present on admission)   Assessment & Plan    w right BKA stump pain  left TMA stump non healing  Wound due to PVD- post I and D of left foot with gastroscoleus resection and application of wound vac 7/25 and then 8/15 by Dr. Shirley, Off [facility] .  wound care-- likely can be dcd to shelter when less complex therapies , discussed with .  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 (CMS-[facility])- (present on admission)   Assessment & Plan    Neurontin,  marinol           Pseudomonas infection- (present on admission)   Assessment & Plan    finished Cefepime 8/25        Chest wall pain   Assessment & Plan    Pain control        Noncompliance- (present on admission)   Assessment & Plan    Counseling done on risks and complications        MRSA (methicillin resistant staph aureus) culture positive- (present on admission)   Assessment & Plan    Completed Doxycycline  8/25        Anxiety- (present on admission)   Assessment & Plan    Klonopin        Opiate dependence (CMS-HCC)- (present on admission)   Assessment & Plan    sched neurontin- increase dose.   sched  Methadone w prn oral dilaudid   prn   Morphine IM for dressing changes only          Wound of right leg, at BKA site- (present on admission)   Assessment & Plan    s/p BKA revision  Monitor for acute infxn, fever.        Tobacco abuse- (present on admission)   Assessment & Plan    Nicotine replacement         Peripheral neuropathy- (present on admission)   Assessment & Plan    Lyrica, neurontin.               Reviewed items::  Labs reviewed and Medications reviewed  Rolon catheter::  No Rolon  DVT prophylaxis pharmacological::  Enoxaparin (Lovenox)  Ulcer Prophylaxis::  No     For complexity-based billing, please refer to the history, exam, and decison making above. In addition, I spent 35 minutes caring for the patient today. More than 50% of the time was spent counseling and coordinating care.    I have discussed with RN and CM and SW and other consultants about patient's plan.

## 2017-09-28 NOTE — DISCHARGE PLANNING
Called: [facility]-no male beds  [facility]-no male beds  [facility]-left message  [facility]-left message  [facility]-left message  [facility]-left message  [facility]-no male beds,  [facility]- no male beds  Will ask Ethan to refer to [facility], [facility], [facility], [facility], [facility] and [facility].

## 2017-09-28 NOTE — PROGRESS NOTES
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 Takes methadone he states for back pain.  Not diabetic.  Presented to ED on 7/22/17 with increased pain to L TMA with infected neuropathic ulcer and pain to R BKA.        s/p L foot I & D with VAC placement, GSR by Dr. Shirley on 7/25/17  S/p    Right revision below-knee amputation,  Right lower extremity irrigation and debridement, skin, subcutaneous  tissue to bone on 8/15/17    .  Acell Cytal biologic initiated on 9/13, NPWT discontinued  Weekly applications of Cytal since then with outer dressing changes every 2-3 days PRN   3rd application of Cytal done yesterday    Patient is up in wheelchair out in hallway  Dressing intact, though outer dressing is soiled with dirt        PLAN  -LPS to reapply Acell Cytal 1x/week  -LPS to assess wound every 3- 4 days, change cover dressing as indicated for saturation or displacement  -Nsg to contact LPS if dressing becomes soiled or dislodged.

## 2017-09-29 PROCEDURE — 700101 HCHG RX REV CODE 250: Performed by: HOSPITALIST

## 2017-09-29 PROCEDURE — A9270 NON-COVERED ITEM OR SERVICE: HCPCS | Performed by: HOSPITALIST

## 2017-09-29 PROCEDURE — 700102 HCHG RX REV CODE 250 W/ 637 OVERRIDE(OP): Performed by: HOSPITALIST

## 2017-09-29 PROCEDURE — 99232 SBSQ HOSP IP/OBS MODERATE 35: CPT | Performed by: HOSPITALIST

## 2017-09-29 PROCEDURE — 700111 HCHG RX REV CODE 636 W/ 250 OVERRIDE (IP): Performed by: HOSPITALIST

## 2017-09-29 PROCEDURE — 770021 HCHG ROOM/CARE - ISO PRIVATE

## 2017-09-29 RX ORDER — DRONABINOL 2.5 MG/1
2.5 CAPSULE ORAL 2 TIMES DAILY
Status: DISCONTINUED | OUTPATIENT
Start: 2017-09-29 | End: 2017-10-09

## 2017-09-29 RX ORDER — DRONABINOL 2.5 MG/1
2.5 CAPSULE ORAL 2 TIMES DAILY WITH MEALS
Status: DISCONTINUED | OUTPATIENT
Start: 2017-09-29 | End: 2017-09-29

## 2017-09-29 RX ADMIN — ACETAMINOPHEN 650 MG: 325 TABLET, FILM COATED ORAL at 17:06

## 2017-09-29 RX ADMIN — PREGABALIN 150 MG: 150 CAPSULE ORAL at 14:58

## 2017-09-29 RX ADMIN — Medication 325 MG: at 17:06

## 2017-09-29 RX ADMIN — METHADONE HYDROCHLORIDE 20 MG: 10 TABLET ORAL at 20:22

## 2017-09-29 RX ADMIN — PREGABALIN 150 MG: 150 CAPSULE ORAL at 08:25

## 2017-09-29 RX ADMIN — HYDROMORPHONE HYDROCHLORIDE 4 MG: 4 TABLET ORAL at 00:52

## 2017-09-29 RX ADMIN — ACETAMINOPHEN 325 MG: 325 TABLET, FILM COATED ORAL at 05:17

## 2017-09-29 RX ADMIN — GABAPENTIN 200 MG: 100 CAPSULE ORAL at 20:22

## 2017-09-29 RX ADMIN — METHADONE HYDROCHLORIDE 20 MG: 10 TABLET ORAL at 14:58

## 2017-09-29 RX ADMIN — HYDROMORPHONE HYDROCHLORIDE 4 MG: 4 TABLET ORAL at 08:30

## 2017-09-29 RX ADMIN — OXYCODONE HYDROCHLORIDE 5 MG: 5 TABLET ORAL at 11:26

## 2017-09-29 RX ADMIN — LIDOCAINE 1 PATCH: 50 PATCH CUTANEOUS at 11:26

## 2017-09-29 RX ADMIN — ENOXAPARIN SODIUM 40 MG: 100 INJECTION SUBCUTANEOUS at 08:26

## 2017-09-29 RX ADMIN — ACETAMINOPHEN 325 MG: 325 TABLET, FILM COATED ORAL at 00:51

## 2017-09-29 RX ADMIN — OXYCODONE HYDROCHLORIDE 10 MG: 10 TABLET ORAL at 21:04

## 2017-09-29 RX ADMIN — GABAPENTIN 200 MG: 100 CAPSULE ORAL at 14:59

## 2017-09-29 RX ADMIN — ACETAMINOPHEN 650 MG: 325 TABLET, FILM COATED ORAL at 11:26

## 2017-09-29 RX ADMIN — CLONAZEPAM 2 MG: 1 TABLET ORAL at 20:22

## 2017-09-29 RX ADMIN — Medication 325 MG: at 08:25

## 2017-09-29 RX ADMIN — METHADONE HYDROCHLORIDE 20 MG: 10 TABLET ORAL at 05:17

## 2017-09-29 RX ADMIN — OXYCODONE HYDROCHLORIDE 10 MG: 10 TABLET ORAL at 04:38

## 2017-09-29 RX ADMIN — PREGABALIN 150 MG: 150 CAPSULE ORAL at 20:22

## 2017-09-29 RX ADMIN — GABAPENTIN 200 MG: 100 CAPSULE ORAL at 08:26

## 2017-09-29 RX ADMIN — ACETAMINOPHEN 650 MG: 325 TABLET, FILM COATED ORAL at 23:19

## 2017-09-29 RX ADMIN — DRONABINOL 2.5 MG: 2.5 CAPSULE ORAL at 11:26

## 2017-09-29 RX ADMIN — HYDROMORPHONE HYDROCHLORIDE 4 MG: 4 TABLET ORAL at 17:06

## 2017-09-29 RX ADMIN — DRONABINOL 2.5 MG: 2.5 CAPSULE ORAL at 20:22

## 2017-09-29 RX ADMIN — CLONAZEPAM 2 MG: 1 TABLET ORAL at 08:26

## 2017-09-29 ASSESSMENT — ENCOUNTER SYMPTOMS
ORTHOPNEA: 0
COUGH: 0
MYALGIAS: 1
STRIDOR: 0
FOCAL WEAKNESS: 0
CHILLS: 0
SHORTNESS OF BREATH: 0
HALLUCINATIONS: 0
PALPITATIONS: 0
CONSTIPATION: 0
ABDOMINAL PAIN: 0
BACK PAIN: 0
SPEECH CHANGE: 0
FEVER: 0
NERVOUS/ANXIOUS: 0
VOMITING: 0
EYE REDNESS: 0
FLANK PAIN: 0
EYE DISCHARGE: 0

## 2017-09-29 ASSESSMENT — PAIN SCALES - GENERAL
PAINLEVEL_OUTOF10: 7
PAINLEVEL_OUTOF10: 5
PAINLEVEL_OUTOF10: 7
PAINLEVEL_OUTOF10: 7
PAINLEVEL_OUTOF10: 5
PAINLEVEL_OUTOF10: 7
PAINLEVEL_OUTOF10: 5
PAINLEVEL_OUTOF10: 6
PAINLEVEL_OUTOF10: 7

## 2017-09-29 ASSESSMENT — LIFESTYLE VARIABLES: SUBSTANCE_ABUSE: 1

## 2017-09-29 NOTE — THERAPY
Pt conts to be Indep @ w/c level. Prosthetist called regarding pts prosthesis. Nazario from [facility] recommending definitive laminated socket. MD found and order written. PT will cont to see pt 1x/wk until prosthesis revised, then will initiate gait training.

## 2017-09-29 NOTE — DIETARY
Nutrition note:  Pt was admitted over 2 months ago with a BMI of 18.96 (pt does have a right BKA).  Pt has not been re-weighed since admit.  Called RN and requested current wt.

## 2017-09-29 NOTE — CARE PLAN
Problem: Safety  Goal: Will remain free from injury  Outcome: PROGRESSING AS EXPECTED  Safety precautions in place. Call light within reach. Bed is low and in locked position. Hourly rounding in place.     Problem: Pain Management  Goal: Pain level will decrease to patient's comfort goal  Outcome: PROGRESSING AS EXPECTED  Pt has been getting oxycodone 10 mg and Dilaudid PO 5 mg prn for pain. Pt is also on scheduled gabapentin, Lyrica, tylenol, and methadone. Pt states this pain management has been working good. Will continue to assess for pain.

## 2017-09-30 PROBLEM — R07.89 CHEST WALL PAIN: Status: RESOLVED | Noted: 2017-09-11 | Resolved: 2017-09-30

## 2017-09-30 PROCEDURE — 700102 HCHG RX REV CODE 250 W/ 637 OVERRIDE(OP): Performed by: HOSPITALIST

## 2017-09-30 PROCEDURE — 770021 HCHG ROOM/CARE - ISO PRIVATE

## 2017-09-30 PROCEDURE — A9270 NON-COVERED ITEM OR SERVICE: HCPCS | Performed by: HOSPITALIST

## 2017-09-30 PROCEDURE — 700111 HCHG RX REV CODE 636 W/ 250 OVERRIDE (IP): Performed by: HOSPITALIST

## 2017-09-30 PROCEDURE — 99232 SBSQ HOSP IP/OBS MODERATE 35: CPT | Performed by: HOSPITALIST

## 2017-09-30 PROCEDURE — 700101 HCHG RX REV CODE 250: Performed by: HOSPITALIST

## 2017-09-30 RX ORDER — GABAPENTIN 400 MG/1
400 CAPSULE ORAL 3 TIMES DAILY
Status: DISCONTINUED | OUTPATIENT
Start: 2017-09-30 | End: 2017-10-01

## 2017-09-30 RX ADMIN — OXYCODONE HYDROCHLORIDE 10 MG: 10 TABLET ORAL at 03:20

## 2017-09-30 RX ADMIN — HYDROMORPHONE HYDROCHLORIDE 4 MG: 4 TABLET ORAL at 10:16

## 2017-09-30 RX ADMIN — GABAPENTIN 400 MG: 400 CAPSULE ORAL at 14:26

## 2017-09-30 RX ADMIN — PREGABALIN 150 MG: 150 CAPSULE ORAL at 08:01

## 2017-09-30 RX ADMIN — ACETAMINOPHEN 650 MG: 325 TABLET, FILM COATED ORAL at 05:50

## 2017-09-30 RX ADMIN — ACETAMINOPHEN 650 MG: 325 TABLET, FILM COATED ORAL at 11:20

## 2017-09-30 RX ADMIN — DRONABINOL 2.5 MG: 2.5 CAPSULE ORAL at 21:30

## 2017-09-30 RX ADMIN — PREGABALIN 150 MG: 150 CAPSULE ORAL at 21:33

## 2017-09-30 RX ADMIN — METHADONE HYDROCHLORIDE 20 MG: 10 TABLET ORAL at 14:26

## 2017-09-30 RX ADMIN — PROCHLORPERAZINE MALEATE 10 MG: 10 TABLET, FILM COATED ORAL at 10:16

## 2017-09-30 RX ADMIN — PREGABALIN 150 MG: 150 CAPSULE ORAL at 14:26

## 2017-09-30 RX ADMIN — HYDROMORPHONE HYDROCHLORIDE 4 MG: 4 TABLET ORAL at 21:46

## 2017-09-30 RX ADMIN — OXYCODONE HYDROCHLORIDE 10 MG: 10 TABLET ORAL at 15:45

## 2017-09-30 RX ADMIN — GABAPENTIN 200 MG: 100 CAPSULE ORAL at 08:01

## 2017-09-30 RX ADMIN — Medication 325 MG: at 05:50

## 2017-09-30 RX ADMIN — ACETAMINOPHEN 325 MG: 325 TABLET, FILM COATED ORAL at 17:18

## 2017-09-30 RX ADMIN — CLONAZEPAM 2 MG: 1 TABLET ORAL at 21:30

## 2017-09-30 RX ADMIN — HYDROMORPHONE HYDROCHLORIDE 4 MG: 4 TABLET ORAL at 01:23

## 2017-09-30 RX ADMIN — LIDOCAINE 1 PATCH: 50 PATCH CUTANEOUS at 11:20

## 2017-09-30 RX ADMIN — METHADONE HYDROCHLORIDE 20 MG: 10 TABLET ORAL at 05:50

## 2017-09-30 RX ADMIN — METHADONE HYDROCHLORIDE 20 MG: 10 TABLET ORAL at 21:33

## 2017-09-30 RX ADMIN — Medication 325 MG: at 17:18

## 2017-09-30 RX ADMIN — DRONABINOL 2.5 MG: 2.5 CAPSULE ORAL at 11:20

## 2017-09-30 RX ADMIN — GABAPENTIN 400 MG: 400 CAPSULE ORAL at 21:30

## 2017-09-30 RX ADMIN — ENOXAPARIN SODIUM 40 MG: 100 INJECTION SUBCUTANEOUS at 08:01

## 2017-09-30 RX ADMIN — CLONAZEPAM 2 MG: 1 TABLET ORAL at 08:01

## 2017-09-30 ASSESSMENT — PAIN SCALES - GENERAL
PAINLEVEL_OUTOF10: 2
PAINLEVEL_OUTOF10: 8
PAINLEVEL_OUTOF10: 6
PAINLEVEL_OUTOF10: 2
PAINLEVEL_OUTOF10: 5

## 2017-09-30 ASSESSMENT — ENCOUNTER SYMPTOMS
PALPITATIONS: 0
SPEECH CHANGE: 0
MYALGIAS: 1
SHORTNESS OF BREATH: 0
ORTHOPNEA: 0
FOCAL WEAKNESS: 0
BACK PAIN: 0
CHILLS: 0
HALLUCINATIONS: 0
NERVOUS/ANXIOUS: 0
ABDOMINAL PAIN: 0
FEVER: 0
FLANK PAIN: 0
COUGH: 0
TINGLING: 1
VOMITING: 0

## 2017-09-30 ASSESSMENT — LIFESTYLE VARIABLES: SUBSTANCE_ABUSE: 1

## 2017-09-30 NOTE — PROGRESS NOTES
[facility] Hospitalist Progress Note    Date of Service: 2017    Chief Complaint  52 y.o. male hx of Rt BKA, left foot partial amputation admitted 2017 with stump infections    Interval Problem Update    Fairly indep- up to self into wheelchair.     Consultants/Specialty  ID - Yenny  Ortho - Fern    Disposition  Rehab vs SNF- ref[place] out of state- if all pendings turn out denial  - plan dc to shelter when clinically better.       Review of Systems   Constitutional: Negative for chills and fever.   Respiratory: Negative for cough and shortness of breath.    Cardiovascular: Positive for leg swelling. Negative for palpitations and orthopnea.   Gastrointestinal: Negative for abdominal pain and vomiting.   Genitourinary: Negative for flank pain and hematuria.   Musculoskeletal: Positive for joint pain and myalgias. Negative for back pain.        Chronic pain    Neurological: Positive for tingling (burning left foot. ). Negative for speech change and focal weakness.   Psychiatric/Behavioral: Positive for substance abuse. Negative for hallucinations. The patient is not nervous/anxious.       Physical Exam  Laboratory/Imaging   Hemodynamics  Temp (24hrs), Av.5 °C (97.7 °F), Min:36.2 °C (97.1 °F), Max:36.8 °C (98.2 °F)   Temperature: 36.2 °C (97.1 °F)  Pulse  Av.1  Min: 58  Max: 111    Blood Pressure: 107/63      Respiratory      Respiration: 15, Pulse Oximetry: 91 %        RUL Breath Sounds: Clear, RML Breath Sounds: Clear, RLL Breath Sounds: Clear;Diminished, NANCY Breath Sounds: Clear, LLL Breath Sounds: Clear;Diminished    Fluids    Intake/Output Summary (Last 24 hours) at 17 1049  Last data filed at 17 0805   Gross per 24 hour   Intake              740 ml   Output                0 ml   Net              740 ml       Nutrition  Orders Placed This Encounter   Procedures   • DIET ORDER     Standing Status:   Standing     Number of Occurrences:   1     Order Specific Question:   Diet:      Answer:   Regular [1]     Comments:   regular trays     Physical Exam   Constitutional: He is oriented to person, place, and time. He appears well-nourished. No distress.   HENT:   Head: Normocephalic and atraumatic.   Eyes: Right eye exhibits no discharge. Left eye exhibits no discharge.   Rt eye deviated , chronic .   Neck: Normal range of motion.   Cardiovascular: Normal rate and regular rhythm.    No murmur heard.  Pulmonary/Chest: No stridor. He has no wheezes. He has no rales.   Abdominal: Soft. Bowel sounds are normal. He exhibits no distension. There is no tenderness.   Musculoskeletal: He exhibits no edema.   R BKA, L transmetatarsal amputation- dry -  wrapped.    Neurological: He is alert and oriented to person, place, and time. No cranial nerve deficit.   Skin: Skin is warm and dry. No rash noted. He is not diaphoretic.   Vitals reviewed.                               Assessment/Plan     * Skin ulcer of left foot with fat layer exposed (CMS-HCC)- (present on admission)   Assessment & Plan    w right BKA stump pain  left TMA stump non healing  Wound due to PVD- post I and D of left foot with gastroscoleus resection and application of wound vac 7/25 and then 8/15 by Dr. Shirley, Off [facility] .  wound care  laminated prosthesis socket ordered as recommended by PT on 9-28-17   Nutrition support  Pain control -- increase neurontin for neuropathic pain.             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  sched  Methadone w prn oral dilaudid - try to wean  prn  Morphine IM for dressing changes only          Pseudomonas infection- (present on admission)   Assessment & Plan    finished Cefepime 8/25        MRSA (methicillin  resistant staph aureus) culture positive- (present on admission)   Assessment & Plan    Completed Doxycycline 8/25        Noncompliance- (present on admission)   Assessment & Plan    Counseling done on risks and complications        Wound of right leg, at BKA site- (present on admission)   Assessment & Plan    s/p BKA revision  Follow clinically .        Tobacco abuse- (present on admission)   Assessment & Plan    Nicotine replacement   Emphasize cessation         Peripheral neuropathy (CMS-HCC)- (present on admission)   Assessment & Plan    Lyrica, neurontin.               Reviewed items::  Labs reviewed and Medications reviewed  Rolon catheter::  No Rolon  DVT prophylaxis pharmacological::  Enoxaparin (Lovenox)  Ulcer Prophylaxis::  No     For complexity-based billing, please refer to the history, exam, and decison making above. In addition, I spent 35 minutes caring for the patient today. More than 50% of the time was spent counseling and coordinating care.    I have discussed with RN and CM and SW and other consultants about patient's plan.

## 2017-09-30 NOTE — CARE PLAN
Problem: Mobility  Goal: Risk for activity intolerance will decrease    Intervention: Assess and monitor signs of activity intolerance  Pending prosthesis next week, stump healed to right lower extremity, no swelling or redness noted. PT and OT on board, patient is 0-assist patient is chair fast, patient able to transfer independently from bed to wheelchair as well as to bathroom.       Problem: Pain Management  Goal: Pain level will decrease to patient's comfort goal    Intervention: Follow pain managment plan developed in collaboration with patient and Interdisciplinary Team  Pain well controlled at this time, patient has PRN dilaudid and PRN oxycodone according to MAR, patient also on scheduled methadone.

## 2017-09-30 NOTE — PROGRESS NOTES
[facility]ist Progress Note    Date of Service: 2017    Chief Complaint  52 y.o. male hx of Rt BKA, left foot partial amputation admitted 2017 with stump infections    Interval Problem Update    Became very agitated when discussed homeless shelter options yesterday- calmer today.   Discussed with multi disciplinary team- have not received denials yet from all centers apparently .    Consultants/Specialty  ID - Yenny  Ortho - Fern    Disposition  Rehab vs SNF- ref[place] out of state.      Review of Systems   Constitutional: Negative for chills and fever.   HENT: Negative for congestion.    Eyes: Negative for discharge and redness.   Respiratory: Negative for cough, shortness of breath and stridor.    Cardiovascular: Positive for leg swelling. Negative for palpitations and orthopnea.   Gastrointestinal: Negative for abdominal pain, constipation and vomiting.   Genitourinary: Negative for flank pain and hematuria.   Musculoskeletal: Positive for joint pain and myalgias. Negative for back pain.        Chronic pain    Neurological: Negative for speech change and focal weakness.   Psychiatric/Behavioral: Positive for substance abuse. Negative for hallucinations. The patient is not nervous/anxious.       Physical Exam  Laboratory/Imaging   Hemodynamics  Temp (24hrs), Av.6 °C (97.9 °F), Min:36.4 °C (97.5 °F), Max:36.8 °C (98.3 °F)   Temperature: 36.6 °C (97.9 °F)  Pulse  Av.3  Min: 58  Max: 111    Blood Pressure: 110/69      Respiratory      Respiration: 16, Pulse Oximetry: 92 %        RUL Breath Sounds: Clear, RML Breath Sounds: Clear, RLL Breath Sounds: Clear, NANCY Breath Sounds: Clear, LLL Breath Sounds: Clear    Fluids    Intake/Output Summary (Last 24 hours) at 17 1328  Last data filed at 17 0057   Gross per 24 hour   Intake                0 ml   Output              400 ml   Net             -400 ml       Nutrition  Orders Placed This Encounter   Procedures   • DIET ORDER      Standing Status:   Standing     Number of Occurrences:   1     Order Specific Question:   Diet:     Answer:   Regular [1]     Comments:   regular trays     Physical Exam   Constitutional: He is oriented to person, place, and time. He appears well-nourished. No distress.   Eyes: Right eye exhibits no discharge. Left eye exhibits no discharge.   Rt eye deviated , chronic .   Neck: Normal range of motion.   Cardiovascular: Normal rate and regular rhythm.    No murmur heard.  Pulmonary/Chest: No stridor. He has no wheezes. He has no rales.   Abdominal: Soft. Bowel sounds are normal. He exhibits no distension. There is no tenderness.   Musculoskeletal: He exhibits no edema.   R BKA, L transmetatarsal amputation- dry , no signif increased warmth, redness- wrapped.    Neurological: He is alert and oriented to person, place, and time. No cranial nerve deficit.   Skin: Skin is warm and dry. He is not diaphoretic. No pallor.   Vitals reviewed.                               Assessment/Plan     * Skin ulcer of left foot with fat layer exposed (CMS-HCC)- (present on admission)   Assessment & Plan    w right BKA stump pain  left TMA stump non healing  Wound due to PVD- post I and D of left foot with gastroscoleus resection and application of wound vac 7/25 and then 8/15 by Dr. Shirley, Off [facility] .  wound care-- likely can be dcd to shelter when less complex therapies- and if pending referral responses turn into denials.  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  sched  Methadone w prn oral dilaudid - try to wean  prn  Morphine IM for dressing changes only          Chest wall pain   Assessment & Plan    Pain control         Noncompliance- (present on admission)   Assessment & Plan    Counseling done on risks and complications        Pseudomonas infection- (present on admission)   Assessment & Plan    finished Cefepime 8/25        MRSA (methicillin resistant staph aureus) culture positive- (present on admission)   Assessment & Plan    Completed Doxycycline 8/25        Anxiety- (present on admission)   Assessment & Plan    Klonopin        Wound of right leg, at BKA site- (present on admission)   Assessment & Plan    s/p BKA revision  Follow clinically .        Tobacco abuse- (present on admission)   Assessment & Plan    Nicotine replacement   Emphasize cessation         Peripheral neuropathy (CMS-HCC)- (present on admission)   Assessment & Plan    Lyrica, neurontin.               Reviewed items::  Labs reviewed and Medications reviewed  Rolon catheter::  No Rolon  DVT prophylaxis pharmacological::  Enoxaparin (Lovenox)  Ulcer Prophylaxis::  No     For complexity-based billing, please refer to the history, exam, and decison making above. In addition, I spent 35 minutes caring for the patient today. More than 50% of the time was spent counseling and coordinating care.    I have discussed with RN and EDGARDO and SW and other consultants about patient's plan.

## 2017-10-01 PROCEDURE — 700101 HCHG RX REV CODE 250: Performed by: HOSPITALIST

## 2017-10-01 PROCEDURE — A9270 NON-COVERED ITEM OR SERVICE: HCPCS | Performed by: HOSPITALIST

## 2017-10-01 PROCEDURE — 700102 HCHG RX REV CODE 250 W/ 637 OVERRIDE(OP): Performed by: HOSPITALIST

## 2017-10-01 PROCEDURE — 99232 SBSQ HOSP IP/OBS MODERATE 35: CPT | Performed by: HOSPITALIST

## 2017-10-01 PROCEDURE — 770021 HCHG ROOM/CARE - ISO PRIVATE

## 2017-10-01 PROCEDURE — 700111 HCHG RX REV CODE 636 W/ 250 OVERRIDE (IP): Performed by: HOSPITALIST

## 2017-10-01 RX ORDER — GABAPENTIN 300 MG/1
600 CAPSULE ORAL 3 TIMES DAILY
Status: DISCONTINUED | OUTPATIENT
Start: 2017-10-01 | End: 2017-10-12

## 2017-10-01 RX ADMIN — OXYCODONE HYDROCHLORIDE 10 MG: 10 TABLET ORAL at 05:58

## 2017-10-01 RX ADMIN — METHADONE HYDROCHLORIDE 20 MG: 10 TABLET ORAL at 05:58

## 2017-10-01 RX ADMIN — ACETAMINOPHEN 325 MG: 325 TABLET, FILM COATED ORAL at 20:10

## 2017-10-01 RX ADMIN — PREGABALIN 150 MG: 150 CAPSULE ORAL at 08:57

## 2017-10-01 RX ADMIN — PREGABALIN 150 MG: 150 CAPSULE ORAL at 15:15

## 2017-10-01 RX ADMIN — METHADONE HYDROCHLORIDE 20 MG: 10 TABLET ORAL at 22:33

## 2017-10-01 RX ADMIN — HYDROMORPHONE HYDROCHLORIDE 4 MG: 4 TABLET ORAL at 22:33

## 2017-10-01 RX ADMIN — GABAPENTIN 600 MG: 300 CAPSULE ORAL at 20:09

## 2017-10-01 RX ADMIN — CLONAZEPAM 2 MG: 1 TABLET ORAL at 20:09

## 2017-10-01 RX ADMIN — LIDOCAINE 1 PATCH: 50 PATCH CUTANEOUS at 11:56

## 2017-10-01 RX ADMIN — OXYCODONE HYDROCHLORIDE 10 MG: 10 TABLET ORAL at 01:33

## 2017-10-01 RX ADMIN — PREGABALIN 150 MG: 150 CAPSULE ORAL at 20:09

## 2017-10-01 RX ADMIN — Medication 325 MG: at 16:00

## 2017-10-01 RX ADMIN — OXYCODONE HYDROCHLORIDE 10 MG: 10 TABLET ORAL at 11:56

## 2017-10-01 RX ADMIN — CLONAZEPAM 2 MG: 1 TABLET ORAL at 08:57

## 2017-10-01 RX ADMIN — GABAPENTIN 400 MG: 400 CAPSULE ORAL at 08:57

## 2017-10-01 RX ADMIN — Medication 325 MG: at 08:57

## 2017-10-01 RX ADMIN — DRONABINOL 2.5 MG: 2.5 CAPSULE ORAL at 11:56

## 2017-10-01 RX ADMIN — GABAPENTIN 600 MG: 300 CAPSULE ORAL at 15:14

## 2017-10-01 RX ADMIN — METHADONE HYDROCHLORIDE 20 MG: 10 TABLET ORAL at 15:14

## 2017-10-01 RX ADMIN — DRONABINOL 2.5 MG: 2.5 CAPSULE ORAL at 20:09

## 2017-10-01 RX ADMIN — ENOXAPARIN SODIUM 40 MG: 100 INJECTION SUBCUTANEOUS at 08:56

## 2017-10-01 RX ADMIN — ACETAMINOPHEN 325 MG: 325 TABLET, FILM COATED ORAL at 05:58

## 2017-10-01 RX ADMIN — HYDROMORPHONE HYDROCHLORIDE 4 MG: 4 TABLET ORAL at 09:01

## 2017-10-01 RX ADMIN — OXYCODONE HYDROCHLORIDE 10 MG: 10 TABLET ORAL at 20:09

## 2017-10-01 RX ADMIN — ACETAMINOPHEN 325 MG: 325 TABLET, FILM COATED ORAL at 11:55

## 2017-10-01 ASSESSMENT — ENCOUNTER SYMPTOMS
COUGH: 0
MYALGIAS: 1
TINGLING: 1
NERVOUS/ANXIOUS: 0
SPEECH CHANGE: 0
SHORTNESS OF BREATH: 0
FLANK PAIN: 0
CHILLS: 0
VOMITING: 0
STRIDOR: 0
BACK PAIN: 0
FEVER: 0
HALLUCINATIONS: 0
FOCAL WEAKNESS: 0
ABDOMINAL PAIN: 0
ORTHOPNEA: 0
PALPITATIONS: 0

## 2017-10-01 ASSESSMENT — PAIN SCALES - GENERAL
PAINLEVEL_OUTOF10: 6
PAINLEVEL_OUTOF10: 6
PAINLEVEL_OUTOF10: 3
PAINLEVEL_OUTOF10: 3
PAINLEVEL_OUTOF10: 7
PAINLEVEL_OUTOF10: 6

## 2017-10-01 ASSESSMENT — LIFESTYLE VARIABLES: SUBSTANCE_ABUSE: 1

## 2017-10-01 NOTE — CARE PLAN
Problem: Safety  Goal: Will remain free from injury  Outcome: PROGRESSING AS EXPECTED  Patient independent with transfers, calls appropriately. No injuries.

## 2017-10-01 NOTE — PROGRESS NOTES
[facility]ist Progress Note    Date of Service: 10/1/2017    Chief Complaint  52 y.o. male hx of Rt BKA, left foot partial amputation admitted 2017 with stump infections    Interval Problem Update    Co burning sensation left foot. Still receiving frequent oral dilaudid , oxycodone w less frequ IV ms of late. Want to come off isolation.    Consultants/Specialty  ID - Yenny  Ortho - Fern    Disposition  Rehab vs SNF- ref[place] out of state- if all pendings turn out denial  - plan dc to shelter when clinically better.       Review of Systems   Constitutional: Negative for chills and fever.   HENT: Negative for congestion.    Respiratory: Negative for cough, shortness of breath and stridor.    Cardiovascular: Positive for leg swelling. Negative for palpitations and orthopnea.   Gastrointestinal: Negative for abdominal pain and vomiting.   Genitourinary: Negative for flank pain and hematuria.   Musculoskeletal: Positive for joint pain and myalgias. Negative for back pain.        Chronic pain    Skin: Negative for itching and rash.   Neurological: Positive for tingling (burning left foot. ). Negative for speech change and focal weakness. Seizures:    Psychiatric/Behavioral: Positive for substance abuse. Negative for hallucinations. The patient is not nervous/anxious.       Physical Exam  Laboratory/Imaging   Hemodynamics  Temp (24hrs), Av.8 °C (98.2 °F), Min:36.7 °C (98 °F), Max:36.8 °C (98.3 °F)   Temperature: 36.8 °C (98.3 °F)  Pulse  Av.2  Min: 58  Max: 111    Blood Pressure: 110/70      Respiratory      Respiration: 16, Pulse Oximetry: 92 %        RUL Breath Sounds: Clear, RML Breath Sounds: Clear, RLL Breath Sounds: Clear;Diminished, NANCY Breath Sounds: Clear, LLL Breath Sounds: Clear;Diminished    Fluids    Intake/Output Summary (Last 24 hours) at 10/01/17 0918  Last data filed at 17 1711   Gross per 24 hour   Intake              760 ml   Output              500 ml   Net               260 ml       Nutrition  Orders Placed This Encounter   Procedures   • DIET ORDER     Standing Status:   Standing     Number of Occurrences:   1     Order Specific Question:   Diet:     Answer:   Regular [1]     Comments:   regular trays     Physical Exam   Constitutional: He is oriented to person, place, and time. He appears well-nourished. No distress.   HENT:   Head: Normocephalic and atraumatic.   Eyes: Right eye exhibits no discharge. Left eye exhibits no discharge. No scleral icterus.   Rt eye deviated , chronic .   Cardiovascular: Normal rate and regular rhythm.    No murmur heard.  Pulmonary/Chest: He has no wheezes. He has no rales.   Abdominal: Soft. Bowel sounds are normal. He exhibits no distension. There is no tenderness.   Musculoskeletal: He exhibits no edema.   R BKA, L transmetatarsal amputation- dry -  wrapped.    Neurological: He is alert and oriented to person, place, and time. No cranial nerve deficit.   Skin: Skin is warm and dry. No rash noted. He is not diaphoretic.   Psychiatric: His mood appears anxious. He is agitated.   Vitals reviewed.                               Assessment/Plan     * Skin ulcer of left foot with fat layer exposed (CMS-HCC)- (present on admission)   Assessment & Plan    w right BKA stump pain  left TMA stump non healing  Wound due to PVD- post I and D of left foot with gastroscoleus resection and application of wound vac 7/25 and then 8/15 by Dr. Shirley, Off at .  wound care to left stump-- at this point getting once weekly .  laminated prosthesis socket ordered as recommended by PT on 9-28-17   Nutrition support  Pain control -- continue to increase neurontin as tolerated  Goal to reduce reliance on oral dilaudid, oxycodone , IV narcotics.             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          Anxiety- (present on admission)   Assessment & Plan    Re assurance  cw  Klonopin        Opiate dependence (CMS-HCC)- (present on admission)   Assessment & Plan    sched  Methadone w prn oral oxycodone, dilaudid - try to wean  prn  Morphine IM for dressing changes only          Pseudomonas infection- (present on admission)   Assessment & Plan    finished Cefepime 8/25        MRSA (methicillin resistant staph aureus) culture positive- (present on admission)   Assessment & Plan    Completed Doxycycline 8/25        Noncompliance- (present on admission)   Assessment & Plan    Counseling done on risks and complications        Wound of right leg, at BKA site- (present on admission)   Assessment & Plan    s/p BKA revision  Follow clinically .        Tobacco abuse- (present on admission)   Assessment & Plan    Nicotine replacement   Emphasize cessation         Peripheral neuropathy (CMS-HCC)- (present on admission)   Assessment & Plan    Lyrica, neurontin.           Previous VRE - will dw infection control - isolation policy .    Reviewed items::  Labs reviewed and Medications reviewed  Rolon catheter::  No Rolon  DVT prophylaxis pharmacological::  Enoxaparin (Lovenox)  Ulcer Prophylaxis::  No     For complexity-based billing, please refer to the history, exam, and decison making above. In addition, I spent 35 minutes caring for the patient today. More than 50% of the time was spent counseling and coordinating care.    I have discussed with RN and CM and SW and other consultants about patient's plan.

## 2017-10-01 NOTE — PROGRESS NOTES
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 Takes methadone he states for back pain.  Not diabetic.  Presented to ED on 7/22/17 with increased pain to L TMA with infected neuropathic ulcer and pain to R BKA.        s/p L foot I & D with VAC placement, GSR by Dr. Shirley on 7/25/17  S/p Right revision below-knee amputation,  Right lower extremity irrigation and debridement, skin, subcutaneous  tissue to bone on 8/15/17      Acell Cytal biologic initiated on 9/13, NPWT discontinued  Weekly applications of Cytal since then with outer dressing changes every 2-3 days PRN   3rd application of Cytal done 9/27    Patient has been up in wheelchair out in hallway. Wearing offloading boot    +2 pedal pulses  drsg changed, saturated   Rinsed with NS, applied adaptic to wound bed, secured with steristrip, covered with alginate. Applied alginate to separation between 4th and 5th met. Covered with kerlix, and secured with coban-not too tight.        Discharge planning: continues to be denied by [place] SNFs/Rehabs. Unwilling to change insurance to medicaid.       PLAN  -LPS to reapply Acell Cytal 1x/week-Wed  -LPS to assess wound every 3- 4 days, change cover dressing as indicated for saturation or displacement  -Nsg to contact LPS if dressing becomes soiled or dislodged.

## 2017-10-01 NOTE — PROGRESS NOTES
Patient transferred self to W/C from bed independently. Drsg C/D/I to LLE. Pt wheeled self around unit and off unit without difficulty.  Medicated for pain with positive results. Will continue to monitor.

## 2017-10-02 PROCEDURE — A9270 NON-COVERED ITEM OR SERVICE: HCPCS | Performed by: HOSPITALIST

## 2017-10-02 PROCEDURE — 700102 HCHG RX REV CODE 250 W/ 637 OVERRIDE(OP): Performed by: HOSPITALIST

## 2017-10-02 PROCEDURE — 99232 SBSQ HOSP IP/OBS MODERATE 35: CPT | Performed by: HOSPITALIST

## 2017-10-02 PROCEDURE — 770021 HCHG ROOM/CARE - ISO PRIVATE

## 2017-10-02 PROCEDURE — 700101 HCHG RX REV CODE 250: Performed by: HOSPITALIST

## 2017-10-02 PROCEDURE — 700111 HCHG RX REV CODE 636 W/ 250 OVERRIDE (IP): Performed by: HOSPITALIST

## 2017-10-02 RX ADMIN — ACETAMINOPHEN 650 MG: 325 TABLET, FILM COATED ORAL at 11:40

## 2017-10-02 RX ADMIN — DRONABINOL 2.5 MG: 2.5 CAPSULE ORAL at 11:40

## 2017-10-02 RX ADMIN — ACETAMINOPHEN 650 MG: 325 TABLET, FILM COATED ORAL at 06:41

## 2017-10-02 RX ADMIN — LOPERAMIDE HYDROCHLORIDE 2 MG: 2 CAPSULE ORAL at 00:49

## 2017-10-02 RX ADMIN — OXYCODONE HYDROCHLORIDE 10 MG: 10 TABLET ORAL at 07:56

## 2017-10-02 RX ADMIN — ZOLPIDEM TARTRATE 5 MG: 5 TABLET, FILM COATED ORAL at 23:53

## 2017-10-02 RX ADMIN — OXYCODONE HYDROCHLORIDE 10 MG: 10 TABLET ORAL at 15:38

## 2017-10-02 RX ADMIN — DRONABINOL 2.5 MG: 2.5 CAPSULE ORAL at 21:15

## 2017-10-02 RX ADMIN — GABAPENTIN 600 MG: 300 CAPSULE ORAL at 21:12

## 2017-10-02 RX ADMIN — OXYCODONE HYDROCHLORIDE 10 MG: 10 TABLET ORAL at 21:11

## 2017-10-02 RX ADMIN — Medication 325 MG: at 07:55

## 2017-10-02 RX ADMIN — METHADONE HYDROCHLORIDE 20 MG: 10 TABLET ORAL at 22:30

## 2017-10-02 RX ADMIN — HYDROMORPHONE HYDROCHLORIDE 4 MG: 4 TABLET ORAL at 11:40

## 2017-10-02 RX ADMIN — ACETAMINOPHEN 650 MG: 325 TABLET, FILM COATED ORAL at 16:55

## 2017-10-02 RX ADMIN — PREGABALIN 150 MG: 150 CAPSULE ORAL at 15:37

## 2017-10-02 RX ADMIN — CLONAZEPAM 2 MG: 1 TABLET ORAL at 07:56

## 2017-10-02 RX ADMIN — OXYCODONE HYDROCHLORIDE 10 MG: 10 TABLET ORAL at 02:54

## 2017-10-02 RX ADMIN — ZOLPIDEM TARTRATE 5 MG: 5 TABLET, FILM COATED ORAL at 00:49

## 2017-10-02 RX ADMIN — LIDOCAINE 1 PATCH: 50 PATCH CUTANEOUS at 11:42

## 2017-10-02 RX ADMIN — ENOXAPARIN SODIUM 40 MG: 100 INJECTION SUBCUTANEOUS at 07:57

## 2017-10-02 RX ADMIN — GABAPENTIN 600 MG: 300 CAPSULE ORAL at 15:36

## 2017-10-02 RX ADMIN — METHADONE HYDROCHLORIDE 20 MG: 10 TABLET ORAL at 06:41

## 2017-10-02 RX ADMIN — HYDROMORPHONE HYDROCHLORIDE 4 MG: 4 TABLET ORAL at 23:52

## 2017-10-02 RX ADMIN — Medication 325 MG: at 16:55

## 2017-10-02 RX ADMIN — PREGABALIN 150 MG: 150 CAPSULE ORAL at 07:56

## 2017-10-02 RX ADMIN — PREGABALIN 150 MG: 150 CAPSULE ORAL at 21:11

## 2017-10-02 RX ADMIN — ACETAMINOPHEN 650 MG: 325 TABLET, FILM COATED ORAL at 23:52

## 2017-10-02 RX ADMIN — METHADONE HYDROCHLORIDE 20 MG: 10 TABLET ORAL at 15:37

## 2017-10-02 RX ADMIN — CLONAZEPAM 2 MG: 1 TABLET ORAL at 21:12

## 2017-10-02 RX ADMIN — GABAPENTIN 600 MG: 300 CAPSULE ORAL at 07:55

## 2017-10-02 RX ADMIN — PROCHLORPERAZINE MALEATE 10 MG: 10 TABLET, FILM COATED ORAL at 08:02

## 2017-10-02 ASSESSMENT — PAIN SCALES - GENERAL
PAINLEVEL_OUTOF10: 7
PAINLEVEL_OUTOF10: 5
PAINLEVEL_OUTOF10: 7
PAINLEVEL_OUTOF10: 5
PAINLEVEL_OUTOF10: 6
PAINLEVEL_OUTOF10: 0
PAINLEVEL_OUTOF10: 5

## 2017-10-02 ASSESSMENT — ENCOUNTER SYMPTOMS
CHILLS: 0
BACK PAIN: 0
COUGH: 0
MYALGIAS: 1
SHORTNESS OF BREATH: 0
HALLUCINATIONS: 0
FOCAL WEAKNESS: 0
FLANK PAIN: 0
FEVER: 0
SPEECH CHANGE: 0
TINGLING: 1
NERVOUS/ANXIOUS: 1
ABDOMINAL PAIN: 0
VOMITING: 0
STRIDOR: 0

## 2017-10-02 ASSESSMENT — LIFESTYLE VARIABLES
SUBSTANCE_ABUSE: 1
DO YOU DRINK ALCOHOL: NO

## 2017-10-02 NOTE — PROGRESS NOTES
[facility] Hospitalist Progress Note    Date of Service: 10/2/2017    Chief Complaint  52 y.o. male hx of Rt BKA, left foot partial amputation admitted 2017 with stump infections    Interval Problem Update    Fell overnight- no residual acute pain. Up independently in wheelchair. Hopefully, prosthesis socket coming today . Discussed possibility of dc to homeless shelter when prosthesis delivered, stable wound and if all referrals denied.     Consultants/Specialty  ID - Alvarez Ramon    Disposition  Rehab vs SNF- [place] out of state- if all pendings turn out denial  - plan dc to shelter when clinically better.       Review of Systems   Constitutional: Negative for chills and fever.   HENT: Negative for congestion.    Respiratory: Negative for cough, shortness of breath and stridor.    Cardiovascular: Positive for leg swelling. Negative for chest pain.   Gastrointestinal: Negative for abdominal pain and vomiting.   Genitourinary: Negative for flank pain and hematuria.   Musculoskeletal: Positive for joint pain and myalgias. Negative for back pain.        Chronic pain    Neurological: Positive for tingling (burning left foot. ). Negative for speech change and focal weakness.   Psychiatric/Behavioral: Positive for substance abuse. Negative for hallucinations. The patient is nervous/anxious.       Physical Exam  Laboratory/Imaging   Hemodynamics  Temp (24hrs), Av.6 °C (97.8 °F), Min:36.3 °C (97.3 °F), Max:36.8 °C (98.2 °F)   Temperature: 36.3 °C (97.3 °F)  Pulse  Av.2  Min: 58  Max: 111    Blood Pressure: 132/78      Respiratory      Respiration: 16, Pulse Oximetry: 95 %        RUL Breath Sounds: Clear, RML Breath Sounds: Clear, RLL Breath Sounds: Clear;Diminished, NANCY Breath Sounds: Clear, LLL Breath Sounds: Clear;Diminished    Fluids    Intake/Output Summary (Last 24 hours) at 10/02/17 0910  Last data filed at 10/01/17 2230   Gross per 24 hour   Intake              480 ml   Output               750 ml   Net             -270 ml       Nutrition  Orders Placed This Encounter   Procedures   • DIET ORDER     Standing Status:   Standing     Number of Occurrences:   1     Order Specific Question:   Diet:     Answer:   Regular [1]     Comments:   regular trays     Physical Exam   Constitutional: He is oriented to person, place, and time. He appears well-nourished. No distress.   HENT:   Head: Normocephalic and atraumatic.   Eyes: Right eye exhibits no discharge. Left eye exhibits no discharge. No scleral icterus.   Rt eye deviated , ptosis, chronic .   Neck: Normal range of motion.   Cardiovascular: Normal rate and regular rhythm.    No murmur heard.  Pulmonary/Chest: No stridor. He has no wheezes. He has no rales.   Abdominal: Soft. Bowel sounds are normal. He exhibits no distension. There is no tenderness.   Obese.    Musculoskeletal: He exhibits no edema.   R BKA, L transmetatarsal amputation- dry -  wrapped.    Neurological: He is alert and oriented to person, place, and time. No cranial nerve deficit.   Skin: Skin is warm and dry. No rash noted. He is not diaphoretic.   Psychiatric: His mood appears anxious. He is agitated.   Vitals reviewed.                               Assessment/Plan     * Skin ulcer of left foot with fat layer exposed (CMS-HCC)- (present on admission)   Assessment & Plan    w right BKA stump pain  left TMA stump non healing wound due to PVD- post I and D of left foot with gastroscoleus resection and application of wound vac 7/25. and then 8/15 by Dr. Shirley Revision of Rt BKA with I+D.   Off atbs , wound vacs removed.   Continue with weekly wound care to left stump  Encourage indep mobility - Await Laminated prosthesis socket ordered as recommended by PT on 9-28-17   Nutrition support  Pain control -- neurontin recently increased-- monitor response.  , Trying to reduce reliance on oral dilaudid, oxycodone , IV narcotics.   Isolation for VRE to be continued  after treatment ?  - needs discussion w infxn control policy .            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)- (present on admission)   Assessment & Plan    cw lyrica , neurontin         Anxiety- (present on admission)   Assessment & Plan    Re assurance  cw Klonopin        Opiate dependence (CMS-HCC)- (present on admission)   Assessment & Plan    sched  Methadone w prn oral oxycodone, dilaudid - try to wean  prn  Morphine IM for dressing changes only          Pseudomonas infection- (present on admission)   Assessment & Plan    finished Cefepime 8/25        MRSA (methicillin resistant staph aureus) culture positive- (present on admission)   Assessment & Plan    Completed Doxycycline 8/25        Noncompliance- (present on admission)   Assessment & Plan    Counseling done on risks and complications        Wound of right leg, at BKA site- (present on admission)   Assessment & Plan    s/p BKA revision w debridement.   Follow clinically .        Tobacco abuse- (present on admission)   Assessment & Plan    Nicotine replacement   Emphasize cessation         Peripheral neuropathy (CMS-HCC)- (present on admission)   Assessment & Plan    Lyrica, neurontin.               Reviewed items::  Labs reviewed and Medications reviewed  Rolon catheter::  No Rolon  DVT prophylaxis pharmacological::  Enoxaparin (Lovenox)  Ulcer Prophylaxis::  No     For complexity-based billing, please refer to the history, exam, and decison making above. In addition, I spent 35 minutes caring for the patient today. More than 50% of the time was spent counseling and coordinating care.    I have discussed with RN and CM and SW and other consultants about patient's plan.

## 2017-10-02 NOTE — DISCHARGE PLANNING
Met with pt to discuss discharge planning. Pt again states he wants to return to [place]. Discussed need for W/C, order received and referral placed to [facility]. Pt states he has no funds for transportation. [facility] rates $10.00-40.00 to [place], no service to [place]. Will cont to follow.

## 2017-10-02 NOTE — DISCHARGE PLANNING
Spoke to Laurel at [facility]. She provided me with phone # [facility]  for Wound Clinic and [facility] . Will need w/c and transpoertation back to [facility]. Will ask MD for DME order and check prices for [employer].

## 2017-10-02 NOTE — PROGRESS NOTES
Pt doesn't want to be on contact precaution.Talked with infectious control about pt being on contact precaution. Pt case will be evaluated tomorrow.

## 2017-10-02 NOTE — CARE PLAN
Problem: Venous Thromboembolism (VTW)/Deep Vein Thrombosis (DVT) Prevention:  Goal: Patient will participate in Venous Thrombosis (VTE)/Deep Vein Thrombosis (DVT)Prevention Measures  Outcome: PROGRESSING AS EXPECTED  Lovenox given.     Problem: Pain Management  Goal: Pain level will decrease to patient's comfort goal  Outcome: PROGRESSING SLOWER THAN EXPECTED  Pt complains about pain. Med per MAR.

## 2017-10-03 PROCEDURE — 700102 HCHG RX REV CODE 250 W/ 637 OVERRIDE(OP): Performed by: HOSPITALIST

## 2017-10-03 PROCEDURE — 700101 HCHG RX REV CODE 250: Performed by: HOSPITALIST

## 2017-10-03 PROCEDURE — A9270 NON-COVERED ITEM OR SERVICE: HCPCS | Performed by: HOSPITALIST

## 2017-10-03 PROCEDURE — 99232 SBSQ HOSP IP/OBS MODERATE 35: CPT | Performed by: INTERNAL MEDICINE

## 2017-10-03 PROCEDURE — 97535 SELF CARE MNGMENT TRAINING: CPT

## 2017-10-03 PROCEDURE — 770021 HCHG ROOM/CARE - ISO PRIVATE

## 2017-10-03 PROCEDURE — 700111 HCHG RX REV CODE 636 W/ 250 OVERRIDE (IP): Performed by: HOSPITALIST

## 2017-10-03 RX ADMIN — OXYCODONE HYDROCHLORIDE 10 MG: 10 TABLET ORAL at 12:08

## 2017-10-03 RX ADMIN — GABAPENTIN 600 MG: 300 CAPSULE ORAL at 21:32

## 2017-10-03 RX ADMIN — HYDROMORPHONE HYDROCHLORIDE 4 MG: 4 TABLET ORAL at 07:46

## 2017-10-03 RX ADMIN — ACETAMINOPHEN 650 MG: 325 TABLET, FILM COATED ORAL at 23:24

## 2017-10-03 RX ADMIN — LIDOCAINE 1 APPLICATION: 40 CREAM TOPICAL at 21:33

## 2017-10-03 RX ADMIN — PREGABALIN 150 MG: 150 CAPSULE ORAL at 21:32

## 2017-10-03 RX ADMIN — OXYCODONE HYDROCHLORIDE 10 MG: 10 TABLET ORAL at 03:26

## 2017-10-03 RX ADMIN — METHADONE HYDROCHLORIDE 20 MG: 10 TABLET ORAL at 06:01

## 2017-10-03 RX ADMIN — CLONAZEPAM 2 MG: 1 TABLET ORAL at 07:46

## 2017-10-03 RX ADMIN — Medication 325 MG: at 17:47

## 2017-10-03 RX ADMIN — CLONAZEPAM 2 MG: 1 TABLET ORAL at 21:32

## 2017-10-03 RX ADMIN — PREGABALIN 150 MG: 150 CAPSULE ORAL at 15:39

## 2017-10-03 RX ADMIN — METHADONE HYDROCHLORIDE 20 MG: 10 TABLET ORAL at 23:24

## 2017-10-03 RX ADMIN — GABAPENTIN 600 MG: 300 CAPSULE ORAL at 15:39

## 2017-10-03 RX ADMIN — PREGABALIN 150 MG: 150 CAPSULE ORAL at 07:46

## 2017-10-03 RX ADMIN — ACETAMINOPHEN 650 MG: 325 TABLET, FILM COATED ORAL at 17:47

## 2017-10-03 RX ADMIN — ACETAMINOPHEN 650 MG: 325 TABLET, FILM COATED ORAL at 06:01

## 2017-10-03 RX ADMIN — ACETAMINOPHEN 650 MG: 325 TABLET, FILM COATED ORAL at 12:08

## 2017-10-03 RX ADMIN — OXYCODONE HYDROCHLORIDE 10 MG: 10 TABLET ORAL at 21:32

## 2017-10-03 RX ADMIN — ENOXAPARIN SODIUM 40 MG: 100 INJECTION SUBCUTANEOUS at 07:46

## 2017-10-03 RX ADMIN — GABAPENTIN 600 MG: 300 CAPSULE ORAL at 07:46

## 2017-10-03 RX ADMIN — Medication 325 MG: at 07:46

## 2017-10-03 RX ADMIN — OXYCODONE HYDROCHLORIDE 10 MG: 10 TABLET ORAL at 17:48

## 2017-10-03 RX ADMIN — DRONABINOL 2.5 MG: 2.5 CAPSULE ORAL at 12:07

## 2017-10-03 RX ADMIN — LIDOCAINE 1 PATCH: 50 PATCH CUTANEOUS at 12:08

## 2017-10-03 RX ADMIN — HYDROMORPHONE HYDROCHLORIDE 4 MG: 4 TABLET ORAL at 16:00

## 2017-10-03 RX ADMIN — DRONABINOL 2.5 MG: 2.5 CAPSULE ORAL at 21:32

## 2017-10-03 RX ADMIN — METHADONE HYDROCHLORIDE 20 MG: 10 TABLET ORAL at 15:39

## 2017-10-03 ASSESSMENT — LIFESTYLE VARIABLES
DO YOU DRINK ALCOHOL: NO
SUBSTANCE_ABUSE: 1

## 2017-10-03 ASSESSMENT — ENCOUNTER SYMPTOMS
CHILLS: 0
VOMITING: 0
HALLUCINATIONS: 0
FEVER: 0
FOCAL WEAKNESS: 0
CONSTIPATION: 0
MYALGIAS: 1
NERVOUS/ANXIOUS: 1
ABDOMINAL PAIN: 0
SHORTNESS OF BREATH: 0
HEADACHES: 0
BACK PAIN: 0
SPEECH CHANGE: 0
TINGLING: 1

## 2017-10-03 ASSESSMENT — PAIN SCALES - GENERAL
PAINLEVEL_OUTOF10: 8
PAINLEVEL_OUTOF10: 6
PAINLEVEL_OUTOF10: 8
PAINLEVEL_OUTOF10: 6
PAINLEVEL_OUTOF10: 8
PAINLEVEL_OUTOF10: 6
PAINLEVEL_OUTOF10: 7
PAINLEVEL_OUTOF10: 7
PAINLEVEL_OUTOF10: 4
PAINLEVEL_OUTOF10: 8
PAINLEVEL_OUTOF10: 7
PAINLEVEL_OUTOF10: 8

## 2017-10-03 NOTE — PROGRESS NOTES
[facility] Hospitalist Progress Note    Date of Service: 10/3/2017    Chief Complaint  52 y.o. male hx of Rt BKA, left foot partial amputation admitted 2017 with stump infections status post I&D and antibiotic treatment    Interval Problem Update    No acute events overnight, patient's friend visiting. Patient up in wheelchair.    Consultants/Specialty  ID - Alvarez Ramon   Ortho - Fern    Disposition  No accepting facilities, patient to be discharged to [place], [place]. Social work assisting with bus pass and equipment [wheelchair].      Review of Systems   Constitutional: Negative for chills and fever.   Respiratory: Negative for shortness of breath.    Cardiovascular: Positive for leg swelling. Negative for chest pain.   Gastrointestinal: Negative for abdominal pain, constipation and vomiting.   Musculoskeletal: Positive for joint pain and myalgias. Negative for back pain.        Chronic pain    Neurological: Positive for tingling (burning left foot. ). Negative for speech change, focal weakness and headaches.   Psychiatric/Behavioral: Positive for substance abuse. Negative for hallucinations. The patient is nervous/anxious.       Physical Exam  Laboratory/Imaging   Hemodynamics  Temp (24hrs), Av.4 °C (97.6 °F), Min:36.2 °C (97.1 °F), Max:36.8 °C (98.2 °F)   Temperature: 36.8 °C (98.2 °F)  Pulse  Av.2  Min: 58  Max: 111    Blood Pressure: 119/76      Respiratory      Respiration: 16, Pulse Oximetry: 92 %        RUL Breath Sounds: Clear, RML Breath Sounds: Clear, RLL Breath Sounds: Clear;Diminished, NANCY Breath Sounds: Clear, LLL Breath Sounds: Clear;Diminished    Fluids    Intake/Output Summary (Last 24 hours) at 10/03/17 1336  Last data filed at 10/03/17 0900   Gross per 24 hour   Intake              240 ml   Output              350 ml   Net             -110 ml       Nutrition  Orders Placed This Encounter   Procedures   • DIET ORDER     Standing Status:   Standing     Number of Occurrences:    1     Order Specific Question:   Diet:     Answer:   Regular [1]     Comments:   regular trays     Physical Exam   Constitutional: He is oriented to person, place, and time. He appears well-nourished. No distress.   HENT:   Head: Normocephalic and atraumatic.   Eyes: Conjunctivae are normal. Right eye exhibits no discharge. Left eye exhibits no discharge. No scleral icterus.   Rt eye deviated , ptosis, chronic .   Neck: Normal range of motion.   Cardiovascular: Normal rate and regular rhythm.    No murmur heard.  Pulmonary/Chest: Effort normal. No stridor. He has no wheezes. He has no rales.   Abdominal: Soft. Bowel sounds are normal. He exhibits no distension. There is no tenderness.   Obese.    Musculoskeletal: He exhibits no edema.   R BKA, L transmetatarsal amputation- dressing in place, C/D/I   Neurological: He is alert and oriented to person, place, and time. No cranial nerve deficit.   Skin: Skin is warm and dry. No rash noted. He is not diaphoretic.   Psychiatric: He is not agitated.   Vitals reviewed.                               Assessment/Plan     * Skin ulcer of left foot with fat layer exposed (CMS-HCC)- (present on admission)   Assessment & Plan    w right BKA stump pain  left TMA stump non healing wound due to PVD- post I and D of left foot with gastroscoleus resection and application of wound vac 7/25. and then 8/15 by Dr. Shirley Revision of Rt BKA with I+D.   Completed antibiotic course , wound vacs removed.   Continue with weekly wound care to left stump  Encourage independent mobility - Await Laminated prosthesis socket ordered as recommended by PT on 9-28-17   Nutrition support  Pain control -- neurontin recently increased-- monitor response.  , Trying to reduce reliance on oral dilaudid, oxycodone , IV narcotics.   Isolation for VRE to be continued  after treatment ? - needs discussion w infxn control policy .            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)- (present on  admission)   Assessment & Plan    cw lyrica , neurontin         Anxiety- (present on admission)   Assessment & Plan    Re assurance  cw Klonopin        Opiate dependence (CMS-HCC)- (present on admission)   Assessment & Plan    sched  Methadone w prn oral oxycodone, dilaudid - try to wean  prn  Morphine IM for dressing changes only          Pseudomonas infection- (present on admission)   Assessment & Plan    finished Cefepime 8/25        MRSA (methicillin resistant staph aureus) culture positive- (present on admission)   Assessment & Plan    Completed Doxycycline 8/25        Noncompliance- (present on admission)   Assessment & Plan    Counseling done on risks and complications        Wound of right leg, at BKA site- (present on admission)   Assessment & Plan    s/p BKA revision w debridement.   Follow clinically .        Tobacco abuse- (present on admission)   Assessment & Plan    Nicotine replacement   Cessation counseling provided        Peripheral neuropathy (CMS-HCC)- (present on admission)   Assessment & Plan    Lyrica, neurontin.               Reviewed items::  Labs reviewed and Medications reviewed  Rolon catheter::  No Rolon  DVT prophylaxis pharmacological::  Enoxaparin (Lovenox)  Ulcer Prophylaxis::  No

## 2017-10-03 NOTE — THERAPY
Pt prosthesis still not ready for gait training. PT will cont to monitor and initiate gait training once prosthesis has been adjusted.   Prosthetist: Nazario from [facility] [phone].

## 2017-10-03 NOTE — PROGRESS NOTES
Pt is AAO x4.  Pt reports a 8/10 pain level.  Medicated per MAR.   VS WNL.  Denies chest pain or SOB.  R BKA, sleave in place CDI.  L dressing in place, CDI.  No IV, MD aware.   POC discussed.  All needs met at this time.  Bed in low position.  Call light within reach.  Rounding in place.

## 2017-10-03 NOTE — CARE PLAN
Problem: Safety  Goal: Will remain free from injury  Outcome: PROGRESSING AS EXPECTED  Pt up self to wheelchair. Calls appropriately. Pt needs met. Continue to monitor.    Problem: Pain Management  Goal: Pain level will decrease to patient's comfort goal  Outcome: PROGRESSING AS EXPECTED  Pt c/o pain 7/10, medicated per MAR.

## 2017-10-03 NOTE — FACE TO FACE
Face to Face Note  -  Durable Medical Equipment    Carolina Delgado M.D. - NPI: [id number]  I certify that this patient is under my care and that they had a durable medical equipment(DME)face to face encounter by myself that meets the physician DME face-to-face encounter requirements with this patient on:    Date of encounter:   Patient:                    MRN:                       YOB: 2019  Yariel Cheung  [id number]  1965     The encounter with the patient was in whole, or in part, for the following medical condition, which is the primary reason for durable medical equipment:  Post-Op Surgery    I certify that, based on my findings, the following durable medical equipment is medically necessary:  Wheel Chair with ELRs.        My Clinical findings support the need for the above equipment due to:  Abnormal Gait    Supporting Symptoms: left foot partial amputation

## 2017-10-04 PROCEDURE — 700101 HCHG RX REV CODE 250: Performed by: HOSPITALIST

## 2017-10-04 PROCEDURE — A9270 NON-COVERED ITEM OR SERVICE: HCPCS | Performed by: HOSPITALIST

## 2017-10-04 PROCEDURE — 700102 HCHG RX REV CODE 250 W/ 637 OVERRIDE(OP): Performed by: HOSPITALIST

## 2017-10-04 PROCEDURE — 700111 HCHG RX REV CODE 636 W/ 250 OVERRIDE (IP): Performed by: HOSPITALIST

## 2017-10-04 PROCEDURE — 770021 HCHG ROOM/CARE - ISO PRIVATE

## 2017-10-04 PROCEDURE — 99232 SBSQ HOSP IP/OBS MODERATE 35: CPT | Performed by: INTERNAL MEDICINE

## 2017-10-04 PROCEDURE — 97535 SELF CARE MNGMENT TRAINING: CPT

## 2017-10-04 RX ADMIN — OXYCODONE HYDROCHLORIDE 10 MG: 10 TABLET ORAL at 05:44

## 2017-10-04 RX ADMIN — GABAPENTIN 600 MG: 300 CAPSULE ORAL at 14:49

## 2017-10-04 RX ADMIN — CLONAZEPAM 2 MG: 1 TABLET ORAL at 09:21

## 2017-10-04 RX ADMIN — HYDROMORPHONE HYDROCHLORIDE 4 MG: 4 TABLET ORAL at 00:39

## 2017-10-04 RX ADMIN — ENOXAPARIN SODIUM 40 MG: 100 INJECTION SUBCUTANEOUS at 09:22

## 2017-10-04 RX ADMIN — GABAPENTIN 600 MG: 300 CAPSULE ORAL at 09:21

## 2017-10-04 RX ADMIN — ACETAMINOPHEN 650 MG: 325 TABLET, FILM COATED ORAL at 12:37

## 2017-10-04 RX ADMIN — ZOLPIDEM TARTRATE 5 MG: 5 TABLET, FILM COATED ORAL at 00:39

## 2017-10-04 RX ADMIN — OXYCODONE HYDROCHLORIDE 10 MG: 10 TABLET ORAL at 23:56

## 2017-10-04 RX ADMIN — ZOLPIDEM TARTRATE 5 MG: 5 TABLET, FILM COATED ORAL at 23:53

## 2017-10-04 RX ADMIN — Medication 325 MG: at 09:21

## 2017-10-04 RX ADMIN — OXYCODONE HYDROCHLORIDE 10 MG: 10 TABLET ORAL at 17:45

## 2017-10-04 RX ADMIN — GABAPENTIN 600 MG: 300 CAPSULE ORAL at 21:29

## 2017-10-04 RX ADMIN — METHADONE HYDROCHLORIDE 20 MG: 10 TABLET ORAL at 14:48

## 2017-10-04 RX ADMIN — OXYCODONE HYDROCHLORIDE 10 MG: 10 TABLET ORAL at 12:42

## 2017-10-04 RX ADMIN — ACETAMINOPHEN 650 MG: 325 TABLET, FILM COATED ORAL at 17:42

## 2017-10-04 RX ADMIN — METHADONE HYDROCHLORIDE 20 MG: 10 TABLET ORAL at 05:44

## 2017-10-04 RX ADMIN — ACETAMINOPHEN 650 MG: 325 TABLET, FILM COATED ORAL at 05:44

## 2017-10-04 RX ADMIN — DRONABINOL 2.5 MG: 2.5 CAPSULE ORAL at 12:37

## 2017-10-04 RX ADMIN — DRONABINOL 2.5 MG: 2.5 CAPSULE ORAL at 21:29

## 2017-10-04 RX ADMIN — HYDROMORPHONE HYDROCHLORIDE 4 MG: 4 TABLET ORAL at 09:22

## 2017-10-04 RX ADMIN — LIDOCAINE 1 PATCH: 50 PATCH CUTANEOUS at 12:37

## 2017-10-04 RX ADMIN — Medication 325 MG: at 17:42

## 2017-10-04 RX ADMIN — PREGABALIN 150 MG: 150 CAPSULE ORAL at 14:48

## 2017-10-04 RX ADMIN — ACETAMINOPHEN 650 MG: 325 TABLET, FILM COATED ORAL at 23:53

## 2017-10-04 RX ADMIN — PREGABALIN 150 MG: 150 CAPSULE ORAL at 21:29

## 2017-10-04 RX ADMIN — CLONAZEPAM 2 MG: 1 TABLET ORAL at 21:29

## 2017-10-04 RX ADMIN — METHADONE HYDROCHLORIDE 20 MG: 10 TABLET ORAL at 21:30

## 2017-10-04 RX ADMIN — PREGABALIN 150 MG: 150 CAPSULE ORAL at 09:22

## 2017-10-04 ASSESSMENT — LIFESTYLE VARIABLES
DO YOU DRINK ALCOHOL: NO
SUBSTANCE_ABUSE: 1

## 2017-10-04 ASSESSMENT — ENCOUNTER SYMPTOMS
FEVER: 0
MYALGIAS: 1
SHORTNESS OF BREATH: 0
CONSTIPATION: 0
HALLUCINATIONS: 0
WEAKNESS: 1
HEADACHES: 0
VOMITING: 0
BACK PAIN: 0
ABDOMINAL PAIN: 0
FOCAL WEAKNESS: 0
NERVOUS/ANXIOUS: 1
CHILLS: 0
TINGLING: 1

## 2017-10-04 ASSESSMENT — PAIN SCALES - GENERAL
PAINLEVEL_OUTOF10: 0
PAINLEVEL_OUTOF10: 8
PAINLEVEL_OUTOF10: 6
PAINLEVEL_OUTOF10: 8
PAINLEVEL_OUTOF10: 0
PAINLEVEL_OUTOF10: 6
PAINLEVEL_OUTOF10: 8
PAINLEVEL_OUTOF10: 8
PAINLEVEL_OUTOF10: 0
PAINLEVEL_OUTOF10: 6
PAINLEVEL_OUTOF10: 6

## 2017-10-04 NOTE — PROGRESS NOTES
[facility] Hospitalist Progress Note    Date of Service: 10/4/2017    Chief Complaint  52 y.o. male hx of Rt BKA, left foot partial amputation admitted 2017 with stump infections status post I&D and antibiotic treatment    Interval Problem Update    No acute events overnight, pt upset that we will not provide him with an [employer] pass to the [facility] but bus pass instead. Pt prosthesis still not ready for gait training    Consultants/Specialty  ID - Alvarez Ribeiro - Fern    Disposition  No accepting facilities, patient to be discharged to [place], [place]. Social work assisting with bus pass and equipment [wheelchair].      Review of Systems   Constitutional: Negative for chills and fever.   Respiratory: Negative for shortness of breath.    Cardiovascular: Negative for chest pain.   Gastrointestinal: Negative for abdominal pain, constipation and vomiting.   Musculoskeletal: Positive for joint pain and myalgias. Negative for back pain.        Chronic pain    Neurological: Positive for tingling (L foot) and weakness. Negative for focal weakness and headaches.   Psychiatric/Behavioral: Positive for substance abuse. Negative for hallucinations. The patient is nervous/anxious.       Physical Exam  Laboratory/Imaging   Hemodynamics  Temp (24hrs), Av.4 °C (97.5 °F), Min:36.3 °C (97.4 °F), Max:36.6 °C (97.8 °F)   Temperature: 36.3 °C (97.4 °F)  Pulse  Av.2  Min: 58  Max: 111    Blood Pressure: 114/70      Respiratory      Respiration: 16, Pulse Oximetry: 94 %        RUL Breath Sounds: Clear, RML Breath Sounds: Clear, RLL Breath Sounds: Clear;Diminished, NANCY Breath Sounds: Clear, LLL Breath Sounds: Clear;Diminished    Fluids  No intake or output data in the 24 hours ending 10/04/17 1107    Nutrition  Orders Placed This Encounter   Procedures   • DIET ORDER     Standing Status:   Standing     Number of Occurrences:   1     Order Specific Question:   Diet:     Answer:   Regular [1]     Comments:    regular trays     Physical Exam   Constitutional: He is oriented to person, place, and time. He appears well-nourished. No distress.   HENT:   Head: Normocephalic and atraumatic.   Eyes: Conjunctivae are normal. Right eye exhibits no discharge. Left eye exhibits no discharge. No scleral icterus.   Rt eye deviated , ptosis, chronic .   Neck: Normal range of motion.   Cardiovascular: Normal rate and regular rhythm.    No murmur heard.  Pulmonary/Chest: Effort normal. No stridor. He has no wheezes. He has no rales.   Abdominal: Soft. Bowel sounds are normal. He exhibits no distension. There is no tenderness.   Obese.    Musculoskeletal: He exhibits no edema.   R BKA, L transmetatarsal amputation- dressing in place, C/D/I   Neurological: He is alert and oriented to person, place, and time. No cranial nerve deficit.   Skin: Skin is warm and dry. No rash noted. He is not diaphoretic.   Psychiatric: He is not agitated.   Vitals reviewed.                               Assessment/Plan     * Skin ulcer of left foot with fat layer exposed (CMS-[facility])- (present on admission)   Assessment & Plan    w right BKA stump pain  left TMA stump non healing wound due to PVD- post I and D of left foot with gastroscoleus resection and application of wound vac 7/25. and then 8/15 by Dr. Shirley Revision of Rt BKA with I+D.   Completed antibiotic course , wound vacs removed.   Continue with weekly wound care to left stump  Encourage independent mobility - Await Laminated prosthesis socket ordered as recommended by PT on 9-28-17   Nutrition support  Pain control -- neurontin recently increased-- monitor response.  , Trying to reduce reliance on oral dilaudid, oxycodone , IV narcotics.   Isolation for VRE to be continued  after treatment ? - needs discussion w infxn control policy .            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)- (present on admission)   Assessment & Plan    cw lyrica , neurontin         Anxiety- (present  on admission)   Assessment & Plan    Re assurance  cw Klonopin        Opiate dependence (CMS-HCC)- (present on admission)   Assessment & Plan    sched  Methadone w prn oral oxycodone, dilaudid - try to wean  prn  Morphine IM for dressing changes only          Pseudomonas infection- (present on admission)   Assessment & Plan    finished Cefepime 8/25        MRSA (methicillin resistant staph aureus) culture positive- (present on admission)   Assessment & Plan    Completed Doxycycline 8/25        Noncompliance- (present on admission)   Assessment & Plan    Counseling done on risks and complications        Wound of right leg, at BKA site- (present on admission)   Assessment & Plan    s/p BKA revision w debridement.   Follow clinically .        Tobacco abuse- (present on admission)   Assessment & Plan    Nicotine replacement   Cessation counseling provided        Peripheral neuropathy (CMS-HCC)- (present on admission)   Assessment & Plan    Lyrica, neurontin.               Reviewed items::  Labs reviewed and Medications reviewed  Rolon catheter::  No Rolon  DVT prophylaxis pharmacological::  Enoxaparin (Lovenox)  Ulcer Prophylaxis::  No

## 2017-10-04 NOTE — PROGRESS NOTES
Patient got up to wheelchair and spent most of his day in the wheelchair. Patient took a full shower, linens were changed, bedside table and chest of drawers were cleaned with bleach wipes, and housekeeping was called to clean room. Patient was awake and doing activities most of the day. Vital signs stable, will continue to monitor until end of shift.

## 2017-10-04 NOTE — CARE PLAN
Problem: Safety  Goal: Will remain free from falls  Outcome: PROGRESSING AS EXPECTED  Patient remains free of falls and injury during shift. Personal belongings within reach. Patient demonstrated proper use of call light. Patient used call light to notify staff of needs. Patient rounded on hourly. When up patient wore non slip socks. Will continue to monitor until end of shift.     Problem: Mobility  Goal: Risk for activity intolerance will decrease  Outcome: PROGRESSING AS EXPECTED  Patient was able to get self from bed to wheelchair and back again. Patient spent the majority of the day up in wheelchair. Patient did not take any naps today. Patient was able to maintain or increase activity level. Will continue to monitor until end of shift.

## 2017-10-04 NOTE — CARE PLAN
Problem: Mobility  Goal: Risk for activity intolerance will decrease  Outcome: PROGRESSING AS EXPECTED  Pt able to transfer self to wheelchair and perform ADLs independently with minimal to no assist    Problem: Respiratory:  Goal: Respiratory status will improve  Outcome: PROGRESSING AS EXPECTED  Pt on room air with sats above 94%

## 2017-10-05 PROCEDURE — 700102 HCHG RX REV CODE 250 W/ 637 OVERRIDE(OP): Performed by: HOSPITALIST

## 2017-10-05 PROCEDURE — A9270 NON-COVERED ITEM OR SERVICE: HCPCS | Performed by: HOSPITALIST

## 2017-10-05 PROCEDURE — 99232 SBSQ HOSP IP/OBS MODERATE 35: CPT | Performed by: INTERNAL MEDICINE

## 2017-10-05 PROCEDURE — 700111 HCHG RX REV CODE 636 W/ 250 OVERRIDE (IP): Performed by: HOSPITALIST

## 2017-10-05 PROCEDURE — 770021 HCHG ROOM/CARE - ISO PRIVATE

## 2017-10-05 PROCEDURE — 700101 HCHG RX REV CODE 250: Performed by: HOSPITALIST

## 2017-10-05 RX ADMIN — GABAPENTIN 600 MG: 300 CAPSULE ORAL at 08:47

## 2017-10-05 RX ADMIN — PREGABALIN 150 MG: 150 CAPSULE ORAL at 08:47

## 2017-10-05 RX ADMIN — OXYCODONE HYDROCHLORIDE 10 MG: 10 TABLET ORAL at 17:37

## 2017-10-05 RX ADMIN — ACETAMINOPHEN 650 MG: 325 TABLET, FILM COATED ORAL at 11:31

## 2017-10-05 RX ADMIN — HYDROMORPHONE HYDROCHLORIDE 4 MG: 4 TABLET ORAL at 13:03

## 2017-10-05 RX ADMIN — HYDROMORPHONE HYDROCHLORIDE 4 MG: 4 TABLET ORAL at 22:19

## 2017-10-05 RX ADMIN — LIDOCAINE 1 PATCH: 50 PATCH CUTANEOUS at 13:03

## 2017-10-05 RX ADMIN — DRONABINOL 2.5 MG: 2.5 CAPSULE ORAL at 13:03

## 2017-10-05 RX ADMIN — CLONAZEPAM 2 MG: 1 TABLET ORAL at 08:47

## 2017-10-05 RX ADMIN — OXYCODONE HYDROCHLORIDE 10 MG: 10 TABLET ORAL at 08:47

## 2017-10-05 RX ADMIN — ACETAMINOPHEN 325 MG: 325 TABLET, FILM COATED ORAL at 17:36

## 2017-10-05 RX ADMIN — CLONAZEPAM 2 MG: 1 TABLET ORAL at 21:04

## 2017-10-05 RX ADMIN — ACETAMINOPHEN 650 MG: 325 TABLET, FILM COATED ORAL at 04:50

## 2017-10-05 RX ADMIN — Medication 325 MG: at 17:36

## 2017-10-05 RX ADMIN — DRONABINOL 2.5 MG: 2.5 CAPSULE ORAL at 21:03

## 2017-10-05 RX ADMIN — LIDOCAINE 1 APPLICATION: 40 CREAM TOPICAL at 08:47

## 2017-10-05 RX ADMIN — METHADONE HYDROCHLORIDE 20 MG: 10 TABLET ORAL at 13:03

## 2017-10-05 RX ADMIN — METHADONE HYDROCHLORIDE 20 MG: 10 TABLET ORAL at 21:02

## 2017-10-05 RX ADMIN — PREGABALIN 150 MG: 150 CAPSULE ORAL at 15:54

## 2017-10-05 RX ADMIN — GABAPENTIN 600 MG: 300 CAPSULE ORAL at 15:54

## 2017-10-05 RX ADMIN — PREGABALIN 150 MG: 150 CAPSULE ORAL at 21:04

## 2017-10-05 RX ADMIN — HYDROMORPHONE HYDROCHLORIDE 4 MG: 4 TABLET ORAL at 04:50

## 2017-10-05 RX ADMIN — Medication 325 MG: at 07:30

## 2017-10-05 RX ADMIN — ENOXAPARIN SODIUM 40 MG: 100 INJECTION SUBCUTANEOUS at 08:47

## 2017-10-05 RX ADMIN — METHADONE HYDROCHLORIDE 20 MG: 10 TABLET ORAL at 05:49

## 2017-10-05 RX ADMIN — MORPHINE SULFATE 4 MG: 4 INJECTION INTRAVENOUS at 11:31

## 2017-10-05 RX ADMIN — GABAPENTIN 600 MG: 300 CAPSULE ORAL at 21:02

## 2017-10-05 ASSESSMENT — ENCOUNTER SYMPTOMS
VOMITING: 0
HALLUCINATIONS: 0
SHORTNESS OF BREATH: 0
FOCAL WEAKNESS: 0
WEAKNESS: 0
CONSTIPATION: 0
HEADACHES: 0
FEVER: 0
BACK PAIN: 0
ABDOMINAL PAIN: 0
CHILLS: 0
TINGLING: 1
NERVOUS/ANXIOUS: 0

## 2017-10-05 ASSESSMENT — PAIN SCALES - GENERAL
PAINLEVEL_OUTOF10: 4
PAINLEVEL_OUTOF10: 6
PAINLEVEL_OUTOF10: 7
PAINLEVEL_OUTOF10: 6
PAINLEVEL_OUTOF10: 3
PAINLEVEL_OUTOF10: 6
PAINLEVEL_OUTOF10: 5
PAINLEVEL_OUTOF10: 7
PAINLEVEL_OUTOF10: 5

## 2017-10-05 ASSESSMENT — LIFESTYLE VARIABLES: SUBSTANCE_ABUSE: 1

## 2017-10-05 NOTE — CARE PLAN
Problem: Skin Integrity  Goal: Risk for impaired skin integrity will decrease  Outcome: PROGRESSING AS EXPECTED  Pt able to reposition/ turn self. Mobilizes often. Skin assessed and free from breakdown    Problem: Pain Management  Goal: Pain level will decrease to patient's comfort goal  Outcome: PROGRESSING AS EXPECTED  Pain adequately managed with scheduled and PRN pain medication per MAR

## 2017-10-05 NOTE — PROGRESS NOTES
[facility] Hospitalist Progress Note    Date of Service: 10/5/2017    Chief Complaint  52 y.o. male hx of Rt BKA, left foot partial amputation admitted 2017 with stump infections status post I&D and antibiotic treatment    Interval Problem Update  Patient in wheelchair this am, visiting another patient in his room this am. No acute events overnight. Pt pending prosthesis delivery pending for gait training with PT.     Consultants/Specialty  ID - Alvarez Ribeiro    Disposition  No accepting facilities, patient to be discharged to [place], [place]. Social work assisting with bus pass and equipment [wheelchair].      Review of Systems   Constitutional: Negative for chills and fever.   Respiratory: Negative for shortness of breath.    Cardiovascular: Negative for chest pain.   Gastrointestinal: Negative for abdominal pain, constipation and vomiting.   Musculoskeletal: Positive for joint pain. Negative for back pain.        Chronic pain    Neurological: Positive for tingling (L foot). Negative for focal weakness, weakness and headaches.   Psychiatric/Behavioral: Positive for substance abuse. Negative for hallucinations and suicidal ideas. The patient is not nervous/anxious.       Physical Exam  Laboratory/Imaging   Hemodynamics  Temp (24hrs), Av.1 °C (96.9 °F), Min:35.9 °C (96.7 °F), Max:36.2 °C (97.1 °F)   Temperature: 36.2 °C (97.1 °F)  Pulse  Av.2  Min: 58  Max: 111    Blood Pressure: 116/74      Respiratory      Respiration: 16, Pulse Oximetry: 95 %        RUL Breath Sounds: Clear, RML Breath Sounds: Clear, RLL Breath Sounds: Clear;Diminished, NANCY Breath Sounds: Clear, LLL Breath Sounds: Clear;Diminished    Fluids  No intake or output data in the 24 hours ending 10/05/17 1157    Nutrition  Orders Placed This Encounter   Procedures   • DIET ORDER     Standing Status:   Standing     Number of Occurrences:   1     Order Specific Question:   Diet:     Answer:   Regular [1]     Comments:    regular trays     Physical Exam   Constitutional: He is oriented to person, place, and time. He appears well-nourished. No distress.   HENT:   Head: Normocephalic and atraumatic.   Right Ear: External ear normal.   Left Ear: External ear normal.   Mouth/Throat: Oropharynx is clear and moist.   Eyes: Conjunctivae are normal. Right eye exhibits no discharge. Left eye exhibits no discharge. No scleral icterus.   Rt eye deviated , ptosis, chronic .   Neck: Normal range of motion.   Cardiovascular: Normal rate and regular rhythm.    No murmur heard.  Pulmonary/Chest: Effort normal. No stridor. He has no wheezes. He has no rales.   Abdominal: Soft. Bowel sounds are normal. He exhibits no distension. There is no tenderness.   Obese.    Musculoskeletal: He exhibits no edema.   R BKA, L transmetatarsal amputation- dressing in place, C/D/I   Neurological: He is alert and oriented to person, place, and time. No cranial nerve deficit.   Skin: Skin is warm and dry. No rash noted. He is not diaphoretic.   Psychiatric: He has a normal mood and affect. He is not agitated.   Vitals reviewed.                               Assessment/Plan     * Skin ulcer of left foot with fat layer exposed (CMS-HCC)- (present on admission)   Assessment & Plan    w right BKA stump pain  left TMA stump non healing wound due to PVD- post I and D of left foot with gastroscoleus resection and application of wound vac 7/25. and then 8/15 by Dr. Shirley Revision of Rt BKA with I+D.   Completed antibiotic course , wound vacs removed.   Continue with weekly wound care to left stump  Encourage independent mobility - Await Laminated prosthesis socket ordered as recommended by PT on 9-28-17   Nutrition support  Pain control -- neurontin recently increased-- monitor response.  , Trying to reduce reliance on oral dilaudid, oxycodone , IV narcotics.   Isolation for VRE to be continued  after treatment ? - needs discussion w infxn control policy .             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)- (present on admission)   Assessment & Plan    cw lyrica , neurontin         Anxiety- (present on admission)   Assessment & Plan    Re assurance  cw Klonopin        Opiate dependence (CMS-HCC)- (present on admission)   Assessment & Plan    sched  Methadone w prn oral oxycodone, dilaudid - try to wean  prn  Morphine IM for dressing changes only          Pseudomonas infection- (present on admission)   Assessment & Plan    finished Cefepime 8/25        MRSA (methicillin resistant staph aureus) culture positive- (present on admission)   Assessment & Plan    Completed Doxycycline 8/25        Noncompliance- (present on admission)   Assessment & Plan    Counseling done on risks and complications        Wound of right leg, at BKA site- (present on admission)   Assessment & Plan    s/p BKA revision w debridement.   Follow clinically .        Tobacco abuse- (present on admission)   Assessment & Plan    Nicotine replacement   Cessation counseling provided        Peripheral neuropathy (CMS-HCC)- (present on admission)   Assessment & Plan    Lyrica, neurontin.               Reviewed items::  Labs reviewed and Medications reviewed  Rolon catheter::  No Rolon  DVT prophylaxis pharmacological::  Enoxaparin (Lovenox)  Ulcer Prophylaxis::  No

## 2017-10-05 NOTE — PROGRESS NOTES
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 Takes methadone he states for back pain.  Not diabetic.  Presented to ED on 7/22/17 with increased pain to L TMA with infected neuropathic ulcer and pain to R BKA.        s/p L foot I & D with VAC placement, GSR by Dr. Shirley on 7/25/17  S/p    Right revision below-knee amputation,  Right lower extremity irrigation and debridement, skin, subcutaneous  tissue to bone on 8/15/17    .  Acell Cytal biologic initiated on 9/13, NPWT discontinued  Weekly applications of Cytal since then with outer dressing changes every 2-3 days PRN     Patient seen today for 4th application  Outer dressing is again deeply soiled.  Pt denies walking on this foot or using this foot to propel wheelchair    PROCEDURE:  Old dressing removed.  Scissors used to trim hypertrophic tissue to skin level.  Bleeding controlled with manual pressure.  Wound cleansed with NS. Tincture of Benzoin applied to periwound skin.   Using sterile technique and assist from a unit CNA, a 3.0 x 3.5 cm sheet of Acell Cytal wound matrix, 1-layer, hydrated with NS,  and applied to the wound bed..  Adaptic placed over product, secured with steristrips, covered with alginate and then foam, secured with hypafix .  Foot and ankle wrapped with kerlix and then coban    PRODUCT: Cytal wound matrix 1-layer 3 x 3.5 cm sheet.  REF [id number] SN [id number]; LOT [id number]    WOUND DIMENSIONS: 2.5 x 3.5 x 0.1 cm  (photo in EPIC)    Strategies discussed with patient to try to keep dressing clean.  He will clean the inside of his Kaufman boot.  Will call housekeeping to clean floor in room    PLAN  -LPS to reapply Acell Cytal 1x/week  -LPS to assess wound every 3- 4 days, change cover dressing as indicated for saturation or displacement  -Nsg to contact LPS  if dressing becomes soiled or dislodged.

## 2017-10-06 PROCEDURE — 700102 HCHG RX REV CODE 250 W/ 637 OVERRIDE(OP): Performed by: HOSPITALIST

## 2017-10-06 PROCEDURE — 770021 HCHG ROOM/CARE - ISO PRIVATE

## 2017-10-06 PROCEDURE — A9270 NON-COVERED ITEM OR SERVICE: HCPCS | Performed by: HOSPITALIST

## 2017-10-06 PROCEDURE — 700111 HCHG RX REV CODE 636 W/ 250 OVERRIDE (IP): Performed by: HOSPITALIST

## 2017-10-06 PROCEDURE — 99232 SBSQ HOSP IP/OBS MODERATE 35: CPT | Performed by: INTERNAL MEDICINE

## 2017-10-06 PROCEDURE — 700101 HCHG RX REV CODE 250: Performed by: HOSPITALIST

## 2017-10-06 RX ADMIN — ZOLPIDEM TARTRATE 5 MG: 5 TABLET, FILM COATED ORAL at 00:18

## 2017-10-06 RX ADMIN — METHADONE HYDROCHLORIDE 20 MG: 10 TABLET ORAL at 06:17

## 2017-10-06 RX ADMIN — CLONAZEPAM 2 MG: 1 TABLET ORAL at 08:54

## 2017-10-06 RX ADMIN — GABAPENTIN 600 MG: 300 CAPSULE ORAL at 08:55

## 2017-10-06 RX ADMIN — HYDROMORPHONE HYDROCHLORIDE 4 MG: 4 TABLET ORAL at 08:54

## 2017-10-06 RX ADMIN — PREGABALIN 150 MG: 150 CAPSULE ORAL at 08:54

## 2017-10-06 RX ADMIN — DRONABINOL 2.5 MG: 2.5 CAPSULE ORAL at 12:00

## 2017-10-06 RX ADMIN — LIDOCAINE 1 APPLICATION: 40 CREAM TOPICAL at 12:01

## 2017-10-06 RX ADMIN — ACETAMINOPHEN 325 MG: 325 TABLET, FILM COATED ORAL at 12:01

## 2017-10-06 RX ADMIN — GABAPENTIN 600 MG: 300 CAPSULE ORAL at 20:17

## 2017-10-06 RX ADMIN — PREGABALIN 150 MG: 150 CAPSULE ORAL at 20:18

## 2017-10-06 RX ADMIN — METHADONE HYDROCHLORIDE 20 MG: 10 TABLET ORAL at 20:19

## 2017-10-06 RX ADMIN — GABAPENTIN 600 MG: 300 CAPSULE ORAL at 14:12

## 2017-10-06 RX ADMIN — HYDROMORPHONE HYDROCHLORIDE 4 MG: 4 TABLET ORAL at 17:18

## 2017-10-06 RX ADMIN — ACETAMINOPHEN 325 MG: 325 TABLET, FILM COATED ORAL at 00:20

## 2017-10-06 RX ADMIN — OXYCODONE HYDROCHLORIDE 10 MG: 10 TABLET ORAL at 04:39

## 2017-10-06 RX ADMIN — ENOXAPARIN SODIUM 40 MG: 100 INJECTION SUBCUTANEOUS at 08:53

## 2017-10-06 RX ADMIN — LIDOCAINE 1 PATCH: 50 PATCH CUTANEOUS at 12:00

## 2017-10-06 RX ADMIN — OXYCODONE HYDROCHLORIDE 10 MG: 10 TABLET ORAL at 12:01

## 2017-10-06 RX ADMIN — CLONAZEPAM 2 MG: 1 TABLET ORAL at 20:19

## 2017-10-06 RX ADMIN — Medication 325 MG: at 08:55

## 2017-10-06 RX ADMIN — PREGABALIN 150 MG: 150 CAPSULE ORAL at 14:12

## 2017-10-06 RX ADMIN — ACETAMINOPHEN 325 MG: 325 TABLET, FILM COATED ORAL at 17:19

## 2017-10-06 RX ADMIN — Medication 325 MG: at 17:18

## 2017-10-06 RX ADMIN — METHADONE HYDROCHLORIDE 20 MG: 10 TABLET ORAL at 14:12

## 2017-10-06 RX ADMIN — DRONABINOL 2.5 MG: 2.5 CAPSULE ORAL at 20:19

## 2017-10-06 ASSESSMENT — ENCOUNTER SYMPTOMS
HALLUCINATIONS: 0
INSOMNIA: 1
FOCAL WEAKNESS: 0
BACK PAIN: 0
CHILLS: 0
CONSTIPATION: 0
TINGLING: 1
HEADACHES: 0
ABDOMINAL PAIN: 0
FEVER: 0
SHORTNESS OF BREATH: 0
WEAKNESS: 0
NERVOUS/ANXIOUS: 0

## 2017-10-06 ASSESSMENT — PAIN SCALES - GENERAL
PAINLEVEL_OUTOF10: 7
PAINLEVEL_OUTOF10: 5
PAINLEVEL_OUTOF10: 4
PAINLEVEL_OUTOF10: 5
PAINLEVEL_OUTOF10: 4
PAINLEVEL_OUTOF10: 5
PAINLEVEL_OUTOF10: 6
PAINLEVEL_OUTOF10: 7
PAINLEVEL_OUTOF10: 5
PAINLEVEL_OUTOF10: 7
PAINLEVEL_OUTOF10: 7
PAINLEVEL_OUTOF10: 6

## 2017-10-06 ASSESSMENT — LIFESTYLE VARIABLES
DO YOU DRINK ALCOHOL: NO
SUBSTANCE_ABUSE: 1

## 2017-10-06 NOTE — CARE PLAN
Problem: Mobility  Goal: Risk for activity intolerance will decrease  Outcome: PROGRESSING AS EXPECTED  Moves around using wheelchair.    Problem: Pain Management  Goal: Pain level will decrease to patient's comfort goal  Outcome: PROGRESSING AS EXPECTED  Pain controlled with dilaudid po or oxy 10.

## 2017-10-06 NOTE — PROGRESS NOTES
[facility]ist Progress Note    Date of Service: 10/6/2017    Chief Complaint  52 y.o. male hx of Rt BKA, left foot partial amputation admitted 2017 with stump infections status post I&D and antibiotic treatment    Interval Problem Update  Patient in wheelchair this am, in hallway. He complains of insomnia overnight. Patient anxious to receive his prosthesis seeking learn gait training from physical therapy.  assisting with prosthesis deliver.     Consultants/Specialty  ID - [facility] Ribeiro - [facility]    Disposition  No accepting facilities, patient to be discharged to [place], [place]. Social work assisting with bus pass and equipment [wheelchair]. Pt will need gait training with prosthesis prior to dc. Pending arrival of prosthesis, SW assisting.      Review of Systems   Constitutional: Negative for chills and fever.   Respiratory: Negative for shortness of breath.    Cardiovascular: Negative for chest pain.   Gastrointestinal: Negative for abdominal pain and constipation.   Musculoskeletal: Positive for joint pain. Negative for back pain.        Chronic pain    Neurological: Positive for tingling (L foot). Negative for focal weakness, weakness and headaches.   Psychiatric/Behavioral: Positive for substance abuse. Negative for hallucinations and suicidal ideas. The patient has insomnia. The patient is not nervous/anxious.       Physical Exam  Laboratory/Imaging   Hemodynamics  Temp (24hrs), Av.2 °C (97.1 °F), Min:36 °C (96.8 °F), Max:36.3 °C (97.4 °F)   Temperature: 36 °C (96.8 °F)  Pulse  Av.2  Min: 58  Max: 111    Blood Pressure: 115/72      Respiratory      Respiration: 18, Pulse Oximetry: 95 %        RUL Breath Sounds: Clear, RML Breath Sounds: Clear, RLL Breath Sounds: Clear;Diminished, NANCY Breath Sounds: Clear, LLL Breath Sounds: Clear;Diminished    Fluids    Intake/Output Summary (Last 24 hours) at 10/06/17 1225  Last data filed at 10/06/17 0600   Gross per 24  hour   Intake                0 ml   Output             1275 ml   Net            -1275 ml       Nutrition  Orders Placed This Encounter   Procedures   • DIET ORDER     Standing Status:   Standing     Number of Occurrences:   1     Order Specific Question:   Diet:     Answer:   Regular [1]     Comments:   regular trays     Physical Exam   Constitutional: He is oriented to person, place, and time. He appears well-nourished. No distress.   HENT:   Head: Normocephalic and atraumatic.   Mouth/Throat: Oropharynx is clear and moist.   Eyes: Conjunctivae are normal. Right eye exhibits no discharge. Left eye exhibits no discharge.   Rt eye deviated , ptosis, chronic .   Neck: Normal range of motion.   Cardiovascular: Normal rate and regular rhythm.    No murmur heard.  Pulmonary/Chest: Effort normal. No respiratory distress. He has no wheezes.   Abdominal: Soft. Bowel sounds are normal. He exhibits no distension.   Obese.    Musculoskeletal: He exhibits no edema.   R BKA, L transmetatarsal amputation- dressing in place, C/D/I   Neurological: He is alert and oriented to person, place, and time. No cranial nerve deficit.   Skin: Skin is warm and dry. No rash noted. He is not diaphoretic.   Psychiatric: He has a normal mood and affect. He is not agitated.   Vitals reviewed.                               Assessment/Plan     * Skin ulcer of left foot with fat layer exposed (CMS-HCC)- (present on admission)   Assessment & Plan    w right BKA stump pain  left TMA stump non healing wound due to PVD- post I and D of left foot with gastroscoleus resection and application of wound vac 7/25. and then 8/15 by Dr. Shirley Revision of Rt BKA with I+D.   Completed antibiotic course , wound vacs removed.   Continue with weekly wound care to left stump  Encourage independent mobility - Await Laminated prosthesis socket ordered as recommended by PT on 9-28-17   Nutrition support  Pain control -- neurontin recently increased-- monitor response.   , Trying to reduce reliance on oral dilaudid, oxycodone , IV narcotics.   Isolation for VRE to be continued  after treatment ? - needs discussion w infxn control policy .            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)- (present on admission)   Assessment & Plan    cw lyrica , neurontin         Anxiety- (present on admission)   Assessment & Plan    Re assurance  cw Klonopin        Opiate dependence (CMS-HCC)- (present on admission)   Assessment & Plan    sched  Methadone w prn oral oxycodone, dilaudid - try to wean  prn  Morphine IM for dressing changes only          Pseudomonas infection- (present on admission)   Assessment & Plan    finished Cefepime 8/25        MRSA (methicillin resistant staph aureus) culture positive- (present on admission)   Assessment & Plan    Completed Doxycycline 8/25        Noncompliance- (present on admission)   Assessment & Plan    Counseling done on risks and complications        Wound of right leg, at BKA site- (present on admission)   Assessment & Plan    s/p BKA revision w debridement.   Follow clinically .        Tobacco abuse- (present on admission)   Assessment & Plan    Nicotine replacement   Cessation counseling provided        Peripheral neuropathy (CMS-HCC)- (present on admission)   Assessment & Plan    Lyrica, neurontin.               Reviewed items::  Labs reviewed and Medications reviewed  Rolon catheter::  No Rolon  DVT prophylaxis pharmacological::  Enoxaparin (Lovenox)  Ulcer Prophylaxis::  No

## 2017-10-06 NOTE — PROGRESS NOTES
Pain controlled with oxy 10 or dilaudid po 4 mg. Dressing on left foot had some drainage. Day RN reinforced it with some gauze. No bleeding since. Left stump looks good with no signs of wound. Drinking and voiding. Sleeping. Uses wheelchair in the hallway. Pt is concerned about being discharged via bus to [place]. He would like to get stronger first through a rehab type facility. Currently sleeping.

## 2017-10-06 NOTE — PROGRESS NOTES
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 Takes methadone he states for back pain.  Not diabetic.  Presented to ED on 7/22/17 with increased pain to L TMA with infected neuropathic ulcer and pain to R BKA.        s/p L foot I & D with VAC placement, GSR by Dr. Shirley on 7/25/17  S/p Right revision below-knee amputation,  Right lower extremity irrigation and debridement, skin, subcutaneous  tissue to bone on 8/15/17      [employer] Cytal biologic initiated on 9/13, NPWT discontinued  Weekly applications of Cytal since then with outer dressing changes every 2-3 days PRN     4th application has been applied this week.  Recd call from nursing that dressing is draining.   Upon assessment dressing is CDI, informed that dressing was reinforced.       PROCEDURE:  Old dressing removed. Adaptic with steristrips over product remains intact, covered wound with alginate and then foam, secured with kerlix and then coban. Blue bootie placed to protect dressing.    While changing his dressing, Michael Tellez CP from [employer] was at bedside. Delivered prosthetic attachment.   Pt declined to try on prosthetic, states he will never wear the prosthetic and he will never walk again. encouraged pt to check fit of prosthesis, declined.          PLAN  -LPS to assess wound every 3- 4 days, change cover dressing as indicated for saturation or displacement  -Nsg to contact LPS if dressing becomes soiled or dislodged.  -no plan for cytal next week

## 2017-10-07 LAB
ANION GAP SERPL CALC-SCNC: 7 MMOL/L (ref 0–11.9)
BUN SERPL-MCNC: 22 MG/DL (ref 8–22)
CALCIUM SERPL-MCNC: 9.1 MG/DL (ref 8.5–10.5)
CHLORIDE SERPL-SCNC: 105 MMOL/L (ref 96–112)
CO2 SERPL-SCNC: 27 MMOL/L (ref 20–33)
CREAT SERPL-MCNC: 0.7 MG/DL (ref 0.5–1.4)
ERYTHROCYTE [DISTWIDTH] IN BLOOD BY AUTOMATED COUNT: 44.9 FL (ref 35.9–50)
GFR SERPL CREATININE-BSD FRML MDRD: >60 ML/MIN/1.73 M 2
GLUCOSE SERPL-MCNC: 124 MG/DL (ref 65–99)
HCT VFR BLD AUTO: 43.2 % (ref 42–52)
HGB BLD-MCNC: 13.6 G/DL (ref 14–18)
MCH RBC QN AUTO: 26.9 PG (ref 27–33)
MCHC RBC AUTO-ENTMCNC: 31.5 G/DL (ref 33.7–35.3)
MCV RBC AUTO: 85.5 FL (ref 81.4–97.8)
PLATELET # BLD AUTO: 247 K/UL (ref 164–446)
PMV BLD AUTO: 9.3 FL (ref 9–12.9)
POTASSIUM SERPL-SCNC: 4.1 MMOL/L (ref 3.6–5.5)
RBC # BLD AUTO: 5.05 M/UL (ref 4.7–6.1)
SODIUM SERPL-SCNC: 139 MMOL/L (ref 135–145)
WBC # BLD AUTO: 5.2 K/UL (ref 4.8–10.8)

## 2017-10-07 PROCEDURE — A9270 NON-COVERED ITEM OR SERVICE: HCPCS | Performed by: HOSPITALIST

## 2017-10-07 PROCEDURE — 700102 HCHG RX REV CODE 250 W/ 637 OVERRIDE(OP): Performed by: HOSPITALIST

## 2017-10-07 PROCEDURE — 85027 COMPLETE CBC AUTOMATED: CPT

## 2017-10-07 PROCEDURE — 770021 HCHG ROOM/CARE - ISO PRIVATE

## 2017-10-07 PROCEDURE — 36415 COLL VENOUS BLD VENIPUNCTURE: CPT

## 2017-10-07 PROCEDURE — 80048 BASIC METABOLIC PNL TOTAL CA: CPT

## 2017-10-07 PROCEDURE — 99231 SBSQ HOSP IP/OBS SF/LOW 25: CPT | Performed by: HOSPITALIST

## 2017-10-07 PROCEDURE — 700111 HCHG RX REV CODE 636 W/ 250 OVERRIDE (IP): Performed by: HOSPITALIST

## 2017-10-07 RX ADMIN — CLONAZEPAM 2 MG: 1 TABLET ORAL at 08:21

## 2017-10-07 RX ADMIN — PREGABALIN 150 MG: 150 CAPSULE ORAL at 20:39

## 2017-10-07 RX ADMIN — OXYCODONE HYDROCHLORIDE 10 MG: 10 TABLET ORAL at 15:24

## 2017-10-07 RX ADMIN — HYDROMORPHONE HYDROCHLORIDE 4 MG: 4 TABLET ORAL at 20:39

## 2017-10-07 RX ADMIN — METHADONE HYDROCHLORIDE 20 MG: 10 TABLET ORAL at 23:57

## 2017-10-07 RX ADMIN — ZOLPIDEM TARTRATE 5 MG: 5 TABLET, FILM COATED ORAL at 01:59

## 2017-10-07 RX ADMIN — HYDROMORPHONE HYDROCHLORIDE 4 MG: 4 TABLET ORAL at 01:00

## 2017-10-07 RX ADMIN — HYDROMORPHONE HYDROCHLORIDE 4 MG: 4 TABLET ORAL at 08:30

## 2017-10-07 RX ADMIN — GABAPENTIN 600 MG: 300 CAPSULE ORAL at 15:20

## 2017-10-07 RX ADMIN — ENOXAPARIN SODIUM 40 MG: 100 INJECTION SUBCUTANEOUS at 08:21

## 2017-10-07 RX ADMIN — PREGABALIN 150 MG: 150 CAPSULE ORAL at 15:20

## 2017-10-07 RX ADMIN — CLONAZEPAM 2 MG: 1 TABLET ORAL at 20:39

## 2017-10-07 RX ADMIN — PREGABALIN 150 MG: 150 CAPSULE ORAL at 08:21

## 2017-10-07 RX ADMIN — GABAPENTIN 600 MG: 300 CAPSULE ORAL at 08:21

## 2017-10-07 RX ADMIN — METHADONE HYDROCHLORIDE 20 MG: 10 TABLET ORAL at 06:35

## 2017-10-07 RX ADMIN — GABAPENTIN 600 MG: 300 CAPSULE ORAL at 20:39

## 2017-10-07 RX ADMIN — METHADONE HYDROCHLORIDE 20 MG: 10 TABLET ORAL at 15:20

## 2017-10-07 RX ADMIN — DRONABINOL 2.5 MG: 2.5 CAPSULE ORAL at 20:39

## 2017-10-07 RX ADMIN — ACETAMINOPHEN 325 MG: 325 TABLET, FILM COATED ORAL at 06:36

## 2017-10-07 ASSESSMENT — ENCOUNTER SYMPTOMS
MYALGIAS: 1
SHORTNESS OF BREATH: 0
DIZZINESS: 0
DIARRHEA: 0
FEVER: 0
NERVOUS/ANXIOUS: 0
HEADACHES: 0
ABDOMINAL PAIN: 0
SPEECH CHANGE: 0
SORE THROAT: 0

## 2017-10-07 ASSESSMENT — PAIN SCALES - GENERAL
PAINLEVEL_OUTOF10: 5
PAINLEVEL_OUTOF10: 5
PAINLEVEL_OUTOF10: 7

## 2017-10-07 NOTE — CARE PLAN
Problem: Mobility  Goal: Risk for activity intolerance will decrease  Pt up to chair for meals.

## 2017-10-07 NOTE — PROGRESS NOTES
[facility] Hospitalist Progress Note    Date of Service: 10/7/2017    Chief Complaint  52 y.o. male presented 2017 with right BKA stump pain and wound drainage in addition to left TMA stump wound (nonhealing).  Patient has history of PVD s/p L TMA and R BKA more than 5 yrs ago in [place].      Interval Problem Update  Ongoing chronic pain.  Wheels up and down hallways in wheel chair.  Speech clear.  No distress.  Care discussed with RN    Consultants/Specialty  Ortho  Wound care    Disposition  Difficult discharge.  Possibly going back to [facility] to stay with his family.        Review of Systems   Constitutional: Negative for fever.   HENT: Negative for sore throat.    Respiratory: Negative for shortness of breath.    Cardiovascular: Negative for leg swelling.   Gastrointestinal: Negative for abdominal pain and diarrhea.   Genitourinary: Negative for dysuria.   Musculoskeletal: Positive for myalgias (bilateral lower legs).   Neurological: Negative for dizziness, speech change and headaches.   Psychiatric/Behavioral: The patient is not nervous/anxious.       Physical Exam  Laboratory/Imaging   Hemodynamics  Temp (24hrs), Av.1 °C (97 °F), Min:36.1 °C (97 °F), Max:36.1 °C (97 °F)   Temperature: 36.1 °C (97 °F)  Pulse  Av.1  Min: 58  Max: 111    Blood Pressure: 107/63      Respiratory      Respiration: 18, Pulse Oximetry: 92 %        RUL Breath Sounds: Clear, RML Breath Sounds: Clear, RLL Breath Sounds: Clear;Diminished, NANCY Breath Sounds: Clear, LLL Breath Sounds: Clear;Diminished    Fluids    Intake/Output Summary (Last 24 hours) at 10/07/17 1922  Last data filed at 10/07/17 1600   Gross per 24 hour   Intake             1080 ml   Output                0 ml   Net             1080 ml       Nutrition  Orders Placed This Encounter   Procedures   • DIET ORDER     Standing Status:   Standing     Number of Occurrences:   1     Order Specific Question:   Diet:     Answer:   Regular [1]     Comments:   regular trays      Physical Exam   Constitutional: He is oriented to person, place, and time. He appears well-developed. No distress.   HENT:   Head: Normocephalic and atraumatic.   Nose: Nose normal.   Eyes: Conjunctivae are normal. Right eye exhibits no discharge. Left eye exhibits no discharge.   Neck: No tracheal deviation present.   Cardiovascular: Normal rate, regular rhythm, normal heart sounds and intact distal pulses.    No murmur heard.  Pulmonary/Chest: Effort normal and breath sounds normal. No stridor. No respiratory distress. He has no wheezes.   Abdominal: Soft. Bowel sounds are normal. He exhibits no distension. There is no tenderness.   Musculoskeletal: He exhibits no edema.   Right BKA with intact skinw/o drainage.  Left foot with dressing over trans metatarsal amputated area.     Neurological: He is alert and oriented to person, place, and time. No cranial nerve deficit.   Skin: Skin is warm and dry. He is not diaphoretic.   Psychiatric: He has a normal mood and affect. His behavior is normal. Thought content normal.   Vitals reviewed.      Recent Labs      10/07/17   0553   WBC  5.2   RBC  5.05   HEMOGLOBIN  13.6*   HEMATOCRIT  43.2   MCV  85.5   MCH  26.9*   MCHC  31.5*   RDW  44.9   PLATELETCT  247   MPV  9.3     Recent Labs      10/07/17   0553   SODIUM  139   POTASSIUM  4.1   CHLORIDE  105   CO2  27   GLUCOSE  124*   BUN  22   CREATININE  0.70   CALCIUM  9.1                      Assessment/Plan     * Skin ulcer of left foot with fat layer exposed (CMS-HCC)- (present on admission)   Assessment & Plan    w right BKA stump pain  left TMA stump non healing wound due to PVD- post I and D of left foot with gastroscoleus resection and application of wound vac 7/25. and then 8/15 by Dr. Shirley Revision of Rt BKA with I+D.   Completed antibiotic course , wound vacs removed.   Continue with weekly wound care to left stump  Encourage independent mobility - Await Laminated prosthesis socket ordered as recommended by  PT on 9-28-17   Nutrition support  Pain control -- neurontin recently increased-- monitor response.  , Trying to reduce reliance on oral dilaudid, oxycodone , IV narcotics.   Isolation for VRE to be continued  after treatment ? - needs discussion w infxn control policy .            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)- (present on admission)   Assessment & Plan    cw lyrica , neurontin         Anxiety- (present on admission)   Assessment & Plan    Re assurance  cw Klonopin        Opiate dependence (CMS-HCC)- (present on admission)   Assessment & Plan    Methadone w prn oral oxycodone, dilaudid - try to wean  prn  Morphine IM for dressing changes only          Pseudomonas infection- (present on admission)   Assessment & Plan    finished Cefepime 8/25        MRSA (methicillin resistant staph aureus) culture positive- (present on admission)   Assessment & Plan    Completed Doxycycline 8/25        Noncompliance- (present on admission)   Assessment & Plan    Counseling done on risks and complications        Wound of right leg, at BKA site- (present on admission)   Assessment & Plan    s/p BKA revision w debridement.   Follow clinically .        Tobacco abuse- (present on admission)   Assessment & Plan    Nicotine replacement   Cessation counseling provided        Peripheral neuropathy (CMS-HCC)- (present on admission)   Assessment & Plan    Lyrica, neurontin.   No narcotics for neuropathy.            Reviewed items::  Labs reviewed and Medications reviewed  Rolon catheter::  No Rolon  DVT prophylaxis pharmacological::  Enoxaparin (Lovenox)

## 2017-10-07 NOTE — PROGRESS NOTES
"No bleeding on LLE dressing. Complaining that oxy 10 has not given him adequate pain control. Dilaudid PO is the \"only one that works.\" Pt expressed that staff just wants to kick him out and does not want to help him transfer to a rehab. Pt slept all night after ambien was given. Patient refused to use his prostheses.  "

## 2017-10-08 PROCEDURE — A9270 NON-COVERED ITEM OR SERVICE: HCPCS | Performed by: HOSPITALIST

## 2017-10-08 PROCEDURE — 770021 HCHG ROOM/CARE - ISO PRIVATE

## 2017-10-08 PROCEDURE — 700111 HCHG RX REV CODE 636 W/ 250 OVERRIDE (IP): Performed by: HOSPITALIST

## 2017-10-08 PROCEDURE — 700102 HCHG RX REV CODE 250 W/ 637 OVERRIDE(OP): Performed by: HOSPITALIST

## 2017-10-08 PROCEDURE — 99232 SBSQ HOSP IP/OBS MODERATE 35: CPT | Performed by: HOSPITALIST

## 2017-10-08 RX ORDER — CAPSAICIN 0.025 %
CREAM (GRAM) TOPICAL 3 TIMES DAILY
Status: DISCONTINUED | OUTPATIENT
Start: 2017-10-08 | End: 2017-10-18 | Stop reason: HOSPADM

## 2017-10-08 RX ADMIN — GABAPENTIN 600 MG: 300 CAPSULE ORAL at 09:00

## 2017-10-08 RX ADMIN — PREGABALIN 150 MG: 150 CAPSULE ORAL at 21:42

## 2017-10-08 RX ADMIN — HYDROMORPHONE HYDROCHLORIDE 4 MG: 4 TABLET ORAL at 21:42

## 2017-10-08 RX ADMIN — PREGABALIN 150 MG: 150 CAPSULE ORAL at 09:00

## 2017-10-08 RX ADMIN — GABAPENTIN 600 MG: 300 CAPSULE ORAL at 21:42

## 2017-10-08 RX ADMIN — CLONAZEPAM 2 MG: 1 TABLET ORAL at 09:00

## 2017-10-08 RX ADMIN — CLONAZEPAM 2 MG: 1 TABLET ORAL at 21:41

## 2017-10-08 RX ADMIN — ENOXAPARIN SODIUM 40 MG: 100 INJECTION SUBCUTANEOUS at 09:00

## 2017-10-08 RX ADMIN — ACETAMINOPHEN 650 MG: 325 TABLET, FILM COATED ORAL at 18:29

## 2017-10-08 RX ADMIN — OXYCODONE HYDROCHLORIDE 10 MG: 10 TABLET ORAL at 09:18

## 2017-10-08 RX ADMIN — HYDROMORPHONE HYDROCHLORIDE 4 MG: 4 TABLET ORAL at 04:23

## 2017-10-08 RX ADMIN — METHADONE HYDROCHLORIDE 20 MG: 10 TABLET ORAL at 18:35

## 2017-10-08 RX ADMIN — ACETAMINOPHEN 650 MG: 325 TABLET, FILM COATED ORAL at 00:00

## 2017-10-08 RX ADMIN — HYDROMORPHONE HYDROCHLORIDE 4 MG: 4 TABLET ORAL at 13:48

## 2017-10-08 RX ADMIN — DRONABINOL 2.5 MG: 2.5 CAPSULE ORAL at 21:42

## 2017-10-08 RX ADMIN — Medication 325 MG: at 18:29

## 2017-10-08 RX ADMIN — METHADONE HYDROCHLORIDE 20 MG: 10 TABLET ORAL at 06:36

## 2017-10-08 RX ADMIN — OXYCODONE HYDROCHLORIDE 10 MG: 10 TABLET ORAL at 18:29

## 2017-10-08 ASSESSMENT — ENCOUNTER SYMPTOMS
SPEECH CHANGE: 0
FEVER: 0
MYALGIAS: 1
HEADACHES: 0
ABDOMINAL PAIN: 0
NERVOUS/ANXIOUS: 0
PALPITATIONS: 0
DEPRESSION: 0
SHORTNESS OF BREATH: 0

## 2017-10-08 ASSESSMENT — PAIN SCALES - GENERAL
PAINLEVEL_OUTOF10: 4
PAINLEVEL_OUTOF10: 5
PAINLEVEL_OUTOF10: 6
PAINLEVEL_OUTOF10: 3
PAINLEVEL_OUTOF10: 4

## 2017-10-08 NOTE — CARE PLAN
Problem: Pain Management  Goal: Pain level will decrease to patient's comfort goal  Outcome: PROGRESSING AS EXPECTED  Pain medication given to pt as required by proper pain scale. Medication alternatives provided to pt.

## 2017-10-08 NOTE — PROGRESS NOTES
[facility]ist Progress Note    Date of Service: 10/8/2017    Chief Complaint  52 y.o. male presented 2017 with right BKA stump pain and wound drainage in addition to left TMA stump wound (nonhealing).  Patient has history of PVD s/p L TMA and R BKA more than 5 yrs ago in [place].      Interval Problem Update  Ongoing chronic pain.  Wheels up and down hallways in wheel chair.  Speech clear.  No distress.  Care discussed with RN    Consultants/Specialty  Ortho  Wound care    Disposition  Difficult discharge.  Possibly going back to [facility] to stay with his family.        Review of Systems   Constitutional: Negative for fever.   Respiratory: Negative for shortness of breath.    Cardiovascular: Negative for palpitations and leg swelling.   Gastrointestinal: Negative for abdominal pain.   Musculoskeletal: Positive for myalgias (bilateral lower legs). Negative for joint pain.   Neurological: Negative for speech change and headaches.   Psychiatric/Behavioral: Negative for depression. The patient is not nervous/anxious.       Physical Exam  Laboratory/Imaging   Hemodynamics  Temp (24hrs), Av.1 °C (97 °F), Min:36.1 °C (97 °F), Max:36.1 °C (97 °F)   Temperature: 36.1 °C (97 °F)  Pulse  Av.1  Min: 58  Max: 111    Blood Pressure: 111/73      Respiratory      Respiration: 17, Pulse Oximetry: 93 %        RUL Breath Sounds: Clear, RML Breath Sounds: Clear, RLL Breath Sounds: Clear;Diminished, NANCY Breath Sounds: Clear, LLL Breath Sounds: Clear;Diminished    Fluids    Intake/Output Summary (Last 24 hours) at 10/08/17 1912  Last data filed at 10/08/17 0630   Gross per 24 hour   Intake                0 ml   Output              750 ml   Net             -750 ml       Nutrition  Orders Placed This Encounter   Procedures   • DIET ORDER     Standing Status:   Standing     Number of Occurrences:   1     Order Specific Question:   Diet:     Answer:   Regular [1]     Comments:   regular trays     Physical Exam    Constitutional: He is oriented to person, place, and time. He appears well-developed. No distress.   overweight   HENT:   Head: Normocephalic and atraumatic.   Nose: Nose normal.   Eyes: Conjunctivae are normal. Right eye exhibits no discharge. Left eye exhibits no discharge.   Neck: No tracheal deviation present.   Cardiovascular: Normal rate, regular rhythm, normal heart sounds and intact distal pulses.    No murmur heard.  Pulses:       Radial pulses are 2+ on the right side, and 2+ on the left side.   Pulmonary/Chest: Effort normal and breath sounds normal. No respiratory distress. He has no wheezes.   Abdominal: Soft. Bowel sounds are normal. He exhibits no distension. There is no tenderness.   Musculoskeletal: He exhibits no edema.   Right BKA with intact skinw/o drainage.  Left foot with dressing over trans metatarsal amputated area.     Neurological: He is alert and oriented to person, place, and time. No cranial nerve deficit.   Skin: Skin is warm and dry. He is not diaphoretic.   Psychiatric: He has a normal mood and affect. His behavior is normal. Thought content normal.   Vitals reviewed.      Recent Labs      10/07/17   0553   WBC  5.2   RBC  5.05   HEMOGLOBIN  13.6*   HEMATOCRIT  43.2   MCV  85.5   MCH  26.9*   MCHC  31.5*   RDW  44.9   PLATELETCT  247   MPV  9.3     Recent Labs      10/07/17   0553   SODIUM  139   POTASSIUM  4.1   CHLORIDE  105   CO2  27   GLUCOSE  124*   BUN  22   CREATININE  0.70   CALCIUM  9.1                      Assessment/Plan     * Skin ulcer of left foot with fat layer exposed (CMS-HCC)- (present on admission)   Assessment & Plan    w right BKA stump pain  left TMA stump non healing wound due to PVD- post I and D of left foot with gastroscoleus resection and application of wound vac 7/25. and then 8/15 by Dr. Shirley Revision of Rt BKA with I+D.   Completed antibiotic course , wound vacs removed.   Continue with weekly wound care to left stump  Encourage independent mobility  - Await Laminated prosthesis socket ordered as recommended by PT on 9-28-17   Nutrition support  Pain control -- neurontin recently increased-- monitor response.  , Trying to reduce reliance on oral dilaudid, oxycodone , IV narcotics.   Isolation for VRE to be continued  after treatment ? - needs discussion w infxn control policy .            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)- (present on admission)   Assessment & Plan    cw lyrica , neurontin         Anxiety- (present on admission)   Assessment & Plan    Re assurance  cw Klonopin        Opiate dependence (CMS-HCC)- (present on admission)   Assessment & Plan    Methadone w prn oral oxycodone, dilaudid - try to wean  prn  Morphine IM for dressing changes only          Pseudomonas infection- (present on admission)   Assessment & Plan    finished Cefepime 8/25        MRSA (methicillin resistant staph aureus) culture positive- (present on admission)   Assessment & Plan    Completed Doxycycline 8/25        Noncompliance- (present on admission)   Assessment & Plan    Counseling done on risks and complications        Wound of right leg, at BKA site- (present on admission)   Assessment & Plan    s/p BKA revision w debridement.   Follow clinically   Prosthetic Tech evaluating patient for fit.        Tobacco abuse- (present on admission)   Assessment & Plan    Nicotine replacement   Cessation counseling provided        Peripheral neuropathy (CMS-HCC)- (present on admission)   Assessment & Plan    Lyrica, neurontin.   No narcotics for neuropathy.            Reviewed items::  Labs reviewed and Medications reviewed  Rolon catheter::  No Rolon  DVT prophylaxis pharmacological::  Enoxaparin (Lovenox)

## 2017-10-09 PROCEDURE — 700102 HCHG RX REV CODE 250 W/ 637 OVERRIDE(OP): Performed by: HOSPITALIST

## 2017-10-09 PROCEDURE — A9270 NON-COVERED ITEM OR SERVICE: HCPCS | Performed by: HOSPITALIST

## 2017-10-09 PROCEDURE — 700101 HCHG RX REV CODE 250: Performed by: HOSPITALIST

## 2017-10-09 PROCEDURE — 770021 HCHG ROOM/CARE - ISO PRIVATE

## 2017-10-09 PROCEDURE — 700111 HCHG RX REV CODE 636 W/ 250 OVERRIDE (IP): Performed by: HOSPITALIST

## 2017-10-09 PROCEDURE — 99231 SBSQ HOSP IP/OBS SF/LOW 25: CPT | Performed by: HOSPITALIST

## 2017-10-09 RX ADMIN — DRONABINOL 2.5 MG: 2.5 CAPSULE ORAL at 11:30

## 2017-10-09 RX ADMIN — GABAPENTIN 600 MG: 300 CAPSULE ORAL at 08:37

## 2017-10-09 RX ADMIN — Medication 325 MG: at 18:35

## 2017-10-09 RX ADMIN — PREGABALIN 150 MG: 150 CAPSULE ORAL at 08:38

## 2017-10-09 RX ADMIN — GABAPENTIN 600 MG: 300 CAPSULE ORAL at 15:50

## 2017-10-09 RX ADMIN — METHADONE HYDROCHLORIDE 20 MG: 10 TABLET ORAL at 02:26

## 2017-10-09 RX ADMIN — HYDROMORPHONE HYDROCHLORIDE 4 MG: 4 TABLET ORAL at 21:33

## 2017-10-09 RX ADMIN — OXYCODONE HYDROCHLORIDE 10 MG: 10 TABLET ORAL at 18:35

## 2017-10-09 RX ADMIN — OXYCODONE HYDROCHLORIDE 10 MG: 10 TABLET ORAL at 08:59

## 2017-10-09 RX ADMIN — GABAPENTIN 600 MG: 300 CAPSULE ORAL at 20:00

## 2017-10-09 RX ADMIN — LIDOCAINE 1 PATCH: 50 PATCH CUTANEOUS at 10:18

## 2017-10-09 RX ADMIN — CLONAZEPAM 2 MG: 1 TABLET ORAL at 20:00

## 2017-10-09 RX ADMIN — CLONAZEPAM 2 MG: 1 TABLET ORAL at 08:37

## 2017-10-09 RX ADMIN — PREGABALIN 150 MG: 150 CAPSULE ORAL at 15:50

## 2017-10-09 RX ADMIN — ACETAMINOPHEN 650 MG: 325 TABLET, FILM COATED ORAL at 11:25

## 2017-10-09 RX ADMIN — METHADONE HYDROCHLORIDE 20 MG: 10 TABLET ORAL at 10:17

## 2017-10-09 RX ADMIN — ENOXAPARIN SODIUM 40 MG: 100 INJECTION SUBCUTANEOUS at 08:37

## 2017-10-09 RX ADMIN — METHADONE HYDROCHLORIDE 20 MG: 10 TABLET ORAL at 20:00

## 2017-10-09 RX ADMIN — PREGABALIN 150 MG: 150 CAPSULE ORAL at 20:00

## 2017-10-09 RX ADMIN — HYDROMORPHONE HYDROCHLORIDE 4 MG: 4 TABLET ORAL at 13:29

## 2017-10-09 RX ADMIN — ACETAMINOPHEN 650 MG: 325 TABLET, FILM COATED ORAL at 18:35

## 2017-10-09 RX ADMIN — HYDROMORPHONE HYDROCHLORIDE 4 MG: 4 TABLET ORAL at 05:19

## 2017-10-09 RX ADMIN — Medication 325 MG: at 08:37

## 2017-10-09 RX ADMIN — ACETAMINOPHEN 650 MG: 325 TABLET, FILM COATED ORAL at 06:00

## 2017-10-09 RX ADMIN — ACETAMINOPHEN 650 MG: 325 TABLET, FILM COATED ORAL at 05:18

## 2017-10-09 ASSESSMENT — ENCOUNTER SYMPTOMS
FEVER: 0
MYALGIAS: 1
SHORTNESS OF BREATH: 0
BACK PAIN: 1
SPEECH CHANGE: 0
NAUSEA: 0

## 2017-10-09 ASSESSMENT — PAIN SCALES - GENERAL
PAINLEVEL_OUTOF10: 5
PAINLEVEL_OUTOF10: 8
PAINLEVEL_OUTOF10: 2
PAINLEVEL_OUTOF10: 5
PAINLEVEL_OUTOF10: 6
PAINLEVEL_OUTOF10: 8

## 2017-10-09 NOTE — PROGRESS NOTES
[facility] Hospitalist Progress Note    Date of Service: 10/9/2017    Chief Complaint  52 y.o. male presented 2017 with right BKA stump pain and wound drainage in addition to left TMA stump wound (nonhealing).  Patient has history of PVD s/p L TMA and R BKA more than 5 yrs ago in [place].      Interval Problem Update  Discussed placement issues with  today. Still await possible discharge to [place] as patient states he has family in [place] or near [facility]. Patient states chronic back pain. Patient's the prosthetic tech at bedside and requested physical therapist to be present so he can help adjust prostheses. Nursing has noted patient gaining weight since admission I will stop Marinol.    Consultants/Specialty  Ortho  Wound care    Disposition  Difficult discharge.  Possibly going back to [facility] to stay with his family.        Review of Systems   Constitutional: Negative for fever.   Respiratory: Negative for shortness of breath.    Cardiovascular: Negative for leg swelling.   Gastrointestinal: Negative for nausea.   Musculoskeletal: Positive for back pain and myalgias (bilateral lower legs). Negative for joint pain.   Neurological: Negative for speech change.      Physical Exam  Laboratory/Imaging   Hemodynamics  Temp (24hrs), Av.5 °C (97.7 °F), Min:36.3 °C (97.3 °F), Max:36.7 °C (98 °F)   Temperature: 36.3 °C (97.3 °F)  Pulse  Av.1  Min: 58  Max: 111    Blood Pressure: 116/79      Respiratory      Respiration: 18, Pulse Oximetry: 94 %        RUL Breath Sounds: Clear, RML Breath Sounds: Clear, RLL Breath Sounds: Clear;Diminished, NANCY Breath Sounds: Clear, LLL Breath Sounds: Clear;Diminished    Fluids    Intake/Output Summary (Last 24 hours) at 10/09/17 1643  Last data filed at 10/09/17 1600   Gross per 24 hour   Intake              610 ml   Output              970 ml   Net             -360 ml       Nutrition  Orders Placed This Encounter   Procedures   • DIET ORDER      Standing Status:   Standing     Number of Occurrences:   1     Order Specific Question:   Diet:     Answer:   Regular [1]     Comments:   regular trays     Physical Exam   Constitutional: He is oriented to person, place, and time. He appears well-developed. No distress.   overweight   HENT:   Head: Normocephalic and atraumatic.   Nose: Nose normal.   Eyes: Conjunctivae are normal. Right eye exhibits no discharge. Left eye exhibits no discharge.   Neck: No tracheal deviation present.   Pulmonary/Chest: Effort normal. No respiratory distress.   Abdominal: He exhibits no distension.   Musculoskeletal:   Right BKA with intact skinw/o drainage.  Left foot with dressing over trans metatarsal amputated area.     Neurological: He is alert and oriented to person, place, and time. No cranial nerve deficit.   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 He is not diaphoretic.   Psychiatric: He has a normal mood and affect. His behavior is normal. Thought content normal.   Vitals reviewed.      Recent Labs      10/07/17   0553   WBC  5.2   RBC  5.05   HEMOGLOBIN  13.6*   HEMATOCRIT  43.2   MCV  85.5   MCH  26.9*   MCHC  31.5*   RDW  44.9   PLATELETCT  247   MPV  9.3     Recent Labs      10/07/17   0553   SODIUM  139   POTASSIUM  4.1   CHLORIDE  105   CO2  27   GLUCOSE  124*   BUN  22   CREATININE  0.70   CALCIUM  9.1                      Assessment/Plan     * Skin ulcer of left foot with fat layer exposed (CMS-HCC)- (present on admission)   Assessment & Plan    w right BKA stump pain  left TMA stump non healing wound due to PVD- post I and D of left foot with gastroscoleus resection and application of wound vac 7/25. and then 8/15 by Dr. Shirley Revision of Rt BKA with I+D.   Completed antibiotic course , wound vacs removed.   Continue with weekly wound care to left stump  Encourage independent mobility - Await Laminated prosthesis socket ordered as recommended by PT on 9-28-17   Nutrition support  Pain control  -- neurontin recently increased-- monitor response.  , Trying to reduce reliance on oral dilaudid, oxycodone , IV narcotics.   Isolation for VRE to be continued  after treatment ? - needs discussion w infxn control policy .            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)- (present on admission)   Assessment & Plan    cw lyrica , neurontin         Anxiety- (present on admission)   Assessment & Plan    Re assurance  cw Klonopin        Opiate dependence (CMS-HCC)- (present on admission)   Assessment & Plan    Methadone w prn oral oxycodone, dilaudid - try to wean  prn  Morphine IM for dressing changes only          Pseudomonas infection- (present on admission)   Assessment & Plan    finished Cefepime 8/25        MRSA (methicillin resistant staph aureus) culture positive- (present on admission)   Assessment & Plan    Completed Doxycycline 8/25        Noncompliance- (present on admission)   Assessment & Plan    Counseling done on risks and complications        Wound of right leg, at BKA site- (present on admission)   Assessment & Plan    s/p BKA revision w debridement.   Follow clinically   Prosthetic Tech evaluating patient for fit.        Tobacco abuse- (present on admission)   Assessment & Plan    Nicotine replacement   Cessation counseling provided        Peripheral neuropathy (CMS-HCC)- (present on admission)   Assessment & Plan    Lyrica, neurontin.   No narcotics for neuropathy.            Reviewed items::  Labs reviewed and Medications reviewed  Rolon catheter::  No Rolon  DVT prophylaxis pharmacological::  Enoxaparin (Lovenox)

## 2017-10-09 NOTE — PROGRESS NOTES
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 Takes methadone he states for back pain.  Not diabetic.  Presented to ED on 7/22/17 with increased pain to L TMA with infected neuropathic ulcer and pain to R BKA.        s/p L foot I & D with VAC placement, GSR by Dr. Shirley on 7/25/17  S/p Right revision below-knee amputation,  Right lower extremity irrigation and debridement, skin, subcutaneous  tissue to bone on 8/15/17      Acell Cytal biologic initiated on 9/13, NPWT discontinued  Weekly applications of Cytal since then with outer dressing changes every 2-3 days PRN     4th application has been applied this week.  L foot drsg intact    PROCEDURE:  Old dressing removed. Adaptic with steristrips had slipped off of wound.   Wound bed red granular tissue, dispersed with possible residual cytal product. Wound cleansed with wound cleanser, applied adaptic, alginate, non adhesive foam piece. aquacel ag placed between 4th webspace. Covered foot with kerlix and secured with coban. Blue bootie placed to protect dressing.    Pt states prosthetist is to see pt today to adjust prosthetic.     PLAN  -LPS to assess wound every 3- 4 days, change cover dressing as indicated for saturation or displacement  -Nsg to contact LPS if dressing becomes soiled or dislodged.  -plan for Cytal application Wed or Thurs

## 2017-10-09 NOTE — CARE PLAN
"Problem: Knowledge Deficit  Goal: Knowledge of disease process/condition, treatment plan, diagnostic tests, and medications will improve    Intervention: Explain information regarding disease process/condition, treatment plan, diagnostic tests, and medications and document in education  Current BMI is 27, patient has gained significant weight during hospital stay since admit on 7/22, discussed weight gain with hospitalist Dr. Ortega, marinol discontinued off MAR. Discussed with patient, patient aware and verbalizes understanding.       Problem: Mobility  Goal: Risk for activity intolerance will decrease    Intervention: Assess and monitor signs of activity intolerance  Prosthesis at bedside, patient refuses to wear it, patient states \"I need the fitting redone because it's not comfortable when I use it\". Notified LPS, team is aware.         "

## 2017-10-10 PROCEDURE — 97530 THERAPEUTIC ACTIVITIES: CPT

## 2017-10-10 PROCEDURE — A9270 NON-COVERED ITEM OR SERVICE: HCPCS | Performed by: HOSPITALIST

## 2017-10-10 PROCEDURE — 700102 HCHG RX REV CODE 250 W/ 637 OVERRIDE(OP): Performed by: HOSPITALIST

## 2017-10-10 PROCEDURE — 99232 SBSQ HOSP IP/OBS MODERATE 35: CPT | Performed by: INTERNAL MEDICINE

## 2017-10-10 PROCEDURE — 700101 HCHG RX REV CODE 250: Performed by: HOSPITALIST

## 2017-10-10 PROCEDURE — 770021 HCHG ROOM/CARE - ISO PRIVATE

## 2017-10-10 RX ADMIN — ACETAMINOPHEN 650 MG: 325 TABLET, FILM COATED ORAL at 00:06

## 2017-10-10 RX ADMIN — OXYCODONE HYDROCHLORIDE 10 MG: 10 TABLET ORAL at 08:42

## 2017-10-10 RX ADMIN — Medication 325 MG: at 16:40

## 2017-10-10 RX ADMIN — CLONAZEPAM 2 MG: 1 TABLET ORAL at 22:02

## 2017-10-10 RX ADMIN — ACETAMINOPHEN 650 MG: 325 TABLET, FILM COATED ORAL at 05:35

## 2017-10-10 RX ADMIN — PREGABALIN 150 MG: 150 CAPSULE ORAL at 08:42

## 2017-10-10 RX ADMIN — METHADONE HYDROCHLORIDE 20 MG: 10 TABLET ORAL at 16:40

## 2017-10-10 RX ADMIN — ACETAMINOPHEN 650 MG: 325 TABLET, FILM COATED ORAL at 18:25

## 2017-10-10 RX ADMIN — HYDROMORPHONE HYDROCHLORIDE 4 MG: 4 TABLET ORAL at 18:25

## 2017-10-10 RX ADMIN — GABAPENTIN 600 MG: 300 CAPSULE ORAL at 08:41

## 2017-10-10 RX ADMIN — LIDOCAINE 1 PATCH: 50 PATCH CUTANEOUS at 13:02

## 2017-10-10 RX ADMIN — METHADONE HYDROCHLORIDE 20 MG: 10 TABLET ORAL at 22:02

## 2017-10-10 RX ADMIN — ACETAMINOPHEN 650 MG: 325 TABLET, FILM COATED ORAL at 13:02

## 2017-10-10 RX ADMIN — GABAPENTIN 600 MG: 300 CAPSULE ORAL at 16:40

## 2017-10-10 RX ADMIN — METHADONE HYDROCHLORIDE 20 MG: 10 TABLET ORAL at 08:41

## 2017-10-10 RX ADMIN — OXYCODONE HYDROCHLORIDE 10 MG: 10 TABLET ORAL at 20:45

## 2017-10-10 RX ADMIN — CLONAZEPAM 2 MG: 1 TABLET ORAL at 08:42

## 2017-10-10 RX ADMIN — HYDROMORPHONE HYDROCHLORIDE 4 MG: 4 TABLET ORAL at 05:35

## 2017-10-10 RX ADMIN — PREGABALIN 150 MG: 150 CAPSULE ORAL at 16:40

## 2017-10-10 RX ADMIN — Medication 325 MG: at 08:41

## 2017-10-10 RX ADMIN — GABAPENTIN 600 MG: 300 CAPSULE ORAL at 20:45

## 2017-10-10 RX ADMIN — OXYCODONE HYDROCHLORIDE 10 MG: 10 TABLET ORAL at 13:02

## 2017-10-10 RX ADMIN — OXYCODONE HYDROCHLORIDE 10 MG: 10 TABLET ORAL at 00:06

## 2017-10-10 RX ADMIN — PREGABALIN 150 MG: 150 CAPSULE ORAL at 20:45

## 2017-10-10 ASSESSMENT — ENCOUNTER SYMPTOMS
FEVER: 0
DIZZINESS: 0
SPEECH CHANGE: 0
VOMITING: 0
BACK PAIN: 1
SHORTNESS OF BREATH: 0
ABDOMINAL PAIN: 0
MYALGIAS: 1

## 2017-10-10 ASSESSMENT — PAIN SCALES - GENERAL
PAINLEVEL_OUTOF10: ASSUMED PAIN PRESENT
PAINLEVEL_OUTOF10: 7
PAINLEVEL_OUTOF10: 4

## 2017-10-10 ASSESSMENT — LIFESTYLE VARIABLES: DO YOU DRINK ALCOHOL: NO

## 2017-10-10 NOTE — DISCHARGE PLANNING
Called [facility] (Wound Care) [phone] and left a message requesting call back. Pt will need weekly Outpt Wound care. Has been receiving Cytal wound matrix in wound and getting good results. It will be beneficial for pt to be able to continue with this plan of care for wound healing.

## 2017-10-10 NOTE — DISCHARGE PLANNING
Unable to find provider for DME that is contracted with [facility] [place]. Called [facility] and left message requesting call back.

## 2017-10-10 NOTE — CARE PLAN
Problem: Safety  Goal: Will remain free from injury    Intervention: Provide assistance with mobility  Pt transfers and ambulates with wheelchair independently.       Problem: Skin Integrity  Goal: Risk for impaired skin integrity will decrease  Outcome: PROGRESSING AS EXPECTED  Pt has waffle overlay for bed and wheelchair. Pt repositions himself frequently when in bed.

## 2017-10-10 NOTE — CARE PLAN
Problem: Safety  Goal: Will remain free from injury  Outcome: PROGRESSING AS EXPECTED  Treaded socks in place, bed in the lowest position, call light and belongings within reach, pt call for assistance appropriately    Problem: Mobility  Goal: Risk for activity intolerance will decrease  Outcome: PROGRESSING AS EXPECTED

## 2017-10-10 NOTE — THERAPY
Attempted fitting of prosthetic and initiation of ambulation. Pt very tangential, poor ability to problem solve sock fitting. Once prosthetic donned evident valgus with ankle mal-alignment and height discrepancy. Not safe to ambulate until adjusted by prosthetist. Contacted prosthetist's assistant, Flakita, and they will be in tomorrow AM to fit prosthetic with PT present.

## 2017-10-10 NOTE — PROGRESS NOTES
[facility] Hospitalist Progress Note    Date of Service: 10/10/2017    Chief Complaint  52 y.o. male presented 2017 with right BKA stump pain and wound drainage in addition to left TMA stump wound (nonhealing).  Patient has history of PVD s/p L TMA and R BKA more than 5 yrs ago in [place].      Interval Problem Update  No changes today. No acute events noted overnight. Talked with SW this AM, working on dispo plan in [facility].     Consultants/Specialty  Ortho  Wound care    Disposition  Difficult discharge.  Possibly going back to [facility] to stay with his family. Working on new prosthesis.        Review of Systems   Constitutional: Negative for fever.   Respiratory: Negative for shortness of breath.    Cardiovascular: Negative for leg swelling.   Gastrointestinal: Negative for abdominal pain and vomiting.   Musculoskeletal: Positive for back pain and myalgias (bilateral lower legs). Negative for joint pain.        Always in pain   Neurological: Negative for dizziness and speech change.   All other systems reviewed and are negative.     Physical Exam  Laboratory/Imaging   Hemodynamics  Temp (24hrs), Av.8 °C (98.3 °F), Min:36.7 °C (98.1 °F), Max:37.1 °C (98.7 °F)   Temperature: 37.1 °C (98.7 °F)  Pulse  Av.1  Min: 58  Max: 111    Blood Pressure: (!) 99/51      Respiratory      Respiration: 18, Pulse Oximetry: 96 %        RUL Breath Sounds: Clear, RML Breath Sounds: Clear, RLL Breath Sounds: Clear;Diminished, NANCY Breath Sounds: Clear, LLL Breath Sounds: Clear;Diminished    Fluids    Intake/Output Summary (Last 24 hours) at 10/10/17 1359  Last data filed at 10/09/17 1800   Gross per 24 hour   Intake              460 ml   Output              400 ml   Net               60 ml       Nutrition  Orders Placed This Encounter   Procedures   • DIET ORDER     Standing Status:   Standing     Number of Occurrences:   1     Order Specific Question:   Diet:     Answer:   Regular [1]     Comments:   regular trays      Physical Exam   Constitutional: He is oriented to person, place, and time. He appears well-developed. No distress.   Overweight, moving around the hallway in his WC   HENT:   Head: Normocephalic and atraumatic.   Nose: Nose normal.   Eyes: Conjunctivae are normal. No scleral icterus.   Neck: No tracheal deviation present.   Cardiovascular: Normal rate and regular rhythm.    Pulmonary/Chest: Effort normal. He has no wheezes.   Abdominal: There is no tenderness.   Musculoskeletal:   Right BKA with intact skinw/o drainage.  Left foot with dressing over trans metatarsal amputated area.  No changes appreciated today   Neurological: He is alert and oriented to person, place, and time. No cranial nerve deficit.   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 He is not diaphoretic.   Psychiatric: He has a normal mood and affect. His behavior is normal. Thought content normal.   Vitals reviewed.                               Assessment/Plan     * Skin ulcer of left foot with fat layer exposed (CMS-[facility])- (present on admission)   Assessment & Plan    w right BKA stump pain  left TMA stump non healing wound due to PVD- post I and D of left foot with gastroscoleus resection and application of wound vac 7/25. and then 8/15 by Dr. Shirley Revision of Rt BKA with I+D.   Completed antibiotic course , wound vacs removed.   Continue with weekly wound care to left stump  Encourage independent mobility - Await Laminated prosthesis socket ordered as recommended by PT on 9-28-17   Nutrition support  Pain control -- neurontin recently increased-- monitor response.  , Trying to reduce reliance on oral dilaudid, oxycodone , IV narcotics.   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)- (present on admission)   Assessment & Plan    cw lyrica , neurontin, appears to be tolerating well        Anxiety- (present on admission)    Assessment & Plan    Re assurance  cw Klonopin        Opiate dependence (CMS-HCC)- (present on admission)   Assessment & Plan    Methadone w prn oral oxycodone, dilaudid - try to wean  prn  Morphine IM for dressing changes only  -NO INCREASE IN PAIN MEDS  -attempt multi-modal pain therapy          Pseudomonas infection- (present on admission)   Assessment & Plan    finished Cefepime 8/25        MRSA (methicillin resistant staph aureus) culture positive- (present on admission)   Assessment & Plan    Completed Doxycycline 8/25        Noncompliance- (present on admission)   Assessment & Plan    Counseling done on risks and complications        Wound of right leg, at BKA site- (present on admission)   Assessment & Plan    s/p BKA revision w debridement.   Follow clinically   Prosthetic Tech evaluating patient for fit.        Tobacco abuse- (present on admission)   Assessment & Plan    Nicotine replacement   Cessation counseling provided        Peripheral neuropathy (CMS-HCC)- (present on admission)   Assessment & Plan    Lyrica, neurontin.   No narcotics for neuropathy.            Reviewed items::  Labs reviewed and Medications reviewed  Rolon catheter::  No Rolon  DVT prophylaxis pharmacological::  Enoxaparin (Lovenox)

## 2017-10-11 PROCEDURE — 700102 HCHG RX REV CODE 250 W/ 637 OVERRIDE(OP): Performed by: HOSPITALIST

## 2017-10-11 PROCEDURE — G8979 MOBILITY GOAL STATUS: HCPCS | Mod: CI

## 2017-10-11 PROCEDURE — A9270 NON-COVERED ITEM OR SERVICE: HCPCS | Performed by: HOSPITALIST

## 2017-10-11 PROCEDURE — A9270 NON-COVERED ITEM OR SERVICE: HCPCS | Performed by: INTERNAL MEDICINE

## 2017-10-11 PROCEDURE — 99231 SBSQ HOSP IP/OBS SF/LOW 25: CPT | Performed by: INTERNAL MEDICINE

## 2017-10-11 PROCEDURE — 700111 HCHG RX REV CODE 636 W/ 250 OVERRIDE (IP): Performed by: HOSPITALIST

## 2017-10-11 PROCEDURE — 700101 HCHG RX REV CODE 250: Performed by: HOSPITALIST

## 2017-10-11 PROCEDURE — 700102 HCHG RX REV CODE 250 W/ 637 OVERRIDE(OP): Performed by: INTERNAL MEDICINE

## 2017-10-11 PROCEDURE — 97116 GAIT TRAINING THERAPY: CPT

## 2017-10-11 PROCEDURE — 97530 THERAPEUTIC ACTIVITIES: CPT

## 2017-10-11 PROCEDURE — G8978 MOBILITY CURRENT STATUS: HCPCS | Mod: CJ

## 2017-10-11 PROCEDURE — 770021 HCHG ROOM/CARE - ISO PRIVATE

## 2017-10-11 RX ORDER — HYDROMORPHONE HYDROCHLORIDE 2 MG/1
3 TABLET ORAL EVERY 8 HOURS PRN
Status: DISCONTINUED | OUTPATIENT
Start: 2017-10-11 | End: 2017-10-16

## 2017-10-11 RX ADMIN — PREGABALIN 150 MG: 150 CAPSULE ORAL at 14:56

## 2017-10-11 RX ADMIN — Medication 325 MG: at 14:56

## 2017-10-11 RX ADMIN — ACETAMINOPHEN 650 MG: 325 TABLET, FILM COATED ORAL at 23:57

## 2017-10-11 RX ADMIN — HYDROMORPHONE HYDROCHLORIDE 3 MG: 2 TABLET ORAL at 18:21

## 2017-10-11 RX ADMIN — LIDOCAINE 1 PATCH: 50 PATCH CUTANEOUS at 11:12

## 2017-10-11 RX ADMIN — METHADONE HYDROCHLORIDE 20 MG: 10 TABLET ORAL at 21:31

## 2017-10-11 RX ADMIN — ENOXAPARIN SODIUM 40 MG: 100 INJECTION SUBCUTANEOUS at 07:57

## 2017-10-11 RX ADMIN — CLONAZEPAM 2 MG: 1 TABLET ORAL at 07:58

## 2017-10-11 RX ADMIN — OXYCODONE HYDROCHLORIDE 10 MG: 10 TABLET ORAL at 06:19

## 2017-10-11 RX ADMIN — Medication 325 MG: at 07:58

## 2017-10-11 RX ADMIN — METHADONE HYDROCHLORIDE 20 MG: 10 TABLET ORAL at 07:58

## 2017-10-11 RX ADMIN — GABAPENTIN 600 MG: 300 CAPSULE ORAL at 21:31

## 2017-10-11 RX ADMIN — GABAPENTIN 600 MG: 300 CAPSULE ORAL at 14:56

## 2017-10-11 RX ADMIN — ACETAMINOPHEN 650 MG: 325 TABLET, FILM COATED ORAL at 01:25

## 2017-10-11 RX ADMIN — OXYCODONE HYDROCHLORIDE 10 MG: 10 TABLET ORAL at 09:50

## 2017-10-11 RX ADMIN — OXYCODONE HYDROCHLORIDE 10 MG: 10 TABLET ORAL at 14:56

## 2017-10-11 RX ADMIN — GABAPENTIN 600 MG: 300 CAPSULE ORAL at 07:58

## 2017-10-11 RX ADMIN — CLONAZEPAM 2 MG: 1 TABLET ORAL at 21:31

## 2017-10-11 RX ADMIN — PREGABALIN 150 MG: 150 CAPSULE ORAL at 21:30

## 2017-10-11 RX ADMIN — METHADONE HYDROCHLORIDE 20 MG: 10 TABLET ORAL at 14:57

## 2017-10-11 RX ADMIN — CAPSAICIN: 0.25 CREAM TOPICAL at 08:03

## 2017-10-11 RX ADMIN — CAPSAICIN: 0.25 CREAM TOPICAL at 15:00

## 2017-10-11 RX ADMIN — OXYCODONE HYDROCHLORIDE 10 MG: 10 TABLET ORAL at 19:44

## 2017-10-11 RX ADMIN — ACETAMINOPHEN 650 MG: 325 TABLET, FILM COATED ORAL at 06:18

## 2017-10-11 RX ADMIN — OXYCODONE HYDROCHLORIDE 10 MG: 10 TABLET ORAL at 01:25

## 2017-10-11 RX ADMIN — HYDROMORPHONE HYDROCHLORIDE 4 MG: 4 TABLET ORAL at 11:12

## 2017-10-11 RX ADMIN — OXYCODONE HYDROCHLORIDE 10 MG: 10 TABLET ORAL at 23:57

## 2017-10-11 RX ADMIN — ACETAMINOPHEN 650 MG: 325 TABLET, FILM COATED ORAL at 18:21

## 2017-10-11 RX ADMIN — ACETAMINOPHEN 650 MG: 325 TABLET, FILM COATED ORAL at 11:12

## 2017-10-11 RX ADMIN — PREGABALIN 150 MG: 150 CAPSULE ORAL at 07:58

## 2017-10-11 RX ADMIN — HYDROMORPHONE HYDROCHLORIDE 4 MG: 4 TABLET ORAL at 02:44

## 2017-10-11 ASSESSMENT — PAIN SCALES - GENERAL
PAINLEVEL_OUTOF10: 5
PAINLEVEL_OUTOF10: 4
PAINLEVEL_OUTOF10: 3
PAINLEVEL_OUTOF10: 3
PAINLEVEL_OUTOF10: ASSUMED PAIN PRESENT
PAINLEVEL_OUTOF10: 4
PAINLEVEL_OUTOF10: 6
PAINLEVEL_OUTOF10: 6
PAINLEVEL_OUTOF10: 2
PAINLEVEL_OUTOF10: 3
PAINLEVEL_OUTOF10: 5
PAINLEVEL_OUTOF10: 5
PAINLEVEL_OUTOF10: 4
PAINLEVEL_OUTOF10: 5
PAINLEVEL_OUTOF10: 6
PAINLEVEL_OUTOF10: ASSUMED PAIN PRESENT

## 2017-10-11 ASSESSMENT — ENCOUNTER SYMPTOMS
CHILLS: 0
DEPRESSION: 0
NAUSEA: 0
TINGLING: 0
BACK PAIN: 1
MYALGIAS: 1
SHORTNESS OF BREATH: 0
HEADACHES: 0

## 2017-10-11 ASSESSMENT — GAIT ASSESSMENTS
GAIT LEVEL OF ASSIST: CONTACT GUARD ASSIST
ASSISTIVE DEVICE: PARALLEL BARS
DISTANCE (FEET): 3
DEVIATION: ANTALGIC

## 2017-10-11 NOTE — PROGRESS NOTES
Received shift report from yousif RN and assumed care of this pt at 0715. Pt AOx4, calm, sitting in WC. Pt reports pain is 4/10 - BLE, reports well contolled w current prn routine. Pt is up self transfer to WC. Does call appropriately. Bed alarm is off per pt refusal. No PIV, OK per MD. Pt is on RA with SaO2 >90%. Pt states priority this shift is pain management. Discussed POC for medications, day time routine, comfort, and safety. Patient has call light and personal belongings within reach. Safety and fall precautions in place. Reviewed orders, notes, labs, and test results. Hourly rounding in place with RN rounding on odd hours and CNA on even hours.

## 2017-10-11 NOTE — DISCHARGE PLANNING
Spoke to Hugo at Dr. Jensen's office. She has no appointments in the immediate future. Pt has appt with Dr. Mendes on Tueasday 10/24 at [phone] [facility] . Wound Care can't be scheduled in advance, has to be seen by Dr. Mendes and then referral placed to wound care. Pt will need to bring discharge papers, meds, and wound care notes to appointment.

## 2017-10-11 NOTE — CARE PLAN
Problem: Pain Management  Goal: Pain level will decrease to patient's comfort goal  Outcome: PROGRESSING AS EXPECTED  Pt reports pain manageable on current regiment. Wants to be woken for PRN pain maedication     Problem: Respiratory:  Goal: Respiratory status will improve  Outcome: PROGRESSING AS EXPECTED  IS/deep breathing encouraged

## 2017-10-11 NOTE — THERAPY
"Physical Therapy Treatment completed.   Bed Mobility:  Supine to Sit: Modified Independent  Transfers: Sit to Stand: Contact Guard Assist  Gait: Level Of Assist: Contact Guard Assist with parallel bars       Plan of Care: Will benefit from Physical Therapy 3 times per week  Discharge Recommendations: Equipment: Will Continue to Assess for Equipment Needs. Post-acute therapy Discharge to home with outpatient or home health for additional skilled therapy services.    Attempted gait training with prosthetist present as upon initial fitting yesterday not safe to ambulate until adjusted. Pt donned prosthetic and stood. Prosthetist adjusted. Pt then began to ambulate in parallel bars, after 2nd step screws came undone and bar disassociated from socket. With CGA from therapist pt maintained upright position and was safely returned to his chair. Prosthetist took leg and will attempt to fix. Once he has returned the limb continue with gait training for DC home.      See \"Rehab Therapy-Acute\" Patient Summary Report for complete documentation.       "

## 2017-10-11 NOTE — PROGRESS NOTES
[facility] Hospitalist Progress Note    Date of Service: 10/11/2017    Chief Complaint  52 y.o. male presented 2017 with right BKA stump pain and wound drainage in addition to left TMA stump wound (nonhealing).  Patient has history of PVD s/p L TMA and R BKA more than 5 yrs ago in [place].      Interval Problem Update  Rolling around on his WC without issue. Apparently he tried to walk on his prosthesis today but it fell apart. He remains quite concerned about going to [facility] for further medical care and wants the wound pads he is getting here with alginate.    Consultants/Specialty  Ortho  Wound care    Disposition  Difficult discharge.  Possibly going back to [facility] to stay with his family. Working on new prosthesis.        Review of Systems   Constitutional: Negative for chills.   Respiratory: Negative for shortness of breath.    Cardiovascular: Negative for chest pain.   Gastrointestinal: Negative for nausea.   Musculoskeletal: Positive for back pain and myalgias (bilateral lower legs). Negative for joint pain.        Little less pain appreciated today   Skin: Negative for itching.   Neurological: Negative for tingling and headaches.   Psychiatric/Behavioral: Negative for depression.   All other systems reviewed and are negative.     Physical Exam  Laboratory/Imaging   Hemodynamics  Temp (24hrs), Av.5 °C (97.7 °F), Min:36.2 °C (97.1 °F), Max:36.9 °C (98.4 °F)   Temperature: 36.9 °C (98.4 °F)  Pulse  Av.1  Min: 58  Max: 111    Blood Pressure: 101/73      Respiratory      Respiration: 16, Pulse Oximetry: 93 %        RLL Breath Sounds: Diminished, LLL Breath Sounds: Diminished    Fluids    Intake/Output Summary (Last 24 hours) at 10/11/17 1508  Last data filed at 10/11/17 0800   Gross per 24 hour   Intake              480 ml   Output              700 ml   Net             -220 ml       Nutrition  Orders Placed This Encounter   Procedures   • DIET ORDER     Standing Status:   Standing      Number of Occurrences:   1     Order Specific Question:   Diet:     Answer:   Regular [1]     Comments:   regular trays     Physical Exam   Constitutional: He is oriented to person, place, and time. He appears well-developed. No distress.   Overweight, moving around the hallway in his WC   HENT:   Head: Normocephalic and atraumatic.   Nose: Nose normal.   Eyes: Conjunctivae are normal. Right eye exhibits no discharge. Left eye exhibits no discharge.   Neck: Normal range of motion. Neck supple.   Cardiovascular: Normal rate and regular rhythm.    Pulmonary/Chest: Effort normal. No stridor. No respiratory distress.   Abdominal: He exhibits no distension.   Musculoskeletal:   Right BKA with intact skinw/o drainage.  Left foot with dressing over trans metatarsal amputated area.  Unchanged again today.   Neurological: He is alert and oriented to person, place, and time. Coordination normal.   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 No rash noted. He is not diaphoretic.   Psychiatric: He has a normal mood and affect. His behavior is normal. Thought content normal.   Vitals reviewed.                               Assessment/Plan     * Skin ulcer of left foot with fat layer exposed (CMS-HCC)- (present on admission)   Assessment & Plan    w right BKA stump pain  left TMA stump non healing wound due to PVD- post I and D of left foot with gastroscoleus resection and application of wound vac 7/25. and then 8/15 by Dr. Shirley Revision of Rt BKA with I+D.   Completed antibiotic course , wound vacs removed.   Continue with weekly wound care to left stump  Encourage independent mobility - Await Laminated prosthesis socket ordered as recommended by PT on 9-28-17   Nutrition support  Pain control -- neurontin recently increased-- monitor response.  , Trying to reduce reliance on oral dilaudid, oxycodone , IV narcotics.   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)- (present on admission)   Assessment & Plan    cw lyrica , neurontin, appears to be tolerating well        Anxiety- (present on admission)   Assessment & Plan    Re assurance  cw Klonopin        Opiate dependence (CMS-HCC)- (present on admission)   Assessment & Plan    Methadone w prn oral oxycodone, dilaudid - continue to try to wean  prn  Morphine IM for dressing changes only  -NO INCREASE IN PAIN MEDS  -attempt multi-modal pain therapy          Pseudomonas infection- (present on admission)   Assessment & Plan    finished Cefepime 8/25        MRSA (methicillin resistant staph aureus) culture positive- (present on admission)   Assessment & Plan    Completed Doxycycline 8/25        Noncompliance- (present on admission)   Assessment & Plan    Counseling done on risks and complications        Wound of right leg, at BKA site- (present on admission)   Assessment & Plan    s/p BKA revision w debridement.   Follow clinically   Prosthetic Tech evaluating patient for fit, apparently having issues        Tobacco abuse- (present on admission)   Assessment & Plan    Nicotine replacement   Cessation counseling provided        Peripheral neuropathy (CMS-HCC)- (present on admission)   Assessment & Plan    Lyrica, neurontin.   No narcotics for neuropathy.            Reviewed items::  Labs reviewed and Medications reviewed  Rolon catheter::  No Rolon  DVT prophylaxis pharmacological::  Enoxaparin (Lovenox)

## 2017-10-11 NOTE — DISCHARGE PLANNING
Received call back from [facility]. [facility] is contact for  [phone]. Spoke to [facility], [facility] is a provider, they can contact [facility] for auth. Have asked [facility] to send referral to local [facility].  Pt's PCP is Roxana Jensen . Left message with Dr. Jensen's assistant, Hugo requesting call back. Will need to discuss follow up care including wound care.

## 2017-10-11 NOTE — CARE PLAN
Problem: Skin Integrity  Goal: Risk for impaired skin integrity will decrease  Outcome: PROGRESSING AS EXPECTED  Pt repositions self; Skin assessed, no new s/s breakdown noted    Problem: Mobility  Goal: Risk for activity intolerance will decrease  Outcome: PROGRESSING AS EXPECTED  Awaiting delivery of prosthetic limb today at which time PT/OT will work with pt

## 2017-10-12 PROCEDURE — A9270 NON-COVERED ITEM OR SERVICE: HCPCS | Performed by: HOSPITALIST

## 2017-10-12 PROCEDURE — 99232 SBSQ HOSP IP/OBS MODERATE 35: CPT | Performed by: INTERNAL MEDICINE

## 2017-10-12 PROCEDURE — 700102 HCHG RX REV CODE 250 W/ 637 OVERRIDE(OP): Performed by: HOSPITALIST

## 2017-10-12 PROCEDURE — 97116 GAIT TRAINING THERAPY: CPT

## 2017-10-12 PROCEDURE — 700101 HCHG RX REV CODE 250: Performed by: HOSPITALIST

## 2017-10-12 PROCEDURE — 700102 HCHG RX REV CODE 250 W/ 637 OVERRIDE(OP): Performed by: INTERNAL MEDICINE

## 2017-10-12 PROCEDURE — 700111 HCHG RX REV CODE 636 W/ 250 OVERRIDE (IP): Performed by: HOSPITALIST

## 2017-10-12 PROCEDURE — 770021 HCHG ROOM/CARE - ISO PRIVATE

## 2017-10-12 PROCEDURE — A9270 NON-COVERED ITEM OR SERVICE: HCPCS | Performed by: INTERNAL MEDICINE

## 2017-10-12 RX ORDER — GABAPENTIN 400 MG/1
800 CAPSULE ORAL 3 TIMES DAILY
Status: DISCONTINUED | OUTPATIENT
Start: 2017-10-12 | End: 2017-10-16

## 2017-10-12 RX ADMIN — PREGABALIN 150 MG: 150 CAPSULE ORAL at 07:26

## 2017-10-12 RX ADMIN — Medication 325 MG: at 18:40

## 2017-10-12 RX ADMIN — HYDROMORPHONE HYDROCHLORIDE 3 MG: 2 TABLET ORAL at 22:44

## 2017-10-12 RX ADMIN — GABAPENTIN 600 MG: 300 CAPSULE ORAL at 07:27

## 2017-10-12 RX ADMIN — PREGABALIN 150 MG: 150 CAPSULE ORAL at 20:17

## 2017-10-12 RX ADMIN — CLONAZEPAM 2 MG: 1 TABLET ORAL at 20:17

## 2017-10-12 RX ADMIN — OXYCODONE HYDROCHLORIDE 10 MG: 10 TABLET ORAL at 10:27

## 2017-10-12 RX ADMIN — CAPSAICIN: 0.25 CREAM TOPICAL at 15:00

## 2017-10-12 RX ADMIN — ACETAMINOPHEN 650 MG: 325 TABLET, FILM COATED ORAL at 18:40

## 2017-10-12 RX ADMIN — GABAPENTIN 800 MG: 400 CAPSULE ORAL at 14:25

## 2017-10-12 RX ADMIN — MORPHINE SULFATE 4 MG: 4 INJECTION INTRAVENOUS at 07:26

## 2017-10-12 RX ADMIN — OXYCODONE HYDROCHLORIDE 10 MG: 10 TABLET ORAL at 05:12

## 2017-10-12 RX ADMIN — METHADONE HYDROCHLORIDE 20 MG: 10 TABLET ORAL at 20:16

## 2017-10-12 RX ADMIN — LIDOCAINE 1 PATCH: 50 PATCH CUTANEOUS at 14:24

## 2017-10-12 RX ADMIN — OXYCODONE HYDROCHLORIDE 10 MG: 10 TABLET ORAL at 18:40

## 2017-10-12 RX ADMIN — PREGABALIN 150 MG: 150 CAPSULE ORAL at 14:25

## 2017-10-12 RX ADMIN — GABAPENTIN 800 MG: 400 CAPSULE ORAL at 20:16

## 2017-10-12 RX ADMIN — HYDROMORPHONE HYDROCHLORIDE 3 MG: 2 TABLET ORAL at 14:25

## 2017-10-12 RX ADMIN — ACETAMINOPHEN 650 MG: 325 TABLET, FILM COATED ORAL at 10:27

## 2017-10-12 RX ADMIN — METHADONE HYDROCHLORIDE 20 MG: 10 TABLET ORAL at 07:27

## 2017-10-12 RX ADMIN — Medication 325 MG: at 07:27

## 2017-10-12 RX ADMIN — CLONAZEPAM 2 MG: 1 TABLET ORAL at 07:26

## 2017-10-12 RX ADMIN — CAPSAICIN: 0.25 CREAM TOPICAL at 09:00

## 2017-10-12 RX ADMIN — HYDROMORPHONE HYDROCHLORIDE 3 MG: 2 TABLET ORAL at 02:55

## 2017-10-12 RX ADMIN — ACETAMINOPHEN 650 MG: 325 TABLET, FILM COATED ORAL at 05:12

## 2017-10-12 RX ADMIN — METHADONE HYDROCHLORIDE 20 MG: 10 TABLET ORAL at 14:25

## 2017-10-12 ASSESSMENT — ENCOUNTER SYMPTOMS
BLURRED VISION: 0
DEPRESSION: 0
FEVER: 0
MYALGIAS: 1
SHORTNESS OF BREATH: 0
HEADACHES: 0
BACK PAIN: 1
ABDOMINAL PAIN: 0
TINGLING: 0
VOMITING: 0

## 2017-10-12 ASSESSMENT — PAIN SCALES - GENERAL
PAINLEVEL_OUTOF10: 8
PAINLEVEL_OUTOF10: 6
PAINLEVEL_OUTOF10: 8
PAINLEVEL_OUTOF10: 4
PAINLEVEL_OUTOF10: 8
PAINLEVEL_OUTOF10: ASSUMED PAIN PRESENT
PAINLEVEL_OUTOF10: 4
PAINLEVEL_OUTOF10: 8
PAINLEVEL_OUTOF10: 8
PAINLEVEL_OUTOF10: 3
PAINLEVEL_OUTOF10: ASSUMED PAIN PRESENT
PAINLEVEL_OUTOF10: 8

## 2017-10-12 ASSESSMENT — GAIT ASSESSMENTS
ASSISTIVE DEVICE: FRONT WHEEL WALKER
DEVIATION: BRADYKINETIC
GAIT LEVEL OF ASSIST: CONTACT GUARD ASSIST
DISTANCE (FEET): 500

## 2017-10-12 NOTE — PROGRESS NOTES
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 Takes methadone he states for back pain.  Not diabetic.  Presented to ED on 7/22/17 with increased pain to L TMA with infected neuropathic ulcer and pain to R BKA.        s/p L foot I & D with VAC placement, GSR by Dr. Shirley on 7/25/17  S/p Right revision below-knee amputation,  Right lower extremity irrigation and debridement, skin, subcutaneous  tissue to bone on 8/15/17      Acell Cytal biologic initiated on 9/13, NPWT discontinued  Weekly applications of Cytal since then with outer dressing changes every 2-3 days PRN     5th application of cytal applied today by Dr. Fern GONZALEZ foot drsg intact    PROCEDURE:  Old dressing removed. primary dressing had slipped. Callus to periwound. Hypertrophic tissue noted with residual cytal product. Using curette, forceps, and scalpel, debrided non viable tissue and callus.  Bleeding controlled with manual pressure.  Wound cleansed with NS. Skin prep applied to periwound skin.   Following MD procedure of applying a 3.0 x 3.5 cm sheet of Acell Cytal wound matrix, 1-layer, hydrated with NS, adaptic placed over product, secured with steristrips, covered with alginate and then foam. alginate also placed to 4th webspace. Foot and ankle wrapped with kerlix and then coban. Blue bootie placed to protect dressing.      PRODUCT: Cytal wound matrix 1-layer 3 x 3.5 cm sheet.  REF [id number]  [id number]; LOT [id number]     WOUND DIMENSIONS: 3.7 x 2.8 x 0.1 cm  (photo in EPIC)     prosthetist saw pt yesterday with PT and prosthesis fell apart while ambulating. Prosthesis to be revised.      PLAN  -LPS to assess wound every 3- 4 days, change cover dressing as indicated for saturation or displacement  -Nsg to contact LPS if dressing becomes soiled or  dislodged.  -contact prosthetist

## 2017-10-12 NOTE — DISCHARGE PLANNING
"Prosthesis has been repaired and delivered. Pt has been up with PT with prosthesis and doing well. Can proceed with plans to discharge to home as soon as W/C delivered. Will need several more sessions with PT for amb with prosthesis.  Mode of transport will be bus vs train. Pt states he may have \"points\" to use for train. Unable to plan transportation until W/C arrives.  "

## 2017-10-12 NOTE — THERAPY
"Physical Therapy Treatment completed.   Bed Mobility:  Supine to Sit: Modified Independent  Transfers: Sit to Stand: Stand by Assist  Gait: Level Of Assist: Contact Guard Assist with Front-Wheel Walker       Plan of Care: Will benefit from Physical Therapy 4 times per week  Discharge Recommendations: Equipment: Front-Wheel Walker. Post-acute therapy Discharge to home with outpatient or home health for additional skilled therapy services.    Prosthetist present with corrected limb. Initial gait training in parallel bars as prosthetist made needed adjustments.  Progressed to gait training with FWW. Pt \"faked\" falling when he saw his . CGA to maintain upright balance. Pt then laughed and stated \"I'm just teasing I want to leave tonight!\" Pt then requesting to therapist more days to gait train. Extensively educated pt that he must slowly progress donning of his prosthetic as he must re-build calluses and wearing tolerance. Educated him to wear  as it may swell after todays ambulation. Recommend x1 more gait training session to monitor prosthetic fitting and patient's stability with gait. He is modified independent from a wheelchair level.      See \"Rehab Therapy-Acute\" Patient Summary Report for complete documentation.       "

## 2017-10-12 NOTE — CARE PLAN
Problem: Safety  Goal: Will remain free from injury  Outcome: PROGRESSING AS EXPECTED  Proper signs communicated in pts doorway; floor clear of clutter, debris, or cords; pts personal items and call light within reach; pt educated to use call light for assistance and states understanding    Problem: Discharge Barriers/Planning  Goal: Patient's continuum of care needs will be met  Outcome: PROGRESSING SLOWER THAN EXPECTED  PT and prosthetics rep to work with pt today; DC planned for home ([place], [place]) via bus; SW following

## 2017-10-12 NOTE — CARE PLAN
Problem: Skin Integrity  Goal: Risk for impaired skin integrity will decrease  Outcome: PROGRESSING AS EXPECTED  Pt makes major changes in position by self. Waffle cushion in place.       Problem: Pain Management  Goal: Pain level will decrease to patient's comfort goal  Outcome: PROGRESSING AS EXPECTED  Pt reports pain manageable with current regiment

## 2017-10-12 NOTE — PROGRESS NOTES
[facility] Hospitalist Progress Note    Date of Service: 10/12/2017    Chief Complaint  52 y.o. male presented 2017 with right BKA stump pain and wound drainage in addition to left TMA stump wound (nonhealing).  Patient has history of PVD s/p L TMA and R BKA more than 5 yrs ago in [place].      Interval Problem Update  Getting dressing changed again this AM. Wondering why his dilaudid is being tapered down slowly again and when I explained the rationale behind he got angry.     Consultants/Specialty  Ortho  Wound care    Disposition  Difficult discharge.  Possibly going back to [facility] to stay with his family. Working on new prosthesis.        Review of Systems   Constitutional: Negative for fever.   Eyes: Negative for blurred vision.   Respiratory: Negative for shortness of breath.    Cardiovascular: Negative for chest pain.   Gastrointestinal: Negative for abdominal pain and vomiting.   Musculoskeletal: Positive for back pain and myalgias (bilateral lower legs). Negative for joint pain.        Claims the pain is worse   Skin: Negative for itching.   Neurological: Negative for tingling and headaches.   Psychiatric/Behavioral: Negative for depression.   All other systems reviewed and are negative.     Physical Exam  Laboratory/Imaging   Hemodynamics  Temp (24hrs), Av.8 °C (98.2 °F), Min:36.5 °C (97.7 °F), Max:37.2 °C (98.9 °F)   Temperature: 36.5 °C (97.7 °F)  Pulse  Av.1  Min: 58  Max: 111    Blood Pressure: 122/64      Respiratory      Respiration: 20, Pulse Oximetry: 94 %        RLL Breath Sounds: Diminished, LLL Breath Sounds: Diminished    Fluids    Intake/Output Summary (Last 24 hours) at 10/12/17 1207  Last data filed at 10/12/17 0730   Gross per 24 hour   Intake              480 ml   Output              700 ml   Net             -220 ml       Nutrition  Orders Placed This Encounter   Procedures   • DIET ORDER     Standing Status:   Standing     Number of Occurrences:   1     Order Specific  Question:   Diet:     Answer:   Regular [1]     Comments:   regular trays     Physical Exam   Constitutional: He is oriented to person, place, and time. He appears well-developed. No distress.   Overweight, moving around the hallway in his WC   HENT:   Head: Normocephalic and atraumatic.   Nose: Nose normal.   Eyes: Conjunctivae are normal. No scleral icterus.   Neck: Normal range of motion. Neck supple. No JVD present.   Cardiovascular: Normal rate and regular rhythm.    Pulmonary/Chest: Effort normal. No respiratory distress.   Abdominal: He exhibits no distension.   Musculoskeletal:   Right BKA with intact skinw/o drainage.  Left foot with dressing over trans metatarsal amputated area.  Unchanged again today.   Neurological: He is alert and oriented to person, place, and time. Coordination normal.   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 No rash noted. He is not diaphoretic.   No change in wounds, RBKA stump appears well healed   Psychiatric: He has a normal mood and affect. His behavior is normal. Thought content normal.   Intermittently irritable   Vitals reviewed.                               Assessment/Plan     * Skin ulcer of left foot with fat layer exposed (CMS-[facility])- (present on admission)   Assessment & Plan    Stumps are healed well on the R BKA and L BKA stump is progressing nicely, wound care saw patient today  Completed antibiotic course , wound vacs removed.   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 (CMS-[facility])- (present on admission)   Assessment & Plan    cw lyrica , neurontin, appears to be tolerating well        Anxiety- (present on admission)   Assessment & Plan    Re assurance  cw Klonopin        Opiate  dependence (CMS-HCC)- (present on admission)   Assessment & Plan    Methadone w prn oral oxycodone, dilaudid - will wean further again tomorrow  prn  Morphine IM for dressing changes only  -NO INCREASE IN PAIN MEDS with IV   increased the gabapentin to 800 mg TID  -highly manipulative behavior  -attempt multi-modal pain therapy          Pseudomonas infection- (present on admission)   Assessment & Plan    finished Cefepime 8/25        MRSA (methicillin resistant staph aureus) culture positive- (present on admission)   Assessment & Plan    Completed Doxycycline 8/25        Noncompliance- (present on admission)   Assessment & Plan    Counseling done on risks and complications        Wound of right leg, at BKA site- (present on admission)   Assessment & Plan    s/p BKA revision w debridement.   Follow clinically   Prosthetic Tech evaluating patient for fit, apparently having issues        Tobacco abuse- (present on admission)   Assessment & Plan    Nicotine replacement   Cessation counseling provided        Peripheral neuropathy (CMS-HCC)- (present on admission)   Assessment & Plan    Lyrica, neurontin.   No narcotics for neuropathy.            Reviewed items::  Labs reviewed and Medications reviewed  Rolon catheter::  No Rolon  DVT prophylaxis pharmacological::  Enoxaparin (Lovenox)

## 2017-10-12 NOTE — DISCHARGE PLANNING
[facility] called [employer] for the second time. [facility]CS spoke to Magali in the [employer] customer service. Magali escalated the referral. CCS did explain that we are waiting for the equipment to discharge the patient.

## 2017-10-13 PROCEDURE — 700102 HCHG RX REV CODE 250 W/ 637 OVERRIDE(OP): Performed by: HOSPITALIST

## 2017-10-13 PROCEDURE — A9270 NON-COVERED ITEM OR SERVICE: HCPCS | Performed by: HOSPITALIST

## 2017-10-13 PROCEDURE — 770021 HCHG ROOM/CARE - ISO PRIVATE

## 2017-10-13 PROCEDURE — 700101 HCHG RX REV CODE 250: Performed by: HOSPITALIST

## 2017-10-13 PROCEDURE — 700102 HCHG RX REV CODE 250 W/ 637 OVERRIDE(OP): Performed by: INTERNAL MEDICINE

## 2017-10-13 PROCEDURE — 99232 SBSQ HOSP IP/OBS MODERATE 35: CPT | Performed by: INTERNAL MEDICINE

## 2017-10-13 PROCEDURE — A9270 NON-COVERED ITEM OR SERVICE: HCPCS | Performed by: INTERNAL MEDICINE

## 2017-10-13 RX ADMIN — METHADONE HYDROCHLORIDE 20 MG: 10 TABLET ORAL at 22:52

## 2017-10-13 RX ADMIN — OXYCODONE HYDROCHLORIDE 10 MG: 10 TABLET ORAL at 18:11

## 2017-10-13 RX ADMIN — ACETAMINOPHEN 650 MG: 325 TABLET, FILM COATED ORAL at 00:07

## 2017-10-13 RX ADMIN — Medication 325 MG: at 17:56

## 2017-10-13 RX ADMIN — ACETAMINOPHEN 325 MG: 325 TABLET, FILM COATED ORAL at 17:56

## 2017-10-13 RX ADMIN — PREGABALIN 150 MG: 150 CAPSULE ORAL at 08:40

## 2017-10-13 RX ADMIN — OXYCODONE HYDROCHLORIDE 10 MG: 10 TABLET ORAL at 10:16

## 2017-10-13 RX ADMIN — ACETAMINOPHEN 650 MG: 325 TABLET, FILM COATED ORAL at 06:54

## 2017-10-13 RX ADMIN — METHADONE HYDROCHLORIDE 20 MG: 10 TABLET ORAL at 14:40

## 2017-10-13 RX ADMIN — PREGABALIN 150 MG: 150 CAPSULE ORAL at 22:52

## 2017-10-13 RX ADMIN — PREGABALIN 150 MG: 150 CAPSULE ORAL at 14:40

## 2017-10-13 RX ADMIN — CLONAZEPAM 2 MG: 1 TABLET ORAL at 08:40

## 2017-10-13 RX ADMIN — METHADONE HYDROCHLORIDE 20 MG: 10 TABLET ORAL at 08:40

## 2017-10-13 RX ADMIN — GABAPENTIN 800 MG: 400 CAPSULE ORAL at 08:40

## 2017-10-13 RX ADMIN — HYDROMORPHONE HYDROCHLORIDE 3 MG: 2 TABLET ORAL at 06:54

## 2017-10-13 RX ADMIN — OXYCODONE HYDROCHLORIDE 10 MG: 10 TABLET ORAL at 04:05

## 2017-10-13 RX ADMIN — OXYCODONE HYDROCHLORIDE 10 MG: 10 TABLET ORAL at 00:07

## 2017-10-13 RX ADMIN — ACETAMINOPHEN 325 MG: 325 TABLET, FILM COATED ORAL at 11:37

## 2017-10-13 RX ADMIN — CLONAZEPAM 2 MG: 1 TABLET ORAL at 22:52

## 2017-10-13 RX ADMIN — HYDROMORPHONE HYDROCHLORIDE 3 MG: 2 TABLET ORAL at 15:15

## 2017-10-13 RX ADMIN — LIDOCAINE 1 PATCH: 50 PATCH CUTANEOUS at 11:35

## 2017-10-13 RX ADMIN — GABAPENTIN 800 MG: 400 CAPSULE ORAL at 14:40

## 2017-10-13 RX ADMIN — GABAPENTIN 800 MG: 400 CAPSULE ORAL at 22:52

## 2017-10-13 RX ADMIN — Medication 325 MG: at 08:39

## 2017-10-13 ASSESSMENT — PAIN SCALES - GENERAL
PAINLEVEL_OUTOF10: 4
PAINLEVEL_OUTOF10: 6
PAINLEVEL_OUTOF10: 5
PAINLEVEL_OUTOF10: 5
PAINLEVEL_OUTOF10: ASSUMED PAIN PRESENT
PAINLEVEL_OUTOF10: 3
PAINLEVEL_OUTOF10: 5
PAINLEVEL_OUTOF10: ASSUMED PAIN PRESENT
PAINLEVEL_OUTOF10: 5

## 2017-10-13 ASSESSMENT — ENCOUNTER SYMPTOMS
NAUSEA: 0
BACK PAIN: 1
DIZZINESS: 0
DOUBLE VISION: 0
MYALGIAS: 1
DEPRESSION: 0
HEADACHES: 0
SHORTNESS OF BREATH: 0
CHILLS: 0
PALPITATIONS: 0

## 2017-10-13 NOTE — PROGRESS NOTES
[facility] Hospitalist Progress Note    Date of Service: 10/13/2017    Chief Complaint  52 y.o. male presented 2017 with right BKA stump pain and wound drainage in addition to left TMA stump wound (nonhealing).  Patient has history of PVD s/p L TMA and R BKA more than 5 yrs ago in [place].      Interval Problem Update  Sitting in his room on the telephone. Wants PT to come in and figure out his prosthesis. Denies any new secretions from his wound.     Consultants/Specialty  Ortho  Wound care    Disposition  Difficult discharge.  Possibly going back to [facility] to stay with his family. Working on new prosthesis.        Review of Systems   Constitutional: Negative for chills.   Eyes: Negative for double vision.   Respiratory: Negative for shortness of breath.    Cardiovascular: Negative for palpitations.   Gastrointestinal: Negative for nausea.   Musculoskeletal: Positive for back pain and myalgias (bilateral lower legs). Negative for joint pain.        Pain is about the same today, tolerable   Skin: Negative for itching.   Neurological: Negative for dizziness and headaches.   Psychiatric/Behavioral: Negative for depression.   All other systems reviewed and are negative.     Physical Exam  Laboratory/Imaging   Hemodynamics  Temp (24hrs), Av.5 °C (97.7 °F), Min:36.3 °C (97.3 °F), Max:36.7 °C (98.1 °F)   Temperature: 36.5 °C (97.7 °F)  Pulse  Av.1  Min: 58  Max: 111    Blood Pressure: 123/75      Respiratory      Respiration: 16, Pulse Oximetry: 96 %        RUL Breath Sounds: Diminished, RML Breath Sounds: Diminished, RLL Breath Sounds: Diminished, NANCY Breath Sounds: Diminished, LLL Breath Sounds: Diminished    Fluids  No intake or output data in the 24 hours ending 10/13/17 1230    Nutrition  Orders Placed This Encounter   Procedures   • DIET ORDER     Standing Status:   Standing     Number of Occurrences:   1     Order Specific Question:   Diet:     Answer:   Regular [1]     Comments:   regular trays      Physical Exam   Constitutional: He is oriented to person, place, and time. He appears well-developed. No distress.   Overweight, in his wheelchair again today   HENT:   Head: Normocephalic and atraumatic.   Nose: Nose normal.   Eyes: Conjunctivae are normal. Right eye exhibits no discharge. Left eye exhibits no discharge.   Neck: Normal range of motion. Neck supple.   Cardiovascular: Normal rate and regular rhythm.    Pulmonary/Chest: Effort normal. No stridor. He has no wheezes.   Abdominal: There is no tenderness.   Musculoskeletal:   Right BKA with intact skin and well healed.  Left foot with dressing over trans metatarsal amputated area.  reviweed media pictures, scant drainage appreciated   Neurological: He is alert and oriented to person, place, and time. No cranial nerve deficit.   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 No rash noted.   No change in wounds, RBKA stump appears well healed   Psychiatric: He has a normal mood and affect. His behavior is normal. Thought content normal.   Intermittently irritable   Vitals reviewed.                               Assessment/Plan     * Skin ulcer of left foot with fat layer exposed (CMS-HCC)- (present on admission)   Assessment & Plan    -L stump is healing well, new alginate appears to be accelerating wound healing  Completed antibiotic course , wound vacs removed.   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)- (present on admission)   Assessment & Plan    cw lyrica , neurontin, appears to be tolerating well        Anxiety- (present on admission)   Assessment & Plan    Re assurance  cw Klonopin        Opiate dependence (CMS-HCC)- (present on admission)    Assessment & Plan    Methadone w prn oral oxycodone, dilaudid - will wean further again tomorrow  prn  Morphine IM for dressing changes only  -NO INCREASE IN PAIN MEDS with IV   increased the gabapentin to 800 mg TID, reinforced the idea again today  -highly manipulative behavior  -attempt multi-modal pain therapy          Pseudomonas infection- (present on admission)   Assessment & Plan    finished Cefepime 8/25        MRSA (methicillin resistant staph aureus) culture positive- (present on admission)   Assessment & Plan    Completed Doxycycline 8/25        Noncompliance- (present on admission)   Assessment & Plan    Counseling done on risks and complications        Wound of right leg, at BKA site- (present on admission)   Assessment & Plan    s/p BKA revision w debridement.   Follow clinically   Prosthetic Tech evaluating patient for fit, apparently having issues        Tobacco abuse- (present on admission)   Assessment & Plan    Nicotine replacement   Cessation counseling provided        Peripheral neuropathy (CMS-HCC)- (present on admission)   Assessment & Plan    Lyrica, neurontin.   No narcotics for neuropathy.            Reviewed items::  Labs reviewed and Medications reviewed  Rolon catheter::  No Rolon  DVT prophylaxis pharmacological::  Enoxaparin (Lovenox)

## 2017-10-13 NOTE — CARE PLAN
Problem: Mobility  Goal: Risk for activity intolerance will decrease  Outcome: PROGRESSING AS EXPECTED  Pt up self to wheel chair. Walked with PT using prosthetic with FWW today. Pt stated he felt unsteady. Knows he needs to practice walking but is concerned that prosthetic could break like last one so is hesitant to use it.     Problem: Pain Management  Goal: Pain level will decrease to patient's comfort goal  Outcome: PROGRESSING SLOWER THAN EXPECTED  Pt concerned that timing off PRNs has gotten off. Patient wants to be able to time it that he is alternating every 4 hours between oxy and dilaudid

## 2017-10-13 NOTE — CARE PLAN
Problem: Skin Integrity  Goal: Risk for impaired skin integrity will decrease  Dressing on LLE clean dry and intact. Unable to visualize wound base. VSS

## 2017-10-13 NOTE — CARE PLAN
Problem: Pain Management  Goal: Pain level will decrease to patient's comfort goal  Pt pain controlled with PRN and Scheduled pain medications.

## 2017-10-14 PROCEDURE — A9270 NON-COVERED ITEM OR SERVICE: HCPCS | Performed by: HOSPITALIST

## 2017-10-14 PROCEDURE — 700102 HCHG RX REV CODE 250 W/ 637 OVERRIDE(OP): Performed by: HOSPITALIST

## 2017-10-14 PROCEDURE — 770021 HCHG ROOM/CARE - ISO PRIVATE

## 2017-10-14 PROCEDURE — A9270 NON-COVERED ITEM OR SERVICE: HCPCS | Performed by: INTERNAL MEDICINE

## 2017-10-14 PROCEDURE — 700102 HCHG RX REV CODE 250 W/ 637 OVERRIDE(OP): Performed by: INTERNAL MEDICINE

## 2017-10-14 PROCEDURE — 700111 HCHG RX REV CODE 636 W/ 250 OVERRIDE (IP): Performed by: HOSPITALIST

## 2017-10-14 PROCEDURE — 700101 HCHG RX REV CODE 250: Performed by: HOSPITALIST

## 2017-10-14 PROCEDURE — 99232 SBSQ HOSP IP/OBS MODERATE 35: CPT | Performed by: INTERNAL MEDICINE

## 2017-10-14 RX ORDER — DRONABINOL 2.5 MG/1
2.5 CAPSULE ORAL
Status: DISCONTINUED | OUTPATIENT
Start: 2017-10-14 | End: 2017-10-18

## 2017-10-14 RX ADMIN — CAPSAICIN: 0.25 CREAM TOPICAL at 09:00

## 2017-10-14 RX ADMIN — CLONAZEPAM 2 MG: 1 TABLET ORAL at 09:20

## 2017-10-14 RX ADMIN — Medication 325 MG: at 16:53

## 2017-10-14 RX ADMIN — LIDOCAINE 1 PATCH: 50 PATCH CUTANEOUS at 14:30

## 2017-10-14 RX ADMIN — ACETAMINOPHEN 650 MG: 325 TABLET, FILM COATED ORAL at 16:53

## 2017-10-14 RX ADMIN — METHADONE HYDROCHLORIDE 20 MG: 10 TABLET ORAL at 14:48

## 2017-10-14 RX ADMIN — PREGABALIN 150 MG: 150 CAPSULE ORAL at 21:36

## 2017-10-14 RX ADMIN — HYDROMORPHONE HYDROCHLORIDE 3 MG: 2 TABLET ORAL at 09:30

## 2017-10-14 RX ADMIN — HYDROMORPHONE HYDROCHLORIDE 3 MG: 2 TABLET ORAL at 03:00

## 2017-10-14 RX ADMIN — METHADONE HYDROCHLORIDE 20 MG: 10 TABLET ORAL at 09:20

## 2017-10-14 RX ADMIN — GABAPENTIN 800 MG: 400 CAPSULE ORAL at 21:35

## 2017-10-14 RX ADMIN — Medication 325 MG: at 09:20

## 2017-10-14 RX ADMIN — OXYCODONE HYDROCHLORIDE 10 MG: 10 TABLET ORAL at 00:26

## 2017-10-14 RX ADMIN — MORPHINE SULFATE 4 MG: 4 INJECTION INTRAVENOUS at 14:52

## 2017-10-14 RX ADMIN — CLONAZEPAM 2 MG: 1 TABLET ORAL at 21:35

## 2017-10-14 RX ADMIN — DRONABINOL 2.5 MG: 2.5 CAPSULE ORAL at 16:53

## 2017-10-14 RX ADMIN — PREGABALIN 150 MG: 150 CAPSULE ORAL at 09:21

## 2017-10-14 RX ADMIN — PREGABALIN 150 MG: 150 CAPSULE ORAL at 14:49

## 2017-10-14 RX ADMIN — GABAPENTIN 800 MG: 400 CAPSULE ORAL at 14:49

## 2017-10-14 RX ADMIN — OXYCODONE HYDROCHLORIDE 10 MG: 10 TABLET ORAL at 14:26

## 2017-10-14 RX ADMIN — METHADONE HYDROCHLORIDE 20 MG: 10 TABLET ORAL at 21:36

## 2017-10-14 RX ADMIN — ACETAMINOPHEN 650 MG: 325 TABLET, FILM COATED ORAL at 06:09

## 2017-10-14 RX ADMIN — OXYCODONE HYDROCHLORIDE 10 MG: 10 TABLET ORAL at 06:09

## 2017-10-14 RX ADMIN — GABAPENTIN 800 MG: 400 CAPSULE ORAL at 09:19

## 2017-10-14 RX ADMIN — ACETAMINOPHEN 650 MG: 325 TABLET, FILM COATED ORAL at 00:26

## 2017-10-14 RX ADMIN — CAPSAICIN: 0.25 CREAM TOPICAL at 15:00

## 2017-10-14 ASSESSMENT — PAIN SCALES - GENERAL
PAINLEVEL_OUTOF10: 6
PAINLEVEL_OUTOF10: 3
PAINLEVEL_OUTOF10: 5

## 2017-10-14 ASSESSMENT — ENCOUNTER SYMPTOMS
SHORTNESS OF BREATH: 0
DEPRESSION: 0
DIARRHEA: 0
DIZZINESS: 0
MYALGIAS: 1
ABDOMINAL PAIN: 0
CHILLS: 0
BLURRED VISION: 0
BACK PAIN: 1

## 2017-10-14 NOTE — WOUND TEAM
"[facility]  Inpatient Services  Wound and Skin Care Progress Note    HPI, PMH, SH: Reviewed    WOUND TEAM FOLLOW UP:  Left plantar dressing change   Unit where seen by Wound Team:  Ortho     SUBJECTIVE:  \"Can't we like super glue it on or something?\"      Self Report / Pain Level:  Tolerated well.     WOUND TYPE, LOCATION, CHARACTERISTICS:    Location/type of wound:  Open surgical wound left plantar foot                                                      Periwound:  Intact                                                Drainage:                    Moderate, yellow/SS   Tissue Type and %:   100% pink/red hypergranular   Wound Edges:            Attached/macerated    Odor:                           mild   Exposed structure(s): None     S&S of Infection:          odor, drainage                            Measurements: (cm)  NM    INTERVENTIONS BY WOUND TEAM:  Previous dressing removed, steri stips, adaptic and Cytal dressing clumped and at distal portion of wound, cytal not salvageable.  All removed, cleansed with NS and gauze, cut piece of Aquacel silver to wound bed and between wedge of foot.  Covered with cut piece of non-adhesive foam, secured with roll gauze and coban.      Interdisciplinary consultation:  RN, patient      EVALUATION AND PROGRESS OF WOUND(S):  LPS apply Cytal wound matrix, issues with keeping it in place.  LPS to follow up.       Factors affecting wound healing:  DM, drainage pressure  Goals:  Cytol to stay in place.    NURSING PLAN OF CARE:    Dressing changes: Continue previous Dressing Maintenance orders:     X   See new Dressing Maintenance orders:       Skin care: See Skin Care orders:        Rectal tube care: See Rectal Tube Care orders:      Other orders:           WOUND TEAM PLAN OF CARE (X):   NPWT change 3 x week:        Dressing changes:       Follow up as needed:     X  Other:        "

## 2017-10-14 NOTE — CARE PLAN
Problem: Knowledge Deficit  Goal: Knowledge of disease process/condition, treatment plan, diagnostic tests, and medications will improve    Intervention: Assess knowledge level of disease process/condition, treatment plan, diagnostic tests, and medications  Went over POC.

## 2017-10-14 NOTE — PROGRESS NOTES
Patient asking for marinol to get reordered. Pt states it helps with his pain. Esme called. MD said he does not want to reorder this med since it does not help with pain and patient has been gaining weight since his admission. MD said he would come talk to the patient in the AM

## 2017-10-14 NOTE — PROGRESS NOTES
[facility] Hospitalist Progress Note    Date of Service: 10/14/2017    Chief Complaint  52 y.o. male presented 2017 with right BKA stump pain and wound drainage in addition to left TMA stump wound (nonhealing).  Patient has history of PVD s/p L TMA and R BKA more than 5 yrs ago in [place].      Interval Problem Update  Moving around the hallway in his wheelchair. He wants marinol back on since it helps with pain and appetite. No other acute events noted per nursing staff.      Consultants/Specialty  Ortho  Wound care    Disposition  Difficult discharge.  Possibly going back to [facility] to stay with his family. Working on new prosthesis.        Review of Systems   Constitutional: Negative for chills.   HENT: Negative for hearing loss.    Eyes: Negative for blurred vision.   Respiratory: Negative for shortness of breath.    Cardiovascular: Negative for chest pain.   Gastrointestinal: Negative for abdominal pain and diarrhea.   Musculoskeletal: Positive for back pain and myalgias (bilateral lower legs). Negative for joint pain.        No changes appreciated today   Skin: Negative for rash.   Neurological: Negative for dizziness.   Psychiatric/Behavioral: Negative for depression.   All other systems reviewed and are negative.     Physical Exam  Laboratory/Imaging   Hemodynamics  Temp (24hrs), Av.7 °C (98 °F), Min:36.6 °C (97.9 °F), Max:36.8 °C (98.2 °F)   Temperature: 36.7 °C (98.1 °F)  Pulse  Av.1  Min: 58  Max: 111    Blood Pressure: 108/68      Respiratory      Respiration: 16, Pulse Oximetry: 90 %        RUL Breath Sounds: Diminished, RML Breath Sounds: Diminished, RLL Breath Sounds: Diminished, NANCY Breath Sounds: Diminished, LLL Breath Sounds: Diminished    Fluids  No intake or output data in the 24 hours ending 10/14/17 1610    Nutrition  Orders Placed This Encounter   Procedures   • DIET ORDER     Standing Status:   Standing     Number of Occurrences:   1     Order Specific Question:   Diet:     Answer:    Regular [1]     Comments:   regular trays     Physical Exam   Constitutional: He is oriented to person, place, and time. He appears well-developed.   Overweight, in his wheelchair again today   HENT:   Head: Normocephalic and atraumatic.   Nose: Nose normal.   Eyes: Conjunctivae are normal. No scleral icterus.   Neck: Normal range of motion. Neck supple. No JVD present.   Cardiovascular: Normal rate and regular rhythm.    Pulmonary/Chest: Effort normal. No respiratory distress.   Abdominal: He exhibits no distension and no mass. There is no guarding.   Musculoskeletal:   Right BKA with intact skin and well healed.  Left foot with dressing over trans metatarsal amputated area.  reviweed media pictures, scant drainage appreciated   Neurological: He is alert and oriented to person, place, and time. Coordination normal.   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 No rash noted. He is not diaphoretic.   No change in wounds, RBKA stump appears well healed   Psychiatric: He has a normal mood and affect. His behavior is normal. Thought content normal.   Intermittently irritable, remains the same today   Vitals reviewed.                               Assessment/Plan     * Skin ulcer of left foot with fat layer exposed (CMS-[facility])- (present on admission)   Assessment & Plan    -L stump is healing well, new alginate appears to be accelerating wound healing, no changes noted today, afebrile  Completed antibiotic course , wound vacs removed.   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 (CMS-[facility])- (present on admission)   Assessment & Plan    cw lyrica , neurontin, appears to be tolerating well        Anxiety- (present on admission)    Assessment & Plan    Re assurance  cw Klonopin        Opiate dependence (CMS-HCC)- (present on admission)   Assessment & Plan    Methadone w prn oral oxycodone, dilaudid - will decrease again today  prn  Morphine IM for dressing changes only  -NO INCREASE IN PAIN MEDS with IV   increased the gabapentin to 800 mg TID, reinforced the idea again today  -highly manipulative behavior  -attempt multi-modal pain therapy  -start low dose marinol to help with pain and appetite improvement  -NO other changes to pain medications today        Pseudomonas infection- (present on admission)   Assessment & Plan    finished Cefepime 8/25        MRSA (methicillin resistant staph aureus) culture positive- (present on admission)   Assessment & Plan    Completed Doxycycline 8/25        Noncompliance- (present on admission)   Assessment & Plan    Counseling done on risks and complications        Wound of right leg, at BKA site- (present on admission)   Assessment & Plan    s/p BKA revision w debridement.   Follow clinically   Prosthetic Tech evaluating patient for fit, apparently having issues        Tobacco abuse- (present on admission)   Assessment & Plan    Nicotine replacement   Cessation counseling provided        Peripheral neuropathy (CMS-HCC)- (present on admission)   Assessment & Plan    Lyrica, neurontin.   No narcotics for neuropathy.            Reviewed items::  Labs reviewed and Medications reviewed  Rolon catheter::  No Rolon  DVT prophylaxis pharmacological::  Enoxaparin (Lovenox)

## 2017-10-15 LAB
ANION GAP SERPL CALC-SCNC: 9 MMOL/L (ref 0–11.9)
BASOPHILS # BLD AUTO: 0.5 % (ref 0–1.8)
BASOPHILS # BLD: 0.03 K/UL (ref 0–0.12)
BUN SERPL-MCNC: 22 MG/DL (ref 8–22)
CALCIUM SERPL-MCNC: 9 MG/DL (ref 8.5–10.5)
CHLORIDE SERPL-SCNC: 103 MMOL/L (ref 96–112)
CO2 SERPL-SCNC: 27 MMOL/L (ref 20–33)
CREAT SERPL-MCNC: 0.84 MG/DL (ref 0.5–1.4)
EOSINOPHIL # BLD AUTO: 0.22 K/UL (ref 0–0.51)
EOSINOPHIL NFR BLD: 3.5 % (ref 0–6.9)
ERYTHROCYTE [DISTWIDTH] IN BLOOD BY AUTOMATED COUNT: 42.6 FL (ref 35.9–50)
GFR SERPL CREATININE-BSD FRML MDRD: >60 ML/MIN/1.73 M 2
GLUCOSE SERPL-MCNC: 140 MG/DL (ref 65–99)
HCT VFR BLD AUTO: 43.7 % (ref 42–52)
HGB BLD-MCNC: 14.3 G/DL (ref 14–18)
IMM GRANULOCYTES # BLD AUTO: 0.02 K/UL (ref 0–0.11)
IMM GRANULOCYTES NFR BLD AUTO: 0.3 % (ref 0–0.9)
LYMPHOCYTES # BLD AUTO: 2.72 K/UL (ref 1–4.8)
LYMPHOCYTES NFR BLD: 43.7 % (ref 22–41)
MCH RBC QN AUTO: 26.7 PG (ref 27–33)
MCHC RBC AUTO-ENTMCNC: 32.7 G/DL (ref 33.7–35.3)
MCV RBC AUTO: 81.5 FL (ref 81.4–97.8)
MONOCYTES # BLD AUTO: 0.46 K/UL (ref 0–0.85)
MONOCYTES NFR BLD AUTO: 7.4 % (ref 0–13.4)
NEUTROPHILS # BLD AUTO: 2.77 K/UL (ref 1.82–7.42)
NEUTROPHILS NFR BLD: 44.6 % (ref 44–72)
NRBC # BLD AUTO: 0 K/UL
NRBC BLD AUTO-RTO: 0 /100 WBC
PLATELET # BLD AUTO: 289 K/UL (ref 164–446)
PMV BLD AUTO: 9.5 FL (ref 9–12.9)
POTASSIUM SERPL-SCNC: 3.7 MMOL/L (ref 3.6–5.5)
RBC # BLD AUTO: 5.36 M/UL (ref 4.7–6.1)
SODIUM SERPL-SCNC: 139 MMOL/L (ref 135–145)
WBC # BLD AUTO: 6.2 K/UL (ref 4.8–10.8)

## 2017-10-15 PROCEDURE — A9270 NON-COVERED ITEM OR SERVICE: HCPCS | Performed by: HOSPITALIST

## 2017-10-15 PROCEDURE — 700102 HCHG RX REV CODE 250 W/ 637 OVERRIDE(OP): Performed by: INTERNAL MEDICINE

## 2017-10-15 PROCEDURE — 99231 SBSQ HOSP IP/OBS SF/LOW 25: CPT | Performed by: INTERNAL MEDICINE

## 2017-10-15 PROCEDURE — 700101 HCHG RX REV CODE 250: Performed by: HOSPITALIST

## 2017-10-15 PROCEDURE — 700102 HCHG RX REV CODE 250 W/ 637 OVERRIDE(OP): Performed by: HOSPITALIST

## 2017-10-15 PROCEDURE — 85025 COMPLETE CBC W/AUTO DIFF WBC: CPT

## 2017-10-15 PROCEDURE — 770021 HCHG ROOM/CARE - ISO PRIVATE

## 2017-10-15 PROCEDURE — 80048 BASIC METABOLIC PNL TOTAL CA: CPT

## 2017-10-15 PROCEDURE — 36415 COLL VENOUS BLD VENIPUNCTURE: CPT

## 2017-10-15 PROCEDURE — A9270 NON-COVERED ITEM OR SERVICE: HCPCS | Performed by: INTERNAL MEDICINE

## 2017-10-15 RX ADMIN — PREGABALIN 150 MG: 150 CAPSULE ORAL at 14:27

## 2017-10-15 RX ADMIN — OXYCODONE HYDROCHLORIDE 10 MG: 10 TABLET ORAL at 21:31

## 2017-10-15 RX ADMIN — LIDOCAINE 1 APPLICATION: 40 CREAM TOPICAL at 20:31

## 2017-10-15 RX ADMIN — METHADONE HYDROCHLORIDE 20 MG: 10 TABLET ORAL at 20:28

## 2017-10-15 RX ADMIN — LIDOCAINE 1 PATCH: 50 PATCH CUTANEOUS at 11:25

## 2017-10-15 RX ADMIN — OXYCODONE HYDROCHLORIDE 10 MG: 10 TABLET ORAL at 03:00

## 2017-10-15 RX ADMIN — Medication 325 MG: at 16:38

## 2017-10-15 RX ADMIN — CLONAZEPAM 2 MG: 1 TABLET ORAL at 10:10

## 2017-10-15 RX ADMIN — ACETAMINOPHEN 325 MG: 325 TABLET, FILM COATED ORAL at 11:25

## 2017-10-15 RX ADMIN — DRONABINOL 2.5 MG: 2.5 CAPSULE ORAL at 11:25

## 2017-10-15 RX ADMIN — OXYCODONE HYDROCHLORIDE 10 MG: 10 TABLET ORAL at 17:24

## 2017-10-15 RX ADMIN — Medication 325 MG: at 06:18

## 2017-10-15 RX ADMIN — GABAPENTIN 800 MG: 400 CAPSULE ORAL at 20:29

## 2017-10-15 RX ADMIN — ACETAMINOPHEN 325 MG: 325 TABLET, FILM COATED ORAL at 17:24

## 2017-10-15 RX ADMIN — METHADONE HYDROCHLORIDE 20 MG: 10 TABLET ORAL at 10:10

## 2017-10-15 RX ADMIN — METHADONE HYDROCHLORIDE 20 MG: 10 TABLET ORAL at 14:28

## 2017-10-15 RX ADMIN — GABAPENTIN 800 MG: 400 CAPSULE ORAL at 10:09

## 2017-10-15 RX ADMIN — CLONAZEPAM 2 MG: 1 TABLET ORAL at 20:28

## 2017-10-15 RX ADMIN — GABAPENTIN 800 MG: 400 CAPSULE ORAL at 14:27

## 2017-10-15 RX ADMIN — CAPSAICIN: 0.25 CREAM TOPICAL at 16:02

## 2017-10-15 RX ADMIN — OXYCODONE HYDROCHLORIDE 10 MG: 10 TABLET ORAL at 06:57

## 2017-10-15 RX ADMIN — PREGABALIN 150 MG: 150 CAPSULE ORAL at 20:29

## 2017-10-15 RX ADMIN — DRONABINOL 2.5 MG: 2.5 CAPSULE ORAL at 16:38

## 2017-10-15 RX ADMIN — CAPSAICIN: 0.25 CREAM TOPICAL at 10:15

## 2017-10-15 RX ADMIN — PREGABALIN 150 MG: 150 CAPSULE ORAL at 10:11

## 2017-10-15 RX ADMIN — CAPSAICIN: 0.25 CREAM TOPICAL at 21:00

## 2017-10-15 RX ADMIN — OXYCODONE HYDROCHLORIDE 5 MG: 5 TABLET ORAL at 14:28

## 2017-10-15 RX ADMIN — LIDOCAINE 1 APPLICATION: 40 CREAM TOPICAL at 10:14

## 2017-10-15 ASSESSMENT — ENCOUNTER SYMPTOMS
HEADACHES: 0
BACK PAIN: 1
COUGH: 0
VOMITING: 0
DIZZINESS: 0
DEPRESSION: 0
PALPITATIONS: 0
MYALGIAS: 0
NAUSEA: 0
FEVER: 0
BLURRED VISION: 0

## 2017-10-15 ASSESSMENT — PAIN SCALES - GENERAL
PAINLEVEL_OUTOF10: 5
PAINLEVEL_OUTOF10: 6
PAINLEVEL_OUTOF10: 5
PAINLEVEL_OUTOF10: 6
PAINLEVEL_OUTOF10: 6

## 2017-10-15 NOTE — PROGRESS NOTES
"Pt has been sleeping, now is awake, calm, cooperative, generally pleasant. Pt has had several visitors, including a couple who are sleeping in bed. RN present for DR barrera, POC reviewed which is for discharge planning w/ case mgt, awaiting wheelchair, and subsequent work w/ P.T. RN encouraged CDB w/ \"I.S.\" pt generally non compliant w/ practicing this. Fall and safety precautions in place, pt declines BA, uses call light appropriately. Pt is independent w/ turns for skin integrity. Pt has Lovenox for VTE per MAR. Hourly rounds ongoing, call light w/in reach.   "

## 2017-10-15 NOTE — CARE PLAN
Problem: Infection  Goal: Will remain free from infection  Patient's vitals are stable.  Surgical dressing is clean dry and intact.  Patient has no signs or symptoms of infection.

## 2017-10-15 NOTE — CARE PLAN
Problem: Pain Management  Goal: Pain level will decrease to patient's comfort goal  Patient medicated with scheduled Methadone for pain rated at 5/10.  Patient able to sleep comfortably.

## 2017-10-15 NOTE — PROGRESS NOTES
[facility] Hospitalist Progress Note    Date of Service: 10/15/2017    Chief Complaint  52 y.o. male presented 2017 with right BKA stump pain and wound drainage in addition to left TMA stump wound (nonhealing).  Patient has history of PVD s/p L TMA and R BKA more than 5 yrs ago in [place].      Interval Problem Update  Sleeping in bed this AM. Multiple visitors in his room. Denies any other new issues other than constant pain, He was quite happy that his low dose marinol was re-started. Remains afebrile.       Consultants/Specialty  Ortho  Wound care    Disposition  Difficult discharge.  Possibly going back to [facility] to stay with his family. Working on new prosthesis.        Review of Systems   Constitutional: Negative for fever.   Eyes: Negative for blurred vision.   Respiratory: Negative for cough.    Cardiovascular: Negative for palpitations.   Gastrointestinal: Negative for nausea and vomiting.   Musculoskeletal: Positive for back pain. Negative for joint pain and myalgias (bilateral lower legs).        Patient claims no change at all   Skin: Negative for itching.   Neurological: Negative for dizziness and headaches.   Psychiatric/Behavioral: Negative for depression.   All other systems reviewed and are negative.     Physical Exam  Laboratory/Imaging   Hemodynamics  Temp (24hrs), Av.4 °C (97.5 °F), Min:36.2 °C (97.1 °F), Max:36.7 °C (98.1 °F)   Temperature: 36.4 °C (97.6 °F)  Pulse  Av.1  Min: 58  Max: 111    Blood Pressure: 116/71      Respiratory      Respiration: 17, Pulse Oximetry: 96 %        RUL Breath Sounds: Diminished, RML Breath Sounds: Diminished, RLL Breath Sounds: Diminished, NANCY Breath Sounds: Diminished, LLL Breath Sounds: Diminished    Fluids    Intake/Output Summary (Last 24 hours) at 10/15/17 1430  Last data filed at 10/14/17 2130   Gross per 24 hour   Intake              240 ml   Output                0 ml   Net              240 ml       Nutrition  Orders Placed This Encounter    Procedures   • DIET ORDER     Standing Status:   Standing     Number of Occurrences:   1     Order Specific Question:   Diet:     Answer:   Regular [1]     Comments:   regular trays     Physical Exam   Constitutional: He is oriented to person, place, and time. He appears well-developed.   Overweight, laying in bed   HENT:   Head: Normocephalic and atraumatic.   Nose: Nose normal.   Eyes: Conjunctivae are normal. Right eye exhibits no discharge. Left eye exhibits no discharge.   Neck: Normal range of motion. Neck supple.   Cardiovascular: Normal rate and regular rhythm.    Pulmonary/Chest: Effort normal. No stridor. He has no wheezes.   Abdominal: He exhibits no distension. There is no tenderness.   Musculoskeletal:   Right BKA with intact skin and well healed.  Left foot with dressing over trans metatarsal amputated area.  reviweed media pictures, scant drainage appreciated   Neurological: He is alert and oriented to person, place, and time. No cranial nerve deficit.   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 No rash noted.   No change in wounds, RBKA stump appears well healed and no changes appreciated  L stump, refer to media   Psychiatric: He has a normal mood and affect. His behavior is normal. Thought content normal.   Intermittently irritable, remains the same today   Vitals reviewed.      Recent Labs      10/15/17   0417   WBC  6.2   RBC  5.36   HEMOGLOBIN  14.3   HEMATOCRIT  43.7   MCV  81.5   MCH  26.7*   MCHC  32.7*   RDW  42.6   PLATELETCT  289   MPV  9.5     Recent Labs      10/15/17   0417   SODIUM  139   POTASSIUM  3.7   CHLORIDE  103   CO2  27   GLUCOSE  140*   BUN  22   CREATININE  0.84   CALCIUM  9.0                      Assessment/Plan     * Skin ulcer of left foot with fat layer exposed (CMS-HCC)- (present on admission)   Assessment & Plan    -L stump is healing well, new alginate appears to be accelerating wound healing, no changes noted today, afebrile  -aggressive wound  care, all wound vacs off  -continue to encourage ambulation, working on fixing prosthetic   Nutrition support  -see pain control as outlined above  -working on prosthetic leg for patient, SW working on acquiring a wheelchair as well, doing well            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)- (present on admission)   Assessment & Plan    cw lyrica , neurontin, appears to be tolerating well        Anxiety- (present on admission)   Assessment & Plan    Re assurance  cw Klonopin        Opiate dependence (CMS-HCC)- (present on admission)   Assessment & Plan    Methadone w prn oral oxycodone, dilaudid - will decrease again today  prn  Morphine IM for dressing changes only  -NO INCREASE IN PAIN MEDS with IV  Continue this dose of the gabapentin at 800 mg TID  -highly manipulative behavior  -attempt multi-modal pain therapy  -continue low dose marinol to help with pain and appetite improvement  -NO other changes to pain medications today        Pseudomonas infection- (present on admission)   Assessment & Plan    finished Cefepime 8/25        MRSA (methicillin resistant staph aureus) culture positive- (present on admission)   Assessment & Plan    Completed Doxycycline 8/25        Noncompliance- (present on admission)   Assessment & Plan    Counseling done on risks and complications        Wound of right leg, at BKA site- (present on admission)   Assessment & Plan    s/p BKA revision w debridement.   Follow clinically   Prosthetic Tech evaluating patient for fit, apparently having issues        Tobacco abuse- (present on admission)   Assessment & Plan    Nicotine replacement   Cessation counseling provided        Peripheral neuropathy (CMS-HCC)- (present on admission)   Assessment & Plan    Lyrica, neurontin.   No narcotics for neuropathy.            Reviewed items::  Labs reviewed and Medications reviewed  Rolon catheter::  No Rolon  DVT prophylaxis pharmacological::  Enoxaparin (Lovenox)

## 2017-10-16 PROCEDURE — A9270 NON-COVERED ITEM OR SERVICE: HCPCS | Performed by: HOSPITALIST

## 2017-10-16 PROCEDURE — 700102 HCHG RX REV CODE 250 W/ 637 OVERRIDE(OP): Performed by: HOSPITALIST

## 2017-10-16 PROCEDURE — 700102 HCHG RX REV CODE 250 W/ 637 OVERRIDE(OP): Performed by: INTERNAL MEDICINE

## 2017-10-16 PROCEDURE — 97116 GAIT TRAINING THERAPY: CPT

## 2017-10-16 PROCEDURE — 770021 HCHG ROOM/CARE - ISO PRIVATE

## 2017-10-16 PROCEDURE — A9270 NON-COVERED ITEM OR SERVICE: HCPCS | Performed by: INTERNAL MEDICINE

## 2017-10-16 PROCEDURE — 700101 HCHG RX REV CODE 250: Performed by: HOSPITALIST

## 2017-10-16 PROCEDURE — 99232 SBSQ HOSP IP/OBS MODERATE 35: CPT | Performed by: INTERNAL MEDICINE

## 2017-10-16 PROCEDURE — 97535 SELF CARE MNGMENT TRAINING: CPT

## 2017-10-16 RX ORDER — HYDROMORPHONE HYDROCHLORIDE 2 MG/1
2 TABLET ORAL EVERY 8 HOURS PRN
Status: DISCONTINUED | OUTPATIENT
Start: 2017-10-16 | End: 2017-10-17

## 2017-10-16 RX ORDER — GABAPENTIN 300 MG/1
900 CAPSULE ORAL 3 TIMES DAILY
Status: DISCONTINUED | OUTPATIENT
Start: 2017-10-16 | End: 2017-10-18 | Stop reason: HOSPADM

## 2017-10-16 RX ADMIN — ACETAMINOPHEN 650 MG: 325 TABLET, FILM COATED ORAL at 11:16

## 2017-10-16 RX ADMIN — PREGABALIN 150 MG: 150 CAPSULE ORAL at 08:36

## 2017-10-16 RX ADMIN — Medication 325 MG: at 17:10

## 2017-10-16 RX ADMIN — Medication 325 MG: at 06:14

## 2017-10-16 RX ADMIN — DRONABINOL 2.5 MG: 2.5 CAPSULE ORAL at 11:16

## 2017-10-16 RX ADMIN — DRONABINOL 2.5 MG: 2.5 CAPSULE ORAL at 17:00

## 2017-10-16 RX ADMIN — PREGABALIN 150 MG: 150 CAPSULE ORAL at 14:02

## 2017-10-16 RX ADMIN — METHADONE HYDROCHLORIDE 20 MG: 10 TABLET ORAL at 08:36

## 2017-10-16 RX ADMIN — ACETAMINOPHEN 325 MG: 325 TABLET, FILM COATED ORAL at 17:10

## 2017-10-16 RX ADMIN — CAPSAICIN: 0.25 CREAM TOPICAL at 21:00

## 2017-10-16 RX ADMIN — OXYCODONE HYDROCHLORIDE 10 MG: 10 TABLET ORAL at 14:03

## 2017-10-16 RX ADMIN — HYDROMORPHONE HYDROCHLORIDE 2 MG: 2 TABLET ORAL at 17:11

## 2017-10-16 RX ADMIN — METHADONE HYDROCHLORIDE 20 MG: 10 TABLET ORAL at 20:03

## 2017-10-16 RX ADMIN — PREGABALIN 150 MG: 150 CAPSULE ORAL at 20:04

## 2017-10-16 RX ADMIN — METHADONE HYDROCHLORIDE 20 MG: 10 TABLET ORAL at 14:02

## 2017-10-16 RX ADMIN — OXYCODONE HYDROCHLORIDE 10 MG: 10 TABLET ORAL at 03:53

## 2017-10-16 RX ADMIN — GABAPENTIN 900 MG: 300 CAPSULE ORAL at 14:03

## 2017-10-16 RX ADMIN — ZOLPIDEM TARTRATE 5 MG: 5 TABLET, FILM COATED ORAL at 21:47

## 2017-10-16 RX ADMIN — GABAPENTIN 900 MG: 300 CAPSULE ORAL at 20:04

## 2017-10-16 RX ADMIN — CLONAZEPAM 2 MG: 1 TABLET ORAL at 20:04

## 2017-10-16 RX ADMIN — GABAPENTIN 800 MG: 400 CAPSULE ORAL at 08:35

## 2017-10-16 RX ADMIN — CLONAZEPAM 2 MG: 1 TABLET ORAL at 08:36

## 2017-10-16 RX ADMIN — LIDOCAINE 1 PATCH: 50 PATCH CUTANEOUS at 14:04

## 2017-10-16 RX ADMIN — OXYCODONE HYDROCHLORIDE 10 MG: 10 TABLET ORAL at 21:47

## 2017-10-16 RX ADMIN — ACETAMINOPHEN 325 MG: 325 TABLET, FILM COATED ORAL at 06:14

## 2017-10-16 RX ADMIN — OXYCODONE HYDROCHLORIDE 10 MG: 10 TABLET ORAL at 08:33

## 2017-10-16 ASSESSMENT — ENCOUNTER SYMPTOMS
NERVOUS/ANXIOUS: 1
BACK PAIN: 1
TINGLING: 0
SHORTNESS OF BREATH: 0
DOUBLE VISION: 0
MYALGIAS: 0
CHILLS: 0
DEPRESSION: 0
ABDOMINAL PAIN: 0
HEADACHES: 0

## 2017-10-16 ASSESSMENT — PATIENT HEALTH QUESTIONNAIRE - PHQ9
1. LITTLE INTEREST OR PLEASURE IN DOING THINGS: NOT AT ALL
SUM OF ALL RESPONSES TO PHQ9 QUESTIONS 1 AND 2: 0
2. FEELING DOWN, DEPRESSED, IRRITABLE, OR HOPELESS: NOT AT ALL
SUM OF ALL RESPONSES TO PHQ QUESTIONS 1-9: 0

## 2017-10-16 ASSESSMENT — GAIT ASSESSMENTS
GAIT LEVEL OF ASSIST: CONTACT GUARD ASSIST
ASSISTIVE DEVICE: FRONT WHEEL WALKER
DISTANCE (FEET): 150

## 2017-10-16 ASSESSMENT — LIFESTYLE VARIABLES: DO YOU DRINK ALCOHOL: NO

## 2017-10-16 ASSESSMENT — PAIN SCALES - GENERAL
PAINLEVEL_OUTOF10: 8
PAINLEVEL_OUTOF10: 6
PAINLEVEL_OUTOF10: 5
PAINLEVEL_OUTOF10: 5

## 2017-10-16 NOTE — DISCHARGE PLANNING
[facility] called [facility] and spoke to Anthony. Per Anthony the order was reactivated [facility] provided the Authorization number of [id number]. The referral has been re-faxed to [phone].

## 2017-10-16 NOTE — PROGRESS NOTES
Pt up in wheelchair for breakfast. Safety and fall precautions in place, call light and belongings within reach.

## 2017-10-16 NOTE — PROGRESS NOTES
[facility] Hospitalist Progress Note    Date of Service: 10/16/2017    Chief Complaint  52 y.o. male presented 2017 with right BKA stump pain and wound drainage in addition to left TMA stump wound (nonhealing).  Patient has history of PVD s/p L TMA and R BKA more than 5 yrs ago in [place].      Interval Problem Update  Laying in bed again this AM with multiple visitors. He continues to endorse pain of this LLE and is quite concerned about when physical therapy is coming to see him. No other acute events noted overnight.       Consultants/Specialty  Ortho  Wound care    Disposition  Difficult discharge.  Possibly going back to [facility] to stay with his family. Working on new prosthesis.        Review of Systems   Constitutional: Negative for chills.   Eyes: Negative for double vision.   Respiratory: Negative for shortness of breath.    Cardiovascular: Negative for chest pain.   Gastrointestinal: Negative for abdominal pain.   Musculoskeletal: Positive for back pain. Negative for joint pain and myalgias (bilateral lower legs).        Remains the same   Skin: Negative for itching.   Neurological: Negative for tingling and headaches.   Psychiatric/Behavioral: Negative for depression. The patient is nervous/anxious (about leaving the hospital soon).    All other systems reviewed and are negative.     Physical Exam  Laboratory/Imaging   Hemodynamics  Temp (24hrs), Av.6 °C (97.8 °F), Min:36 °C (96.8 °F), Max:37.1 °C (98.8 °F)   Temperature: 36.9 °C (98.4 °F)  Pulse  Av.2  Min: 58  Max: 114    Blood Pressure: 114/80      Respiratory      Respiration: 18, Pulse Oximetry: 92 %        RUL Breath Sounds: Diminished, RML Breath Sounds: Diminished, RLL Breath Sounds: Diminished, NANCY Breath Sounds: Diminished, LLL Breath Sounds: Diminished    Fluids    Intake/Output Summary (Last 24 hours) at 10/16/17 1157  Last data filed at 10/16/17 1100   Gross per 24 hour   Intake              240 ml   Output             1650 ml    Net            -1410 ml       Nutrition  Orders Placed This Encounter   Procedures   • DIET ORDER     Standing Status:   Standing     Number of Occurrences:   1     Order Specific Question:   Diet:     Answer:   Regular [1]     Comments:   regular trays     Physical Exam   Constitutional: He is oriented to person, place, and time. He appears well-developed and well-nourished.   Overweight, laying in bed again today   HENT:   Head: Normocephalic and atraumatic.   Nose: Nose normal.   Mouth/Throat: No oropharyngeal exudate.   Eyes: Conjunctivae are normal. No scleral icterus.   Neck: Normal range of motion. Neck supple.   Cardiovascular: Normal rate and regular rhythm.    No murmur heard.  Pulmonary/Chest: Effort normal. No respiratory distress.   Abdominal: There is no tenderness.   Musculoskeletal:   Right BKA with intact skin and well healed.  Left foot with dressing over trans metatarsal amputated area.  reviewed media pictures, no changes again today.   Neurological: He is alert and oriented to person, place, and time. No cranial nerve deficit.   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 No rash noted.   No change in wounds, RBKA stump appears well healed and no changes appreciated  L stump, refer to media   Psychiatric: He has a normal mood and affect. His behavior is normal. Thought content normal.   Intermittently irritable, remains the same today   Vitals reviewed.      Recent Labs      10/15/17   0417   WBC  6.2   RBC  5.36   HEMOGLOBIN  14.3   HEMATOCRIT  43.7   MCV  81.5   MCH  26.7*   MCHC  32.7*   RDW  42.6   PLATELETCT  289   MPV  9.5     Recent Labs      10/15/17   0417   SODIUM  139   POTASSIUM  3.7   CHLORIDE  103   CO2  27   GLUCOSE  140*   BUN  22   CREATININE  0.84   CALCIUM  9.0                      Assessment/Plan     * Skin ulcer of left foot with fat layer exposed (CMS-[facility])- (present on admission)   Assessment & Plan    -L stump is healing well, new alginate appears to be  accelerating wound healing, no changes noted today, afebrile, will do another wound dressing change today  -aggressive wound care, all wound vacs off  -continue to encourage ambulation, working on fixing prosthetic   Nutrition support  -see pain control as outlined above  -working on prosthetic leg for patient, SW working on acquiring a wheelchair as well, doing well            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)- (present on admission)   Assessment & Plan    cw lyrica , neurontin, appears to be tolerating well        Anxiety- (present on admission)   Assessment & Plan    Re assurance, bit worse today since patient is apprehensive about his discharge plan to [place], [place]  cw Klonopin        Opiate dependence (CMS-HCC)- (present on admission)   Assessment & Plan    Methadone w prn oral oxycodone, dilaudid -decrease even further today  prn  Morphine IM for dressing changes only, pain appears well controlled  -NO INCREASE IN PAIN MEDS with IV  Continue this dose of the gabapentin at 800 mg TID  -highly manipulative behavior  -attempt multi-modal pain therapy  -continue low dose marinol to help with pain and appetite improvement  -NO other changes to pain medications today        Pseudomonas infection- (present on admission)   Assessment & Plan    finished Cefepime 8/25        MRSA (methicillin resistant staph aureus) culture positive- (present on admission)   Assessment & Plan    Completed Doxycycline 8/25        Noncompliance- (present on admission)   Assessment & Plan    Counseling done on risks and complications        Wound of right leg, at BKA site- (present on admission)   Assessment & Plan    s/p BKA revision w debridement.   Follow clinically   Prosthetic Tech evaluating patient for fit, apparently having issues        Tobacco abuse- (present on admission)   Assessment & Plan    Nicotine replacement   Cessation counseling provided        Peripheral neuropathy (CMS-HCC)- (present on  admission)   Assessment & Plan    Lyrica, neurontin.   No narcotics for neuropathy.            Reviewed items::  Labs reviewed and Medications reviewed  Rolon catheter::  No Rolon  DVT prophylaxis pharmacological::  Enoxaparin (Lovenox)

## 2017-10-16 NOTE — DISCHARGE PLANNING
Looking into train vs bus transport to home. [facility] but does not go to [place], next closest stop is [place]. Pt would need to get from [facility] to public bus vs [place]. (States he has no funds.)  [facility] goes directly from [place] to [place], friend can get pt at station in [place].  Will discuss with admin staff in am for plan. (When w/c is here.)

## 2017-10-16 NOTE — CARE PLAN
Problem: Infection  Goal: Will remain free from infection  Patient's vitals are stable.  Patient has no signs or symptoms of infection.

## 2017-10-16 NOTE — DISCHARGE PLANNING
Received call from Aminah, she said they were having difficulty with codes assigned by [employer] not recognized by [employer]. They have resolved that issue and have auth's w/c with auth # [id number]. They will call and fax auth # to [employer].

## 2017-10-16 NOTE — PROGRESS NOTES
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 Takes methadone he states for back pain.  Not diabetic.  Presented to ED on 7/22/17 with increased pain to L TMA with infected neuropathic ulcer and pain to R BKA.        s/p L foot I & D with VAC placement, GSR by Dr. Shirley on 7/25/17  S/p Right revision below-knee amputation,  Right lower extremity irrigation and debridement, skin, subcutaneous  tissue to bone on 8/15/17      [employer] Cytal biologic initiated on 9/13, NPWT discontinued  Weekly applications of Cytal since then with outer dressing changes every 2-3 days PRN     5th application of cytal applied 10/12/17 by Dr. Shirley  L foot drsg intact  Wound bed granular with dispersed layer, possible cytal vs biofilm    PROCEDURE:  Old dressing removed. primary dressing had slipped. Wound bed cleansed with wound cleanser, covered with alginate and then foam. aquacel ag placed to 4th webspace. Foot and ankle wrapped with kerlix.   Nursing to apply coban when supply available to unit.    Pt concerned about crack to prosthetic. Spoke with [facility] prosthetist on 10/12, who was going to fix connecting piece. Per the prosthetist, pt stated the prosthesis was fine and did not need to be adjusted. Pt was able to walk with PT in the PT gym.   Today pt in   with prosthesis on to RLE and CROW boot to LLE        PLAN  -LPS to assess wound every 3- 4 days, change cover dressing as indicated for saturation or displacement  -Nsg to contact LPS if dressing becomes soiled or dislodged.  -next application of cytal Wed or Thurs

## 2017-10-16 NOTE — CARE PLAN
Problem: Pain Management  Goal: Pain level will decrease to patient's comfort goal  Patient medicated with oral pain medication.  Patient resting comfortably.

## 2017-10-16 NOTE — DISCHARGE PLANNING
[facility]S called and spoke to Latasha at [facility]. Per Cheryl they are waiting for insurance to authorize the deliver of the equipment.

## 2017-10-16 NOTE — DISCHARGE PLANNING
"According to Dinorah, [employer] has been seeking auth for W/C from Crystal at [facility]. They have called Taylor at  and faxed request to Taylor, no response.  Called Laurel , she is out of office until 10/18. Called Luc at , \"mail box not accepting messages.\"  Called Angelica , \"currently unavailable\"  Left message requesting call back ASAP.  "

## 2017-10-16 NOTE — THERAPY
"Pt agreeable to tx today. Pt presents w/functional limitations due to pain. Pt mobility i simproving. Pt able to amb w/prosthetic. Pt able to don prosthetic. Pt prosthetic appears to improperly fit intially. Pt able to don sleeves for better suction in prosthetic. Pt amb @ [facility] w/FWW. Pt demonstrated weakness in LEs and compensation while amb. Pt tends to drag toe of R LE when advancing L LE. Pt has difficulty flexing R hip to clear prosthetic. Pt able to flex L LE w/extra time, verbal cuing and demonstration. Pt instruted to amb w/FWW first, then R LE, then L LE, bringing L LE together w/R LE (step to vs reciprocal gait). Pt receptive. Pt cont to struggle w/flexing R hip to clear prosthetic. Pt demonstrates poor heel-toe gait of R LE. Pt tends to compensate and amb \"stiff-legged\", using his QL and gluts to hip-hike and circumduct R LE to advance. Pt required extra time and instruction to amb. Pt required 2 seated breaks as he c/o UE soreness. When redirected, pt able to amb fairly w/step-to gait. While amb, pt hips seemed uneven, perhaps due to prothetic. Pt stated he thinks his prosthetic is \"too short\". Pt would like to amb again today w/nursing staff to make sure it does/doesn't fit properly. Spent extra time educating pt on proper amb, compensation, and strength. Pt receptive. Instructed pt to perform seated marching while in WC w/prostheic on to improve strength w/the wt of prosthetic. Pt agreeable. Pt would benefit from further acute PT txs to progress towards goals and independence.    Physical Therapy Treatment completed.   Bed Mobility:  Supine to Sit: Modified Independent  Transfers: Sit to Stand: Supervised  Gait: Level Of Assist: Contact Guard Assist with Front-Wheel Walker       Plan of Care: Will benefit from Physical Therapy 4 times per week      See \"Rehab Therapy-Acute\" Patient Summary Report for complete documentation.       "

## 2017-10-16 NOTE — CARE PLAN
Problem: Discharge Barriers/Planning  Goal: Patient's continuum of care needs will be met  Outcome: PROGRESSING AS EXPECTED  Pt is aware of plan to go home tomorrow, pending wheelchair arrival.

## 2017-10-17 PROCEDURE — A9270 NON-COVERED ITEM OR SERVICE: HCPCS | Performed by: HOSPITALIST

## 2017-10-17 PROCEDURE — 770021 HCHG ROOM/CARE - ISO PRIVATE

## 2017-10-17 PROCEDURE — 700102 HCHG RX REV CODE 250 W/ 637 OVERRIDE(OP): Performed by: INTERNAL MEDICINE

## 2017-10-17 PROCEDURE — 700102 HCHG RX REV CODE 250 W/ 637 OVERRIDE(OP): Performed by: HOSPITALIST

## 2017-10-17 PROCEDURE — 99232 SBSQ HOSP IP/OBS MODERATE 35: CPT | Performed by: HOSPITALIST

## 2017-10-17 PROCEDURE — 700101 HCHG RX REV CODE 250: Performed by: HOSPITALIST

## 2017-10-17 PROCEDURE — 97116 GAIT TRAINING THERAPY: CPT

## 2017-10-17 PROCEDURE — A9270 NON-COVERED ITEM OR SERVICE: HCPCS | Performed by: INTERNAL MEDICINE

## 2017-10-17 RX ADMIN — DRONABINOL 2.5 MG: 2.5 CAPSULE ORAL at 16:54

## 2017-10-17 RX ADMIN — DRONABINOL 2.5 MG: 2.5 CAPSULE ORAL at 12:21

## 2017-10-17 RX ADMIN — PREGABALIN 150 MG: 150 CAPSULE ORAL at 19:41

## 2017-10-17 RX ADMIN — OXYCODONE HYDROCHLORIDE 10 MG: 10 TABLET ORAL at 07:52

## 2017-10-17 RX ADMIN — PREGABALIN 150 MG: 150 CAPSULE ORAL at 07:52

## 2017-10-17 RX ADMIN — HYDROMORPHONE HYDROCHLORIDE 2 MG: 2 TABLET ORAL at 03:53

## 2017-10-17 RX ADMIN — METHADONE HYDROCHLORIDE 20 MG: 10 TABLET ORAL at 19:41

## 2017-10-17 RX ADMIN — ACETAMINOPHEN 650 MG: 325 TABLET, FILM COATED ORAL at 06:00

## 2017-10-17 RX ADMIN — Medication 325 MG: at 16:54

## 2017-10-17 RX ADMIN — METHADONE HYDROCHLORIDE 20 MG: 10 TABLET ORAL at 07:52

## 2017-10-17 RX ADMIN — OXYCODONE HYDROCHLORIDE 5 MG: 5 TABLET ORAL at 21:54

## 2017-10-17 RX ADMIN — METHADONE HYDROCHLORIDE 20 MG: 10 TABLET ORAL at 15:03

## 2017-10-17 RX ADMIN — LIDOCAINE 1 PATCH: 50 PATCH CUTANEOUS at 12:21

## 2017-10-17 RX ADMIN — ACETAMINOPHEN 650 MG: 325 TABLET, FILM COATED ORAL at 12:21

## 2017-10-17 RX ADMIN — GABAPENTIN 900 MG: 300 CAPSULE ORAL at 19:41

## 2017-10-17 RX ADMIN — OXYCODONE HYDROCHLORIDE 5 MG: 5 TABLET ORAL at 17:48

## 2017-10-17 RX ADMIN — CLONAZEPAM 2 MG: 1 TABLET ORAL at 19:41

## 2017-10-17 RX ADMIN — GABAPENTIN 900 MG: 300 CAPSULE ORAL at 15:03

## 2017-10-17 RX ADMIN — PREGABALIN 150 MG: 150 CAPSULE ORAL at 15:03

## 2017-10-17 RX ADMIN — GABAPENTIN 900 MG: 300 CAPSULE ORAL at 07:51

## 2017-10-17 RX ADMIN — Medication 325 MG: at 07:52

## 2017-10-17 RX ADMIN — OXYCODONE HYDROCHLORIDE 5 MG: 5 TABLET ORAL at 12:21

## 2017-10-17 RX ADMIN — CLONAZEPAM 2 MG: 1 TABLET ORAL at 07:52

## 2017-10-17 RX ADMIN — ACETAMINOPHEN 650 MG: 325 TABLET, FILM COATED ORAL at 17:47

## 2017-10-17 ASSESSMENT — ENCOUNTER SYMPTOMS
VOMITING: 0
HEADACHES: 0
SHORTNESS OF BREATH: 0
DOUBLE VISION: 0
DEPRESSION: 0
MYALGIAS: 0
EYE PAIN: 0
INSOMNIA: 0
BACK PAIN: 1
CHILLS: 0
SORE THROAT: 0
BLURRED VISION: 0
ABDOMINAL PAIN: 0
NAUSEA: 0
PALPITATIONS: 0
TINGLING: 0
NECK PAIN: 0
COUGH: 0
DIZZINESS: 0
FEVER: 0

## 2017-10-17 ASSESSMENT — GAIT ASSESSMENTS
GAIT LEVEL OF ASSIST: SUPERVISED
DISTANCE (FEET): 150
ASSISTIVE DEVICE: FRONT WHEEL WALKER
DEVIATION: ANTALGIC;BRADYKINETIC;DECREASED HEEL STRIKE;DECREASED TOE OFF

## 2017-10-17 ASSESSMENT — PAIN SCALES - GENERAL
PAINLEVEL_OUTOF10: 7
PAINLEVEL_OUTOF10: 5
PAINLEVEL_OUTOF10: 7
PAINLEVEL_OUTOF10: 6
PAINLEVEL_OUTOF10: 6
PAINLEVEL_OUTOF10: 4

## 2017-10-17 NOTE — THERAPY
"Pt agreeable to tx today. Pt found up in WC. Pt functions @ [facility] in room/WC. Pt amb has improved. Pt amb @ PSV level this tx. Pt required extra time for amb. Pt cont to demonstrate L Eweakness when amb, especially in quads. Pt cont to struggle w/hip flexion of B LEs which causes decreased heel-toe off. Pt cont to drag R foot and required verbal cuing and therapist demonstration to flex R hip to advance R LE. Pt able to flex R knee when amb this session. Pt required verbal cuing to ocrrect gait, as pt tends to push FWW too far anteriorly which causes gait disturbance and further distance to advance to. Pt receptive to cuing. Pt cont to compensate for LE weakness and use QL to advance R LE when fatigued.Pt fatigued easily and required 1 seated break. Pt able to amb same distance as prev tx. Pt amb tolerance has improved slightly, however pt does fatigue. Instructed pt to perform LAQ and seated marching w/prosthetic on to adapt to wt of prosthetic when amb. Educated pt on effects of wearing prosthetic too long and to take breaks from it as to not cause increased swelling to residual limb. Pt receptive. Pt would benefit from further acute PT txs to progress towards goals and independence.    Physical Therapy Treatment completed.   Bed Mobility:  Supine to Sit: Modified Independent  Transfers: Sit to Stand: Supervised  Gait: Level Of Assist: Supervised with Front-Wheel Walker       Plan of Care: Will benefit from Physical Therapy 4 times per week      See \"Rehab Therapy-Acute\" Patient Summary Report for complete documentation.       "

## 2017-10-17 NOTE — CARE PLAN
Problem: Discharge Barriers/Planning  Goal: Patient's continuum of care needs will be met    Intervention: Involve patient and significant other/support system in setting and prioritizing goals for hospital stay and discharge  Pt aware of probable discharge tomorrow, however he states he is not ready to go, and does not feel like it is okay for him to leave tomorrow.

## 2017-10-17 NOTE — CARE PLAN
Problem: Pain Management  Goal: Pain level will decrease to patient's comfort goal    Intervention: Follow pain managment plan developed in collaboration with patient and Interdisciplinary Team  Pt asks for PRN pain medication as needed and when appropriate.

## 2017-10-17 NOTE — DISCHARGE PLANNING
Received call from yvrose Cutler for [employer]  . She states they can offer no transportation assistance out of area, but can assist once he is back in [place]. Need to discuss transportation with supervisor.

## 2017-10-17 NOTE — DISCHARGE PLANNING
[facility] received transport form to arranger transportation to transport the patient [place] [employer] [facility]. [facility] emailed the transport form to [facility].

## 2017-10-17 NOTE — DISCHARGE PLANNING
Received call from [employer], they will send message to local [employer] to deliver W/C asap.  Made reservations for pt to travel via [employer] from [place] to [place] tomorrow 10/18/2017. Time of departure 2:45pm , Reservation # [id number]. Approved services to cover cost of $47.60 via credit card ([employer].) Will arrange for [facility] van to take pt to [employer] station.

## 2017-10-17 NOTE — PROGRESS NOTES
[facility]ist Progress Note    Date of Service: 10/17/2017    Chief Complaint  52 y.o. male presented 2017 with right BKA stump pain and wound drainage in addition to left TMA stump wound (nonhealing).  treated for Mrsa, pseudomonas leg infxn and completed abx. Admission complicated by drug seeking behavior, Difficult discharge and Patient has history of PVD s/p L TMA and R BKA more than 5 yrs ago in [place].      Interval Problem Update  States that therapy has never seen him today. We discussed stopping pain meds.     Consultants/Specialty  Ortho  Wound care    Disposition  Difficult discharge.    Hopefully home tomorrow.         Review of Systems   Constitutional: Negative for chills and fever.   HENT: Negative for sore throat.    Eyes: Negative for blurred vision, double vision and pain.   Respiratory: Negative for cough and shortness of breath.    Cardiovascular: Negative for chest pain and palpitations.   Gastrointestinal: Negative for abdominal pain, nausea and vomiting.   Genitourinary: Negative for dysuria and urgency.   Musculoskeletal: Positive for back pain. Negative for joint pain, myalgias (bilateral lower legs) and neck pain.        Remains the same   Skin: Negative for itching and rash.   Neurological: Negative for dizziness, tingling and headaches.   Psychiatric/Behavioral: Negative for depression. The patient does not have insomnia.    All other systems reviewed and are negative.     Physical Exam  Laboratory/Imaging   Hemodynamics  Temp (24hrs), Av.9 °C (98.5 °F), Min:36.9 °C (98.4 °F), Max:37 °C (98.6 °F)   Temperature: 37 °C (98.6 °F)  Pulse  Av.2  Min: 58  Max: 114    Blood Pressure: 102/70      Respiratory      Respiration: 18, Pulse Oximetry: 94 %        RUL Breath Sounds: Diminished, RML Breath Sounds: Diminished, RLL Breath Sounds: Diminished, NANCY Breath Sounds: Diminished, LLL Breath Sounds: Diminished    Fluids    Intake/Output Summary (Last 24 hours) at 10/17/17  0757  Last data filed at 10/16/17 1642   Gross per 24 hour   Intake                0 ml   Output             1400 ml   Net            -1400 ml       Nutrition  Orders Placed This Encounter   Procedures   • DIET ORDER     Standing Status:   Standing     Number of Occurrences:   1     Order Specific Question:   Diet:     Answer:   Regular [1]     Comments:   regular trays     Physical Exam   Constitutional: He is oriented to person, place, and time. He appears well-developed and well-nourished. No distress.   Overweight, laying in bed again today   HENT:   Right Ear: External ear normal.   Left Ear: External ear normal.   Nose: Nose normal.   Mouth/Throat: No oropharyngeal exudate.   Eyes: Conjunctivae are normal. Right eye exhibits no discharge. Left eye exhibits no discharge. No scleral icterus.   Neck: Normal range of motion. Neck supple. No JVD present. No tracheal deviation present.   Cardiovascular: Normal rate, regular rhythm, normal heart sounds and intact distal pulses.    No murmur heard.  Pulmonary/Chest: Effort normal and breath sounds normal. No respiratory distress. He has no wheezes. He has no rales.   Abdominal: Soft. Bowel sounds are normal. He exhibits no distension. There is no tenderness. There is no guarding.   Musculoskeletal: He exhibits no edema or tenderness.   Right BKA.  Left foot with dressing over trans metatarsal amputated area.     Neurological: He is alert and oriented to person, place, and time. No cranial nerve deficit.   Right eye blindness    Skin: Skin is warm and dry. No rash noted. He is not diaphoretic. No erythema.   No change in wounds, RBKA stump appears well healed and no changes appreciated  L stump, refer to media   Psychiatric:   Irritable. Tangential on occasion.    Nursing note and vitals reviewed.      Recent Labs      10/15/17   0417   WBC  6.2   RBC  5.36   HEMOGLOBIN  14.3   HEMATOCRIT  43.7   MCV  81.5   MCH  26.7*   MCHC  32.7*   RDW  42.6   PLATELETCT  289   MPV   9.5     Recent Labs      10/15/17   0417   SODIUM  139   POTASSIUM  3.7   CHLORIDE  103   CO2  27   GLUCOSE  140*   BUN  22   CREATININE  0.84   CALCIUM  9.0                      Assessment/Plan     * Skin ulcer of left foot with fat layer exposed (CMS-HCC)- (present on admission)   Assessment & Plan    -L stump is healing well, new alginate appears to be accelerating wound healing, no changes noted today, afebrile, will do another wound dressing change today  -aggressive wound care, all wound vacs off  -continue to encourage ambulation, working on fixing prosthetic   Nutrition support  -see pain control as outlined above  -working on prosthetic leg for patient, SW working on acquiring a wheelchair as well, doing well            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)- (present on admission)   Assessment & Plan    cw lyrica , neurontin, appears to be tolerating well        Anxiety- (present on admission)   Assessment & Plan    Re assurance, bit worse today since patient is apprehensive about his discharge plan to [place], [place]  cw Klonopin        Opiate dependence (CMS-HCC)- (present on admission)   Assessment & Plan    Methadone w prn oral oxycodone, dilaudid -decrease even further today  prn  Morphine IM for dressing changes only, pain appears well controlled  -NO INCREASE IN PAIN MEDS with IV  Continue this dose of the gabapentin at 800 mg TID  -highly manipulative behavior  -attempt multi-modal pain therapy  -continue low dose marinol to help with pain and appetite improvement  -NO other changes to pain medications today        Pseudomonas infection- (present on admission)   Assessment & Plan    finished Cefepime 8/25        MRSA (methicillin resistant staph aureus) culture positive- (present on admission)   Assessment & Plan    Completed Doxycycline 8/25        Noncompliance- (present on admission)   Assessment & Plan    Counseling done on risks and complications        Wound of right  leg, at BKA site- (present on admission)   Assessment & Plan    s/p BKA revision w debridement.   Follow clinically   Prosthetic Tech evaluating patient for fit, apparently having issues        Tobacco abuse- (present on admission)   Assessment & Plan    Nicotine replacement   Cessation counseling provided        Peripheral neuropathy (CMS-HCC)- (present on admission)   Assessment & Plan    Lyrica, neurontin.   No narcotics for neuropathy.            Reviewed items::  Labs reviewed and Medications reviewed  Rolon catheter::  No Rolon  DVT prophylaxis pharmacological::  Enoxaparin (Lovenox)

## 2017-10-17 NOTE — CARE PLAN
Problem: Infection  Goal: Will remain free from infection  Outcome: PROGRESSING AS EXPECTED  Pt washes hands frequently, exhibits no signs/symptoms of infection.     Problem: Mobility  Goal: Risk for activity intolerance will decrease  Outcome: PROGRESSING AS EXPECTED  Pt walked twice today with PT using prosthesis.

## 2017-10-17 NOTE — DISCHARGE PLANNING
Per request, called [facility] spoke with [facility], states wheelchair has been scheduled for delivery today.  Arlen was unable to give time of delivery, she is aware patient is ready for discharged today.

## 2017-10-17 NOTE — DISCHARGE PLANNING
Transportation has been arranged to transport the patient to [facility] tomorrow at 1:15 pm via the [facility] CTTM on floor has been notified.

## 2017-10-18 VITALS
HEART RATE: 96 BPM | SYSTOLIC BLOOD PRESSURE: 116 MMHG | TEMPERATURE: 98.3 F | WEIGHT: 207.67 LBS | DIASTOLIC BLOOD PRESSURE: 65 MMHG | RESPIRATION RATE: 17 BRPM | OXYGEN SATURATION: 96 % | HEIGHT: 73 IN | BODY MASS INDEX: 27.52 KG/M2

## 2017-10-18 LAB
ANION GAP SERPL CALC-SCNC: 10 MMOL/L (ref 0–11.9)
BUN SERPL-MCNC: 23 MG/DL (ref 8–22)
CALCIUM SERPL-MCNC: 9.4 MG/DL (ref 8.5–10.5)
CHLORIDE SERPL-SCNC: 105 MMOL/L (ref 96–112)
CO2 SERPL-SCNC: 27 MMOL/L (ref 20–33)
CREAT SERPL-MCNC: 0.78 MG/DL (ref 0.5–1.4)
GFR SERPL CREATININE-BSD FRML MDRD: >60 ML/MIN/1.73 M 2
GLUCOSE SERPL-MCNC: 125 MG/DL (ref 65–99)
POTASSIUM SERPL-SCNC: 4 MMOL/L (ref 3.6–5.5)
SODIUM SERPL-SCNC: 142 MMOL/L (ref 135–145)

## 2017-10-18 PROCEDURE — 700102 HCHG RX REV CODE 250 W/ 637 OVERRIDE(OP): Performed by: INTERNAL MEDICINE

## 2017-10-18 PROCEDURE — 700102 HCHG RX REV CODE 250 W/ 637 OVERRIDE(OP): Performed by: HOSPITALIST

## 2017-10-18 PROCEDURE — A9270 NON-COVERED ITEM OR SERVICE: HCPCS | Performed by: HOSPITALIST

## 2017-10-18 PROCEDURE — 80048 BASIC METABOLIC PNL TOTAL CA: CPT

## 2017-10-18 PROCEDURE — A9270 NON-COVERED ITEM OR SERVICE: HCPCS | Performed by: INTERNAL MEDICINE

## 2017-10-18 PROCEDURE — 99239 HOSP IP/OBS DSCHRG MGMT >30: CPT | Performed by: HOSPITALIST

## 2017-10-18 PROCEDURE — 36415 COLL VENOUS BLD VENIPUNCTURE: CPT

## 2017-10-18 PROCEDURE — 700111 HCHG RX REV CODE 636 W/ 250 OVERRIDE (IP): Performed by: HOSPITALIST

## 2017-10-18 PROCEDURE — 700101 HCHG RX REV CODE 250: Performed by: HOSPITALIST

## 2017-10-18 RX ORDER — CLONAZEPAM 1 MG/1
2 TABLET ORAL DAILY
Status: DISCONTINUED | OUTPATIENT
Start: 2017-10-19 | End: 2017-10-18 | Stop reason: HOSPADM

## 2017-10-18 RX ORDER — GABAPENTIN 300 MG/1
900 CAPSULE ORAL 3 TIMES DAILY
Qty: 90 CAP | Refills: 1 | Status: SHIPPED | OUTPATIENT
Start: 2017-10-18

## 2017-10-18 RX ORDER — METHADONE HYDROCHLORIDE 10 MG/1
TABLET ORAL
Qty: 24 TAB | Refills: 0 | Status: SHIPPED | OUTPATIENT
Start: 2017-10-18

## 2017-10-18 RX ORDER — CLONAZEPAM 2 MG/1
TABLET ORAL
Qty: 8 TAB | Refills: 0 | Status: SHIPPED | OUTPATIENT
Start: 2017-10-18

## 2017-10-18 RX ADMIN — CLONAZEPAM 2 MG: 1 TABLET ORAL at 08:56

## 2017-10-18 RX ADMIN — PREGABALIN 150 MG: 150 CAPSULE ORAL at 08:57

## 2017-10-18 RX ADMIN — ZOLPIDEM TARTRATE 5 MG: 5 TABLET, FILM COATED ORAL at 02:05

## 2017-10-18 RX ADMIN — LIDOCAINE 1 PATCH: 50 PATCH CUTANEOUS at 11:44

## 2017-10-18 RX ADMIN — OXYCODONE HYDROCHLORIDE 5 MG: 5 TABLET ORAL at 02:05

## 2017-10-18 RX ADMIN — ACETAMINOPHEN 650 MG: 325 TABLET, FILM COATED ORAL at 11:43

## 2017-10-18 RX ADMIN — METHADONE HYDROCHLORIDE 20 MG: 10 TABLET ORAL at 08:57

## 2017-10-18 RX ADMIN — GABAPENTIN 900 MG: 300 CAPSULE ORAL at 08:57

## 2017-10-18 RX ADMIN — MORPHINE SULFATE 4 MG: 4 INJECTION INTRAVENOUS at 08:22

## 2017-10-18 RX ADMIN — Medication 325 MG: at 08:57

## 2017-10-18 ASSESSMENT — PAIN SCALES - GENERAL
PAINLEVEL_OUTOF10: 6
PAINLEVEL_OUTOF10: 8

## 2017-10-18 ASSESSMENT — LIFESTYLE VARIABLES: EVER_SMOKED: YES

## 2017-10-18 NOTE — CARE PLAN
Problem: Safety  Goal: Will remain free from falls    Intervention: Assess risk factors for falls  Pt uses call light appropriately and asks for help when needed. Staff present to assist Pt when asked.       Problem: Pain Management  Goal: Pain level will decrease to patient's comfort goal    Intervention: Follow pain managment plan developed in collaboration with patient and Interdisciplinary Team  Pt states oxycodone IR 5 mg is controlling his pain, however he asks for it to be given to him whenever it is due to maintain pain control.

## 2017-10-18 NOTE — PROGRESS NOTES
Discharge instructions given to pt at bedside. Educated on follow up appointments with primary dr and wound clinic, medications, and things to watch for after discharge. Pt verbalized understanding and showed his scheduled appointments on his cell phone. Room checked for belongings and noted all belongings packed and pt reading for [facility]  at 13:15.

## 2017-10-18 NOTE — DISCHARGE SUMMARY
CHIEF COMPLAINT ON ADMISSION  No chief complaint on file.      CODE STATUS  Full Code    HPI & HOSPITAL COURSE  This is a 52 y.o. male presented 7/21/2017 with right BKA stump pain and wound drainage in addition to left TMA stump wound (nonhealing). He was treated for Mrsa and pseudomonas leg infxn and completed abx. His admission was complicated by severe drug seeking behavior. He was a difficult discharge with minimal resources but ultimately will be sent home to [place]. He has history of PVD s/p L TMA and R BKA more than 5 yrs ago in [place].  It has been recommended that he tpaer off and remain off opiates and sedatives.     Therefore, he is discharged in good and stable condition with close outpatient follow-up.    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  Pcp Pt States None    Schedule an appointment as soon as possible for a visit  Once you have establized pcp in [place] you need to call as soon as possbile to make an appointment and get referral to wound clinc      MEDICATIONS ON DISCHARGE   Yariel Cheung   [facility] Medication Instructions ANNE-MARIE:[id number]    Printed on:10/18/17 5574   Medication Information                      clonazepam (KLONOPIN) 2 MG tablet  Take one per day for 3 days then 1/2 per day for 4 days then 1/2  every other day for 6 days then stop taking.             gabapentin (NEURONTIN) 300 MG Cap  Take 3 Caps by mouth 3 times a day.             methadone (DOLOPHINE) 10 MG Tab  Take 1 tab 2 times per day for 7 days then   1 tab per day for 7 days then  1/2 tab per day for 6 days then  Stop taking                 DIET  Orders Placed This Encounter   Procedures   • DIET ORDER     Standing Status:   Standing     Number of Occurrences:   1     Order Specific Question:   Diet:     Answer:   Regular [1]     Comments:   regular trays       ACTIVITY  As tolerated.  Weight bearing as tolerated      CONSULTATIONS  [facility]avez hamlin- orthopedics  Physiatry and rehab- jose acosta    PROCEDURES  I/d left plantar foot ulcer  Right BKA wound revision      LABORATORY  Lab Results   Component Value Date/Time    SODIUM 142 10/18/2017 04:48 AM    POTASSIUM 4.0 10/18/2017 04:48 AM    CHLORIDE 105 10/18/2017 04:48 AM    CO2 27 10/18/2017 04:48 AM    GLUCOSE 125 (H) 10/18/2017 04:48 AM    BUN 23 (H) 10/18/2017 04:48 AM    CREATININE 0.78 10/18/2017 04:48 AM        Lab Results   Component Value Date/Time    WBC 6.2 10/15/2017 04:17 AM    HEMOGLOBIN 14.3 10/15/2017 04:17 AM    HEMATOCRIT 43.7 10/15/2017 04:17 AM    PLATELETCT 289 10/15/2017 04:17 AM        Total time of the discharge process exceeds 40 minutes

## 2017-10-18 NOTE — DISCHARGE INSTRUCTIONS
Discharge Instructions    Discharged to home by [facility] with self. Discharged via wheelchair, hospital escort: Yes.  Special equipment needed: Wheelchair    Be sure to schedule a follow-up appointment with your primary care doctor or any specialists as instructed.     Discharge Plan:   Pneumococcal Vaccine Given - only chart on this line when given: Given (See MAR)  Influenza Vaccine Indication: Patient Refuses    I understand that a diet low in cholesterol, fat, and sodium is recommended for good health. Unless I have been given specific instructions below for another diet, I accept this instruction as my diet prescription.   Other diet: Regular    Special Instructions: None    · Is patient discharged on Warfarin / Coumadin?   No     · Is patient Post Blood Transfusion?  No    Depression / Suicide Risk    As you are discharged from this [facility], it is important to learn how to keep safe from harming yourself.    Recognize the warning signs:  · Abrupt changes in personality, positive or negative- including increase in energy   · Giving away possessions  · Change in eating patterns- significant weight changes-  positive or negative  · Change in sleeping patterns- unable to sleep or sleeping all the time   · Unwillingness or inability to communicate  · Depression  · Unusual sadness, discouragement and loneliness  · Talk of wanting to die  · Neglect of personal appearance   · Rebelliousness- reckless behavior  · Withdrawal from people/activities they love  · Confusion- inability to concentrate     If you or a loved one observes any of these behaviors or has concerns about self-harm, here's what you can do:  · Talk about it- your feelings and reasons for harming yourself  · Remove any means that you might use to hurt yourself (examples: pills, rope, extension cords, firearm)  · Get professional help from the community (Mental Health, Substance Abuse, psychological counseling)  · Do not be alone:Call  your Safe Contact- someone whom you trust who will be there for you.  · Call your local CRISIS HOTLINE 226-1215 or 615-670-4530  · Call your local Children's Mobile Crisis Response Team [place] (447) 786-4927 or www.CareTree  · Call the toll free National Suicide Prevention Hotlines   · National Suicide Prevention Lifeline 047-206-XUTV (6994)  · National Hope Line Network 800-SUICIDE (478-1998)    Clonazepam tablets  What is this medicine?  CLONAZEPAM (kloe NA ze keren) is a benzodiazepine. It is used to treat certain types of seizures. It is also used to treat panic disorder.  This medicine may be used for other purposes; ask your health care provider or pharmacist if you have questions.  COMMON BRAND NAME(S): Ceberclon , Klonopin  What should I tell my health care provider before I take this medicine?  They need to know if you have any of these conditions:  -an alcohol or drug abuse problem  -bipolar disorder, depression, psychosis or other mental health condition  -glaucoma  -kidney or liver disease  -lung or breathing disease  -myasthenia gravis  -Parkinson's disease  -seizures or a history of seizures  -suicidal thoughts  -an unusual or allergic reaction to clonazepam, other benzodiazepines, foods, dyes, or preservatives  -pregnant or trying to get pregnant  -breast-feeding  How should I use this medicine?  Take this medicine by mouth with a glass of water. Follow the directions on the prescription label. If it upsets your stomach, take it with food or milk. Take your medicine at regular intervals. Do not take it more often than directed. Do not stop taking or change the dose except on the advice of your doctor or health care professional.  A special MedGuide will be given to you by the pharmacist with each prescription and refill. Be sure to read this information carefully each time.  Talk to your pediatrician regarding the use of this medicine in children. Special care may be needed.  Overdosage:  If you think you have taken too much of this medicine contact a poison control center or emergency room at once.  NOTE: This medicine is only for you. Do not share this medicine with others.  What if I miss a dose?  If you miss a dose, take it as soon as you can. If it is almost time for your next dose, take only that dose. Do not take double or extra doses.  What may interact with this medicine?  -herbal or dietary supplements  -medicines for depression, anxiety, or psychotic disturbances  -medicines for fungal infections like fluconazole, itraconazole, ketoconazole, voriconazole  -medicines for HIV infection or AIDS  -medicines for sleep  -prescription pain medicines  -propantheline  -rifampin  -sevelamer  -some medicines for seizures like carbamazepine, phenobarbital, phenytoin, primidone  This list may not describe all possible interactions. Give your health care provider a list of all the medicines, herbs, non-prescription drugs, or dietary supplements you use. Also tell them if you smoke, drink alcohol, or use illegal drugs. Some items may interact with your medicine.  What should I watch for while using this medicine?  Visit your doctor or health care professional for regular checks on your progress. Your body may become dependent on this medicine. If you have been taking this medicine regularly for some time, do not suddenly stop taking it. You must gradually reduce the dose or you may get severe side effects. Ask your doctor or health care professional for advice before increasing or decreasing the dose. Even after you stop taking this medicine it can still affect your body for several days.  If you suffer from several types of seizures, this medicine may increase the chance of grand mal seizures (epilepsy). Let your doctor or health care professional know, he or she may want to prescribe an additional medicine.  You may get drowsy or dizzy. Do not drive, use machinery, or do anything that needs mental  alertness until you know how this medicine affects you. To reduce the risk of dizzy and fainting spells, do not stand or sit up quickly, especially if you are an older patient. Alcohol may increase dizziness and drowsiness. Avoid alcoholic drinks.  Do not treat yourself for coughs, colds or allergies without asking your doctor or health care professional for advice. Some ingredients can increase possible side effects.  The use of this medicine may increase the chance of suicidal thoughts or actions. Pay special attention to how you are responding while on this medicine. Any worsening of mood, or thoughts of suicide or dying should be reported to your health care professional right away.  Women who become pregnant while using this medicine may enroll in the [place] Antiepileptic Drug Pregnancy Registry by calling 1-800.859.6138. This registry collects information about the safety of antiepileptic drug use during pregnancy.  What side effects may I notice from receiving this medicine?  Side effects that you should report to your doctor or health care professional as soon as possible:  -allergic reactions like skin rash, itching or hives, swelling of the face, lips, or tongue  -changes in vision  -confusion  -depression  -hallucinations  -mood changes, excitability or aggressive behavior  -movement difficulty, staggering or jerky movements  -muscle cramps, weakness  -tremors  -unusual eye movements  Side effects that usually do not require medical attention (report to your doctor or health care professional if they continue or are bothersome):  -constipation or diarrhea  -difficulty sleeping, nightmares  -dizziness, drowsiness  -headache  -increased saliva from your mouth  -nausea, vomiting  This list may not describe all possible side effects. Call your doctor for medical advice about side effects. You may report side effects to FDA at 2-721-FDA-9150.  Where should I keep my medicine?  Keep out of the reach of  children. This medicine can be abused. Keep your medicine in a safe place to protect it from theft. Do not share this medicine with anyone. Selling or giving away this medicine is dangerous and against the law.  Store at room temperature between 15 and 30 degrees C (59 and 86 degrees F). Protect from light. Keep container tightly closed. Throw away any unused medicine after the expiration date.  NOTE: This sheet is a summary. It may not cover all possible information. If you have questions about this medicine, talk to your doctor, pharmacist, or health care provider.  © 2014, Elsevier/Gold Standard. (11/12/2010 7:16:36 PM)  Gabapentin capsules or tablets  What is this medicine?  GABAPENTIN (GA ba pen tin) is used to control partial seizures in adults with epilepsy. It is also used to treat certain types of nerve pain.  This medicine may be used for other purposes; ask your health care provider or pharmacist if you have questions.  COMMON BRAND NAME(S): Gabarone , Neurontin  What should I tell my health care provider before I take this medicine?  They need to know if you have any of these conditions:  -kidney disease  -suicidal thoughts, plans, or attempt; a previous suicide attempt by you or a family member  -an unusual or allergic reaction to gabapentin, other medicines, foods, dyes, or preservatives  -pregnant or trying to get pregnant  -breast-feeding  How should I use this medicine?  Take this medicine by mouth. Swallow it with a drink of water. Follow the directions on the prescription label. If this medicine upsets your stomach, take it with food or milk. Take your medicine at regular intervals. Do not take it more often than directed.  If you are directed to break the 600 or 800 mg tablets in half as part of your dose, the extra half tablet should be used for the next dose. If you have not used the extra half tablet within 3 days, it should be thrown away.  A special MedGuide will be given to you by the  pharmacist with each prescription and refill. Be sure to read this information carefully each time.  Talk to your pediatrician regarding the use of this medicine in children. Special care may be needed.  Overdosage: If you think you have taken too much of this medicine contact a poison control center or emergency room at once.  NOTE: This medicine is only for you. Do not share this medicine with others.  What if I miss a dose?  If you miss a dose, take it as soon as you can. If it is almost time for your next dose, take only that dose. Do not take double or extra doses.  What may interact with this medicine?  Do not take this medicine with any of the following medications:  -other gabapentin products  This medicine may also interact with the following medications:  -alcohol  -antacids  -antihistamines for allergy, cough and cold  -certain medicines for anxiety or sleep  -certain medicines for depression or psychotic disturbances  -homatropine; hydrocodone  -naproxen  -narcotic medicines (opiates) for pain  -phenothiazines like chlorpromazine, mesoridazine, prochlorperazine, thioridazine  This list may not describe all possible interactions. Give your health care provider a list of all the medicines, herbs, non-prescription drugs, or dietary supplements you use. Also tell them if you smoke, drink alcohol, or use illegal drugs. Some items may interact with your medicine.  What should I watch for while using this medicine?  Visit your doctor or health care professional for regular checks on your progress. You may want to keep a record at home of how you feel your condition is responding to treatment. You may want to share this information with your doctor or health care professional at each visit. You should contact your doctor or health care professional if your seizures get worse or if you have any new types of seizures. Do not stop taking this medicine or any of your seizure medicines unless instructed by your  doctor or health care professional. Stopping your medicine suddenly can increase your seizures or their severity.  Wear a medical identification bracelet or chain if you are taking this medicine for seizures, and carry a card that lists all your medications.  You may get drowsy, dizzy, or have blurred vision. Do not drive, use machinery, or do anything that needs mental alertness until you know how this medicine affects you. To reduce dizzy or fainting spells, do not sit or stand up quickly, especially if you are an older patient. Alcohol can increase drowsiness and dizziness. Avoid alcoholic drinks.  Your mouth may get dry. Chewing sugarless gum or sucking hard candy, and drinking plenty of water will help.  The use of this medicine may increase the chance of suicidal thoughts or actions. Pay special attention to how you are responding while on this medicine. Any worsening of mood, or thoughts of suicide or dying should be reported to your health care professional right away.  Women who become pregnant while using this medicine may enroll in the [place] American Antiepileptic Drug Pregnancy Registry by calling 1-793.150.9380. This registry collects information about the safety of antiepileptic drug use during pregnancy.  What side effects may I notice from receiving this medicine?  Side effects that you should report to your doctor or health care professional as soon as possible:  -allergic reactions like skin rash, itching or hives, swelling of the face, lips, or tongue  -worsening of mood, thoughts or actions of suicide or dying  Side effects that usually do not require medical attention (report to your doctor or health care professional if they continue or are bothersome):  -constipation  -difficulty walking or controlling muscle movements  -dizziness  -nausea  -slurred speech  -tiredness  -tremors  -weight gain  This list may not describe all possible side effects. Call your doctor for medical advice about side  effects. You may report side effects to FDA at 0-390-FDA-8958.  Where should I keep my medicine?  Keep out of reach of children.  Store at room temperature between 15 and 30 degrees C (59 and 86 degrees F). Throw away any unused medicine after the expiration date.  NOTE: This sheet is a summary. It may not cover all possible information. If you have questions about this medicine, talk to your doctor, pharmacist, or health care provider.  © 2014, Elsevier/Gold Standard. (1/9/2013 11:43:23 AM)  Methadone tablets  What is this medicine?  METHADONE (METH a done) is a pain reliever. It is used to treat severe pain. The medicine is also used to prevent withdrawal symptoms in people addicted to other drugs.  This medicine may be used for other purposes; ask your health care provider or pharmacist if you have questions.  COMMON BRAND NAME(S): Dolophine, Methadose  What should I tell my health care provider before I take this medicine?  They need to know if you have any of these conditions:  -adrenal gland problem ([place]'s disease)  -brain tumor  -drug abuse or addiction  -fast or irregular heartbeat  -gallbladder disease  -head injury  -frequently drink alcohol-containing drinks  -kidney disease or problems going to the bathroom  -liver disease  -low blood pressure  -lung disease, asthma, COPD, or sleep apnea  -mental problems  -seizure disorder  -thyroid disease  -an unusual or allergic reaction to methadone, other opioid analgesics, other medicines, foods, dyes, or preservatives  -pregnant or trying to get pregnant  -breast-feeding  How should I use this medicine?  Take this medicine by mouth with a drink of water. If the medicine upsets your stomach, take it with food or milk. Follow the directions on the prescription label. Do not take more medicine than you are told to take.  A special MedGuide will be given to you by the pharmacist with each prescription and refill. Be sure to read this information carefully each  time.  Talk to your pediatrician regarding the use of this medicine in children. Special care may be needed.  Overdosage: If you think you have taken too much of this medicine contact a poison control center or emergency room at once.  NOTE: This medicine is only for you. Do not share this medicine with others.  What if I miss a dose?  If you miss a dose, take it as soon as you can. If it is almost time for your next dose, take only that dose. Do not take double or extra doses.  What may interact with this medicine?  Do not take this medicine with any of the following medications:  -antibiotics like chloroquine, clarithromycin, erythromycin, grepafloxacin, pentamidine, sparfloxacin, troleandomycin  -arsenic trioxide  -cisapride  -droperidol  -halofantrine  -haloperidol  -medicines for irregular heart beat like amiodarone, bretylium, disopyramide, dofetilide, procainamide, quinidine, sotalol  -pimozide  -ranolazine  -rasagiline  -selegiline  -sertindole  -ziprasidone  This medicine may also interact with the following medications:  -alcohol  -alfuzosin  -antibiotics like gatifloxacin, gemifloxacin, levofloxacin, mefloquine, moxifloxacin, ofloxacin, telithromycin  -antihistamines for allergy, cough and cold  -desipramine  -MAOIs like Carbex, Eldepryl, Marplan, Nardil, and Parnate  -medicines for blood pressure  -medicines for depression, anxiety, or psychotic disturbances  -medicines for irregular heart beat like flecainide, propafenone  -medicines for nausea or vomiting like dolasetron, ondansetron, palonosetron  -medicines for seizures like carbamazepine, phenobarbital, phenytoin  -medicines for sleep  -medicines for sleep during surgery  -medicines to numb the skin  -muscle relaxants  -narcotic medicines (opiates) for pain  -octreotide  -peginterferon Marcos-2b  -phenothiazines like chlorpromazine, mesoridazine, prochlorperazine, thioridazine  -rifampin, rifapentine  -some medicines for cancer like dasatinib,  lapatinib, sunitinib  -some medicines for HIV like delavirdine, didanosine, efavirenz, nevirapine  -Kay's wort  -tacrolimus  -tramadol  -vardenafil  -vorinostat  This list may not describe all possible interactions. Give your health care provider a list of all the medicines, herbs, non-prescription drugs, or dietary supplements you use. Also tell them if you smoke, drink alcohol, or use illegal drugs. Some items may interact with your medicine.  What should I watch for while using this medicine?  Tell your doctor or health care professional if your pain does not go away, if it gets worse, or if you have new or a different type of pain. You may develop tolerance to the medicine. Tolerance means that you will need a higher dose of the medicine for pain relief. Tolerance is normal and is expected if you take this medicine for a long time.  Talk to your family and the people you live with about the side effects of this medicine. Tell them to get you medical help right away if you are having trouble breathing, unusually loud snoring, or are too sleepy.  Do not suddenly stop taking your medicine because you may develop a severe reaction. Your body becomes used to the medicine. This does NOT mean you are addicted. Addiction is a behavior related to getting and using a drug for a non-medical reason. If you have pain, you have a medical reason to take pain medicine. Your doctor will tell you how much medicine to take. If your doctor wants you to stop the medicine, the dose will be slowly lowered over time to avoid any side effects.  You may get drowsy or dizzy when you first start taking this medicine or change doses. Do not drive, use machinery, or do anything that may be dangerous until you know how the medicine affects you. Stand or sit up slowly.  There are different types of narcotic medicines (opiates) for pain. If you take more than one type at the same time, you may have more side effects. Give your health care  provider a list of all medicines you use. Your doctor will tell you how much medicine to take. Do not take more medicine than directed. Call emergency for help if you have problems breathing.  This medicine will cause constipation. Try to have a bowel movement at least every 2 to 3 days. If you do not have a bowel movement for 3 days, call your doctor or health care professional.  Your mouth may get dry. Chewing sugarless gum or sucking hard candy, and drinking plenty of water may help. Contact your doctor if the problem does not go away or is severe.  What side effects may I notice from receiving this medicine?  Side effects that you should report to your doctor or health care professional as soon as possible:  -allergic reactions like skin rash, itching or hives, swelling of the face, lips, or tongue  -breathing problems  -chest pain  -confusion  -feeling faint or lightheaded, falls  -hallucinations  -loud snoring  -unusually fast or slow heartbeat  -unusually weak or tired  Side effects that usually do not require medical attention (report to your doctor or health care professional if they continue or are bothersome):  -nausea, vomiting  -sweating  This list may not describe all possible side effects. Call your doctor for medical advice about side effects. You may report side effects to FDA at 2-461-FDA-6520.  Where should I keep my medicine?  Keep out of the reach of children. This medicine can be abused. Keep your medicine in a safe place to protect it from theft. Do not share this medicine with anyone. Selling or giving away this medicine is dangerous and is against the law.  Store at room temperature between 15 and 30 degrees C (59 and 86 degrees F). Keep container tightly closed.  Discard unused medicine and used packaging carefully. Pets and children can be harmed if they find used or lost packages. Flush any unused medicines down the toilet. Do not use the medicine after the expiration date. Follow the  directions in the MedGuide.  NOTE: This sheet is a summary. It may not cover all possible information. If you have questions about this medicine, talk to your doctor, pharmacist, or health care provider.  © 2014, [employer] Standard. (8/24/2012 3:31:56 PM)  Stump and Prosthesis Care  When an arm or leg is removed, it is important to care for the artificial body part that replaces it (prosthesis) and for the remaining end of the arm or leg (stump). Caring for the stump and prosthesis will help you to be comfortable, active, and healthy.  HOW TO CARE FOR YOUR STUMP  Cleaning Your Skin  · Wash your stump with a mild antibacterial soap at least once per day.  · Wash your stump after getting dirty or sweaty.  · After washing your stump, pat it dry and let it air-dry for 5-10 minutes.  · Do not soak your stump in a warm or hot bath for longer than 20 minutes at a time.  · Avoid shaving hair on the stump. Hair that grows out after being shaved is more easily irritated by the prosthesis.  Using Skin Care Products  · Apply ointment to your surgical scar if your health care provider instructed you to do so. This can keep the scar soft and help it heal.  · Do not put creams and lotions on your stump unless your health care provider says it is okay. If your health care provider says it is okay to put creams and lotions on your stump, do not use lotions that contain petroleum jelly.  · Do not use skin care products with an alcohol base. These products can be harmful to your skin. They can also damage the lining of the prosthesis.  · Consider using an antiperspirant spray on the skin of the stump if you are prone to sweating.  Other Instructions  · Every day, look closely at the skin on your stump. Use a mirror with a long handle to check areas you cannot see, or ask a friend or family member to check those areas. Look for areas that appear reddish, swollen, or irritated. Pay extra attention to places where the stump and  prosthesis rub together.  HOW TO CARE FOR YOUR PROSTHESIS  Cleaning Your Prosthesis  · Use hot water and antibacterial soap to wash your prosthesis.  Attaching Your Prosthesis  · Make sure your prosthesis is clean before you attach it to your stump. All the parts that touch your skin should be clean and dry.  · Be sure you understand how to attach the prosthesis. A prosthetic specialist (prosthetist) can show you how to do this. It is a good idea to practice several times while he or she watches.  · If you were given wraps or socks to wear under the prosthesis, make sure to wear them.  Other Instructions  · Exercise and move your prosthesis as recommended by your physical therapist.  · Follow your health care provider's instructions about the length of time you should wear your prosthesis. You will likely need to limit the amount of time you wear your prosthesis at first. You may be instructed to increase the time you wear your prosthesis a little bit each day.  SEEK MEDICAL CARE IF:  · The prosthesis does not seem to fit correctly.  · You have an itchy rash or a sore on your stump.  · Sweating between the stump and the prosthesis is heavy and efforts to control the sweating do not work.  SEEK IMMEDIATE MEDICAL CARE IF:  · Your stump is red, swollen, painful to the touch, or hot.  · A bad smell develops around the stump.  · There is a sore on your stump that is not healing.  · Your stump is colder than the upper part of the limb.  · Skin on your stump turns gray or black.  · There is any drainage coming from your stump.     This information is not intended to replace advice given to you by your health care provider. Make sure you discuss any questions you have with your health care provider.     Document Released: 03/14/2011 Document Revised: 05/03/2016 Document Reviewed: 12/14/2015  [employer] Interactive Patient Education ©2016 [employer]  MRSA Infection, Adult  MRSA stands for methicillin-resistant  Staphylococcus aureus. This type of infection is caused by Staphylococcus aureus bacteria that are no longer affected by the medicines used to kill them (drug resistant). Staphylococcus (staph) bacteria are normally found on the skin or in the nose of healthy people. In most cases, these bacteria do not cause infection. But if these resistant bacteria enter your body through a cut or sore, they can cause a serious infection on your skin or in other parts of your body. There is a slight chance that the staph on your skin or in your nose is MRSA.  There are two types of MRSA infections:  · Hospital-acquired MRSA is bacteria that you get in the hospital.  · Community-acquired MRSA is bacteria that you get somewhere other than in a hospital.  RISK FACTORS  Hospital-acquired MRSA is more common. You could be at risk for this infection if you are in the hospital and you:  · Have surgery or a procedure.  · Have an IV access or a catheter tube placed in your body.  · Have weak resistance to germs (weakened immune system).  · Are elderly.  · Are on kidney dialysis.  You could be at risk for community-acquired MRSA if you have a break in your skin and come into contact with MRSA. This may happen if you:  · Play sports where there is skin-to-skin contact.  · Live in a crowded setting, like a dormitory or a  barracks.  · Share towels, razors, or sports equipment with other people.  SYMPTOMS   Symptoms of hospital-acquired MRSA depend on where MRSA has spread. Symptoms may include:  · Wound infection.  · Skin infection.  · Rash.  · Pneumonia.  · Fever and chills.  · Difficulty breathing.  · Chest pain.   Community-acquired MRSA is most likely to start as a scratch or cut that becomes infected. Symptoms may include:  · A pus-filled pimple.  · A boil on your skin.  · Pus draining from your skin.  · A sore (abscess) under your skin or somewhere in your body.  · Fever with or without chills.  DIAGNOSIS   The diagnosis of  MRSA is made by taking a sample from an infected area and sending it to a lab for testing. A  can grow (culture) MRSA and check it under a microscope. The cultured MRSA can be tested to see which type of antibiotic medicine will work to treat it. Newer tests can identify MRSA more quickly by testing bacteria samples for MRSA genes. Your health care provider can diagnose MRSA using samples from:   · Cuts or wounds in infected areas.  · Nasal swabs.  · Saliva or cough specimens from deep in the lungs (sputum).  · Urine.  · Blood.  You may also have:  · Imaging studies (such as X-ray or MRI) to check if the infection has spread to the lungs, bones, or joints.  · A culture and sensitivity test of blood or fluids from inside the joints.  TREATMENT   Treatment depends on how severe, deep, or extensive the infection is. Very bad infections may require a hospital stay.  · Some skin infections, such as a small boil or sore (abscess), may be treated by draining pus from the site of the infection.  · More extensive surgery to drain pus may be necessary for deeper or more widespread soft tissue infections.  · You may then have to take antibiotic medicine given by mouth or through a vein. You may start antibiotic treatment right away or after testing can be done to see what antibiotic medicine should be used.  HOME CARE INSTRUCTIONS   · Take your antibiotics as directed by your health care provider. Take the medicine as prescribed until it is finished.  · Avoid close contact with those around you as much as possible. Do not use towels, razors, toothbrushes, bedding, or other items that will be used by others.  · Wash your hands frequently for 15 seconds with soap and water. Dry your hands with a clean or disposable towel.  · When you are not able to wash your hands, use hand  that is more than 60 percent alcohol.  · Wash towels, sheets, or clothes in the washing machine with detergent and hot water. Dry  them in a hot dryer.  · Follow your health care provider's instructions for wound care. Wash your hands before and after changing your bandages.  · Always shower after exercising.  · Keep all cuts and scrapes clean and covered with a bandage.  · Be sure to tell all your health care providers that you have MRSA so they are aware of your infection.  SEEK MEDICAL CARE IF:  · You have a cut, scrape, pimple, or boil that becomes red, swollen, or painful or has pus in it.  · You have pus draining from your skin.  · You have an abscess under your skin or somewhere in your body.  SEEK IMMEDIATE MEDICAL CARE IF:   · You have symptoms of a skin infection with a fever or chills.  · You have trouble breathing.  · You have chest pain.  · You have a skin wound and you become nauseous or start vomiting.  MAKE SURE YOU:  · Understand these instructions.  · Will watch your condition.  · Will get help right away if you are not doing well or get worse.     This information is not intended to replace advice given to you by your health care provider. Make sure you discuss any questions you have with your health care provider.     Document Released: 12/18/2006 Document Revised: 05/03/2016 Document Reviewed: 10/10/2014  [employer] Interactive Patient Education ©2016 [employer]  Wound Infection  A wound infection happens when a type of germ (bacteria) starts growing in the wound. In some cases, this can cause the wound to break open. If cared for properly, the infected wound will heal from the inside to the outside. Wound infections need treatment.  CAUSES  An infection is caused by bacteria growing in the wound.   SYMPTOMS   · Increase in redness, swelling, or pain at the wound site.  · Increase in drainage at the wound site.  · Wound or bandage (dressing) starts to smell bad.  · Fever.  · Feeling tired or fatigued.  · Pus draining from the wound.  TREATMENT   Your health care provider will prescribe antibiotic medicine. The wound  infection should improve within 24 to 48 hours. Any redness around the wound should stop spreading and the wound should be less painful.   HOME CARE INSTRUCTIONS   · Only take over-the-counter or prescription medicines for pain, discomfort, or fever as directed by your health care provider.  · Take your antibiotics as directed. Finish them even if you start to feel better.  · Gently wash the area with mild soap and water 2 times a day, or as directed. Rinse off the soap. Pat the area dry with a clean towel. Do not rub the wound. This may cause bleeding.  · Follow your health care provider's instructions for how often you need to change the dressing.  · Apply ointment and a dressing to the wound as directed.  · If the dressing sticks, moisten it with soapy water and gently remove it.  · Change the bandage right away if it becomes wet, dirty, or develops a bad smell.  · Take showers. Do not take tub baths, swim, or do anything that may soak the wound until it is healed.  · Avoid exercises that make you sweat heavily.  · Use anti-itch medicine as directed by your health care provider. The wound may itch when it is healing. Do not pick or scratch at the wound.  · Follow up with your health care provider to get your wound rechecked as directed.  SEEK MEDICAL CARE IF:  · You have an increase in swelling, pain, or redness around the wound.  · You have an increase in the amount of pus coming from the wound.  · There is a bad smell coming from the wound.  · More of the wound breaks open.  · You have a fever.  MAKE SURE YOU:   · Understand these instructions.  · Will watch your condition.  · Will get help right away if you are not doing well or get worse.     This information is not intended to replace advice given to you by your health care provider. Make sure you discuss any questions you have with your health care provider.     Document Released: 09/15/2004 Document Revised: 12/23/2014 Document Reviewed:  04/22/2012  [employer]vier Interactive Patient Education ©2016 [employer].

## 2017-10-18 NOTE — PROGRESS NOTES
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 Takes methadone he states for back pain.  Not diabetic.  Presented to ED on 7/22/17 with increased pain to L TMA with infected neuropathic ulcer and pain to R BKA.        s/p L foot I & D with VAC placement, GSR by Dr. Shirley on 7/25/17  S/p    Right revision below-knee amputation,  Right lower extremity irrigation and debridement, skin, subcutaneous  tissue to bone on 8/15/17     .  Acell Cytal biologic initiated on 9/13, NPWT discontinued  Weekly applications of Cytal since then with outer dressing changes every 2-3 days PRN     Patient seen today for 6th application  Patient is to be discharged today, going back to [place], [place].  Will be treated for this wound in wound clinic there.      PROCEDURE:  Old dressing removed. Curette used to debride hypertrophic tissue to skin level.  Area debrided 6.6cm2, into the SQ layer. Bleeding controlled with manual pressure.  Wound cleansed with NS. Skin prep applied to periwound  Using sterile technique and assist from wound team tech, a 3.0 x 3.5 cm sheet of Acell Cytal wound matrix, 1-layer, hydrated with NS,  and applied to the wound bed, folded over once.  Adaptic placed over product, secured with steristrips, covered with alginate and then foam, secured with VAC drape .  Foot and ankle wrapped with kerlix and then coban     PRODUCT: Cytal wound matrix 1-layer 3 x 3.5 cm sheet.  REF [id number]  [id number]; LOT [id number]     WOUND DIMENSIONS: 3.0 x 2.2 x >0.1 cm  (photo in EPIC)     Strategies discussed with patient to try to keep dressing clean and intact     PLAN  -Pt to discharge today  -F/u with PCP in [place], will be referred to wound clinic there  -LPS to sign off

## 2017-10-18 NOTE — DISCHARGE PLANNING
The patient is to be picked up by our van at 1:15 PM today to be taken to the [employer] station to go home to [place], [place].  Dr. CESAR Fatima gave me 3 prescriptions for the patient to get filled before he leaves.  They are methadone, clonazepam and gabapentin. I faxed copies to [facility] to have them filled under an Approved Service.    11:43:  The pharmacy called me back and said that the methadone will be $8.97, clonazepam $7.63 and gabapentin $14.68, for a total of $31.28.  My manager, Lachelle Rivas, approved the request and I faxed the Approved Service form to the pharmacy.  Because [facility]'s van won't stop at the pharmacy after they  the patient I will need to go to the pharmacy and pick them up before the patient is picked up at 1:15 PM.    1:08 PM:  I picked up the patient's 3 prescriptions from the [facility] and gave them to the patient.

## 2017-10-18 NOTE — PROGRESS NOTES
Report received from day RN, and care assumed. Pt A&O x 4. Pt denies N/V at this time. Pt complains of 4/10 pain,  which he states is due to his oxycodone 10 and dilaudid being discontinued and being left with only oxycodone 5 mg. Pt requests pain meds as soon as they are available. Vital signs reviewed and are WNL, with the exception of slight tachycardia at 102 bpm. No PIV in place, MD aware and okay with decision. Pt is to discharge tomorrow at 1315 with the [facility] shuttle to [employer].Pt aware of discharge plan. POC discussed with Pt and questions answered. POC includes pain control, rest, and discharge tomorrow. Bed is in the lowest position, rails are up, and call light is within reach. Communication board updated and hourly rounding implemented

## 2018-09-15 NOTE — PROGRESS NOTES
Pt in stable condition, showered this morning, at breakfast in wheelchair, administered alteplase in PICC, will recheck for blood return after 30 minutes, pt wishes to talk to MD during rounds about plan, otherwise no complaints at this time, wound vac dressing change for later this shift, call light within reach, will continue with plan of care    88

## 2019-08-19 NOTE — PROGRESS NOTES
Problem: Patient Care Overview  Goal: Discharge Needs Assessment  Outcome: Ongoing (interventions implemented as appropriate)    Goal: Interprofessional Rounds/Family Conf  Outcome: Ongoing (interventions implemented as appropriate)         Pt's waffle overlay was found to be flat. It was re-inflated and pt reports increased comfort with repositioning. Will continue to monitor.

## 2022-01-17 NOTE — PROGRESS NOTES
Report received from day RN, and care assumed. Pt A&O x 4. Pt denies N/V, and states pain is 6/10 at this time.  Pt medicated with 10 mg oxycodone IR, and PRN Ambien. Vital signs reviewed and are WN, with the expeception of a HR of 101. No IV in place at this time. MD aware and okay with decision. POC discussed with Pt and questions answered. POC includes pain control as needed, rest, and probable discharge in the morning.  Bed is in the lowest position, rails are up, and call light is within reach. Communication board updated and hourly rounding implemented.   oral

## 2022-07-28 NOTE — PROGRESS NOTES
[facility] Hospitalist Progress Note    Date of Service: 9/1/17    Chief Complaint    52 y.o. male hx of PVD s/p L TMA and Rt Bka > 5 years ago  admitted 7/22/2017 with right BKA stump pain and wound drainage in addition to left TMA stump wound (nonhealing) over several months.  s/p Debridement and gastroscoleus resection LLE on 7-25-17 with Dr Shirley. Cultures have grown MRSA / Pseudomonas. He is being treated with IV cefepime and PO doxycycline per ID recommendations. These are to be continued until 08/25/2017. Patient has medical. Difficult disposition. SW continuing to look into placement options at this time. Continuing therapy inpatient at this point in time. Also underwent R BKA revision with Dr Shirley 08/15/2017    Interval Problem Update  Chronic pain-no appreciable changes today. No acute events noted again overnight.     Wounds-no changes. Patient feels it is doing ok right now. Sleeping most of the AM.    Consultants/Specialty  Limb preservation service  Infectious diseases - Last seen by Dr Pino  Orthopedic surgery - Dr Shirley  Psychiatry - Dr Trujillo    Disposition  Difficult disposition. Remains inpatient to continue therapy. SW working on disposition. Discussed on rounds with SW. Continue looking into placement option. No new options today again.       Review of Systems   Constitutional: Negative for diaphoresis and fever.   Eyes:        Vision loss rt eye / Evisceration. Trauma history. No change today   Respiratory: Negative for shortness of breath.    Cardiovascular: Negative for palpitations.   Gastrointestinal: Negative for abdominal pain.   Genitourinary: Negative for dysuria.   Musculoskeletal: Positive for joint pain (chronics).        R BKA  Modest improvement today   Skin: Negative for rash.   Neurological: Negative for tingling and headaches.        Chronic peripheral neuropathy .   Psychiatric/Behavioral: Negative for depression. Nervous/anxious: improved.       Physical Exam   Laboratory/Imaging   Hemodynamics  Temp (24hrs), Av.7 °C (98.1 °F), Min:36.6 °C (97.8 °F), Max:37.1 °C (98.7 °F)   Temperature: 36.6 °C (97.8 °F)  Pulse  Av.9  Min: 61  Max: 111    Blood Pressure: 104/58      Respiratory      Respiration: 18, Pulse Oximetry: 95 %        RUL Breath Sounds: Clear, RML Breath Sounds: Diminished, RLL Breath Sounds: Diminished, NANCY Breath Sounds: Clear, LLL Breath Sounds: Diminished    Fluids    Intake/Output Summary (Last 24 hours) at 17 1428  Last data filed at 17 0559   Gross per 24 hour   Intake              240 ml   Output             1800 ml   Net            -1560 ml       Nutrition  Orders Placed This Encounter   Procedures   • DIET ORDER     Standing Status:   Standing     Number of Occurrences:   1     Order Specific Question:   Diet:     Answer:   Regular [1]     Comments:   regular trays     Physical Exam   Constitutional: He is oriented to person, place, and time. He appears well-developed.   Much calmer today   HENT:   Right Ear: External ear normal.   Left Ear: External ear normal.   Mouth/Throat: Oropharynx is clear and moist.   Eyes: EOM are normal. Right eye exhibits no discharge. Left eye exhibits no discharge.   Rt eye vision loss, enucleated. No changes today   Neck: Normal range of motion. Neck supple.   Cardiovascular: Normal rate, regular rhythm and normal heart sounds.  Exam reveals no gallop.    Pulmonary/Chest: Effort normal. No stridor. No respiratory distress.   Abdominal: Soft. Bowel sounds are normal. There is no tenderness.   Musculoskeletal: He exhibits tenderness and deformity.   Rt leg BKA - incision dry, remains with no dehiscence, no changes appreciated    Left leg w boot wound vac present, scant drainage in the vacuum, unchanged again today   Neurological: He is alert and oriented to person, place, and time. Coordination (Unchanged) abnormal.   Skin: Skin is warm and dry. He is not diaphoretic. There is erythema.   End of stump on  R appears chaffed, wound margins healed. About the same today, wound vac in place   Psychiatric: He has a normal mood and affect. His behavior is normal.                                Assessment/Plan     * Skin ulcer of left foot with fat layer exposed (CMS-[facility])- (present on admission)   Assessment & Plan    S/p OR debridement and gastroscoleus resection 7/25   Continue wound vac care  w changes three times/wk, slowly healing, remains afebrile  Contact isolation for MRSA.  TDWB LLE, WBAT RLE per Ortho recs.  Patient reportedly has restraining orders from much of the Skilled facilites in [place]  Difficult discharge, remained so  Cont to monitor         Pseudomonas infection- (present on admission)   Assessment & Plan    Left foot infection  -Status post IV cefepime which finished on August 25, 2017        MRSA (methicillin resistant staph aureus) culture positive- (present on admission)   Assessment & Plan    Status post doxycycline, finished on August 25, 2017, infectious disease is following        Anxiety- (present on admission)   Assessment & Plan    Depakote, continue Klonopin, psychiatry is following  -tolerating somewhat well and adherent to regimen        Diarrhea- (present on admission)   Assessment & Plan    Resolved        ABRIL (acute kidney injury) (CMS-HCC)   Assessment & Plan    resolved        Opiate dependence (CMS-HCC)- (present on admission)   Assessment & Plan    Noted Drug seeking behavior inpatient-remains adequately controlled at this time  Avoid escalating  IV narcotics with no no signs for pain- use primarily for dressing / vac changes .  Patient's pain has been managed by outpatient pain management in [place]  -Continue methadone at 20 mg 3 times daily, no adjustments necessary today        Wound of right leg, at BKA site- (present on admission)   Assessment & Plan    Culture: VRE  ID following  Wound care  Surgery on  -On Zyvox which is supposed to finish on September 3, only takes in the  evenings, reinforced the importance of adhering to the regimen.  -removed PICC line        Tobacco abuse- (present on admission)   Assessment & Plan    Nicotine replacement PRN  Cessation counseling        Peripheral neuropathy- (present on admission)   Assessment & Plan    Lyrica to cont            Reviewed items::  Labs reviewed and Medications reviewed  Rolno catheter::  No Rolon  DVT prophylaxis pharmacological::  Enoxaparin (Lovenox)      This dictation was created using voice recognition software. The accuracy of the dictation is limited to the abilities of the software. I expect there may be some errors of grammar and possibly content.          STACIE

## 2025-06-02 NOTE — CARE PLAN
Problem: Communication  Goal: The ability to communicate needs accurately and effectively will improve  Outcome: PROGRESSING SLOWER THAN EXPECTED  Pt educated on importance of wearing gown and gloves when leaving the room.     Problem: Pain Management  Goal: Pain level will decrease to patient's comfort goal  Outcome: PROGRESSING AS EXPECTED  Pt rates pain 7/10. Roxicodone 10 mg given.        [facility]  Octavio Bone NP  [address] [place] [address]  [phone]      INTERVAL HPI/OVERNIGHT EVENTS:  Pt s/e with aid at bedside  Pt somnolent thus obtained hx from aid  Aid reports pt has no further nausea or GI bleeding  No new GI complaints    MEDICATIONS  (STANDING):  chlorhexidine 2% Cloths 1 Application(s) Topical <User Schedule>  pantoprazole Infusion 8 mG/Hr (10 mL/Hr) IV Continuous <Continuous>    MEDICATIONS  (PRN):  aluminum hydroxide/magnesium hydroxide/simethicone Suspension 30 milliLiter(s) Oral every 4 hours PRN Dyspepsia      Allergies  No Known Allergies      PHYSICAL EXAM:   Vital Signs:  Vital Signs Last 24 Hrs  T(C): 36.7 (2025 04:52), Max: 37 (2025 21:30)  T(F): 98.1 (2025 04:52), Max: 98.6 (2025 21:30)  HR: 89 (2025 04:52) (89 - 96)  BP: 101/62 (2025 04:52) (101/62 - 135/68)  BP(mean): --  RR: 18 (2025 04:52) (18 - 18)  SpO2: 95% (2025 04:52) (91% - 98%)    Parameters below as of 2025 04:52  Patient On (Oxygen Delivery Method): room air      Daily     Daily Weight in k.3 (2025 04:52)    GENERAL:  Appears stated age  HEENT:  NC/AT  CHEST:  Full & symmetric excursion  HEART:  Regular rhythm  ABDOMEN:  Soft, non-tender, non-distended  EXTEREMITIES:  no cyanosis  SKIN:  No rash  NEURO:  Somnolent      LABS:                        8.5    9.89  )-----------( 293      ( 2025 08:00 )             26.7     06-02    140  |  103  |  5[L]  ----------------------------<  93  3.3[L]   |  25  |  0.21[L]    Ca    8.3[L]      2025 08:00    TPro  5.4[L]  /  Alb  2.2[L]  /  TBili  0.6  /  DBili  x   /  AST  12[L]  /  ALT  11[L]  /  AlkPhos  57  06-01      Urinalysis Basic - ( 2025 08:00 )    Color: x / Appearance: x / SG: x / pH: x  Gluc: 93 mg/dL / Ketone: x  / Bili: x / Urobili: x   Blood: x / Protein: x / Nitrite: x   Leuk Esterase: x / RBC: x / WBC x   Sq Epi: x / Non Sq Epi: x / Bacteria: x

## (undated) DEVICE — GLOVE BIOGEL SZ 8 SURGICAL PF LTX - (50PR/BX 4BX/CA)

## (undated) DEVICE — WRAP CO-FLEX 4IN X 5YD STERIL - SELF-ADHERENT (18/CA)

## (undated) DEVICE — SUTURE 3-0 VICRYL PLUS SH - 27 INCH (36/BX)

## (undated) DEVICE — SENSOR SPO2 NEO LNCS ADHESIVE (20/BX) SEE USER NOTES

## (undated) DEVICE — KIT EVACUATER 3 SPRING PVC LF 1/8 DRAIN SIZE (10EA/CA)"

## (undated) DEVICE — BLADE SURGICAL CLIPPER - (50EA/CA)

## (undated) DEVICE — PAD LAP STERILE 18 X 18 - (5/PK 40PK/CA)

## (undated) DEVICE — DRESSING, WOUND VAC MED.

## (undated) DEVICE — ELECTRODE 850 FOAM ADHESIVE - HYDROGEL RADIOTRNSPRNT (50/PK)

## (undated) DEVICE — GLOVE BIOGEL SZ 6.5 SURGICAL PF LTX (50PR/BX 4BX/CA)

## (undated) DEVICE — VAC CANISTER W/GEL 500ML - FITS NEW MACHINES (10EA/CA)

## (undated) DEVICE — PACK LOWER EXTREMITY - (2/CA)

## (undated) DEVICE — MASK, LARYNGEAL AIRWAY #5

## (undated) DEVICE — KIT ROOM DECONTAMINATION

## (undated) DEVICE — SUTURE 3-0 ETHILON PS-1 (36PK/BX)

## (undated) DEVICE — NEPTUNE 4 PORT MANIFOLD - (20/PK)

## (undated) DEVICE — SET EXTENSION WITH 2 PORTS (48EA/CA) ***PART #2C8610 IS A SUBSTITUTE*****

## (undated) DEVICE — HEAD HOLDER JUNIOR/ADULT

## (undated) DEVICE — MASK ANESTHESIA ADULT  - (100/CA)

## (undated) DEVICE — TUBING CLEARLINK DUO-VENT - C-FLO (48EA/CA)

## (undated) DEVICE — SODIUM CHL IRRIGATION 0.9% 1000ML (12EA/CA)

## (undated) DEVICE — TOURNIQUET, STERILE 24 (YELLOW)

## (undated) DEVICE — GOWN WARMING STANDARD FLEX - (30/CA)

## (undated) DEVICE — SUTURE 3-0 VICRYL PLUS SH - 8X 18 INCH (12/BX)

## (undated) DEVICE — BLADE SURGICAL #15 - (50/BX 3BX/CA)

## (undated) DEVICE — SUTURE GENERAL

## (undated) DEVICE — TRAY SKIN SCRUB PVP WET (20EA/CA) PART #DYND70356 DISCONTINUED

## (undated) DEVICE — SUCTION INSTRUMENT YANKAUER BULBOUS TIP W/O VENT (50EA/CA)

## (undated) DEVICE — DRAPE IOBAN II INCISE 23X17 - (10EA/BX 4BX/CA)

## (undated) DEVICE — BANDAGE ELASTIC 4 IN X 5 YDS - LATEX FREE(10/BX 5BX/CA)

## (undated) DEVICE — LEAD SET 6 DISP. EKG NIHON KOHDEN

## (undated) DEVICE — GLOVE BIOGEL INDICATOR SZ 6.5 SURGICAL PF LTX - (50PR/BX 4BX/CA)

## (undated) DEVICE — SET LEADWIRE 5 LEAD BEDSIDE DISPOSABLE ECG (1SET OF 5/EA)

## (undated) DEVICE — BLOCK

## (undated) DEVICE — CHLORAPREP 26 ML APPLICATOR - ORANGE TINT(25/CA)

## (undated) DEVICE — SODIUM CHL. IRRIGATION 0.9% 3000ML (4EA/CA 65CA/PF)

## (undated) DEVICE — CONTAINER SPECIMEN BAG OR - STERILE 4 OZ W/LID (100EA/CA)

## (undated) DEVICE — BLADE SAGITTAL SAW DUAL CUT 75.0 X 25.0MM (1/EA)

## (undated) DEVICE — LACTATED RINGERS INJ 1000 ML - (14EA/CA 60CA/PF)

## (undated) DEVICE — GLOVE BIOGEL SZ 7 SURGICAL PF LTX - (50PR/BX 4BX/CA)

## (undated) DEVICE — KIT ANESTHESIA W/CIRCUIT & 3/LT BAG W/FILTER (20EA/CA)

## (undated) DEVICE — DETERGENT RENUZYME PLUS 10 OZ PACKET (50/BX)

## (undated) DEVICE — GLOVE BIOGEL INDICATOR SZ 8.5 SURGICAL PF LTX - (50/BX 4BX/CA)

## (undated) DEVICE — ELECTRODE DUAL RETURN W/ CORD - (50/PK)

## (undated) DEVICE — DRESSING TRANSPARENT FILM TEGADERM 4 X 4.75" (50EA/BX)"

## (undated) DEVICE — SUTURE ETHILON 2-0 FSLX 30 (36PK/BX)"

## (undated) DEVICE — CANISTER SUCTION 3000ML MECHANICAL FILTER AUTO SHUTOFF MEDI-VAC NONSTERILE LF DISP  (40EA/CA)

## (undated) DEVICE — PROTECTOR ULNA NERVE - (36PR/CA)

## (undated) DEVICE — Device

## (undated) DEVICE — DRAPE LARGE 3 QUARTER - (20/CA)

## (undated) DEVICE — STOCKINET TUBULAR 6IN STERILE - 6 X 48YDS (25/CA)

## (undated) DEVICE — SET IRRIGATION CYSTOSCOPY TUBE L80 IN (20EA/CA)

## (undated) DEVICE — SHEET BILATERAL 76X120 - (12/CA)

## (undated) DEVICE — SUTURE 0 PDS CT-1 CR 8 X 18 (12PK/BX)"